# Patient Record
Sex: FEMALE | Race: WHITE | NOT HISPANIC OR LATINO | Employment: OTHER | URBAN - METROPOLITAN AREA
[De-identification: names, ages, dates, MRNs, and addresses within clinical notes are randomized per-mention and may not be internally consistent; named-entity substitution may affect disease eponyms.]

---

## 2017-01-03 ENCOUNTER — TELEPHONE (OUTPATIENT)
Dept: FAMILY MEDICINE CLINIC | Facility: CLINIC | Age: 77
End: 2017-01-03

## 2017-01-03 DIAGNOSIS — J40 BRONCHITIS: Primary | ICD-10-CM

## 2017-01-03 RX ORDER — AZITHROMYCIN 250 MG/1
TABLET, FILM COATED ORAL
Qty: 6 TABLET | Refills: 0 | Status: SHIPPED | OUTPATIENT
Start: 2017-01-03 | End: 2017-04-27

## 2017-01-03 NOTE — TELEPHONE ENCOUNTER
----- Message from Manuel Milan sent at 1/3/2017  8:53 AM EST -----  Regarding: Call back  Contact: [phone]  Ms. Carr wanted me to send you a message for you to call her. She said that she is leaving town tomorrow morning and has a bad chest cold. She was thinking that you would put her on some antibotics.

## 2017-01-09 ENCOUNTER — APPOINTMENT (OUTPATIENT)
Dept: PHYSICAL THERAPY | Facility: HOSPITAL | Age: 77
End: 2017-01-09

## 2017-01-12 ENCOUNTER — TELEPHONE (OUTPATIENT)
Dept: PHYSICAL THERAPY | Facility: HOSPITAL | Age: 77
End: 2017-01-12

## 2017-01-12 NOTE — TELEPHONE ENCOUNTER
Left Voice massage that patient did not show for bioimpedance f/u on 01/09/17. Instructed to call back to reschedule.

## 2017-01-16 ENCOUNTER — TRANSCRIBE ORDERS (OUTPATIENT)
Dept: ADMINISTRATIVE | Facility: HOSPITAL | Age: 77
End: 2017-01-16

## 2017-01-16 DIAGNOSIS — Z79.811 USE OF AROMATASE INHIBITORS: Primary | ICD-10-CM

## 2017-01-23 ENCOUNTER — APPOINTMENT (OUTPATIENT)
Dept: BONE DENSITY | Facility: HOSPITAL | Age: 77
End: 2017-01-23

## 2017-01-24 RX ORDER — ROSUVASTATIN CALCIUM 20 MG/1
TABLET, COATED ORAL
Qty: 90 TABLET | Refills: 0 | Status: SHIPPED | OUTPATIENT
Start: 2017-01-24 | End: 2017-05-22 | Stop reason: SDUPTHER

## 2017-01-24 RX ORDER — ESCITALOPRAM OXALATE 20 MG/1
TABLET ORAL
Qty: 90 TABLET | Refills: 0 | Status: SHIPPED | OUTPATIENT
Start: 2017-01-24 | End: 2017-04-28 | Stop reason: SDUPTHER

## 2017-01-24 NOTE — TELEPHONE ENCOUNTER
Please contact patient for an appointment to come in for a yearly check around April. I will refill her medications until then.

## 2017-01-26 ENCOUNTER — APPOINTMENT (OUTPATIENT)
Dept: BONE DENSITY | Facility: HOSPITAL | Age: 77
End: 2017-01-26

## 2017-01-27 ENCOUNTER — HOSPITAL ENCOUNTER (OUTPATIENT)
Dept: BONE DENSITY | Facility: HOSPITAL | Age: 77
Discharge: HOME | End: 2017-01-27
Admitting: NURSE PRACTITIONER

## 2017-01-27 DIAGNOSIS — Z79.811 USE OF AROMATASE INHIBITORS: ICD-10-CM

## 2017-01-27 PROCEDURE — 77080 DXA BONE DENSITY AXIAL: CPT

## 2017-01-31 RX ORDER — PANTOPRAZOLE SODIUM 40 MG/1
40 TABLET, DELAYED RELEASE ORAL DAILY
Qty: 90 TABLET | Refills: 0 | Status: SHIPPED | OUTPATIENT
Start: 2017-01-31 | End: 2017-06-23 | Stop reason: SDUPTHER

## 2017-01-31 RX ORDER — ATENOLOL 100 MG/1
TABLET ORAL
Qty: 90 TABLET | Refills: 0 | Status: SHIPPED | OUTPATIENT
Start: 2017-01-31 | End: 2017-04-27 | Stop reason: SDUPTHER

## 2017-01-31 RX ORDER — LEVOTHYROXINE SODIUM 0.1 MG/1
100 TABLET ORAL DAILY
Qty: 90 TABLET | Refills: 0 | Status: SHIPPED | OUTPATIENT
Start: 2017-01-31 | End: 2017-04-23 | Stop reason: DRUGHIGH

## 2017-01-31 RX ORDER — ATENOLOL 100 MG/1
TABLET ORAL
Qty: 90 TABLET | Refills: 0 | Status: SHIPPED | OUTPATIENT
Start: 2017-01-31 | End: 2017-08-22 | Stop reason: SDUPTHER

## 2017-02-22 RX ORDER — LEVOTHYROXINE SODIUM 0.1 MG/1
TABLET ORAL
Qty: 30 TABLET | Refills: 3 | Status: SHIPPED | OUTPATIENT
Start: 2017-02-22 | End: 2017-04-23 | Stop reason: DRUGHIGH

## 2017-02-22 RX ORDER — PANTOPRAZOLE SODIUM 40 MG/1
TABLET, DELAYED RELEASE ORAL
Qty: 30 TABLET | Refills: 3 | Status: SHIPPED | OUTPATIENT
Start: 2017-02-22 | End: 2017-04-27 | Stop reason: SDUPTHER

## 2017-03-24 ENCOUNTER — TELEPHONE (OUTPATIENT)
Dept: SURGERY | Facility: CLINIC | Age: 77
End: 2017-03-24

## 2017-04-17 DIAGNOSIS — E78.2 MIXED HYPERLIPIDEMIA: Primary | ICD-10-CM

## 2017-04-17 DIAGNOSIS — E03.9 UNSPECIFIED HYPOTHYROIDISM: ICD-10-CM

## 2017-04-17 DIAGNOSIS — R53.83 LACKING IN ENERGY: ICD-10-CM

## 2017-04-17 DIAGNOSIS — Z79.899 HIGH RISK MEDICATION USE: ICD-10-CM

## 2017-04-21 LAB
ALBUMIN SERPL-MCNC: 4.5 G/DL (ref 3.5–5.2)
ALBUMIN/GLOB SERPL: 1.6 G/DL
ALP SERPL-CCNC: 84 U/L (ref 39–117)
ALT SERPL-CCNC: 20 U/L (ref 1–33)
AST SERPL-CCNC: 27 U/L (ref 1–32)
BILIRUB SERPL-MCNC: 0.3 MG/DL (ref 0.1–1.2)
BUN SERPL-MCNC: 14 MG/DL (ref 8–23)
BUN/CREAT SERPL: 15.1 (ref 7–25)
CALCIUM SERPL-MCNC: 10.2 MG/DL (ref 8.6–10.5)
CHLORIDE SERPL-SCNC: 103 MMOL/L (ref 98–107)
CHOLEST SERPL-MCNC: 168 MG/DL (ref 0–200)
CO2 SERPL-SCNC: 27.2 MMOL/L (ref 22–29)
CREAT SERPL-MCNC: 0.93 MG/DL (ref 0.57–1)
ERYTHROCYTE [DISTWIDTH] IN BLOOD BY AUTOMATED COUNT: 12.9 % (ref 11.7–13)
GLOBULIN SER CALC-MCNC: 2.9 GM/DL
GLUCOSE SERPL-MCNC: 104 MG/DL (ref 65–99)
HCT VFR BLD AUTO: 41.9 % (ref 35.6–45.5)
HDLC SERPL-MCNC: 39 MG/DL (ref 40–60)
HGB BLD-MCNC: 13.3 G/DL (ref 11.9–15.5)
LDLC SERPL CALC-MCNC: 81 MG/DL (ref 0–100)
LDLC/HDLC SERPL: 2.08 {RATIO}
MCH RBC QN AUTO: 32.8 PG (ref 26.9–32)
MCHC RBC AUTO-ENTMCNC: 31.7 G/DL (ref 32.4–36.3)
MCV RBC AUTO: 103.2 FL (ref 80.5–98.2)
PLATELET # BLD AUTO: 259 10*3/MM3 (ref 140–500)
POTASSIUM SERPL-SCNC: 4.5 MMOL/L (ref 3.5–5.2)
PROT SERPL-MCNC: 7.4 G/DL (ref 6–8.5)
RBC # BLD AUTO: 4.06 10*6/MM3 (ref 3.9–5.2)
SODIUM SERPL-SCNC: 143 MMOL/L (ref 136–145)
TRIGL SERPL-MCNC: 240 MG/DL (ref 0–150)
TSH SERPL DL<=0.005 MIU/L-ACNC: 12.12 MIU/ML (ref 0.27–4.2)
VLDLC SERPL CALC-MCNC: 48 MG/DL (ref 5–40)
WBC # BLD AUTO: 4.13 10*3/MM3 (ref 4.5–10.7)

## 2017-04-23 RX ORDER — LEVOTHYROXINE SODIUM 0.12 MG/1
125 TABLET ORAL DAILY
Qty: 90 TABLET | Refills: 3 | Status: SHIPPED | OUTPATIENT
Start: 2017-04-23 | End: 2017-12-07 | Stop reason: SDUPTHER

## 2017-04-27 ENCOUNTER — OFFICE VISIT (OUTPATIENT)
Dept: FAMILY MEDICINE CLINIC | Facility: CLINIC | Age: 77
End: 2017-04-27

## 2017-04-27 VITALS
HEART RATE: 59 BPM | WEIGHT: 176 LBS | OXYGEN SATURATION: 97 % | DIASTOLIC BLOOD PRESSURE: 84 MMHG | BODY MASS INDEX: 30.05 KG/M2 | HEIGHT: 64 IN | SYSTOLIC BLOOD PRESSURE: 140 MMHG | RESPIRATION RATE: 16 BRPM

## 2017-04-27 DIAGNOSIS — Z23 NEED FOR VACCINATION: ICD-10-CM

## 2017-04-27 DIAGNOSIS — Z00.00 ROUTINE GENERAL MEDICAL EXAMINATION AT A HEALTH CARE FACILITY: Primary | ICD-10-CM

## 2017-04-27 DIAGNOSIS — E03.9 UNSPECIFIED HYPOTHYROIDISM: ICD-10-CM

## 2017-04-27 PROCEDURE — 90670 PCV13 VACCINE IM: CPT | Performed by: FAMILY MEDICINE

## 2017-04-27 PROCEDURE — 99213 OFFICE O/P EST LOW 20 MIN: CPT | Performed by: FAMILY MEDICINE

## 2017-04-27 PROCEDURE — G0009 ADMIN PNEUMOCOCCAL VACCINE: HCPCS | Performed by: FAMILY MEDICINE

## 2017-04-27 PROCEDURE — 90714 TD VACC NO PRESV 7 YRS+ IM: CPT | Performed by: FAMILY MEDICINE

## 2017-04-27 PROCEDURE — 90471 IMMUNIZATION ADMIN: CPT | Performed by: FAMILY MEDICINE

## 2017-04-27 PROCEDURE — G0439 PPPS, SUBSEQ VISIT: HCPCS | Performed by: FAMILY MEDICINE

## 2017-04-27 NOTE — PATIENT INSTRUCTIONS
Medicare Wellness  Personal Prevention Plan of Service     Date of Office Visit:  2017  Encounter Provider:  Goran Carr MD  Place of Service:  [facility]  Patient Name: Avelina Carr  :  1940    As part of the Medicare Wellness portion of your visit today, we are providing you with this personalized preventive plan of services (PPPS). This plan is based upon recommendations of the [place] Preventive Services Task Force (USPSTF) and the Advisory Committee on Immunization Practices (ACIP).    This lists the preventive care services that should be considered, and provides dates of when you are due. Items listed as completed are up-to-date and do not require any further intervention.    Health Maintenance   Topic Date Due   • TDAP/TD VACCINES (2 - Td) 2017   • LIPID PANEL  2018   • MEDICARE ANNUAL WELLNESS  2018   • MAMMOGRAM  2018   • INFLUENZA VACCINE  Completed   • PNEUMOCOCCAL VACCINES (65+ LOW/MEDIUM RISK)  Completed   • ZOSTER VACCINE  Completed       Orders Placed This Encounter   Procedures   • Pneumococcal Conjugate Vaccine 13-Valent All   • Td Vaccine Greater Than or Equal To 8yo With Preservative IM       No Follow-up on file.

## 2017-04-27 NOTE — PROGRESS NOTES
Medicare Subsequent Wellness Visit  Subjective   History of Present Illness    Avelina Carr is a 77 y.o. female who presents for an Medicare Wellness Visit. In addition, we addressed the following health issues:  Hypothyroidism. Her TSH was elevated on last labs. I had called her and started increased levothyroxine to 125 mcg.   She has elevated trigs but is on Crestor so I am assuming this is due to hypothyroidism.    PMH, PSH, SocHx, FamHx, Allergies, and Medications: Reviewed and updated in the history section of chart.  Family History   Problem Relation Age of Onset   • Hypertension Mother    • Hyperlipidemia Mother        Social History     Social History Narrative    Lives at home with        Allergies   Allergen Reactions   • Penicillins        Outpatient Medications Prior to Visit   Medication Sig Dispense Refill   • Probiotic Product (PROBIOTIC DAILY PO) Take  by mouth.     • rosuvastatin (CRESTOR) 20 MG tablet TAKE ONE TABLET BY MOUTH DAILY 90 tablet 0   • Triamcinolone Acetonide (NASACORT) 55 MCG/ACT nasal inhaler 2 sprays into each nostril daily.     • atenolol (TENORMIN) 100 MG tablet TAKE ONE TABLET BY MOUTH DAILY 90 tablet 0   • calcium-vitamin D (OSCAL-500) 500-200 MG-UNIT per tablet Take 1 tablet by mouth daily.     • clopidogrel (PLAVIX) 75 MG tablet Take 75 mg by mouth daily.     • diphenhydrAMINE (BENADRYL) 12.5 MG/5ML elixir Take  by mouth 4 (four) times a day as needed for itching.     • folic acid (FOLVITE) 1 MG tablet Take 1 mg by mouth daily.     • furosemide (LASIX) 20 MG tablet Take 1 tablet by mouth 2 (two) times a day. 90 tablet 1   • gabapentin (NEURONTIN) 800 MG tablet Take 800 mg by mouth 3 (three) times a day.     • letrozole (FEMARA) 2.5 MG tablet Take  by mouth daily.     • levothyroxine (SYNTHROID, LEVOTHROID) 125 MCG tablet Take 1 tablet by mouth Daily. 90 tablet 3   • Multiple Vitamins-Minerals (VISION-BAR PRESERVE PO) Take  by mouth.     • pantoprazole (PROTONIX) 40 MG  EC tablet Take 1 tablet by mouth Daily. 90 tablet 0   • atenolol (TENORMIN) 100 MG tablet TAKE ONE TABLET BY MOUTH DAILY 90 tablet 0   • azithromycin (ZITHROMAX Z-JUWAN) 250 MG tablet Take 2 tablets the first day, then 1 tablet daily for 4 days. 6 tablet 0   • azithromycin (ZITHROMAX Z-JUWAN) 250 MG tablet Take 2 tablets the first day, then 1 tablet daily for 4 days. 6 tablet 0   • escitalopram (LEXAPRO) 20 MG tablet TAKE ONE TABLET BY MOUTH DAILY 90 tablet 0   • Multiple Vitamin (MULTIVITAMIN) capsule Take 1 capsule by mouth daily.     • pantoprazole (PROTONIX) 40 MG EC tablet TAKE ONE TABLET BY MOUTH DAILY 30 tablet 3   • predniSONE (DELTASONE) 10 MG tablet 4 tabs po qd for 4 days, 3 tabs po qd for 4 days, 2 tabs po qd for 2 days, 1 tab po qd for 2 days 34 tablet 0   • Probiotic Product ([employer] PO) Take  by mouth.     • promethazine-dextromethorphan (PROMETHAZINE-DM) 6.25-15 MG/5ML syrup Take 5 mL by mouth 4 (four) times a day as needed for cough. 240 mL 0     No facility-administered medications prior to visit.         Patient Active Problem List   Diagnosis   • Soft tissue swelling of chest wall   • Abnormal ultrasound of breast   • Postmastectomy lymphedema syndrome   • Malignant neoplasm of upper-outer quadrant of right female breast         Patient Care Team:  Goran Carr MD as PCP - General (Family Medicine)  Health Habits:  Current Diet: Well balanced diet  Dental Exam. UTD  Eye Exam. UTD  Exercise:none  Current exercise activities include:    Recent Hospitalizations:  none    Age-appropriate Screening Schedule:  Refer to the list below for future screening recommendations based on patient's age. Orders for these recommended tests are listed in the plan section. The patient has been provided with a written plan.    Health Maintenance   Topic Date Due   • LIPID PANEL  04/21/2018   • MEDICARE ANNUAL WELLNESS  04/27/2018   • MAMMOGRAM  09/09/2018   • TDAP/TD VACCINES (3 - Td) 04/27/2027   •  INFLUENZA VACCINE  Completed   • PNEUMOCOCCAL VACCINES (65+ LOW/MEDIUM RISK)  Completed   • ZOSTER VACCINE  Completed       Depression Screen:   PHQ-9 Depression Screening 4/27/2017   Little interest or pleasure in doing things 0   Feeling down, depressed, or hopeless 0   Trouble falling or staying asleep, or sleeping too much 0   Feeling tired or having little energy 0   Poor appetite or overeating 0   Feeling bad about yourself - or that you are a failure or have let yourself or your family down 0   Trouble concentrating on things, such as reading the newspaper or watching television 0   Moving or speaking so slowly that other people could have noticed. Or the opposite - being so fidgety or restless that you have been moving around a lot more than usual 0   Thoughts that you would be better off dead, or of hurting yourself in some way 0   PHQ-9 Total Score 0   If you checked off any problems, how difficult have these problems made it for you to do your work, take care of things at home, or get along with other people? Not difficult at all       Functional and Cognitive Screening:  Functional & Cognitive Status 4/27/2017   Do you have difficulty preparing food and eating? No   Do you have difficulty bathing yourself? No   Do you have difficulty using the toilet? No   Do you have difficulty moving around from place to place? No   In the past year have you fallen or experienced a near fall? No   Do you need help using the phone?  No   Are you deaf or do you have serious difficulty hearing?  No   Do you need help with transportation? No   Do you need help shopping? No   Do you need help preparing meals?  No   Do you need help with housework?  Yes   Do you need help with laundry? No   Do you need help taking your medications? No     Does the patient have evidence of cognitive impairment? mild      Review of Systems   Constitutional: Positive for fatigue.   Musculoskeletal: Positive for back pain and myalgias.  "      Objective     Vitals:    04/27/17 1029   BP: 140/84   Pulse: 59   Resp: 16   SpO2: 97%   Weight: 176 lb (79.8 kg)   Height: 64\" (162.6 cm)       Body mass index is 30.21 kg/(m^2).    PHYSICAL EXAM  Vitals reviewed and on chart.  HEENT: PERRLA, EOMI. Oral mucosa moist,   No LAD.  CV: RRR, no murmurs, rubs, clicks or gallops  LUNGS: CTA bilaterally  EXT: No edema, FROM in bilateral upper and lower ext  NEURO: CN II - XII grossly intact        ASSESSMENT AND PLAN      Problem List Items Addressed This Visit     None      Visit Diagnoses     Routine general medical examination at a health care facility    -  Primary    Need for vaccination        Relevant Orders    Pneumococcal Conjugate Vaccine 13-Valent All (Completed)    Td Vaccine Greater Than or Equal To 8yo With Preservative IM (Completed)    Unspecified hypothyroidism    You are now on an increased dose at may direction and will  RTO for TSH in 2 months.            Orders:  Orders Placed This Encounter   Procedures   • Pneumococcal Conjugate Vaccine 13-Valent All   • Td Vaccine Greater Than or Equal To 8yo With Preservative IM       Follow Up:  Return in about 2 months (around 6/27/2017) for recheck thyroid.     ADVANCED DIRECTIVES: advised    An After Visit Summary and PPPS with all of these plans were given to the patient.            "

## 2017-04-28 RX ORDER — ESCITALOPRAM OXALATE 20 MG/1
TABLET ORAL
Qty: 90 TABLET | Refills: 0 | Status: SHIPPED | OUTPATIENT
Start: 2017-04-28 | End: 2017-07-25 | Stop reason: SDUPTHER

## 2017-05-01 ENCOUNTER — TELEPHONE (OUTPATIENT)
Dept: FAMILY MEDICINE CLINIC | Facility: CLINIC | Age: 77
End: 2017-05-01

## 2017-05-01 RX ORDER — GABAPENTIN 800 MG/1
800 TABLET ORAL 3 TIMES DAILY
Qty: 90 TABLET | Refills: 6 | Status: SHIPPED | OUTPATIENT
Start: 2017-05-01 | End: 2017-12-07 | Stop reason: DRUGHIGH

## 2017-05-22 RX ORDER — ROSUVASTATIN CALCIUM 20 MG/1
TABLET, COATED ORAL
Qty: 90 TABLET | Refills: 0 | Status: SHIPPED | OUTPATIENT
Start: 2017-05-22 | End: 2017-08-21 | Stop reason: SDUPTHER

## 2017-06-23 RX ORDER — PANTOPRAZOLE SODIUM 40 MG/1
TABLET, DELAYED RELEASE ORAL
Qty: 90 TABLET | Refills: 0 | Status: SHIPPED | OUTPATIENT
Start: 2017-06-23 | End: 2017-12-07 | Stop reason: SDUPTHER

## 2017-07-25 RX ORDER — ESCITALOPRAM OXALATE 20 MG/1
TABLET ORAL
Qty: 90 TABLET | Refills: 0 | Status: SHIPPED | OUTPATIENT
Start: 2017-07-25 | End: 2017-10-24 | Stop reason: SDUPTHER

## 2017-07-25 RX ORDER — FUROSEMIDE 20 MG/1
TABLET ORAL
Qty: 180 TABLET | Refills: 0 | Status: SHIPPED | OUTPATIENT
Start: 2017-07-25 | End: 2017-12-06 | Stop reason: HOSPADM

## 2017-08-21 RX ORDER — ATENOLOL 100 MG/1
TABLET ORAL
Qty: 90 TABLET | Refills: 0 | Status: SHIPPED | OUTPATIENT
Start: 2017-08-21 | End: 2017-12-07 | Stop reason: SDUPTHER

## 2017-08-21 RX ORDER — ROSUVASTATIN CALCIUM 20 MG/1
TABLET, COATED ORAL
Qty: 90 TABLET | Refills: 0 | Status: SHIPPED | OUTPATIENT
Start: 2017-08-21 | End: 2017-12-07 | Stop reason: SDUPTHER

## 2017-08-22 RX ORDER — ATENOLOL 100 MG/1
100 TABLET ORAL DAILY
Qty: 90 TABLET | Refills: 0 | Status: SHIPPED | OUTPATIENT
Start: 2017-08-22 | End: 2017-11-15 | Stop reason: SDUPTHER

## 2017-09-11 ENCOUNTER — APPOINTMENT (OUTPATIENT)
Dept: MAMMOGRAPHY | Facility: HOSPITAL | Age: 77
End: 2017-09-11
Attending: SURGERY

## 2017-09-12 ENCOUNTER — HOSPITAL ENCOUNTER (OUTPATIENT)
Dept: MAMMOGRAPHY | Facility: HOSPITAL | Age: 77
Discharge: HOME | End: 2017-09-12
Attending: SURGERY | Admitting: SURGERY

## 2017-09-12 DIAGNOSIS — Z12.39 BREAST SCREENING: ICD-10-CM

## 2017-09-12 DIAGNOSIS — Z12.31 ENCOUNTER FOR SCREENING MAMMOGRAM FOR MALIGNANT NEOPLASM OF BREAST: ICD-10-CM

## 2017-09-12 PROCEDURE — G0202 SCR MAMMO BI INCL CAD: HCPCS

## 2017-09-12 PROCEDURE — 77063 BREAST TOMOSYNTHESIS BI: CPT

## 2017-09-15 ENCOUNTER — TELEPHONE (OUTPATIENT)
Dept: FAMILY MEDICINE CLINIC | Facility: CLINIC | Age: 77
End: 2017-09-15

## 2017-09-15 NOTE — TELEPHONE ENCOUNTER
----- Message from Charlette Mc sent at 9/14/2017  3:25 PM EDT -----  [facility] called they have received an order from [facility] for vertigo, was requesting a last office note within 90 days (last seen in April), asking, if Dr. Carr would write something up and send it to them so Medicare will pay for visits.  Cira Landeros [phone]

## 2017-09-15 NOTE — TELEPHONE ENCOUNTER
No, we must abide by medicare guidelines, which are:  Pt must have been seen within 90 days prior to start of HH or within 30 days after the start of home health.  She can be seen within that 30 day period either w/ [facility] or one of the ARNP's.

## 2017-09-18 ENCOUNTER — OFFICE VISIT (OUTPATIENT)
Dept: SURGERY | Facility: CLINIC | Age: 77
End: 2017-09-18

## 2017-09-18 ENCOUNTER — TELEPHONE (OUTPATIENT)
Dept: SURGERY | Facility: CLINIC | Age: 77
End: 2017-09-18

## 2017-09-18 VITALS
WEIGHT: 176 LBS | HEART RATE: 62 BPM | DIASTOLIC BLOOD PRESSURE: 83 MMHG | BODY MASS INDEX: 30.05 KG/M2 | TEMPERATURE: 98.5 F | OXYGEN SATURATION: 98 % | HEIGHT: 64 IN | SYSTOLIC BLOOD PRESSURE: 164 MMHG

## 2017-09-18 DIAGNOSIS — C50.411 MALIGNANT NEOPLASM OF UPPER-OUTER QUADRANT OF RIGHT FEMALE BREAST (HCC): Primary | ICD-10-CM

## 2017-09-18 DIAGNOSIS — I97.2 POSTMASTECTOMY LYMPHEDEMA SYNDROME: ICD-10-CM

## 2017-09-18 PROCEDURE — 99213 OFFICE O/P EST LOW 20 MIN: CPT | Performed by: SURGERY

## 2017-09-18 NOTE — PROGRESS NOTES
Chief Complaint: Paloma Carr is a 77 y.o. female who was seen in consultation at the request of Artem Martinez MD  for Malignant neoplasm of upper-outer quadrant of right female breast and a followup visit    History of Present Illness:  Patient presents with a newly diagnosed RIGHT breast cancer. She noted no masses, skin changes, nipple discharge, nipple changes prior to her most recent imaging.     Her most recent imaging includes:  03/16/09          E           Bilateral Mammography             Avelina Carr  Scattered fibroglandular densities.   BIRADS category 1: Negative     03/17/10          E           Mammo Screening Bilateral            Avelina Carr   Scattered fibroglandular densities.   BIRADS category 1: Negative     09/04/14        [facility]             Bone Mineral Densitometry             Avelina Carr   IMPRESSION: Osteopenia.     09/04/14          E            Mammo Screening Bilateral            Avelina Carr   There is some increased density outer aspect of the right breast which has a slightly nodular appearance. Benign-appearing lymph nodes are seen in both axilla.   BIRADS category 0    09/19/14            [facility]              Right Diagnostic Mammography with Right Breast Sonography            Avelina Carr   There is persistence of the area of focal asymmetry although the area partially resolves.   ULTRASOUND: Right breast lateral half. At the 9 o'clock position 6 cm from the nipple, there is a 1.1 x 0.7 x 0.9 cm heterogeneous hypoechoic region.   IMPRESSION: 1.1 cm vague heterogeneous hypoechoic region 9 o'clock right breast 6 cm from the nipple.   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   Grade: Intermediate (Tubules=3, Nuclei=2, Mitosis=1).   Largest focus spanning at least 0.8 cm.     She has not had  a breast biopsy in  "the past.  She has her uterus and ovaries, is postmenopausal, and takes no hormones.  Her family history includes the following:no breast or ovarian cancer    MRI showed 2cm additional enhancement posteriorly, rec removal at surgery. Entire area 3.3 cm AP dimension. No findings LEFt no abnormal nodes. Will not biopsy or place clip due to need to come off of plavix. Instead, will reimage after treatment before resection with MRI.  Her receptors returend as [id number] her 2 1+.   Saw Dr Martinez, who started neoadjuvant femara .  He noted a decrease in the mass. She tells me that she never noted it to begin with so has noted no changes.  She reports hot flashes, muscle aches, and fatigue on the femara, but is \"dealing with it.\"    In the interim, Mrs. Carr has done well. She has continued on the femara with minimal side effects.  She can no longer appreciate the mass.  Her post treatment MRi showed   05/04/15        [facility]         Bilateral Breast MRI          Paloma Carr  IMPRESSION:   There are no findings suspicious for malignancy in the left breast or in the right breast. The metallic clip denoting the site of biopsy-proven malignancy is stable in position. The imaging features are most consistent with that of complete resolution to neoadjuvant chemotherapy.    She has lost 3 #.    We went to the operating room on 5-27-15 for a RIGHT lumpectomy, with a suspected path CR based on imaging. Unfortunately, her pathology returned as     5-27-15 RIGHT lumpectomy returend as IMC, NST, int grade (2,1,1), 2.5 cm, single focus, DCIS focally present, margins as follows:  - anterior margin focally positive for invasive carcinoma, Inferior margin focally positive for invasive cancer, DCIS 5mm from edge, medial margin positive for invasive cancer at multiple foci, DCIS within 4 mm, lateral margin invasive to within < 1mm lateral edge and DCIS within 3 mm.     She reports modest discomfort, denies erythema, warmth, drainage " from the incision.       We went to the operating room on 6-4-15 for a RIGHT total mastectomy and SLNB. Her pathology returend as no residual insitu or invasive in the breast, a 4mm metastatic deposit in an intramammary parth and 1/2 sentinel nodes with a 2.3mm deposit. path stage [id number].    She reports that her drain has had 80-60-45 over the past 3 days. Initially was 200 per day.  She takes pain medication before bed at night.    In the interim,  Mrs. Carr  has done well.  She has noted no changes in her breast exam. No new masses, skin changes,  nipple changes, nipple discharge LEFT breast.  She reports skin thickening RIGHT chest wall with tenderness, but no nodules or discolorations on the chest wall.   Denies lumps in her armpit or neck.  She denies headache, bone pain, belly pain, cough, changes in vision or gait.    Her most recent imaging includes the followin/08/15     [facility]      SCREENING MAMMOGRAM                  Avelina Carr   Scattered fibroglandular densities.   BI-RADS Category 1: negative       In the interim,  Mrs. Carr has done well.  She has noted no changes in her LEFT breast exam. No new masses, skin changes,  nipple changes, nipple discharge either breast.   She reports a swelling on her RIGHT chest wall that has been present for 3 months.  SHe thinks that it may be larger than when she initially identified it.  It is tender. She denies any fever, drainage, redness.  She is still taking her plavix.  I arranged a RIGHT chest wall ultrasound once she called to notify us of the problem:  12/09/15                  [facility]              CHEST SONOGRAPHY                   Avelina Carr   IMPRESSION: 6.5cm complex septated partially cystic mass at the mastectomy site in the right breast. 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 Aspiration to alleviate the mass should be considered but the mass may not completely drain percutaneously. Should there be any persistent portion then 6-month  sonographic followup is recommended.     She has seen PT and they worked with her arm and gave her a sleeve for swelling. She does not routinely wear this at this time. She has not noted any swelling.    SHe did not see Dr Mcdowell about the rib pain and no longer has symptoms similar to esophagitis.        Mrs. Carr called due to persistent discomfort in her right chest wall at the area of the hematoma.  We therefore arranged for a right chest wall ultrasound and to see me after.  The ultrasound showed a dimension of the hematoma that was essentially unchanged just slightly longer and thinner and shape.  This was 8.1 x 6 cm.  It was described as a multicystic septated mass consistent with hematoma.    He has gained 2 pounds.  She has no other changes.    She does report that her right arm feels some discomfort.  She saw Dr. Pham and he feels that this is related to the pediatric issues with her right shoulder for which they have elected not to operate.  I asked her whether her sleeve improved the symptoms and she said that it did not.      In the interim,  Paloma Carr  has done well.  She has noted no changes in her breast exam. No new masses, skin changes, nipple changes, nipple discharge either breast.   She denies headache, bone pain, belly pain, cough, changes in vision or gait.    She has had a period of inactivity.  Over the summer she had bilateral leg skin biopsies in dermatology that did not heal due to venous insufficiency.  After this her dermatologist Dr. Acosta sent her to the wound care clinic where she is seeing Dr. Peterson.  He has her wrapped in Unna boots with a silver salve underneath.  She has been wearing this for 6 weeks.    Her most recent imaging includes the following:  Left screening mammogram September 13, 2016.  No findings suspicious for malignancy BI-RADS 1, [facility].    She reports that the hematoma has completely resolved and she has minimal discomfort at this site.  She does  tell me that her prosthesis is very heavy and that she does not like it.    Interval History:  In the interim,  Paloma Carr  has done well.  She has noted no changes in her breast exam. No new masses, skin changes, nipple changes, nipple discharge LEFT breast.  No nodules or discolorations RIGHT chest wall.    She denies headache, bone pain, belly pain, cough, changes in vision or gait.    Her most recent imaging includes the following:  [facility] mamm[facility] with 3-D.  Fatty replaced left breast parenchyma.  No suspicious findings BI-RADS 1.  She has been having trouble with the crystals in her ear and vertigo.  She continues the Femara under the direction of Dr. Martinez and saw him last week.  She does not wear prosthesis because it is uncomfortable.  She complains of discomfort in her right shoulder related to rotator cuff injury.      Review of Systems:  Review of Systems   Constitutional: Positive for unexpected weight change (6 lb wt gain).   Skin: Positive for wound (seeing [facility] for non-healing wounds in legs bilaterally).   All other systems reviewed and are negative.       Past Medical and Surgical History:  Breast Biopsy History:  Patient has had  1 number of breast biopsies in the past. .  Breast Cancer HIstory:  Patient does not have a past medical history of breast cancer.  Breast Operations, and year:  The patient has never had a breast operation.    Obstetric/Gynecologic History:  Age menstrual periods began:13-13  Patient is postmenopausal, entered menopause naturally at age: The date of her last menstrual period was  not having anymore mp.  The patient started into menopause at age  54.    Number of pregnancies:4  Number of live births: 4  Number of abortions or miscarriages: none  Age of delivery of first child: 15  Patient did not breast feed.  Length of time taking birth control pills:15 yrs  n/a      Past Surgical History:   Procedure  "Laterality Date   • ADRENAL GLAND SURGERY     • BRAIN MENINGIOMA EXCISION      occipital   •  SECTION     • TOTAL SHOULDER REPLACEMENT      x2         Past Medical History:   Diagnosis Date   • Anxiety    • Arthritis    • COPD (chronic obstructive pulmonary disease)    • CVA (cerebral infarction)    • CVD (cardiovascular disease)    • Depression    • Hyperlipemia    • Hypertension    • Hypothyroid    • Macular degeneration    • Meningioma     OCCIPITAL MENINGIOMA-REMOVED BY DR TOLLIVER    • Overweight    • Vertigo        Prior Hospitalizations, other than for surgery or childbirth, and year:  na      Social History     Social History   • Marital status:      Spouse name: Vitaly   • Number of children: 4   • Years of education: N/A     Occupational History   • artist      Social History Main Topics   • Smoking status: Former Smoker     Packs/day: 2.00     Years: 40.00     Types: Cigarettes   • Smokeless tobacco: Not on file      Comment: non-smoker since    • Alcohol use No   • Drug use: No   • Sexual activity: Yes     Partners: Male     Birth control/ protection: Post-menopausal     Other Topics Concern   • Not on file     Social History Narrative    Lives at home with      Patient is .  Patient is a homemaker.  Patient drinks 3 servings of caffeine per day.      Family History:  Family History   Problem Relation Age of Onset   • Hypertension Mother    • Hyperlipidemia Mother        Vital Signs:  /83  Pulse 62  Temp 98.5 °F (36.9 °C)  Ht 64\" (162.6 cm)  Wt 176 lb (79.8 kg)  SpO2 98%  BMI 30.21 kg/m2     Medications:    Current Outpatient Prescriptions:   •  atenolol (TENORMIN) 100 MG tablet, TAKE ONE TABLET BY MOUTH DAILY, Disp: 90 tablet, Rfl: 0  •  atenolol (TENORMIN) 100 MG tablet, Take 1 tablet by mouth Daily., Disp: 90 tablet, Rfl: 0  •  calcium-vitamin D (OSCAL-500) 500-200 MG-UNIT per tablet, Take 1 tablet by mouth daily., Disp: , Rfl:   •  " clopidogrel (PLAVIX) 75 MG tablet, Take 75 mg by mouth daily., Disp: , Rfl:   •  diphenhydrAMINE (BENADRYL) 12.5 MG/5ML elixir, Take  by mouth 4 (four) times a day as needed for itching., Disp: , Rfl:   •  escitalopram (LEXAPRO) 20 MG tablet, TAKE ONE TABLET BY MOUTH DAILY, Disp: 90 tablet, Rfl: 0  •  folic acid (FOLVITE) 1 MG tablet, Take 1 mg by mouth daily., Disp: , Rfl:   •  furosemide (LASIX) 20 MG tablet, TAKE ONE TABLET BY MOUTH TWICE A DAY, Disp: 180 tablet, Rfl: 0  •  gabapentin (NEURONTIN) 800 MG tablet, Take 1 tablet by mouth 3 (Three) Times a Day., Disp: 90 tablet, Rfl: 6  •  letrozole (FEMARA) 2.5 MG tablet, Take  by mouth daily., Disp: , Rfl:   •  levothyroxine (SYNTHROID, LEVOTHROID) 125 MCG tablet, Take 1 tablet by mouth Daily., Disp: 90 tablet, Rfl: 3  •  Multiple Vitamins-Minerals (VISION-BAR PRESERVE PO), Take  by mouth., Disp: , Rfl:   •  pantoprazole (PROTONIX) 40 MG EC tablet, TAKE ONE TABLET BY MOUTH DAILY, Disp: 90 tablet, Rfl: 0  •  Probiotic Product (PROBIOTIC DAILY PO), Take  by mouth., Disp: , Rfl:   •  rosuvastatin (CRESTOR) 20 MG tablet, TAKE ONE TABLET BY MOUTH DAILY, Disp: 90 tablet, Rfl: 0  •  Triamcinolone Acetonide (NASACORT) 55 MCG/ACT nasal inhaler, 2 sprays into each nostril daily., Disp: , Rfl:      Physical Examination:  General Appearance:  5'4 168#  Patient is in no distress.  She is well kept and has an  overweight build.   Psychiatric:  Patient with appropriate mood and affect. Alert and oriented to self, time, and place.    Breast, RIGHT:  surgically absent with a well healed oblique incision. No nodules or discolorations.  There is no longer residual hematoma at the RIGHT LOQ location, inferior to her incision.      Breast, LEFT:   medium  sized, asymmetric with the contralateral surgically absent side.  Breast skin is without erythema, edema, rashes.  There are no visible abnormalities upon inspection during the arm-raising maneuver or with hands on hips in the sitting  position. There is no nipple retraction, discharge or nipple/areolar skin changes. There are no masses palpable in the sitting or supine positions.    Lymphatic:  There is no axillary, cervical, infraclavicular, or supraclavicular adenopathy bilaterally. There is no significant edema on examination today.  Eyes:  Pupils are round and reactive to light.  Cardiovascular:  Heart rate and rhythm are regular.  Respiratory:  Lungs are clear bilaterally with no crackles or wheezes in any lung field.  Gastrointestinal:  Abdomen is soft, nondistended, and nontender.  Musculoskeletal:  Good strength in all 4 extremities.  There is good range of motion in both shoulders.   Skin:  No new skin lesions or rashes on the skin excluding the breast (see breast exam above).    Imagin09          E           Bilateral Mammography             Avelina Carr  Scattered fibroglandular densities.   BIRADS category 1: Negative     03/17/10          E           Mammo Screening Bilateral            Avelina Carr   Scattered fibroglandular densities.   BIRADS category 1: Negative     14        [facility]             Bone Mineral Densitometry             Avelina Carr   IMPRESSION: Osteopenia.     14          E            Mammo Screening Bilateral            Avelina Carr   There is some increased density outer aspect of the right breast which has a slightly nodular appearance. Benign-appearing lymph nodes are seen in both axilla.   BIRADS category 0    14            E              Right Diagnostic Mammography with Right Breast Sonography            Avelina Carr   There is persistence of the area of focal asymmetry although the area partially resolves.   ULTRASOUND: Right breast lateral half. At the 9 o'clock position 6 cm from the nipple, there is a 1.1 x 0.7 x 0.9 cm heterogeneous hypoechoic region.   IMPRESSION: 1.1 cm vague heterogeneous hypoechoic region 9 o'clock right breast 6 cm from the nipple.   BIRADS category  4    10/09/14          [facility]           Bilateral Breast MRI            Avelina Carr   fluid containing fluid biopsy cavity. posterior one-third right breast 10 o'clock 1.9 x 1.5 x 1.6 cm in diameter. Marking clip in the far anterior aspect of the biopsy cavity. Rim of enhancement surrounding the biopsy cavity. Particularly anteriorly where there are areas of above-threshold enhancement. In addition, there is linear enhancement extending posteriorly and medially from the biopsy cavity to the level of the pectoralis fascia.   I cannot completely exclude DCIS extending posteriorly from the biopsy cavity. Therefore, if lumpectomy is performed, it would be prudent to excise this tissue if possible. This extends 2 cm posterior to the posterior margin of the biopsy cavity. A lumpectomy specimen including this area would be approximately 3.3 cm in AP dimension.   There is no axillary lymphadenopathy.   IMPRESSION: There is also a 2 cm long focus of linear enhancement extending posteriorly medially from the biopsy cavity toward the chest wall. DCIS cannot be entirely excluded. There are no suspicious findings in the left breast.     Bedside Ultrasound Breast after 3 months of femara  Procedure: Diagnostic ultrasound RIGHT breast.  Indication: mid neoadjuvant, treatment response assessment  Description of procedure: Using a 10MHz linear transducer, I scanned the breast at the area of interest.  Findings: At the RIGHT breast 9:30 6.5 CFN location, there is a reflecctor that represents the marker centrally within an area of heterogeneity. This measures 1.65 x 0.41 cm in the RAD dimension x 0.72 x 0.35 cm in the ARAD dimension.   Initially, there were 2 areas of heterogeneity present. The more lateral suggests post biopsy hematoma with internal reflector suggestive of clip. The more medial suggests tumor. The area to include both lesions spanned 4.18 cm in the radial dimension.   Impression: Biopsy proven area of malignancy is  visualized as a single area of heterogeneity with reflector centrally representing marker. All at the site of palpable nodularity.  Plan:  as below    05/04/15        [facility]         Bilateral Breast MRI          Paloma Carr  IMPRESSION:   There are no findings suspicious for malignancy in the left breast or in the right breast. The metallic clip denoting the site of biopsy-proven malignancy is stable in position. The imaging features are most consistent with that of complete resolution to neoadjuvant chemotherapy.    09/08/15     [facility]      SCREENING MAMMOGRAM                  Shala Carr   Scattered fibroglandular densities.   BI-RADS Category 1: negative     12/09/15                  [facility]              CHEST SONOGRAPHY                   Shala Carr   IMPRESSION: 6.5cm complex septated partially cystic mass at the mastectomy site in the right breast. 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 Aspiration to alleviate the mass should be considered but the mass may not completely drain percutaneously. Should there be any persistent portion then 6-month sonographic followup is recommended.     03-02-16                          CHEST SONO                                    SHALA CARR  There is an 8.1 x 1.8 x 6  cm  multiseptated  predominantly  fluid- filled mass that is consistent with a postmastectomy  bed hematoma.  While the greastest  dimension measures  slightly  larger, the transverse dimension is slightly smaller. Overall  no significant interval change.  Stable right postmastectomy   bed  Hematoma    9-9-16   [facility]  MAMMO SCREENING MODIFIED LEFT  SHALA CARR  Scattered fibroglandular densisites.  BIRADS CATEGORY:  1 Negative    [facility] September 16, 2017 left mammogram with 3-D.  Fatty replaced left breast parenchyma.  No suspicious findings BI-RADS 1    Pathology:  10/02/14          [facility]            Pathology               Shala Carr   Right breast mass  Invasive ductal carcinoma focally with  "lobular features.   Grade: Intermediate (Tubules=3, Nuclei=2, Mitosis=1).   Largest focus spanning at least 0.8 cm.       Received: 5/27/2015  Reported: 5/28/2015    Clinical Diagnosis and History       Right breast cancer        Operation: Right breast needle localized lumpectomy  fresh for permanent   not for calcifications  calcifications not indicated as per reviewing  radiologist Manuel Epps M.D.       Diagnosis  1: BREAST, RIGHT, LUMPECTOMY WITH WIRE-GUIDED LOCALIZATION:       INVASIVE DUCTAL CARCINOMA AND ASSOCIATED DUCTAL CARCINOMA IN SITU       (PATIENT IS STATUS POST PRESURGICAL / NEOADJUVANT THERAPY).       SEE SYNOPTIC REPORT (BELOW) FOR ADDITIONAL DETAILS.         2: BREAST, RIGHT, DESIGNATED \"ADDITIONAL RIGHT SUPERIOR MARGIN\", EXCISION:       BENIGN BREAST TISSUE.    3: BREAST, RIGHT, DESIGNATED \"ADDITIONAL INFERIOR MARGIN\", EXCISION:       BENIGN BREAST TISSUE.    4: BREAST, RIGHT, DESIGNATED \"ADDITIONAL MEDIAL MARGIN\", EXCISION:       SINGLE FOCUS OF ATYPICAL DUCT HYPERPLASIA, EXTENDING TO WITHIN LESS       THAN 1 MM OF NEW MEDIAL MARGIN (SEE COMMENT).    COMMENT: This focus is obscured by cautery artifact; therefore, ductal carcinoma  in situ (DCIS) is difficult to exclude with certainty.    5: BREAST, RIGHT, DESIGNATED \"ADDITIONAL LATERAL MARGIN\", EXCISION:       BENIGN BREAST TISSUE.    SYNOPTIC REPORT (Based on [facility] Cancer Case Summary, version December 2013):       Procedure: Excision with wire-guided localization and subsequent reexcision  of superior, inferior,       medial, and lateral margins.       Lymph Node Sampling: Not performed.       Specimen Laterality: Right.       Tumor Site: Not specified.       Histologic Type of Invasive Carcinoma: Invasive ductal carcinoma.       Tumor Size (size of largest invasive carcinoma): Approximately 2.5 cm  (estimate based on both       gross evaluation and the number of consecutive cross sections containing  invasive carcinoma       multiplied by " average thickness of tissue sections).       Histologic Grade (Matt Histologic Score):            Glandular (Acinar)/Tubular Differentiation: Score 2.            Nuclear Pleomorphism: Score 2.            Mitotic Rate: Score 1.            Overall Grade: Grade 1 (Score 5 of 9).       Tumor Focality: Single focus of invasive carcinoma.       Ductal Carcinoma In Situ (DCIS): DCIS is focally present.            Architectural Pattern: Soild.            Nuclear Grade: Grade II (intermediate).            Necrosis: Not identified.       Lobular Carcinoma In Situ (LCIS): Not identified.       Margins: Focally positive for invasive carcinoma, but negative for DCIS.                Anterior margin: Focally positive for invasive carcinoma; DCIS  greater than 10 mm from            anterior margin.                Posterior margin: Greater than 10 mm from invasive carcinoma and  from DCIS.               Superior margin: Invasive carcinoma extends to within 10 mm of  superior edge of            specimen #1 and DCIS extends to within 9 mm of superior edge of  specimen #1;       re-excised superior margin (specimen #2) is negative for tumor.               Inferior margin: Focally positive for invasive carcinoma (specimen  #1); DCIS extends to            within 5 mm of inferior edge of specimen #1; reexcised inferior margin  (specimen #3)       demonstrates no evidence of tumor (see NOTE).                 Medial margin: Positive for invasive carcinoma (multiple foci in  specimen #1); DCIS            extends to within 4 mm of medial edge of specimen #1; re-excised medial  margin       (specimen #4) demonstrates atypical duct hyperplasia and possible DCIS less  than 1       mm from new medial margin (see NOTE).               Lateral margin: Invasive carcinoma focally extends to within less  than 1 mm of lateral edge            of specimen #1 and DCIS extends to within 3 mm of lateral edge of  specimen #1;       re-excised lateral  margin demonstrates no evidence of tumor.            NOTE: The re-excised margins are significantly smaller than their  corresponding edges on            the main lumpectomy specimen (specimen #1); therefore, these margins  may be       positive or close even after the subsequent re-excisions, depending on how  the       specimens are physically related to one another.  Correlation with clinical  and       intraoperative findings is suggested.  Treatment Effect (Response to Presurgical Neoadjuvant Therapy):            In the breast: Probable, focal response to presurgical therapy in the  invasive carcinoma.            In the lymph nodes: No lymph nodes removed.       Lymph-Vascular Invasion: Not identified.       Dermal Lymph-Vascular Invasion: No skin present for evaluation.       Pathologic Staging:            TNM Descriptors: y (post treatment).            Primary Tumor: ypT2.            Regional Lymph Nodes: ypNX (no nodes submitted or found).            Distant Metastasis: Not applicable.       Additional Pathologic Findings:             Fibrocystic changes with duct dilatation and stasis and apocrine  metaplasia.            Small fibroadenoma.            Small radial sclerosing lesion.            Small arteries with medial calcific sclerosis.       Ancillary Studies: ER, TX and HER-2/eva studies performed on previous  biopsy specimen (see       [id number]).      REYES/gini     Received: 6/4/2015  Reported: 6/5/2015    Clinical Diagnosis and History       Right breast cancer       Operation: Right breast mastectomy with right axilla sentinel node biopsy -  frozen section       Diagnosis  1: SENTINEL LYMPH NODE #1, RIGHT AXILLA, EXCISION:       ONE LYMPH NODE, POSITIVE FOR METASTATIC CARCINOMA (2.3 MM FOCUS OF       METASTATIC TUMOR, SEE COMMENT).          2: SENTINEL LYMPH NODE #2, RIGHT AXILLA, EXCISION:       ONE LYMPH NODE, NEGATIVE FOR METASTATIC CARCINOMA.    COMMENT: The original frozen section slides have  been reviewed and are negative  for metastatic carcinoma.  This focus of metastatic tumor appears only on deeper  permanent sections of one tissue block (FSC1B).   There is no evidence of  fibrous scarring in either lymph node.  The regional lymph node stage is ypN1a.   The above diagnosis was discussed with Dr. DANGELO Hernandez on 6/5/2015 at 4:45 pm.      The final diagnosis for the remaining specimen (right mastectomy) will be  reported in a supplemental report.      TDJ/je IHC/a/THM           Assessment:   Diagnosis Plan   1. Malignant neoplasm of upper-outer quadrant of right female breast  Ambulatory Referral to Physical Therapy Bioimpedance (Deep tissue massaeg RIGHT chest wall; RIGHT shoulder ROM IF ok with Dr Pahm)   2. Postmastectomy lymphedema syndrome  Ambulatory Referral to Physical Therapy Bioimpedance (Deep tissue massaeg RIGHT chest wall; RIGHT shoulder ROM IF ok with Dr Pham)      1-  RIGHT breast 9:30 6.5 CFN- 5x2 cm on exam, 4 cm on in office US, 1.1 cm on pre biopsy US, density on mammogram pre biopsy was 3.3 x 2.3 cm.  IMC, NST, lobular features, int grade, (t3n2m1), ER 90%  CA 90% HER2 IHC 1+  Clinical stage T2N0M0- IIA , possibly multifocal vs significant lobular growth pattern    - Dr Martinez, femara start - no postoperative chemotherapy  Initially on MRI spanned 3.3 cm, mammogram 3.3 cm. After 3 mo femara, 0.7 x 1.65cm on bedside ultrasound, and 2 cm down from 5 on exam of thickening.  -7 months (5-2015) post start of femara- exam and MRi show no evidence of disease    5-27-15 RIGHT lumpectomy returned as IMC, NST, int grade (2,1,1), 2.5 cm, single focus, DCIS focally present, margins as follows:  - anterior margin focally positive for invasive carcinoma, Inferior margin focally positive for invasive cancer, DCIS 5mm from edge, medial margin positive for invasive cancer at multiple foci, DCIS within 4 mm, lateral margin invasive to within < 1mm lateral edge and DCIS within 3 mm.       6-4-15 for a RIGHT total mastectomy and SLNB. Her pathology returend as no residual insitu or invasive in the breast, a 4mm metastatic deposit in an intramammary parth and 1/2 sentinel nodes with a 2.3mm deposit.   -path stage ghM0H9c- IIB.      07/30/15          [facility]          Trever Mcdowell MD              Avelina Carr  DATE STARTED: 7/10/15  DATE COMPLETED: 7/30/15  4,050 cGy over the course of 15 fractions to the right chest wall and regional lymphatics including the axilla and supraclavicular fossa.     2-  RIGHT arm  - In surveillance with physical therapy at [facility], with bioimpedence    Plan:  Mrs. Carr is doing okay today at her 2 year visit.  From a breast cancer standpoint she is doing well.  There is no evidence of disease on her examination or on her imaging today.    We discussed that she had some bio impedance measurements which were outside of normal range.  I encouraged her to return for follow-up and we will order this.    With regards to some tightness that she observe at her mastectomy site, this is likely related to post-scarring from surgery as well as postop hematoma on her blood thinner.  Therefore we will have physical therapy to some deep tissue massage with her.    With regards to her right arm, we are happy to order range of motion for the right shoulder, but I have asked her to clear this through Dr. Alvarado office before she would engage in any sort of therapy to that shoulder.    Her most recent imaging is in good order.    I have not given her a routine follow-up in our office.  She is due her next mammogram left breast September 17, 2018 at [facility].  I've asked her to continue her self breast exam and to call us in the future if she has any concerns and we will be happy to see her back.          Zoey Hernandez MD      We spent 20 minutes in visit today, 15 in face to face .    Next Appointment:  Return for any future  concerns.

## 2017-09-18 NOTE — TELEPHONE ENCOUNTER
[facility] September 16, 2017 left mammogram with 3-D.  Fatty replaced left breast parenchyma.  No suspicious findings BI-RADS 1.

## 2017-09-26 ENCOUNTER — HOSPITAL ENCOUNTER (OUTPATIENT)
Dept: PHYSICAL THERAPY | Facility: HOSPITAL | Age: 77
Setting detail: THERAPIES SERIES
Discharge: HOME | End: 2017-09-26
Attending: SURGERY

## 2017-09-26 DIAGNOSIS — R29.3 ABNORMAL POSTURE: ICD-10-CM

## 2017-09-26 DIAGNOSIS — I97.2 POSTMASTECTOMY LYMPHEDEMA SYNDROME: Primary | ICD-10-CM

## 2017-09-26 DIAGNOSIS — M25.511 CHRONIC RIGHT SHOULDER PAIN: ICD-10-CM

## 2017-09-26 DIAGNOSIS — G89.29 CHRONIC RIGHT SHOULDER PAIN: ICD-10-CM

## 2017-09-26 DIAGNOSIS — C50.411 MALIGNANT NEOPLASM OF UPPER-OUTER QUADRANT OF RIGHT FEMALE BREAST (HCC): ICD-10-CM

## 2017-09-26 DIAGNOSIS — Z96.611 H/O TOTAL SHOULDER REPLACEMENT, RIGHT: ICD-10-CM

## 2017-09-26 DIAGNOSIS — R22.2 SOFT TISSUE SWELLING OF CHEST WALL: ICD-10-CM

## 2017-09-26 PROCEDURE — 97162 PT EVAL MOD COMPLEX 30 MIN: CPT | Performed by: PHYSICAL THERAPIST

## 2017-09-26 PROCEDURE — 97110 THERAPEUTIC EXERCISES: CPT | Performed by: PHYSICAL THERAPIST

## 2017-09-27 PROCEDURE — G8985 CARRY GOAL STATUS: HCPCS | Performed by: PHYSICAL THERAPIST

## 2017-09-27 PROCEDURE — G8984 CARRY CURRENT STATUS: HCPCS | Performed by: PHYSICAL THERAPIST

## 2017-09-27 NOTE — THERAPY EVALUATION
Physical Therapy Lymphedema Initial Evaluation  [facility]     Patient Name: Paloma Carr  : 1940  MRN: [id number]  Today's Date: 2017      Visit Date: 2017    Visit Dx:    ICD-10-CM ICD-9-CM   1. 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 Malignant neoplasm of upper-outer quadrant of right female breast C50.411 174.4   3. Abnormal posture R29.3 781.92   4. Chronic right shoulder pain M25.511 719.41    G89.29 338.29   5. H/O total shoulder replacement, right Z96.611 V43.61   6. Soft tissue swelling of chest wall R22.2 786.6       Patient Active Problem List   Diagnosis   • Soft tissue swelling of chest wall   • Abnormal ultrasound of breast   • Postmastectomy lymphedema syndrome   • Malignant neoplasm of upper-outer quadrant of right female breast        Past Medical History:   Diagnosis Date   • Anxiety    • Arthritis    • COPD (chronic obstructive pulmonary disease)    • CVA (cerebral infarction)    • CVD (cardiovascular disease)    • Depression    • Hyperlipemia    • Hypertension    • Hypothyroid    • Macular degeneration    • Meningioma     OCCIPITAL MENINGIOMA-REMOVED BY DR TOLLIVER    • Overweight    • Vertigo         Past Surgical History:   Procedure Laterality Date   • ADRENAL GLAND SURGERY     • BRAIN MENINGIOMA EXCISION      occipital   •  SECTION     • TOTAL SHOULDER REPLACEMENT      x2       Visit Dx:    ICD-10-CM ICD-9-CM   1. 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 Malignant neoplasm of upper-outer quadrant of right female breast C50.411 174.4   3. Abnormal posture R29.3 781.92   4. Chronic right shoulder pain M25.511 719.41    G89.29 338.29   5. H/O total shoulder replacement, right Z96.611 V43.61   6. Soft tissue swelling of chest wall R22.2 786.6             Patient History       17 1445          History    Chief Complaint Pain  -SC      Type of Pain Chest pain;Shoulder pain;Upper Extremity / Arm   right  -SC      Date  Current Problem(s) Began 05/11/15  -SC      Brief Description of Current Complaint Patient s/p right lumpectomy then mastectomy May 2015, 0/2 R ALN (+) no chemo, completed 14 days of radiation. Did not have reconstruction. She has history of right total shoulder replacement and left reverse shoulder replacement. She has back issues with sciatica into right LE. She has completed physical therapy in the past for her right UE for post mastectomy lymphedema syndrome. She does have a sleeve that she wears preventatively. She does not qualify for surgery for her back or her shoulder due to multiple other medical issues. She is currently not wearing a bra or compression top for support of her left breast. History of stroke affecting left side.   -SC      Patient/Caregiver Goals Relieve pain;Return to prior level of function;Improve mobility;Improve strength;Know what to do to help the symptoms;Decrease swelling  -SC      Current Tobacco Use none  -SC      Smoking Status former  -SC      Patient's Rating of General Health Good  -SC      Hand Dominance right-handed  -SC      How has patient tried to help current problem? none  -SC      Are you or can you be pregnant No  -SC      Pain     Pain Location Breast;Chest;Back;Shoulder  -SC      Pain at Present 3  -SC      Pain at Best 3  -SC      Pain at Worst 6  -SC      Pain Frequency Constant/continuous  -SC      Pain Description Aching;Sharp;Radiating;Pressure;Tightness  -SC      What Performance Factors Make the Current Problem(s) WORSE? standing, sitting, reaching, lifting, pushing, pullling  -SC      What Performance Factors Make the Current Problem(s) BETTER? rest  -SC      Pain Comments chronic  -SC      Fall Risk Assessment    Any falls in the past year: No  -SC      Previous Functional Level --   independent  -SC      Services    Are you currently receiving Home Health services No  -SC      Daily Activities    Primary Language English  -SC      Are you able to read Yes   -SC      Are you able to write Yes  -SC      How does patient learn best? Listening;Reading;Demonstration;Pictures/Video  -SC      Teaching needs identified Home Exercise Program;Management of Condition  -SC      Patient is concerned about/has problems with Coordination;Difficulty with self care (i.e. bathing, dressing, toileting:;Flexibility;Grasping objects lifting;Performing home management (household chores, shopping, care of dependents);Reaching over head;Repetitive movements of the hand, arm, shoulder;Sitting;Standing;Walking;Writing/grasping items with hand(s)  -SC      Does patient have problems with the following? None  -SC      Barriers to learning None  -SC      Functional Status mobility issues preventing performance of daily activities  -SC      Explanation of Functional Status Problem increased time to complete but does live independently  -SC      Pt Participated in POC and Goals Yes  -SC      Safety    Are you being hurt, hit, or frightened by anyone at home or in your life? No  -SC      Are you being neglected by a caregiver No  -SC        User Key  (r) = Recorded By, (t) = Taken By, (c) = Cosigned By    Initials Name Provider Type    SC Yadira Geller, PT Physical Therapist              09/26/17 3885   Lymphedema Assessment   Lymphedema Classification RUE:;at risk/stage 0;secondary;stage 1 (Spontaneously Reversible)   Lymphedema Cancer Related Sx lumpectomy;radical mastectomy;sentinel node biopsy   Lymphedema Surgery Comments 2015   Lymph Nodes Removed # 2   Positive Lymph Nodes # 0   Chemo Received no   Radiation Therapy Received yes   Lymphedema Edema Assessment   Bioimpedence 9.6, WNL, decrease rom baseline in Aprill 2016 (16.7)   Edema Assessment Comment negative edema noted in right UE currently   Lymphedema Measurements   Measurement Type(s) Circumferential   Circumferential Areas Upper extremities   LUE Circumferential (cm)   Measurement Location 1 axilla   Left 1 27.5 cm   Measurement  Location 2 15 cm above elbow   Left 2 26.4 cm   Measurement Location 3 10 cm above elbow   Measurement Location 4 5 cm above elbow   Left 4 23 cm   Measurement Location 5 elbow   Left 5 21.4 cm   Measurement Location 6 5 cm below elbow   Left 6 20.5 cm   Measurement Location 7 10 cm below elbow   Measurement Location 8 15 cm below elbow   Left 8 14.2 cm   Measurement Location 9 20 cm below elbow   Measurement Location 10 wrist    Left 10 13.8 cm   Measurement Location 11 Midpalm   Left 11 16.2 cm   Measurement Location 12 MCPs    Left 12 17.2 cm   RUE Circumferential (cm)   Measurement Location 1 axilla   Right 1 29 cm   Measurement Location 2 15 cm above elbow   Right 2 28.3 cm   Measurement Location 3 10 cm above elbow   Measurement Location 4 5 cm above elbow   Right 4 25 cm   Measurement Location 5 elbow   Right 5 22 cm   Measurement Location 6 5 cm below elbow   Right 6 21 cm   Measurement Location 7 10 cm below elbow   Measurement Location 8 15 cm below elbow   Right 8 17 cm   Measurement Location 9 20 cm below elbow   Measurement Location 10 wrist    Right 10 14.1 cm   Measurement Location 11 Midpalm   Right 11 16 cm   Measurement Location 12 MCPs    Right 12 18 cm   Compression/Skin Care   Compression/Skin Care compression garment   Compression Garment Comments reviewed for travel           09/26/17 1445   Therapy Education   Education Details education of progression of POC for RTC symptoms, lymphedema, bioimpedance test and results, compression   Program New   How Provided Verbal;Demonstration   Provided to Patient   Level of Understanding Teach back education performed;Verbalized;Demonstrated           09/26/17 1445   PT Short Term Goals   STG Date to Achieve 11/07/17   STG 1 1.Patient independent and compliant with initial home exercise program focused on diaphragmatic breathing, range of motion, flexibility to decrease edema and improve lymphatic flow for decreased edema and decreased risk of infection.    STG 1 Progress New   STG 2 2.Patient demonstrate proper awareness of What is Lymphedema and 18 Steps of Prevention for improved prevention, management, care of symptoms and ease of transition to self-care of condition.   STG 2 Progress New   STG 3 3.Patient independent and compliant with self-massage techniques with spouse/family member as needed for improved lymphatic drainage, decreased edema/symptoms, desensitization, decreased axillary cording, decreased risk of infection and improved transition to self-care of condition.   STG 3 Progress New   STG 4 4.Patient independent and compliant with advanced Home Exercise Program and self-care techniques for self-management of condition.   STG 4 Progress New   STG 5 5..Patient demonstrate proper awareness of Care and Air Travel safety with compression/exercise as indicated for improved safety with travel and self-care of condition.   STG 5 Progress New   Long Term Goals   LTG Date to Achieve 12/26/17   LTG 1 1.Patient score </=18.8/100 on DASH for improved function and transition to self-management of condition.   LTG 1 Progress New   LTG 2 2.Patient independent and compliant self MLD techniques for improved mobility, skin care and lymphatic flow.   LTG 2 Progress New   LTG 3 3. Pt. to have a decrease in UE circumfrential measurements of at least 2 cm.   LTG 3 Progress New   Time Calculation   PT Goal Re-Cert Due Date 12/26/17 09/26/17 1445   PT Assessment   Functional Limitations Limitation in home management;Limitations in community activities;Performance in leisure activities;Performance in self-care ADL   Impairments Impaired flexibility;Impaired lymphatic circulation;Impaired postural alignment;Joint mobility;Muscle strength;Pain;Poor body mechanics;Posture;Range of motion;Sensation   Assessment Comments Mrs. Carr returns to formal therapy due to increased pain right UE and chest wall with history of post mastectomy lymphedema syndrome and R TSR.  Current bioimpedance L-Dex score is 9.6, Which is WNL and marked improved score for Paloma. She does have very limited mobility of  right shoulder with positive RTC impingement however due to history of right TSR rehab will be challenging. We will attempt therapy 2x/week for 3 weeks to determine progress.    Please refer to paper survey for additional self-reported information Yes   Rehab Potential Good   Patient/caregiver participated in establishment of treatment plan and goals Yes   Patient would benefit from skilled therapy intervention Yes   PT Plan   PT Frequency 2x/week   Predicted Duration of Therapy Intervention (days/wks) 3 weeks   Planned CPT's? PT EVAL MOD COMPLELITY: 77816;PT RE-EVAL: 44834;PT THER PROC EA 15 MIN: 11235;PT THER ACT EA 15 MIN: 73191;PT MANUAL THERAPY EA 15 MIN: 56643;PT NEUROMUSC RE-EDUCATION EA 15 MIN: 97199;PT EVAL AQUA: 96137;PT AQUATIC THERAPY EA 15 MIN: 15492;PT SELF CARE/HOME MGMT/TRAIN EA 15: 19228;PT HOT OR COLD PACK TREAT MCARE;PT BIS XTRACELL FLUID ANALYSIS: 97569   PT Plan Comments initiate therapeutic gym exercise with focus on flexibility and scapular stabilzation, deep tissue right chest wall, review lymphedema prevention techniques, bioimpedance 3 months                                                      Outcome Measures       09/26/17 1445          DASH    Open a tight or new jar. 3  -SC      Write 1  -SC      Turn a key 1  -SC      Prepare a meal 1  -SC      Push open a heavy door 1  -SC      Place an object on a shelf above your head 1  -SC      Do heavy household chores (e.g., wash walls, wash floors) 3  -SC      Garden or do yard work 3  -SC      Make a bed 3  -SC      Carry a shopping bag or briefcase 3  -SC      Carry a heavy object (over 10 lbs) 2  -SC      Change a lightbulb overhead 2  -SC      Wash or blow dry your hair 3  -SC      Wash your back 5  -SC      Put on a pullover sweater 2  -SC      Use a knife to cut food 1  -SC      Recreational activities in  which require little effort (e.g., cardplaying, knitting, etc.) 1  -SC      Recreational activities in which you take some force or impact through your arm, should or hand (e.g. golf, hammering, tennis, etc.) 4  -SC      Recreational Activities in which you move your arm freely (e.g., frisbee, badminton, etc.) 3  -SC      Manage transportation needs (getting from one place to another) 1  -SC      Sexual Activities 3  -SC      During the past week, to what extent has your arm, shoulder, or hand problem interfered with your normal social activites with family, friends, neighbors or groups? 2  -SC      During the past week, were you limited in your work or other regular daily activities as a result of your arm, shoulder or hand problem? 2  -SC      Arm, Shoulder, or hand pain 3  -SC      Arm, shoulder or hand pain when you performed any specific activity 3  -SC      Weakness in your arm, shoulder or hand 3  -SC      Stiffness in your arm, shoulder or hand 4  -SC      During the past week, how much difficulty have you had sleeping because of the pain in your arm, shoulder or hand? 2  -SC      I feel less capable, less confident or less useful because of my arm, shoulder or hand problem 3  -SC      DASH Sum  69  -SC      Number of Questions Answered 29  -SC      DASH Score 34.48  -SC      Functional Assessment    Outcome Measure Options Disabilities of the Arm, Shoulder, and Hand (DASH)  -SC        User Key  (r) = Recorded By, (t) = Taken By, (c) = Cosigned By    Initials Name Provider Type    SC Yadira Geller PT Physical Therapist            Time Calculation:   Start Time: 1445  Stop Time: 1530  Time Calculation (min): 45 min     Therapy Charges for Today     Code Description Service Date Service Provider Modifiers Qty    49710807537 HC PT THER PROC EA 15 MIN 9/26/2017 Yadira Geller, PT GP 1    43399600943 HC PT EVAL MOD COMPLEXITY 2 9/26/2017 Yadira Geller, PT GP 1    [id number]  PT CARRY MOV HAND  OBJ CURRENT 9/27/2017 Yadira Geller, PT GP, CJ 1    76879360758 HC PT CARRY MOV HAND OBJ PROJECTED 9/27/2017 Yadira Geller, PT GP, CI 1          PT G-Codes  PT Professional Judgement Used?: Yes  Outcome Measure Options: Disabilities of the Arm, Shoulder, and Hand (DASH)  Score: 35/100  Functional Limitation: Carrying, moving and handling objects  Carrying, Moving and Handling Objects Current Status (): At least 20 percent but less than 40 percent impaired, limited or restricted  Carrying, Moving and Handling Objects Goal Status (): At least 1 percent but less than 20 percent impaired, limited or restricted         Yadira Coleman, PT  9/27/2017

## 2017-10-04 ENCOUNTER — HOSPITAL ENCOUNTER (OUTPATIENT)
Dept: PHYSICAL THERAPY | Facility: HOSPITAL | Age: 77
Setting detail: THERAPIES SERIES
Discharge: HOME | End: 2017-10-04

## 2017-10-04 DIAGNOSIS — C50.411 MALIGNANT NEOPLASM OF UPPER-OUTER QUADRANT OF RIGHT BREAST IN FEMALE, ESTROGEN RECEPTOR POSITIVE (HCC): ICD-10-CM

## 2017-10-04 DIAGNOSIS — I97.2 POST-MASTECTOMY LYMPHEDEMA SYNDROME: ICD-10-CM

## 2017-10-04 DIAGNOSIS — I97.2 POSTMASTECTOMY LYMPHEDEMA SYNDROME: Primary | ICD-10-CM

## 2017-10-04 DIAGNOSIS — R22.2 SOFT TISSUE SWELLING OF CHEST WALL: ICD-10-CM

## 2017-10-04 DIAGNOSIS — G89.29 CHRONIC RIGHT SHOULDER PAIN: ICD-10-CM

## 2017-10-04 DIAGNOSIS — Z96.611 H/O TOTAL SHOULDER REPLACEMENT, RIGHT: ICD-10-CM

## 2017-10-04 DIAGNOSIS — Z17.0 MALIGNANT NEOPLASM OF UPPER-OUTER QUADRANT OF RIGHT BREAST IN FEMALE, ESTROGEN RECEPTOR POSITIVE (HCC): ICD-10-CM

## 2017-10-04 DIAGNOSIS — M25.511 CHRONIC RIGHT SHOULDER PAIN: ICD-10-CM

## 2017-10-04 DIAGNOSIS — R29.3 ABNORMAL POSTURE: ICD-10-CM

## 2017-10-04 PROCEDURE — 97140 MANUAL THERAPY 1/> REGIONS: CPT | Performed by: PHYSICAL THERAPIST

## 2017-10-04 PROCEDURE — 97110 THERAPEUTIC EXERCISES: CPT | Performed by: PHYSICAL THERAPIST

## 2017-10-04 NOTE — THERAPY RE-EVALUATION
Outpatient Physical Therapy Lymphedema Treatment Note  [facility]     Patient Name: Paloma Carr  : 1940  MRN: [id number]  Today's Date: 10/4/2017        Visit Date: 10/04/2017    Visit Dx:    ICD-10-CM ICD-9-CM   1. 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 Malignant neoplasm of upper-outer quadrant of right breast in female, estrogen receptor positive C50.411 174.4    Z17.0 V86.0   3. Abnormal posture R29.3 781.92   4. Chronic right shoulder pain M25.511 719.41    G89.29 338.29   5. H/O total shoulder replacement, right Z96.611 V43.61   6. Soft tissue swelling of chest wall R22.2 786.6   7. Post-mastectomy lymphedema syndrome I97.2 457.0       Patient Active Problem List   Diagnosis   • Soft tissue swelling of chest wall   • Abnormal ultrasound of breast   • Postmastectomy lymphedema syndrome   • Malignant neoplasm of upper-outer quadrant of right female breast                                PT Assessment/Plan       10/04/17 1000       PT Assessment    Assessment Comments Marked post radiation skin changes right pec, responds well to manual. Very limited right shoulder ER A/PROM creating tension in pec as well contributing to symptoms. Tolerated therapeutic gym exercises very well. Focus on flexibility and scapular stabilization.   -SC     PT Plan    PT Plan Comments assess manual, progress flexibility and strengthening gym program.   -SC       User Key  (r) = Recorded By, (t) = Taken By, (c) = Cosigned By    Initials Name Provider Type    SC Yadira Geller, PT Physical Therapist                     Exercises       10/04/17 1000          Subjective Comments    Subjective Comments I am doing well. Took 3 extra strength tylenol before I came in. I hate exercising. But the massage feels so great.   -SC      Subjective Pain    Able to rate subjective pain? yes  -SC      Pre-Treatment Pain Level 0  -SC      Post-Treatment Pain Level 0  -SC      Subjective Pain Comment states no pain with  taking meds  -SC      Exercise 1    Exercise Name 1 see paper flowsheet in chart for greater detail, initiated gym program today  -SC        User Key  (r) = Recorded By, (t) = Taken By, (c) = Cosigned By    Initials Name Provider Type    ROB Geller, PT Physical Therapist                       Manual Rx (last 36 hours)      Manual Treatments       10/04/17 1000          Manual Rx 1    Manual Rx 1 Location right shoulder, chest wall  -SC      Manual Rx 1 Type PROM all planes, scar massage with vit E oil, STM, deep tissue massage, myofascial release right chest wall, pec, axilla, lateral flank, gentle MLD in region  -SC      Manual Rx 1 Duration 15 minutes  -SC        User Key  (r) = Recorded By, (t) = Taken By, (c) = Cosigned By    Initials Name Provider Type    ROB Geller, PT Physical Therapist                PT OP Goals       10/04/17 1000       PT Short Term Goals    STG Date to Achieve 11/07/17  -SC     STG 1 1.Patient independent and compliant with initial home exercise program focused on diaphragmatic breathing, range of motion, flexibility to decrease edema and improve lymphatic flow for decreased edema and decreased risk of infection.  -SC     STG 1 Progress Progressing  -SC     STG 1 Progress Comments initiated gym exercise program today  -SC     STG 2 2.Patient demonstrate proper awareness of What is Lymphedema and 18 Steps of Prevention for improved prevention, management, care of symptoms and ease of transition to self-care of condition.  -SC     STG 2 Progress Progressing  -SC     STG 2 Progress Comments reviewed today  -SC     STG 3 3.Patient independent and compliant with self-massage techniques with spouse/family member as needed for improved lymphatic drainage, decreased edema/symptoms, desensitization, decreased axillary cording, decreased risk of infection and improved transition to self-care of condition.  -SC     STG 3 Progress Progressing  -SC     STG 3 Progress Comments  instructed today  -SC     STG 4 4.Patient independent and compliant with advanced Home Exercise Program and self-care techniques for self-management of condition.  -SC     STG 4 Progress Ongoing  -SC     STG 5 5..Patient demonstrate proper awareness of Care and Air Travel safety with compression/exercise as indicated for improved safety with travel and self-care of condition.  -SC     STG 5 Progress Met  -SC     Long Term Goals    LTG Date to Achieve 12/26/17  -SC     LTG 1 1.Patient score </=18.8/100 on DASH for improved function and transition to self-management of condition.  -SC     LTG 1 Progress Ongoing  -SC     LTG 2 2.Patient independent and compliant self MLD techniques for improved mobility, skin care and lymphatic flow.  -SC     LTG 2 Progress Progressing  -SC     LTG 2 Progress Comments instructed today  -SC     LTG 3 3. Pt. to have a decrease in UE circumfrential measurements of at least 2 cm.  -SC     LTG 3 Progress Ongoing  -SC     Time Calculation    PT Goal Re-Cert Due Date 12/26/17   recert 10/26  -SC       User Key  (r) = Recorded By, (t) = Taken By, (c) = Cosigned By    Initials Name Provider Type    ROB Geller PT Physical Therapist                Therapy Education       10/04/17 1000          Therapy Education    Education Details radiated skin, prognosis  -SC      Given HEP;Symptoms/condition management;Pain management;Posture/body mechanics;Edema management;Mobility training  -SC      Program Progressed  -SC      How Provided Verbal;Demonstration  -SC      Provided to Patient  -SC      Level of Understanding Teach back education performed;Verbalized;Demonstrated  -SC        User Key  (r) = Recorded By, (t) = Taken By, (c) = Cosigned By    Initials Name Provider Type    ROB Geller PT Physical Therapist                Time Calculation:   Start Time: 1000  Stop Time: 1045  Time Calculation (min): 45 min     Therapy Charges for Today     Code Description Service Date  Service Provider Modifiers Qty    31520261099 HC PT THER PROC EA 15 MIN 10/4/2017 Yadira Geller, PT GP 2    44160020738 HC PT MANUAL THERAPY EA 15 MIN 10/4/2017 Yadira Geller, PT GP 1                    Yadira Coleman, PT  10/4/2017

## 2017-10-06 ENCOUNTER — HOSPITAL ENCOUNTER (OUTPATIENT)
Dept: PHYSICAL THERAPY | Facility: HOSPITAL | Age: 77
Setting detail: THERAPIES SERIES
Discharge: HOME | End: 2017-10-06

## 2017-10-06 DIAGNOSIS — M25.511 CHRONIC RIGHT SHOULDER PAIN: ICD-10-CM

## 2017-10-06 DIAGNOSIS — R22.2 SOFT TISSUE SWELLING OF CHEST WALL: ICD-10-CM

## 2017-10-06 DIAGNOSIS — Z96.611 H/O TOTAL SHOULDER REPLACEMENT, RIGHT: ICD-10-CM

## 2017-10-06 DIAGNOSIS — R29.3 ABNORMAL POSTURE: ICD-10-CM

## 2017-10-06 DIAGNOSIS — I97.2 POSTMASTECTOMY LYMPHEDEMA SYNDROME: Primary | ICD-10-CM

## 2017-10-06 DIAGNOSIS — G89.29 CHRONIC RIGHT SHOULDER PAIN: ICD-10-CM

## 2017-10-06 DIAGNOSIS — C50.411 MALIGNANT NEOPLASM OF UPPER-OUTER QUADRANT OF RIGHT BREAST IN FEMALE, ESTROGEN RECEPTOR POSITIVE (HCC): ICD-10-CM

## 2017-10-06 DIAGNOSIS — I97.2 POST-MASTECTOMY LYMPHEDEMA SYNDROME: ICD-10-CM

## 2017-10-06 DIAGNOSIS — Z17.0 MALIGNANT NEOPLASM OF UPPER-OUTER QUADRANT OF RIGHT BREAST IN FEMALE, ESTROGEN RECEPTOR POSITIVE (HCC): ICD-10-CM

## 2017-10-06 PROCEDURE — 97110 THERAPEUTIC EXERCISES: CPT | Performed by: PHYSICAL THERAPIST

## 2017-10-06 PROCEDURE — 97140 MANUAL THERAPY 1/> REGIONS: CPT | Performed by: PHYSICAL THERAPIST

## 2017-10-06 NOTE — THERAPY TREATMENT NOTE
Outpatient Physical Therapy Lymphedema Treatment Note  [facility]     Patient Name: Paloma Carr  : 1940  MRN: [id number]  Today's Date: 10/6/2017        Visit Date: 10/06/2017    Visit Dx:    ICD-10-CM ICD-9-CM   1. 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 Malignant neoplasm of upper-outer quadrant of right breast in female, estrogen receptor positive C50.411 174.4    Z17.0 V86.0   3. Abnormal posture R29.3 781.92   4. Chronic right shoulder pain M25.511 719.41    G89.29 338.29   5. H/O total shoulder replacement, right Z96.611 V43.61   6. Soft tissue swelling of chest wall R22.2 786.6   7. Post-mastectomy lymphedema syndrome I97.2 457.0       Patient Active Problem List   Diagnosis   • Soft tissue swelling of chest wall   • Abnormal ultrasound of breast   • Postmastectomy lymphedema syndrome   • Malignant neoplasm of upper-outer quadrant of right female breast                                PT Assessment/Plan       10/06/17 0945       PT Plan    PT Plan Comments continue manual, progress flexibility and strengthening as patient tolerates  -SC       User Key  (r) = Recorded By, (t) = Taken By, (c) = Cosigned By    Initials Name Provider Type    ROB Geller, PT Physical Therapist                     Exercises       10/06/17 0945          Subjective Comments    Subjective Comments I was a little sore after the exercises in my back not  my shoulder. The massage seemed to help.   -SC      Subjective Pain    Able to rate subjective pain? yes  -SC      Pre-Treatment Pain Level 2  -SC      Post-Treatment Pain Level 1  -SC      Subjective Pain Comment right shoulder  -SC      Exercise 1    Exercise Name 1 see paper flowsheet in chart for greater detail, started on E  -SC        User Key  (r) = Recorded By, (t) = Taken By, (c) = Cosigned By    Initials Name Provider Type    ROB Geller, PT Physical Therapist                       Manual Rx (last 36 hours)      Manual Treatments        10/06/17 0945          Manual Rx 1    Manual Rx 1 Location right shoulder, chest wall  -SC      Manual Rx 1 Type PROM all planes, scar massage with vit E oil, STM, deep tissue massage, myofascial release right chest wall, pec, axilla, lateral flank, gentle MLD in region  -SC      Manual Rx 1 Duration 15 minutes  -SC        User Key  (r) = Recorded By, (t) = Taken By, (c) = Cosigned By    Initials Name Provider Type    SC Yadira Geller, PT Physical Therapist                PT OP Goals       10/06/17 0945       PT Short Term Goals    STG Date to Achieve 11/07/17  -SC     STG 1 1.Patient independent and compliant with initial home exercise program focused on diaphragmatic breathing, range of motion, flexibility to decrease edema and improve lymphatic flow for decreased edema and decreased risk of infection.  -SC     STG 1 Progress Progressing  -SC     STG 2 2.Patient demonstrate proper awareness of What is Lymphedema and 18 Steps of Prevention for improved prevention, management, care of symptoms and ease of transition to self-care of condition.  -SC     STG 2 Progress Progressing  -SC     STG 3 3.Patient independent and compliant with self-massage techniques with spouse/family member as needed for improved lymphatic drainage, decreased edema/symptoms, desensitization, decreased axillary cording, decreased risk of infection and improved transition to self-care of condition.  -SC     STG 3 Progress Progressing  -SC     STG 4 4.Patient independent and compliant with advanced Home Exercise Program and self-care techniques for self-management of condition.  -SC     STG 4 Progress Ongoing  -SC     STG 5 5..Patient demonstrate proper awareness of Care and Air Travel safety with compression/exercise as indicated for improved safety with travel and self-care of condition.  -SC     STG 5 Progress Met  -SC     Long Term Goals    LTG Date to Achieve 12/26/17  -SC     LTG 1 1.Patient score </=18.8/100 on DASH for  improved function and transition to self-management of condition.  -SC     LTG 1 Progress Ongoing  -SC     LTG 2 2.Patient independent and compliant self MLD techniques for improved mobility, skin care and lymphatic flow.  -SC     LTG 2 Progress Progressing  -SC     LTG 3 3. Pt. to have a decrease in UE circumfrential measurements of at least 2 cm.  -SC     LTG 3 Progress Ongoing  -SC     Time Calculation    PT Goal Re-Cert Due Date 12/26/17   reassess 10/26  -SC       User Key  (r) = Recorded By, (t) = Taken By, (c) = Cosigned By    Initials Name Provider Type    ROB Geller PT Physical Therapist                Therapy Education       10/06/17 0945          Therapy Education    Given HEP;Symptoms/condition management  -SC      Program Reinforced  -SC      How Provided Verbal  -SC      Provided to Patient  -SC      Level of Understanding Verbalized  -SC        User Key  (r) = Recorded By, (t) = Taken By, (c) = Cosigned By    Initials Name Provider Type    ROB Geller PT Physical Therapist                Time Calculation:   Start Time: 0945  Stop Time: 1030  Time Calculation (min): 45 min     Therapy Charges for Today     Code Description Service Date Service Provider Modifiers Qty    82953020291 HC PT THER PROC EA 15 MIN 10/6/2017 Yadira Geller, PT GP 2    23382929124 HC PT MANUAL THERAPY EA 15 MIN 10/6/2017 Yadira Geller PT GP 1                    Yadira Coleman PT  10/6/2017

## 2017-10-18 ENCOUNTER — HOSPITAL ENCOUNTER (OUTPATIENT)
Dept: PHYSICAL THERAPY | Facility: HOSPITAL | Age: 77
Setting detail: THERAPIES SERIES
Discharge: HOME | End: 2017-10-18

## 2017-10-18 DIAGNOSIS — R29.3 ABNORMAL POSTURE: ICD-10-CM

## 2017-10-18 DIAGNOSIS — I97.2 POST-MASTECTOMY LYMPHEDEMA SYNDROME: ICD-10-CM

## 2017-10-18 DIAGNOSIS — C50.411 MALIGNANT NEOPLASM OF UPPER-OUTER QUADRANT OF RIGHT BREAST IN FEMALE, ESTROGEN RECEPTOR POSITIVE (HCC): ICD-10-CM

## 2017-10-18 DIAGNOSIS — M25.511 CHRONIC RIGHT SHOULDER PAIN: ICD-10-CM

## 2017-10-18 DIAGNOSIS — G89.29 CHRONIC RIGHT SHOULDER PAIN: ICD-10-CM

## 2017-10-18 DIAGNOSIS — R22.2 SOFT TISSUE SWELLING OF CHEST WALL: ICD-10-CM

## 2017-10-18 DIAGNOSIS — I97.2 POSTMASTECTOMY LYMPHEDEMA SYNDROME: Primary | ICD-10-CM

## 2017-10-18 DIAGNOSIS — Z17.0 MALIGNANT NEOPLASM OF UPPER-OUTER QUADRANT OF RIGHT BREAST IN FEMALE, ESTROGEN RECEPTOR POSITIVE (HCC): ICD-10-CM

## 2017-10-18 DIAGNOSIS — Z96.611 H/O TOTAL SHOULDER REPLACEMENT, RIGHT: ICD-10-CM

## 2017-10-18 PROCEDURE — 97140 MANUAL THERAPY 1/> REGIONS: CPT | Performed by: PHYSICAL THERAPIST

## 2017-10-18 PROCEDURE — 97110 THERAPEUTIC EXERCISES: CPT | Performed by: PHYSICAL THERAPIST

## 2017-10-20 ENCOUNTER — HOSPITAL ENCOUNTER (OUTPATIENT)
Dept: PHYSICAL THERAPY | Facility: HOSPITAL | Age: 77
Setting detail: THERAPIES SERIES
Discharge: HOME | End: 2017-10-20

## 2017-10-20 DIAGNOSIS — I97.2 POSTMASTECTOMY LYMPHEDEMA SYNDROME: Primary | ICD-10-CM

## 2017-10-20 DIAGNOSIS — M25.511 CHRONIC RIGHT SHOULDER PAIN: ICD-10-CM

## 2017-10-20 DIAGNOSIS — G89.29 CHRONIC RIGHT SHOULDER PAIN: ICD-10-CM

## 2017-10-20 DIAGNOSIS — Z17.0 MALIGNANT NEOPLASM OF UPPER-OUTER QUADRANT OF RIGHT BREAST IN FEMALE, ESTROGEN RECEPTOR POSITIVE (HCC): ICD-10-CM

## 2017-10-20 DIAGNOSIS — R29.3 ABNORMAL POSTURE: ICD-10-CM

## 2017-10-20 DIAGNOSIS — R22.2 SOFT TISSUE SWELLING OF CHEST WALL: ICD-10-CM

## 2017-10-20 DIAGNOSIS — I97.2 POST-MASTECTOMY LYMPHEDEMA SYNDROME: ICD-10-CM

## 2017-10-20 DIAGNOSIS — Z96.611 H/O TOTAL SHOULDER REPLACEMENT, RIGHT: ICD-10-CM

## 2017-10-20 DIAGNOSIS — C50.411 MALIGNANT NEOPLASM OF UPPER-OUTER QUADRANT OF RIGHT BREAST IN FEMALE, ESTROGEN RECEPTOR POSITIVE (HCC): ICD-10-CM

## 2017-10-20 PROCEDURE — 97110 THERAPEUTIC EXERCISES: CPT | Performed by: PHYSICAL THERAPIST

## 2017-10-20 PROCEDURE — 97140 MANUAL THERAPY 1/> REGIONS: CPT | Performed by: PHYSICAL THERAPIST

## 2017-10-20 NOTE — THERAPY TREATMENT NOTE
Outpatient Physical Therapy Lymphedema Treatment Note  [facility]     Patient Name: Paloma Carr  : 1940  MRN: [id number]  Today's Date: 10/20/2017        Visit Date: 10/20/2017    Visit Dx:    ICD-10-CM ICD-9-CM   1. 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 Abnormal posture R29.3 781.92   3. Soft tissue swelling of chest wall R22.2 786.6   4. Post-mastectomy lymphedema syndrome I97.2 457.0   5. H/O total shoulder replacement, right Z96.611 V43.61   6. Chronic right shoulder pain M25.511 719.41    G89.29 338.29   7. Malignant neoplasm of upper-outer quadrant of right breast in female, estrogen receptor positive C50.411 174.4    Z17.0 V86.0       Patient Active Problem List   Diagnosis   • Soft tissue swelling of chest wall   • Abnormal ultrasound of breast   • Postmastectomy lymphedema syndrome   • Malignant neoplasm of upper-outer quadrant of right female breast                                PT Assessment/Plan       10/20/17 1030       PT Assessment    Assessment Comments Noted edema and visible bruising with no traumatic injury to region left knee. Instructed gentle program to decrease symptoms. Noted improved right shoulder mobility after manual techniques today. Recert next week to determine continuation of care. Schedule f/u bioimpedance.   -SC     PT Plan    PT Plan Comments recert 10/26 due   -SC       User Key  (r) = Recorded By, (t) = Taken By, (c) = Cosigned By    Initials Name Provider Type    SC Yadira Geller, PT Physical Therapist                 Modalities       10/20/17 1030          Ice    Ice Applied Yes  -SC      Location left knee lateral joint line and inferior medial quad (bruised region)   -SC      Rx Minutes --   60 seconds each location  -SC      Ice S/P Rx Yes  -SC      Patient reports will apply ice at home to involved area Yes   instructed ice massage and ice wrap (8-10 minutes)   -SC        User Key  (r) = Recorded By, (t) = Taken By, (c) = Cosigned By     Initials Name Provider Type    SC Yadira Geller, PT Physical Therapist                Exercises       10/20/17 1030          Subjective Comments    Subjective Comments My knee is really hurting me. I have started the steroids but it is more worrisome that my right shoulder now. After the last session my right shoulder felt so much better. I feel like this is helping now. I want to keep coming for sure now.   -SC      Subjective Pain    Able to rate subjective pain? yes  -SC      Pre-Treatment Pain Level 1  -SC      Subjective Pain Comment right shoulder, left knee 6/10  -SC      Exercise 1    Exercise Name 1 held shoulder exercises today to do knee exercises and focus on manual right shoulder  -SC      Exercise 2    Exercise Name 2 HR  -SC      Reps 2 10  -SC      Exercise 3    Exercise Name 3 HS curl standing  -SC      Reps 3 10  -SC      Exercise 4    Exercise Name 4 SAQ  -SC      Reps 4 10  -SC        User Key  (r) = Recorded By, (t) = Taken By, (c) = Cosigned By    Initials Name Provider Type    SC Yadira Geller, PT Physical Therapist                       Manual Rx (last 36 hours)      Manual Treatments       10/20/17 1030          Manual Rx 1    Manual Rx 1 Location right shoulder, chest wall  -SC      Manual Rx 1 Type PROM all planes, scar massage with vit E oil, STM, deep tissue massage, myofascial release right chest wall, pec, axilla, lateral flank, gentle MLD in region  -SC      Manual Rx 1 Duration 30  -SC        User Key  (r) = Recorded By, (t) = Taken By, (c) = Cosigned By    Initials Name Provider Type    SC Yadira Geller, PT Physical Therapist                PT OP Goals       10/20/17 1030       PT Short Term Goals    STG Date to Achieve 11/07/17  -SC     STG 1 1.Patient independent and compliant with initial home exercise program focused on diaphragmatic breathing, range of motion, flexibility to decrease edema and improve lymphatic flow for decreased edema and decreased risk of  infection.  -SC     STG 1 Progress Progressing  -SC     STG 1 Progress Comments instructed for left knee pain today  -SC     STG 2 2.Patient demonstrate proper awareness of What is Lymphedema and 18 Steps of Prevention for improved prevention, management, care of symptoms and ease of transition to self-care of condition.  -SC     STG 2 Progress Met  -SC     STG 3 3.Patient independent and compliant with self-massage techniques with spouse/family member as needed for improved lymphatic drainage, decreased edema/symptoms, desensitization, decreased axillary cording, decreased risk of infection and improved transition to self-care of condition.  -SC     STG 3 Progress Progressing  -SC     STG 3 Progress Comments reviewed today with myofascial release techniques  -SC     STG 4 4.Patient independent and compliant with advanced Home Exercise Program and self-care techniques for self-management of condition.  -SC     STG 4 Progress Ongoing  -SC     STG 5 5..Patient demonstrate proper awareness of Care and Air Travel safety with compression/exercise as indicated for improved safety with travel and self-care of condition.  -SC     STG 5 Progress Met  -SC     Long Term Goals    LTG Date to Achieve 12/26/17  -SC     LTG 1 1.Patient score </=18.8/100 on DASH for improved function and transition to self-management of condition.  -SC     LTG 1 Progress Ongoing  -SC     LTG 2 2.Patient independent and compliant self MLD techniques for improved mobility, skin care and lymphatic flow.  -SC     LTG 2 Progress Progressing  -SC     LTG 2 Progress Comments reviewed today  -SC     LTG 3 3. Pt. to have a decrease in UE circumfrential measurements of at least 2 cm.  -SC     LTG 3 Progress Ongoing  -SC     Time Calculation    PT Goal Re-Cert Due Date 12/26/17   10/26/17  -SC       User Key  (r) = Recorded By, (t) = Taken By, (c) = Cosigned By    Initials Name Provider Type    SC Yadira Geller, PT Physical Therapist                 Therapy Education       10/20/17 1030          Therapy Education    Education Details instructed knee exercises to decrease edema and pain, education myofascial release right shoulder  -SC      Given HEP;Symptoms/condition management;Edema management  -SC      Program New  -SC      How Provided Verbal;Demonstration;Written  -SC      Provided to Patient  -SC      Level of Understanding Teach back education performed;Verbalized;Demonstrated  -SC        User Key  (r) = Recorded By, (t) = Taken By, (c) = Cosigned By    Initials Name Provider Type    SC Yadira Geller, PT Physical Therapist                Time Calculation:   Start Time: 1045 (patient arrived at 1045 for 1030 appointment)  Stop Time: 1125  Time Calculation (min): 40 min     Therapy Charges for Today     Code Description Service Date Service Provider Modifiers Qty    21252600659  PT THER PROC EA 15 MIN 10/20/2017 Yadira Geller, PT GP 1    [id number]  PT MANUAL THERAPY EA 15 MIN 10/20/2017 Yadira Geller, PT GP 2                    Yadira Coleman, PT  10/20/2017

## 2017-10-23 ENCOUNTER — APPOINTMENT (OUTPATIENT)
Dept: PHYSICAL THERAPY | Facility: HOSPITAL | Age: 77
End: 2017-10-23

## 2017-10-24 RX ORDER — ESCITALOPRAM OXALATE 20 MG/1
TABLET ORAL
Qty: 90 TABLET | Refills: 1 | Status: SHIPPED | OUTPATIENT
Start: 2017-10-24 | End: 2017-12-07 | Stop reason: SDUPTHER

## 2017-10-25 ENCOUNTER — APPOINTMENT (OUTPATIENT)
Dept: PHYSICAL THERAPY | Facility: HOSPITAL | Age: 77
End: 2017-10-25

## 2017-10-30 ENCOUNTER — HOSPITAL ENCOUNTER (OUTPATIENT)
Dept: PHYSICAL THERAPY | Facility: HOSPITAL | Age: 77
Setting detail: THERAPIES SERIES
Discharge: HOME | End: 2017-10-30

## 2017-10-30 DIAGNOSIS — I97.2 POST-MASTECTOMY LYMPHEDEMA SYNDROME: ICD-10-CM

## 2017-10-30 DIAGNOSIS — C50.411 MALIGNANT NEOPLASM OF UPPER-OUTER QUADRANT OF RIGHT BREAST IN FEMALE, ESTROGEN RECEPTOR POSITIVE (HCC): ICD-10-CM

## 2017-10-30 DIAGNOSIS — I97.2 POSTMASTECTOMY LYMPHEDEMA SYNDROME: Primary | ICD-10-CM

## 2017-10-30 DIAGNOSIS — M25.511 CHRONIC RIGHT SHOULDER PAIN: ICD-10-CM

## 2017-10-30 DIAGNOSIS — Z96.611 H/O TOTAL SHOULDER REPLACEMENT, RIGHT: ICD-10-CM

## 2017-10-30 DIAGNOSIS — Z17.0 MALIGNANT NEOPLASM OF UPPER-OUTER QUADRANT OF RIGHT BREAST IN FEMALE, ESTROGEN RECEPTOR POSITIVE (HCC): ICD-10-CM

## 2017-10-30 DIAGNOSIS — R29.3 ABNORMAL POSTURE: ICD-10-CM

## 2017-10-30 DIAGNOSIS — G89.29 CHRONIC RIGHT SHOULDER PAIN: ICD-10-CM

## 2017-10-30 DIAGNOSIS — R22.2 SOFT TISSUE SWELLING OF CHEST WALL: ICD-10-CM

## 2017-10-30 PROCEDURE — 97140 MANUAL THERAPY 1/> REGIONS: CPT | Performed by: PHYSICAL THERAPIST

## 2017-10-30 PROCEDURE — 97110 THERAPEUTIC EXERCISES: CPT | Performed by: PHYSICAL THERAPIST

## 2017-10-31 PROCEDURE — G8985 CARRY GOAL STATUS: HCPCS | Performed by: PHYSICAL THERAPIST

## 2017-10-31 PROCEDURE — G8984 CARRY CURRENT STATUS: HCPCS | Performed by: PHYSICAL THERAPIST

## 2017-10-31 NOTE — THERAPY PROGRESS REPORT/RE-CERT
Outpatient Physical Therapy Lymphedema Progress Note  [facility]     Patient Name: Paloma Carr  : 1940  MRN: [id number]  Today's Date: 10/31/2017        Visit Date: 10/30/2017    Visit Dx:    ICD-10-CM ICD-9-CM   1. 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 Abnormal posture R29.3 781.92   3. Soft tissue swelling of chest wall R22.2 786.6   4. Post-mastectomy lymphedema syndrome I97.2 457.0   5. H/O total shoulder replacement, right Z96.611 V43.61   6. Chronic right shoulder pain M25.511 719.41    G89.29 338.29   7. Malignant neoplasm of upper-outer quadrant of right breast in female, estrogen receptor positive C50.411 174.4    Z17.0 V86.0       Patient Active Problem List   Diagnosis   • Soft tissue swelling of chest wall   • Abnormal ultrasound of breast   • Postmastectomy lymphedema syndrome   • Malignant neoplasm of upper-outer quadrant of right female breast                                PT Assessment/Plan       10/30/17 1015       PT Assessment    Assessment Comments Mrs. Carr has been attending regular therapy session with focus of care on right shoulder RTC strengthening with history of R total shoulder replacement and s/p right mastectomy with axillary dissection. She is progressing quite welll with shoulder rehabilitation however she has multiple joint pain due to age, OA and AI. She currently has negative s/s of lymphedema and we continue to monitor with diagnostic assessment bioimpedance tool and review prevention techniques for right UE. Patient will continue to benefit from formal outpatient physical therapy to progress strengthening of right shoulder 2x/week for additional 2-4 weeks with goal of transition to self-care.   -SC     PT Plan    PT Frequency 2x/week  -SC     Predicted Duration of Therapy Intervention (days/wks) 2-4 weeks  -SC     PT Plan Comments focus strengthening of right shoulder  -SC       User Key  (r) = Recorded By, (t) = Taken By, (c) = Cosigned By     Initials Name Provider Type    ROB Geller, PT Physical Therapist                     Exercises       10/30/17 1015          Subjective Comments    Subjective Comments My knee is much better. My shoulder does seem to be doing better. It's my right hand that is really bothering me now and my back. I know it is from the AI but I have continue to take it for 10 years, which is like the rest of my life so I guess I just have to live with it.   -SC      Subjective Pain    Able to rate subjective pain? yes  -SC      Pre-Treatment Pain Level 2  -SC      Post-Treatment Pain Level 1  -SC      Exercise 1    Exercise Name 1 see paper flowsheet in chart for greater detail, started on UBE  -SC        User Key  (r) = Recorded By, (t) = Taken By, (c) = Cosigned By    Initials Name Provider Type    ROB Geller, PT Physical Therapist                              PT OP Goals       10/31/17 0800       PT Short Term Goals    STG Date to Achieve 11/07/17  -SC     STG 1 1.Patient independent and compliant with initial home exercise program focused on diaphragmatic breathing, range of motion, flexibility to decrease edema and improve lymphatic flow for decreased edema and decreased risk of infection.  -SC     STG 1 Progress Met  -SC     STG 2 2.Patient demonstrate proper awareness of What is Lymphedema and 18 Steps of Prevention for improved prevention, management, care of symptoms and ease of transition to self-care of condition.  -SC     STG 2 Progress Met  -SC     STG 3 3.Patient independent and compliant with self-massage techniques with spouse/family member as needed for improved lymphatic drainage, decreased edema/symptoms, desensitization, decreased axillary cording, decreased risk of infection and improved transition to self-care of condition.  -SC     STG 3 Progress Progressing  -SC     STG 3 Progress Comments reviewed  -SC     STG 4 4.Patient independent and compliant with advanced Home Exercise Program and  self-care techniques for self-management of condition.  -SC     STG 4 Progress Progressing  -SC     STG 5 5..Patient demonstrate proper awareness of Care and Air Travel safety with compression/exercise as indicated for improved safety with travel and self-care of condition.  -SC     STG 5 Progress Met  -SC     Long Term Goals    LTG Date to Achieve 12/26/17  -SC     LTG 1 1.Patient score </=18.8/100 on DASH for improved function and transition to self-management of condition.  -SC     LTG 1 Progress Ongoing  -SC     LTG 2 2.Patient independent and compliant self MLD techniques for improved mobility, skin care and lymphatic flow.  -SC     LTG 2 Progress Met  -SC     LTG 3 3. Pt. to have a decrease in UE circumfrential measurements of at least 2 cm.  -SC     LTG 3 Progress Met  -SC     LTG 3 Progress Comments stable  -SC     Time Calculation    PT Goal Re-Cert Due Date 12/26/17  -SC       User Key  (r) = Recorded By, (t) = Taken By, (c) = Cosigned By    Initials Name Provider Type    ROB Geller, KAREN Physical Therapist                Therapy Education       10/30/17 1015          Therapy Education    Education Details education of re-evaluation and continuation of care  -SC      Given HEP;Symptoms/condition management;Pain management;Posture/body mechanics  -SC      Program Reinforced  -SC      How Provided Verbal;Demonstration  -SC      Provided to Patient  -SC      Level of Understanding Teach back education performed;Verbalized;Demonstrated  -SC        User Key  (r) = Recorded By, (t) = Taken By, (c) = Cosigned By    Initials Name Provider Type    ROB Geller, PT Physical Therapist                Outcome Measures       10/30/17 1015          DASH    Open a tight or new jar. 3  -SC      Write 2  -SC      Turn a key 1  -SC      Prepare a meal 1  -SC      Push open a heavy door 2  -SC      Place an object on a shelf above your head 1  -SC      Do heavy household chores (e.g., wash walls, wash  floors) 2  -SC      Garden or do yard work 2  -SC      Make a bed 3  -SC      Carry a shopping bag or briefcase 2  -SC      Carry a heavy object (over 10 lbs) 2  -SC      Change a lightbulb overhead 2  -SC      Wash or blow dry your hair 3  -SC      Wash your back 5  -SC      Put on a pullover sweater 2  -SC      Use a knife to cut food 1  -SC      Recreational activities in which require little effort (e.g., cardplaying, knitting, etc.) 1  -SC      Recreational activities in which you take some force or impact through your arm, should or hand (e.g. golf, hammering, tennis, etc.) 4  -SC      Recreational Activities in which you move your arm freely (e.g., frisbee, badminton, etc.) 3  -SC      Manage transportation needs (getting from one place to another) 1  -SC      Sexual Activities 2  -SC      During the past week, to what extent has your arm, shoulder, or hand problem interfered with your normal social activites with family, friends, neighbors or groups? 2  -SC      During the past week, were you limited in your work or other regular daily activities as a result of your arm, shoulder or hand problem? 1  -SC      Arm, Shoulder, or hand pain 3  -SC      Arm, shoulder or hand pain when you performed any specific activity 3  -SC      Tingling (pins and needles) in your arm, shoulder, or hand 1  -SC      Weakness in your arm, shoulder or hand 3  -SC      Stiffness in your arm, shoulder or hand 4  -SC      During the past week, how much difficulty have you had sleeping because of the pain in your arm, shoulder or hand? 2  -SC      I feel less capable, less confident or less useful because of my arm, shoulder or hand problem 3  -SC      DASH Sum  67  -SC      Number of Questions Answered 30  -SC      DASH Score 30.83  -SC      Functional Assessment    Outcome Measure Options Disabilities of the Arm, Shoulder, and Hand (DASH)  -SC        User Key  (r) = Recorded By, (t) = Taken By, (c) = Cosigned By    Initials Name  Provider Type    SC Yadira Geller PT Physical Therapist            Time Calculation:   Start Time: 1015  Stop Time: 1100  Time Calculation (min): 45 min     Therapy Charges for Today     Code Description Service Date Service Provider Modifiers Qty    64854977559 HC PT THER PROC EA 15 MIN 10/30/2017 Yadira Geller, PT GP 2    54883941748 HC PT MANUAL THERAPY EA 15 MIN 10/30/2017 Yadira Geller, PT GP 1    63889098282 HC PT CARRY MOV HAND OBJ CURRENT 10/31/2017 Yadira Geller, PT GP, CJ 1    09029177345 HC PT CARRY MOV HAND OBJ PROJECTED 10/31/2017 Yadira Geller PT GP, CI 1          PT G-Codes  PT Professional Judgement Used?: Yes  Outcome Measure Options: Disabilities of the Arm, Shoulder, and Hand (DASH)  Score: 31/100  Functional Limitation: Carrying, moving and handling objects  Carrying, Moving and Handling Objects Current Status (): At least 20 percent but less than 40 percent impaired, limited or restricted  Carrying, Moving and Handling Objects Goal Status (): At least 1 percent but less than 20 percent impaired, limited or restricted         Yadira Coleman PT  10/31/2017

## 2017-11-01 ENCOUNTER — HOSPITAL ENCOUNTER (OUTPATIENT)
Dept: PHYSICAL THERAPY | Facility: HOSPITAL | Age: 77
Setting detail: THERAPIES SERIES
Discharge: HOME | End: 2017-11-01

## 2017-11-01 DIAGNOSIS — M25.511 CHRONIC RIGHT SHOULDER PAIN: ICD-10-CM

## 2017-11-01 DIAGNOSIS — R29.3 ABNORMAL POSTURE: ICD-10-CM

## 2017-11-01 DIAGNOSIS — I97.2 POSTMASTECTOMY LYMPHEDEMA SYNDROME: Primary | ICD-10-CM

## 2017-11-01 DIAGNOSIS — I97.2 POST-MASTECTOMY LYMPHEDEMA SYNDROME: ICD-10-CM

## 2017-11-01 DIAGNOSIS — R22.2 SOFT TISSUE SWELLING OF CHEST WALL: ICD-10-CM

## 2017-11-01 DIAGNOSIS — G89.29 CHRONIC RIGHT SHOULDER PAIN: ICD-10-CM

## 2017-11-01 DIAGNOSIS — Z17.0 MALIGNANT NEOPLASM OF UPPER-OUTER QUADRANT OF RIGHT BREAST IN FEMALE, ESTROGEN RECEPTOR POSITIVE (HCC): ICD-10-CM

## 2017-11-01 DIAGNOSIS — Z96.611 H/O TOTAL SHOULDER REPLACEMENT, RIGHT: ICD-10-CM

## 2017-11-01 DIAGNOSIS — C50.411 MALIGNANT NEOPLASM OF UPPER-OUTER QUADRANT OF RIGHT BREAST IN FEMALE, ESTROGEN RECEPTOR POSITIVE (HCC): ICD-10-CM

## 2017-11-01 PROCEDURE — 97110 THERAPEUTIC EXERCISES: CPT | Performed by: PHYSICAL THERAPIST

## 2017-11-01 PROCEDURE — 97140 MANUAL THERAPY 1/> REGIONS: CPT | Performed by: PHYSICAL THERAPIST

## 2017-11-01 NOTE — THERAPY TREATMENT NOTE
Outpatient Physical Therapy Lymphedema Treatment Note  [facility]     Patient Name: Paloma Carr  : 1940  MRN: [id number]  Today's Date: 2017        Visit Date: 2017    Visit Dx:    ICD-10-CM ICD-9-CM   1. 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 Abnormal posture R29.3 781.92   3. Soft tissue swelling of chest wall R22.2 786.6   4. Post-mastectomy lymphedema syndrome I97.2 457.0   5. H/O total shoulder replacement, right Z96.611 V43.61   6. Chronic right shoulder pain M25.511 719.41    G89.29 338.29   7. Malignant neoplasm of upper-outer quadrant of right breast in female, estrogen receptor positive C50.411 174.4    Z17.0 V86.0       Patient Active Problem List   Diagnosis   • Soft tissue swelling of chest wall   • Abnormal ultrasound of breast   • Postmastectomy lymphedema syndrome   • Malignant neoplasm of upper-outer quadrant of right female breast                                PT Assessment/Plan       17 1130       PT Plan    PT Plan Comments focus strengthening right shoulder, continue manual right chest wall  -SC       User Key  (r) = Recorded By, (t) = Taken By, (c) = Cosigned By    Initials Name Provider Type    SC Yadira Geller, PT Physical Therapist                     Exercises       17 1130          Subjective Comments    Subjective Comments I am doing ok. My chest is just so tight. I think my shoulder is doing some better  -SC      Subjective Pain    Able to rate subjective pain? yes  -SC      Pre-Treatment Pain Level 2  -SC      Post-Treatment Pain Level 1  -SC      Exercise 1    Exercise Name 1 UBE  -SC      Time (Minutes) 1 4  -SC      Exercise 2    Exercise Name 2 shrugs  -SC      Reps 2 10  -SC      Additional Comments 3#  -SC      Exercise 3    Exercise Name 3 reverse shoulder rolls  -SC      Reps 3 10  -SC      Additional Comments 3#  -SC      Exercise 4    Exercise Name 4 alt bicep curl  -SC      Reps 4 10  -SC      Additional Comments 3#  -SC       Exercise 5    Exercise Name 5 shoulder rows  -SC      Reps 5 10  -SC      Additional Comments GTB  -SC      Exercise 6    Exercise Name 6 shoulder extension   -SC      Reps 6 10  -SC      Additional Comments GTB  -SC      Exercise 7    Exercise Name 7 wallslides  -SC      Reps 7 10  -SC      Exercise 8    Exercise Name 8 pulleys flexion  -SC      Reps 8 10  -SC        User Key  (r) = Recorded By, (t) = Taken By, (c) = Cosigned By    Initials Name Provider Type    SC Yadira Geller, KAREN Physical Therapist                       Manual Rx (last 36 hours)      Manual Treatments       11/01/17 1130          Manual Rx 1    Manual Rx 1 Location right shoulder, chest wall  -SC      Manual Rx 1 Type PROM all planes, scar massage with vit E oil, STM, deep tissue massage, myofascial release right chest wall, pec, axilla, lateral flank, gentle MLD in region  -SC      Manual Rx 1 Duration 15 minutes  -SC        User Key  (r) = Recorded By, (t) = Taken By, (c) = Cosigned By    Initials Name Provider Type    SC Yadira Geller PT Physical Therapist                    Therapy Education       11/01/17 1130          Therapy Education    Given HEP;Symptoms/condition management  -SC      Program Reinforced  -SC      How Provided Verbal  -SC      Provided to Patient  -SC      Level of Understanding Verbalized  -SC        User Key  (r) = Recorded By, (t) = Taken By, (c) = Cosigned By    Initials Name Provider Type    SC Yadira Geller PT Physical Therapist                Outcome Measures       10/30/17 1015          DASH    Open a tight or new jar. 3  -SC      Write 2  -SC      Turn a key 1  -SC      Prepare a meal 1  -SC      Push open a heavy door 2  -SC      Place an object on a shelf above your head 1  -SC      Do heavy household chores (e.g., wash walls, wash floors) 2  -SC      Garden or do yard work 2  -SC      Make a bed 3  -SC      Carry a shopping bag or briefcase 2  -SC      Carry a heavy object (over 10 lbs)  2  -SC      Change a lightbulb overhead 2  -SC      Wash or blow dry your hair 3  -SC      Wash your back 5  -SC      Put on a pullover sweater 2  -SC      Use a knife to cut food 1  -SC      Recreational activities in which require little effort (e.g., cardplaying, knitting, etc.) 1  -SC      Recreational activities in which you take some force or impact through your arm, should or hand (e.g. golf, hammering, tennis, etc.) 4  -SC      Recreational Activities in which you move your arm freely (e.g., frisbee, badminton, etc.) 3  -SC      Manage transportation needs (getting from one place to another) 1  -SC      Sexual Activities 2  -SC      During the past week, to what extent has your arm, shoulder, or hand problem interfered with your normal social activites with family, friends, neighbors or groups? 2  -SC      During the past week, were you limited in your work or other regular daily activities as a result of your arm, shoulder or hand problem? 1  -SC      Arm, Shoulder, or hand pain 3  -SC      Arm, shoulder or hand pain when you performed any specific activity 3  -SC      Tingling (pins and needles) in your arm, shoulder, or hand 1  -SC      Weakness in your arm, shoulder or hand 3  -SC      Stiffness in your arm, shoulder or hand 4  -SC      During the past week, how much difficulty have you had sleeping because of the pain in your arm, shoulder or hand? 2  -SC      I feel less capable, less confident or less useful because of my arm, shoulder or hand problem 3  -SC      DASH Sum  67  -SC      Number of Questions Answered 30  -SC      DASH Score 30.83  -SC      Functional Assessment    Outcome Measure Options Disabilities of the Arm, Shoulder, and Hand (DASH)  -SC        User Key  (r) = Recorded By, (t) = Taken By, (c) = Cosigned By    Initials Name Provider Type    SC Yadira Geller PT Physical Therapist            Time Calculation:   Start Time: 1130  Stop Time: 1215  Time Calculation (min): 45 min      Therapy Charges for Today     Code Description Service Date Service Provider Modifiers Qty    99970707283  PT THER PROC EA 15 MIN 11/1/2017 Yadira Geller, PT GP 2    04329319279 HC PT MANUAL THERAPY EA 15 MIN 11/1/2017 Yadira Geller, PT GP 1                    Yadira Coleman, PT  11/1/2017

## 2017-11-06 ENCOUNTER — HOSPITAL ENCOUNTER (OUTPATIENT)
Dept: PHYSICAL THERAPY | Facility: HOSPITAL | Age: 77
Setting detail: THERAPIES SERIES
Discharge: HOME | End: 2017-11-06

## 2017-11-06 DIAGNOSIS — G89.29 CHRONIC RIGHT SHOULDER PAIN: ICD-10-CM

## 2017-11-06 DIAGNOSIS — M25.511 CHRONIC RIGHT SHOULDER PAIN: ICD-10-CM

## 2017-11-06 DIAGNOSIS — R22.2 SOFT TISSUE SWELLING OF CHEST WALL: ICD-10-CM

## 2017-11-06 DIAGNOSIS — I97.2 POSTMASTECTOMY LYMPHEDEMA SYNDROME: Primary | ICD-10-CM

## 2017-11-06 DIAGNOSIS — C50.411 MALIGNANT NEOPLASM OF UPPER-OUTER QUADRANT OF RIGHT BREAST IN FEMALE, ESTROGEN RECEPTOR POSITIVE (HCC): ICD-10-CM

## 2017-11-06 DIAGNOSIS — I97.2 POST-MASTECTOMY LYMPHEDEMA SYNDROME: ICD-10-CM

## 2017-11-06 DIAGNOSIS — R29.3 ABNORMAL POSTURE: ICD-10-CM

## 2017-11-06 DIAGNOSIS — Z96.611 H/O TOTAL SHOULDER REPLACEMENT, RIGHT: ICD-10-CM

## 2017-11-06 DIAGNOSIS — Z17.0 MALIGNANT NEOPLASM OF UPPER-OUTER QUADRANT OF RIGHT BREAST IN FEMALE, ESTROGEN RECEPTOR POSITIVE (HCC): ICD-10-CM

## 2017-11-06 PROCEDURE — 97140 MANUAL THERAPY 1/> REGIONS: CPT | Performed by: PHYSICAL THERAPIST

## 2017-11-06 PROCEDURE — 97110 THERAPEUTIC EXERCISES: CPT | Performed by: PHYSICAL THERAPIST

## 2017-11-06 NOTE — THERAPY TREATMENT NOTE
Outpatient Physical Therapy Lymphedema Treatment Note  [facility]     Patient Name: Paloma Carr  : 1940  MRN: [id number]  Today's Date: 2017        Visit Date: 2017    Visit Dx:    ICD-10-CM ICD-9-CM   1. 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 Abnormal posture R29.3 781.92   3. Soft tissue swelling of chest wall R22.2 786.6   4. Post-mastectomy lymphedema syndrome I97.2 457.0   5. H/O total shoulder replacement, right Z96.611 V43.61   6. Chronic right shoulder pain M25.511 719.41    G89.29 338.29   7. Malignant neoplasm of upper-outer quadrant of right breast in female, estrogen receptor positive C50.411 174.4    Z17.0 V86.0       Patient Active Problem List   Diagnosis   • Soft tissue swelling of chest wall   • Abnormal ultrasound of breast   • Postmastectomy lymphedema syndrome   • Malignant neoplasm of upper-outer quadrant of right female breast                                PT Assessment/Plan       17 1020       PT Assessment    Assessment Comments noted slight improved mobility right shoulder with mild decreased myofascial restrictions right chest wall however may be nearing therapeutic plateau. Patient to contact MD about steroids for knee and hand pain and cough.   -SC     PT Plan    PT Plan Comments continue to focus on strengthening right shoulder, flexibility and manual right chest wall.   -SC       User Key  (r) = Recorded By, (t) = Taken By, (c) = Cosigned By    Initials Name Provider Type    SC Yadira Geller, PT Physical Therapist                     Exercises       17 1020          Subjective Comments    Subjective Comments My knee is killing me today. Going to call in the steroid now. And my right hand. And I am getting a cold. I had pneumonia before so I need to listen to my body  -SC      Subjective Pain    Able to rate subjective pain? yes  -SC      Pre-Treatment Pain Level 3  -SC      Subjective Pain Comment right shoulder   -SC      Exercise  1    Exercise Name 1 UBE  -SC      Time (Minutes) 1 4  -SC      Exercise 2    Exercise Name 2 shrugs  -SC      Reps 2 15  -SC      Additional Comments 3#  -SC      Exercise 3    Exercise Name 3 reverse shoulder rolls  -SC      Reps 3 15  -SC      Additional Comments 3#  -SC      Exercise 4    Exercise Name 4 alt bicep curl  -SC      Reps 4 15  -SC      Additional Comments 3#  -SC      Exercise 5    Exercise Name 5 shoulder rows  -SC      Reps 5 15  -SC      Additional Comments GTB  -SC      Exercise 6    Exercise Name 6 shoulder extension   -SC      Reps 6 15  -SC      Additional Comments GTB  -SC      Exercise 7    Exercise Name 7 wallslides  -SC      Reps 7 10  -SC      Exercise 8    Exercise Name 8 pulleys flexion  -SC      Reps 8 10  -SC      Exercise 9    Exercise Name 9 shoulder ER B  -SC      Reps 9 15  -SC      Additional Comments B   -SC        User Key  (r) = Recorded By, (t) = Taken By, (c) = Cosigned By    Initials Name Provider Type    SC Yadira Geller, PT Physical Therapist                       Manual Rx (last 36 hours)      Manual Treatments       11/06/17 1020          Manual Rx 1    Manual Rx 1 Location right shoulder, chest wall  -SC      Manual Rx 1 Type PROM all planes, scar massage with vit E oil, STM, deep tissue massage, myofascial release right chest wall, pec, axilla, lateral flank, gentle MLD in region  -SC      Manual Rx 1 Duration 15 minutes  -SC        User Key  (r) = Recorded By, (t) = Taken By, (c) = Cosigned By    Initials Name Provider Type    SC Yadira Geller, PT Physical Therapist                PT OP Goals       11/06/17 1020       PT Short Term Goals    STG Date to Achieve 11/07/17  -SC     STG 1 1.Patient independent and compliant with initial home exercise program focused on diaphragmatic breathing, range of motion, flexibility to decrease edema and improve lymphatic flow for decreased edema and decreased risk of infection.  -SC     STG 1 Progress Met  -SC     STG  2 2.Patient demonstrate proper awareness of What is Lymphedema and 18 Steps of Prevention for improved prevention, management, care of symptoms and ease of transition to self-care of condition.  -SC     STG 2 Progress Met  -SC     STG 3 3.Patient independent and compliant with self-massage techniques with spouse/family member as needed for improved lymphatic drainage, decreased edema/symptoms, desensitization, decreased axillary cording, decreased risk of infection and improved transition to self-care of condition.  -SC     STG 3 Progress Met  -SC     STG 4 4.Patient independent and compliant with advanced Home Exercise Program and self-care techniques for self-management of condition.  -SC     STG 4 Progress Progressing  -SC     STG 5 5..Patient demonstrate proper awareness of Care and Air Travel safety with compression/exercise as indicated for improved safety with travel and self-care of condition.  -SC     STG 5 Progress Met  -SC     Long Term Goals    LTG Date to Achieve 12/26/17  -SC     LTG 1 1.Patient score </=18.8/100 on DASH for improved function and transition to self-management of condition.  -SC     LTG 1 Progress Ongoing  -SC     LTG 2 2.Patient independent and compliant self MLD techniques for improved mobility, skin care and lymphatic flow.  -SC     LTG 2 Progress Met  -SC     LTG 3 3. Pt. to have a decrease in UE circumfrential measurements of at least 2 cm.  -SC     LTG 3 Progress Met  -SC     Time Calculation    PT Goal Re-Cert Due Date 12/26/17 11/26  -SC       User Key  (r) = Recorded By, (t) = Taken By, (c) = Cosigned By    Initials Name Provider Type    SC Yadira Geller, PT Physical Therapist                Therapy Education       11/06/17 1020          Therapy Education    Given HEP;Symptoms/condition management;Pain management  -SC      Program Reinforced  -SC      How Provided Verbal  -SC      Provided to Patient  -SC      Level of Understanding Verbalized  -SC        User Key  (r) =  Recorded By, (t) = Taken By, (c) = Cosigned By    Initials Name Provider Type    SC Yadira Geller, PT Physical Therapist                Time Calculation:   Start Time: 1020  Stop Time: 1100  Time Calculation (min): 40 min     Therapy Charges for Today     Code Description Service Date Service Provider Modifiers Qty    56462966541  PT THER PROC EA 15 MIN 11/6/2017 Yadira Geller, PT GP 2    41629290408 HC PT MANUAL THERAPY EA 15 MIN 11/6/2017 Yadira Geller, PT GP 1                    Yadira Coleman, PT  11/6/2017

## 2017-11-08 ENCOUNTER — APPOINTMENT (OUTPATIENT)
Dept: PHYSICAL THERAPY | Facility: HOSPITAL | Age: 77
End: 2017-11-08

## 2017-11-13 ENCOUNTER — APPOINTMENT (OUTPATIENT)
Dept: PHYSICAL THERAPY | Facility: HOSPITAL | Age: 77
End: 2017-11-13

## 2017-11-14 ENCOUNTER — APPOINTMENT (OUTPATIENT)
Dept: PHYSICAL THERAPY | Facility: HOSPITAL | Age: 77
End: 2017-11-14

## 2017-11-15 ENCOUNTER — ANESTHESIA (OUTPATIENT)
Dept: PERIOP | Facility: HOSPITAL | Age: 77
End: 2017-11-15

## 2017-11-15 ENCOUNTER — APPOINTMENT (OUTPATIENT)
Dept: GENERAL RADIOLOGY | Facility: HOSPITAL | Age: 77
End: 2017-11-15
Attending: EMERGENCY MEDICINE

## 2017-11-15 ENCOUNTER — ANESTHESIA EVENT (OUTPATIENT)
Dept: PERIOP | Facility: HOSPITAL | Age: 77
End: 2017-11-15

## 2017-11-15 ENCOUNTER — APPOINTMENT (OUTPATIENT)
Dept: GENERAL RADIOLOGY | Facility: HOSPITAL | Age: 77
End: 2017-11-15

## 2017-11-15 ENCOUNTER — HOSPITAL ENCOUNTER (INPATIENT)
Facility: HOSPITAL | Age: 77
LOS: 4 days | End: 2017-11-19
Attending: EMERGENCY MEDICINE | Admitting: INTERNAL MEDICINE

## 2017-11-15 ENCOUNTER — TELEPHONE (OUTPATIENT)
Dept: FAMILY MEDICINE CLINIC | Facility: CLINIC | Age: 77
End: 2017-11-15

## 2017-11-15 ENCOUNTER — APPOINTMENT (OUTPATIENT)
Dept: CARDIOLOGY | Facility: HOSPITAL | Age: 77
End: 2017-11-15

## 2017-11-15 ENCOUNTER — APPOINTMENT (OUTPATIENT)
Dept: CT IMAGING | Facility: HOSPITAL | Age: 77
End: 2017-11-15

## 2017-11-15 DIAGNOSIS — I63.9 CEREBROVASCULAR ACCIDENT (CVA), UNSPECIFIED MECHANISM (HCC): Primary | ICD-10-CM

## 2017-11-15 LAB
ABO GROUP BLD: NORMAL
ALBUMIN SERPL-MCNC: 4.5 G/DL (ref 3.5–5.2)
ALBUMIN/GLOB SERPL: 1.5 G/DL
ALP SERPL-CCNC: 82 U/L (ref 39–117)
ALT SERPL W P-5'-P-CCNC: 28 U/L (ref 1–33)
ANION GAP SERPL CALCULATED.3IONS-SCNC: 15.2 MMOL/L
ANTI-FYA: NORMAL
APTT PPP: 24.6 SECONDS (ref 22.7–35.4)
AST SERPL-CCNC: 33 U/L (ref 1–32)
B PERT DNA SPEC QL NAA+PROBE: NOT DETECTED
BASOPHILS # BLD AUTO: 0.03 10*3/MM3 (ref 0–0.2)
BASOPHILS NFR BLD AUTO: 0.4 % (ref 0–1.5)
BILIRUB SERPL-MCNC: 0.5 MG/DL (ref 0.1–1.2)
BLD GP AB SCN SERPL QL: POSITIVE
BUN BLD-MCNC: 19 MG/DL (ref 8–23)
BUN/CREAT SERPL: 22.4 (ref 7–25)
C PNEUM DNA NPH QL NAA+NON-PROBE: NOT DETECTED
CALCIUM SPEC-SCNC: 9.5 MG/DL (ref 8.6–10.5)
CHLORIDE SERPL-SCNC: 98 MMOL/L (ref 98–107)
CO2 SERPL-SCNC: 24.8 MMOL/L (ref 22–29)
CREAT BLD-MCNC: 0.85 MG/DL (ref 0.57–1)
DAT POLY-SP REAG RBC QL: NEGATIVE
DEPRECATED RDW RBC AUTO: 45.8 FL (ref 37–54)
EOSINOPHIL # BLD AUTO: 0.1 10*3/MM3 (ref 0–0.7)
EOSINOPHIL NFR BLD AUTO: 1.3 % (ref 0.3–6.2)
ERYTHROCYTE [DISTWIDTH] IN BLOOD BY AUTOMATED COUNT: 12.7 % (ref 11.7–13)
FLUAV H1 2009 PAND RNA NPH QL NAA+PROBE: NOT DETECTED
FLUAV H1 HA GENE NPH QL NAA+PROBE: NOT DETECTED
FLUAV H3 RNA NPH QL NAA+PROBE: NOT DETECTED
FLUAV SUBTYP SPEC NAA+PROBE: NOT DETECTED
FLUBV RNA ISLT QL NAA+PROBE: NOT DETECTED
GFR SERPL CREATININE-BSD FRML MDRD: 65 ML/MIN/1.73
GLOBULIN UR ELPH-MCNC: 3.1 GM/DL
GLUCOSE BLD-MCNC: 76 MG/DL (ref 65–99)
GLUCOSE BLDC GLUCOMTR-MCNC: 93 MG/DL (ref 70–130)
HADV DNA SPEC NAA+PROBE: NOT DETECTED
HCOV 229E RNA SPEC QL NAA+PROBE: NOT DETECTED
HCOV HKU1 RNA SPEC QL NAA+PROBE: NOT DETECTED
HCOV NL63 RNA SPEC QL NAA+PROBE: NOT DETECTED
HCOV OC43 RNA SPEC QL NAA+PROBE: NOT DETECTED
HCT VFR BLD AUTO: 41.8 % (ref 35.6–45.5)
HGB BLD-MCNC: 13.9 G/DL (ref 11.9–15.5)
HMPV RNA NPH QL NAA+NON-PROBE: NOT DETECTED
HOLD SPECIMEN: NORMAL
HOLD SPECIMEN: NORMAL
HPIV1 RNA SPEC QL NAA+PROBE: NOT DETECTED
HPIV2 RNA SPEC QL NAA+PROBE: NOT DETECTED
HPIV3 RNA NPH QL NAA+PROBE: NOT DETECTED
HPIV4 P GENE NPH QL NAA+PROBE: NOT DETECTED
IMM GRANULOCYTES # BLD: 0 10*3/MM3 (ref 0–0.03)
IMM GRANULOCYTES NFR BLD: 0 % (ref 0–0.5)
INR PPP: 0.99 (ref 0.9–1.1)
LYMPHOCYTES # BLD AUTO: 1.32 10*3/MM3 (ref 0.9–4.8)
LYMPHOCYTES NFR BLD AUTO: 17.2 % (ref 19.6–45.3)
M PNEUMO IGG SER IA-ACNC: NOT DETECTED
MCH RBC QN AUTO: 32.7 PG (ref 26.9–32)
MCHC RBC AUTO-ENTMCNC: 33.3 G/DL (ref 32.4–36.3)
MCV RBC AUTO: 98.4 FL (ref 80.5–98.2)
MONOCYTES # BLD AUTO: 1.24 10*3/MM3 (ref 0.2–1.2)
MONOCYTES NFR BLD AUTO: 16.2 % (ref 5–12)
NEUTROPHILS # BLD AUTO: 4.98 10*3/MM3 (ref 1.9–8.1)
NEUTROPHILS NFR BLD AUTO: 64.9 % (ref 42.7–76)
NONSPECIFIC GEL REACTION: NORMAL
PLATELET # BLD AUTO: 263 10*3/MM3 (ref 140–500)
PMV BLD AUTO: 10.6 FL (ref 6–12)
POTASSIUM BLD-SCNC: 4.4 MMOL/L (ref 3.5–5.2)
PROT SERPL-MCNC: 7.6 G/DL (ref 6–8.5)
PROTHROMBIN TIME: 12.7 SECONDS (ref 11.7–14.2)
RBC # BLD AUTO: 4.25 10*6/MM3 (ref 3.9–5.2)
RH BLD: POSITIVE
RHINOVIRUS RNA SPEC NAA+PROBE: NOT DETECTED
RSV RNA NPH QL NAA+NON-PROBE: NOT DETECTED
SODIUM BLD-SCNC: 138 MMOL/L (ref 136–145)
TROPONIN T SERPL-MCNC: <0.01 NG/ML (ref 0–0.03)
WBC NRBC COR # BLD: 7.67 10*3/MM3 (ref 4.5–10.7)
WHOLE BLOOD HOLD SPECIMEN: NORMAL
WHOLE BLOOD HOLD SPECIMEN: NORMAL

## 2017-11-15 PROCEDURE — C1769 GUIDE WIRE: HCPCS | Performed by: RADIOLOGY

## 2017-11-15 PROCEDURE — 25010000002 PERFLUTREN (DEFINITY) 8.476 MG IN SODIUM CHLORIDE 0.9 % 10 ML INJECTION: Performed by: INTERNAL MEDICINE

## 2017-11-15 PROCEDURE — 86880 COOMBS TEST DIRECT: CPT | Performed by: EMERGENCY MEDICINE

## 2017-11-15 PROCEDURE — 87581 M.PNEUMON DNA AMP PROBE: CPT | Performed by: INTERNAL MEDICINE

## 2017-11-15 PROCEDURE — C1894 INTRO/SHEATH, NON-LASER: HCPCS | Performed by: RADIOLOGY

## 2017-11-15 PROCEDURE — 94799 UNLISTED PULMONARY SVC/PX: CPT

## 2017-11-15 PROCEDURE — 03CG3ZZ EXTIRPATION OF MATTER FROM INTRACRANIAL ARTERY, PERCUTANEOUS APPROACH: ICD-10-PCS | Performed by: RADIOLOGY

## 2017-11-15 PROCEDURE — 85610 PROTHROMBIN TIME: CPT | Performed by: EMERGENCY MEDICINE

## 2017-11-15 PROCEDURE — 87633 RESP VIRUS 12-25 TARGETS: CPT | Performed by: INTERNAL MEDICINE

## 2017-11-15 PROCEDURE — 70496 CT ANGIOGRAPHY HEAD: CPT

## 2017-11-15 PROCEDURE — 25010000002 ALTEPLASE PER 1 MG

## 2017-11-15 PROCEDURE — B3171ZZ FLUOROSCOPY OF LEFT INTERNAL CAROTID ARTERY USING LOW OSMOLAR CONTRAST: ICD-10-PCS | Performed by: RADIOLOGY

## 2017-11-15 PROCEDURE — 93306 TTE W/DOPPLER COMPLETE: CPT

## 2017-11-15 PROCEDURE — 93306 TTE W/DOPPLER COMPLETE: CPT | Performed by: INTERNAL MEDICINE

## 2017-11-15 PROCEDURE — 25010000002 HEPARIN (PORCINE) PER 1000 UNITS: Performed by: RADIOLOGY

## 2017-11-15 PROCEDURE — 80053 COMPREHEN METABOLIC PANEL: CPT | Performed by: EMERGENCY MEDICINE

## 2017-11-15 PROCEDURE — 85730 THROMBOPLASTIN TIME PARTIAL: CPT | Performed by: EMERGENCY MEDICINE

## 2017-11-15 PROCEDURE — 86870 RBC ANTIBODY IDENTIFICATION: CPT | Performed by: EMERGENCY MEDICINE

## 2017-11-15 PROCEDURE — 86900 BLOOD TYPING SEROLOGIC ABO: CPT | Performed by: EMERGENCY MEDICINE

## 2017-11-15 PROCEDURE — B31B1ZZ FLUOROSCOPY OF LEFT EXTERNAL CAROTID ARTERY USING LOW OSMOLAR CONTRAST: ICD-10-PCS | Performed by: RADIOLOGY

## 2017-11-15 PROCEDURE — 0 IODIXANOL PER 1 ML: Performed by: RADIOLOGY

## 2017-11-15 PROCEDURE — C2628 CATHETER, OCCLUSION: HCPCS | Performed by: RADIOLOGY

## 2017-11-15 PROCEDURE — 25010000002 ALTEPLASE PER 1 MG: Performed by: RADIOLOGY

## 2017-11-15 PROCEDURE — 82565 ASSAY OF CREATININE: CPT

## 2017-11-15 PROCEDURE — 93010 ELECTROCARDIOGRAM REPORT: CPT | Performed by: INTERNAL MEDICINE

## 2017-11-15 PROCEDURE — 84484 ASSAY OF TROPONIN QUANT: CPT | Performed by: EMERGENCY MEDICINE

## 2017-11-15 PROCEDURE — 86901 BLOOD TYPING SEROLOGIC RH(D): CPT | Performed by: EMERGENCY MEDICINE

## 2017-11-15 PROCEDURE — 0042T HC CT CEREBRAL PERFUSION W/WO CONTRAST: CPT

## 2017-11-15 PROCEDURE — 70498 CT ANGIOGRAPHY NECK: CPT

## 2017-11-15 PROCEDURE — 94640 AIRWAY INHALATION TREATMENT: CPT

## 2017-11-15 PROCEDURE — 86920 COMPATIBILITY TEST SPIN: CPT

## 2017-11-15 PROCEDURE — 99291 CRITICAL CARE FIRST HOUR: CPT

## 2017-11-15 PROCEDURE — 93005 ELECTROCARDIOGRAM TRACING: CPT | Performed by: EMERGENCY MEDICINE

## 2017-11-15 PROCEDURE — 87798 DETECT AGENT NOS DNA AMP: CPT | Performed by: INTERNAL MEDICINE

## 2017-11-15 PROCEDURE — 82962 GLUCOSE BLOOD TEST: CPT

## 2017-11-15 PROCEDURE — C1760 CLOSURE DEV, VASC: HCPCS | Performed by: RADIOLOGY

## 2017-11-15 PROCEDURE — 86905 BLOOD TYPING RBC ANTIGENS: CPT | Performed by: EMERGENCY MEDICINE

## 2017-11-15 PROCEDURE — 0 IOPAMIDOL PER 1 ML: Performed by: EMERGENCY MEDICINE

## 2017-11-15 PROCEDURE — 86850 RBC ANTIBODY SCREEN: CPT | Performed by: EMERGENCY MEDICINE

## 2017-11-15 PROCEDURE — 71010 HC CHEST PA OR AP: CPT

## 2017-11-15 PROCEDURE — 86922 COMPATIBILITY TEST ANTIGLOB: CPT

## 2017-11-15 PROCEDURE — C1887 CATHETER, GUIDING: HCPCS | Performed by: RADIOLOGY

## 2017-11-15 PROCEDURE — 3E03317 INTRODUCTION OF OTHER THROMBOLYTIC INTO PERIPHERAL VEIN, PERCUTANEOUS APPROACH: ICD-10-PCS | Performed by: RADIOLOGY

## 2017-11-15 PROCEDURE — 87486 CHLMYD PNEUM DNA AMP PROBE: CPT | Performed by: INTERNAL MEDICINE

## 2017-11-15 PROCEDURE — 85025 COMPLETE CBC W/AUTO DIFF WBC: CPT | Performed by: EMERGENCY MEDICINE

## 2017-11-15 RX ORDER — LEVOTHYROXINE SODIUM 0.1 MG/1
100 TABLET ORAL DAILY
COMMUNITY
End: 2017-12-06 | Stop reason: HOSPADM

## 2017-11-15 RX ORDER — DEXTROMETHORPHAN HYDROBROMIDE AND PROMETHAZINE HYDROCHLORIDE 15; 6.25 MG/5ML; MG/5ML
5 SYRUP ORAL 4 TIMES DAILY PRN
Qty: 180 ML | Refills: 1 | Status: SHIPPED | OUTPATIENT
Start: 2017-11-15 | End: 2017-12-07

## 2017-11-15 RX ORDER — IODIXANOL 320 MG/ML
150 INJECTION, SOLUTION INTRAVASCULAR
Status: COMPLETED | OUTPATIENT
Start: 2017-11-15 | End: 2017-11-15

## 2017-11-15 RX ORDER — SODIUM CHLORIDE 9 MG/ML
INJECTION, SOLUTION INTRAVENOUS CONTINUOUS PRN
Status: DISCONTINUED | OUTPATIENT
Start: 2017-11-15 | End: 2017-11-15 | Stop reason: SURG

## 2017-11-15 RX ORDER — ASPIRIN 325 MG
325 TABLET ORAL DAILY
Status: DISCONTINUED | OUTPATIENT
Start: 2017-11-16 | End: 2017-11-19 | Stop reason: HOSPADM

## 2017-11-15 RX ORDER — SODIUM CHLORIDE 9 MG/ML
100 INJECTION, SOLUTION INTRAVENOUS ONCE
Status: COMPLETED | OUTPATIENT
Start: 2017-11-15 | End: 2017-11-15

## 2017-11-15 RX ORDER — ASPIRIN 300 MG/1
300 SUPPOSITORY RECTAL DAILY
Status: DISCONTINUED | OUTPATIENT
Start: 2017-11-16 | End: 2017-11-19 | Stop reason: HOSPADM

## 2017-11-15 RX ORDER — ONDANSETRON 2 MG/ML
4 INJECTION INTRAMUSCULAR; INTRAVENOUS EVERY 6 HOURS PRN
Status: DISCONTINUED | OUTPATIENT
Start: 2017-11-15 | End: 2017-11-19 | Stop reason: HOSPADM

## 2017-11-15 RX ORDER — SODIUM CHLORIDE 0.9 % (FLUSH) 0.9 %
10 SYRINGE (ML) INJECTION AS NEEDED
Status: DISCONTINUED | OUTPATIENT
Start: 2017-11-15 | End: 2017-11-19 | Stop reason: HOSPADM

## 2017-11-15 RX ORDER — AZITHROMYCIN 250 MG/1
TABLET, FILM COATED ORAL
Qty: 6 TABLET | Refills: 0 | Status: SHIPPED | OUTPATIENT
Start: 2017-11-15 | End: 2017-12-06 | Stop reason: HOSPADM

## 2017-11-15 RX ORDER — ACETAMINOPHEN 325 MG/1
650 TABLET ORAL EVERY 4 HOURS PRN
Status: DISCONTINUED | OUTPATIENT
Start: 2017-11-15 | End: 2017-11-19 | Stop reason: HOSPADM

## 2017-11-15 RX ORDER — ATORVASTATIN CALCIUM 80 MG/1
80 TABLET, FILM COATED ORAL DAILY
Status: DISCONTINUED | OUTPATIENT
Start: 2017-11-15 | End: 2017-11-16

## 2017-11-15 RX ORDER — SODIUM CHLORIDE 0.9 % (FLUSH) 0.9 %
1-10 SYRINGE (ML) INJECTION AS NEEDED
Status: DISCONTINUED | OUTPATIENT
Start: 2017-11-15 | End: 2017-11-16

## 2017-11-15 RX ORDER — IPRATROPIUM BROMIDE AND ALBUTEROL SULFATE 2.5; .5 MG/3ML; MG/3ML
3 SOLUTION RESPIRATORY (INHALATION)
Status: DISCONTINUED | OUTPATIENT
Start: 2017-11-15 | End: 2017-11-17

## 2017-11-15 RX ORDER — SODIUM CHLORIDE 9 MG/ML
125 INJECTION, SOLUTION INTRAVENOUS CONTINUOUS
Status: DISCONTINUED | OUTPATIENT
Start: 2017-11-15 | End: 2017-11-16

## 2017-11-15 RX ORDER — SODIUM CHLORIDE 0.9 % (FLUSH) 0.9 %
10 SYRINGE (ML) INJECTION AS NEEDED
Status: DISCONTINUED | OUTPATIENT
Start: 2017-11-15 | End: 2017-11-16

## 2017-11-15 RX ADMIN — SODIUM CHLORIDE: 9 INJECTION, SOLUTION INTRAVENOUS at 14:41

## 2017-11-15 RX ADMIN — ALTEPLASE 71 MG: KIT at 14:04

## 2017-11-15 RX ADMIN — SODIUM CHLORIDE 1000 ML: 9 INJECTION, SOLUTION INTRAVENOUS at 14:10

## 2017-11-15 RX ADMIN — CLEVIDIPINE 2 MG/HR: 0.5 EMULSION INTRAVENOUS at 13:59

## 2017-11-15 RX ADMIN — NICARDIPINE HYDROCHLORIDE 5 MG/HR: 2.5 INJECTION INTRAVENOUS at 17:09

## 2017-11-15 RX ADMIN — PERFLUTREN 4 ML: 6.52 INJECTION, SUSPENSION INTRAVENOUS at 21:07

## 2017-11-15 RX ADMIN — SODIUM CHLORIDE: 9 INJECTION, SOLUTION INTRAVENOUS at 14:42

## 2017-11-15 RX ADMIN — SODIUM CHLORIDE: 9 INJECTION, SOLUTION INTRAVENOUS at 14:45

## 2017-11-15 RX ADMIN — IODIXANOL 119 ML: 320 INJECTION, SOLUTION INTRAVASCULAR at 15:00

## 2017-11-15 RX ADMIN — SODIUM CHLORIDE 1000 ML: 9 INJECTION, SOLUTION INTRAVENOUS at 14:14

## 2017-11-15 RX ADMIN — IOPAMIDOL 150 ML: 755 INJECTION, SOLUTION INTRAVENOUS at 14:13

## 2017-11-15 RX ADMIN — IPRATROPIUM BROMIDE AND ALBUTEROL SULFATE 3 ML: .5; 3 SOLUTION RESPIRATORY (INHALATION) at 23:07

## 2017-11-15 NOTE — H&P
[facility]  Phone: [phone]  Timothy Barillas MD      Subjective   LOS: 0 days     77-year-old female who presented with right-sided weakness and speech difficulty.  She has had a previous stroke 4 years ago.  There was no residual damage from the prior stroke.  She is currently on Plavix at home.  After acute change in mental status with new focal findings she was brought to the ED.  Here she was evaluated by the neuro interventional team.  Patient received TPA.  She continued to have worsening of her neuro status.  She was therefore taken to the OR for a possible embolectomy.    She has prior history of meningioma removal about 35 years ago.  Her prior history also includes COPD and hypothyroidism.  She is a former smoker and quit over 15 years ago. She smoked 1ppd since age 14.     I have reviewed and edited the Past Medical History, Past Surgical History, Home Medications, Social History and Family History as of 5:44 PM on 11/15/17.    Prescriptions Prior to Admission   Medication Sig Dispense Refill Last Dose   • B COMPLEX-C-FOLIC ACID PO Take 1 tablet by mouth Daily.      • Cholecalciferol (VITAMIN D3) 5000 units capsule capsule Take 5,000 Units by mouth Daily.      • clopidogrel (PLAVIX) 75 MG tablet Take 75 mg by mouth daily.   Taking   • Cyanocobalamin (VITAMIN B 12 PO) Take 1,000 mcg by mouth Daily.      • escitalopram (LEXAPRO) 20 MG tablet TAKE ONE TABLET BY MOUTH DAILY 90 tablet 1    • furosemide (LASIX) 20 MG tablet TAKE ONE TABLET BY MOUTH TWICE A DAY (Patient taking differently: TAKE ONE TABLET BY MOUTH once A DAY) 180 tablet 0 Taking   • gabapentin (NEURONTIN) 800 MG tablet Take 1 tablet by mouth 3 (Three) Times a Day. 90 tablet 6 Taking   • letrozole (FEMARA) 2.5 MG tablet Take  by mouth daily.   Taking   • levothyroxine (SYNTHROID, LEVOTHROID) 100 MCG tablet Take 100 mcg by mouth Daily.      • Multiple Vitamins-Minerals (MULTIVITAMIN ADULT PO) Take 1 tablet by mouth Daily.      •  Multiple Vitamins-Minerals (VISION-BAR PRESERVE PO) Take  by mouth.   Taking   • pantoprazole (PROTONIX) 40 MG EC tablet TAKE ONE TABLET BY MOUTH DAILY 90 tablet 0 Taking   • rosuvastatin (CRESTOR) 20 MG tablet TAKE ONE TABLET BY MOUTH DAILY 90 tablet 0 Taking   • atenolol (TENORMIN) 100 MG tablet TAKE ONE TABLET BY MOUTH DAILY 90 tablet 0 Taking   • azithromycin (ZITHROMAX Z-JUWAN) 250 MG tablet Take 2 tablets the first day, then 1 tablet daily for 4 days. 6 tablet 0    • calcium-vitamin D (OSCAL-500) 500-200 MG-UNIT per tablet Take 1 tablet by mouth daily.   Taking   • diphenhydrAMINE (BENADRYL) 12.5 MG/5ML elixir Take  by mouth 4 (four) times a day as needed for itching.   Taking   • folic acid (FOLVITE) 1 MG tablet Take 1 mg by mouth daily.   Taking   • levothyroxine (SYNTHROID, LEVOTHROID) 125 MCG tablet Take 1 tablet by mouth Daily. 90 tablet 3 Taking   • Probiotic Product (PROBIOTIC DAILY PO) Take  by mouth.   Taking   • promethazine-dextromethorphan (PROMETHAZINE-DM) 6.25-15 MG/5ML syrup Take 5 mL by mouth 4 (Four) Times a Day As Needed for Cough. 180 mL 1    • Triamcinolone Acetonide (NASACORT) 55 MCG/ACT nasal inhaler 2 sprays into each nostril daily.   Taking     Allergies   Allergen Reactions   • Penicillins        Review of Systems   Unable to perform ROS: Patient nonverbal       Vital Signs past 24hrs  BP range: BP: (115-206)/(61-98) 115/61  Pulse range: Heart Rate:  [55-83] 59  Resp rate range: Resp:  [16-21] 20  Temp range: Temp (24hrs), Av.4 °F (36.3 °C), Min:97.1 °F (36.2 °C), Max:97.7 °F (36.5 °C)    Oxygen range: SpO2:  [94 %-100 %] 99 %; Flow (L/min):  [2] 2;   O2 Device: nasal cannula  173 lb (78.5 kg); Body mass index is 28.79 kg/(m^2).  I/O this shift:  In: 4000 [I.V.:1000; IV Piggyback:3000]  Out: -     Adult female in no distress.  She appears anxious.  Due to the expressive and receptive aphasia history is difficult.  Pupils equal and reactive to light.  Oropharynx moist with class II  Mallampati airway area dentition intact.  Nasopharynx without discharge septum midline.  JVP is not elevated trachea midline thyroid not enlarged.  Lungs reveal bilateral air entry clear to auscultation no rales rhonchi or wheeze.  Percussion note resonant chest expansion equal with no chest wall deformities or tenderness.  Heart examination S1-S2 present rhythm regular no murmurs.  No edema lower extremities.  Abdomen is soft nontender bowel sounds present no liver spleen enlargement.  No peripheral cyanosis clubbing.  Neuro exam was limited somewhat by the aphasia.  She appears to move all 4 extremities and power appears intact.  However profound expressive and receptive aphasia.  No cervical, axillary, inguinal adenopathy.    Results Review:    I have reviewed the laboratory and imaging data from current admission. My annotations are as noted in assessment and plan.    Medication Review:  I have reviewed the current MAR. My annotations are as noted in assessment and plan.    Plan   PCCM Problems  Acute CVA, status post thrombectomy 11/15  History previous stroke  Cough  Relevant Medical Diagnoses  Ex-smoker  COPD, patient states unaware    Plan of Treatment  I reviewed the notes and spoke with the .  I spoke with the son at bedside as well.  Continue to monitor and follow neurology recommendations.     reports that patient has had a cough for the last 3-4 days.  She is an ex-smoker and quit smoking 15 years ago.  She had a pretty significant smoking history of one pack a day for 45 years at least.  However she is not on any bronchodilators.  I will add some duo nebs.  I will check a respiratory virus panel.  A chest x-ray was clear.      Part of this note may be an electronic transcription/translation of spoken language to printed text using the Dragon Dictation System.

## 2017-11-15 NOTE — ANESTHESIA POSTPROCEDURE EVALUATION
"Patient: Paloma Carr    Procedure Summary     Date Anesthesia Start Anesthesia Stop Room / Location    11/15/17 1441 [id number]  [facility] OR 19 INV /  [facility] HY[facility]ID OR 18/19       Procedure Diagnosis Provider Provider    Embolectomy Mechanical (N/A ) No diagnosis on file. MD Wolfgang Vera MD          Anesthesia Type: MAC  Last vitals  BP   110/57 (11/15/17 1747)   Temp   36.5 °C (97.7 °F) (11/15/17 1717)   Pulse   57 (11/15/17 1747)   Resp   20 (11/15/17 1717)     SpO2   97 % (11/15/17 1747)     Post Anesthesia Care and Evaluation    Patient location during evaluation: bedside  Patient participation: complete - patient participated  Level of consciousness: lethargic  Pain score: 2  Pain management: adequate  Airway patency: patent  Anesthetic complications: No anesthetic complications    Cardiovascular status: acceptable  Respiratory status: acceptable  Hydration status: acceptable    Comments: /57  Pulse 57  Temp 36.5 °C (97.7 °F) (Oral)   Resp 20  Ht 65\" (165.1 cm)  Wt 173 lb (78.5 kg)  SpO2 97%  BMI 28.79 kg/m2        "

## 2017-11-15 NOTE — ANESTHESIA PREPROCEDURE EVALUATION
Anesthesia Evaluation     Patient summary reviewed and Nursing notes reviewed          Airway   Mallampati: II  no difficulty expected  Dental - normal exam     Pulmonary     breath sounds clear to auscultation  (+) COPD,   Cardiovascular     Rhythm: regular  Rate: normal    (+) hypertension, hyperlipidemia      Neuro/Psych  (+) CVA,      ROS Comment: Active stroke  GI/Hepatic/Renal/Endo    (+)  hypothyroidism,     Musculoskeletal     Abdominal    Substance History      OB/GYN          Other   (+) arthritis   history of cancer                                    Anesthesia Plan    ASA 3 - emergent     MAC   total IV anesthesia  intravenous induction   Anesthetic plan and risks discussed with patient.

## 2017-11-15 NOTE — ED NOTES
Dr. Figueroa confers with pt's . Decision is made to take pt to OR. OR is called and confirmed for the case to proceed. Pt connected to portable HR and BP monitoring in preparation to be taken to OR. ER pharmacist and ER tech (Autumn) help this RN to transport pt.     Zoey Gorman RN  11/15/17 6709

## 2017-11-15 NOTE — ED NOTES
"Pharmacist to pharmacy to obtain Cleviprex drip per Dr. Figueroa. Pt's BP obtained by portable v/s machine. Pt's BP sustained around 200/90's. Per Dr. Figueroa, starting Cleviprex drip prior to alteplase administration. Per Dr. Figueroa, this RN is to titrate \"EVERY 90 SECONDS,\" until pt's BP is systolic 160-180. With the direction of pharmacy, drip was titrated to 4 mg/hr. Per Dr. Figueroa, pt's drip to stay sustained at 4 mg per hour or higher while alteplase is given if BP is over 180 systolic. Pt's systolic BP was 183 just prior to alteplase administration. Per Dr. Figueroa, Cleviprex to be maintained at 5 mg/hr until next set of v/s are obtained.      Zoey Gorman RN  11/15/17 4386    "

## 2017-11-15 NOTE — ED NOTES
Dr. Figueroa at bedside. Pt's NIH reassessed at 14, when it was previously 5. Asked Dr. Figueroa if he wanted alteplase administration stopped. Per Dr. Figueroa, alteplase administration is to be continued. Dr. Figueroa consulted with this RN to ensure Cleviprex was d/c. Dr. Figueroa informed that Cleviprex had been stopped. NS boluses ordered/administered to help bolster pt's BP and help reduce new-onset neuro symptoms. Pt's  and ER MD (Dr. Marti) at bedside and updated.      Zoey Gorman, RN  11/15/17 2581

## 2017-11-15 NOTE — ED PROVIDER NOTES
EMERGENCY DEPARTMENT ENCOUNTER    CHIEF COMPLAINT  Chief Complaint: Neuro-deficit  History given by: pt/ spouse is present at bedside   History limited by: N/A  Room Number: [facility] Main OR/MAIN OR  PMD: Goran Carr MD      HPI:  Pt is a 77 y.o. female who presents via EMS  complaining of speech difficulty and R sided weakness. Spouse states that pt had a previous stroke that occurred 4 years ago. Spouse states that pt did not receive any permanent damage from this previous stroke. PT is currently on Plavix, but spouse is unsure if pt took her Plavix this morning. Pt was last normal at 1230 today. Shortly after 1230, pt slumped to the ground with spouse present to witness this episode.     Duration/Onset/Timing: Last normal at 1230, symptoms are constant   Location: N/A  Radiation: N/A  Quality: R sided weakness and speech difficulty   Intensity/Severity: moderate   Associated Symptoms: speech difficulty and R sided weakness   Aggravating or Alleviating Factors: None reported   Previous Episodes: Spouse states that pt had a previous stroke approximately 4 years ago with no residual damage from this previous stroke..       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   • Malignant neoplasm of upper-outer quadrant of right female breast 03/04/2016     Resolved Ambulatory Problems     Diagnosis Date Noted   • No Resolved Ambulatory Problems     Past Medical History:   Diagnosis Date   • Anxiety    • Arthritis    • COPD (chronic obstructive pulmonary disease)    • CVA (cerebral infarction) 2012   • CVD (cardiovascular disease)    • Depression    • Hyperlipemia    • Hypertension    • Hypothyroid    • Macular degeneration    • Meningioma    • Overweight    • Stroke    • Vertigo        PAST SURGICAL HISTORY  Past Surgical History:   Procedure  Laterality Date   • ADRENAL GLAND SURGERY     • BRAIN MENINGIOMA EXCISION      occipital   •  SECTION     • TOTAL SHOULDER REPLACEMENT      x2       FAMILY HISTORY  Family History   Problem Relation Age of Onset   • Hypertension Mother    • Hyperlipidemia Mother        SOCIAL HISTORY  Social History     Social History   • Marital status:      Spouse name: Vitaly   • Number of children: 4   • Years of education: N/A     Occupational History   • artist      Social History Main Topics   • Smoking status: Former Smoker     Packs/day: 2.00     Years: 40.00     Types: Cigarettes   • Smokeless tobacco: Not on file      Comment: non-smoker since    • Alcohol use No   • Drug use: No   • Sexual activity: Defer     Other Topics Concern   • Not on file     Social History Narrative    Lives at home with        ALLERGIES  Penicillins    REVIEW OF SYSTEMS  Review of Systems   Constitutional: Negative for chills and fever.   HENT: Negative.  Negative for sore throat.    Eyes: Negative.    Respiratory: Negative.  Negative for cough.    Cardiovascular: Negative.  Negative for chest pain.   Gastrointestinal: Negative.    Genitourinary: Negative.  Negative for dysuria.   Musculoskeletal: Negative.  Negative for back pain.   Skin: Negative.  Negative for rash.   Neurological: Positive for speech difficulty and weakness (R sided ). Negative for headaches.       PHYSICAL EXAM  ED Triage Vitals   Temp Heart Rate Resp BP SpO2   -- 11/15/17 1327 11/15/17 1327 11/15/17 1327 11/15/17 1327    57 16 180/98 94 %      Temp src Heart Rate Source Patient Position BP Location FiO2 (%)   -- 11/15/17 1327 -- -- --    Monitor          Physical Exam   Constitutional: No distress.   Pt was confused and in NAD   HENT:   Head: Normocephalic and atraumatic.   Mouth/Throat: Oropharynx is clear and moist.   Eyes:   Unremarkable   Neck:   Normal    Cardiovascular: Normal rate and regular rhythm.    No murmur heard.  Pulmonary/Chest:  No respiratory distress (breathing was unlabored ). She has wheezes (mild expiratory ).   Abdominal: Soft. There is no tenderness.   Musculoskeletal: She exhibits no edema or tenderness.   Neurological: She is alert.   Refer to procedure for NIH stroke scale    Skin: No rash noted.   Nursing note and vitals reviewed.      LAB RESULTS  Lab Results (last 24 hours)     Procedure Component Value Units Date/Time    CBC & Differential [[id number]] Collected:  11/15/17 1335    Specimen:  Blood Updated:  11/15/17 1349    Narrative:       The following orders were created for panel order CBC & Differential.  Procedure                               Abnormality         Status                     ---------                               -----------         ------                     CBC Auto Differential[[id number]]        Abnormal            Final result                 Please view results for these tests on the individual orders.    Comprehensive Metabolic Panel [[id number]] Collected:  11/15/17 1335    Specimen:  Blood Updated:  11/15/17 1353    Protime-INR [[id number]]  (Normal) Collected:  11/15/17 1335    Specimen:  Blood Updated:  11/15/17 1354     Protime 12.7 Seconds      INR 0.99    aPTT [[id number]]  (Normal) Collected:  11/15/17 1335    Specimen:  Blood Updated:  11/15/17 1354     PTT 24.6 seconds     Troponin [[id number]] Collected:  11/15/17 1335    Specimen:  Blood Updated:  11/15/17 1353    CBC Auto Differential [[id number]]  (Abnormal) Collected:  11/15/17 1335    Specimen:  Blood Updated:  11/15/17 1349     WBC 7.67 10*3/mm3      RBC 4.25 10*6/mm3      Hemoglobin 13.9 g/dL      Hematocrit 41.8 %      MCV 98.4 (H) fL      MCH 32.7 (H) pg      MCHC 33.3 g/dL      RDW 12.7 %      RDW-SD 45.8 fl      MPV 10.6 fL      Platelets 263 10*3/mm3      Neutrophil % 64.9 %      Lymphocyte % 17.2 (L) %      Monocyte % 16.2 (H) %      Eosinophil % 1.3 %      Basophil % 0.4 %      Immature Grans % 0.0 %      Neutrophils, Absolute  4.98 10*3/mm3      Lymphocytes, Absolute 1.32 10*3/mm3      Monocytes, Absolute 1.24 (H) 10*3/mm3      Eosinophils, Absolute 0.10 10*3/mm3      Basophils, Absolute 0.03 10*3/mm3      Immature Grans, Absolute 0.00 10*3/mm3           I ordered the above labs and reviewed the results    RADIOLOGY  XR Chest 1 View    (Results Pending)   CT Angiogram Neck With & Without Contrast    (Results Pending)   CT Cerebral Perfusion With & Without Contrast    (Results Pending)   CT Angiogram Head With & Without Contrast    (Results Pending)   Arteriogram ([employer])    (Results Pending)        I ordered the above noted radiological studies. Interpreted by radiologist. Reviewed by me in PACS.       PROCEDURES  Critical Care  Performed by: LUCERO BEST  Authorized by: LUCERO BEST     Critical care provider statement:     Critical care time (minutes):  35    Critical care was necessary to treat or prevent imminent or life-threatening deterioration of the following conditions:  CNS failure or compromise    Critical care was time spent personally by me on the following activities:  Blood draw for specimens, development of treatment plan with patient or surrogate, discussions with consultants, evaluation of patient's response to treatment, examination of patient, obtaining history from patient or surrogate, ordering and performing treatments and interventions, ordering and review of laboratory studies, ordering and review of radiographic studies, re-evaluation of patient's condition and review of old charts        Interval: baseline  1a. Level Of Consciousness: 0-->Alert: keenly responsive  1b. LOC Questions: 2-->Answers neither question correctly  1c. LOC Commands: 1-->Performs one task correctly  2. Best Gaze: 0-->Normal  3. Visual: 0-->No visual loss  4. Facial Palsy: 0-->Normal symmetrical movements  5a. Motor Arm, Left: 0-->No drift: limb holds 90 (or 45) degrees for full 10 secs  5b. Motor Arm, Right: 0-->No drift: limb  holds 90 (or 45) degrees for full 10 secs  6a. Motor Leg, Left: 0-->No drift: leg holds 30 degree position for full 5 secs  6b. Motor Leg, Right: 0-->No drift: leg holds 30 degree position for full 5 secs  7. Limb Ataxia: 0-->Absent  8. Sensory: 0-->Normal: no sensory loss  9. Best Language: 2-->Severe aphasia: all communication is through fragmentary expression: great need for inference, questioning, and guessing by the listener. Range of information that can be exchanged is limited: listener carries burden of. . . (see row details)  10. Dysarthria: 0-->Normal  11. Extinction and Inattention (formerly Neglect): 0-->No abnormality    Total (NIH Stroke Scale): 5      PROGRESS AND CONSULTS  ED Course     1335  Evaluated pt and discussed that pt is a TPA candidate due to onset of symptoms. Discussed risks and benefits of TPA. Plan to consult stroke neurology and to CT pt's head. Spouse agrees with TPA treatment. All questions addressed at this time.     1343  Ordered CT Head, Chest XR, and labs for further evaluation.     1350  Discussed pt's case with Dr. Samayoa [stroke neurology] who asks us to order more radiology studies to be performed.     1352  Ordered CT A Head, CT A Neck, and CT Cerebral perfusion for further evaluation.     1421  Rechecked pt with Dr. Samayoa present at bedside; Dr. Samayoa is worried about pt's low BP [current 74 systolic] and potential wheezes. Plan to administer a breathing treatment and pt is placed on oxygen. Dr. Samayoa performs another NIH Stroke Scale and got a score of 19. Dr. Samayoa states that pt received TPA while in radiology and was reported to worsen after receiving this treatment. CT a Head showed a M1 occulusion. Plan to take pt to OR for possible embolectomy.      1428  Pt is being admitted to OR. There are pending radiology results that have not been reviewed prior to pt being admitted.     MEDICAL DECISION MAKING  Results were reviewed/discussed with  the patient and they were also made aware of online access. Pt also made aware that some labs, such as cultures, will not be resulted during ER visit and follow up with PMD is necessary.     MDM  Number of Diagnoses or Management Options     Amount and/or Complexity of Data Reviewed  Clinical lab tests: ordered and reviewed (Pt was admitted before all labs were reviewed )  Tests in the radiology section of CPT®: ordered and reviewed (CT A Head showed a M1 Occulusion )  Discussion of test results with the performing providers: yes (Dr. Samayoa (stroke neurology) )  Independent visualization of images, tracings, or specimens: yes    Critical Care  Total time providing critical care: 30-74 minutes         DIAGNOSIS  Final diagnoses:   Cerebrovascular accident (CVA), unspecified mechanism       DISPOSITION  ADMISSION    Discussed treatment plan and reason for admission with pt/family and admitting physician.  Pt/family voiced understanding of the plan for admission for further testing/treatment as needed.         Latest Documented Vital Signs:  As of 2:39 PM  BP- (!) 206/97 HR- 56 Temp-   O2 sat- 97%    --  Documentation assistance provided by irene Valencia for Dr. Marti.  Information recorded by the scribe was done at my direction and has been verified and validated by me.     Ulices Valencia  11/15/17 7940       Gage Marti MD  11/15/17 2365

## 2017-11-15 NOTE — ED NOTES
Pt returned to ER exam room from CT scan. Pt starting to demonstrate new right-sided facial droop and new right-sided weakness. Dr. Figueroa informed.      Zoey Gorman RN  11/15/17 3761

## 2017-11-15 NOTE — ED NOTES
Per Dr. Marti, Team D (stroke protocol) activated. Pt attached to portable heart monitoring and taken to CT scan with assistance of ER tech Anisa and ER pharmacist.      Zoey Gorman RN  11/15/17 2801

## 2017-11-15 NOTE — ED NOTES
Pt taken from ER to OR. Report handed to OR RNs; updated on pt's NIH/decrease in mental status after BP drop, when cleviprex was stopped, and slow increase in pt's mentation now that pt's BP has started to recover. Pt was taken to OR with Dr. Figueroa. Pt's  (Vitaly) was updated on pt status, informed consent was obtained from him by Dr. Figueroa for surgical stroke intervention, and pt's  was lead to OR waiting room.      Zoey Gorman, SENG  11/15/17 7209

## 2017-11-15 NOTE — ED NOTES
"Pt presents to ER with expressive aphasia and difficulty following commands/confusion. NIH upon presenting to ER was 5 (see related documentation). Pt's  reports that around 12:35 while pt was attempting to cook lunch, she slumped over into the cabinets, \" and \"started slurring her words and not making any sense.\" Pt's  reports pt has a hx of CVA \"between 3-5 years ago,\" and that she was seen at this hospital for treatment. Pt's  also reports that prior to her collapse, pt had no prior neurological deficits from any other CVAs, etc. Pt's  provided pt's medication list and reported that the only \"blood thinner\" that the pt was taking was Plavix. Pt's  also reported that pt had \"issues with a meningioma 20 or so years ago, but she hasn't had any issues with it since.\" Pt's  reports that pt's speech sounds normal, but pt is obviously confused and is unable to follow some simple commands and is experiencing expressive aphasia with her inability to answer simple questions. Pt is quick to respond to questions with \"yes,\" and \"I'm Paloma,\" which sometimes are, and sometimes are not logical answers to the questions being asked of her. Pt was able to ask her  where she was upon her arrival to the ER, but after 3 minutes or so had passed since her arrival to the ER, pt only used fragmented sentences and phrases. Pt's  and EMS both report that pt had right-sided weakness, facial droop, dysarthria, expressive aphasia and confusion prior to her arrival to the ER. Pt had equal  upon her arrival, and no focal right or left-sided deficits were noted when pt was assessed with NIH stroke scale. EMS crew observed and noted that pt did look to be mildly improved in neuro status from when they first picked up the pt from her home. Dr. Marti Notified.      Zoey Gorman RN  11/15/17 3461    "

## 2017-11-15 NOTE — SIGNIFICANT NOTE
11/15/17 1453   Rehab Treatment   Discipline speech language pathologist   Rehab Evaluation   Evaluation Not Performed patient unavailable for evaluation  (Pt RAMOS in OR. SLP to follow up next date. )   Recommendation   OT - Next Appointment (11/16 as indicated)

## 2017-11-15 NOTE — ED NOTES
Dr. Figueroa (stroke neurologist) has arrived in CT and is assessing pt on CT scan table. History and v/s provided to MD. TPA mixed in anticipation of administration. Pt's weight already gathered via stretcher scale, dosing calculations checked by pharmacy and MD.       Zoey Gorman RN  11/15/17 3628

## 2017-11-15 NOTE — ED TRIAGE NOTES
Pt was in kitchen at 1230 and was at baseline. Pt then slid down cabinet, had dysarthria, aphasia, right facial droop, right arm weakness.    Pt now able to move right arm and speech has improved some, however pt not answering questions appropriately - pt says yes or no when asked what objects are.

## 2017-11-15 NOTE — TELEPHONE ENCOUNTER
Avelina called with 10 days of worsening cough and URI symptoms. She has recently been on prednisone for her knee but noticed no improvement in her cough. She is a former smoker. She is up at night with cough and is finding it hard to rest.   I have called in a Zpak, phen DM cough med and advised supportive care, f/u with me if she gets worse.

## 2017-11-16 ENCOUNTER — APPOINTMENT (OUTPATIENT)
Dept: GENERAL RADIOLOGY | Facility: HOSPITAL | Age: 77
End: 2017-11-16

## 2017-11-16 ENCOUNTER — APPOINTMENT (OUTPATIENT)
Dept: CT IMAGING | Facility: HOSPITAL | Age: 77
End: 2017-11-16

## 2017-11-16 LAB
ALBUMIN SERPL-MCNC: 3.4 G/DL (ref 3.5–5.2)
ALBUMIN/GLOB SERPL: 1.3 G/DL
ALP SERPL-CCNC: 65 U/L (ref 39–117)
ALT SERPL W P-5'-P-CCNC: 13 U/L (ref 1–33)
ANION GAP SERPL CALCULATED.3IONS-SCNC: 12.2 MMOL/L
APTT PPP: 26.2 SECONDS (ref 22.7–35.4)
ASCENDING AORTA: 3 CM
AST SERPL-CCNC: 30 U/L (ref 1–32)
BH CV ECHO MEAS - ACS: 1.6 CM
BH CV ECHO MEAS - AO MAX PG: 9 MMHG
BH CV ECHO MEAS - AO MEAN PG (FULL): 1 MMHG
BH CV ECHO MEAS - AO MEAN PG: 4 MMHG
BH CV ECHO MEAS - AO ROOT AREA (BSA CORRECTED): 1.6
BH CV ECHO MEAS - AO ROOT AREA: 7.1 CM^2
BH CV ECHO MEAS - AO ROOT DIAM: 3 CM
BH CV ECHO MEAS - AO V2 MAX: 152 CM/SEC
BH CV ECHO MEAS - AO V2 MEAN: 94.1 CM/SEC
BH CV ECHO MEAS - AO V2 VTI: 33 CM
BH CV ECHO MEAS - ASC AORTA: 3 CM
BH CV ECHO MEAS - AVA(I,A): 3.3 CM^2
BH CV ECHO MEAS - AVA(I,D): 3.3 CM^2
BH CV ECHO MEAS - BSA(HAYCOCK): 1.9 M^2
BH CV ECHO MEAS - BSA: 1.9 M^2
BH CV ECHO MEAS - BZI_BMI: 28.8 KILOGRAMS/M^2
BH CV ECHO MEAS - BZI_METRIC_HEIGHT: 165.1 CM
BH CV ECHO MEAS - BZI_METRIC_WEIGHT: 78.5 KG
BH CV ECHO MEAS - CONTRAST EF (2CH): 78.7 ML/M^2
BH CV ECHO MEAS - CONTRAST EF 4CH: 71.4 ML/M^2
BH CV ECHO MEAS - EDV(CUBED): 91.1 ML
BH CV ECHO MEAS - EDV(MOD-SP2): 61 ML
BH CV ECHO MEAS - EDV(MOD-SP4): 77 ML
BH CV ECHO MEAS - EDV(TEICH): 92.4 ML
BH CV ECHO MEAS - EF(CUBED): 73.2 %
BH CV ECHO MEAS - EF(MOD-SP2): 78.7 %
BH CV ECHO MEAS - EF(MOD-SP4): 71.4 %
BH CV ECHO MEAS - EF(TEICH): 65.2 %
BH CV ECHO MEAS - ESV(CUBED): 24.4 ML
BH CV ECHO MEAS - ESV(MOD-SP2): 13 ML
BH CV ECHO MEAS - ESV(MOD-SP4): 22 ML
BH CV ECHO MEAS - ESV(TEICH): 32.2 ML
BH CV ECHO MEAS - FS: 35.6 %
BH CV ECHO MEAS - IVS/LVPW: 0.9
BH CV ECHO MEAS - IVSD: 0.9 CM
BH CV ECHO MEAS - LAT PEAK E' VEL: 7 CM/SEC
BH CV ECHO MEAS - LV DIASTOLIC VOL/BSA (35-75): 41.4 ML/M^2
BH CV ECHO MEAS - LV MASS(C)D: 142.9 GRAMS
BH CV ECHO MEAS - LV MASS(C)DI: 76.8 GRAMS/M^2
BH CV ECHO MEAS - LV MEAN PG: 3 MMHG
BH CV ECHO MEAS - LV SYSTOLIC VOL/BSA (12-30): 11.8 ML/M^2
BH CV ECHO MEAS - LV V1 MEAN: 81.5 CM/SEC
BH CV ECHO MEAS - LV V1 VTI: 31.5 CM
BH CV ECHO MEAS - LVIDD: 4.5 CM
BH CV ECHO MEAS - LVIDS: 2.9 CM
BH CV ECHO MEAS - LVLD AP2: 7 CM
BH CV ECHO MEAS - LVLD AP4: 7.6 CM
BH CV ECHO MEAS - LVLS AP2: 4.8 CM
BH CV ECHO MEAS - LVLS AP4: 5.9 CM
BH CV ECHO MEAS - LVOT AREA (M): 3.5 CM^2
BH CV ECHO MEAS - LVOT AREA: 3.5 CM^2
BH CV ECHO MEAS - LVOT DIAM: 2.1 CM
BH CV ECHO MEAS - LVPWD: 1 CM
BH CV ECHO MEAS - MED PEAK E' VEL: 5 CM/SEC
BH CV ECHO MEAS - MR MAX PG: 47.3 MMHG
BH CV ECHO MEAS - MR MAX VEL: 344 CM/SEC
BH CV ECHO MEAS - MV A DUR: 156 SEC
BH CV ECHO MEAS - MV A MAX VEL: 92.7 CM/SEC
BH CV ECHO MEAS - MV DEC SLOPE: 189 CM/SEC^2
BH CV ECHO MEAS - MV DEC TIME: 351 SEC
BH CV ECHO MEAS - MV E MAX VEL: 55.9 CM/SEC
BH CV ECHO MEAS - MV E/A: 0.6
BH CV ECHO MEAS - MV MEAN PG: 2 MMHG
BH CV ECHO MEAS - MV P1/2T MAX VEL: 86.3 CM/SEC
BH CV ECHO MEAS - MV P1/2T: 133.7 MSEC
BH CV ECHO MEAS - MV V2 MEAN: 61.9 CM/SEC
BH CV ECHO MEAS - MV V2 VTI: 40.9 CM
BH CV ECHO MEAS - MVA P1/2T LCG: 2.5 CM^2
BH CV ECHO MEAS - MVA(P1/2T): 1.6 CM^2
BH CV ECHO MEAS - MVA(VTI): 2.7 CM^2
BH CV ECHO MEAS - PA ACC SLOPE: 287 CM/SEC^2
BH CV ECHO MEAS - PA ACC TIME: 0.14 SEC
BH CV ECHO MEAS - PA MAX PG: 0.69 MMHG
BH CV ECHO MEAS - PA PR(ACCEL): 15.6 MMHG
BH CV ECHO MEAS - PA V2 MAX: 41.6 CM/SEC
BH CV ECHO MEAS - PULM A REVS DUR: 134 SEC
BH CV ECHO MEAS - PULM A REVS VEL: 26.8 CM/SEC
BH CV ECHO MEAS - PULM DIAS VEL: 13.9 CM/SEC
BH CV ECHO MEAS - PULM S/D: 2.8
BH CV ECHO MEAS - PULM SYS VEL: 39.4 CM/SEC
BH CV ECHO MEAS - QP/QS: 0.35
BH CV ECHO MEAS - RAP SYSTOLE: 15 MMHG
BH CV ECHO MEAS - RV MEAN PG: 1 MMHG
BH CV ECHO MEAS - RV V1 MEAN: 44.5 CM/SEC
BH CV ECHO MEAS - RV V1 VTI: 16.8 CM
BH CV ECHO MEAS - RVOT AREA: 2.3 CM^2
BH CV ECHO MEAS - RVOT DIAM: 1.7 CM
BH CV ECHO MEAS - SI(AO): 125.4 ML/M^2
BH CV ECHO MEAS - SI(CUBED): 35.9 ML/M^2
BH CV ECHO MEAS - SI(LVOT): 58.7 ML/M^2
BH CV ECHO MEAS - SI(MOD-SP2): 25.8 ML/M^2
BH CV ECHO MEAS - SI(MOD-SP4): 29.6 ML/M^2
BH CV ECHO MEAS - SI(TEICH): 32.4 ML/M^2
BH CV ECHO MEAS - SV(AO): 233.3 ML
BH CV ECHO MEAS - SV(CUBED): 66.7 ML
BH CV ECHO MEAS - SV(LVOT): 109.1 ML
BH CV ECHO MEAS - SV(MOD-SP2): 48 ML
BH CV ECHO MEAS - SV(MOD-SP4): 55 ML
BH CV ECHO MEAS - SV(RVOT): 38.1 ML
BH CV ECHO MEAS - SV(TEICH): 60.2 ML
BH CV ECHO MEAS - TAPSE (>1.6): 3.3 CM2
BH CV VAS BP RIGHT ARM: NORMAL MMHG
BH CV XLRA - RV BASE: 3.5 CM
BH CV XLRA - TDI S': 12 CM/SEC
BILIRUB SERPL-MCNC: 0.5 MG/DL (ref 0.1–1.2)
BUN BLD-MCNC: 9 MG/DL (ref 8–23)
BUN/CREAT SERPL: 16.7 (ref 7–25)
CALCIUM SPEC-SCNC: 8 MG/DL (ref 8.6–10.5)
CHLORIDE SERPL-SCNC: 108 MMOL/L (ref 98–107)
CHOLEST SERPL-MCNC: 139 MG/DL (ref 0–200)
CO2 SERPL-SCNC: 21.8 MMOL/L (ref 22–29)
CREAT BLD-MCNC: 0.54 MG/DL (ref 0.57–1)
DEPRECATED RDW RBC AUTO: 47.5 FL (ref 37–54)
DUFFY A ANTIGEN: NEGATIVE
E/E' RATIO: 10
ERYTHROCYTE [DISTWIDTH] IN BLOOD BY AUTOMATED COUNT: 13.1 % (ref 11.7–13)
GFR SERPL CREATININE-BSD FRML MDRD: 109 ML/MIN/1.73
GLOBULIN UR ELPH-MCNC: 2.7 GM/DL
GLUCOSE BLD-MCNC: 86 MG/DL (ref 65–99)
GLUCOSE BLDC GLUCOMTR-MCNC: 93 MG/DL (ref 70–130)
HBA1C MFR BLD: 5.46 % (ref 4.8–5.6)
HCT VFR BLD AUTO: 31.3 % (ref 35.6–45.5)
HDLC SERPL-MCNC: 40 MG/DL (ref 40–60)
HGB BLD-MCNC: 10.2 G/DL (ref 11.9–15.5)
INR PPP: 1.11 (ref 0.9–1.1)
LDLC SERPL CALC-MCNC: 63 MG/DL (ref 0–100)
LDLC/HDLC SERPL: 1.58 {RATIO}
LEFT ATRIUM VOLUME INDEX: 30 ML/M2
LV EF 2D ECHO EST: 71 %
MAXIMAL PREDICTED HEART RATE: 142 BPM
MCH RBC QN AUTO: 32.4 PG (ref 26.9–32)
MCHC RBC AUTO-ENTMCNC: 32.6 G/DL (ref 32.4–36.3)
MCV RBC AUTO: 99.4 FL (ref 80.5–98.2)
PA ADP PRP-ACNC: 323 PRU (ref 194–418)
PLATELET # BLD AUTO: 202 10*3/MM3 (ref 140–500)
PMV BLD AUTO: 10.6 FL (ref 6–12)
POTASSIUM BLD-SCNC: 3.6 MMOL/L (ref 3.5–5.2)
PROT SERPL-MCNC: 6.1 G/DL (ref 6–8.5)
PROTHROMBIN TIME: 13.8 SECONDS (ref 11.7–14.2)
RBC # BLD AUTO: 3.15 10*6/MM3 (ref 3.9–5.2)
SODIUM BLD-SCNC: 142 MMOL/L (ref 136–145)
STRESS TARGET HR: 121 BPM
TRIGL SERPL-MCNC: 180 MG/DL (ref 0–150)
TSH SERPL DL<=0.05 MIU/L-ACNC: 2.28 MIU/ML (ref 0.27–4.2)
VLDLC SERPL-MCNC: 36 MG/DL (ref 5–40)
WBC NRBC COR # BLD: 6.95 10*3/MM3 (ref 4.5–10.7)

## 2017-11-16 PROCEDURE — 25010000002 ONDANSETRON PER 1 MG: Performed by: NURSE PRACTITIONER

## 2017-11-16 PROCEDURE — 84443 ASSAY THYROID STIM HORMONE: CPT | Performed by: NURSE PRACTITIONER

## 2017-11-16 PROCEDURE — 82962 GLUCOSE BLOOD TEST: CPT

## 2017-11-16 PROCEDURE — 94799 UNLISTED PULMONARY SVC/PX: CPT

## 2017-11-16 PROCEDURE — 92610 EVALUATE SWALLOWING FUNCTION: CPT

## 2017-11-16 PROCEDURE — 73060 X-RAY EXAM OF HUMERUS: CPT

## 2017-11-16 PROCEDURE — 80061 LIPID PANEL: CPT | Performed by: NURSE PRACTITIONER

## 2017-11-16 PROCEDURE — 85576 BLOOD PLATELET AGGREGATION: CPT | Performed by: NURSE PRACTITIONER

## 2017-11-16 PROCEDURE — 85027 COMPLETE CBC AUTOMATED: CPT | Performed by: NURSE PRACTITIONER

## 2017-11-16 PROCEDURE — 80053 COMPREHEN METABOLIC PANEL: CPT | Performed by: NURSE PRACTITIONER

## 2017-11-16 PROCEDURE — 99233 SBSQ HOSP IP/OBS HIGH 50: CPT | Performed by: NURSE PRACTITIONER

## 2017-11-16 PROCEDURE — 97110 THERAPEUTIC EXERCISES: CPT

## 2017-11-16 PROCEDURE — 85610 PROTHROMBIN TIME: CPT | Performed by: NURSE PRACTITIONER

## 2017-11-16 PROCEDURE — 70450 CT HEAD/BRAIN W/O DYE: CPT

## 2017-11-16 PROCEDURE — 83036 HEMOGLOBIN GLYCOSYLATED A1C: CPT | Performed by: NURSE PRACTITIONER

## 2017-11-16 PROCEDURE — 97162 PT EVAL MOD COMPLEX 30 MIN: CPT

## 2017-11-16 PROCEDURE — 99232 SBSQ HOSP IP/OBS MODERATE 35: CPT | Performed by: NURSE PRACTITIONER

## 2017-11-16 PROCEDURE — 85730 THROMBOPLASTIN TIME PARTIAL: CPT | Performed by: NURSE PRACTITIONER

## 2017-11-16 RX ORDER — ROSUVASTATIN CALCIUM 20 MG/1
20 TABLET, COATED ORAL DAILY
Status: DISCONTINUED | OUTPATIENT
Start: 2017-11-16 | End: 2017-11-19 | Stop reason: HOSPADM

## 2017-11-16 RX ORDER — CLOPIDOGREL BISULFATE 75 MG/1
75 TABLET ORAL DAILY
Status: DISCONTINUED | OUTPATIENT
Start: 2017-11-16 | End: 2017-11-19 | Stop reason: HOSPADM

## 2017-11-16 RX ORDER — LEVOTHYROXINE SODIUM 0.12 MG/1
125 TABLET ORAL DAILY
Status: DISCONTINUED | OUTPATIENT
Start: 2017-11-16 | End: 2017-11-19 | Stop reason: HOSPADM

## 2017-11-16 RX ORDER — SODIUM CHLORIDE 9 MG/ML
75 INJECTION, SOLUTION INTRAVENOUS CONTINUOUS
Status: DISCONTINUED | OUTPATIENT
Start: 2017-11-16 | End: 2017-11-16

## 2017-11-16 RX ORDER — GABAPENTIN 400 MG/1
800 CAPSULE ORAL EVERY 8 HOURS SCHEDULED
Status: DISCONTINUED | OUTPATIENT
Start: 2017-11-16 | End: 2017-11-19 | Stop reason: HOSPADM

## 2017-11-16 RX ORDER — ESCITALOPRAM OXALATE 10 MG/1
10 TABLET ORAL DAILY
Status: DISCONTINUED | OUTPATIENT
Start: 2017-11-16 | End: 2017-11-19 | Stop reason: HOSPADM

## 2017-11-16 RX ORDER — ATENOLOL 50 MG/1
50 TABLET ORAL
Status: DISCONTINUED | OUTPATIENT
Start: 2017-11-16 | End: 2017-11-19 | Stop reason: HOSPADM

## 2017-11-16 RX ORDER — PANTOPRAZOLE SODIUM 40 MG/1
40 TABLET, DELAYED RELEASE ORAL
Status: DISCONTINUED | OUTPATIENT
Start: 2017-11-16 | End: 2017-11-19 | Stop reason: HOSPADM

## 2017-11-16 RX ADMIN — ESCITALOPRAM 10 MG: 10 TABLET, FILM COATED ORAL at 11:27

## 2017-11-16 RX ADMIN — GABAPENTIN 800 MG: 400 CAPSULE ORAL at 16:31

## 2017-11-16 RX ADMIN — IPRATROPIUM BROMIDE AND ALBUTEROL SULFATE 3 ML: .5; 3 SOLUTION RESPIRATORY (INHALATION) at 07:18

## 2017-11-16 RX ADMIN — PANTOPRAZOLE SODIUM 40 MG: 40 TABLET, DELAYED RELEASE ORAL at 16:31

## 2017-11-16 RX ADMIN — IPRATROPIUM BROMIDE AND ALBUTEROL SULFATE 3 ML: .5; 3 SOLUTION RESPIRATORY (INHALATION) at 16:23

## 2017-11-16 RX ADMIN — IPRATROPIUM BROMIDE AND ALBUTEROL SULFATE 3 ML: .5; 3 SOLUTION RESPIRATORY (INHALATION) at 11:06

## 2017-11-16 RX ADMIN — CLOPIDOGREL 75 MG: 75 TABLET, FILM COATED ORAL at 16:31

## 2017-11-16 RX ADMIN — GABAPENTIN 800 MG: 400 CAPSULE ORAL at 21:45

## 2017-11-16 RX ADMIN — ATORVASTATIN CALCIUM 80 MG: 80 TABLET, FILM COATED ORAL at 09:12

## 2017-11-16 RX ADMIN — ASPIRIN 325 MG: 325 TABLET ORAL at 16:32

## 2017-11-16 RX ADMIN — LEVOTHYROXINE SODIUM 125 MCG: 125 TABLET ORAL at 11:27

## 2017-11-16 RX ADMIN — IPRATROPIUM BROMIDE AND ALBUTEROL SULFATE 3 ML: .5; 3 SOLUTION RESPIRATORY (INHALATION) at 20:19

## 2017-11-16 RX ADMIN — ATENOLOL 50 MG: 50 TABLET ORAL at 11:16

## 2017-11-16 RX ADMIN — ONDANSETRON 4 MG: 2 INJECTION INTRAMUSCULAR; INTRAVENOUS at 08:09

## 2017-11-16 NOTE — PROGRESS NOTES
LOS: 1 day   Patient Care Team:  Goran Carr MD as PCP - General (Family Medicine)    Chief Complaint:    Chief Complaint   Patient presents with   • Neuro Deficit(s)       Subjective     Interval History: per son, she drinks 7 days a week at least 2 glasses of wine if not more. She also has intermittent right arm pit swelling, since her mastectomy.     Patient Complaints: none  Patient Denies:  H/A  History taken from: patient chart family RN    Objective     Vital Signs  Temp:  [97.1 °F (36.2 °C)-98.8 °F (37.1 °C)] 98.8 °F (37.1 °C)  Heart Rate:  [51-83] 59  Resp:  [16-21] 16  BP: ()/(39-98) 109/64      Physical Examination:  HEENT: Normocephalic, atraumatic   COR: RRR  Resp: Even and unlabored  Extremities: Equal pulses, non distal embolization, generalized ecchymosis     Neurological:   MS: AO. Language: mild expressive aphasia with some paraphasic errors. No neglect. Higher integrative function fair  CN: II-XII normal  Motor: 5/5, normal tone  Coordination: Normal    Results Review:     I reviewed the patient's new clinical results.      Results from last 7 days  Lab Units 11/16/17  0426   HEMOGLOBIN A1C % 5.46       Results from last 7 days  Lab Units 11/16/17  0426 11/15/17  1335   WBC 10*3/mm3 6.95 7.67   HEMOGLOBIN g/dL 10.2* 13.9   HEMATOCRIT % 31.3* 41.8   PLATELETS 10*3/mm3 202 263       Results from last 7 days  Lab Units 11/16/17  0426   CHOLESTEROL mg/dL 139     Lab Results   Component Value Date    LDLCALC 63 11/16/2017         Results from last 7 days  Lab Units 11/15/17  1335   SODIUM mmol/L 138   POTASSIUM mmol/L 4.4   CHLORIDE mmol/L 98   CO2 mmol/L 24.8   BUN mg/dL 19   CREATININE mg/dL 0.85   CALCIUM mg/dL 9.5   BILIRUBIN mg/dL 0.5   ALK PHOS U/L 82   ALT (SGPT) U/L 28   AST (SGOT) U/L 33*   GLUCOSE mg/dL 76     11/16 CT head: normal    11/2017 TTE  Left Ventricle  Left ventricular systolic function is hyperdynamic (EF > 70%). Calculated EF = 71.4%. Estimated EF was in agreement  with the calculated EF. Normal left ventricular cavity size and wall thickness noted. All left ventricular wall segments contract normally. Left ventricular diastolic dysfunction is noted (grade I) consistent with impaired relaxation.   Left Atrium  Left atrial volume is borderline increased. Lipomatous hypertrophy of the interatrial septum present. Saline test results are negative.   Aortic Valve  The aortic valve is not well visualized. The aortic valve is abnormal in structure. There is mild-to-moderate thickening of the aortic valve. No aortic valve regurgitation is present. No aortic valve stenosis is present.       Medication Review: reviewed, changes made    Assessment/Plan  Ms Carr is a 76 yo with HTN, HLD, breast cancer and Hx of stroke and meningioma resection who presented on 11/15 with sudden onset speech difficulty and right side weakness. She is POD#1 for IV thrombolysis and emergent mechanical embolectomy. She has improved neurologically. The etiology is unknown. I will check plavix response, she was on this at home. TTE only shows borderline LA enlargement, ? JEFE need. She needs continued aggressive risk factor control, Crestor 20mg sufficient LDL 63. Her home antihypertensives have been resumed, Cardene off. Goal SBP . If CT head normal okay to transfer to [facility] [place] [place] after 24 hours. PT/OT/ST. CCP For D/C planning. Please call with any questions or concerns.     Plan:   -     - 24 hour head CT    - Crestor 20mg, LDL 63    - Neurochecks   - Non-pharmacological DVT prophylaxis   - EKG Tele   - PT/OT/ST   - Stroke Education   - SBP    - Goal LDL <70-recommend high dose statins-    - Serum glucose < 140   - CCP for D/C planning    Active Problems:    Cerebrovascular accident (CVA)    ADDENDUM: start ASA and Plavix. P2Y12 reveals the patient does not response to plavix alone. etiology of stroke unknown. Cerebral vascular imaging did not reveal atherosclerosis that would explain the event. ?JEFE  and if negative prolonged heart monitoring     I have discussed the above with the patient, Dr. Figueroa, the nurse and family, son renita .    Jenelle Kevin, APRN  11/16/17  8:03 AM

## 2017-11-16 NOTE — CONSULTS
Patient Name: Paloma Carr  :1940  77 y.o.    Date of Admission: 11/15/2017  Date of Consultation:  17  Encounter Provider: CELY Honeycutt  Place of Service: [facility] CARDIOLOGY  Referring Provider: Timothy Barillas MD  Patient Care Team:  Goran Carr MD as PCP - General (Family Medicine)      Chief complaint: stroke    Reason for consult: JEFE    History of Present Illness: Mrs. Carr is a 77 year old female with history of HTN, HLD, hypothyroidism, breast cancer, right occipital meningioma s/p removal, and prior stroke 3 years ago. She presented to the emergency room on 11/15 with acute stroke.  states she was standing at the counter cutting fruit and suddenly had a glazed over appearance and started to slip down to the floor. She was found to have complete occlusion of the left MCA. She got IV thrombolysis and was taken emergently for mechanical thrombectomy. Patient's  reports she has improved neurologically although she seems to continue to have trouble with comprehension and speech.     She had a TTE that demonstrated normal LV systolic function with grade I diastolic dysfunction. LA was borderline enlarged. Saline study was negative. We have been asked to see for JFEE and complete cardiac workup.     Patient denies any trouble swallowing. She has not had any throat/neck/stomach surgeries or esophageal dilation. She has not had any prior problems with sedation. She denies any palpitations or heart racing.     Echo 11/15/17  · Left ventricular systolic function is hyperdynamic (EF > 70%). Calculated EF = 71.4%. Estimated EF was in agreement with the calculated EF. Normal left ventricular cavity size and wall thickness noted. All left ventricular wall segments contract normally. Left ventricular diastolic dysfunction is noted (grade I) consistent with impaired relaxation.  · Limited 2D imaging of cardiac valves  · Left atrial volume is  borderline increased. Lipomatous hypertrophy of the interatrial septum present. Saline test results are negative.  · There is mild-to-moderate thickening of the aortic valve.  · Moderate mitral annular calcification is present. Mild mitral valve regurgitation is present      Past Medical History:   Diagnosis Date   • Anxiety    • Arthritis    • COPD (chronic obstructive pulmonary disease)    • CVA (cerebral infarction)    • CVD (cardiovascular disease)    • Depression    • Hyperlipemia    • Hypertension    • Hypothyroid    • Macular degeneration    • Meningioma     OCCIPITAL MENINGIOMA-REMOVED BY DR TOLLIVER    • Overweight    • Stroke    • Vertigo        Past Surgical History:   Procedure Laterality Date   • ADRENAL GLAND SURGERY     • BRAIN MENINGIOMA EXCISION      occipital   •  SECTION     • EMBOLECTOMY N/A 11/15/2017    Procedure: Embolectomy Mechanical;  Surgeon: Rachid Figueroa MD;  Location: [facility] ;  Service:    • TOTAL SHOULDER REPLACEMENT      x2         Prior to Admission medications    Medication Sig Start Date End Date Taking? Authorizing Provider   B COMPLEX-C-FOLIC ACID PO Take 1 tablet by mouth Daily.   Yes Historical Provider, MD   Cholecalciferol (VITAMIN D3) 5000 units capsule capsule Take 5,000 Units by mouth Daily.   Yes Historical Provider, MD   clopidogrel (PLAVIX) 75 MG tablet Take 75 mg by mouth daily.   Yes Historical Provider, MD   Cyanocobalamin (VITAMIN B 12 PO) Take 1,000 mcg by mouth Daily.   Yes Historical Provider, MD   escitalopram (LEXAPRO) 20 MG tablet TAKE ONE TABLET BY MOUTH DAILY 10/24/17  Yes Goran Carr MD   furosemide (LASIX) 20 MG tablet TAKE ONE TABLET BY MOUTH TWICE A DAY  Patient taking differently: TAKE ONE TABLET BY MOUTH once A DAY 17  Yes Goran Carr MD   gabapentin (NEURONTIN) 800 MG tablet Take 1 tablet by mouth 3 (Three) Times a Day. 17  Yes Goran Carr MD   letrozole (FEMARA) 2.5 MG tablet  Take  by mouth daily.   Yes Historical Provider, MD   levothyroxine (SYNTHROID, LEVOTHROID) 100 MCG tablet Take 100 mcg by mouth Daily.   Yes Historical Provider, MD   Multiple Vitamins-Minerals (MULTIVITAMIN ADULT PO) Take 1 tablet by mouth Daily.   Yes Historical Provider, MD   Multiple Vitamins-Minerals (VISION-BAR PRESERVE PO) Take  by mouth.   Yes Historical Provider, MD   pantoprazole (PROTONIX) 40 MG EC tablet TAKE ONE TABLET BY MOUTH DAILY 6/23/17  Yes Goran Carr MD   rosuvastatin (CRESTOR) 20 MG tablet TAKE ONE TABLET BY MOUTH DAILY 8/21/17  Yes Goran Carr MD   atenolol (TENORMIN) 100 MG tablet TAKE ONE TABLET BY MOUTH DAILY 8/21/17   Goran Carr MD   azithromycin (ZITHROMAX Z-JUWAN) 250 MG tablet Take 2 tablets the first day, then 1 tablet daily for 4 days. 11/15/17   Goran Carr MD   calcium-vitamin D (OSCAL-500) 500-200 MG-UNIT per tablet Take 1 tablet by mouth daily.    Historical Provider, MD   diphenhydrAMINE (BENADRYL) 12.5 MG/5ML elixir Take  by mouth 4 (four) times a day as needed for itching.    Historical Provider, MD   folic acid (FOLVITE) 1 MG tablet Take 1 mg by mouth daily.    Historical Provider, MD   levothyroxine (SYNTHROID, LEVOTHROID) 125 MCG tablet Take 1 tablet by mouth Daily. 4/23/17   Goran Carr MD   Probiotic Product (PROBIOTIC DAILY PO) Take  by mouth.    Historical Provider, MD   promethazine-dextromethorphan (PROMETHAZINE-DM) 6.25-15 MG/5ML syrup Take 5 mL by mouth 4 (Four) Times a Day As Needed for Cough. 11/15/17   Goran Carr MD   Triamcinolone Acetonide (NASACORT) 55 MCG/ACT nasal inhaler 2 sprays into each nostril daily.    Historical Provider, MD       Allergies   Allergen Reactions   • Penicillins        Social History     Social History   • Marital status:      Spouse name: Vitaly   • Number of children: 4   • Years of education: N/A     Occupational History   • artist      Social History Main Topics   • Smoking status:  Former Smoker     Packs/day: 2.00     Years: 40.00     Types: Cigarettes   • Smokeless tobacco: None      Comment: non-smoker since 2003   • Alcohol use No   • Drug use: No   • Sexual activity: Defer     Other Topics Concern   • None     Social History Narrative    Lives at home with        Family History   Problem Relation Age of Onset   • Hypertension Mother    • Hyperlipidemia Mother        REVIEW OF SYSTEMS:   All systems reviewed.  Pertinent positives identified in HPI.  All other systems are negative.      Objective:     Vitals:    11/16/17 1445 11/16/17 1500 11/16/17 1605 11/16/17 1623   BP: 118/51 118/51 117/43    BP Location:       Patient Position:       Pulse: 75 75 70 69   Resp:    18   Temp:  99.7 °F (37.6 °C)     TempSrc:  Oral     SpO2: 93% 91% 91% 91%   Weight:       Height:         Body mass index is 28.79 kg/(m^2).    General Appearance:    Alert, cooperative, in no acute distress   Head:    Normocephalic, without obvious abnormality, atraumatic   Eyes:            Lids and lashes normal, conjunctivae and sclerae normal, no   icterus, no pallor, corneas clear, PERRLA   Ears:    Ears appear intact with no abnormalities noted   Throat:   No oral lesions, no thrush, oral mucosa moist   Neck:   No adenopathy, supple, trachea midline, no thyromegaly, no   carotid bruit, no JVD   Back:     No kyphosis present, no scoliosis present, no skin lesions, erythema or scars, no tenderness to percussion or palpation, range of motion normal   Lungs:     Clear to auscultation,respirations regular, even and unlabored    Heart:    Regular rhythm and normal rate, normal S1 and S2, no murmur, no gallop, no rub, no click   Chest Wall:    No abnormalities observed   Abdomen:     Normal bowel sounds, no masses, no organomegaly, soft        non-tender, non-distended, no guarding, no rebound  tenderness   Extremities:   Moves all extremities well, no edema, no cyanosis, no redness   Pulses:   Pulses palpable and equal  bilaterally. Normal radial, carotid, femoral, dorsalis pedis and posterior tibial pulses bilaterally. Normal abdominal aorta   Skin:  Psychiatric:   No bleeding, bruising or rash    Alert to person, flat affect   Lab Review:       Results from last 7 days  Lab Units 11/16/17  0708   SODIUM mmol/L 142   POTASSIUM mmol/L 3.6   CHLORIDE mmol/L 108*   CO2 mmol/L 21.8*   BUN mg/dL 9   CREATININE mg/dL 0.54*   CALCIUM mg/dL 8.0*   BILIRUBIN mg/dL 0.5   ALK PHOS U/L 65   ALT (SGPT) U/L 13   AST (SGOT) U/L 30   GLUCOSE mg/dL 86       Results from last 7 days  Lab Units 11/15/17  1335   TROPONIN T ng/mL <0.010       Results from last 7 days  Lab Units 11/16/17  0426   WBC 10*3/mm3 6.95   HEMOGLOBIN g/dL 10.2*   HEMATOCRIT % 31.3*   PLATELETS 10*3/mm3 202       Results from last 7 days  Lab Units 11/16/17  0426 11/15/17  1335   INR  1.11* 0.99   APTT seconds 26.2 24.6           Results from last 7 days  Lab Units 11/16/17  0426   CHOLESTEROL mg/dL 139   TRIGLYCERIDES mg/dL 180*   HDL CHOL mg/dL 40                I personally viewed and interpreted the patient's EKG/Telemetry data.        Assessment and Plan:   1. Left MCA occlusion/ stroke- TTE unremarkable. Will proceed with JEFE in the morning (With Dr. Reed at 8am at the bedside). She will need prolonged telemetry monitoring at discharge if all other testing is negative.   2. HTN  3. HLD  4. Prior stroke  5. Hypothyroidism     Discussed with Dr. Boyd. Discussed the indication/risk/benefits of JEFE with patient and . They agree to proceed.     Dee Shaw, CELY  11/16/17  5:33 PM

## 2017-11-16 NOTE — SIGNIFICANT NOTE
11/16/17 1447   Rehab Treatment   Discipline occupational therapist   Rehab Evaluation   Evaluation Not Performed other (see comments)  (await CT. Will follow up tomorrow for OT)

## 2017-11-16 NOTE — THERAPY EVALUATION
Acute Care - Speech Language Pathology   Swallow Initial Evaluation [facility]     Patient Name: Paloma Carr  : 1940  MRN: [id number]  Today's Date: 2017               Admit Date: 11/15/2017    SPEECH-LANGUAGE PATHOLOGY EVALUATION - SWALLOW  Subjective: The patient was seen on this date for a Clinical Swallow evaluation.  Patient was alert and cooperative.    The patient's history is significant for acute stroke s/p tPA and embolectomy. RN cleared patient for upright positioning. Pt with expressive aphasia, but able to state name and ; responding well to verbal cues. Pt with difficulty following commands for oral mech exam, suspect receptive aphasia component. Will follow for SLE.   Objective: Textures given included thin liquid, puree consistency, mechanical soft consistency and regular consistency.  Assessment: Difficulties were noted with thin liquid and mechanical soft consistency, characterized by oral holding vs swallow delay with thins via cup-voice clear post swallow, throat clear x1 with consecutive drinks of thins via straw, no overt s/s of asp with puree or regular, voice change x1 with mixed mech soft.  SLP Findings:  Patient presents with mild oropharyngeal dysphagia, without esophageal component.   Recommendations: Diet Textures: thin liquid, regular consistency (no mixed) food.  Medications should be taken whole with puree.   Recommended Strategies: Upright for PO and small bites and sips. Oral care before breakfast, after all meals and PRN.  Other Recommended Evaluations: VFSS and Speech-Language Evaluation    Dysphagia therapy is recommended. Rationale: to establish safest PO diet.    Visit Dx:     ICD-10-CM ICD-9-CM   1. Cerebrovascular accident (CVA), unspecified mechanism I63.9 434.91     Patient Active Problem List   Diagnosis   • Soft tissue swelling of chest wall   • Abnormal ultrasound of breast   • Postmastectomy lymphedema syndrome   • Malignant neoplasm of upper-outer  quadrant of right female breast   • Cerebrovascular accident (CVA)     Past Medical History:   Diagnosis Date   • Anxiety    • Arthritis    • COPD (chronic obstructive pulmonary disease)    • CVA (cerebral infarction)    • CVD (cardiovascular disease)    • Depression    • Hyperlipemia    • Hypertension    • Hypothyroid    • Macular degeneration    • Meningioma     OCCIPITAL MENINGIOMA-REMOVED BY DR TOLLIVER    • Overweight    • Stroke    • Vertigo      Past Surgical History:   Procedure Laterality Date   • ADRENAL GLAND SURGERY     • BRAIN MENINGIOMA EXCISION      occipital   •  SECTION     • TOTAL SHOULDER REPLACEMENT      x2          SWALLOW EVALUATION (last 72 hours)      Swallow Evaluation       17 0901                Rehab Evaluation    Document Type evaluation  -        Subjective Information agree to therapy  -        Patient Effort, Rehab Treatment excellent  -        Symptoms Noted During/After Treatment none  -        General Information    Patient Profile Review yes  -        Subjective Patient Observations Pt with aphasia, but able to state name and  without cues  -        Pertinent History Of Current Problem stroke s/p tPA and embolectomy  -        Current Diet Limitations NPO  -        Prior Level of Function- Communication functional in all spheres  -        Prior Level of Function- Swallowing no diet consistency restrictions  -        Plans/Goals Discussed With patient  -        Barriers to Rehab medically complex  -        Clinical Impression    Patient's Goals For Discharge return to regular diet  -        SLP Swallowing Diagnosis mild dysphagia  -        Rehab Potential/Prognosis, Swallowing good, to achieve stated therapy goals  -        Criteria for Skilled Therapeutic Interventions Met skilled criteria for dysphagia intervention met  -        FCM, Swallowing 6-->Level 6  -SH        Therapy Frequency PRN  -SH        Predicted  Duration Therapy Interv (days) until discharge  [facility]        Expected Duration Therapy Session (min) 15-30 minutes  [facility]        SLP Diet Recommendation regular textures;thin liquids  -        Recommended Diagnostics VFSS ([facility])  -        Recommended Feeding/Eating Techniques no straws;small sips/bites;other (see comments)   no mixed  -        SLP Rec. for Method of Medication Administration meds whole in pudding/applesauce  [facility]        Monitor For Signs Of Aspiration cough;elevated WBC count;gurgly voice;fever;throat clearing;upper respiratory infection;pneumonia;right lower lobe infiltrates  -        Anticipated Discharge Disposition inpatient rehabilitation facility  [facility]        Pain Assessment    Pain Assessment No/denies pain  -        Oral Motor Structure and Function    Oral Motor Assessment Comment No focal weakness  -        Dysphagia Treatment Objectives and Progress    Dysphagia Treatment Objectives Other 1  -        Dysphagia Other 1    Dysphagia Other 1 Objective Pt will tolerate regular and thins without overt s/s of aspiration.   -          User Key  (r) = Recorded By, (t) = Taken By, (c) = Cosigned By    Initials Name Effective Dates     Yadira Lancaster MS [facility]-SLP 07/13/17 -         EDUCATION  The patient has been educated in the following areas:   Dysphagia (Swallowing Impairment) Modified Diet Instruction.    SLP Recommendation and Plan  SLP Swallowing Diagnosis: mild dysphagia  SLP Diet Recommendation: regular textures, thin liquids  Recommended Feeding/Eating Techniques: no straws, small sips/bites, other (see comments) (no mixed)  SLP Rec. for Method of Medication Administration: meds whole in pudding/applesauce  Monitor For Signs Of Aspiration: cough, elevated WBC count, gurgly voice, fever, throat clearing, upper respiratory infection, pneumonia, right lower lobe infiltrates  Recommended Diagnostics: VFSS ([facility])  Criteria for Skilled Therapeutic Interventions Met: skilled criteria for  dysphagia intervention met  Anticipated Discharge Disposition: inpatient rehabilitation facility  Rehab Potential/Prognosis, Swallowing: good, to achieve stated therapy goals  Therapy Frequency: PRN             Plan of Care Review  Plan Of Care Reviewed With: patient  Progress: improving  Outcome Summary/Follow up Plan: Bedside swallow completed. SLP recs regular (no mixed) and thins (no straw); meds whole in puree. Will follow for VFSS and language evaluation.            SLP Goals       11/16/17 0844          Safely Consume Diet    Safely Consume Diet- SLP, Date Established 11/16/17  -      Safely Consume Diet- SLP, Time to Achieve by discharge  -        User Key  (r) = Recorded By, (t) = Taken By, (c) = Cosigned By    Initials Name Provider Type    SANDI Lancaster MS CCC-SLP Speech and Language Pathologist             SLP Outcome Measures (last 72 hours)      SLP Outcome Measures       11/16/17 0900          SLP Outcome Measures    Outcome Measure Used? Adult NOMS  -      FCM Scores    FCM Chosen Swallowing  -      Swallowing FCM Score 6  -        User Key  (r) = Recorded By, (t) = Taken By, (c) = Cosigned By    Initials Name Effective Dates     Yadira Lancaster MS CCC-SLP 07/13/17 -            Time Calculation:         Time Calculation- SLP       11/16/17 0905          Time Calculation- [facility]    SLP Start Time 0830  -      SLP Stop Time 0905  -      SLP Time Calculation (min) 35 min  -      SLP Received On 11/16/17  -        User Key  (r) = Recorded By, (t) = Taken By, (c) = Cosigned By    Initials Name Provider Type     Yadira Lancaster MS CCC-SLP Speech and Language Pathologist          Therapy Charges for Today     Code Description Service Date Service Provider Modifiers Qty    [id number] HC ST EVAL ORAL PHARYNG SWALLOW 2 11/16/2017 Yadira Lancaster MS CCC-SLP GN 1               MS AVI Andrade  11/16/2017

## 2017-11-16 NOTE — PLAN OF CARE
Problem: Patient Care Overview (Adult)  Goal: Plan of Care Review  Outcome: Ongoing (interventions implemented as appropriate)    11/16/17 1501   Coping/Psychosocial Response Interventions   Plan Of Care Reviewed With patient   Outcome Evaluation   Outcome Summary/Follow up Plan Pt presents with impaired funcitonal mobility and gait secondary to some weakness, impaired balance, and decreased activity tolerance s/p CVA with embolectomy. Pt may benefit from skilled PT to address these deficits.         Problem: Inpatient Physical Therapy  Goal: Bed Mobility Goal LTG- PT  Outcome: Ongoing (interventions implemented as appropriate)    11/16/17 1501   Bed Mobility PT LTG   Bed Mobility PT LTG, Time to Achieve 1 wk   Bed Mobility PT LTG, Activity Type all bed mobility   Bed Mobility PT LTG, [place] Level supervision required       Goal: Transfer Training Goal 1 LTG- PT  Outcome: Ongoing (interventions implemented as appropriate)    11/16/17 1501   Transfer Training PT LTG   Transfer Training PT LTG, Time to Achieve 1 wk   Transfer Training PT LTG, Activity Type all transfers   Transfer Training PT LTG, [place] Level supervision required       Goal: Gait Training Goal LTG- PT  Outcome: Ongoing (interventions implemented as appropriate)    11/16/17 1501   Gait Training PT LTG   Gait Training Goal PT LTG, Time to Achieve 1 wk   Gait Training Goal PT LTG, [place] Level supervision required   Gait Training Goal PT LTG, Distance to Achieve 150

## 2017-11-16 NOTE — PLAN OF CARE
Problem: Patient Care Overview (Adult)  Goal: Plan of Care Review    11/16/17 0844   Coping/Psychosocial Response Interventions   Plan Of Care Reviewed With patient   Patient Care Overview   Progress improving   Outcome Evaluation   Outcome Summary/Follow up Plan Bedside swallow completed. SLP recs regular (no mixed) and thins (no straw); meds whole in puree. Will follow for VFSS and language evaluation.          Problem: Inpatient SLP  Goal: Dysphagia- Patient will safely consume diet as per recommendation with no signs/symptoms of aspiration    11/16/17 0844   Safely Consume Diet   Safely Consume Diet- SLP, Date Established 11/16/17   Safely Consume Diet- SLP, Time to Achieve by discharge

## 2017-11-16 NOTE — THERAPY EVALUATION
Acute Care - Physical Therapy Initial Evaluation  [facility]     Patient Name: Paloma Carr  : 1940  MRN: [id number]  Today's Date: 2017   Onset of Illness/Injury or Date of Surgery Date: 11/15/17            Admit Date: 11/15/2017     Visit Dx:    ICD-10-CM ICD-9-CM   1. Cerebrovascular accident (CVA), unspecified mechanism I63.9 434.91     Patient Active Problem List   Diagnosis   • Soft tissue swelling of chest wall   • Abnormal ultrasound of breast   • Postmastectomy lymphedema syndrome   • Malignant neoplasm of upper-outer quadrant of right female breast   • Cerebrovascular accident (CVA)     Past Medical History:   Diagnosis Date   • Anxiety    • Arthritis    • COPD (chronic obstructive pulmonary disease)    • CVA (cerebral infarction)    • CVD (cardiovascular disease)    • Depression    • Hyperlipemia    • Hypertension    • Hypothyroid    • Macular degeneration    • Meningioma     OCCIPITAL MENINGIOMA-REMOVED BY DR TOLLIVER    • Overweight    • Stroke    • Vertigo      Past Surgical History:   Procedure Laterality Date   • ADRENAL GLAND SURGERY     • BRAIN MENINGIOMA EXCISION      occipital   •  SECTION     • EMBOLECTOMY N/A 11/15/2017    Procedure: Embolectomy Mechanical;  Surgeon: Rachid Figueroa MD;  Location: [facility] ;  Service:    • TOTAL SHOULDER REPLACEMENT      x2          PT ASSESSMENT (last 72 hours)      PT Evaluation       17 1447 17 1440    Rehab Evaluation    Document Type  evaluation  -CH    Subjective Information  agree to therapy  -CH    Evaluation Not Performed other (see comments)   await CT. Will follow up tomorrow for OT  -SG     Patient Effort, Rehab Treatment  good  -CH    Symptoms Noted During/After Treatment  none  -CH    General Information    Onset of Illness/Injury or Date of Surgery Date  11/15/17  -CH    General Observations  supine in bed, no acute distress noted at rest  -CH    Pertinent History Of Current  Problem  pt admitted with R side weakness, aphasia, and CVA  -    Precautions/Limitations  fall precautions  -    Prior Level of Function  independent:;gait;transfer;bed mobility;ADL's  -    Equipment Currently Used at Home  none  -    Plans/Goals Discussed With  patient  -    Barriers to Rehab  medically complex  -    Clinical Impression    Patient/Family Goals Statement  to return to [facility]  -    Criteria for Skilled Therapeutic Interventions Met  treatment indicated  -    Impairments Found (describe specific impairments)  gait, locomotion, and balance;muscle performance  -    Rehab Potential  good, to achieve stated therapy goals  -    Pain Assessment    Pain Assessment  --   pt says 'yes' to pain, but unable to specify where  -    Cognitive Assessment/Intervention    Current Cognitive/Communication Assessment  impaired  -    Orientation Status  unable/difficult to assess   pt with aphasia  -    Follows Commands/Answers Questions  75% of the time;able to follow single-step instructions;needs cueing;needs increased time;needs repetition  -    Personal Safety  mild impairment  -    Personal Safety Interventions  fall prevention program maintained;nonskid shoes/slippers when out of bed  -    ROM (Range of Motion)    General ROM  no range of motion deficits identified  -    MMT (Manual Muscle Testing)    General MMT Assessment  other (see comments)   generalized weaknes noted R slightly weaker than L  -    General MMT Assessment Detail  --   pt having some difficulty following commands for MMT  -    Bed Mobility, Assessment/Treatment    Bed Mob, Supine to Sit, [place]  verbal cues required;nonverbal cues required (demo/gesture);minimum assist (75% patient effort)  -    Bed Mob, Sit to Supine, [place]  verbal cues required;nonverbal cues required (demo/gesture);contact guard assist;2 person assist required  -    Transfer Assessment/Treatment    Transfers, Sit-Stand  [place]  verbal cues required;nonverbal cues required (demo/gesture);minimum assist (75% patient effort);2 person assist required;hand held assist  -    Transfers, Stand-Sit [place]  verbal cues required;nonverbal cues required (demo/gesture);minimum assist (75% patient effort);2 person assist required;hand held assist  -    Gait Assessment/Treatment    Gait, [place] Level  verbal cues required;nonverbal cues required (demo/gesture);minimum assist (75% patient effort);2 person assist required;hand held assist  -    Gait, Distance (Feet)  --   3 sidesteps to [facility]  -    Gait, Gait Deviations  zabrina decreased;step length decreased;stride length decreased  -    Gait, Safety Issues  step length decreased  -    Gait, Impairments  strength decreased;impaired balance  -    Motor Skills/Interventions    Additional Documentation  Balance Skills Training (Group)  -    Balance Skills Training    Standing-Level of Assistance  Minimum assistance;x2  -    Gait Balance-Level of Assistance  Minimum assistance;x2  -CH    Positioning and Restraints    Pre-Treatment Position  in bed  -CH    Post Treatment Position  bed  -    In Bed  supine;call light within reach;encouraged to call for assist;with [facility]  -      11/16/17 0901 11/15/17 1703    Rehab Evaluation    Document Type evaluation  -     Subjective Information agree to therapy  -SH     Patient Effort, Rehab Treatment excellent  -SH     Symptoms Noted During/After Treatment none  -SH     General Information    Equipment Currently Used at Home  walker, standard  -KW    Living Environment    Lives With  spouse  -KW    Living Arrangements  condominium  -KW    Home Accessibility  no concerns  -KW    Stair Railings at Home  none  -KW    Type of Financial/Environmental Concern  none  -KW    Transportation Available  family or friend will provide  -KW    Living Environment Comment  no concerns  -KW    Pain Assessment    Pain Assessment No/denies pain   -       11/15/17 1453       Rehab Evaluation    Evaluation Not Performed patient unavailable for evaluation   Pt RAMOS in OR. SLP to follow up next date.   -       User Key  (r) = Recorded By, (t) = Taken By, (c) = Cosigned By    Initials Name Provider Type    SG Charlette Crabtree, OTR Occupational Therapist     Alicia Gan, PT Physical Therapist     Yadira Lancaster, MS CCC-SLP Speech and Language Pathologist    HOSSEIN Costello, RN Registered Nurse          Physical Therapy Education     Title: PT OT SLP Therapies (Done)     Topic: Physical Therapy (Done)     Point: Mobility training (Done)    Learning Progress Summary    Learner Readiness Method Response Comment Documented by Status   Patient Acceptance TB,D,E VU,NR   11/16/17 1500 Done               Point: Home exercise program (Done)    Learning Progress Summary    Learner Readiness Method Response Comment Documented by Status   Patient Acceptance TB,D,E VU,NR   11/16/17 1500 Done               Point: Body mechanics (Done)    Learning Progress Summary    Learner Readiness Method Response Comment Documented by Status   Patient Acceptance TB,D,E VU,NR   11/16/17 1500 Done               Point: Precautions (Done)    Learning Progress Summary    Learner Readiness Method Response Comment Documented by Status   Patient Acceptance TB,D,E VU,NR   11/16/17 1500 Done                      User Key     Initials Effective Dates Name Provider Type [facility] 12/01/15 -  Alicia Gan, PT Physical Therapist PT                PT Recommendation and Plan  Anticipated Discharge Disposition: inpatient rehabilitation facility (pending progress and home resources)  Planned Therapy Interventions: balance training, bed mobility training, gait training, home exercise program, patient/family education, transfer training  PT Frequency: daily  Plan of Care Review  Plan Of Care Reviewed With: patient  Outcome Summary/Follow up Plan: Pt presents with impaired funcitonal  mobility and gait secondary to some weakness, impaired balance, and decreased activity tolerance s/p CVA with embolectomy. Pt may benefit from skilled PT to address these deficits.          IP PT Goals       11/16/17 1501          Bed Mobility PT LTG    Bed Mobility PT LTG, Time to Achieve 1 wk  -CH      Bed Mobility PT LTG, Activity Type all bed mobility  -CH      Bed Mobility PT LTG, [place] Level supervision required  -CH      Transfer Training PT LTG    Transfer Training PT LTG, Time to Achieve 1 wk  -CH      Transfer Training PT LTG, Activity Type all transfers  -CH      Transfer Training PT LTG, [place] Level supervision required  -CH      Gait Training PT LTG    Gait Training Goal PT LTG, Time to Achieve 1 wk  -CH      Gait Training Goal PT LTG, [place] Level supervision required  -CH      Gait Training Goal PT LTG, Distance to Achieve 150  -CH        User Key  (r) = Recorded By, (t) = Taken By, (c) = Cosigned By    Initials Name Provider Type     Alicia aGn, PT Physical Therapist                Outcome Measures       11/16/17 1500          How much help from another person do you currently need...    Turning from your back to your side while in flat bed without using bedrails? 3  -CH      Moving from lying on back to sitting on the side of a flat bed without bedrails? 3  -CH      Moving to and from a bed to a chair (including a wheelchair)? 3  -CH      Standing up from a chair using your arms (e.g., wheelchair, bedside chair)? 3  -CH      Climbing 3-5 steps with a railing? 2  -CH      To walk in hospital room? 3  -CH      AM-PAC 6 Clicks Score 17  -CH      Modified Jose Scale    Modified Crawford Scale 4 - Moderately severe disability.  Unable to walk without assistance, and unable to attend to own bodily needs without assistance.  -CH      Functional Assessment    Outcome Measure Options AM-PAC 6 Clicks Basic Mobility (PT);Modified Jose  -CH        User Key  (r) = Recorded By, (t)  = Taken By, (c) = Cosigned By    Initials Name Provider Type     Alicia Gan, PT Physical Therapist           Time Calculation:         PT Charges       11/16/17 1503          Time Calculation    Start Time 1430  -      Stop Time 1440  -      Time Calculation (min) 10 min  -      PT Received On 11/16/17  -      PT - Next Appointment 11/17/17  -      PT Goal Re-Cert Due Date 11/23/17  -        User Key  (r) = Recorded By, (t) = Taken By, (c) = Cosigned By    Initials Name Provider Type     Alicia Gan, PT Physical Therapist          Therapy Charges for Today     Code Description Service Date Service Provider Modifiers Qty    83311348361 HC PT EVAL MOD COMPLEXITY 2 11/16/2017 Alicia Gan, PT GP 1    88446569305 HC PT THER PROC EA 15 MIN 11/16/2017 Alicia Gan, PT GP 1    68713850109 HC PT THER SUPP EA 15 MIN 11/16/2017 Alicia Gan, PT GP 1          PT G-Codes  Outcome Measure Options: AM-PAC 6 Clicks Basic Mobility (PT), Modified Jose Gan, PT  11/16/2017

## 2017-11-16 NOTE — PLAN OF CARE
Problem: Patient Care Overview (Adult)  Goal: Plan of Care Review  Outcome: Ongoing (interventions implemented as appropriate)    11/16/17 0623   Coping/Psychosocial Response Interventions   Plan Of Care Reviewed With patient;spouse   Patient Care Overview   Progress improving   Outcome Evaluation   Outcome Summary/Follow up Plan pt NIH at a 3-4 this shift d/t confusion with questions at times and continues to have expressive aphasia. pt continues the need for cardene gtt to maintain sbp . follows all commands at this time moving all extremities, ROM in right upper extremity limited d/t previous orthopedic procedure pt has had in past. MD notified of significant amount of bruising to KATERINA and xray was ordered.  vss, q1hr neuro checks, using bedpan for elimination, q2hr turns being offered to pt. Will continue to monitor.          Problem: Stroke (Ischemic) (Adult)  Goal: Signs and Symptoms of Listed Potential Problems Will be Absent or Manageable (Stroke)  Outcome: Ongoing (interventions implemented as appropriate)    Problem: Fall Risk (Adult)  Goal: Absence of Falls  Outcome: Ongoing (interventions implemented as appropriate)

## 2017-11-16 NOTE — PROGRESS NOTES
[facility]  Phone: [phone]  Timothy Barillas MD    Subjective:  LOS: 1    Her aphasia seems marginally better.  She does not have focal weakness.  She is pending a follow-up CT this afternoon.    Objective   Vital Signs past 24hrs  BP range: BP: ()/(39-97) 104/88  Pulse range: Heart Rate:  [51-90] 71  Resp rate range: Resp:  [16-20] 16  Temp range: Temp (24hrs), Av.5 °F (36.9 °C), Min:97.7 °F (36.5 °C), Max:99.1 °F (37.3 °C)    O2 Device: room airFlow (L/min):  [1-2] 1  Oxygen range:SpO2:  [90 %-100 %] 90 %   173 lb (78.5 kg); Body mass index is 28.79 kg/(m^2).    Intake/Output Summary (Last 24 hours) at 17 1406  Last data filed at 17 0830   Gross per 24 hour   Intake             6361 ml   Output             2400 ml   Net             3961 ml       Physical Exam   Constitutional: She appears well-developed.   Eyes: Conjunctivae are normal.   Neck: Normal range of motion. Neck supple.   Cardiovascular: Normal rate and regular rhythm.    No murmur heard.  Pulmonary/Chest: Effort normal. No respiratory distress. She has no wheezes. She has no rales.   Abdominal: Soft. Bowel sounds are normal. She exhibits no mass. There is no tenderness.   Musculoskeletal: She exhibits no edema.   Right upper extremity bruising   Neurological: She is alert.   Receptive aphasia appears improved  Expressive aphasia persistent   Skin: Skin is warm. No rash noted.     Results Review:    I have reviewed the laboratory and imaging data since the last note by [facility] physician.  My annotations are noted in assessment and plan.    Medication Review:  I have reviewed the current MAR.  My annotations are noted in assessment and plan.      niCARdipine 5-15 mg/hr Last Rate: Stopped (17 0848)     Plan   PCCM Problems  Acute CVA, status post thrombectomy 11/15  History previous stroke  Cough  Relevant Medical Diagnoses  Ex-smoker  COPD, patient states unaware    Plan of Treatment  Spoke with the son and .   Patient is somewhat improved.  CT head is stable will transfer out.    Patient states cough improved.  Continue nebs every 4hrs.    Drop in hb likely dilutional. Monitor. Cleared for diet. Off fluids.    Timothy Barillas MD  11/16/17  2:06 PM    Part of this note may be an electronic transcription/translation of spoken language to printed text using the Dragon Dictation System.

## 2017-11-16 NOTE — PLAN OF CARE
Problem: Patient Care Overview (Adult)  Goal: Plan of Care Review  Outcome: Ongoing (interventions implemented as appropriate)    11/15/17 9663   Coping/Psychosocial Response Interventions   Plan Of Care Reviewed With patient;spouse   Patient Care Overview   Progress improving   Outcome Evaluation   Outcome Summary/Follow up Plan Patient arrived to ICU at 1615. Patient is moving all extremities however having some receptive and expressive aphasia. NIHSS 6. Cardene on to maintain SBP .        Goal: Adult Individualization and Mutuality  Outcome: Ongoing (interventions implemented as appropriate)  Goal: Discharge Needs Assessment  Outcome: Ongoing (interventions implemented as appropriate)    Problem: Stroke (Ischemic) (Adult)  Goal: Signs and Symptoms of Listed Potential Problems Will be Absent or Manageable (Stroke)  Outcome: Ongoing (interventions implemented as appropriate)    Problem: Fall Risk (Adult)  Goal: Identify Related Risk Factors and Signs and Symptoms  Outcome: Outcome(s) achieved Date Met:  11/15/17  Goal: Absence of Falls  Outcome: Ongoing (interventions implemented as appropriate)

## 2017-11-16 NOTE — CONSULTS
"Adult Nutrition  Assessment/PES    Patient Name:  Paloma Carr  YOB: 1940  MRN: [id number]  Admit Date:  11/15/2017    Assessment Date:  11/16/2017    Comments:  Diet advanced per speech recommendations.          Reason for Assessment       11/16/17 1133    Reason for Assessment    Reason For Assessment/Visit identified at risk by screening criteria    Identified At Risk By Screening Criteria MST SCORE 2+    Diagnosis Diagnosis    Neurological CVA                Anthropometrics       11/16/17 1133    Anthropometrics    Height 165.1 cm (65\")    RD Documented Current Weight  78.5 kg (173 lb)    Ideal Body Weight (IBW)    Ideal Body Weight (IBW), Female 57.69    Usual Body Weight (UBW)    Weight Loss --   no wt loss    Body Mass Index (BMI)    BMI Grade 25 - 29.9 - overweight            Labs/Tests/Procedures/Meds       11/16/17 1134    Labs/Tests/Procedures/Meds    Diagnostic Test/Procedure Review reviewed    Labs/Tests Review Reviewed;Alb;Hgb Hct    Procedure Review SLP    Swallow eval status Done    Medication Review Reviewed, pertinent    Significant Vitals reviewed            Physical Findings       11/16/17 1134    Physical Findings/Assessment    Additional Documentation Physical Appearance (Group)    Physical Appearance    Skin other (see comments)   bruises, incision              Nutrition Prescription Ordered       11/16/17 1135    Nutrition Prescription PO    Current PO Diet Regular              Problem/Interventions:        Problem 1       11/16/17 1135    Nutrition Diagnoses Problem 1    Problem 1 Nutrition Appropriate for Condition at this Time    Etiology (related to) Medical Diagnosis    Neurological CVA                    Intervention Goal       11/16/17 1136    Intervention Goal    General Maintain nutrition    PO Initiate feeding    Weight No significant weight loss            Nutrition Intervention       11/16/17 1136    Nutrition Intervention    RD/Tech Action Follow Tx progress;Care " plan reviewd;Await begin PO              Education/Evaluation       11/16/17 1136    Education    Education Will Instruct as appropriate    Monitor/Evaluation    Monitor Per protocol        Electronically signed by:  Zoey Gee RD  11/16/17 11:37 AM

## 2017-11-17 ENCOUNTER — APPOINTMENT (OUTPATIENT)
Dept: CARDIOLOGY | Facility: HOSPITAL | Age: 77
End: 2017-11-17

## 2017-11-17 ENCOUNTER — APPOINTMENT (OUTPATIENT)
Dept: GENERAL RADIOLOGY | Facility: HOSPITAL | Age: 77
End: 2017-11-17

## 2017-11-17 LAB
ANION GAP SERPL CALCULATED.3IONS-SCNC: 12.7 MMOL/L
BH CV ECHO MEAS - BSA(HAYCOCK): 1.9 M^2
BH CV ECHO MEAS - BSA: 1.9 M^2
BH CV ECHO MEAS - BZI_BMI: 28.6 KILOGRAMS/M^2
BH CV ECHO MEAS - BZI_METRIC_HEIGHT: 165.1 CM
BH CV ECHO MEAS - BZI_METRIC_WEIGHT: 78 KG
BUN BLD-MCNC: 10 MG/DL (ref 8–23)
BUN/CREAT SERPL: 14.1 (ref 7–25)
CALCIUM SPEC-SCNC: 8.9 MG/DL (ref 8.6–10.5)
CHLORIDE SERPL-SCNC: 108 MMOL/L (ref 98–107)
CO2 SERPL-SCNC: 25.3 MMOL/L (ref 22–29)
CREAT BLD-MCNC: 0.71 MG/DL (ref 0.57–1)
DEPRECATED RDW RBC AUTO: 48.8 FL (ref 37–54)
ERYTHROCYTE [DISTWIDTH] IN BLOOD BY AUTOMATED COUNT: 13.4 % (ref 11.7–13)
GFR SERPL CREATININE-BSD FRML MDRD: 80 ML/MIN/1.73
GLUCOSE BLD-MCNC: 105 MG/DL (ref 65–99)
HCT VFR BLD AUTO: 34.3 % (ref 35.6–45.5)
HGB BLD-MCNC: 11 G/DL (ref 11.9–15.5)
MCH RBC QN AUTO: 32.4 PG (ref 26.9–32)
MCHC RBC AUTO-ENTMCNC: 32.1 G/DL (ref 32.4–36.3)
MCV RBC AUTO: 100.9 FL (ref 80.5–98.2)
PLATELET # BLD AUTO: 190 10*3/MM3 (ref 140–500)
PMV BLD AUTO: 10.3 FL (ref 6–12)
POTASSIUM BLD-SCNC: 3.6 MMOL/L (ref 3.5–5.2)
RBC # BLD AUTO: 3.4 10*6/MM3 (ref 3.9–5.2)
SODIUM BLD-SCNC: 146 MMOL/L (ref 136–145)
WBC NRBC COR # BLD: 5.94 10*3/MM3 (ref 4.5–10.7)

## 2017-11-17 PROCEDURE — 74230 X-RAY XM SWLNG FUNCJ C+: CPT

## 2017-11-17 PROCEDURE — 99232 SBSQ HOSP IP/OBS MODERATE 35: CPT | Performed by: INTERNAL MEDICINE

## 2017-11-17 PROCEDURE — 93320 DOPPLER ECHO COMPLETE: CPT

## 2017-11-17 PROCEDURE — 25010000002 MIDAZOLAM PER 1 MG: Performed by: INTERNAL MEDICINE

## 2017-11-17 PROCEDURE — 94799 UNLISTED PULMONARY SVC/PX: CPT

## 2017-11-17 PROCEDURE — 92611 MOTION FLUOROSCOPY/SWALLOW: CPT | Performed by: SPEECH-LANGUAGE PATHOLOGIST

## 2017-11-17 PROCEDURE — 99233 SBSQ HOSP IP/OBS HIGH 50: CPT | Performed by: NURSE PRACTITIONER

## 2017-11-17 PROCEDURE — 63710000001 PREDNISONE PER 1 MG: Performed by: INTERNAL MEDICINE

## 2017-11-17 PROCEDURE — B24BZZ4 ULTRASONOGRAPHY OF HEART WITH AORTA, TRANSESOPHAGEAL: ICD-10-PCS | Performed by: INTERNAL MEDICINE

## 2017-11-17 PROCEDURE — 93325 DOPPLER ECHO COLOR FLOW MAPG: CPT | Performed by: INTERNAL MEDICINE

## 2017-11-17 PROCEDURE — 99152 MOD SED SAME PHYS/QHP 5/>YRS: CPT

## 2017-11-17 PROCEDURE — 80048 BASIC METABOLIC PNL TOTAL CA: CPT | Performed by: INTERNAL MEDICINE

## 2017-11-17 PROCEDURE — 93320 DOPPLER ECHO COMPLETE: CPT | Performed by: INTERNAL MEDICINE

## 2017-11-17 PROCEDURE — 93325 DOPPLER ECHO COLOR FLOW MAPG: CPT

## 2017-11-17 PROCEDURE — 93312 ECHO TRANSESOPHAGEAL: CPT | Performed by: INTERNAL MEDICINE

## 2017-11-17 PROCEDURE — 85027 COMPLETE CBC AUTOMATED: CPT | Performed by: INTERNAL MEDICINE

## 2017-11-17 PROCEDURE — 97110 THERAPEUTIC EXERCISES: CPT

## 2017-11-17 PROCEDURE — 93312 ECHO TRANSESOPHAGEAL: CPT

## 2017-11-17 PROCEDURE — 25010000002 FENTANYL CITRATE (PF) 100 MCG/2ML SOLUTION: Performed by: INTERNAL MEDICINE

## 2017-11-17 PROCEDURE — 25010000002 METHYLPREDNISOLONE PER 40 MG: Performed by: INTERNAL MEDICINE

## 2017-11-17 RX ORDER — IPRATROPIUM BROMIDE AND ALBUTEROL SULFATE 2.5; .5 MG/3ML; MG/3ML
3 SOLUTION RESPIRATORY (INHALATION)
Status: DISCONTINUED | OUTPATIENT
Start: 2017-11-17 | End: 2017-11-18

## 2017-11-17 RX ORDER — METHYLPREDNISOLONE SODIUM SUCCINATE 40 MG/ML
40 INJECTION, POWDER, LYOPHILIZED, FOR SOLUTION INTRAMUSCULAR; INTRAVENOUS ONCE
Status: COMPLETED | OUTPATIENT
Start: 2017-11-17 | End: 2017-11-17

## 2017-11-17 RX ORDER — PREDNISONE 20 MG/1
40 TABLET ORAL EVERY 12 HOURS SCHEDULED
Status: DISCONTINUED | OUTPATIENT
Start: 2017-11-17 | End: 2017-11-18

## 2017-11-17 RX ORDER — MIDAZOLAM HYDROCHLORIDE 1 MG/ML
INJECTION INTRAMUSCULAR; INTRAVENOUS
Status: COMPLETED | OUTPATIENT
Start: 2017-11-17 | End: 2017-11-17

## 2017-11-17 RX ORDER — CHOLECALCIFEROL (VITAMIN D3) 125 MCG
5 CAPSULE ORAL NIGHTLY PRN
Status: DISCONTINUED | OUTPATIENT
Start: 2017-11-17 | End: 2017-11-19 | Stop reason: HOSPADM

## 2017-11-17 RX ORDER — FENTANYL CITRATE 50 UG/ML
INJECTION, SOLUTION INTRAMUSCULAR; INTRAVENOUS
Status: COMPLETED | OUTPATIENT
Start: 2017-11-17 | End: 2017-11-17

## 2017-11-17 RX ORDER — POTASSIUM CHLORIDE 750 MG/1
20 CAPSULE, EXTENDED RELEASE ORAL ONCE
Status: COMPLETED | OUTPATIENT
Start: 2017-11-17 | End: 2017-11-17

## 2017-11-17 RX ORDER — DOCUSATE SODIUM 100 MG/1
100 CAPSULE, LIQUID FILLED ORAL 2 TIMES DAILY
Status: DISCONTINUED | OUTPATIENT
Start: 2017-11-17 | End: 2017-11-19 | Stop reason: HOSPADM

## 2017-11-17 RX ORDER — SODIUM CHLORIDE 9 MG/ML
INJECTION, SOLUTION INTRAVENOUS
Status: COMPLETED | OUTPATIENT
Start: 2017-11-17 | End: 2017-11-17

## 2017-11-17 RX ADMIN — IPRATROPIUM BROMIDE AND ALBUTEROL SULFATE 3 ML: .5; 3 SOLUTION RESPIRATORY (INHALATION) at 15:16

## 2017-11-17 RX ADMIN — PANTOPRAZOLE SODIUM 40 MG: 40 TABLET, DELAYED RELEASE ORAL at 06:14

## 2017-11-17 RX ADMIN — ATENOLOL 50 MG: 50 TABLET ORAL at 09:22

## 2017-11-17 RX ADMIN — MAGNESIUM HYDROXIDE 20 ML: 2400 SUSPENSION ORAL at 15:35

## 2017-11-17 RX ADMIN — FENTANYL CITRATE 25 MCG: 50 INJECTION INTRAMUSCULAR; INTRAVENOUS at 08:17

## 2017-11-17 RX ADMIN — GABAPENTIN 800 MG: 400 CAPSULE ORAL at 06:15

## 2017-11-17 RX ADMIN — METHYLPREDNISOLONE SODIUM SUCCINATE 40 MG: 40 INJECTION, POWDER, FOR SOLUTION INTRAMUSCULAR; INTRAVENOUS at 15:35

## 2017-11-17 RX ADMIN — GABAPENTIN 800 MG: 400 CAPSULE ORAL at 21:09

## 2017-11-17 RX ADMIN — IPRATROPIUM BROMIDE AND ALBUTEROL SULFATE 3 ML: .5; 3 SOLUTION RESPIRATORY (INHALATION) at 07:30

## 2017-11-17 RX ADMIN — SODIUM CHLORIDE 50 ML/HR: 9 INJECTION, SOLUTION INTRAVENOUS at 07:53

## 2017-11-17 RX ADMIN — IPRATROPIUM BROMIDE AND ALBUTEROL SULFATE 3 ML: .5; 3 SOLUTION RESPIRATORY (INHALATION) at 19:21

## 2017-11-17 RX ADMIN — POTASSIUM CHLORIDE 20 MEQ: 750 CAPSULE, EXTENDED RELEASE ORAL at 09:58

## 2017-11-17 RX ADMIN — MIDAZOLAM HYDROCHLORIDE 2 MG: 1 INJECTION, SOLUTION INTRAMUSCULAR; INTRAVENOUS at 08:17

## 2017-11-17 RX ADMIN — ROSUVASTATIN CALCIUM 20 MG: 20 TABLET, FILM COATED ORAL at 09:22

## 2017-11-17 RX ADMIN — CLOPIDOGREL 75 MG: 75 TABLET, FILM COATED ORAL at 09:22

## 2017-11-17 RX ADMIN — PREDNISONE 40 MG: 20 TABLET ORAL at 21:09

## 2017-11-17 RX ADMIN — ASPIRIN 325 MG: 325 TABLET ORAL at 09:22

## 2017-11-17 RX ADMIN — ESCITALOPRAM 10 MG: 10 TABLET, FILM COATED ORAL at 09:22

## 2017-11-17 RX ADMIN — LIDOCAINE HYDROCHLORIDE 10 ML: 20 SOLUTION ORAL; TOPICAL at 07:56

## 2017-11-17 RX ADMIN — MIDAZOLAM HYDROCHLORIDE 1 MG: 1 INJECTION, SOLUTION INTRAMUSCULAR; INTRAVENOUS at 08:22

## 2017-11-17 RX ADMIN — BENZOCAINE, BUTAMBEN, AND TETRACAINE HYDROCHLORIDE 1 SPRAY: .028; .004; .004 AEROSOL, SPRAY TOPICAL at 07:58

## 2017-11-17 RX ADMIN — IPRATROPIUM BROMIDE AND ALBUTEROL SULFATE 3 ML: .5; 3 SOLUTION RESPIRATORY (INHALATION) at 11:13

## 2017-11-17 RX ADMIN — LEVOTHYROXINE SODIUM 125 MCG: 125 TABLET ORAL at 06:12

## 2017-11-17 RX ADMIN — FENTANYL CITRATE 25 MCG: 50 INJECTION INTRAMUSCULAR; INTRAVENOUS at 08:19

## 2017-11-17 RX ADMIN — MIDAZOLAM HYDROCHLORIDE 1 MG: 1 INJECTION, SOLUTION INTRAMUSCULAR; INTRAVENOUS at 08:19

## 2017-11-17 NOTE — PROGRESS NOTES
"DOS: 2017  NAME: Paloma Carr   : 1940  PCP: Goran Carr MD  Chief Complaint   Patient presents with   • Neuro Deficit(s)     CC: Stroke    Subjective: No new events overnight per RN. She had JEFE done this morning. Patient denies any headache or any new stroke/TIA symptoms but with c/o generalized weakness and residual speech problems. Spouse at bedside.     Interval History  History taken from: patient chart family RN    Objective:  Vital signs: /67  Pulse 71  Temp 98.1 °F (36.7 °C) (Oral)   Resp 16  Ht 65\" (165.1 cm)  Wt 173 lb (78.5 kg)  SpO2 94%  BMI 28.79 kg/m2      Physical Exam:  GENERAL: NAD  HEENT: Normocephalic, atraumatic   COR: RRR  Resp: Even and unlabored  Extremities: No signs of distal embolization. Multiple areas of ecchymosis bilateral upper extremities. Right groin site soft, mildly tender to palpation, no bruit.     Neurological:   MS: AO. Language nearly normal with some word finding and few paraphasic errors. Repetition and naming intact. No neglect. Some difficulty with arithmetic and multi step commands. Higher integrative function normal.  CN: II-XII normal  Motor: Normal strength and tone throughout.  Sensory: Intact  Coordination: Normal    Results Review:     I reviewed the patient's new clinical results.    Current Medications:    aspirin 325 mg Oral Daily   Or      aspirin 300 mg Rectal Daily   atenolol 50 mg Oral Q24H   clopidogrel 75 mg Oral Daily   escitalopram 10 mg Oral Daily   gabapentin 800 mg Oral Q8H   ipratropium-albuterol 3 mL Nebulization 4x Daily - RT   levothyroxine 125 mcg Oral Daily   pantoprazole 40 mg Oral Q AM   rosuvastatin 20 mg Oral Daily       niCARdipine 5-15 mg/hr Last Rate: Stopped (17 0848)       Medications Reviewed    Laboratory results:  Lab Results   Component Value Date    GLUCOSE 105 (H) 2017    CALCIUM 8.9 2017     (H) 2017    K 3.6 2017    CO2 25.3 2017     (H) 2017 "    BUN 10 11/17/2017    CREATININE 0.71 11/17/2017    EGFRIFAFRI 71 04/21/2017    EGFRIFNONA 80 11/17/2017    BCR 14.1 11/17/2017    ANIONGAP 12.7 11/17/2017     Lab Results   Component Value Date    WBC 5.94 11/17/2017    HGB 11.0 (L) 11/17/2017    HCT 34.3 (L) 11/17/2017    .9 (H) 11/17/2017     11/17/2017        Results from last 7 days  Lab Units 11/16/17  0426   CHOLESTEROL mg/dL 139     Lab Results   Component Value Date    INR 1.11 (H) 11/16/2017    INR 0.99 11/15/2017    PROTIME 13.8 11/16/2017    PROTIME 12.7 11/15/2017     Lab Results   Component Value Date    TSH 2.280 11/16/2017     Lab Results   Component Value Date    LDLCALC 63 11/16/2017     Lab Results   Component Value Date    HGBA1C 5.46 11/16/2017        Ref Range & Units 1d ago     P2Y12 Platelet Inhibition 194 - 418      No components found for: B12    Review and interpretation of imaging:  CT/CTP 11/15/17 IMPRESSION:  Complete occlusion of the left MCA division with an abnormal perfusion  study demonstrating an extensive area of perfusion abnormality involving  the lateral aspect of the left temporal and parietal lobes within the  left MCA distribution. No area of completed acute infarction is seen on  either the CT perfusion study or the noncontrast head CT. As such, there  is a sizable mismatch within this area of perfusion abnormality and this  is estimated to be 52 mL by rapid software reconstruction.    CT head 11/16/17: Chronic small vessel ischemic changes and evidence of previous meningioma resection changes otherwise normal.     TTE 11/16/17: LVEF >70%, normal size; RV nromal size; LA borderline increased, saline tests negative; RA normal size  JEFE 11/17/17: Report pending.     Impression: Ms Carr is a 78 yo with HTN, HLD, breast cancer and Hx of stroke and meningioma resection who presented on 11/15 with sudden onset speech difficulty and right side weakness. She is POD#2 for IV thrombolysis and emergent  mechanical LMCA embolectomy. Clinically she is nearly back to her neurologic baseline. The etiology is unknown. Her 24-hour CT was negative for bleed. She was on Plavix at the time of her event and shows good response.  She will need to continue ASA and Plavix. Her TTE results are noted above showing borderline LA enlargement. She underwent JEFE this morning, per spouse was unrevealing, report is pending. Plans are for ZIO patch on discharge. She will need continued aggressive control. Continue ASA, Plavix and Crestor 20 mg (LDL is 63). Goal SBP . PT/OT/ST. CCP For D/C planning. Please call with any questions or concerns.      Plan:  ZIO patch  Aspirin 325mg  Plavix 75mg  Crestor 20 mg  Hydrate  Neurochecks  Non-pharmacological DVT prophylaxis  EKG Tele  PT/OT/ST  Stroke Education  Blood pressure control to <130/80  Goal LDL <70-recommend high dose statins-   Serum glucose < 140    I have discussed the above with Dr. Figueroa, RN, patient and spouse.  Savana Estrella, APRN  11/17/17  10:27 AM      Active Problems:    Cerebrovascular accident (CVA)

## 2017-11-17 NOTE — PROGRESS NOTES
Hospital Follow Up    Chief Complaint: S/p left MCA stroke    Interval History: JEFE performed today and was unremarkable.  She has no complaints this morning.     Objective:     Objective:  Temp:  [98.1 °F (36.7 °C)-99.7 °F (37.6 °C)] 98.1 °F (36.7 °C)  Heart Rate:  [61-77] 71  Resp:  [14-18] 16  BP: (104-141)/(43-88) 131/67     Intake/Output Summary (Last 24 hours) at 11/17/17 0921  Last data filed at 11/17/17 0600   Gross per 24 hour   Intake              480 ml   Output             1900 ml   Net            -1420 ml     Body mass index is 28.79 kg/(m^2).  Last 3 weights    11/15/17  1334   Weight: 173 lb (78.5 kg)     Weight change:       Physical Exam:   General : Alert, cooperative, in no acute distress.  Neuro: alert,cooperative and oriented  Lungs: CTAB. Normal respiratory effort and rate.  CV:: Regular rate and rhythm, normal S1 and S2, no murmurs, gallops or rubs.  ABD: Soft, nontender, non-distended. positive bowel sounds  Extr: No edema or cyanosis, moves all extremities    Lab Review:     Results from last 7 days  Lab Units 11/17/17  0608 11/16/17  0708 11/15/17  1335   SODIUM mmol/L 146* 142 138   POTASSIUM mmol/L 3.6 3.6 4.4   CHLORIDE mmol/L 108* 108* 98   CO2 mmol/L 25.3 21.8* 24.8   BUN mg/dL 10 9 19   CREATININE mg/dL 0.71 0.54* 0.85   GLUCOSE mg/dL 105* 86 76   CALCIUM mg/dL 8.9 8.0* 9.5   AST (SGOT) U/L  --  30 33*   ALT (SGPT) U/L  --  13 28       Results from last 7 days  Lab Units 11/15/17  1335   TROPONIN T ng/mL <0.010       Results from last 7 days  Lab Units 11/17/17  0608 11/16/17  0426   WBC 10*3/mm3 5.94 6.95   HEMOGLOBIN g/dL 11.0* 10.2*   HEMATOCRIT % 34.3* 31.3*   PLATELETS 10*3/mm3 190 202       Results from last 7 days  Lab Units 11/16/17  0426 11/15/17  1335   INR  1.11* 0.99   APTT seconds 26.2 24.6           Results from last 7 days  Lab Units 11/16/17  0426   CHOLESTEROL mg/dL 139   TRIGLYCERIDES mg/dL 180*   HDL CHOL mg/dL 40   LDL CHOL mg/dL 63           Results from last  7 days  Lab Units 11/16/17  0426   TSH mIU/mL 2.280     I reviewed the patient's new clinical results.  I personally viewed and interpreted the patient's EKG  Current Medications:   Scheduled Meds:  aspirin 325 mg Oral Daily   Or      aspirin 300 mg Rectal Daily   atenolol 50 mg Oral Q24H   clopidogrel 75 mg Oral Daily   escitalopram 10 mg Oral Daily   gabapentin 800 mg Oral Q8H   ipratropium-albuterol 3 mL Nebulization 4x Daily - RT   levothyroxine 125 mcg Oral Daily   pantoprazole 40 mg Oral Q AM   potassium chloride 20 mEq Oral Once   rosuvastatin 20 mg Oral Daily     Continuous Infusions:  niCARdipine 5-15 mg/hr Last Rate: Stopped (11/16/17 0848)       Allergies:  Allergies   Allergen Reactions   • Penicillins        Assessment/Plan:     1. Left MCA occlusion/ stroke.  Status post TPA and thrombectomy.  TTE unremarkable. JEFE performed this morning without evidence of left atrial thrombus or obvious PFO (although bubble study was marginally adequate).     2. HTN  3. HLD  4. Prior stroke  5. Hypothyroidism    -  She will need to be discharged with a Zio patch.   -  Will see again as needed as inpatient.  Will need 4 week office follow up.      Carmen Reed MD  11/17/17  9:21 AM

## 2017-11-17 NOTE — MBS/VFSS/FEES
Acute Care - Speech Language Pathology   Swallow Initial Evaluation [facility]-VFSS     Patient Name: Paloma Carr  : 1940  MRN: [id number]  Today's Date: 2017  Onset of Illness/Injury or Date of Surgery Date: 11/15/17            Admit Date: 11/15/2017  SPEECH-LANGUAGE PATHOLOGY EVALUTION - VFSS  Subjective: The patient was seen on this date for a VFSS(Videofluoroscopic Swallowing Study).  Patient was alert and cooperative.    Objective: Risks/benefits were reviewed with the patient, and consent was obtained. The study was completed with SLP present and Radiologist review. The patient was seen in lateral view(s). Textures given included thin liquid, puree, mechanical soft/mixed consistency and regular.  Assessment:  Pt demonstrates a functional oropharyngeal swallow.  No penetration, aspiration, or significant oral, pharyngeal or cervical esophageal residue observed.  Pt was observed to cough following completion of study.  When asked, pt denied sensation of reflux at this time but did acknowledge having heartburn and taking Omeprazole and Tums for relief.  SLP Findings: Patient presents with functional swallow.   Recommendations: Diet Textures: thin liquid, regular consistency food. Medications should be taken whole with thin liquids or puree.   Recommended Strategies: Upright for PO and 30 minutes following meals.  Pt to take small bites and sips. Oral care before breakfast, after all meals and PRN.  Other Recommended Evaluations: If aspiration suspected or pt complains of symptoms may consider esophagram; however, would defer to GI for any additional esophageal studies.  Dysphagia therapy is not recommended. Rationale: functional oropharyngeal swallow.        Visit Dx:     ICD-10-CM ICD-9-CM   1. Cerebrovascular accident (CVA), unspecified mechanism I63.9 434.91     Patient Active Problem List   Diagnosis   • Soft tissue swelling of chest wall   • Abnormal ultrasound of breast   • Postmastectomy  lymphedema syndrome   • Malignant neoplasm of upper-outer quadrant of right female breast   • Cerebrovascular accident (CVA)     Past Medical History:   Diagnosis Date   • Anxiety    • Arthritis    • COPD (chronic obstructive pulmonary disease)    • CVA (cerebral infarction)    • CVD (cardiovascular disease)    • Depression    • Hyperlipemia    • Hypertension    • Hypothyroid    • Macular degeneration    • Meningioma     OCCIPITAL MENINGIOMA-REMOVED BY DR TOLLIVER    • Overweight    • Stroke    • Vertigo      Past Surgical History:   Procedure Laterality Date   • ADRENAL GLAND SURGERY     • BRAIN MENINGIOMA EXCISION      occipital   •  SECTION     • EMBOLECTOMY N/A 11/15/2017    Procedure: Embolectomy Mechanical;  Surgeon: Rachid Figueroa MD;  Location: [facility] ;  Service:    • TOTAL SHOULDER REPLACEMENT      x2          SWALLOW EVALUATION (last 72 hours)      Swallow Evaluation       17 1400 17 0901             Rehab Evaluation    Document Type evaluation  -SA evaluation  -SH       Subjective Information  agree to therapy  -SH       Patient Effort, Rehab Treatment  excellent  -SH       Symptoms Noted During/After Treatment none  -SA none  -SH       General Information    Patient Profile Review yes  -SA yes  -SH       Subjective Patient Observations  Pt with aphasia, but able to state name and  without cues  -SH       Pertinent History Of Current Problem  stroke s/p tPA and embolectomy  -SH       Current Diet Limitations thin liquids;regular solid  -SA NPO  -SH       Prior Level of Function- Communication  functional in all spheres  -SH       Prior Level of Function- Swallowing  no diet consistency restrictions  -SH       Plans/Goals Discussed With patient  -SA patient  -SH       Barriers to Rehab  medically complex  -SH       Clinical Impression    Patient's Goals For Discharge return home  -SA return to regular diet  -SH       SLP Swallowing Diagnosis other  (see comments)   functional  - mild dysphagia  -       Rehab Potential/Prognosis, Swallowing  good, to achieve stated therapy goals  -       Criteria for Skilled Therapeutic Interventions Met no problems identified which require skilled intervention  - skilled criteria for dysphagia intervention met  -       FCM, Swallowing  6-->Level 6  -       Therapy Frequency evaluation only  - PRN  -       Predicted Duration Therapy Interv (days) until discharge  - until discharge  -       Expected Duration Therapy Session (min)  15-30 minutes  -       SLP Diet Recommendation regular textures;thin liquids  - regular textures;thin liquids  -       Recommended Diagnostics  VFSS (MBS)  -       Recommended Feeding/Eating Techniques maintain upright posture during/after eating for 30 mins;small sips/bites  - no straws;small sips/bites;other (see comments)   no mixed  -       SLP Rec. for Method of Medication Administration meds whole with thin liquid  - meds whole in pudding/applesauce  -       Monitor For Signs Of Aspiration cough;gurgly voice;pneumonia;right lower lobe infiltrates  - cough;elevated WBC count;gurgly voice;fever;throat clearing;upper respiratory infection;pneumonia;right lower lobe infiltrates  -       Anticipated Discharge Disposition home  - inpatient rehabilitation facility  -       Pain Assessment    Pain Assessment No/denies pain  - No/denies pain  -       Oral Motor Structure and Function    Oral Motor Assessment Comment  No focal weakness  -       SLP Communication to Radiology    Summary Statement Pt demonstrates a functional oropharyngeal swallow.  No penetration, aspiration, or significant oral, pharyngeal or cervical esophageal residue observed.  -        Dysphagia Treatment Objectives and Progress    Dysphagia Treatment Objectives  Other 1  -       Dysphagia Other 1    Dysphagia Other 1 Objective  Pt will tolerate regular and thins without overt s/s  of aspiration.   -         User Key  (r) = Recorded By, (t) = Taken By, (c) = Cosigned By    Initials Name Effective Dates    SA Charlette Henry, MS CCC-SLP 04/13/15 -      Yadira Lancaster, MS CCC-SLP 07/13/17 -         EDUCATION  The patient has been educated in the following areas:   Dysphagia (Swallowing Impairment).    SLP Recommendation and Plan  SLP Swallowing Diagnosis: other (see comments) (functional)  SLP Diet Recommendation: regular textures, thin liquids  Recommended Feeding/Eating Techniques: maintain upright posture during/after eating for 30 mins, small sips/bites  SLP Rec. for Method of Medication Administration: meds whole with thin liquid  Monitor For Signs Of Aspiration: cough, gurgly voice, pneumonia, right lower lobe infiltrates     Criteria for Skilled Therapeutic Interventions Met: no problems identified which require skilled intervention  Anticipated Discharge Disposition: home     Therapy Frequency: evaluation only                        IP SLP Goals       11/17/17 1438 11/16/17 0844       Safely Consume Diet    Safely Consume Diet- SLP, Date Established  11/16/17  -     Safely Consume Diet- SLP, Time to Achieve by discharge  - by discharge  -     Safely Consume Diet- SLP, Outcome goal met  -        User Key  (r) = Recorded By, (t) = Taken By, (c) = Cosigned By    Initials Name Provider Type    SA Charlette Henry MS CCC-SLP Speech and Language Pathologist     Yadira Lancaster, MS CCC-SLP Speech and Language Pathologist             SLP Outcome Measures (last 72 hours)      SLP Outcome Measures       11/17/17 1400 11/16/17 0900       SLP Outcome Measures    Outcome Measure Used? Adult NOMS  - Adult NOMS  -     FCM Scores    FCM Chosen  Swallowing  -     Swallowing FCM Score 7  -SA 6  -SH       User Key  (r) = Recorded By, (t) = Taken By, (c) = Cosigned By    Initials Name Effective Dates    SA Charlette Henry, MS CCC-SLP 04/13/15 -      Yadira Lancaster MS CCC-SLP 07/13/17 -             Time Calculation:         Time Calculation- SLP       11/17/17 1440          Time Calculation- SLP    SLP Start Time 1315  -      SLP Stop Time 1415  -      SLP Time Calculation (min) 60 min  -      SLP Received On 11/17/17  -        User Key  (r) = Recorded By, (t) = Taken By, (c) = Cosigned By    Initials Name Provider Type    SA Charlette Henry MS CCC-SLP Speech and Language Pathologist          Therapy Charges for Today     Code Description Service Date Service Provider Modifiers Qty    75461615230 HC ST MOTION FLUORO EVAL SWALLOW 4 11/17/2017 Charlette Henry MS CCC-SLP GN 1               Charlette Henry MS CCC-OMAR  11/17/2017

## 2017-11-17 NOTE — NURSING NOTE
Referral received from [facility] and stroke screening order set.  Eval initiated and will follow up tomorrow.

## 2017-11-17 NOTE — PLAN OF CARE
Problem: Patient Care Overview (Adult)  Goal: Plan of Care Review  Outcome: Ongoing (interventions implemented as appropriate)    11/17/17 3170   Coping/Psychosocial Response Interventions   Plan Of Care Reviewed With patient;spouse   Patient Care Overview   Progress improving   Outcome Evaluation   Outcome Summary/Follow up Plan pt transfered from icu. nih 1. vital signs stable. will continue to monitor.          Problem: Stroke (Ischemic) (Adult)  Goal: Signs and Symptoms of Listed Potential Problems Will be Absent or Manageable (Stroke)  Outcome: Ongoing (interventions implemented as appropriate)    Problem: Fall Risk (Adult)  Goal: Absence of Falls  Outcome: Ongoing (interventions implemented as appropriate)

## 2017-11-17 NOTE — PROGRESS NOTES
Discharge Planning Assessment  [facility]     Patient Name: Paloma Carr  MRN: [id number]  Today's Date: 11/17/2017    Admit Date: 11/15/2017          Discharge Needs Assessment       11/17/17 1531    Living Environment    Lives With spouse    Living Arrangements condominium    Home Accessibility no concerns;stairs within home    Number of Stairs Within Home --   pt states that there are stairs but she does not use them    Type of Financial/Environmental Concern none    Transportation Available family or friend will provide    Living Environment    Provides Primary Care For no one, unable/limited ability to care for self    Quality Of Family Relationships supportive    Able to Return to Prior Living Arrangements yes    Discharge Needs Assessment    Concerns To Be Addressed discharge planning concerns    Concerns Comments will need rehab    Readmission Within The Last 30 Days no previous admission in last 30 days    Anticipated Changes Related to Illness inability to care for self    Equipment Currently Used at Home walker, rolling   for long distances    Equipment Needed After Discharge none    Discharge Facility/Level Of Care Needs acute rehab    Current Discharge Risk dependent with mobility/activities of daily living    Discharge Disposition still a patient            Discharge Plan       11/17/17 2050    Case Management/Social Work Plan    Plan [facility] to evaluate    Patient/Family In Agreement With Plan yes    Additional Comments Spoke with pt and her  at bedside.  Introduced self and explained role.  CCP contact information provided.  Face sheet and pharmacy verified and IMM received.  Pt was independent with ADLs at home.  She has been inpatient at [facility] after a prior CVA.  They would like a referral at this time as well.  Referral called to Dominik with Acute Rehab.  CCP will follow.          Discharge Placement     Facility/Agency Request Status Selected? Address Phone Number  Fax Number    [facility] Pending - No Request Sent     [address] [facility] [address] [phone]         Trang Cedillo RN 11/17/2017 15:41    Spoke with Dominik                           Demographic Summary       11/17/17 1534    Referral Information    Admission Type inpatient    Arrived From admitted as an inpatient;home or self-care    Contact Information    Comments permission granted to speak with pt's  at bedside    Primary Care Physician Information    Name Goran Carr MD            Functional Status       11/17/17 1535    Functional Status Prior    Ambulation 0-->independent   uses a walker for long distances    Transferring 0-->independent    Toileting 0-->independent    Bathing 0-->independent    Dressing 0-->independent    Eating 0-->independent    Communication 0-->understands/communicates without difficulty    Swallowing 0-->swallows foods/liquids without difficulty    IADL    Medications independent    Meal Preparation independent    Housekeeping independent    Laundry independent    Shopping independent    Oral Care independent            Psychosocial     None            Abuse/Neglect     None            Legal     None            Substance Abuse     None            Patient Forms     None          Trang Cedillo RN

## 2017-11-17 NOTE — PLAN OF CARE
Problem: Patient Care Overview (Adult)  Goal: Plan of Care Review  Outcome: Ongoing (interventions implemented as appropriate)    11/17/17 5314   Coping/Psychosocial Response Interventions   Plan Of Care Reviewed With patient   Patient Care Overview   Progress progress toward functional goals as expected   Outcome Evaluation   Outcome Summary/Follow up Plan Pts expressive aphasia improving per spouse. Able to ambulate today with HHA x 2; no focal deficits in strength noted today.

## 2017-11-17 NOTE — PLAN OF CARE
Problem: Inpatient SLP  Goal: Dysphagia- Patient will safely consume diet as per recommendation with no signs/symptoms of aspiration  Outcome: Outcome(s) achieved Date Met:  11/17/17 11/17/17 1438   Safely Consume Diet   Safely Consume Diet- SLP, Time to Achieve by discharge   Safely Consume Diet- SLP, Outcome goal met

## 2017-11-17 NOTE — SIGNIFICANT NOTE
11/17/17 1341   Rehab Treatment   Discipline occupational therapist   Rehab Evaluation   Evaluation Not Performed patient unavailable for evaluation  (Pt off floor.)   Recommendation   OT - Next Appointment 11/18/17

## 2017-11-17 NOTE — THERAPY TREATMENT NOTE
Acute Care - Physical Therapy Treatment Note  [facility]     Patient Name: Paloma Carr  : 1940  MRN: [id number]  Today's Date: 2017  Onset of Illness/Injury or Date of Surgery Date: 11/15/17          Admit Date: 11/15/2017    Visit Dx:    ICD-10-CM ICD-9-CM   1. Cerebrovascular accident (CVA), unspecified mechanism I63.9 434.91     Patient Active Problem List   Diagnosis   • Soft tissue swelling of chest wall   • Abnormal ultrasound of breast   • Postmastectomy lymphedema syndrome   • Malignant neoplasm of upper-outer quadrant of right female breast   • Cerebrovascular accident (CVA)               Adult Rehabilitation Note       17 1550 17 1500       Rehab Assessment/Intervention    Discipline physical therapist  -DM physical therapist  -DM     Document Type therapy note (daily note)  -DM      Subjective Information no complaints;agree to therapy  -DM      Patient Effort, Rehab Treatment good  -DM      Symptoms Noted During/After Treatment none  -DM      Precautions/Limitations fall precautions  -DM      Recorded by [DM] Kika García, PT [DM] Kika García, PT     Pain Assessment    Pain Assessment No/denies pain  -DM      Recorded by [DM] Kika García, PT      Cognitive Assessment/Intervention    Current Cognitive/Communication Assessment impaired  -DM      Orientation Status oriented to;person;place  -DM      Follows Commands/Answers Questions 100% of the time  -DM      Personal Safety WNL/WFL  -DM      Personal Safety Interventions fall prevention program maintained;gait belt;nonskid shoes/slippers when out of bed;supervised activity  -DM      Recorded by [DM] Kika García, PT      Bed Mobility, Assessment/Treatment    Bed Mob, Supine to Sit, [place] contact guard assist;verbal cues required  -DM      Bed Mob, Sit to Supine, [place] contact guard assist;verbal cues required  -DM      Recorded by [DM] Kika García, PT      Transfer Assessment/Treatment    Transfers,  Sit-Stand [place] contact guard assist;verbal cues required  -DM      Transfers, Stand-Sit [place] contact guard assist;verbal cues required  -DM      Recorded by [ROSALES] Kika García, PT      Gait Assessment/Treatment    Gait, [place] Level hand held assist;2 person assist required  -DM      Gait, Distance (Feet) 120  -DM      Gait, Gait Deviations zabrina decreased  -DM      Gait, Safety Issues step length decreased;balance decreased during turns  -DM      Gait, Impairments strength decreased;impaired balance  -DM      Recorded by [ROSALES] Kika García PT      Motor Skills/Interventions    Additional Documentation Balance Skills Training (Group)  -DM      Recorded by [ROSALES] Kika García, PT      Balance Skills Training    Sitting-Level of Assistance Close supervision  -DM      Sitting-Balance Support Feet supported  -DM      Standing-Level of Assistance Contact guard  -DM      Gait Balance-Level of Assistance Minimum assistance  -DM      Gait Balance Support Left upper extremity supported;Right upper extremity supported  -DM      Recorded by [ROSALES] Kika García PT      Positioning and Restraints    Pre-Treatment Position in bed  -DM      Post Treatment Position bed  -DM      In Bed supine;call light within reach;encouraged to call for assist;with nsg   getting ready to transfer to   -DM      Recorded by [ROSALES] Kika García, PT        User Key  (r) = Recorded By, (t) = Taken By, (c) = Cosigned By    Initials Name Effective Dates    ROSALES García, PT 10/06/15 -                 IP PT Goals       11/16/17 1501          Bed Mobility PT LTG    Bed Mobility PT LTG, Time to Achieve 1 wk  -CH      Bed Mobility PT LTG, Activity Type all bed mobility  -CH      Bed Mobility PT LTG, [place] Level supervision required  -CH      Transfer Training PT LTG    Transfer Training PT LTG, Time to Achieve 1 wk  -CH      Transfer Training PT LTG, Activity Type all transfers  -CH      Transfer Training PT LTG,  [place] Level supervision required  -CH      Gait Training PT LTG    Gait Training Goal PT LTG, Time to Achieve 1 wk  -CH      Gait Training Goal PT LTG, [place] Level supervision required  -CH      Gait Training Goal PT LTG, Distance to Achieve 150  -CH        User Key  (r) = Recorded By, (t) = Taken By, (c) = Cosigned By    Initials Name Provider Type     Alicia Gan, PT Physical Therapist          Physical Therapy Education     Title: PT OT SLP Therapies (Done)     Topic: Physical Therapy (Done)     Point: Mobility training (Done)    Learning Progress Summary    Learner Readiness Method Response Comment Documented by Status   Patient Eager E,TB,D DU,NR   11/17/17 1550 Done    Acceptance TB,D,E VU,NR   11/16/17 1500 Done               Point: Home exercise program (Done)    Learning Progress Summary    Learner Readiness Method Response Comment Documented by Status   Patient Acceptance TB,D,E VU,NR   11/16/17 1500 Done               Point: Body mechanics (Done)    Learning Progress Summary    Learner Readiness Method Response Comment Documented by Status   Patient Eager E,TB,D CHASIDY,NR   11/17/17 1550 Done    Acceptance TB,D,E VU,[facility] 11/16/17 1500 Done               Point: Precautions (Done)    Learning Progress Summary    Learner Readiness Method Response Comment Documented by Status   Patient Eager E,TB,D DU,NR   11/17/17 1550 Done    Acceptance TB,D,E VU,[facility] 11/16/17 1500 Done                      User Key     Initials Effective Dates Name Provider Type Discipline     12/01/15 -  Alicia Gan, PT Physical Therapist PT     10/06/15 -  Kika García, PT Physical Therapist PT                    PT Recommendation and Plan  Anticipated Discharge Disposition: inpatient rehabilitation facility (pending progress and home resources)  Planned Therapy Interventions: balance training, bed mobility training, gait training, home exercise program, patient/family education, transfer  training  PT Frequency: daily  Plan of Care Review  Plan Of Care Reviewed With: patient  Progress: progress toward functional goals as expected  Outcome Summary/Follow up Plan: Pts expressive aphasia improving per spouse. Able to ambulate today with HHA x 2; no focal deficits in strength noted today.          Outcome Measures       11/17/17 1500 11/16/17 1500       How much help from another person do you currently need...    Turning from your back to your side while in flat bed without using bedrails? 4  -DM 3  -CH     Moving from lying on back to sitting on the side of a flat bed without bedrails? 3  -DM 3  -CH     Moving to and from a bed to a chair (including a wheelchair)? 3  -DM 3  -CH     Standing up from a chair using your arms (e.g., wheelchair, bedside chair)? 3  -DM 3  -CH     Climbing 3-5 steps with a railing? 3  -DM 2  -CH     To walk in hospital room? 3  -DM 3  -CH     AM-PAC 6 Clicks Score 19  -DM 17  -CH     Modified [place] Scale    Modified [place] Scale 4 - Moderately severe disability.  Unable to walk without assistance, and unable to attend to own bodily needs without assistance.  -DM 4 - Moderately severe disability.  Unable to walk without assistance, and unable to attend to own bodily needs without assistance.  -CH     Functional Assessment    Outcome Measure Options AM-PAC 6 Clicks Basic Mobility (PT);Modified Jose  -DM AM-PAC 6 Clicks Basic Mobility (PT);Modified [place]  -CH       User Key  (r) = Recorded By, (t) = Taken By, (c) = Cosigned By    Initials Name Provider Type    TOMASZ Gan, PT Physical Therapist    ROSALES García, PT Physical Therapist           Time Calculation:         PT Charges       11/17/17 1542          Time Calculation    Start Time 1532  -DM      Stop Time 1552  -DM      Time Calculation (min) 20 min  -DM      PT Received On 11/17/17  -DM      PT - Next Appointment 11/18/17  -DM        User Key  (r) = Recorded By, (t) = Taken By, (c) = Cosigned By     Initials Name Provider Type    DM Kika García, PT Physical Therapist          Therapy Charges for Today     Code Description Service Date Service Provider Modifiers Qty    37826533531 HC PT THER PROC EA 15 MIN 11/17/2017 Kika García, PT GP 1    34155368080 HC PT THER SUPP EA 15 MIN 11/17/2017 Kika García, PT GP 1          PT G-Codes  Outcome Measure Options: AM-PAC 6 Clicks Basic Mobility (PT), Modified Jose García, PT  11/17/2017

## 2017-11-17 NOTE — PROGRESS NOTES
[facility]  Phone: [phone]  Timothy Barillas MD    Subjective:  LOS: 2    Aphasia is much better.  She continues to complain of a cough.  She wants a probiotic as she is having constipation.  I explained to her that I would like to avoid a probiotic unless she has a clear indication for same as there is a risk of acute infection in an immunosuppressed or immunocompromise situation.    Objective   Vital Signs past 24hrs  BP range: BP: (110-141)/(43-72) 128/62  Pulse range: Heart Rate:  [61-77] 67  Resp rate range: Resp:  [14-18] 16  Temp range: Temp (24hrs), Av.7 °F (37.1 °C), Min:98.1 °F (36.7 °C), Max:99.7 °F (37.6 °C)    O2 Device: room airFlow (L/min):  [2] 2  Oxygen range:SpO2:  [88 %-98 %] 95 %   173 lb (78.5 kg); Body mass index is 28.79 kg/(m^2).    Intake/Output Summary (Last 24 hours) at 17 1443  Last data filed at 17 0600   Gross per 24 hour   Intake              240 ml   Output             1900 ml   Net            -1660 ml       Physical Exam   Constitutional: She appears well-developed.   Eyes: Conjunctivae are normal.   Neck: Normal range of motion. Neck supple.   Cardiovascular: Normal rate and regular rhythm.    No murmur heard.  Pulmonary/Chest: Effort normal. No respiratory distress. She has no wheezes. She has no rales.   Abdominal: Soft. Bowel sounds are normal. She exhibits no mass. There is no tenderness.   Musculoskeletal: She exhibits no edema.   Right upper extremity bruising   Neurological: She is alert.   Receptive aphasia appears improved  Expressive aphasia much better   Skin: Skin is warm. No rash noted.     Results Review:    I have reviewed the laboratory and imaging data since the last note by [facility] physician.  My annotations are noted in assessment and plan.    Medication Review:  I have reviewed the current MAR.  My annotations are noted in assessment and plan.      niCARdipine 5-15 mg/hr Last Rate: Stopped (17 0848)     Plan   PCCM Problems  Acute  CVA, status post thrombectomy 11/15  History previous stroke  Cough  Wheezing likely COPD exacerbation  Relevant Medical Diagnoses  Ex-smoker  COPD, patient states unaware    Plan of Treatment  Neuro status stable and improved.  Transferring out of the unit today. Needs Zio patch.    Clear wheezing on exam today.  Likely COPD exacerbation.  Treat with steroids.    Spoke with the son and .  Patient is somewhat improved.  CT head is stable will transfer out.    Patient states cough improved.  Continue nebs every 4hrs.    No dysphagia.    Hb stable.    Timothy Barillas MD  11/17/17  2:43 PM    Part of this note may be an electronic transcription/translation of spoken language to printed text using the Dragon Dictation System.

## 2017-11-18 ENCOUNTER — APPOINTMENT (OUTPATIENT)
Dept: CT IMAGING | Facility: HOSPITAL | Age: 77
End: 2017-11-18

## 2017-11-18 PROCEDURE — 94640 AIRWAY INHALATION TREATMENT: CPT

## 2017-11-18 PROCEDURE — 94799 UNLISTED PULMONARY SVC/PX: CPT

## 2017-11-18 PROCEDURE — 63710000001 PREDNISONE PER 1 MG: Performed by: INTERNAL MEDICINE

## 2017-11-18 PROCEDURE — 70450 CT HEAD/BRAIN W/O DYE: CPT

## 2017-11-18 PROCEDURE — 99231 SBSQ HOSP IP/OBS SF/LOW 25: CPT | Performed by: NURSE PRACTITIONER

## 2017-11-18 PROCEDURE — 97166 OT EVAL MOD COMPLEX 45 MIN: CPT | Performed by: OCCUPATIONAL THERAPIST

## 2017-11-18 PROCEDURE — 97110 THERAPEUTIC EXERCISES: CPT

## 2017-11-18 RX ORDER — PREDNISONE 20 MG/1
40 TABLET ORAL DAILY
Status: DISCONTINUED | OUTPATIENT
Start: 2017-11-19 | End: 2017-11-19 | Stop reason: HOSPADM

## 2017-11-18 RX ORDER — BUDESONIDE AND FORMOTEROL FUMARATE DIHYDRATE 160; 4.5 UG/1; UG/1
2 AEROSOL RESPIRATORY (INHALATION)
Status: DISCONTINUED | OUTPATIENT
Start: 2017-11-18 | End: 2017-11-19 | Stop reason: HOSPADM

## 2017-11-18 RX ADMIN — Medication 5 MG: at 00:20

## 2017-11-18 RX ADMIN — DOCUSATE SODIUM 100 MG: 100 CAPSULE, LIQUID FILLED ORAL at 17:38

## 2017-11-18 RX ADMIN — GABAPENTIN 800 MG: 400 CAPSULE ORAL at 05:40

## 2017-11-18 RX ADMIN — GABAPENTIN 800 MG: 400 CAPSULE ORAL at 21:54

## 2017-11-18 RX ADMIN — PREDNISONE 40 MG: 20 TABLET ORAL at 08:06

## 2017-11-18 RX ADMIN — BUDESONIDE AND FORMOTEROL FUMARATE DIHYDRATE 2 PUFF: 160; 4.5 AEROSOL RESPIRATORY (INHALATION) at 19:52

## 2017-11-18 RX ADMIN — LEVOTHYROXINE SODIUM 125 MCG: 125 TABLET ORAL at 05:40

## 2017-11-18 RX ADMIN — DOCUSATE SODIUM 100 MG: 100 CAPSULE, LIQUID FILLED ORAL at 08:06

## 2017-11-18 RX ADMIN — ATENOLOL 50 MG: 50 TABLET ORAL at 08:06

## 2017-11-18 RX ADMIN — Medication 5 MG: at 23:10

## 2017-11-18 RX ADMIN — ROSUVASTATIN CALCIUM 20 MG: 20 TABLET, FILM COATED ORAL at 08:06

## 2017-11-18 RX ADMIN — ACETAMINOPHEN 650 MG: 325 TABLET ORAL at 04:53

## 2017-11-18 RX ADMIN — IPRATROPIUM BROMIDE AND ALBUTEROL SULFATE 3 ML: .5; 3 SOLUTION RESPIRATORY (INHALATION) at 07:39

## 2017-11-18 RX ADMIN — CLOPIDOGREL 75 MG: 75 TABLET, FILM COATED ORAL at 08:06

## 2017-11-18 RX ADMIN — ASPIRIN 325 MG: 325 TABLET ORAL at 08:06

## 2017-11-18 RX ADMIN — ESCITALOPRAM 10 MG: 10 TABLET, FILM COATED ORAL at 08:06

## 2017-11-18 RX ADMIN — GABAPENTIN 800 MG: 400 CAPSULE ORAL at 13:48

## 2017-11-18 RX ADMIN — PANTOPRAZOLE SODIUM 40 MG: 40 TABLET, DELAYED RELEASE ORAL at 05:41

## 2017-11-18 NOTE — NURSING NOTE
Talked with pt today about rehab options. Have bed available 11/19 if ready for rehab. Thanks, May RN rehab admission nurse [phone]

## 2017-11-18 NOTE — THERAPY TREATMENT NOTE
Acute Care - Physical Therapy Treatment Note  [facility]     Patient Name: Paloma Carr  : 1940  MRN: [id number]  Today's Date: 2017  Onset of Illness/Injury or Date of Surgery Date: 11/15/17     Referring Physician: Delores    Admit Date: 11/15/2017    Visit Dx:    ICD-10-CM ICD-9-CM   1. Cerebrovascular accident (CVA), unspecified mechanism I63.9 434.91     Patient Active Problem List   Diagnosis   • Soft tissue swelling of chest wall   • Abnormal ultrasound of breast   • Postmastectomy lymphedema syndrome   • Malignant neoplasm of upper-outer quadrant of right female breast   • Cerebrovascular accident (CVA)               Adult Rehabilitation Note       17 1500 17 1550 17 1500    Rehab Assessment/Intervention    Discipline physical therapy assistant  -RW physical therapist  -DM physical therapist  -DM    Document Type therapy note (daily note)  -RW therapy note (daily note)  -DM     Subjective Information agree to therapy;no complaints  -RW no complaints;agree to therapy  -DM     Patient Effort, Rehab Treatment good  -RW good  -DM     Symptoms Noted During/After Treatment fatigue;shortness of breath  -RW none  -DM     Precautions/Limitations fall precautions  -RW fall precautions  -DM     Recorded by [RW] Makenna Guadalupe PTA [DM] Kika García, PT [DM] Kika García, PT    Vital Signs    Pre SpO2 (%) 97  -RW      O2 Delivery Pre Treatment supplemental O2  -RW      Intra SpO2 (%) 95  -RW      O2 Delivery Intra Treatment room air  -RW      Post SpO2 (%) 97  -RW      O2 Delivery Post Treatment supplemental O2  -RW      Recorded by [RW] Makenna Guadalupe PTA      Pain Assessment    Pain Assessment No/denies pain  -RW No/denies pain  -DM     Recorded by [RW] Makenna Guadalupe PTA [DM] Kika García, PT     Cognitive Assessment/Intervention    Current Cognitive/Communication Assessment functional  -RW impaired  -DM     Orientation Status oriented x 4  -RW oriented to;person;place   -DM     Follows Commands/Answers Questions 100% of the time;needs cueing  -% of the time  -DM     Personal Safety mild impairment;at risk behaviors demonstrated  -RW WNL/WFL  -DM     Personal Safety Interventions fall prevention program maintained;gait belt;nonskid shoes/slippers when out of bed  -RW fall prevention program maintained;gait belt;nonskid shoes/slippers when out of bed;supervised activity  -DM     Recorded by [RW] Makenna Guadalupe PTA [DM] Kika García, PT     Bed Mobility, Assessment/Treatment    Bed Mobility, Assistive Device bed rails;head of bed elevated  -RW      Bed Mob, Supine to Sit, [place] contact guard assist;verbal cues required  -RW contact guard assist;verbal cues required  -DM     Bed Mob, Sit to Supine, [place] contact guard assist;verbal cues required  -RW contact guard assist;verbal cues required  -DM     Recorded by [RW] Makenna Guadalupe PTA [DM] Kika García, PT     Transfer Assessment/Treatment    Transfers, Sit-Stand [place] contact guard assist;hand held assist;verbal cues required  -RW contact guard assist;verbal cues required  -DM     Transfers, Stand-Sit [place] contact guard assist;hand held assist;verbal cues required  -RW contact guard assist;verbal cues required  -DM     Transfers, Sit-Stand-Sit, Assist Device --   HHA  -RW      Recorded by [RW] Makenna Guadalupe PTA [DM] Kika García, PT     Gait Assessment/Treatment    Gait, [place] Level minimum assist (75% patient effort);hand held assist;verbal cues required  -RW hand held assist;2 person assist required  -DM     Gait, Distance (Feet) 200  -  -DM     Gait, Gait Pattern Analysis swing-through gait  -RW      Gait, Gait Deviations forward flexed posture;bilateral:;zabrina decreased;limb motion velocity decreased;step length decreased;narrow base  -RW zabrina decreased  -DM     Gait, Safety Issues balance decreased during turns;step length decreased  -RW step length decreased;balance  decreased during turns  -DM     Gait, Impairments strength decreased;impaired balance  -RW strength decreased;impaired balance  -DM     Gait, Comment HHA required. Unsteady and reaching out for rails in hallway. May benefit from use of RW  -RW      Recorded by [RW] Makenna Guadalupe PTA [DM] Kika García, PT     Stairs Assessment/Treatment    Number of Stairs 12  -RW      Stairs, Handrail Location right side (ascending)  -RW      Stairs, [place] Level contact guard assist;verbal cues required  -RW      Stairs, Assistive Device --   HHA  -RW      Stairs, Technique Used step over step (ascending);step over step (descending)  -RW      Recorded by [RW] Makenna Guadalupe PTA      Motor Skills/Interventions    Additional Documentation  Balance Skills Training (Group)  -DM     Recorded by  [DM] Kika García, PT     Balance Skills Training    Sitting-Level of Assistance  Close supervision  -DM     Sitting-Balance Support  Feet supported  -DM     Standing-Level of Assistance  Contact guard  -DM     Gait Balance-Level of Assistance  Minimum assistance  -DM     Gait Balance Support  Left upper extremity supported;Right upper extremity supported  -DM     Recorded by  [DM] Kika García, PT     Positioning and Restraints    Pre-Treatment Position in bed  -RW in bed  -DM     Post Treatment Position bed  -RW bed  -DM     In Bed fowlers;call light within reach;encouraged to call for assist;exit alarm on;with family/caregiver;SCD pump applied  -RW supine;call light within reach;encouraged to call for assist;with nsg   getting ready to transfer to   -DM     Recorded by [RW] Makenna Guadalupe PTA [DM] Kika García, PT       User Key  (r) = Recorded By, (t) = Taken By, (c) = Cosigned By    Initials Name Effective Dates    ROSALES García, PT 10/06/15 -     RW Makenna Guadalupe PTA 04/06/16 -                 IP PT Goals       11/16/17 1501          Bed Mobility PT LTG    Bed Mobility PT LTG, Time to Achieve 1 wk  -      Bed  Mobility PT LTG, Activity Type all bed mobility  -CH      Bed Mobility PT LTG, [place] Level supervision required  -CH      Transfer Training PT LTG    Transfer Training PT LTG, Time to Achieve 1 wk  -CH      Transfer Training PT LTG, Activity Type all transfers  -CH      Transfer Training PT LTG, [place] Level supervision required  -CH      Gait Training PT LTG    Gait Training Goal PT LTG, Time to Achieve 1 wk  -CH      Gait Training Goal PT LTG, [place] Level supervision required  -CH      Gait Training Goal PT LTG, Distance to Achieve 150  -CH        User Key  (r) = Recorded By, (t) = Taken By, (c) = Cosigned By    Initials Name Provider Type    TOMASZ Gan, PT Physical Therapist          Physical Therapy Education     Title: PT OT SLP Therapies (Active)     Topic: Physical Therapy (Done)     Point: Mobility training (Done)    Learning Progress Summary    Learner Readiness Method Response Comment Documented by Status   Patient Acceptance E,TB,D VU,NR   11/18/17 1554 Done    Eager E,TB,D DU,NR   11/17/17 1550 Done    Acceptance TB,D,E VU,[facility] 11/16/17 1500 Done               Point: Home exercise program (Done)    Learning Progress Summary    Learner Readiness Method Response Comment Documented by Status   Patient Acceptance TB,D,E VU,[facility] 11/16/17 1500 Done               Point: Body mechanics (Done)    Learning Progress Summary    Learner Readiness Method Response Comment Documented by Status   Patient Acceptance E,TB,D VU,NR   11/18/17 1554 Done    Eager E,TB,D DU,NR   11/17/17 1550 Done    Acceptance TB,D,E VU,[facility] 11/16/17 1500 Done               Point: Precautions (Done)    Learning Progress Summary    Learner Readiness Method Response Comment Documented by Status   Patient Acceptance E,TB,D VU,[facility] 11/18/17 1554 Done    Eager E,TB,D DU,NR   11/17/17 1550 Done    Acceptance TB,D,E VU,[facility] 11/16/17 1500 Done                      User Key     Initials Effective Dates  Name Provider Type Discipline     12/01/15 -  Alicia Gan, PT Physical Therapist PT    DM 10/06/15 -  Kika García, PT Physical Therapist PT    RW 04/06/16 -  Makenna Guadalupe PTA Physical Therapy Assistant PT                    PT Recommendation and Plan  Anticipated Discharge Disposition: inpatient rehabilitation facility (pending progress and home resources)  Planned Therapy Interventions: balance training, bed mobility training, gait training, home exercise program, patient/family education, transfer training  PT Frequency: daily  Plan of Care Review  Plan Of Care Reviewed With: patient  Outcome Summary/Follow up Plan: Pt increased ambulation distance. Unsteady even w/ HHA. May benefit from use of RW. Increased fatigue and dyspnea w/ increased ambulation distance. Educated on stair climbing and demonstrated correctly.           Outcome Measures       11/18/17 1500 11/18/17 1100 11/17/17 1500    How much help from another person do you currently need...    Turning from your back to your side while in flat bed without using bedrails? 4  -RW  4  -DM    Moving from lying on back to sitting on the side of a flat bed without bedrails? 3  -RW  3  -DM    Moving to and from a bed to a chair (including a wheelchair)? 3  -RW  3  -DM    Standing up from a chair using your arms (e.g., wheelchair, bedside chair)? 3  -RW  3  -DM    Climbing 3-5 steps with a railing? 3  -RW  3  -DM    To walk in hospital room? 3  -RW  3  -DM    AM-PAC 6 Clicks Score 19  -RW  19  -DM    How much help from another is currently needed...    Putting on and taking off regular lower body clothing?  3  -SO     Bathing (including washing, rinsing, and drying)  3  -SO     Toileting (which includes using toilet bed pan or urinal)  3  -SO     Putting on and taking off regular upper body clothing  3  -SO     Taking care of personal grooming (such as brushing teeth)  3  -SO     Eating meals  4  -SO     Score  19  -SO     Modified [place] Scale     Modified [place] Scale   4 - Moderately severe disability.  Unable to walk without assistance, and unable to attend to own bodily needs without assistance.  -DM    Functional Assessment    Outcome Measure Options AM-PAC 6 Clicks Basic Mobility (PT)  -RW AM-PAC 6 Clicks Daily Activity (OT)  -SO AM-PAC 6 Clicks Basic Mobility (PT);Modified Jose  -DM      11/16/17 1500          How much help from another person do you currently need...    Turning from your back to your side while in flat bed without using bedrails? 3  -CH      Moving from lying on back to sitting on the side of a flat bed without bedrails? 3  -CH      Moving to and from a bed to a chair (including a wheelchair)? 3  -CH      Standing up from a chair using your arms (e.g., wheelchair, bedside chair)? 3  -CH      Climbing 3-5 steps with a railing? 2  -CH      To walk in hospital room? 3  -CH      AM-PAC 6 Clicks Score 17  -CH      Modified [place] Scale    Modified [place] Scale 4 - Moderately severe disability.  Unable to walk without assistance, and unable to attend to own bodily needs without assistance.  -      Functional Assessment    Outcome Measure Options AM-PAC 6 Clicks Basic Mobility (PT);Modified Jose  -        User Key  (r) = Recorded By, (t) = Taken By, (c) = Cosigned By    Initials Name Provider Type    SO Hortensia Paredes, OTR Occupational Therapist    CH Alicia Gan, PT Physical Therapist    ROSALES García, PT Physical Therapist    ALEX Guadalupe, DIO Physical Therapy Assistant           Time Calculation:         PT Charges       11/18/17 1551          Time Calculation    Start Time 1536  -RW      Stop Time 1550  -RW      Time Calculation (min) 14 min  -RW      PT Received On 11/18/17  -RW      PT - Next Appointment 11/19/17  -RW        User Key  (r) = Recorded By, (t) = Taken By, (c) = Cosigned By    Initials Name Provider Type    ALEX Guadalupe, DIO Physical Therapy Assistant          Therapy Charges for Today      Code Description Service Date Service Provider Modifiers Qty    50641712647 HC PT THER PROC EA 15 MIN 11/18/2017 Makenna Guadalupe, PTA GP 1          PT G-Codes  Outcome Measure Options: AM-PAC 6 Clicks Basic Mobility (PT)    Makenna Guadalupe PTA  11/18/2017

## 2017-11-18 NOTE — PROGRESS NOTES
[facility]  Phone: [phone]  Timothy Barillas MD    Subjective:  LOS: 3    Her aphasia is remarkably better.  However when ambulating with physical therapy she had some gait imbalance.  Her cough is slightly better as well.    Objective   Vital Signs past 24hrs  BP range: BP: (151-173)/(68-86) 153/74  Pulse range: Heart Rate:  [64-98] 68  Resp rate range: Resp:  [16-20] 18  Temp range: Temp (24hrs), Av.5 °F (36.9 °C), Min:98.1 °F (36.7 °C), Max:98.8 °F (37.1 °C)    O2 Device: nasal cannulaFlow (L/min):  [2] 2  Oxygen range:SpO2:  [92 %-98 %] 98 %   173 lb (78.5 kg); Body mass index is 28.79 kg/(m^2).  No intake or output data in the 24 hours ending 17 1739    Physical Exam   Constitutional: She appears well-developed.   Eyes: Conjunctivae are normal.   Neck: Normal range of motion. Neck supple.   Cardiovascular: Normal rate and regular rhythm.    No murmur heard.  Pulmonary/Chest: Effort normal. No respiratory distress. She has no wheezes. She has no rales.   Abdominal: Soft. Bowel sounds are normal. She exhibits no mass. There is no tenderness.   Musculoskeletal: She exhibits no edema.   Right upper extremity bruising   Neurological: She is alert.   Aphasia practically resolved  Gait not assessed   Skin: Skin is warm. No rash noted.     Results Review:    I have reviewed the laboratory and imaging data since the last note by [facility] physician.  My annotations are noted in assessment and plan.    Medication Review:  I have reviewed the current MAR.  My annotations are noted in assessment and plan.       Plan   PCCM Problems  Acute CVA, status post thrombectomy 11/15  History previous stroke  Cough  Wheezing likely COPD exacerbation  Relevant Medical Diagnoses  Ex-smoker  COPD, patient states unaware    Plan of Treatment  Neuro status stable and improved.  Needs Zio patch.    Wheezing better. On pred bid. States nebs make her shake, hold for now and use Symbicort.    Spoke with .      Transfer to acute rehab tomorrow.    Timothy Barillas MD  11/18/17  5:39 PM    Part of this note may be an electronic transcription/translation of spoken language to printed text using the Dragon Dictation System.

## 2017-11-18 NOTE — PLAN OF CARE
Problem: Patient Care Overview (Adult)  Goal: Plan of Care Review  Outcome: Ongoing (interventions implemented as appropriate)    11/18/17 4395   Coping/Psychosocial Response Interventions   Plan Of Care Reviewed With patient   Outcome Evaluation   Outcome Summary/Follow up Plan Pt increased ambulation distance. Unsteady even w/ HHA. May benefit from use of RW. Increased fatigue and dyspnea w/ increased ambulation distance. Educated on stair climbing and demonstrated correctly.

## 2017-11-18 NOTE — PROGRESS NOTES
LOS: 3 days   Patient Care Team:  Goran Carr MD as PCP - General (Family Medicine)    Chief Complaint:    Chief Complaint   Patient presents with   • Neuro Deficit(s)       Subjective     Interval History:      Patient Complaints: H/A left side of head, not bad just there and has been for a day or so and balance trouble  Patient Denies:  New stroke symptoms   History taken from: patient chart family RN    Objective     Vital Signs  Temp:  [97.7 °F (36.5 °C)-98.1 °F (36.7 °C)] 98.1 °F (36.7 °C)  Heart Rate:  [64-98] 98  Resp:  [14-20] 20  BP: (128-173)/(62-86) 173/86      Physical Examination:  HEENT: Normocephalic, atraumatic   COR: RRR  Resp: Even and unlabored  Extremities: Equal pulses, non distal embolization, generalized ecchymosis     Neurological:   MS: AO. Language: rare paraphasic errors. No neglect. Higher integrative function fair  CN: II-XII normal  Motor: 5/5, normal tone      Results Review:     I reviewed the patient's new clinical results.      Results from last 7 days  Lab Units 11/16/17  0426   HEMOGLOBIN A1C % 5.46       Results from last 7 days  Lab Units 11/17/17  0608 11/16/17  0426 11/15/17  1335   WBC 10*3/mm3 5.94 6.95 7.67   HEMOGLOBIN g/dL 11.0* 10.2* 13.9   HEMATOCRIT % 34.3* 31.3* 41.8   PLATELETS 10*3/mm3 190 202 263       Results from last 7 days  Lab Units 11/16/17  0426   CHOLESTEROL mg/dL 139     Lab Results   Component Value Date    LDLCALC 63 11/16/2017         Results from last 7 days  Lab Units 11/17/17  0608 11/16/17  0708 11/15/17  1335   SODIUM mmol/L 146* 142 138   POTASSIUM mmol/L 3.6 3.6 4.4   CHLORIDE mmol/L 108* 108* 98   CO2 mmol/L 25.3 21.8* 24.8   BUN mg/dL 10 9 19   CREATININE mg/dL 0.71 0.54* 0.85   CALCIUM mg/dL 8.9 8.0* 9.5   BILIRUBIN mg/dL  --  0.5 0.5   ALK PHOS U/L  --  65 82   ALT (SGPT) U/L  --  13 28   AST (SGOT) U/L  --  30 33*   GLUCOSE mg/dL 105* 86 76     11/16 CT head: normal    11/2017 JEFE  Left Ventricle  Left ventricular systolic function  is normal. Estimated EF appears to be in the range of 56 - 60%. Normal left ventricular cavity size noted.   Right Ventricle  Normal right ventricular cavity size and systolic function noted.   Left Atrium  Left atrial cavity size is mildly dilated. No evidence of a left atrial thrombus present. No evidence of a left atrial appendage thrombus was present. Lipomatous hypertrophy of the interatrial septum present. Saline test results are negative. No evidence of interatrial shunt noted with marginally adequate agitated saline contrast study. The left inferior pulmonary vein(s) appear normal with no flow abnormalities.   Right Atrium  Normal right atrial size noted.   Aortic Valve  The aortic valve is structurally normal. No aortic valve regurgitation is present.       Medication Review: reviewed, changes made    Assessment/Plan  Ms Carr is a 78 yo with HTN, HLD, breast cancer and Hx of stroke and meningioma resection who presented on 11/15 with sudden onset speech difficulty and right side weakness. She is POD#3 for IV thrombolysis and emergent mechanical embolectomy. She has improved neurologically. The etiology is unknown. The JEFE was unrevealing. She will need prolonged heart monitoring out patient. ? hypercoag workup if zio patch negative. She needs continued aggressive risk factor control, Crestor 20mg sufficient LDL 63. Her home antihypertensives have been resumed. Goal SBP .  PT/OT/ST. CCP For D/C planning. Please call with any questions or concerns.     Plan:   -  ASA and Plavix   - Zio Patch    - Crestor 20mg, LDL 63    - Neurochecks   - Non-pharmacological DVT prophylaxis   - EKG Tele   - PT/OT/ST   - Stroke Education   - SBP    - Goal LDL <70-recommend high dose statins-    - Serum glucose < 140   - CCP for D/C planning   - FU in 3 months     Active Problems:    Cerebrovascular accident (CVA)      I have discussed the above with the patient, Dr. Figueroa, the nurse and family, daughter  .    Jenelle Kevin, APRN  11/18/17  12:42 PM

## 2017-11-18 NOTE — PLAN OF CARE
Problem: Patient Care Overview (Adult)  Goal: Plan of Care Review  Outcome: Ongoing (interventions implemented as appropriate)    11/18/17 0402 11/18/17 1554 11/18/17 1744   Coping/Psychosocial Response Interventions   Plan Of Care Reviewed With --  patient --    Patient Care Overview   Progress progress toward functional goals as expected --  --    Outcome Evaluation   Outcome Summary/Follow up Plan --  --  NIH 1. LOCX4. vital signs stable. no C/O of nausea or vomiting. PT reports cough & wheezing. Repeat CT of head revealed no change. possible D[facility].         Problem: Stroke (Ischemic) (Adult)  Goal: Signs and Symptoms of Listed Potential Problems Will be Absent or Manageable (Stroke)  Outcome: Ongoing (interventions implemented as appropriate)    11/15/17 0399   Stroke (Ischemic)   Problems Assessed (Stroke (Ischemic)/TIA) all   Problems Present (Stroke (Ischemic)/TIA) acute neurologic deterioration;communication impairment;situational response         Problem: Fall Risk (Adult)  Goal: Absence of Falls  Outcome: Ongoing (interventions implemented as appropriate)    11/18/17 0402   Fall Risk (Adult)   Absence of Falls making progress toward outcome

## 2017-11-18 NOTE — PLAN OF CARE
Problem: Patient Care Overview (Adult)  Goal: Plan of Care Review    11/18/17 1158   Coping/Psychosocial Response Interventions   Plan Of Care Reviewed With patient   Outcome Evaluation   Outcome Summary/Follow up Plan Pt presents with some RUE shoulder weakness and decreased balance, pt may benefit from OT to address ADLs.         Problem: Inpatient Occupational Therapy  Goal: Transfer Training Goal 1 LTG- OT    11/18/17 1158   Transfer Training OT LTG   Transfer Training OT LTG, Date Established 11/18/17   Transfer Training OT LTG, Time to Achieve 1 wk   Transfer Training OT LTG, Activity Type sit to stand/stand to sit;toilet   Transfer Training OT LTG, [place] Level supervision required   Transfer Training OT LTG, Assist Device walker, rolling       Goal: Strength Goal LTG- OT    11/18/17 1158   Strength OT LTG   Strength Goal OT LTG, Date Established 11/18/17   Strength Goal OT LTG, Time to Achieve 1 wk   Strength Goal OT LTG, Measure to Achieve Pt to increase RUE strength to 4/5 to assist during ADLs.       Goal: ADL Goal LTG- OT    11/18/17 1158   ADL OT LTG   ADL OT LTG, Date Established 11/18/17   ADL OT LTG, Time to Achieve 1 wk   ADL OT LTG, Activity Type ADL skills   ADL OT LTG, [place] Level standby assist;assistive device

## 2017-11-18 NOTE — PLAN OF CARE
Problem: Patient Care Overview (Adult)  Goal: Plan of Care Review  Outcome: Ongoing (interventions implemented as appropriate)    11/18/17 0402   Coping/Psychosocial Response Interventions   Plan Of Care Reviewed With patient   Patient Care Overview   Progress progress toward functional goals as expected   Outcome Evaluation   Outcome Summary/Follow up Plan Vitals as charted, NIH 1, will continue to monitor.        Goal: Adult Individualization and Mutuality  Outcome: Ongoing (interventions implemented as appropriate)  Goal: Discharge Needs Assessment  Outcome: Ongoing (interventions implemented as appropriate)    Problem: Stroke (Ischemic) (Adult)  Goal: Signs and Symptoms of Listed Potential Problems Will be Absent or Manageable (Stroke)  Outcome: Ongoing (interventions implemented as appropriate)    Problem: Fall Risk (Adult)  Goal: Absence of Falls  Outcome: Ongoing (interventions implemented as appropriate)

## 2017-11-18 NOTE — THERAPY EVALUATION
Acute Care - Occupational Therapy Initial Evaluation  [facility]     Patient Name: Paloma Carr  : 1940  MRN: [id number]  Today's Date: 2017  Onset of Illness/Injury or Date of Surgery Date: 11/15/17  Date of Referral to OT: 17  Referring Physician: Delores    Admit Date: 11/15/2017       ICD-10-CM ICD-9-CM   1. Cerebrovascular accident (CVA), unspecified mechanism I63.9 434.91     Patient Active Problem List   Diagnosis   • Soft tissue swelling of chest wall   • Abnormal ultrasound of breast   • Postmastectomy lymphedema syndrome   • Malignant neoplasm of upper-outer quadrant of right female breast   • Cerebrovascular accident (CVA)     Past Medical History:   Diagnosis Date   • Anxiety    • Arthritis    • COPD (chronic obstructive pulmonary disease)    • CVA (cerebral infarction)    • CVD (cardiovascular disease)    • Depression    • Hyperlipemia    • Hypertension    • Hypothyroid    • Macular degeneration    • Meningioma     OCCIPITAL MENINGIOMA-REMOVED BY DR TOLLIVER    • Overweight    • Stroke    • Vertigo      Past Surgical History:   Procedure Laterality Date   • ADRENAL GLAND SURGERY     • BRAIN MENINGIOMA EXCISION      occipital   •  SECTION     • EMBOLECTOMY N/A 11/15/2017    Procedure: Embolectomy Mechanical;  Surgeon: Rachid Figueroa MD;  Location: [facility] ;  Service:    • TOTAL SHOULDER REPLACEMENT      x2          OT ASSESSMENT FLOWSHEET (last 72 hours)      OT Evaluation       17 1154 17 1550 17 1536 17 1535 17 1400    Rehab Evaluation    Document Type evaluation  -SO therapy note (daily note)  -DM   evaluation  -SA    Subjective Information no complaints;agree to therapy  -SO no complaints;agree to therapy  -DM       Patient Effort, Rehab Treatment good  -SO good  -DM       Symptoms Noted During/After Treatment none  -SO none  -DM   none  -SA    General Information    Patient Profile Review yes  -SO         Referring Physician Delores  -SO        General Observations Pt sidelying left in bed, bruises on R shoulder  -SO        Pertinent History Of Current Problem CVA  -SO        Precautions/Limitations fall precautions  -SO fall precautions  -DM       Prior Level of Function independent:;ADL's  -SO        Equipment Currently Used at Home walker, rolling  -SO  walker, rolling   for long distances  -SI      Plans/Goals Discussed With patient;agreed upon  -SO        Living Environment    Lives With spouse  -SO  spouse  -SI      Living Arrangements condominium  -SO  condominium  -SI      Home Accessibility   no concerns;stairs within home  -SI      Number of Stairs Within Home   --   pt states that there are stairs but she does not use them  -SI      Type of Financial/Environmental Concern   none  -SI      Transportation Available   family or friend will provide  -SI      Clinical Impression    Date of Referral to OT 11/18/17  -SO        Criteria for Skilled Therapeutic Interventions Met yes  -SO        Rehab Potential good, to achieve stated therapy goals  -SO        Therapy Frequency 3-5 times/wk  -SO        Anticipated Discharge Disposition home with home health;inpatient rehabilitation facility   Pending progress  -SO        Functional Level Prior    Ambulation    0-->independent   uses a walker for long distances  -SI     Transferring    0-->independent  -SI     Toileting    0-->independent  -SI     Bathing    0-->independent  -SI     Dressing    0-->independent  -SI     Eating    0-->independent  -SI     Communication    0-->understands/communicates without difficulty  -SI     Swallowing    0-->swallows foods/liquids without difficulty  -SI     Pain Assessment    Pain Assessment No/denies pain  -SO No/denies pain  -DM   No/denies pain  -SA    Cognitive Assessment/Intervention    Current Cognitive/Communication Assessment impaired  -SO impaired  -DM       Orientation Status oriented to;person;place;time;situation   -SO oriented to;person;place  -DM       Follows Commands/Answers Questions 100% of the time  -% of the time  -DM       Personal Safety decreased insight to deficits  -SO WNL/WFL  -DM       Personal Safety Interventions gait belt;fall prevention program maintained  -SO fall prevention program maintained;gait belt;nonskid shoes/slippers when out of bed;supervised activity  -DM       ROM (Range of Motion)    General ROM Detail BUE appear WFL  -SO        MMT (Manual Muscle Testing)    General MMT Assessment upper extremity strength deficits identified  -SO        General MMT Assessment Detail LUE WFL 4/5, RUE shoulder 3/5, distally 4-/5  -SO        Bed Mobility, Assessment/Treatment    Bed Mob, Supine to Sit, [place] contact guard assist  -SO contact guard assist;verbal cues required  -DM       Bed Mob, Sit to Supine, [place] minimum assist (75% patient effort)  -SO contact guard assist;verbal cues required  -DM       Bed Mobility, Comment Assist with BLE  -SO        Transfer Assessment/Treatment    Transfers, Sit-Stand [place] contact guard assist  -SO contact guard assist;verbal cues required  -DM       Transfers, Stand-Sit [place] contact guard assist  -SO contact guard assist;verbal cues required  -DM       Transfers, Sit-Stand-Sit, Assist Device --   HHA  -SO        Toilet Transfer, [place] contact guard assist;verbal cues required  -SO        Toilet Transfer, Assistive Device --   Grab bar, HHA  -SO        Functional Mobility    Functional Mobility- Ind. Level minimum assist (75% patient effort);verbal cues required  -SO        Functional Mobility- Device --   HHA  -SO        Functional Mobility- Comment Small LOB, scissoring  -SO        Lower Body Dressing Assessment/Training    LB Dressing Assess/Train, Clothing Type doffing:;donning:;slipper socks  -SO        LB Dressing Assess/Train, Position sitting;edge of bed  -SO        LB Dressing Assess/Train, [place] contact guard  assist  -SO        Toileting Assessment/Training    Toileting Assess/Train, Position sitting;standing  -SO        Toileting Assess/Train, Indepen Level contact guard assist  -SO        Toileting Assess/Train, Impairments impaired balance  -SO        Motor Skills/Interventions    Additional Documentation  Balance Skills Training (Group)  -DM       Balance Skills Training    Sitting-Level of Assistance  Close supervision  -DM       Sitting-Balance Support  Feet supported  -DM       Standing-Level of Assistance  Contact guard  -       Gait Balance-Level of Assistance  Minimum assistance  -DM       Gait Balance Support  Left upper extremity supported;Right upper extremity supported  -       Positioning and Restraints    Pre-Treatment Position in bed  -SO in bed  -DM       Post Treatment Position bed  -SO bed  -DM       In Bed side lying left;call light within reach;encouraged to call for assist;exit alarm on  -SO supine;call light within reach;encouraged to call for assist;with nsg   getting ready to transfer to [facility]         11/17/17 1341 11/16/17 1447 11/16/17 1440 11/16/17 0901 11/15/17 1700    Rehab Evaluation    Document Type   evaluation  - evaluation  -     Subjective Information   agree to therapy  - agree to therapy  -     Evaluation Not Performed patient unavailable for evaluation   Pt off floor.  -RE other (see comments)   await CT. Will follow up tomorrow for OT  -SG       Patient Effort, Rehab Treatment   good  -CH excellent  -SH     Symptoms Noted During/After Treatment   none  -CH none  -SH     General Information    Onset of Illness/Injury or Date of Surgery Date   11/15/17  -      General Observations   supine in bed, no acute distress noted at rest  -CH      Pertinent History Of Current Problem   pt admitted with R side weakness, aphasia, and CVA  -CH      Precautions/Limitations   fall precautions  -      Prior Level of Function   independent:;gait;transfer;bed mobility;ADL's  -CH       Equipment Currently Used at Home   none  -CH  walker, standard  -KW    Plans/Goals Discussed With   patient  -      Barriers to Rehab   medically complex  -      Living Environment    Lives With     spouse  -KW    Living Arrangements     condominium  -KW    Home Accessibility     no concerns  -KW    Stair Railings at Home     none  -KW    Type of Financial/Environmental Concern     none  -KW    Transportation Available     family or friend will provide  -KW    Living Environment Comment     no concerns  -KW    Functional Level Prior    Ambulation     0-->independent  -KW    Transferring     0-->independent  -KW    Toileting     0-->independent  -KW    Bathing     0-->independent  -KW    Dressing     0-->independent  -KW    Eating     0-->independent  -KW    Communication     0-->understands/communicates without difficulty  -KW    Swallowing     0-->swallows foods/liquids without difficulty  -KW    Prior Functional Level Comment     Independent  -    Pain Assessment    Pain Assessment   --   pt says 'yes' to pain, but unable to specify where  - No/denies pain  -     Cognitive Assessment/Intervention    Current Cognitive/Communication Assessment   impaired  -      Orientation Status   unable/difficult to assess   pt with aphasia  -      Follows Commands/Answers Questions   75% of the time;able to follow single-step instructions;needs cueing;needs increased time;needs repetition  -      Personal Safety   mild impairment  -      Personal Safety Interventions   fall prevention program maintained;nonskid shoes/slippers when out of bed  -      ROM (Range of Motion)    General ROM   no range of motion deficits identified  -      MMT (Manual Muscle Testing)    General MMT Assessment   other (see comments)   generalized weaknes noted R slightly weaker than L  -      General MMT Assessment Detail   --   pt having some difficulty following commands for MMT  -      Bed Mobility, Assessment/Treatment     Bed Mob, Supine to Sit, [place]   verbal cues required;nonverbal cues required (demo/gesture);minimum assist (75% patient effort)  -      Bed Mob, Sit to Supine, [place]   verbal cues required;nonverbal cues required (demo/gesture);contact guard assist;2 person assist required  -      Transfer Assessment/Treatment    Transfers, Sit-Stand [place]   verbal cues required;nonverbal cues required (demo/gesture);minimum assist (75% patient effort);2 person assist required;hand held assist  -      Transfers, Stand-Sit [place]   verbal cues required;nonverbal cues required (demo/gesture);minimum assist (75% patient effort);2 person assist required;hand held assist  -      Motor Skills/Interventions    Additional Documentation   Balance Skills Training (Group)  -      Balance Skills Training    Standing-Level of Assistance   Minimum assistance;x2  -CH      Gait Balance-Level of Assistance   Minimum assistance;x2  -CH      Positioning and Restraints    Pre-Treatment Position   in bed  -CH      Post Treatment Position   bed  -CH      In Bed   supine;call light within reach;encouraged to call for assist;with [facility]  -        11/15/17 0211                Rehab Evaluation    Evaluation Not Performed patient unavailable for evaluation   Pt RAMOS in OR. SLP to follow up next date.   -          User Key  (r) = Recorded By, (t) = Taken By, (c) = Cosigned By    Initials Name Effective Dates    SG Charlette Crabtree, OTR 04/13/15 -     SA Charlette Henry, MS [facility]-SLP 04/13/15 -     RE Tayla Kaye, OTR 04/13/15 -     SO Hortenisa Paredes, OTR 04/13/15 -     CH Alicia Gan, PT 12/01/15 -     ROSALES García, PT 10/06/15 -     SH Yadira Lancaster, MS [facility]-SLP 07/13/17 -     HOSSEIN Costello, RN 10/17/16 -     RADHA Cedillo, RN 02/18/16 -            Occupational Therapy Education     Title: PT OT SLP Therapies (Active)     Topic: Occupational Therapy (Active)     Point: ADL training (Done)    Description:  Instruct learner(s) on proper safety adaptation and remediation techniques during self care or transfers.   Instruct in proper use of assistive devices.    Learning Progress Summary    Learner Readiness Method Response Comment Documented by Status   Patient Acceptance E VU  SO 11/18/17 1157 Done                      User Key     Initials Effective Dates Name Provider Type Discipline    SO 04/13/15 -  KEON Barnhart Occupational Therapist OT                  OT Recommendation and Plan  Anticipated Discharge Disposition: home with home health, inpatient rehabilitation facility (Pending progress)  Therapy Frequency: 3-5 times/wk  Plan of Care Review  Plan Of Care Reviewed With: patient  Outcome Summary/Follow up Plan: Pt presents with some RUE shoulder weakness and decreased balance, pt may benefit from OT to address ADLs.          OT Goals       11/18/17 1158          Transfer Training OT LTG    Transfer Training OT LTG, Date Established 11/18/17  -SO      Transfer Training OT LTG, Time to Achieve 1 wk  -SO      Transfer Training OT LTG, Activity Type sit to stand/stand to sit;toilet  -SO      Transfer Training OT LTG, [place] Level supervision required  -SO      Transfer Training OT LTG, Assist Device walker, rolling  -SO      Strength OT LTG    Strength Goal OT LTG, Date Established 11/18/17  -SO      Strength Goal OT LTG, Time to Achieve 1 wk  -SO      Strength Goal OT LTG, Measure to Achieve Pt to increase RUE strength to 4/5 to assist during ADLs.  -SO      ADL OT LTG    ADL OT LTG, Date Established 11/18/17  -SO      ADL OT LTG, Time to Achieve 1 wk  -SO      ADL OT LTG, Activity Type ADL skills  -SO      ADL OT LTG, [place] Level standby assist;assistive device  -SO        User Key  (r) = Recorded By, (t) = Taken By, (c) = Cosigned By    Initials Name Provider Type    SO KEON Barnhart Occupational Therapist                Outcome Measures       11/18/17 1100 11/17/17 1500  11/16/17 1500    How much help from another person do you currently need...    Turning from your back to your side while in flat bed without using bedrails?  4  -DM 3  -CH    Moving from lying on back to sitting on the side of a flat bed without bedrails?  3  -DM 3  -CH    Moving to and from a bed to a chair (including a wheelchair)?  3  -DM 3  -CH    Standing up from a chair using your arms (e.g., wheelchair, bedside chair)?  3  -DM 3  -CH    Climbing 3-5 steps with a railing?  3  -DM 2  -CH    To walk in hospital room?  3  -DM 3  -CH    AM-PAC 6 Clicks Score  19  -DM 17  -CH    How much help from another is currently needed...    Putting on and taking off regular lower body clothing? 3  -SO      Bathing (including washing, rinsing, and drying) 3  -SO      Toileting (which includes using toilet bed pan or urinal) 3  -SO      Putting on and taking off regular upper body clothing 3  -SO      Taking care of personal grooming (such as brushing teeth) 3  -SO      Eating meals 4  -SO      Score 19  -SO      Modified [place] Scale    Modified [place] Scale  4 - Moderately severe disability.  Unable to walk without assistance, and unable to attend to own bodily needs without assistance.  -DM 4 - Moderately severe disability.  Unable to walk without assistance, and unable to attend to own bodily needs without assistance.  -CH    Functional Assessment    Outcome Measure Options AM-PAC 6 Clicks Daily Activity (OT)  -SO AM-PAC 6 Clicks Basic Mobility (PT);Modified [place]  -DM AM-PAC 6 Clicks Basic Mobility (PT);Modified [place]  -CH      User Key  (r) = Recorded By, (t) = Taken By, (c) = Cosigned By    Initials Name Provider Type    CARLITO Paredes, OTR Occupational Therapist    CH Alicia Gan, PT Physical Therapist    ROSALES García, PT Physical Therapist          Time Calculation:   OT Start Time: 0945  OT Stop Time: 0957  OT Time Calculation (min): 12 min    Therapy Charges for Today     Code Description  Service Date Service Provider Modifiers Qty    01225189396 HC OT EVAL MOD COMPLEXITY 2 11/18/2017 KEON Barnhart GO 1               KEON Barnhart  11/18/2017

## 2017-11-19 ENCOUNTER — HOSPITAL ENCOUNTER (INPATIENT)
Facility: HOSPITAL | Age: 77
LOS: 17 days | Discharge: HOME | End: 2017-12-06
Attending: PHYSICAL MEDICINE & REHABILITATION | Admitting: PHYSICAL MEDICINE & REHABILITATION

## 2017-11-19 VITALS
OXYGEN SATURATION: 98 % | TEMPERATURE: 98.3 F | BODY MASS INDEX: 28.82 KG/M2 | SYSTOLIC BLOOD PRESSURE: 149 MMHG | HEART RATE: 59 BPM | HEIGHT: 65 IN | RESPIRATION RATE: 18 BRPM | DIASTOLIC BLOOD PRESSURE: 82 MMHG | WEIGHT: 173 LBS

## 2017-11-19 DIAGNOSIS — R26.89 IMPAIRED GAIT AND MOBILITY: Primary | ICD-10-CM

## 2017-11-19 PROBLEM — J44.1 COPD EXACERBATION: Status: ACTIVE | Noted: 2017-11-19

## 2017-11-19 LAB
ABO + RH BLD: NORMAL
ABO + RH BLD: NORMAL
ANION GAP SERPL CALCULATED.3IONS-SCNC: 11.7 MMOL/L
BH BB BLOOD EXPIRATION DATE: NORMAL
BH BB BLOOD EXPIRATION DATE: NORMAL
BH BB BLOOD TYPE BARCODE: 5100
BH BB BLOOD TYPE BARCODE: 5100
BH BB DISPENSE STATUS: NORMAL
BH BB DISPENSE STATUS: NORMAL
BH BB PRODUCT CODE: NORMAL
BH BB PRODUCT CODE: NORMAL
BH BB UNIT NUMBER: NORMAL
BH BB UNIT NUMBER: NORMAL
BUN BLD-MCNC: 15 MG/DL (ref 8–23)
BUN/CREAT SERPL: 22.7 (ref 7–25)
CALCIUM SPEC-SCNC: 9.4 MG/DL (ref 8.6–10.5)
CHLORIDE SERPL-SCNC: 105 MMOL/L (ref 98–107)
CO2 SERPL-SCNC: 25.3 MMOL/L (ref 22–29)
CREAT BLD-MCNC: 0.66 MG/DL (ref 0.57–1)
CROSSMATCH INTERPRETATION: NORMAL
CROSSMATCH INTERPRETATION: NORMAL
DEPRECATED RDW RBC AUTO: 49.9 FL (ref 37–54)
ERYTHROCYTE [DISTWIDTH] IN BLOOD BY AUTOMATED COUNT: 13.2 % (ref 11.7–13)
GFR SERPL CREATININE-BSD FRML MDRD: 87 ML/MIN/1.73
GLUCOSE BLD-MCNC: 117 MG/DL (ref 65–99)
HCT VFR BLD AUTO: 34 % (ref 35.6–45.5)
HGB BLD-MCNC: 10.8 G/DL (ref 11.9–15.5)
MCH RBC QN AUTO: 32.5 PG (ref 26.9–32)
MCHC RBC AUTO-ENTMCNC: 31.8 G/DL (ref 32.4–36.3)
MCV RBC AUTO: 102.4 FL (ref 80.5–98.2)
PLATELET # BLD AUTO: 199 10*3/MM3 (ref 140–500)
PMV BLD AUTO: 10.6 FL (ref 6–12)
POTASSIUM BLD-SCNC: 3.8 MMOL/L (ref 3.5–5.2)
RBC # BLD AUTO: 3.32 10*6/MM3 (ref 3.9–5.2)
SODIUM BLD-SCNC: 142 MMOL/L (ref 136–145)
UNIT  ABO: NORMAL
UNIT  ABO: NORMAL
UNIT  RH: NORMAL
UNIT  RH: NORMAL
WBC NRBC COR # BLD: 7.16 10*3/MM3 (ref 4.5–10.7)

## 2017-11-19 PROCEDURE — 94799 UNLISTED PULMONARY SVC/PX: CPT

## 2017-11-19 PROCEDURE — 99231 SBSQ HOSP IP/OBS SF/LOW 25: CPT | Performed by: NURSE PRACTITIONER

## 2017-11-19 PROCEDURE — 85027 COMPLETE CBC AUTOMATED: CPT | Performed by: INTERNAL MEDICINE

## 2017-11-19 PROCEDURE — 63710000001 PREDNISONE PER 1 MG: Performed by: INTERNAL MEDICINE

## 2017-11-19 PROCEDURE — 97110 THERAPEUTIC EXERCISES: CPT

## 2017-11-19 PROCEDURE — 80048 BASIC METABOLIC PNL TOTAL CA: CPT | Performed by: INTERNAL MEDICINE

## 2017-11-19 RX ORDER — ACETAMINOPHEN 325 MG/1
650 TABLET ORAL EVERY 4 HOURS PRN
Status: CANCELLED | OUTPATIENT
Start: 2017-11-19

## 2017-11-19 RX ORDER — LEVOTHYROXINE SODIUM 0.12 MG/1
125 TABLET ORAL
Status: DISCONTINUED | OUTPATIENT
Start: 2017-11-20 | End: 2017-12-06 | Stop reason: HOSPADM

## 2017-11-19 RX ORDER — PANTOPRAZOLE SODIUM 40 MG/1
40 TABLET, DELAYED RELEASE ORAL
Status: DISCONTINUED | OUTPATIENT
Start: 2017-11-20 | End: 2017-12-06 | Stop reason: HOSPADM

## 2017-11-19 RX ORDER — DOCUSATE SODIUM 100 MG/1
100 CAPSULE, LIQUID FILLED ORAL 2 TIMES DAILY
Status: CANCELLED | OUTPATIENT
Start: 2017-11-19

## 2017-11-19 RX ORDER — FOLIC ACID 1 MG/1
1 TABLET ORAL DAILY
Status: DISCONTINUED | OUTPATIENT
Start: 2017-11-20 | End: 2017-12-06 | Stop reason: HOSPADM

## 2017-11-19 RX ORDER — ONDANSETRON 4 MG/1
4 TABLET, FILM COATED ORAL EVERY 6 HOURS PRN
Status: DISCONTINUED | OUTPATIENT
Start: 2017-11-19 | End: 2017-12-06 | Stop reason: HOSPADM

## 2017-11-19 RX ORDER — DOCUSATE SODIUM 100 MG/1
100 CAPSULE, LIQUID FILLED ORAL 2 TIMES DAILY
Status: DISCONTINUED | OUTPATIENT
Start: 2017-11-19 | End: 2017-12-06 | Stop reason: HOSPADM

## 2017-11-19 RX ORDER — GABAPENTIN 400 MG/1
800 CAPSULE ORAL EVERY 8 HOURS SCHEDULED
Status: DISCONTINUED | OUTPATIENT
Start: 2017-11-19 | End: 2017-12-06 | Stop reason: HOSPADM

## 2017-11-19 RX ORDER — FOLIC ACID 1 MG/1
1 TABLET ORAL DAILY
Status: CANCELLED | OUTPATIENT
Start: 2017-11-19

## 2017-11-19 RX ORDER — ROSUVASTATIN CALCIUM 20 MG/1
20 TABLET, COATED ORAL DAILY
Status: CANCELLED | OUTPATIENT
Start: 2017-11-20

## 2017-11-19 RX ORDER — ATENOLOL 50 MG/1
50 TABLET ORAL
Status: DISCONTINUED | OUTPATIENT
Start: 2017-11-20 | End: 2017-11-21

## 2017-11-19 RX ORDER — ESCITALOPRAM OXALATE 10 MG/1
10 TABLET ORAL DAILY
Status: DISCONTINUED | OUTPATIENT
Start: 2017-11-20 | End: 2017-12-06 | Stop reason: HOSPADM

## 2017-11-19 RX ORDER — CHOLECALCIFEROL (VITAMIN D3) 125 MCG
5 CAPSULE ORAL NIGHTLY PRN
Status: DISCONTINUED | OUTPATIENT
Start: 2017-11-19 | End: 2017-11-22

## 2017-11-19 RX ORDER — BUDESONIDE AND FORMOTEROL FUMARATE DIHYDRATE 160; 4.5 UG/1; UG/1
2 AEROSOL RESPIRATORY (INHALATION)
Status: DISCONTINUED | OUTPATIENT
Start: 2017-11-19 | End: 2017-12-06 | Stop reason: HOSPADM

## 2017-11-19 RX ORDER — ESCITALOPRAM OXALATE 10 MG/1
10 TABLET ORAL DAILY
Status: CANCELLED | OUTPATIENT
Start: 2017-11-20

## 2017-11-19 RX ORDER — CLOPIDOGREL BISULFATE 75 MG/1
75 TABLET ORAL DAILY
Status: DISCONTINUED | OUTPATIENT
Start: 2017-11-20 | End: 2017-12-06 | Stop reason: HOSPADM

## 2017-11-19 RX ORDER — ATENOLOL 50 MG/1
50 TABLET ORAL
Status: CANCELLED | OUTPATIENT
Start: 2017-11-20

## 2017-11-19 RX ORDER — BUDESONIDE AND FORMOTEROL FUMARATE DIHYDRATE 160; 4.5 UG/1; UG/1
2 AEROSOL RESPIRATORY (INHALATION)
Status: CANCELLED | OUTPATIENT
Start: 2017-11-19

## 2017-11-19 RX ORDER — PREDNISONE 20 MG/1
40 TABLET ORAL
Status: DISCONTINUED | OUTPATIENT
Start: 2017-11-20 | End: 2017-11-20

## 2017-11-19 RX ORDER — CLOPIDOGREL BISULFATE 75 MG/1
75 TABLET ORAL DAILY
Status: CANCELLED | OUTPATIENT
Start: 2017-11-20

## 2017-11-19 RX ORDER — GABAPENTIN 400 MG/1
800 CAPSULE ORAL EVERY 8 HOURS SCHEDULED
Status: CANCELLED | OUTPATIENT
Start: 2017-11-19

## 2017-11-19 RX ORDER — ONDANSETRON 2 MG/ML
4 INJECTION INTRAMUSCULAR; INTRAVENOUS EVERY 6 HOURS PRN
Status: CANCELLED | OUTPATIENT
Start: 2017-11-19

## 2017-11-19 RX ORDER — PANTOPRAZOLE SODIUM 40 MG/1
40 TABLET, DELAYED RELEASE ORAL
Status: CANCELLED | OUTPATIENT
Start: 2017-11-20

## 2017-11-19 RX ORDER — CHOLECALCIFEROL (VITAMIN D3) 125 MCG
5 CAPSULE ORAL NIGHTLY PRN
Status: CANCELLED | OUTPATIENT
Start: 2017-11-19

## 2017-11-19 RX ORDER — ACETAMINOPHEN 325 MG/1
650 TABLET ORAL EVERY 4 HOURS PRN
Status: DISCONTINUED | OUTPATIENT
Start: 2017-11-19 | End: 2017-12-06 | Stop reason: HOSPADM

## 2017-11-19 RX ORDER — PREDNISONE 20 MG/1
40 TABLET ORAL DAILY
Status: CANCELLED | OUTPATIENT
Start: 2017-11-20 | End: 2017-11-24

## 2017-11-19 RX ORDER — LEVOTHYROXINE SODIUM 0.12 MG/1
125 TABLET ORAL DAILY
Status: CANCELLED | OUTPATIENT
Start: 2017-11-20

## 2017-11-19 RX ORDER — ROSUVASTATIN CALCIUM 20 MG/1
20 TABLET, COATED ORAL DAILY
Status: DISCONTINUED | OUTPATIENT
Start: 2017-11-20 | End: 2017-12-06 | Stop reason: HOSPADM

## 2017-11-19 RX ADMIN — BUDESONIDE AND FORMOTEROL FUMARATE DIHYDRATE 2 PUFF: 160; 4.5 AEROSOL RESPIRATORY (INHALATION) at 10:05

## 2017-11-19 RX ADMIN — ASPIRIN 325 MG: 325 TABLET ORAL at 10:20

## 2017-11-19 RX ADMIN — DOCUSATE SODIUM 100 MG: 100 CAPSULE, LIQUID FILLED ORAL at 10:21

## 2017-11-19 RX ADMIN — GABAPENTIN 800 MG: 400 CAPSULE ORAL at 05:49

## 2017-11-19 RX ADMIN — LEVOTHYROXINE SODIUM 125 MCG: 125 TABLET ORAL at 05:49

## 2017-11-19 RX ADMIN — DOCUSATE SODIUM 100 MG: 100 CAPSULE, LIQUID FILLED ORAL at 18:01

## 2017-11-19 RX ADMIN — Medication 5 MG: at 21:21

## 2017-11-19 RX ADMIN — ATENOLOL 50 MG: 50 TABLET ORAL at 10:20

## 2017-11-19 RX ADMIN — GABAPENTIN 800 MG: 400 CAPSULE ORAL at 14:55

## 2017-11-19 RX ADMIN — ROSUVASTATIN CALCIUM 20 MG: 20 TABLET, FILM COATED ORAL at 10:21

## 2017-11-19 RX ADMIN — CLOPIDOGREL 75 MG: 75 TABLET, FILM COATED ORAL at 10:21

## 2017-11-19 RX ADMIN — GABAPENTIN 800 MG: 400 CAPSULE ORAL at 21:22

## 2017-11-19 RX ADMIN — ESCITALOPRAM 10 MG: 10 TABLET, FILM COATED ORAL at 10:21

## 2017-11-19 RX ADMIN — PREDNISONE 40 MG: 20 TABLET ORAL at 10:21

## 2017-11-19 RX ADMIN — BUDESONIDE AND FORMOTEROL FUMARATE DIHYDRATE 2 PUFF: 160; 4.5 AEROSOL RESPIRATORY (INHALATION) at 20:05

## 2017-11-19 RX ADMIN — PANTOPRAZOLE SODIUM 40 MG: 40 TABLET, DELAYED RELEASE ORAL at 05:49

## 2017-11-19 NOTE — DISCHARGE SUMMARY
Date of Admission: 11/15/2017  Date of Discharge:  11/19/2017    Discharge Diagnosis:    Acute CVA, status post thrombectomy 11/15  History previous stroke  Cough  Wheezing likely COPD exacerbation  Relevant Medical Diagnoses  Ex-smoker  COPD, patient states unaware    Presenting Problem/History of Present Illness    77-year-old female who presented with right-sided weakness and speech difficulty.  She has had a previous stroke 4 years ago.  There was no residual damage from the prior stroke.  She is currently on Plavix at home.  After acute change in mental status with new focal findings she was brought to the ED.  Here she was evaluated by the neuro interventional team.  Patient received TPA.  She continued to have worsening of her neuro status.  She was therefore taken to the OR for a possible embolectomy.     She has prior history of meningioma removal about 35 years ago.  Her prior history also includes COPD and hypothyroidism.  She is a former smoker and quit over 15 years ago. She smoked 1ppd since age 14.    Hospital Course    77-year-old female who presented to the hospital with speech difficulty and right-sided weakness.  She has had a previous stroke 4 years ago with no residual damage.  She is on Plavix at home.  She slumped to the ground with this episode.  In the ED she was thought to have some wheezing also.  She was checked by neuro interventional team.  Patient received TPA.  She continued to have some worsening of her neuro symptoms and therefore was taken to the OR for embolectomy.  She was brought back to the intensive care unit where I took over her care.  She has a prior history of meningioma removal about 35 years ago.  She has COPD and hypothyroidism.  She is a ex-smoker and quit 15 years ago.  In addition to his current symptoms she reports a cough which is quite troublesome.  This has been going on for several weeks.  Her P2 white 12 shows that patient would not respond to Plavix alone.   She will therefore require aspirin and Plavix.  Patient was seen by speech.  She was cleared for thin liquids with regular consistency with no makes foods.  As her cough continued be added some nebulizer treatments.  Cardiology was consulted for JEFE.  This was unremarkable.  They signed off the case.  However they didn't recommend AZO patch on discharge with 4 week follow-up in the office.  Repeat swallow evaluation showed functional swallow.  She was wheezing on 11/17/17 and prednisone was added.  She was initially got some Solu-Medrol.  This did seem to result in improvement of the cough.  The next day she reported perceiving some wheezing on her own.  However her cough is improved.  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  However she continues to have some gait imbalance and for this she will need rehabilitation.  As she was having the tremors with the albuterol I switched her to Symbicort inhaler he did she remains on prednisone which will be discontinued in 4-5 days.  She would benefit from an outpatient evaluation with pulmonology.    Procedures Performed:    Procedure(s):  Embolectomy Mechanical    Consults:    Consults     Date and Time Order Name Status Description    11/16/2017 1543 Inpatient Consult to Cardiology Completed     11/15/2017 1343 Inpatient Consult to Neurology (on call physician/group) Completed     11/15/2017 1343 Consult Interventional Neurologist and/or Stroke Team Completed           Pertinent Test Results:      Results for orders placed during the hospital encounter of 11/15/17   Adult Transesophageal Echo (JEFE) W/ Cont if Necessary Per Protocol    Narrative · No evidence of a left atrial thrombus present. No evidence of a left   atrial appendage thrombus was present.  · Left atrial cavity size is mildly dilated.  · Lipomatous hypertrophy of the interatrial septum present. Saline test    results are negative. No evidence of interatrial shunt noted with   marginally adequate agitated saline contrast study.  · Left ventricular systolic function is normal. Estimated EF appears to be   in the range of 56 - 60%. Normal left ventricular cavity size noted.     A two-dimensional transesophageal echocardiogram was performed.  Complete   color flow velocity mapping and Doppler interrogation was performed.           Results from last 7 days  Lab Units 11/15/17  1335   TROPONIN T ng/mL <0.010               Results from last 7 days  Lab Units 11/19/17  0525 11/17/17  0608 11/16/17  0426 11/15/17  1335   WBC 10*3/mm3 7.16 5.94 6.95 7.67   HEMOGLOBIN g/dL 10.8* 11.0* 10.2* 13.9   HEMATOCRIT % 34.0* 34.3* 31.3* 41.8   PLATELETS 10*3/mm3 199 190 202 263       Microbiology Results (last 10 days)     Procedure Component Value - Date/Time    Respiratory Panel, PCR - Swab, Nasopharynx [[id number]]  (Normal) Collected:  11/15/17 1909    Lab Status:  Final result Specimen:  Swab from Nasopharynx Updated:  11/15/17 2148     ADENOVIRUS, PCR Not Detected     Coronavirus 229E Not Detected     Coronavirus HKU1 Not Detected     Coronavirus NL63 Not Detected     Coronavirus OC43 Not Detected     Human Metapneumovirus Not Detected     Human Rhinovirus/Enterovirus Not Detected     Influenza B PCR Not Detected     Parainfluenza Virus 1 Not Detected     Parainfluenza Virus 2 Not Detected     Parainfluenza Virus 3 Not Detected     Parainfluenza Virus 4 Not Detected     Bordetella pertussis pcr Not Detected     Influenza 2009 H1N1 by PCR Not Detected     Chlamydophila pneumoniae PCR Not Detected     Mycoplasma pneumo by PCR Not Detected     Influenza A PCR Not Detected     Influenza A H3 Not Detected     Influenza A H1 Not Detected     RSV, PCR Not Detected            Results from last 7 days  Lab Units 11/19/17  0525 11/17/17  0608 11/16/17  0708 11/15/17  1335   SODIUM mmol/L 142 146* 142 138   POTASSIUM mmol/L 3.8 3.6 3.6 4.4    CHLORIDE mmol/L 105 108* 108* 98   CO2 mmol/L 25.3 25.3 21.8* 24.8   BUN mg/dL 15 10 9 19   CREATININE mg/dL 0.66 0.71 0.54* 0.85       Results from last 7 days  Lab Units 11/16/17  0708 11/15/17  1335   BILIRUBIN mg/dL 0.5 0.5   ALK PHOS U/L 65 82   ALT (SGPT) U/L 13 28   AST (SGOT) U/L 30 33*       Pertinent Imaging Results:    Imaging Results (all)     Procedure Component Value Units Date/Time    XR Chest 1 View [[id number]] Collected:  11/15/17 1726     Updated:  11/15/17 1734    Narrative:       PORTABLE CHEST 11/15/2017 AT 1640 HOURS     CLINICAL HISTORY: Acute stroke. Rule out aspiration. Status post  [facility] left MCA thrombectomy.     The lungs are fairly well-expanded and appear free of focal infiltrates.  There are no pleural effusions. The heart is mildly prominent. The  pulmonary vasculature is within normal limits. 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     IMPRESSIONS: No evidence of acute disease within the chest.     This report was finalized on 11/15/2017 5:31 PM by Dr. Gage Ji MD.       CT Angiogram Neck With & Without Contrast [[id number]] Collected:  11/15/17 1602     Updated:  11/16/17 1018    Narrative:       CT ANGIOGRAM OF THE HEAD AND NECK AND CT PERFUSION STUDY     CLINICAL HISTORY: Aphasic. History of meningioma resection.     TECHNIQUE: CT scan of the head was performed with 3 mm axial images  before and after the administration of IV contrast. A CT angiogram of  the head and neck was performed with 1 mm axial images following the  administration of IV contrast. Sagittal, coronal, and 3-dimensional  reconstructed images were obtained. Finally, a CT perfusion study was  performed after the dynamic bolus of IV contrast. Standard perfusion  maps were constructed.     COMPARISON:  Outside MRI of the brain dated 09/09/2009.     FINDINGS:  PRE AND POSTCONTRAST HEAD CT: The patient is status post a right  parietal craniotomy. Apparently, this was performed for the purpose  of  resection of a meningioma. There is a focus of encephalomalacia within  the lateral aspect of the right occipital lobe measuring up to  approximately 1.8 x 2.0 cm in greatest axial dimensions that is  compatible with a chronic infarct within the right MCA distribution.  This area of encephalomalacia is new when compared to the previous exam  of 09/09/2009. A focus of encephalomalacia is also identified within the  medial portion of the right parietal lobe and this measures up to 3.6 x  3.1 cm in greatest axial dimensions. There are some areas of extra-axial  nodular dural-based density that most likely represent foci of  meningioma. Posterior to the right parietal lobe, a focus is seen  measuring up to approximately 7 mm in diameter. Other areas are noted  along either side of the inferior aspect of the superior sagittal sinus  measuring up to approximately 1.6 cm as a conglomerate. There may be  invasion of the superior sagittal sinus. Similar findings were noted on  the outside MRI of the brain dated 09/09/2009. The extent of these  meningiomas could be more accurately assessed on MR imaging as  clinically indicated.     There is no evidence to suggest an area of acute completed infarction on  the noncontrast head CT.     CT PERFUSION STUDY: The CT perfusion study demonstrates no CBF deficit  to suggest an area of completed infarction. However, there is a sizable  area of perfusion abnormality within the lateral aspect of the left  temporal and parietal lobes within the left MCA distribution. This  volume is estimated to be 52 mL and the mismatch volume is consequently  52 mL as well.      CTA NECK: There is a classic configuration of the aortic arch. There is  a moderate degree of stenosis involving the proximal aspect of the left  subclavian artery. This is seen just proximal to the vertebral artery  origin. There is a mild to moderate degree of stenosis involving the  left vertebral artery origin. A mild  degree of stenosis is seen at the  origin of the left common carotid artery. By NASCET criteria, there is  an approximate 45% stenosis within the proximal portion of the left  internal carotid artery. Within the proximal portion of the right  internal carotid, there is an approximate 60% stenosis. A mild to  moderate stenosis is seen at the origin of the right vertebral artery.     CTA HEAD: The left M1 segment is unremarkable although there is complete  occlusion of a left MCA division. The vertebral arteries, basilar  artery, and posterior cerebrals are within normal limits. The petrous,  cavernous, and supraclinoid segments of the internal carotids are  essentially unremarkable. There is mild atherosclerotic phenomena  appreciated within the cavernous segments bilaterally. The right middle  cerebral artery and both anterior cerebrals are within normal limits.       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 No area of completed acute infarction is seen on  either the CT perfusion study or the noncontrast head CT. As such, there  is a sizable mismatch within this area of perfusion abnormality and this  is estimated to be 52 mL by rapid software reconstruction.     On the CT angiogram, there is complete occlusion of a left MCA division.     The right internal carotid artery is remarkable for an approximate 60%  stenosis by NASCET criteria and the proximal portion of the left  internal carotid artery is remarkable for a 45% NASCET stenosis.     Mild-to-moderate stenoses are seen within the origins of the vertebral  arteries bilaterally.     The patient is status post a right parieto-occipital craniotomy for the  purpose of resection of the meningioma. There are findings compatible  with meningioma along  the posterior aspect of the right parietal lobe  and along either side of the posterior and inferior portion of the  superior sagittal sinus. There may be invasion of the superior sagittal  sinus. Similar findings were noted on an outside MRI of the brain dated  09/09/2009. This could be more accurately assessed on MR imaging if  clinically indicated.     There is a focus of encephalomalacia within the medial portion of the  right parietal lobe and this is unchanged when compared to the prior  outside MRI study. However, there is a new focus of encephalomalacia  within the lateral aspect of the right occipital lobe. This new area of  encephalomalacia is seen within the right MCA distribution.     The findings of the noncontrast head CT were discussed with Dr. Figueroa on 11/15/2017 at approximately 1:48 PM. The findings of the  CT angiogram and CT perfusion study were discussed with Dr. Figueroa  at approximately 2:30 PM.     Radiation dose reduction techniques were utilized, including automated  exposure control and exposure modulation based on body size.     This report was finalized on 11/16/2017 10:15 AM by Dr. Juan Bishop MD.       CT Cerebral Perfusion With & Without Contrast [[id number]] Collected:  11/15/17 1602     Updated:  11/16/17 1018    Narrative:       CT ANGIOGRAM OF THE HEAD AND NECK AND CT PERFUSION STUDY     CLINICAL HISTORY: Aphasic. History of meningioma resection.     TECHNIQUE: CT scan of the head was performed with 3 mm axial images  before and after the administration of IV contrast. A CT angiogram of  the head and neck was performed with 1 mm axial images following the  administration of IV contrast. Sagittal, coronal, and 3-dimensional  reconstructed images were obtained. Finally, a CT perfusion study was  performed after the dynamic bolus of IV contrast. Standard perfusion  maps were constructed.     COMPARISON:  Outside MRI of the brain dated 09/09/2009.     FINDINGS:  PRE AND  POSTCONTRAST HEAD CT: The patient is status post a right  parietal craniotomy. Apparently, this was performed for the purpose of  resection of a meningioma. There is a focus of encephalomalacia within  the lateral aspect of the right occipital lobe measuring up to  approximately 1.8 x 2.0 cm in greatest axial dimensions that is  compatible with a chronic infarct within the right MCA distribution.  This area of encephalomalacia is new when compared to the previous exam  of 09/09/2009. A focus of encephalomalacia is also identified within the  medial portion of the right parietal lobe and this measures up to 3.6 x  3.1 cm in greatest axial dimensions. There are some areas of extra-axial  nodular dural-based density that most likely represent foci of  meningioma. Posterior to the right parietal lobe, a focus is seen  measuring up to approximately 7 mm in diameter. Other areas are noted  along either side of the inferior aspect of the superior sagittal sinus  measuring up to approximately 1.6 cm as a conglomerate. There may be  invasion of the superior sagittal sinus. Similar findings were noted on  the outside MRI of the brain dated 09/09/2009. The extent of these  meningiomas could be more accurately assessed on MR imaging as  clinically indicated.     There is no evidence to suggest an area of acute completed infarction on  the noncontrast head CT.     CT PERFUSION STUDY: The CT perfusion study demonstrates no CBF deficit  to suggest an area of completed infarction. However, there is a sizable  area of perfusion abnormality within the lateral aspect of the left  temporal and parietal lobes within the left MCA distribution. This  volume is estimated to be 52 mL and the mismatch volume is consequently  52 mL as well.      CTA NECK: There is a classic configuration of the aortic arch. There is  a moderate degree of stenosis involving the proximal aspect of the left  subclavian artery. This is seen just proximal to the  vertebral artery  origin. There is a mild to moderate degree of stenosis involving the  left vertebral artery origin. A mild degree of stenosis is seen at the  origin of the left common carotid artery. By NASCET criteria, there is  an approximate 45% stenosis within the proximal portion of the left  internal carotid artery. Within the proximal portion of the right  internal carotid, there is an approximate 60% stenosis. A mild to  moderate stenosis is seen at the origin of the right vertebral artery.     CTA HEAD: The left M1 segment is unremarkable although there is complete  occlusion of a left MCA division. The vertebral arteries, basilar  artery, and posterior cerebrals are within normal limits. The petrous,  cavernous, and supraclinoid segments of the internal carotids are  essentially unremarkable. There is mild atherosclerotic phenomena  appreciated within the cavernous segments bilaterally. The right middle  cerebral artery and both anterior cerebrals are within normal limits.       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 No area of completed acute infarction is seen on  either the CT perfusion study or the noncontrast head CT. As such, there  is a sizable mismatch within this area of perfusion abnormality and this  is estimated to be 52 mL by rapid software reconstruction.     On the CT angiogram, there is complete occlusion of a left MCA division.     The right internal carotid artery is remarkable for an approximate 60%  stenosis by NASCET criteria and the proximal portion of the left  internal carotid artery is remarkable for a 45% NASCET stenosis.     Mild-to-moderate stenoses are seen within the origins of the vertebral  arteries bilaterally.     The patient is status post a right  parieto-occipital craniotomy for the  purpose of resection of the meningioma. There are findings compatible  with meningioma along the posterior aspect of the right parietal lobe  and along either side of the posterior and inferior portion of the  superior sagittal sinus. There may be invasion of the superior sagittal  sinus. Similar findings were noted on an outside MRI of the brain dated  09/09/2009. This could be more accurately assessed on MR imaging if  clinically indicated.     There is a focus of encephalomalacia within the medial portion of the  right parietal lobe and this is unchanged when compared to the prior  outside MRI study. However, there is a new focus of encephalomalacia  within the lateral aspect of the right occipital lobe. This new area of  encephalomalacia is seen within the right MCA distribution.     The findings of the noncontrast head CT were discussed with Dr. Figueroa on 11/15/2017 at approximately 1:48 PM. The findings of the  CT angiogram and CT perfusion study were discussed with Dr. Figueroa  at approximately 2:30 PM.     Radiation dose reduction techniques were utilized, including automated  exposure control and exposure modulation based on body size.     This report was finalized on 11/16/2017 10:15 AM by Dr. Juan Bishop MD.       CT Angiogram Head With & Without Contrast [[id number]] Collected:  11/15/17 1602     Updated:  11/16/17 1018    Narrative:       CT ANGIOGRAM OF THE HEAD AND NECK AND CT PERFUSION STUDY     CLINICAL HISTORY: Aphasic. History of meningioma resection.     TECHNIQUE: CT scan of the head was performed with 3 mm axial images  before and after the administration of IV contrast. A CT angiogram of  the head and neck was performed with 1 mm axial images following the  administration of IV contrast. Sagittal, coronal, and 3-dimensional  reconstructed images were obtained. Finally, a CT perfusion study was  performed after the dynamic bolus of IV contrast.  Standard perfusion  maps were constructed.     COMPARISON:  Outside MRI of the brain dated 09/09/2009.     FINDINGS:  PRE AND POSTCONTRAST HEAD CT: The patient is status post a right  parietal craniotomy. Apparently, this was performed for the purpose of  resection of a meningioma. There is a focus of encephalomalacia within  the lateral aspect of the right occipital lobe measuring up to  approximately 1.8 x 2.0 cm in greatest axial dimensions that is  compatible with a chronic infarct within the right MCA distribution.  This area of encephalomalacia is new when compared to the previous exam  of 09/09/2009. A focus of encephalomalacia is also identified within the  medial portion of the right parietal lobe and this measures up to 3.6 x  3.1 cm in greatest axial dimensions. There are some areas of extra-axial  nodular dural-based density that most likely represent foci of  meningioma. Posterior to the right parietal lobe, a focus is seen  measuring up to approximately 7 mm in diameter. Other areas are noted  along either side of the inferior aspect of the superior sagittal sinus  measuring up to approximately 1.6 cm as a conglomerate. There may be  invasion of the superior sagittal sinus. Similar findings were noted on  the outside MRI of the brain dated 09/09/2009. The extent of these  meningiomas could be more accurately assessed on MR imaging as  clinically indicated.     There is no evidence to suggest an area of acute completed infarction on  the noncontrast head CT.     CT PERFUSION STUDY: The CT perfusion study demonstrates no CBF deficit  to suggest an area of completed infarction. However, there is a sizable  area of perfusion abnormality within the lateral aspect of the left  temporal and parietal lobes within the left MCA distribution. This  volume is estimated to be 52 mL and the mismatch volume is consequently  52 mL as well.      CTA NECK: There is a classic configuration of the aortic arch. There is  a  moderate degree of stenosis involving the proximal aspect of the left  subclavian artery. This is seen just proximal to the vertebral artery  origin. There is a mild to moderate degree of stenosis involving the  left vertebral artery origin. A mild degree of stenosis is seen at the  origin of the left common carotid artery. By NASCET criteria, there is  an approximate 45% stenosis within the proximal portion of the left  internal carotid artery. Within the proximal portion of the right  internal carotid, there is an approximate 60% stenosis. A mild to  moderate stenosis is seen at the origin of the right vertebral artery.     CTA HEAD: The left M1 segment is unremarkable although there is complete  occlusion of a left MCA division. The vertebral arteries, basilar  artery, and posterior cerebrals are within normal limits. The petrous,  cavernous, and supraclinoid segments of the internal carotids are  essentially unremarkable. There is mild atherosclerotic phenomena  appreciated within the cavernous segments bilaterally. The right middle  cerebral artery and both anterior cerebrals are within normal limits.       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 No area of completed acute infarction is seen on  either the CT perfusion study or the noncontrast head CT. As such, there  is a sizable mismatch within this area of perfusion abnormality and this  is estimated to be 52 mL by rapid software reconstruction.     On the CT angiogram, there is complete occlusion of a left MCA division.     The right internal carotid artery is remarkable for an approximate 60%  stenosis by NASCET criteria and the proximal portion of the left  internal carotid artery is remarkable for a 45% NASCET stenosis.     Mild-to-moderate  stenoses are seen within the origins of the vertebral  arteries bilaterally.     The patient is status post a right parieto-occipital craniotomy for the  purpose of resection of the meningioma. There are findings compatible  with meningioma along the posterior aspect of the right parietal lobe  and along either side of the posterior and inferior portion of the  superior sagittal sinus. There may be invasion of the superior sagittal  sinus. Similar findings were noted on an outside MRI of the brain dated  09/09/2009. This could be more accurately assessed on MR imaging if  clinically indicated.     There is a focus of encephalomalacia within the medial portion of the  right parietal lobe and this is unchanged when compared to the prior  outside MRI study. However, there is a new focus of encephalomalacia  within the lateral aspect of the right occipital lobe. This new area of  encephalomalacia is seen within the right MCA distribution.     The findings of the noncontrast head CT were discussed with Dr. Figueroa on 11/15/2017 at approximately 1:48 PM. The findings of the  CT angiogram and CT perfusion study were discussed with Dr. Figueroa  at approximately 2:30 PM.     Radiation dose reduction techniques were utilized, including automated  exposure control and exposure modulation based on body size.     This report was finalized on 11/16/2017 10:15 AM by Dr. Juan Bishop MD.       CT Head Without Contrast [[id number]] Collected:  11/16/17 1555     Updated:  11/16/17 1622    Narrative:       CT SCAN OF THE BRAIN WITHOUT CONTRAST AT 2:51 PM     HISTORY: Aphasia. Previous meningioma resection, has recent CT perfusion  evaluation demonstrating acute left MCA infarct. 24 hour post-TPA  treatment.     The CT scan was performed through the brain without contrast and  compared to yesterday's CT scan. There is mild to moderate diffuse  atrophy and chronic small vessel ischemic change as well as postsurgical  changes on  the right described in detail on yesterday's exam. The  patient has history of acute left MCA infarct and there is no evidence  of change in the gray-white matter interface or evidence of hemorrhage  or mass effect on the current exam. It remains unchanged from  yesterday's exam.     Radiation dose reduction techniques were utilized, including automated  exposure control and exposure modulation based on body size.     This report was finalized on 11/16/2017 4:19 PM by Dr. Willis Curtis MD.       XR Humerus Right [[id number]] Collected:  11/16/17 0439     Updated:  11/17/17 0016    Narrative:       X-RAY RIGHT HUMERUS.     HISTORY: Bruising to the upper arm and shoulder.     COMPARISON: No prior studies for comparison.     FINDINGS:  Patient is status post shoulder joint replacement, hardware is intact.  No fracture or dislocation.     Soft tissue swelling of the upper arm.       Impression:       No acute fracture or dislocation.         This report was finalized on 11/17/2017 12:13 AM by Dr. Arlene Jacobsen MD.       FL Video Swallow [[id number]] Collected:  11/17/17 1531     Updated:  11/17/17 1535    Narrative:       VIDEO SWALLOW STUDY     HISTORY: Dysphagia.     2 minutes 39 seconds fluoroscopy was provided for the speech pathologist  during a video swallow study.      No aspiration or laryngeal penetration was observed.       Impression:       Fluoroscopy was provided for the speech pathologist during a  video swallow study. For full details please see the speech pathology  report     This report was finalized on 11/17/2017 3:32 PM by Dr. Russel Mendez MD.       CT Head Without Contrast [[id number]] Collected:  11/18/17 0721     Updated:  11/18/17 0753    Narrative:       EMERGENCY NONCONTRAST HEAD CT SCAN.     HISTORY: Female who is 77 years-old, with a history of eighth dysphasia,  previous meningioma resection with recent acute left MCA infarction, 72  hours post-TPA treatment.     COMPARISON:  11/16/2017.     Radiation dose reduction techniques were utilized, including automated  exposure control and exposure modulation based on body size.     FINDINGS:   1. Stable postop change.  2. No change in patchy bilateral areas of encephalomalacia.  3. No acute hemorrhage mass or mass effect..  4. No other evidence of a complication.     This report was finalized on 11/18/2017 7:50 AM by Dr. Calin Sultana MD.       Arteriogram ("[employer]cribe) [[id number]] Collected:  11/15/17 1618     Updated:  11/18/17 1733    Narrative:       PROCEDURE: ARTERIOGRAM ([employer])-     CLINICAL HISTORY: 77-year-old woman had a witnessed onset of left MCA  syndrome at 12:30 PM. She received IV TPA but by noninvasive imaging she  has a large ischemic penumbra and a left MCA occlusion     SURGEON: Rachid Figueroa MD     ASSISTANT: None     ANESTHESIA: Mac, 10 cc 2% lidocaine     ACCESS: 8 Scottish short sheath right common femoral artery     CONTRAST: 119 cc VISI     FLUOROSCOPY TIME: 22.5 minutes     COMPLICATIONS: None     FINDINGS:     1. There is a severely tortuous left common carotid artery. There is a  calcified and mildly irregular left internal carotid artery bulb. The  left external carotid artery and its branches are angiographically  normal other than the superficial temporal artery branches which are  irregular with some areas of dilatation and narrowing. The cervical ICA  is moderately tortuous proximally. There is a a mildly irregular  intracranial internal carotid artery. There is a normal ophthalmic  artery and a large posterior communicating artery, which supplies  competitive flow to the left posterior cerebral artery. The internal  carotid artery divides into the left MCA and ARIS. The ARIS is normal. The  MCA trunk is normal. There is a small superior division of the MCA which  is patent but a large inferior division which is flush occluded at its  origin. TICI flow is 0. There are  mild pial collaterals from the ARIS  to  the posterior MCA territory. There is a normal anterior communicating  artery. There is a very poor capillary phase of the posterior two thirds  of the MCA but a normal capillary phase in the ARIS territory and there  is normal venous drainage. There are no aneurysms, AVMs, AV fistula, or  other abnormalities noted.     2. After deployment of the Solitaire device there is antegrade flow in  the inferior division with what appears to be protrusion of thrombus  through the stent tines. After withdrawal of the device there is  complete restoration of flow and recanalization of the middle cerebral  artery with TICI III flow. The MCA is normal with normal filling. Flow  within the anterior cerebral artery remains normal. There is no evidence  of extravasation or abnormal blush. The cervical ICA is normal with no  evidence of injury by the balloon guide. The superficial temporal artery  remains abnormal.     3. The right common carotid artery has diffuse irregularity distally.  There is a calcified concentric stenosis extending into the ICA bulb  causing approximately 30% luminal narrowing. There are 2 tandem kinks in  the very tortuous cervical right ICA. The intracranial ICA is normal and  gives off a normal ophthalmic artery. The ICA divides into the MCA and  ARIS both of which are angiographically normal. The normal capillary  phase. There are no aneurysms, AVMs or AV fistulas. The external carotid  artery is mildly irregular. The superficial temporal artery is not well  visualized and appears to be very small in caliber. The middle meningeal  artery is also not well visualized due to patient motion but appears  otherwise normal.     4. The right common femoral artery is angiographically normal. The  arteriotomy is below the inguinal ligament and above the bifurcation.     PROCEDURE:  Written informed consent was obtained from the patient's family since  the patient was unable to give informed consent since she had  global  aphasia. She was then emergently brought down to the vascular laboratory  and was prepped and draped in the usual sterile fashion. Using the  modified Seldinger technique an 8French short sheath was inserted into  the right common femoral artery at 1448 hours. Then a FlowGate 8F  balloon guide catheter was advanced over a wire into the aorta and with  difficulty and multiple attempts its introducer was used to cannulate  the left common carotid artery. Left common carotid artery angiography  was then performed in multiple orthogonal planes. Using roadmapping  technique the catheter was advanced into the left internal carotid  artery and selective left internal carotid artery angiography was  performed in multiple orthogonal planes with intracranial views. The  decision to intervene was made at that time. Then using roadmapping  technique the guide-catheter was advanced over its introducer into the  proximal cervical internal carotid artery at 1510 hours. It could not be  advanced beyond the bulb due to severe tortuosity. The wire and  introducer were removed and flow was checked and the guide catheter had  fallen into the common carotid artery. Then using roadmapping a Xerosman  microcatheter was advanced over a Synchro wire into the left middle  cerebral artery. This required quite a bit of time due to the severe  tortuosity. The Synchro wire was removed and then a 3mm mindframe device  was advanced into the microcatheter and slowly unsheathed with one-half  of the device distal to the thrombus and the other half within the  thrombus at 1523 hours. Control angiography was then performed. The  device was left deployed for 4 minutes after which the balloon on the  guide catheter was inflated and occlusion of antegrade flow was  confirmed. Then the mindframe device was withdrawn into the  guide-catheter and removed from the body while suction was applied  through the guide catheter at 1528 hours. The device  was inspected and a  long thrombus was seen ensnared within it. Then the guide catheter was  flushed and and the balloon was deflated and this was initially  unsuccessful. Over a period of approximately 6 minutes with the use of a  20 cc syringe as well as withdrawing the guide catheter approximately 1  cm proximally the balloon finally deflated. Control angiography was  performed in multiple orthogonal planes at 1532 hours confirming  recanalization. Then final left internal carotid artery angiography was  performed in multiple orthogonal planes with intracranial as well as  cervical views. Then the introducer was reinserted and the guide  catheter and used to cannulate the left external carotid artery.  Selective left external carotid artery angiography was then performed in  multiple orthogonal planes. The catheter was then withdrawn into the  aorta and used to cannulate the innominate artery. Using roadmapping  technique was advanced over wire into the right common carotid artery.  Selective right common carotid artery angiography was then performed in  multiple orthogonal planes with cervical and intracranial views. Then  the catheter was removed and right common femoral artery angiography was  then performed. Hemostasis was achieved with an 7French Mynx with  excellent results. By the end of the procedure the patient had mild  neurological recovery. The embolus was sent for pathological analysis.        Impression:       The patient is status post successful mechanical embolectomy of a left  middle cerebral artery thrombus. The patient will need to be transferred  to the neurological intensive care unit and monitored closely. The  results of this angiogram were discussed with the patient's family.     This report was finalized on 11/18/2017 5:29 PM by Dr. Rachid Figueroa MD.             Condition on Discharge:  Good    Vital Signs past 24hrs  BP range: BP: (138-167)/(66-84) 138/66  Pulse range: Heart Rate:   [58-69] 58  Resp rate range: Resp:  [18-20] 20  Temp range: Temp (24hrs), Av.4 °F (36.9 °C), Min:98 °F (36.7 °C), Max:98.8 °F (37.1 °C)    O2 Device: room airFlow (L/min):  [2] 2  Oxygen range:SpO2:  [90 %-98 %] 90 %   173 lb (78.5 kg); Body mass index is 28.79 kg/(m^2).  No intake or output data in the 24 hours ending 17 1440    Constitutional: She appears well-developed.   Eyes: Conjunctivae are normal.   Neck: Normal range of motion. Neck supple.   Cardiovascular: Normal rate and regular rhythm.    No murmur heard.  Pulmonary/Chest: Effort normal. No respiratory distress. She has no wheezes. She has no rales.   Abdominal: Soft. Bowel sounds are normal. She exhibits no mass. There is no tenderness.   Musculoskeletal: She exhibits no edema.   Right upper extremity bruising   Neurological: She is alert.   Gait not assessed   Skin: Skin is warm. No rash noted.        Discharge Disposition  Rehab    Discharge Medications     Your medication list      ASK your doctor about these medications       Instructions Last Dose Given Next Dose Due    atenolol 100 MG tablet   Commonly known as:  TENORMIN        TAKE ONE TABLET BY MOUTH DAILY         azithromycin 250 MG tablet   Commonly known as:  ZITHROMAX Z-JUWAN        Take 2 tablets the first day, then 1 tablet daily for 4 days.         B COMPLEX-C-FOLIC ACID PO              calcium-vitamin D 500-200 MG-UNIT per tablet   Commonly known as:  OSCAL-500              clopidogrel 75 MG tablet   Commonly known as:  PLAVIX              diphenhydrAMINE 12.5 MG/5ML elixir   Commonly known as:  BENADRYL              escitalopram 20 MG tablet   Commonly known as:  LEXAPRO        TAKE ONE TABLET BY MOUTH DAILY         folic acid 1 MG tablet   Commonly known as:  FOLVITE              furosemide 20 MG tablet   Commonly known as:  LASIX        TAKE ONE TABLET BY MOUTH TWICE A DAY         gabapentin 800 MG tablet   Commonly known as:  NEURONTIN        Take 1 tablet by mouth 3  (Three) Times a Day.         letrozole 2.5 MG tablet   Commonly known as:  FEMARA              levothyroxine 100 MCG tablet   Commonly known as:  SYNTHROID, LEVOTHROID              levothyroxine 125 MCG tablet   Commonly known as:  SYNTHROID, LEVOTHROID        Take 1 tablet by mouth Daily.         pantoprazole 40 MG EC tablet   Commonly known as:  PROTONIX        TAKE ONE TABLET BY MOUTH DAILY         PROBIOTIC DAILY PO              promethazine-dextromethorphan 6.25-15 MG/5ML syrup   Commonly known as:  PROMETHAZINE-DM        Take 5 mL by mouth 4 (Four) Times a Day As Needed for Cough.         rosuvastatin 20 MG tablet   Commonly known as:  CRESTOR        TAKE ONE TABLET BY MOUTH DAILY         Triamcinolone Acetonide 55 MCG/ACT nasal inhaler   Commonly known as:  NASACORT              MULTIVITAMIN ADULT PO              VISION-BAR PRESERVE PO              VITAMIN B 12 PO              vitamin D3 5000 units capsule capsule                    Discharge Diet:  Diet Order   Procedures   • Diet Regular       Activity at Discharge:     Per Rehab    Follow-up Appointments  Follow-up Information     Follow up with Rachid Figueroa MD. Schedule an appointment as soon as possible for a visit in 3 month(s).    Specialties:  Neurology, Radiology    Contact information:    [address] [facility] [address]  [place] [address]  [phone]          Follow up with Carmen Reed MD. Schedule an appointment as soon as possible for a visit in 1 month(s).    Specialty:  Cardiology    Contact information:    [address] [facility]  [place] [address]  [phone]          Follow up with CELY Elias. Schedule an appointment as soon as possible for a visit in 6 week(s).    Specialties:  Family Medicine, Sleep Medicine    Why:  NP for Dr. Eran BECERRIL's on arrival    Contact information:    [address] [facility] [address]  [place] [address]  [phone]          Follow up with Goran Carr MD. Schedule an appointment as soon  as possible for a visit in 1 month(s).    Specialty:  Family Medicine    Contact information:    [address]  [facility] [id number] [phone]                   Timothy Barillas MD  11/19/17  2:40 PM    Time: I spent over 30mins in the discharge planning of this patient.    Some of this encounter note is an electronic transcription/translation of spoken language to printed text.    Orders Placed during this admission:    Orders Placed This Encounter   Procedures   • Critical Care   • Respiratory Panel, PCR - Swab, Nasopharynx   • XR Chest 1 View   • CT Angiogram Neck With & Without Contrast   • CT Cerebral Perfusion With & Without Contrast   • CT Angiogram Head With & Without Contrast   • Arteriogram ([employer])   • CT Head Without Contrast   • XR Humerus Right   • FL Video Swallow   • CT Head Without Contrast   • Pavillion Draw   • Pavillion Draw   • Comprehensive Metabolic Panel   • Protime-INR   • aPTT   • Troponin   • CBC Auto Differential   • CBC (No Diff)   • Comprehensive Metabolic Panel   • Hemoglobin A1c   • Lipid Panel   • aPTT   • Protime-INR   • TSH   • P2Y12 Platelet Inhibition   • Basic Metabolic Panel   • CBC (No Diff)   • Basic Metabolic Panel   • CBC (No Diff)   • Diet Regular   • Initiate Department's Acute Stroke Process (Team D, Code 19, etc)   • Measure Weight   • Head of bed 30 degrees or less   • No Hypotonic Fluids   • Nursing swallow assessment   • Follow department guidlines to notify pharmacy that tPA will be administered.   • Ensure an NIH Stroke Scale has been performed prior to tPA administration.   • Neuro Checks per Post-tPA Flowsheet   • FOR ANY ACUTE NEUROLOGICAL CHANGE AT ANY TIME during or following tPA administration (new/worsening headache, acute hypertension, nausea and/or vomiting) suspect intracranial hemorrhage and notify stroke MD STAT.  Stop tPA if still infusing and document amount to be wasted.  Prepare for a stat non-contrast CT of head and refer to facility process for  management of intracranial hemorrhage.   • Maintain blood pressure as follows:   • AVOID indwelling urethral catheterization during IV thrombolytic infusion and for at least 24 hours after infusion   • Place Sequential Compression Device   • Maintain Sequential Compression Device   • Intake and Output   • NIHSS Assessment   • Order CT Head Without Contrast for Neurological Decline   • Place Compression Stockings (TEDs)   • Remove & Replace Compression Stockings (TEDS) Daily   • Provide Stroke Education Material   • Nursing Dysphagia Screening   • Notify physician of changes in level of consciousness, worsening of stroke symptoms, acute headache or severe nausea and vomiting or any of the following vital sign parameters:   • Continuous Pulse Oximetry   • DO NOT STICK PATIENT <24 HOURS POST ALTEPLASE ADMINISTRATION   • Saline Lock & Maintain IV Access   • Cardiac Monitoring   • Neuro checks   • Vital signs   • Clinical [place] [facility] Withdrawal Assessment   • Obtain Informed Consent   • Full Code   • Consult Interventional Neurologist and/or Stroke Team   • Inpatient Consult to Neurology (on call physician/group)   • Inpatient consult to Neuro Clinical Specialist   • Inpatient Consult to Rehab Admission   • Consult to Case Management    • Inpatient Consult to Nutrition   • Inpatient Consult to Cardiology   • OT Consult: Eval & Treat for stroke   • PT Consult: Eval & Treat   • Oxygen Therapy- Nasal Cannula; 2 LPM; Titrate for SPO2: equal to or greater than, 94%   • SLP Consult: Eval & Treat   • Holter / Event ZIO Patch   • POC Glucose Fingerstick   • POC Glucose Fingerstick   • ECG 12 Lead   • Adult transthoracic echo complete   • Adult Transesophageal Echo (JEFE) W/ Cont if Necessary Per Protocol   • Type & Screen   • Antibody Identification   • Direct Antiglobulin Test (Direct Jake)   • Prepare RBC, 2 Units   • Patient Antigen Type   • Insert peripheral IV   • Insert large-bore peripheral IV - Right AC  preferred   • Insert Peripheral IV   • Inpatient Admission   • Transfer Patient   • Transfer Patient   • Transfer Patient   • Discharge readmit patient   • Bleeding precautions   • Post TPA precautions   • Swallow Precautions   • Light Blue Top   • Green Top (Gel)   • Lavender Top   • Gold Top - SST   • CBC & Differential   • Light Blue Top   • Green Top (Gel)   • Lavender Top   • Gold Top - SST

## 2017-11-19 NOTE — PROGRESS NOTES
[facility]  Phone: [phone]  Timothy Barillas MD    Subjective:  LOS: 4    Aphasia is hugely better.  She continues to have some gait imbalance when she walks.  She requires rehabilitation.  Her cough is improved and she denies any further complaints.  No wheezing is reported today and no shortness of breath.    Objective   Vital Signs past 24hrs  BP range: BP: (138-167)/(66-84) 138/66  Pulse range: Heart Rate:  [58-69] 58  Resp rate range: Resp:  [18-20] 20  Temp range: Temp (24hrs), Av.4 °F (36.9 °C), Min:98 °F (36.7 °C), Max:98.8 °F (37.1 °C)    O2 Device: room airFlow (L/min):  [2] 2  Oxygen range:SpO2:  [90 %-98 %] 90 %   173 lb (78.5 kg); Body mass index is 28.79 kg/(m^2).  No intake or output data in the 24 hours ending 17 1350    Physical Exam   Constitutional: She appears well-developed.   Eyes: Conjunctivae are normal.   Neck: Normal range of motion. Neck supple.   Cardiovascular: Normal rate and regular rhythm.    No murmur heard.  Pulmonary/Chest: Effort normal. No respiratory distress. She has no wheezes. She has no rales.   Abdominal: Soft. Bowel sounds are normal. She exhibits no mass. There is no tenderness.   Musculoskeletal: She exhibits no edema.   Right upper extremity bruising   Neurological: She is alert.   Gait not assessed   Skin: Skin is warm. No rash noted.     Results Review:    I have reviewed the laboratory and imaging data since the last note by [facility] physician.  My annotations are noted in assessment and plan.    Medication Review:  I have reviewed the current MAR.  My annotations are noted in assessment and plan.       Plan   PCCM Problems  Acute CVA, status post thrombectomy 11/15  History previous stroke  Cough  Wheezing likely COPD exacerbation  Relevant Medical Diagnoses  Ex-smoker  COPD, patient states unaware    Plan of Treatment  Neuro status stable and improved.  Needs Zio patch on discharge.    Wheezing better. On pred daily. States nebs make her shake,  hold for now and use Symbicort.    Transfer to acute rehab when approved by insurance.    Timothy Barillas MD  11/19/17  1:50 PM    Part of this note may be an electronic transcription/translation of spoken language to printed text using the Dragon Dictation System.

## 2017-11-19 NOTE — PLAN OF CARE
Problem: Patient Care Overview (Adult)  Goal: Plan of Care Review    11/19/17 7345   Coping/Psychosocial Response Interventions   Plan Of Care Reviewed With patient   Patient Care Overview   Progress improving   Outcome Evaluation   Outcome Summary/Follow up Plan Pt demonstrates improved balance with use of rwx. Continues to require assist of one for safety due to strength and balance impairments. Demonstrating improved endurance as evidenced by tolerance of increased gait distance. No new PT concerns; pt hopeful to transfer to acute rehab soon.

## 2017-11-19 NOTE — NURSING NOTE
Several attempts made to contact patients  regarding transfer, left contact information. Discussed with patient and she called and left message with room number for him and said he was at a play and would respond when he got out of it.

## 2017-11-19 NOTE — PROGRESS NOTES
"DOS: 2017  NAME: Paloma Carr   : 1940  PCP: Goran Carr MD  Chief Complaint   Patient presents with   • Neuro Deficit(s)     CC: Stroke    Subjective: No new events overnight per RN. Patient OOB in chair, she denies headache or any new stroke/TIA symptoms. Brother at bedside.     Interval History  History taken from: patient chart family RN    Objective:  Vital signs: /66 (BP Location: Left arm, Patient Position: Sitting)  Pulse 58  Temp 98 °F (36.7 °C) (Oral)   Resp 20  Ht 65\" (165.1 cm)  Wt 173 lb (78.5 kg)  SpO2 90%  BMI 28.79 kg/m2      Physical Exam:  GENERAL: NAD  HEENT: Normocephalic, atraumatic   COR: RRR  Resp: Even and unlabored  Extremities: No signs of distal embolization. Multiple areas of ecchymosis of BUE.     Neurological:   MS: AO. Language nearly normal with mild word-finding. Naming, reading and repetition intact. No neglect. Higher integrative function normal  CN: II-XII normal  Motor: Normal strength and tone throughout.  Sensory: Intact    Results Review:     I reviewed the patient's new clinical results.    Current Medications:    aspirin 325 mg Oral Daily   Or      aspirin 300 mg Rectal Daily   atenolol 50 mg Oral Q24H   budesonide-formoterol 2 puff Inhalation BID - RT   clopidogrel 75 mg Oral Daily   docusate sodium 100 mg Oral BID   escitalopram 10 mg Oral Daily   gabapentin 800 mg Oral Q8H   levothyroxine 125 mcg Oral Daily   pantoprazole 40 mg Oral Q AM   predniSONE 40 mg Oral Daily   rosuvastatin 20 mg Oral Daily          Medications Reviewed    Laboratory results:  Lab Results   Component Value Date    GLUCOSE 117 (H) 2017    CALCIUM 9.4 2017     2017    K 3.8 2017    CO2 25.3 2017     2017    BUN 15 2017    CREATININE 0.66 2017    EGFRIFAFRI 71 2017    EGFRIFNONA 87 2017    BCR 22.7 2017    ANIONGAP 11.7 2017     Lab Results   Component Value Date    WBC 7.16 2017 "    HGB 10.8 (L) 11/19/2017    HCT 34.0 (L) 11/19/2017    .4 (H) 11/19/2017     11/19/2017        Results from last 7 days  Lab Units 11/16/17  0426   CHOLESTEROL mg/dL 139     Lab Results   Component Value Date    INR 1.11 (H) 11/16/2017    INR 0.99 11/15/2017    PROTIME 13.8 11/16/2017    PROTIME 12.7 11/15/2017     Lab Results   Component Value Date    TSH 2.280 11/16/2017     Lab Results   Component Value Date    LDLCALC 63 11/16/2017     Lab Results   Component Value Date    HGBA1C 5.46 11/16/2017     No components found for: B12      Impression: Ms Carr is a 76 yo with HTN, HLD, breast cancer and Hx of stroke and meningioma resection who presented on 11/15 with sudden onset speech difficulty and right side weakness. She is POD#4 for IV thrombolysis and emergent mechanical embolectomy. She has improved neurologically. The etiology is unknown. The JEFE was unrevealing. She will need prolonged heart monitoring out patient. ? hypercoag workup if zio patch negative. She needs continued aggressive risk factor control, Crestor 20mg sufficient LDL 63. Her home antihypertensives have been resumed. Goal SBP .  PT/OT/ST. Plans are for rehab. Please call with any questions or concerns.      Plan:  Aspirin and Plavix  Check a P2Y12 on 11/22/17  Crestor 20 mg, LDL 53  [facility] D[facility]  Non-pharmacological DVT prophylaxis  EKG Tele  PT/OT/ST  Stroke Education  Blood pressure control to <130/80  Goal LDL <70-recommend high dose statins-   Serum glucose < 140    Call [phone] for stroke/TIA symptoms  F/U in stroke clinic in 3 months, call [phone] for an appointment    I have discussed the above with the patient and family.  Savana Estrella, APRN  11/19/17  1:24 PM      Active Problems:    Cerebrovascular accident (CVA)

## 2017-11-19 NOTE — PLAN OF CARE
Problem: Patient Care Overview (Adult)  Goal: Plan of Care Review  Outcome: Ongoing (interventions implemented as appropriate)    11/19/17 0643   Coping/Psychosocial Response Interventions   Plan Of Care Reviewed With patient   Patient Care Overview   Progress improving   Outcome Evaluation   Outcome Summary/Follow up Plan No changes during night. Melatonin for sleep. VSS. [facility] soon

## 2017-11-19 NOTE — THERAPY TREATMENT NOTE
Acute Care - Physical Therapy Treatment Note  [facility]     Patient Name: Paloma Carr  : 1940  MRN: [id number]  Today's Date: 2017  Onset of Illness/Injury or Date of Surgery Date: 11/15/17     Referring Physician: Delores    Admit Date: 11/15/2017    Visit Dx:    ICD-10-CM ICD-9-CM   1. Cerebrovascular accident (CVA), unspecified mechanism I63.9 434.91     Patient Active Problem List   Diagnosis   • Soft tissue swelling of chest wall   • Abnormal ultrasound of breast   • Postmastectomy lymphedema syndrome   • Malignant neoplasm of upper-outer quadrant of right female breast   • Cerebrovascular accident (CVA)               Adult Rehabilitation Note       17 1400 17 1500 17 1550    Rehab Assessment/Intervention    Discipline physical therapist  -EE physical therapy assistant  -RW physical therapist  -DM    Document Type therapy note (daily note)  -EE therapy note (daily note)  -RW therapy note (daily note)  -DM    Subjective Information agree to therapy;no complaints  -EE agree to therapy;no complaints  -RW no complaints;agree to therapy  -DM    Patient Effort, Rehab Treatment good  -EE good  -RW good  -DM    Symptoms Noted During/After Treatment fatigue  -EE fatigue;shortness of breath  -RW none  -DM    Precautions/Limitations fall precautions  -EE fall precautions  -RW fall precautions  -DM    Recorded by [EE] Kate Roach, PT [RW] Makenna Guadalupe, PTA [DM] Kika García, PT    Vital Signs    Pre SpO2 (%)  97  -RW     O2 Delivery Pre Treatment  supplemental O2  -RW     Intra SpO2 (%)  95  -RW     O2 Delivery Intra Treatment  room air  -RW     Post SpO2 (%)  97  -RW     O2 Delivery Post Treatment  supplemental O2  -RW     Recorded by  [RW] Makenna Guadalupe PTA     Pain Assessment    Pain Assessment No/denies pain  -EE No/denies pain  -RW No/denies pain  -DM    Recorded by [EE] Kate Roach, PT [RW] Makenna Guadalupe PTA [DM] Kika García, PT    Cognitive Assessment/Intervention     Current Cognitive/Communication Assessment functional  -EE functional  -RW impaired  -DM    Orientation Status oriented x 4  -EE oriented x 4  -RW oriented to;person;place  -DM    Follows Commands/Answers Questions 100% of the time;needs cueing;able to follow single-step instructions   easily distractible; cues to stay on task  -% of the time;needs cueing  -% of the time  -DM    Personal Safety mild impairment;at risk behaviors demonstrated  -EE mild impairment;at risk behaviors demonstrated  -RW WNL/WFL  -DM    Personal Safety Interventions fall prevention program maintained;gait belt;muscle strengthening facilitated;nonskid shoes/slippers when out of bed;supervised activity  -EE fall prevention program maintained;gait belt;nonskid shoes/slippers when out of bed  -RW fall prevention program maintained;gait belt;nonskid shoes/slippers when out of bed;supervised activity  -DM    Recorded by [EE] Kate Roach, PT [RW] Makenna Guadalupe, PTA [DM] Kika García, PT    Bed Mobility, Assessment/Treatment    Bed Mobility, Assistive Device  bed rails;head of bed elevated  -RW     Bed Mob, Supine to Sit, [place]  contact guard assist;verbal cues required  -RW contact guard assist;verbal cues required  -DM    Bed Mob, Sit to Supine, [place]  contact guard assist;verbal cues required  -RW contact guard assist;verbal cues required  -DM    Bed Mobility, Comment not tested, pt up in chair  -EE      Recorded by [EE] Kate Roach, PT [RW] Makenna Guadalupe, PTA [DM] Kika García, PT    Transfer Assessment/Treatment    Transfers, Sit-Stand [place] contact guard assist;verbal cues required  -EE contact guard assist;hand held assist;verbal cues required  -RW contact guard assist;verbal cues required  -DM    Transfers, Stand-Sit [place] contact guard assist  -EE contact guard assist;hand held assist;verbal cues required  -RW contact guard assist;verbal cues required  -DM    Transfers, Sit-Stand-Sit, Assist  Device rolling walker  -EE --   HHA  -RW     Toilet Transfer, [place] contact guard assist;verbal cues required  -EE      Toilet Transfer, Assistive Device rolling walker   grab bar  -EE      Transfer, Safety Issues weight-shifting ability decreased;balance decreased during turns  -EE      Transfer, Impairments strength decreased;impaired balance  -EE      Transfer, Comment cues required for hand placement  -EE      Recorded by [EE] Kate Roach, PT [RW] Makenna Guadalupe, PTA [DM] Kika García, PT    Gait Assessment/Treatment    Gait, [place] Level contact guard assist;verbal cues required  -EE minimum assist (75% patient effort);hand held assist;verbal cues required  -RW hand held assist;2 person assist required  -DM    Gait, Assistive Device rolling walker  -EE      Gait, Distance (Feet) 400  -  -  -DM    Gait, Gait Pattern Analysis swing-through gait  -EE swing-through gait  -RW     Gait, Gait Deviations forward flexed posture;zabrina decreased;step length decreased  -EE forward flexed posture;bilateral:;zabrina decreased;limb motion velocity decreased;step length decreased;narrow base  -RW zabrina decreased  -DM    Gait, Safety Issues balance decreased during turns;step length decreased  -EE balance decreased during turns;step length decreased  -RW step length decreased;balance decreased during turns  -DM    Gait, Impairments strength decreased;impaired balance  -EE strength decreased;impaired balance  -RW strength decreased;impaired balance  -DM    Gait, Comment no LOB noted with forward ambulation, but pt drifts from straight path when performing head turns while ambulating. Easily distractible, resulting in some unsteadiness intermittently.   -EE HHA required. Unsteady and reaching out for rails in hallway. May benefit from use of RW  -RW     Recorded by [EE] Kate Roach, PT [RW] Makenna Guadalupe, PTA [DM] Kika García, PT    Stairs Assessment/Treatment    Number of Stairs  12  -RW      Stairs, Handrail Location  right side (ascending)  -RW     Stairs, [place] Level  contact guard assist;verbal cues required  -RW     Stairs, Assistive Device  --   HHA  -RW     Stairs, Technique Used  step over step (ascending);step over step (descending)  -RW     Recorded by  [RW] Makenna Guadalupe PTA     Motor Skills/Interventions    Additional Documentation   Balance Skills Training (Group)  -DM    Recorded by   [DM] Kika García PT    Balance Skills Training    Sitting-Level of Assistance   Close supervision  -DM    Sitting-Balance Support   Feet supported  -DM    Standing-Level of Assistance   Contact guard  -DM    Gait Balance-Level of Assistance   Minimum assistance  -DM    Gait Balance Support   Left upper extremity supported;Right upper extremity supported  -DM    Recorded by   [DM] Kika García PT    Positioning and Restraints    Pre-Treatment Position sitting in chair/recliner  -EE in bed  -RW in bed  -DM    Post Treatment Position chair  -EE bed  -RW bed  -DM    In Bed  fowlers;call light within reach;encouraged to call for assist;exit alarm on;with family/caregiver;SCD pump applied  -RW supine;call light within reach;encouraged to call for assist;with nsg   getting ready to transfer to   -    In Chair reclined;call light within reach;encouraged to call for assist;exit alarm on;with family/caregiver  -EE      Recorded by [EE] Kate Roach, PT [RW] Makenna Guadalupe, PTA [DM] Kika García, PT      11/17/17 1500          Rehab Assessment/Intervention    Discipline physical therapist  -DM      Recorded by [DM] Kika García, PT        User Key  (r) = Recorded By, (t) = Taken By, (c) = Cosigned By    Initials Name Effective Dates    ROSALES García, PT 10/06/15 -     EE Kate Roach, PT 12/01/15 -     RW Makenna Guadalupe, PTA 04/06/16 -                 IP PT Goals       11/16/17 1501          Bed Mobility PT LTG    Bed Mobility PT LTG, Time to Achieve 1 wk  -CH      Bed Mobility PT LTG, Activity  Type all bed mobility  -CH      Bed Mobility PT LTG, [place] Level supervision required  -CH      Transfer Training PT LTG    Transfer Training PT LTG, Time to Achieve 1 wk  -CH      Transfer Training PT LTG, Activity Type all transfers  -CH      Transfer Training PT LTG, [place] Level supervision required  -CH      Gait Training PT LTG    Gait Training Goal PT LTG, Time to Achieve 1 wk  -CH      Gait Training Goal PT LTG, [place] Level supervision required  -CH      Gait Training Goal PT LTG, Distance to Achieve 150  -CH        User Key  (r) = Recorded By, (t) = Taken By, (c) = Cosigned By    Initials Name Provider Type    CH Alicia Gan, PT Physical Therapist          Physical Therapy Education     Title: PT OT SLP Therapies (Active)     Topic: Physical Therapy (Done)     Point: Mobility training (Done)    Learning Progress Summary    Learner Readiness Method Response Comment Documented by Status   Patient Acceptance E,TB VU,NR   11/19/17 1417 Done    Acceptance E,TB,D VU,[facility] 11/18/17 1554 Done    Eager E,TB,D DU,NR   11/17/17 1550 Done    Acceptance TB,D,E VU,[facility] 11/16/17 1500 Done               Point: Home exercise program (Done)    Learning Progress Summary    Learner Readiness Method Response Comment Documented by Status   Patient Acceptance TB,D,E VU,NR   11/16/17 1500 Done               Point: Body mechanics (Done)    Learning Progress Summary    Learner Readiness Method Response Comment Documented by Status   Patient Acceptance E,TB VU,NR   11/19/17 1417 Done    Acceptance E,TB,D VU,NR   11/18/17 1554 Done    Eager E,TB,D DU,NR   11/17/17 1550 Done    Acceptance TB,D,E VU,[facility] 11/16/17 1500 Done               Point: Precautions (Done)    Learning Progress Summary    Learner Readiness Method Response Comment Documented by Status   Patient Acceptance E,TB,D VU,NR   11/18/17 1554 Done    Eager E,TB,D DU,NR   11/17/17 1550 Done    Acceptance TB,D,E VU,[facility] 11/16/17  1500 Done                      User Key     Initials Effective Dates Name Provider Type Discipline     12/01/15 -  Alicia Gan, PT Physical Therapist PT    DM 10/06/15 -  Kika García, PT Physical Therapist PT    EE 12/01/15 -  Kate Roach, PT Physical Therapist PT    RW 04/06/16 -  Makenna Guadalupe, PTA Physical Therapy Assistant PT                    PT Recommendation and Plan  Anticipated Discharge Disposition: inpatient rehabilitation facility (pending progress and home resources)  Planned Therapy Interventions: balance training, bed mobility training, gait training, home exercise program, patient/family education, transfer training  PT Frequency: daily  Plan of Care Review  Plan Of Care Reviewed With: patient  Progress: improving  Outcome Summary/Follow up Plan: Pt demonstrates improved balance with use of rwx. Continues to require assist of one for safety due to strength and balance impairments. Demonstrating improved endurance as evidenced by tolerance of increased gait distance. No new PT concerns; pt hopeful to transfer to acute rehab soon.           Outcome Measures       11/19/17 1400 11/18/17 1500 11/18/17 1100    How much help from another person do you currently need...    Turning from your back to your side while in flat bed without using bedrails? 4  -EE 4  -RW     Moving from lying on back to sitting on the side of a flat bed without bedrails? 3  -EE 3  -RW     Moving to and from a bed to a chair (including a wheelchair)? 3  -EE 3  -RW     Standing up from a chair using your arms (e.g., wheelchair, bedside chair)? 3  -EE 3  -RW     Climbing 3-5 steps with a railing? 3  -EE 3  -RW     To walk in hospital room? 3  -EE 3  -RW     AM-PAC 6 Clicks Score 19  -EE 19  -RW     How much help from another is currently needed...    Putting on and taking off regular lower body clothing?   3  -SO    Bathing (including washing, rinsing, and drying)   3  -SO    Toileting (which includes using toilet bed pan  or urinal)   3  -SO    Putting on and taking off regular upper body clothing   3  -SO    Taking care of personal grooming (such as brushing teeth)   3  -SO    Eating meals   4  -SO    Score   19  -SO    Functional Assessment    Outcome Measure Options AM-PAC 6 Clicks Basic Mobility (PT)  -EE AM-PAC 6 Clicks Basic Mobility (PT)  -RW AM-PAC 6 Clicks Daily Activity (OT)  -SO      11/17/17 1500 11/16/17 1500       How much help from another person do you currently need...    Turning from your back to your side while in flat bed without using bedrails? 4  -DM 3  -CH     Moving from lying on back to sitting on the side of a flat bed without bedrails? 3  -DM 3  -CH     Moving to and from a bed to a chair (including a wheelchair)? 3  -DM 3  -CH     Standing up from a chair using your arms (e.g., wheelchair, bedside chair)? 3  -DM 3  -CH     Climbing 3-5 steps with a railing? 3  -DM 2  -CH     To walk in hospital room? 3  -DM 3  -CH     AM-PAC 6 Clicks Score 19  -DM 17  -CH     Modified [place] Scale    Modified Jose Scale 4 - Moderately severe disability.  Unable to walk without assistance, and unable to attend to own bodily needs without assistance.  -DM 4 - Moderately severe disability.  Unable to walk without assistance, and unable to attend to own bodily needs without assistance.  -CH     Functional Assessment    Outcome Measure Options AM-PAC 6 Clicks Basic Mobility (PT);Modified [place]  -DM AM-PAC 6 Clicks Basic Mobility (PT);Modified [place]  -CH       User Key  (r) = Recorded By, (t) = Taken By, (c) = Cosigned By    Initials Name Provider Type    SO Hortensia Paredes, OTR Occupational Therapist    CH Alicia Gan, PT Physical Therapist    ROSALES García, PT Physical Therapist    EE Kate Roach, PT Physical Therapist    RW Makenna Guadalupe, PTA Physical Therapy Assistant           Time Calculation:         PT Charges       11/19/17 1419          Time Calculation    Start Time 1400  -EE      Stop Time 1415   -EE      Time Calculation (min) 15 min  -EE      PT Received On 11/19/17  -EE      PT - Next Appointment 11/20/17  -EE        User Key  (r) = Recorded By, (t) = Taken By, (c) = Cosigned By    Initials Name Provider Type    EE Kate Roach PT Physical Therapist          Therapy Charges for Today     Code Description Service Date Service Provider Modifiers Qty    [id number] HC PT THER PROC EA 15 MIN 11/19/2017 Kate Roach PT GP 1          PT G-Codes  Outcome Measure Options: AM-PAC 6 Clicks Basic Mobility (PT)    Kate Roach PT  11/19/2017

## 2017-11-20 LAB — CREAT BLDA-MCNC: 0.8 MG/DL (ref 0.6–1.3)

## 2017-11-20 PROCEDURE — 97110 THERAPEUTIC EXERCISES: CPT

## 2017-11-20 PROCEDURE — 97162 PT EVAL MOD COMPLEX 30 MIN: CPT

## 2017-11-20 PROCEDURE — 96125 COGNITIVE TEST BY HC PRO: CPT

## 2017-11-20 PROCEDURE — 94799 UNLISTED PULMONARY SVC/PX: CPT

## 2017-11-20 PROCEDURE — 63710000001 PREDNISONE PER 1 MG: Performed by: INTERNAL MEDICINE

## 2017-11-20 PROCEDURE — 97535 SELF CARE MNGMENT TRAINING: CPT | Performed by: OCCUPATIONAL THERAPIST

## 2017-11-20 PROCEDURE — 97165 OT EVAL LOW COMPLEX 30 MIN: CPT | Performed by: OCCUPATIONAL THERAPIST

## 2017-11-20 RX ORDER — ALBUTEROL SULFATE 0.63 MG/3ML
0.63 SOLUTION RESPIRATORY (INHALATION)
Status: DISCONTINUED | OUTPATIENT
Start: 2017-11-20 | End: 2017-12-01

## 2017-11-20 RX ORDER — PREDNISONE 20 MG/1
40 TABLET ORAL 2 TIMES DAILY WITH MEALS
Status: DISCONTINUED | OUTPATIENT
Start: 2017-11-20 | End: 2017-11-21

## 2017-11-20 RX ORDER — MELATONIN
5000 DAILY
Status: DISCONTINUED | OUTPATIENT
Start: 2017-11-20 | End: 2017-12-06 | Stop reason: HOSPADM

## 2017-11-20 RX ORDER — VITS A,C,E/LUTEIN/MINERALS 300MCG-200
1 TABLET ORAL 2 TIMES DAILY
Status: DISCONTINUED | OUTPATIENT
Start: 2017-11-20 | End: 2017-12-06 | Stop reason: HOSPADM

## 2017-11-20 RX ORDER — ASPIRIN 325 MG
325 TABLET ORAL DAILY
Status: DISCONTINUED | OUTPATIENT
Start: 2017-11-20 | End: 2017-12-06 | Stop reason: HOSPADM

## 2017-11-20 RX ORDER — ALBUTEROL SULFATE 0.63 MG/3ML
0.63 SOLUTION RESPIRATORY (INHALATION)
Status: DISCONTINUED | OUTPATIENT
Start: 2017-11-20 | End: 2017-12-06 | Stop reason: HOSPADM

## 2017-11-20 RX ADMIN — ATENOLOL 50 MG: 50 TABLET ORAL at 08:15

## 2017-11-20 RX ADMIN — Medication 5 MG: at 21:04

## 2017-11-20 RX ADMIN — DOCUSATE SODIUM 100 MG: 100 CAPSULE, LIQUID FILLED ORAL at 08:15

## 2017-11-20 RX ADMIN — BUDESONIDE AND FORMOTEROL FUMARATE DIHYDRATE 2 PUFF: 160; 4.5 AEROSOL RESPIRATORY (INHALATION) at 07:14

## 2017-11-20 RX ADMIN — GABAPENTIN 800 MG: 400 CAPSULE ORAL at 21:01

## 2017-11-20 RX ADMIN — ACETAMINOPHEN 650 MG: 325 TABLET ORAL at 23:07

## 2017-11-20 RX ADMIN — PANTOPRAZOLE SODIUM 40 MG: 40 TABLET, DELAYED RELEASE ORAL at 05:24

## 2017-11-20 RX ADMIN — ESCITALOPRAM 10 MG: 10 TABLET, FILM COATED ORAL at 08:15

## 2017-11-20 RX ADMIN — DOCUSATE SODIUM 100 MG: 100 CAPSULE, LIQUID FILLED ORAL at 17:46

## 2017-11-20 RX ADMIN — ROSUVASTATIN CALCIUM 20 MG: 20 TABLET, FILM COATED ORAL at 08:15

## 2017-11-20 RX ADMIN — GABAPENTIN 800 MG: 400 CAPSULE ORAL at 05:24

## 2017-11-20 RX ADMIN — GABAPENTIN 800 MG: 400 CAPSULE ORAL at 14:09

## 2017-11-20 RX ADMIN — VITAMIN D, TAB 1000IU (100/BT) 5000 UNITS: 25 TAB at 14:08

## 2017-11-20 RX ADMIN — ASPIRIN 325 MG: 325 TABLET ORAL at 14:08

## 2017-11-20 RX ADMIN — PREDNISONE 40 MG: 20 TABLET ORAL at 17:46

## 2017-11-20 RX ADMIN — FOLIC ACID 1 MG: 1 TABLET ORAL at 08:15

## 2017-11-20 RX ADMIN — LEVOTHYROXINE SODIUM 125 MCG: 125 TABLET ORAL at 05:24

## 2017-11-20 RX ADMIN — CLOPIDOGREL 75 MG: 75 TABLET, FILM COATED ORAL at 08:15

## 2017-11-20 RX ADMIN — PREDNISONE 40 MG: 20 TABLET ORAL at 08:15

## 2017-11-20 RX ADMIN — Medication 1 TABLET: at 17:46

## 2017-11-20 NOTE — PROGRESS NOTES
Case Management Discharge Note    Final Note: acute rehab bed at [facility]    Discharge Placement     Facility/Agency Request Status Selected? Address Phone Number Fax Number    [facility] Accepted    [facility] [address] [facility] [id number] [phone]         Trang Cedillo RN 11/17/2017 15:41    Spoke with Dominik                   Other: Other (bed transfer)    Discharge Codes: 02  Discharged/transferred to short-term acute care hospital

## 2017-11-21 PROBLEM — I63.9 STROKE (HCC): Status: ACTIVE | Noted: 2017-11-21

## 2017-11-21 PROCEDURE — 97110 THERAPEUTIC EXERCISES: CPT

## 2017-11-21 PROCEDURE — 97535 SELF CARE MNGMENT TRAINING: CPT

## 2017-11-21 PROCEDURE — 97532: CPT

## 2017-11-21 PROCEDURE — 94799 UNLISTED PULMONARY SVC/PX: CPT

## 2017-11-21 PROCEDURE — 63710000001 PREDNISONE PER 1 MG: Performed by: INTERNAL MEDICINE

## 2017-11-21 PROCEDURE — 97112 NEUROMUSCULAR REEDUCATION: CPT

## 2017-11-21 RX ORDER — BENZONATATE 100 MG/1
200 CAPSULE ORAL 3 TIMES DAILY PRN
Status: DISCONTINUED | OUTPATIENT
Start: 2017-11-21 | End: 2017-12-06 | Stop reason: HOSPADM

## 2017-11-21 RX ORDER — GUAIFENESIN/DEXTROMETHORPHAN 100-10MG/5
5 SYRUP ORAL EVERY 4 HOURS PRN
Status: DISCONTINUED | OUTPATIENT
Start: 2017-11-21 | End: 2017-12-06 | Stop reason: HOSPADM

## 2017-11-21 RX ORDER — LETROZOLE 2.5 MG/1
2.5 TABLET, FILM COATED ORAL DAILY
Status: DISCONTINUED | OUTPATIENT
Start: 2017-11-22 | End: 2017-12-06 | Stop reason: HOSPADM

## 2017-11-21 RX ORDER — PREDNISONE 20 MG/1
40 TABLET ORAL DAILY
Status: DISCONTINUED | OUTPATIENT
Start: 2017-11-22 | End: 2017-11-24

## 2017-11-21 RX ORDER — ATENOLOL 50 MG/1
50 TABLET ORAL ONCE
Status: COMPLETED | OUTPATIENT
Start: 2017-11-21 | End: 2017-11-21

## 2017-11-21 RX ORDER — FUROSEMIDE 20 MG/1
20 TABLET ORAL DAILY
Status: DISCONTINUED | OUTPATIENT
Start: 2017-11-21 | End: 2017-12-06 | Stop reason: HOSPADM

## 2017-11-21 RX ORDER — ATENOLOL 50 MG/1
100 TABLET ORAL
Status: DISCONTINUED | OUTPATIENT
Start: 2017-11-22 | End: 2017-12-06 | Stop reason: HOSPADM

## 2017-11-21 RX ADMIN — PREDNISONE 40 MG: 20 TABLET ORAL at 08:28

## 2017-11-21 RX ADMIN — ROSUVASTATIN CALCIUM 20 MG: 20 TABLET, FILM COATED ORAL at 08:28

## 2017-11-21 RX ADMIN — ESCITALOPRAM 10 MG: 10 TABLET, FILM COATED ORAL at 08:28

## 2017-11-21 RX ADMIN — GABAPENTIN 800 MG: 400 CAPSULE ORAL at 14:35

## 2017-11-21 RX ADMIN — GABAPENTIN 800 MG: 400 CAPSULE ORAL at 21:45

## 2017-11-21 RX ADMIN — BUDESONIDE AND FORMOTEROL FUMARATE DIHYDRATE 2 PUFF: 160; 4.5 AEROSOL RESPIRATORY (INHALATION) at 19:40

## 2017-11-21 RX ADMIN — VITAMIN D, TAB 1000IU (100/BT) 5000 UNITS: 25 TAB at 08:28

## 2017-11-21 RX ADMIN — FOLIC ACID 1 MG: 1 TABLET ORAL at 08:28

## 2017-11-21 RX ADMIN — ATENOLOL 50 MG: 50 TABLET ORAL at 15:56

## 2017-11-21 RX ADMIN — Medication 1 TABLET: at 17:25

## 2017-11-21 RX ADMIN — ASPIRIN 325 MG: 325 TABLET ORAL at 08:28

## 2017-11-21 RX ADMIN — FUROSEMIDE 20 MG: 20 TABLET ORAL at 18:18

## 2017-11-21 RX ADMIN — LEVOTHYROXINE SODIUM 125 MCG: 125 TABLET ORAL at 06:27

## 2017-11-21 RX ADMIN — DOCUSATE SODIUM 100 MG: 100 CAPSULE, LIQUID FILLED ORAL at 08:28

## 2017-11-21 RX ADMIN — Medication 5 MG: at 21:45

## 2017-11-21 RX ADMIN — Medication 1 TABLET: at 08:28

## 2017-11-21 RX ADMIN — PREDNISONE 40 MG: 20 TABLET ORAL at 17:25

## 2017-11-21 RX ADMIN — BUDESONIDE AND FORMOTEROL FUMARATE DIHYDRATE 2 PUFF: 160; 4.5 AEROSOL RESPIRATORY (INHALATION) at 07:05

## 2017-11-21 RX ADMIN — PANTOPRAZOLE SODIUM 40 MG: 40 TABLET, DELAYED RELEASE ORAL at 06:27

## 2017-11-21 RX ADMIN — BENZONATATE 200 MG: 200 CAPSULE, LIQUID FILLED ORAL at 23:52

## 2017-11-21 RX ADMIN — CLOPIDOGREL 75 MG: 75 TABLET, FILM COATED ORAL at 08:29

## 2017-11-21 RX ADMIN — ATENOLOL 50 MG: 50 TABLET ORAL at 07:18

## 2017-11-21 RX ADMIN — DOCUSATE SODIUM 100 MG: 100 CAPSULE, LIQUID FILLED ORAL at 17:25

## 2017-11-21 RX ADMIN — GABAPENTIN 800 MG: 400 CAPSULE ORAL at 06:27

## 2017-11-22 LAB — PA ADP PRP-ACNC: 116 PRU (ref 194–418)

## 2017-11-22 PROCEDURE — 97110 THERAPEUTIC EXERCISES: CPT

## 2017-11-22 PROCEDURE — 97535 SELF CARE MNGMENT TRAINING: CPT

## 2017-11-22 PROCEDURE — 85576 BLOOD PLATELET AGGREGATION: CPT | Performed by: PHYSICAL MEDICINE & REHABILITATION

## 2017-11-22 PROCEDURE — 97112 NEUROMUSCULAR REEDUCATION: CPT

## 2017-11-22 PROCEDURE — 92507 TX SP LANG VOICE COMM INDIV: CPT

## 2017-11-22 PROCEDURE — 63710000001 PREDNISONE PER 1 MG: Performed by: INTERNAL MEDICINE

## 2017-11-22 PROCEDURE — 94799 UNLISTED PULMONARY SVC/PX: CPT

## 2017-11-22 RX ORDER — POLYETHYLENE GLYCOL 3350 17 G/17G
17 POWDER, FOR SOLUTION ORAL DAILY PRN
Status: DISCONTINUED | OUTPATIENT
Start: 2017-11-22 | End: 2017-12-06 | Stop reason: HOSPADM

## 2017-11-22 RX ORDER — CHOLECALCIFEROL (VITAMIN D3) 125 MCG
10 CAPSULE ORAL NIGHTLY PRN
Status: DISCONTINUED | OUTPATIENT
Start: 2017-11-22 | End: 2017-12-01

## 2017-11-22 RX ADMIN — FOLIC ACID 1 MG: 1 TABLET ORAL at 08:02

## 2017-11-22 RX ADMIN — ALBUTEROL SULFATE 0.63 MG: 0.63 SOLUTION RESPIRATORY (INHALATION) at 20:12

## 2017-11-22 RX ADMIN — PREDNISONE 40 MG: 20 TABLET ORAL at 08:02

## 2017-11-22 RX ADMIN — BENZONATATE 200 MG: 200 CAPSULE, LIQUID FILLED ORAL at 21:16

## 2017-11-22 RX ADMIN — GABAPENTIN 800 MG: 400 CAPSULE ORAL at 14:11

## 2017-11-22 RX ADMIN — ACETAMINOPHEN 650 MG: 325 TABLET ORAL at 02:38

## 2017-11-22 RX ADMIN — ASPIRIN 325 MG: 325 TABLET ORAL at 08:03

## 2017-11-22 RX ADMIN — ATENOLOL 100 MG: 50 TABLET ORAL at 08:03

## 2017-11-22 RX ADMIN — BUDESONIDE AND FORMOTEROL FUMARATE DIHYDRATE 2 PUFF: 160; 4.5 AEROSOL RESPIRATORY (INHALATION) at 20:13

## 2017-11-22 RX ADMIN — GUAIFENESIN AND DEXTROMETHORPHAN 5 ML: 100; 10 SYRUP ORAL at 12:09

## 2017-11-22 RX ADMIN — BUDESONIDE AND FORMOTEROL FUMARATE DIHYDRATE 2 PUFF: 160; 4.5 AEROSOL RESPIRATORY (INHALATION) at 06:58

## 2017-11-22 RX ADMIN — CLOPIDOGREL 75 MG: 75 TABLET, FILM COATED ORAL at 08:03

## 2017-11-22 RX ADMIN — GUAIFENESIN AND DEXTROMETHORPHAN 5 ML: 100; 10 SYRUP ORAL at 06:47

## 2017-11-22 RX ADMIN — GUAIFENESIN AND DEXTROMETHORPHAN 5 ML: 100; 10 SYRUP ORAL at 21:16

## 2017-11-22 RX ADMIN — ROSUVASTATIN CALCIUM 20 MG: 20 TABLET, FILM COATED ORAL at 08:02

## 2017-11-22 RX ADMIN — LETROZOLE 2.5 MG: 2.5 TABLET ORAL at 09:19

## 2017-11-22 RX ADMIN — GUAIFENESIN AND DEXTROMETHORPHAN 5 ML: 100; 10 SYRUP ORAL at 02:38

## 2017-11-22 RX ADMIN — DOCUSATE SODIUM 100 MG: 100 CAPSULE, LIQUID FILLED ORAL at 08:02

## 2017-11-22 RX ADMIN — Medication 10 MG: at 21:16

## 2017-11-22 RX ADMIN — PANTOPRAZOLE SODIUM 40 MG: 40 TABLET, DELAYED RELEASE ORAL at 06:20

## 2017-11-22 RX ADMIN — LEVOTHYROXINE SODIUM 125 MCG: 125 TABLET ORAL at 06:20

## 2017-11-22 RX ADMIN — GABAPENTIN 800 MG: 400 CAPSULE ORAL at 21:16

## 2017-11-22 RX ADMIN — BENZONATATE 200 MG: 200 CAPSULE, LIQUID FILLED ORAL at 08:03

## 2017-11-22 RX ADMIN — VITAMIN D, TAB 1000IU (100/BT) 5000 UNITS: 25 TAB at 08:03

## 2017-11-22 RX ADMIN — ESCITALOPRAM 10 MG: 10 TABLET, FILM COATED ORAL at 08:02

## 2017-11-22 RX ADMIN — DOCUSATE SODIUM 100 MG: 100 CAPSULE, LIQUID FILLED ORAL at 17:18

## 2017-11-22 RX ADMIN — FUROSEMIDE 20 MG: 20 TABLET ORAL at 08:02

## 2017-11-22 RX ADMIN — GABAPENTIN 800 MG: 400 CAPSULE ORAL at 06:22

## 2017-11-22 RX ADMIN — Medication 1 TABLET: at 08:02

## 2017-11-23 PROCEDURE — 97110 THERAPEUTIC EXERCISES: CPT | Performed by: OCCUPATIONAL THERAPIST

## 2017-11-23 PROCEDURE — 97110 THERAPEUTIC EXERCISES: CPT

## 2017-11-23 PROCEDURE — 63710000001 PREDNISONE PER 1 MG: Performed by: INTERNAL MEDICINE

## 2017-11-23 PROCEDURE — 94799 UNLISTED PULMONARY SVC/PX: CPT

## 2017-11-23 PROCEDURE — 92507 TX SP LANG VOICE COMM INDIV: CPT

## 2017-11-23 PROCEDURE — 97535 SELF CARE MNGMENT TRAINING: CPT | Performed by: OCCUPATIONAL THERAPIST

## 2017-11-23 RX ADMIN — BUDESONIDE AND FORMOTEROL FUMARATE DIHYDRATE 2 PUFF: 160; 4.5 AEROSOL RESPIRATORY (INHALATION) at 20:08

## 2017-11-23 RX ADMIN — FUROSEMIDE 20 MG: 20 TABLET ORAL at 08:05

## 2017-11-23 RX ADMIN — LETROZOLE 2.5 MG: 2.5 TABLET ORAL at 08:06

## 2017-11-23 RX ADMIN — PANTOPRAZOLE SODIUM 40 MG: 40 TABLET, DELAYED RELEASE ORAL at 05:44

## 2017-11-23 RX ADMIN — GABAPENTIN 800 MG: 400 CAPSULE ORAL at 05:44

## 2017-11-23 RX ADMIN — ATENOLOL 100 MG: 50 TABLET ORAL at 08:05

## 2017-11-23 RX ADMIN — ALBUTEROL SULFATE 0.63 MG: 0.63 SOLUTION RESPIRATORY (INHALATION) at 07:04

## 2017-11-23 RX ADMIN — ESCITALOPRAM 10 MG: 10 TABLET, FILM COATED ORAL at 08:05

## 2017-11-23 RX ADMIN — FOLIC ACID 1 MG: 1 TABLET ORAL at 08:06

## 2017-11-23 RX ADMIN — VITAMIN D, TAB 1000IU (100/BT) 5000 UNITS: 25 TAB at 08:06

## 2017-11-23 RX ADMIN — Medication 10 MG: at 21:01

## 2017-11-23 RX ADMIN — PREDNISONE 40 MG: 20 TABLET ORAL at 08:06

## 2017-11-23 RX ADMIN — GABAPENTIN 800 MG: 400 CAPSULE ORAL at 21:01

## 2017-11-23 RX ADMIN — ALBUTEROL SULFATE 0.63 MG: 0.63 SOLUTION RESPIRATORY (INHALATION) at 14:39

## 2017-11-23 RX ADMIN — CLOPIDOGREL 75 MG: 75 TABLET, FILM COATED ORAL at 08:05

## 2017-11-23 RX ADMIN — DOCUSATE SODIUM 100 MG: 100 CAPSULE, LIQUID FILLED ORAL at 17:45

## 2017-11-23 RX ADMIN — LEVOTHYROXINE SODIUM 125 MCG: 125 TABLET ORAL at 05:44

## 2017-11-23 RX ADMIN — Medication 1 TABLET: at 08:06

## 2017-11-23 RX ADMIN — GUAIFENESIN AND DEXTROMETHORPHAN 5 ML: 100; 10 SYRUP ORAL at 01:59

## 2017-11-23 RX ADMIN — ASPIRIN 325 MG: 325 TABLET ORAL at 08:05

## 2017-11-23 RX ADMIN — GABAPENTIN 800 MG: 400 CAPSULE ORAL at 13:56

## 2017-11-23 RX ADMIN — BUDESONIDE AND FORMOTEROL FUMARATE DIHYDRATE 2 PUFF: 160; 4.5 AEROSOL RESPIRATORY (INHALATION) at 07:04

## 2017-11-23 RX ADMIN — ROSUVASTATIN CALCIUM 20 MG: 20 TABLET, FILM COATED ORAL at 08:06

## 2017-11-23 RX ADMIN — BENZONATATE 200 MG: 200 CAPSULE, LIQUID FILLED ORAL at 17:45

## 2017-11-23 RX ADMIN — BENZONATATE 200 MG: 200 CAPSULE, LIQUID FILLED ORAL at 09:38

## 2017-11-23 RX ADMIN — ALBUTEROL SULFATE 0.63 MG: 0.63 SOLUTION RESPIRATORY (INHALATION) at 20:08

## 2017-11-23 RX ADMIN — DOCUSATE SODIUM 100 MG: 100 CAPSULE, LIQUID FILLED ORAL at 08:06

## 2017-11-23 RX ADMIN — GUAIFENESIN AND DEXTROMETHORPHAN 5 ML: 100; 10 SYRUP ORAL at 06:06

## 2017-11-23 RX ADMIN — Medication 1 TABLET: at 17:45

## 2017-11-24 PROCEDURE — 97530 THERAPEUTIC ACTIVITIES: CPT

## 2017-11-24 PROCEDURE — 97110 THERAPEUTIC EXERCISES: CPT

## 2017-11-24 PROCEDURE — 94799 UNLISTED PULMONARY SVC/PX: CPT

## 2017-11-24 PROCEDURE — 97535 SELF CARE MNGMENT TRAINING: CPT

## 2017-11-24 PROCEDURE — 92507 TX SP LANG VOICE COMM INDIV: CPT | Performed by: SPEECH-LANGUAGE PATHOLOGIST

## 2017-11-24 PROCEDURE — 63710000001 PREDNISONE PER 1 MG: Performed by: INTERNAL MEDICINE

## 2017-11-24 RX ADMIN — GUAIFENESIN AND DEXTROMETHORPHAN 5 ML: 100; 10 SYRUP ORAL at 22:21

## 2017-11-24 RX ADMIN — PANTOPRAZOLE SODIUM 40 MG: 40 TABLET, DELAYED RELEASE ORAL at 05:35

## 2017-11-24 RX ADMIN — ALBUTEROL SULFATE 0.63 MG: 0.63 SOLUTION RESPIRATORY (INHALATION) at 12:01

## 2017-11-24 RX ADMIN — FUROSEMIDE 20 MG: 20 TABLET ORAL at 07:41

## 2017-11-24 RX ADMIN — ESCITALOPRAM 10 MG: 10 TABLET, FILM COATED ORAL at 07:43

## 2017-11-24 RX ADMIN — FOLIC ACID 1 MG: 1 TABLET ORAL at 07:43

## 2017-11-24 RX ADMIN — ACETAMINOPHEN 650 MG: 325 TABLET ORAL at 14:02

## 2017-11-24 RX ADMIN — GABAPENTIN 800 MG: 400 CAPSULE ORAL at 22:20

## 2017-11-24 RX ADMIN — LEVOTHYROXINE SODIUM 125 MCG: 125 TABLET ORAL at 05:35

## 2017-11-24 RX ADMIN — BUDESONIDE AND FORMOTEROL FUMARATE DIHYDRATE 2 PUFF: 160; 4.5 AEROSOL RESPIRATORY (INHALATION) at 07:04

## 2017-11-24 RX ADMIN — CLOPIDOGREL 75 MG: 75 TABLET, FILM COATED ORAL at 07:42

## 2017-11-24 RX ADMIN — Medication 1 TABLET: at 07:42

## 2017-11-24 RX ADMIN — BUDESONIDE AND FORMOTEROL FUMARATE DIHYDRATE 2 PUFF: 160; 4.5 AEROSOL RESPIRATORY (INHALATION) at 20:11

## 2017-11-24 RX ADMIN — GABAPENTIN 800 MG: 400 CAPSULE ORAL at 05:35

## 2017-11-24 RX ADMIN — GABAPENTIN 800 MG: 400 CAPSULE ORAL at 13:02

## 2017-11-24 RX ADMIN — DOCUSATE SODIUM 100 MG: 100 CAPSULE, LIQUID FILLED ORAL at 17:59

## 2017-11-24 RX ADMIN — ROSUVASTATIN CALCIUM 20 MG: 20 TABLET, FILM COATED ORAL at 07:41

## 2017-11-24 RX ADMIN — ALBUTEROL SULFATE 0.63 MG: 0.63 SOLUTION RESPIRATORY (INHALATION) at 20:11

## 2017-11-24 RX ADMIN — ACETAMINOPHEN 650 MG: 325 TABLET ORAL at 22:20

## 2017-11-24 RX ADMIN — ATENOLOL 100 MG: 50 TABLET ORAL at 07:42

## 2017-11-24 RX ADMIN — LETROZOLE 2.5 MG: 2.5 TABLET ORAL at 07:43

## 2017-11-24 RX ADMIN — VITAMIN D, TAB 1000IU (100/BT) 5000 UNITS: 25 TAB at 07:43

## 2017-11-24 RX ADMIN — ACETAMINOPHEN 650 MG: 325 TABLET ORAL at 17:59

## 2017-11-24 RX ADMIN — ASPIRIN 325 MG: 325 TABLET ORAL at 07:41

## 2017-11-24 RX ADMIN — PREDNISONE 40 MG: 20 TABLET ORAL at 07:41

## 2017-11-24 RX ADMIN — ALBUTEROL SULFATE 0.63 MG: 0.63 SOLUTION RESPIRATORY (INHALATION) at 15:22

## 2017-11-24 RX ADMIN — DOCUSATE SODIUM 100 MG: 100 CAPSULE, LIQUID FILLED ORAL at 07:42

## 2017-11-24 RX ADMIN — ALBUTEROL SULFATE 0.63 MG: 0.63 SOLUTION RESPIRATORY (INHALATION) at 06:55

## 2017-11-24 RX ADMIN — Medication 1 TABLET: at 17:59

## 2017-11-24 RX ADMIN — Medication 10 MG: at 22:20

## 2017-11-25 PROCEDURE — 94799 UNLISTED PULMONARY SVC/PX: CPT

## 2017-11-25 PROCEDURE — 97110 THERAPEUTIC EXERCISES: CPT

## 2017-11-25 RX ADMIN — ALBUTEROL SULFATE 0.63 MG: 0.63 SOLUTION RESPIRATORY (INHALATION) at 21:11

## 2017-11-25 RX ADMIN — GABAPENTIN 800 MG: 400 CAPSULE ORAL at 05:22

## 2017-11-25 RX ADMIN — DOCUSATE SODIUM 100 MG: 100 CAPSULE, LIQUID FILLED ORAL at 08:11

## 2017-11-25 RX ADMIN — GUAIFENESIN AND DEXTROMETHORPHAN 5 ML: 100; 10 SYRUP ORAL at 08:12

## 2017-11-25 RX ADMIN — PANTOPRAZOLE SODIUM 40 MG: 40 TABLET, DELAYED RELEASE ORAL at 05:23

## 2017-11-25 RX ADMIN — ASPIRIN 325 MG: 325 TABLET ORAL at 08:11

## 2017-11-25 RX ADMIN — GABAPENTIN 800 MG: 400 CAPSULE ORAL at 13:35

## 2017-11-25 RX ADMIN — FUROSEMIDE 20 MG: 20 TABLET ORAL at 08:11

## 2017-11-25 RX ADMIN — ACETAMINOPHEN 650 MG: 325 TABLET ORAL at 20:17

## 2017-11-25 RX ADMIN — CLOPIDOGREL 75 MG: 75 TABLET, FILM COATED ORAL at 08:11

## 2017-11-25 RX ADMIN — GABAPENTIN 800 MG: 400 CAPSULE ORAL at 20:17

## 2017-11-25 RX ADMIN — GUAIFENESIN AND DEXTROMETHORPHAN 5 ML: 100; 10 SYRUP ORAL at 20:17

## 2017-11-25 RX ADMIN — Medication 1 TABLET: at 08:12

## 2017-11-25 RX ADMIN — GUAIFENESIN AND DEXTROMETHORPHAN 5 ML: 100; 10 SYRUP ORAL at 13:35

## 2017-11-25 RX ADMIN — ATENOLOL 100 MG: 50 TABLET ORAL at 06:48

## 2017-11-25 RX ADMIN — ALBUTEROL SULFATE 0.63 MG: 0.63 SOLUTION RESPIRATORY (INHALATION) at 15:39

## 2017-11-25 RX ADMIN — LETROZOLE 2.5 MG: 2.5 TABLET ORAL at 09:58

## 2017-11-25 RX ADMIN — FOLIC ACID 1 MG: 1 TABLET ORAL at 08:11

## 2017-11-25 RX ADMIN — ROSUVASTATIN CALCIUM 20 MG: 20 TABLET, FILM COATED ORAL at 08:11

## 2017-11-25 RX ADMIN — ALBUTEROL SULFATE 0.63 MG: 0.63 SOLUTION RESPIRATORY (INHALATION) at 07:02

## 2017-11-25 RX ADMIN — Medication 1 TABLET: at 17:24

## 2017-11-25 RX ADMIN — BUDESONIDE AND FORMOTEROL FUMARATE DIHYDRATE 2 PUFF: 160; 4.5 AEROSOL RESPIRATORY (INHALATION) at 07:09

## 2017-11-25 RX ADMIN — ALBUTEROL SULFATE 0.63 MG: 0.63 SOLUTION RESPIRATORY (INHALATION) at 11:55

## 2017-11-25 RX ADMIN — ESCITALOPRAM 10 MG: 10 TABLET, FILM COATED ORAL at 08:11

## 2017-11-25 RX ADMIN — Medication 10 MG: at 20:17

## 2017-11-25 RX ADMIN — BUDESONIDE AND FORMOTEROL FUMARATE DIHYDRATE 2 PUFF: 160; 4.5 AEROSOL RESPIRATORY (INHALATION) at 21:11

## 2017-11-25 RX ADMIN — VITAMIN D, TAB 1000IU (100/BT) 5000 UNITS: 25 TAB at 08:11

## 2017-11-25 RX ADMIN — LEVOTHYROXINE SODIUM 125 MCG: 125 TABLET ORAL at 05:22

## 2017-11-26 PROCEDURE — 94799 UNLISTED PULMONARY SVC/PX: CPT

## 2017-11-26 RX ORDER — TROLAMINE SALICYLATE 10 G/100G
CREAM TOPICAL 2 TIMES DAILY PRN
Status: DISCONTINUED | OUTPATIENT
Start: 2017-11-26 | End: 2017-12-06 | Stop reason: HOSPADM

## 2017-11-26 RX ORDER — DIPHENHYDRAMINE HCL 25 MG
25 CAPSULE ORAL NIGHTLY PRN
Status: DISCONTINUED | OUTPATIENT
Start: 2017-11-26 | End: 2017-12-06 | Stop reason: HOSPADM

## 2017-11-26 RX ADMIN — LEVOTHYROXINE SODIUM 125 MCG: 125 TABLET ORAL at 05:17

## 2017-11-26 RX ADMIN — ALBUTEROL SULFATE 0.63 MG: 0.63 SOLUTION RESPIRATORY (INHALATION) at 14:33

## 2017-11-26 RX ADMIN — ROSUVASTATIN CALCIUM 20 MG: 20 TABLET, FILM COATED ORAL at 08:16

## 2017-11-26 RX ADMIN — FUROSEMIDE 20 MG: 20 TABLET ORAL at 08:16

## 2017-11-26 RX ADMIN — ALBUTEROL SULFATE 0.63 MG: 0.63 SOLUTION RESPIRATORY (INHALATION) at 20:25

## 2017-11-26 RX ADMIN — ASPIRIN 325 MG: 325 TABLET ORAL at 08:17

## 2017-11-26 RX ADMIN — ALBUTEROL SULFATE 0.63 MG: 0.63 SOLUTION RESPIRATORY (INHALATION) at 10:42

## 2017-11-26 RX ADMIN — PANTOPRAZOLE SODIUM 40 MG: 40 TABLET, DELAYED RELEASE ORAL at 05:17

## 2017-11-26 RX ADMIN — ESCITALOPRAM 10 MG: 10 TABLET, FILM COATED ORAL at 08:16

## 2017-11-26 RX ADMIN — ATENOLOL 100 MG: 50 TABLET ORAL at 08:17

## 2017-11-26 RX ADMIN — ACETAMINOPHEN 650 MG: 325 TABLET ORAL at 17:15

## 2017-11-26 RX ADMIN — Medication 1 TABLET: at 17:15

## 2017-11-26 RX ADMIN — LETROZOLE 2.5 MG: 2.5 TABLET ORAL at 08:17

## 2017-11-26 RX ADMIN — Medication 1 TABLET: at 08:16

## 2017-11-26 RX ADMIN — GUAIFENESIN AND DEXTROMETHORPHAN 5 ML: 100; 10 SYRUP ORAL at 08:17

## 2017-11-26 RX ADMIN — GABAPENTIN 800 MG: 400 CAPSULE ORAL at 14:05

## 2017-11-26 RX ADMIN — VITAMIN D, TAB 1000IU (100/BT) 5000 UNITS: 25 TAB at 08:16

## 2017-11-26 RX ADMIN — GABAPENTIN 800 MG: 400 CAPSULE ORAL at 05:17

## 2017-11-26 RX ADMIN — BENZONATATE 200 MG: 200 CAPSULE, LIQUID FILLED ORAL at 12:23

## 2017-11-26 RX ADMIN — CLOPIDOGREL 75 MG: 75 TABLET, FILM COATED ORAL at 08:16

## 2017-11-26 RX ADMIN — GUAIFENESIN AND DEXTROMETHORPHAN 5 ML: 100; 10 SYRUP ORAL at 21:31

## 2017-11-26 RX ADMIN — GUAIFENESIN AND DEXTROMETHORPHAN 5 ML: 100; 10 SYRUP ORAL at 12:23

## 2017-11-26 RX ADMIN — DOCUSATE SODIUM 100 MG: 100 CAPSULE, LIQUID FILLED ORAL at 17:15

## 2017-11-26 RX ADMIN — ALBUTEROL SULFATE 0.63 MG: 0.63 SOLUTION RESPIRATORY (INHALATION) at 06:55

## 2017-11-26 RX ADMIN — BUDESONIDE AND FORMOTEROL FUMARATE DIHYDRATE 2 PUFF: 160; 4.5 AEROSOL RESPIRATORY (INHALATION) at 06:55

## 2017-11-26 RX ADMIN — BUDESONIDE AND FORMOTEROL FUMARATE DIHYDRATE 2 PUFF: 160; 4.5 AEROSOL RESPIRATORY (INHALATION) at 20:26

## 2017-11-26 RX ADMIN — ACETAMINOPHEN 650 MG: 325 TABLET ORAL at 08:16

## 2017-11-26 RX ADMIN — BENZONATATE 200 MG: 200 CAPSULE, LIQUID FILLED ORAL at 21:31

## 2017-11-26 RX ADMIN — DOCUSATE SODIUM 100 MG: 100 CAPSULE, LIQUID FILLED ORAL at 08:16

## 2017-11-26 RX ADMIN — GUAIFENESIN AND DEXTROMETHORPHAN 5 ML: 100; 10 SYRUP ORAL at 17:15

## 2017-11-26 RX ADMIN — FOLIC ACID 1 MG: 1 TABLET ORAL at 08:16

## 2017-11-26 RX ADMIN — GABAPENTIN 800 MG: 400 CAPSULE ORAL at 21:31

## 2017-11-27 ENCOUNTER — APPOINTMENT (OUTPATIENT)
Dept: GENERAL RADIOLOGY | Facility: HOSPITAL | Age: 77
End: 2017-11-27
Attending: PHYSICAL MEDICINE & REHABILITATION

## 2017-11-27 LAB
ANION GAP SERPL CALCULATED.3IONS-SCNC: 12.9 MMOL/L
BUN BLD-MCNC: 16 MG/DL (ref 8–23)
BUN/CREAT SERPL: 20.5 (ref 7–25)
CALCIUM SPEC-SCNC: 8.9 MG/DL (ref 8.6–10.5)
CHLORIDE SERPL-SCNC: 102 MMOL/L (ref 98–107)
CK MB SERPL-CCNC: 1.81 NG/ML
CK SERPL-CCNC: 55 U/L (ref 20–180)
CO2 SERPL-SCNC: 27.1 MMOL/L (ref 22–29)
CREAT BLD-MCNC: 0.78 MG/DL (ref 0.57–1)
D DIMER PPP FEU-MCNC: 0.4 MCGFEU/ML (ref 0–0.49)
GFR SERPL CREATININE-BSD FRML MDRD: 71 ML/MIN/1.73
GLUCOSE BLD-MCNC: 75 MG/DL (ref 65–99)
POTASSIUM BLD-SCNC: 3.7 MMOL/L (ref 3.5–5.2)
SODIUM BLD-SCNC: 142 MMOL/L (ref 136–145)
TROPONIN T SERPL-MCNC: <0.01 NG/ML (ref 0–0.03)

## 2017-11-27 PROCEDURE — 97110 THERAPEUTIC EXERCISES: CPT

## 2017-11-27 PROCEDURE — 94799 UNLISTED PULMONARY SVC/PX: CPT

## 2017-11-27 PROCEDURE — 97535 SELF CARE MNGMENT TRAINING: CPT

## 2017-11-27 PROCEDURE — 85379 FIBRIN DEGRADATION QUANT: CPT | Performed by: PHYSICAL MEDICINE & REHABILITATION

## 2017-11-27 PROCEDURE — 82550 ASSAY OF CK (CPK): CPT | Performed by: PHYSICAL MEDICINE & REHABILITATION

## 2017-11-27 PROCEDURE — 80048 BASIC METABOLIC PNL TOTAL CA: CPT | Performed by: PHYSICAL MEDICINE & REHABILITATION

## 2017-11-27 PROCEDURE — 84484 ASSAY OF TROPONIN QUANT: CPT | Performed by: PHYSICAL MEDICINE & REHABILITATION

## 2017-11-27 PROCEDURE — 82553 CREATINE MB FRACTION: CPT | Performed by: PHYSICAL MEDICINE & REHABILITATION

## 2017-11-27 PROCEDURE — 93010 ELECTROCARDIOGRAM REPORT: CPT | Performed by: INTERNAL MEDICINE

## 2017-11-27 PROCEDURE — 92507 TX SP LANG VOICE COMM INDIV: CPT

## 2017-11-27 PROCEDURE — 93005 ELECTROCARDIOGRAM TRACING: CPT | Performed by: PHYSICAL MEDICINE & REHABILITATION

## 2017-11-27 PROCEDURE — 63710000001 DIPHENHYDRAMINE PER 50 MG: Performed by: PHYSICAL MEDICINE & REHABILITATION

## 2017-11-27 PROCEDURE — 71020 HC CHEST PA AND LATERAL: CPT

## 2017-11-27 RX ADMIN — BUDESONIDE AND FORMOTEROL FUMARATE DIHYDRATE 2 PUFF: 160; 4.5 AEROSOL RESPIRATORY (INHALATION) at 07:06

## 2017-11-27 RX ADMIN — GABAPENTIN 800 MG: 400 CAPSULE ORAL at 06:05

## 2017-11-27 RX ADMIN — FUROSEMIDE 20 MG: 20 TABLET ORAL at 08:24

## 2017-11-27 RX ADMIN — ASPIRIN 325 MG: 325 TABLET ORAL at 08:25

## 2017-11-27 RX ADMIN — LETROZOLE 2.5 MG: 2.5 TABLET ORAL at 08:24

## 2017-11-27 RX ADMIN — VITAMIN D, TAB 1000IU (100/BT) 5000 UNITS: 25 TAB at 08:25

## 2017-11-27 RX ADMIN — DIPHENHYDRAMINE HYDROCHLORIDE 25 MG: 25 CAPSULE ORAL at 21:32

## 2017-11-27 RX ADMIN — PANTOPRAZOLE SODIUM 40 MG: 40 TABLET, DELAYED RELEASE ORAL at 06:05

## 2017-11-27 RX ADMIN — ATENOLOL 100 MG: 50 TABLET ORAL at 08:24

## 2017-11-27 RX ADMIN — GUAIFENESIN AND DEXTROMETHORPHAN 5 ML: 100; 10 SYRUP ORAL at 17:41

## 2017-11-27 RX ADMIN — Medication 1 TABLET: at 17:39

## 2017-11-27 RX ADMIN — ALBUTEROL SULFATE 0.63 MG: 0.63 SOLUTION RESPIRATORY (INHALATION) at 07:06

## 2017-11-27 RX ADMIN — Medication 10 MG: at 21:32

## 2017-11-27 RX ADMIN — GABAPENTIN 800 MG: 400 CAPSULE ORAL at 13:07

## 2017-11-27 RX ADMIN — ROSUVASTATIN CALCIUM 20 MG: 20 TABLET, FILM COATED ORAL at 08:25

## 2017-11-27 RX ADMIN — ALBUTEROL SULFATE 0.63 MG: 0.63 SOLUTION RESPIRATORY (INHALATION) at 11:09

## 2017-11-27 RX ADMIN — GABAPENTIN 800 MG: 400 CAPSULE ORAL at 21:32

## 2017-11-27 RX ADMIN — CLOPIDOGREL 75 MG: 75 TABLET, FILM COATED ORAL at 08:25

## 2017-11-27 RX ADMIN — Medication 1 TABLET: at 08:25

## 2017-11-27 RX ADMIN — LEVOTHYROXINE SODIUM 125 MCG: 125 TABLET ORAL at 06:05

## 2017-11-27 RX ADMIN — BUDESONIDE AND FORMOTEROL FUMARATE DIHYDRATE 2 PUFF: 160; 4.5 AEROSOL RESPIRATORY (INHALATION) at 19:30

## 2017-11-27 RX ADMIN — DOCUSATE SODIUM 100 MG: 100 CAPSULE, LIQUID FILLED ORAL at 08:24

## 2017-11-27 RX ADMIN — DOCUSATE SODIUM 100 MG: 100 CAPSULE, LIQUID FILLED ORAL at 17:39

## 2017-11-27 RX ADMIN — ALBUTEROL SULFATE 0.63 MG: 0.63 SOLUTION RESPIRATORY (INHALATION) at 19:30

## 2017-11-27 RX ADMIN — FOLIC ACID 1 MG: 1 TABLET ORAL at 08:25

## 2017-11-27 RX ADMIN — ESCITALOPRAM 10 MG: 10 TABLET, FILM COATED ORAL at 08:25

## 2017-11-28 LAB
CK MB SERPL-CCNC: 1.85 NG/ML
CK SERPL-CCNC: 59 U/L (ref 20–180)
TROPONIN T SERPL-MCNC: <0.01 NG/ML (ref 0–0.03)

## 2017-11-28 PROCEDURE — 97110 THERAPEUTIC EXERCISES: CPT

## 2017-11-28 PROCEDURE — 97535 SELF CARE MNGMENT TRAINING: CPT

## 2017-11-28 PROCEDURE — 97112 NEUROMUSCULAR REEDUCATION: CPT

## 2017-11-28 PROCEDURE — 97532: CPT

## 2017-11-28 PROCEDURE — 93005 ELECTROCARDIOGRAM TRACING: CPT | Performed by: PHYSICAL MEDICINE & REHABILITATION

## 2017-11-28 PROCEDURE — 63710000001 DIPHENHYDRAMINE PER 50 MG: Performed by: PHYSICAL MEDICINE & REHABILITATION

## 2017-11-28 PROCEDURE — 82550 ASSAY OF CK (CPK): CPT | Performed by: PHYSICAL MEDICINE & REHABILITATION

## 2017-11-28 PROCEDURE — 93010 ELECTROCARDIOGRAM REPORT: CPT | Performed by: INTERNAL MEDICINE

## 2017-11-28 PROCEDURE — 94799 UNLISTED PULMONARY SVC/PX: CPT

## 2017-11-28 PROCEDURE — 84484 ASSAY OF TROPONIN QUANT: CPT | Performed by: PHYSICAL MEDICINE & REHABILITATION

## 2017-11-28 PROCEDURE — 82553 CREATINE MB FRACTION: CPT | Performed by: PHYSICAL MEDICINE & REHABILITATION

## 2017-11-28 RX ORDER — SUCRALFATE ORAL 1 G/10ML
1 SUSPENSION ORAL NIGHTLY
Status: COMPLETED | OUTPATIENT
Start: 2017-11-28 | End: 2017-11-28

## 2017-11-28 RX ORDER — SUCRALFATE ORAL 1 G/10ML
1 SUSPENSION ORAL ONCE
Status: COMPLETED | OUTPATIENT
Start: 2017-11-28 | End: 2017-11-28

## 2017-11-28 RX ADMIN — BUDESONIDE AND FORMOTEROL FUMARATE DIHYDRATE 2 PUFF: 160; 4.5 AEROSOL RESPIRATORY (INHALATION) at 06:47

## 2017-11-28 RX ADMIN — ALBUTEROL SULFATE 0.63 MG: 0.63 SOLUTION RESPIRATORY (INHALATION) at 19:40

## 2017-11-28 RX ADMIN — BENZONATATE 200 MG: 200 CAPSULE, LIQUID FILLED ORAL at 22:06

## 2017-11-28 RX ADMIN — DOCUSATE SODIUM 100 MG: 100 CAPSULE, LIQUID FILLED ORAL at 09:11

## 2017-11-28 RX ADMIN — Medication 1 TABLET: at 09:12

## 2017-11-28 RX ADMIN — ASPIRIN 325 MG: 325 TABLET ORAL at 09:12

## 2017-11-28 RX ADMIN — ALBUTEROL SULFATE 0.63 MG: 0.63 SOLUTION RESPIRATORY (INHALATION) at 11:11

## 2017-11-28 RX ADMIN — PANTOPRAZOLE SODIUM 40 MG: 40 TABLET, DELAYED RELEASE ORAL at 05:46

## 2017-11-28 RX ADMIN — DOCUSATE SODIUM 100 MG: 100 CAPSULE, LIQUID FILLED ORAL at 17:28

## 2017-11-28 RX ADMIN — GABAPENTIN 800 MG: 400 CAPSULE ORAL at 14:04

## 2017-11-28 RX ADMIN — GUAIFENESIN AND DEXTROMETHORPHAN 5 ML: 100; 10 SYRUP ORAL at 22:06

## 2017-11-28 RX ADMIN — ALBUTEROL SULFATE 0.63 MG: 0.63 SOLUTION RESPIRATORY (INHALATION) at 15:01

## 2017-11-28 RX ADMIN — ALBUTEROL SULFATE 0.63 MG: 0.63 SOLUTION RESPIRATORY (INHALATION) at 06:48

## 2017-11-28 RX ADMIN — BENZONATATE 200 MG: 200 CAPSULE, LIQUID FILLED ORAL at 01:13

## 2017-11-28 RX ADMIN — LEVOTHYROXINE SODIUM 125 MCG: 125 TABLET ORAL at 05:45

## 2017-11-28 RX ADMIN — FUROSEMIDE 20 MG: 20 TABLET ORAL at 09:11

## 2017-11-28 RX ADMIN — VITAMIN D, TAB 1000IU (100/BT) 5000 UNITS: 25 TAB at 09:12

## 2017-11-28 RX ADMIN — ROSUVASTATIN CALCIUM 20 MG: 20 TABLET, FILM COATED ORAL at 09:18

## 2017-11-28 RX ADMIN — LETROZOLE 2.5 MG: 2.5 TABLET ORAL at 11:41

## 2017-11-28 RX ADMIN — DIPHENHYDRAMINE HYDROCHLORIDE 25 MG: 25 CAPSULE ORAL at 22:07

## 2017-11-28 RX ADMIN — ATENOLOL 100 MG: 50 TABLET ORAL at 09:12

## 2017-11-28 RX ADMIN — SUCRALFATE 1 G: 1 SUSPENSION ORAL at 23:29

## 2017-11-28 RX ADMIN — Medication 10 MG: at 22:06

## 2017-11-28 RX ADMIN — GABAPENTIN 800 MG: 400 CAPSULE ORAL at 22:07

## 2017-11-28 RX ADMIN — SUCRALFATE 1 G: 1 SUSPENSION ORAL at 17:28

## 2017-11-28 RX ADMIN — Medication 1 TABLET: at 17:28

## 2017-11-28 RX ADMIN — ESCITALOPRAM 10 MG: 10 TABLET, FILM COATED ORAL at 09:11

## 2017-11-28 RX ADMIN — GABAPENTIN 800 MG: 400 CAPSULE ORAL at 05:45

## 2017-11-28 RX ADMIN — CLOPIDOGREL 75 MG: 75 TABLET, FILM COATED ORAL at 09:11

## 2017-11-28 RX ADMIN — BUDESONIDE AND FORMOTEROL FUMARATE DIHYDRATE 2 PUFF: 160; 4.5 AEROSOL RESPIRATORY (INHALATION) at 19:41

## 2017-11-28 RX ADMIN — GUAIFENESIN AND DEXTROMETHORPHAN 5 ML: 100; 10 SYRUP ORAL at 04:19

## 2017-11-28 RX ADMIN — FOLIC ACID 1 MG: 1 TABLET ORAL at 09:11

## 2017-11-29 PROCEDURE — 94799 UNLISTED PULMONARY SVC/PX: CPT

## 2017-11-29 PROCEDURE — 97110 THERAPEUTIC EXERCISES: CPT

## 2017-11-29 PROCEDURE — 97112 NEUROMUSCULAR REEDUCATION: CPT

## 2017-11-29 PROCEDURE — 92507 TX SP LANG VOICE COMM INDIV: CPT

## 2017-11-29 PROCEDURE — 63710000001 DIPHENHYDRAMINE PER 50 MG: Performed by: PHYSICAL MEDICINE & REHABILITATION

## 2017-11-29 RX ORDER — SIMETHICONE 80 MG
80 TABLET,CHEWABLE ORAL 4 TIMES DAILY PRN
Status: DISCONTINUED | OUTPATIENT
Start: 2017-11-29 | End: 2017-12-06 | Stop reason: HOSPADM

## 2017-11-29 RX ADMIN — ASPIRIN 325 MG: 325 TABLET ORAL at 08:35

## 2017-11-29 RX ADMIN — PANTOPRAZOLE SODIUM 40 MG: 40 TABLET, DELAYED RELEASE ORAL at 05:11

## 2017-11-29 RX ADMIN — ATENOLOL 100 MG: 50 TABLET ORAL at 08:35

## 2017-11-29 RX ADMIN — DOCUSATE SODIUM 100 MG: 100 CAPSULE, LIQUID FILLED ORAL at 08:36

## 2017-11-29 RX ADMIN — ACETAMINOPHEN 650 MG: 325 TABLET ORAL at 21:37

## 2017-11-29 RX ADMIN — DIPHENHYDRAMINE HYDROCHLORIDE 25 MG: 25 CAPSULE ORAL at 21:38

## 2017-11-29 RX ADMIN — SIMETHICONE CHEW TAB 80 MG 80 MG: 80 TABLET ORAL at 17:21

## 2017-11-29 RX ADMIN — LEVOTHYROXINE SODIUM 125 MCG: 125 TABLET ORAL at 05:11

## 2017-11-29 RX ADMIN — GABAPENTIN 800 MG: 400 CAPSULE ORAL at 21:37

## 2017-11-29 RX ADMIN — BENZONATATE 200 MG: 200 CAPSULE, LIQUID FILLED ORAL at 21:39

## 2017-11-29 RX ADMIN — CLOPIDOGREL 75 MG: 75 TABLET, FILM COATED ORAL at 08:35

## 2017-11-29 RX ADMIN — BUDESONIDE AND FORMOTEROL FUMARATE DIHYDRATE 2 PUFF: 160; 4.5 AEROSOL RESPIRATORY (INHALATION) at 07:04

## 2017-11-29 RX ADMIN — BENZONATATE 200 MG: 200 CAPSULE, LIQUID FILLED ORAL at 08:35

## 2017-11-29 RX ADMIN — FOLIC ACID 1 MG: 1 TABLET ORAL at 08:34

## 2017-11-29 RX ADMIN — ROSUVASTATIN CALCIUM 20 MG: 20 TABLET, FILM COATED ORAL at 08:35

## 2017-11-29 RX ADMIN — BUDESONIDE AND FORMOTEROL FUMARATE DIHYDRATE 2 PUFF: 160; 4.5 AEROSOL RESPIRATORY (INHALATION) at 20:22

## 2017-11-29 RX ADMIN — GABAPENTIN 800 MG: 400 CAPSULE ORAL at 05:12

## 2017-11-29 RX ADMIN — ESCITALOPRAM 10 MG: 10 TABLET, FILM COATED ORAL at 08:35

## 2017-11-29 RX ADMIN — ALBUTEROL SULFATE 0.63 MG: 0.63 SOLUTION RESPIRATORY (INHALATION) at 07:04

## 2017-11-29 RX ADMIN — Medication 1 TABLET: at 17:21

## 2017-11-29 RX ADMIN — LETROZOLE 2.5 MG: 2.5 TABLET ORAL at 08:36

## 2017-11-29 RX ADMIN — GUAIFENESIN AND DEXTROMETHORPHAN 5 ML: 100; 10 SYRUP ORAL at 08:35

## 2017-11-29 RX ADMIN — VITAMIN D, TAB 1000IU (100/BT) 5000 UNITS: 25 TAB at 08:35

## 2017-11-29 RX ADMIN — FUROSEMIDE 20 MG: 20 TABLET ORAL at 08:35

## 2017-11-29 RX ADMIN — SIMETHICONE CHEW TAB 80 MG 80 MG: 80 TABLET ORAL at 21:37

## 2017-11-29 RX ADMIN — Medication 1 TABLET: at 08:35

## 2017-11-29 RX ADMIN — GABAPENTIN 800 MG: 400 CAPSULE ORAL at 14:00

## 2017-11-29 RX ADMIN — GUAIFENESIN AND DEXTROMETHORPHAN 5 ML: 100; 10 SYRUP ORAL at 21:38

## 2017-11-29 RX ADMIN — DOCUSATE SODIUM 100 MG: 100 CAPSULE, LIQUID FILLED ORAL at 19:40

## 2017-11-29 RX ADMIN — Medication 10 MG: at 21:38

## 2017-11-29 RX ADMIN — ALBUTEROL SULFATE 0.63 MG: 0.63 SOLUTION RESPIRATORY (INHALATION) at 20:22

## 2017-11-30 ENCOUNTER — APPOINTMENT (OUTPATIENT)
Dept: GENERAL RADIOLOGY | Facility: HOSPITAL | Age: 77
End: 2017-11-30
Attending: PHYSICAL MEDICINE & REHABILITATION

## 2017-11-30 PROCEDURE — 97532: CPT

## 2017-11-30 PROCEDURE — 94799 UNLISTED PULMONARY SVC/PX: CPT

## 2017-11-30 PROCEDURE — 63710000001 DIPHENHYDRAMINE PER 50 MG: Performed by: PHYSICAL MEDICINE & REHABILITATION

## 2017-11-30 PROCEDURE — 92507 TX SP LANG VOICE COMM INDIV: CPT

## 2017-11-30 PROCEDURE — 97535 SELF CARE MNGMENT TRAINING: CPT

## 2017-11-30 PROCEDURE — 73030 X-RAY EXAM OF SHOULDER: CPT

## 2017-11-30 PROCEDURE — 97110 THERAPEUTIC EXERCISES: CPT

## 2017-11-30 RX ADMIN — LETROZOLE 2.5 MG: 2.5 TABLET ORAL at 08:04

## 2017-11-30 RX ADMIN — LEVOTHYROXINE SODIUM 125 MCG: 125 TABLET ORAL at 05:12

## 2017-11-30 RX ADMIN — DOCUSATE SODIUM 100 MG: 100 CAPSULE, LIQUID FILLED ORAL at 17:25

## 2017-11-30 RX ADMIN — ALBUTEROL SULFATE 0.63 MG: 0.63 SOLUTION RESPIRATORY (INHALATION) at 19:55

## 2017-11-30 RX ADMIN — Medication 10 MG: at 22:52

## 2017-11-30 RX ADMIN — Medication 1 TABLET: at 08:05

## 2017-11-30 RX ADMIN — SIMETHICONE CHEW TAB 80 MG 80 MG: 80 TABLET ORAL at 22:47

## 2017-11-30 RX ADMIN — ASPIRIN 325 MG: 325 TABLET ORAL at 08:04

## 2017-11-30 RX ADMIN — GUAIFENESIN AND DEXTROMETHORPHAN 5 ML: 100; 10 SYRUP ORAL at 07:24

## 2017-11-30 RX ADMIN — DOCUSATE SODIUM 100 MG: 100 CAPSULE, LIQUID FILLED ORAL at 08:04

## 2017-11-30 RX ADMIN — Medication 1 TABLET: at 17:25

## 2017-11-30 RX ADMIN — GABAPENTIN 800 MG: 400 CAPSULE ORAL at 22:47

## 2017-11-30 RX ADMIN — CLOPIDOGREL 75 MG: 75 TABLET, FILM COATED ORAL at 08:04

## 2017-11-30 RX ADMIN — ACETAMINOPHEN 650 MG: 325 TABLET ORAL at 07:24

## 2017-11-30 RX ADMIN — ESCITALOPRAM 10 MG: 10 TABLET, FILM COATED ORAL at 08:04

## 2017-11-30 RX ADMIN — BENZONATATE 200 MG: 200 CAPSULE, LIQUID FILLED ORAL at 22:47

## 2017-11-30 RX ADMIN — GUAIFENESIN AND DEXTROMETHORPHAN 5 ML: 100; 10 SYRUP ORAL at 22:47

## 2017-11-30 RX ADMIN — FUROSEMIDE 20 MG: 20 TABLET ORAL at 08:04

## 2017-11-30 RX ADMIN — SIMETHICONE CHEW TAB 80 MG 80 MG: 80 TABLET ORAL at 16:28

## 2017-11-30 RX ADMIN — GABAPENTIN 800 MG: 400 CAPSULE ORAL at 14:26

## 2017-11-30 RX ADMIN — ALBUTEROL SULFATE 0.63 MG: 0.63 SOLUTION RESPIRATORY (INHALATION) at 17:02

## 2017-11-30 RX ADMIN — BENZONATATE 200 MG: 200 CAPSULE, LIQUID FILLED ORAL at 14:25

## 2017-11-30 RX ADMIN — VITAMIN D, TAB 1000IU (100/BT) 5000 UNITS: 25 TAB at 08:04

## 2017-11-30 RX ADMIN — DIPHENHYDRAMINE HYDROCHLORIDE 25 MG: 25 CAPSULE ORAL at 22:47

## 2017-11-30 RX ADMIN — BUDESONIDE AND FORMOTEROL FUMARATE DIHYDRATE 2 PUFF: 160; 4.5 AEROSOL RESPIRATORY (INHALATION) at 08:37

## 2017-11-30 RX ADMIN — BUDESONIDE AND FORMOTEROL FUMARATE DIHYDRATE 2 PUFF: 160; 4.5 AEROSOL RESPIRATORY (INHALATION) at 19:55

## 2017-11-30 RX ADMIN — GABAPENTIN 800 MG: 400 CAPSULE ORAL at 05:12

## 2017-11-30 RX ADMIN — GUAIFENESIN AND DEXTROMETHORPHAN 5 ML: 100; 10 SYRUP ORAL at 14:25

## 2017-11-30 RX ADMIN — ATENOLOL 100 MG: 50 TABLET ORAL at 08:04

## 2017-11-30 RX ADMIN — ALBUTEROL SULFATE 0.63 MG: 0.63 SOLUTION RESPIRATORY (INHALATION) at 08:37

## 2017-11-30 RX ADMIN — PANTOPRAZOLE SODIUM 40 MG: 40 TABLET, DELAYED RELEASE ORAL at 05:12

## 2017-11-30 RX ADMIN — FOLIC ACID 1 MG: 1 TABLET ORAL at 08:04

## 2017-11-30 RX ADMIN — ROSUVASTATIN CALCIUM 20 MG: 20 TABLET, FILM COATED ORAL at 08:05

## 2017-12-01 PROCEDURE — 97535 SELF CARE MNGMENT TRAINING: CPT

## 2017-12-01 PROCEDURE — 97110 THERAPEUTIC EXERCISES: CPT

## 2017-12-01 PROCEDURE — 63710000001 DIPHENHYDRAMINE PER 50 MG: Performed by: PHYSICAL MEDICINE & REHABILITATION

## 2017-12-01 PROCEDURE — 94799 UNLISTED PULMONARY SVC/PX: CPT

## 2017-12-01 PROCEDURE — 92507 TX SP LANG VOICE COMM INDIV: CPT

## 2017-12-01 RX ORDER — CHOLECALCIFEROL (VITAMIN D3) 125 MCG
10 CAPSULE ORAL NIGHTLY
Status: DISCONTINUED | OUTPATIENT
Start: 2017-12-01 | End: 2017-12-06 | Stop reason: HOSPADM

## 2017-12-01 RX ORDER — MECLIZINE HCL 12.5 MG/1
12.5 TABLET ORAL 3 TIMES DAILY PRN
Status: DISCONTINUED | OUTPATIENT
Start: 2017-12-01 | End: 2017-12-06 | Stop reason: HOSPADM

## 2017-12-01 RX ADMIN — IPRATROPIUM BROMIDE 0.5 MG: 0.5 SOLUTION RESPIRATORY (INHALATION) at 14:56

## 2017-12-01 RX ADMIN — ATENOLOL 100 MG: 50 TABLET ORAL at 09:44

## 2017-12-01 RX ADMIN — BENZONATATE 200 MG: 200 CAPSULE, LIQUID FILLED ORAL at 09:43

## 2017-12-01 RX ADMIN — DIPHENHYDRAMINE HYDROCHLORIDE 25 MG: 25 CAPSULE ORAL at 21:00

## 2017-12-01 RX ADMIN — VITAMIN D, TAB 1000IU (100/BT) 5000 UNITS: 25 TAB at 09:44

## 2017-12-01 RX ADMIN — DOCUSATE SODIUM 100 MG: 100 CAPSULE, LIQUID FILLED ORAL at 09:43

## 2017-12-01 RX ADMIN — ACETAMINOPHEN 650 MG: 325 TABLET ORAL at 09:43

## 2017-12-01 RX ADMIN — Medication 1 TABLET: at 17:32

## 2017-12-01 RX ADMIN — BUDESONIDE AND FORMOTEROL FUMARATE DIHYDRATE 2 PUFF: 160; 4.5 AEROSOL RESPIRATORY (INHALATION) at 07:21

## 2017-12-01 RX ADMIN — BUDESONIDE AND FORMOTEROL FUMARATE DIHYDRATE 2 PUFF: 160; 4.5 AEROSOL RESPIRATORY (INHALATION) at 20:49

## 2017-12-01 RX ADMIN — GUAIFENESIN AND DEXTROMETHORPHAN 5 ML: 100; 10 SYRUP ORAL at 09:43

## 2017-12-01 RX ADMIN — GABAPENTIN 800 MG: 400 CAPSULE ORAL at 05:40

## 2017-12-01 RX ADMIN — IPRATROPIUM BROMIDE 0.5 MG: 0.5 SOLUTION RESPIRATORY (INHALATION) at 11:26

## 2017-12-01 RX ADMIN — CLOPIDOGREL 75 MG: 75 TABLET, FILM COATED ORAL at 09:44

## 2017-12-01 RX ADMIN — LEVOTHYROXINE SODIUM 125 MCG: 125 TABLET ORAL at 05:39

## 2017-12-01 RX ADMIN — LETROZOLE 2.5 MG: 2.5 TABLET ORAL at 09:43

## 2017-12-01 RX ADMIN — GABAPENTIN 800 MG: 400 CAPSULE ORAL at 21:01

## 2017-12-01 RX ADMIN — FOLIC ACID 1 MG: 1 TABLET ORAL at 09:44

## 2017-12-01 RX ADMIN — Medication 10 MG: at 21:00

## 2017-12-01 RX ADMIN — MECLIZINE HCL 12.5 MG: 12.5 TABLET ORAL at 13:55

## 2017-12-01 RX ADMIN — ROSUVASTATIN CALCIUM 20 MG: 20 TABLET, FILM COATED ORAL at 09:45

## 2017-12-01 RX ADMIN — ASPIRIN 325 MG: 325 TABLET ORAL at 09:45

## 2017-12-01 RX ADMIN — IPRATROPIUM BROMIDE 0.5 MG: 0.5 SOLUTION RESPIRATORY (INHALATION) at 20:49

## 2017-12-01 RX ADMIN — DOCUSATE SODIUM 100 MG: 100 CAPSULE, LIQUID FILLED ORAL at 17:32

## 2017-12-01 RX ADMIN — FUROSEMIDE 20 MG: 20 TABLET ORAL at 09:44

## 2017-12-01 RX ADMIN — Medication 1 TABLET: at 09:44

## 2017-12-01 RX ADMIN — IPRATROPIUM BROMIDE 0.5 MG: 0.5 SOLUTION RESPIRATORY (INHALATION) at 07:21

## 2017-12-01 RX ADMIN — GABAPENTIN 800 MG: 400 CAPSULE ORAL at 13:56

## 2017-12-01 RX ADMIN — ESCITALOPRAM 10 MG: 10 TABLET, FILM COATED ORAL at 09:44

## 2017-12-01 RX ADMIN — PANTOPRAZOLE SODIUM 40 MG: 40 TABLET, DELAYED RELEASE ORAL at 05:40

## 2017-12-01 NOTE — NURSING NOTE
Patient experiences positional vertigo and nystagmus which is corrected by vestibular therapy she receives at [facility].   wants this done here. Dr. Taylor will not give Ubaldo, her PT therapist an order this close to her stroke.  He wants Neuro to handle. I called for Jenelle Kevin or Dr. Neris Lane to see if I can get an order.  Ubaldo is comfortable doing the manipulation and will be here until 1600.    1420  Ifeanyi London phoned back. She will review chart and history and will advise me of whether we may do vestibular therapy.    1515  CELY London, Neuro called back and gave order for vestibular therapy for patient's vertigo.  Ubaldo will do by 1600.

## 2017-12-01 NOTE — PROGRESS NOTES
Occupational Therapy: Individual: 60 minutes.    Physical Therapy: [place]    Speech Language Pathology:  [place]    Signed by: KEON Tran/AISHA

## 2017-12-01 NOTE — PROGRESS NOTES
Inpatient Rehabilitation Plan of Care Note    Plan of Care  Care Plan Reviewed - No updates at this time.    Psychosocial    Performed Intervention(s)  Verbalize needs and concerns      Body Systems    Performed Intervention(s)  Daily skin inspection      Safety    Performed Intervention(s)  Items within reach    Signed by: Miguel Angel Valladares RN

## 2017-12-01 NOTE — PROGRESS NOTES
Inpatient Rehabilitation Functional Measures Assessment    Functional Measures  KYM Eating:  [facility] Grooming: [facility] Bathing:  [facility] Upper Body Dressing:  [facility] Lower Body Dressing:  [facility] Toileting:  [facility] Bladder Management  Level of Assistance:  [place]  Frequency/Number of Accidents this Shift:  [facility] Bowel Management  Level of Assistance: [place]  Frequency/Number of Accidents this Shift: [facility] Bed/Chair/Wheelchair Transfer:  [facility] Toilet Transfer:  [facility] Tub/Shower Transfer:  [place]    Previously Documented Mode of Locomotion at Discharge: Field  KYM Expected Mode of Locomotion at Discharge: [facility] Walk/Wheelchair:  [facility] Stairs:  [facility] Comprehension:  Auditory comprehension is the usual mode. Comprehension  Score = 6, Modified [place].  Patient comprehends complex/abstract  information in their primary language with only mild difficulty.  KYM Expression:  Vocal expression is the usual mode. Patient does not express  complex/abstract information in their primary language without a helper.  Expression Score = 3, Moderate Prompting. Patient expresses basic daily needs or  ideas 50-74% of the time. Patient requires moderate/some prompting. No assistive  devices were required.  KY[facility] Social Interaction:  Social Interaction Score = 6, Modified Independent.  Patient is modified independent for social interaction, occasionally losing  control, but self-corrects.  KY[facility] Problem Solving:  Problem Solving Score = 6, Modified [place].  Patient  makes appropriate decisions in order to solve complex problems with mild  difficulty but self-corrects.  KYM Memory:  Memory Score = 6, Modified [place].  Patient is modified  independent for memory, having only mild difficulty and using self-initiated or  environmental cues to remember.    Therapy Mode Minutes  Occupational Therapy: [place]  Physical Therapy: [place]  Speech Language  Pathology:  [place]    Signed by: Miguel Angel Valladares RN

## 2017-12-01 NOTE — PLAN OF CARE
Problem: Patient Care Overview (Adult)  Goal: Plan of Care Review  Outcome: Ongoing (interventions implemented as appropriate)    12/01/17 0434   Coping/Psychosocial Response Interventions   Plan Of Care Reviewed With patient   Patient Care Overview   Progress improving   Outcome Evaluation   Outcome Summary/Follow up Plan No complained of any pain at night. Sleeping pills is given. Pt. is still cough frequently with small thick white productive. Tessalon and Robitussin given and relieved. Pt. asked Benadryle and simethicone and applied. Sleeping well at night. No new issues noted at this time.         Problem: Stroke (Ischemic) (Adult)  Goal: Signs and Symptoms of Listed Potential Problems Will be Absent or Manageable (Stroke)  Outcome: Ongoing (interventions implemented as appropriate)    Problem: Fall Risk (Adult)  Goal: Absence of Falls  Outcome: Ongoing (interventions implemented as appropriate)

## 2017-12-01 NOTE — PLAN OF CARE
Problem: Patient Care Overview (Adult)  Goal: Plan of Care Review    12/01/17 8617   Coping/Psychosocial Response Interventions   Plan Of Care Reviewed With patient   Outcome Evaluation   Outcome Summary/Follow up Plan noted neuro clearance for vestibular therex this PM. educated pt, performed with pt and provided written HEP. good tolerance, no noted nystagmus or reproduction of s/s with exercises. see flow sheet. pt able to ambulate from gym to pt room at 1545 without reports of dizziness. will cont to monitor. RN Miguel Angel aware.

## 2017-12-01 NOTE — THERAPY TREATMENT NOTE
Inpatient Rehabilitation - Speech Language Pathology Treatment Note  [facility]     Patient Name: Paloma Carr  : 1940  MRN: [id number]  Today's Date: 2017         Admit Date: 2017    Visit Dx:      ICD-10-CM ICD-9-CM   1. Impaired gait and mobility R26.89 781.2     Patient Active Problem List   Diagnosis   • Soft tissue swelling of chest wall   • Abnormal ultrasound of breast   • Postmastectomy lymphedema syndrome   • Malignant neoplasm of upper-outer quadrant of right female breast   • Cerebrovascular accident (CVA)   • COPD exacerbation   • Stroke              Adult Rehabilitation Note       17 0930 17 0815 17 1400    Rehab Assessment/Intervention    Discipline speech language pathologist  -OC physical therapist  -LH speech language pathologist  -OC    Document Type therapy note (daily note)  -OC therapy note (daily note)  -LH therapy note (daily note)  -OC    Subjective Information agree to therapy  -OC agree to therapy;complains of;pain  -LH agree to therapy  -OC    Patient Effort, Rehab Treatment good  -OC  good  -OC    Symptoms Noted During/After Treatment none  -OC none  -LH none  -OC    Precautions/Limitations  fall precautions  -LH     Specific Treatment Considerations  continued RUE pain/discomfort rated at 4/10. ortho sx consulted. pt continues to wear sleeve for lymphedema. have contact OP PT who saw her for lymphedema - waiting response back for handouts.   -LH     Patient Response to Treatment  noted nystagmus R beating with ambulation, resolved after approx 10 sec. spoke with Dr. Taylor, no head manuevers until cleared by neuro. educated and performed basic VOR seated therex - smooth pursuit/saccades, tracking  -LH     Recorded by [OC] Viky Ramires MA,CCC-SLP [LH] Savana Dickey, PT [OC] Viky Ramires MA,CCC-SLP    Pain Assessment    Pain Assessment No/denies pain  -OC 0-10  -LH No/denies pain  -OC    Pain Score  4  -LH     Post Pain Score  4  -LH     Pain Type   Chronic pain  -LH     Pain Location  Shoulder  -LH     Pain Orientation  Right  -LH     Pain Intervention(s)  Repositioned  -LH     Response to Interventions  federica  -LH     Recorded by [OC] Viky Ramires MA,CCC-SLP [LH] Savana Dickey, PT [OC] Viky Ramires MA,CCC-SLP    Vision Assessment/Intervention    Visual Impairment  WFL with corrective lenses  -LH     Recorded by  [LH] Savana Dickey, PT     Cognitive Assessment/Intervention    Current Cognitive/Communication Assessment functional  -OC functional  -LH functional  -OC    Orientation Status oriented x 4  -OC oriented x 4  -LH oriented x 4  -OC    Follows Commands/Answers Questions  100% of the time  -% of the time  -OC    Personal Safety  mild impairment  -LH     Personal Safety Interventions  fall prevention program maintained;gait belt;nonskid shoes/slippers when out of bed;supervised activity  -LH     Recorded by [OC] Viky Ramires MA,CCC-SLP [LH] Savana Dickey, PT [OC] Viky Ramires MA,CCC-SLP    Improve word retrieval skills    Status: Improve word retrieval skills Progressing as expected  -OC  Progressing as expected  -OC    Word Retrieval Skills Progress 80%;without cues;90%;with consistent cues   dual phrase completion, 4 word completion   -OC  80%;without cues   high level word finding, famous people by attribute  -OC    Recorded by [OC] Viky Ramires MA,CCC-SLP  [OC] Viky Ramires MA,[facility]-SLP    Improve functional problem solving    Status: Improve functional problem solving Progressing as expected  -OC      Functional Problem Solving Progress 70%;without cues;90%;with consistent cues   functional math, money  -OC      Recorded by [OC] Viky Ramires MA,CCC-SLP      Improve executive function skills    Status: Improve executive function skills Progressing as expected  -OC  Progressing as expected  -OC    Executive Function Skills Progress   80%;without cues   pill box task  -OC    Recorded by [OC] Viky Ramires MA,CCC-SLP  [OC] Viky Ramires MA,CCC-SLP    Bed  Mobility, Assessment/Treatment    Bed Mob, Supine to Sit, [place]  conditional independence  -     Recorded by  [] Savana Dickey PT     Transfer Assessment/Treatment    Transfers, Bed-Chair [place]  stand by assist  -     Transfers, Chair-Bed [place]  stand by assist  -     Transfers, Bed-Chair-Bed, Assist Device  rolling walker  -     Transfers, Sit-Stand [place]  stand by assist  -     Transfers, Stand-Sit [place]  stand by assist  -     Transfers, Sit-Stand-Sit, Assist Device  rolling walker   rollator  -     Toilet Transfer, [place]  supervision required  -     Toilet Transfer, Assistive Device  rolling walker  -LH     Transfer, Safety Issues  weight-shifting ability decreased;step length decreased  -     Recorded by  [] Savana Dickey PT     Gait Assessment/Treatment    Gait, [place] Level  stand by assist  -     Gait, Assistive Device  rollator  -     Gait, Distance (Feet)  160   80x1  -     Gait, Gait Pattern Analysis  swing-through gait  -     Gait, Gait Deviations  bilateral:;zabrina decreased;decreased heel strike  -     Gait, Safety Issues  step length decreased;weight-shifting ability decreased  -     Gait, Comment  focusing in head turns horizontally for inc VOR exercise  -     Recorded by  [] Savana Dickey PT     Balance Skills Training    Training Strategies (Balance Skills)  educated on and performed seated VOR therex - saccades and visual tracking, horizontal and vertical, 10 x ea direction. re-educated on vestibular handout   -     Recorded by  [] Savana Dickey PT     Positioning and Restraints    Pre-Treatment Position  in bed  -     Post Treatment Position  wheelchair  -     In Wheelchair  sitting;call light within reach;encouraged to call for assist;exit alarm on  -     Recorded by  [] Savana Dickey PT       11/30/17 1200 11/30/17 0812 11/29/17 8899    Rehab Assessment/Intervention    Discipline occupational  "therapist  -HC physical therapist  -LH occupational therapist  -DN    Document Type therapy note (daily note)  -HC therapy note (daily note)  -LH therapy note (daily note)  -DN    Subjective Information agree to therapy;no complaints  - agree to therapy;complains of;pain  -LH no complaints;agree to therapy  -DN    Patient Effort, Rehab Treatment good  -HC good  -LH good  -DN    Symptoms Noted During/After Treatment none  -HC dizziness;fatigue  -LH     Precautions/Limitations   fall precautions  -DN    Precautions/Limitations, Vision WFL with corrective lenses  -HC WFL with corrective lenses  -     Specific Treatment Considerations  reports dizziness in AM and PM session, reports \"happens on occassion\" h/o vertigo. educated on and provided vestibular handout on VOR therex  - family conferance held with pt and  today   -DN    Recorded by [HC] Blanca Whitney, OTR [LH] Savana Dcikey, PT [DN] Gage Licea, OTR    Vital Signs    Intra Systolic BP Rehab  119  -LH     Intra Treatment Diastolic BP  79  -LH     Recorded by  [LH] Savana Dickey, PT     Pain Assessment    Pain Assessment No/denies pain  -HC 0-10  -LH     Pain Score  5  -LH     Post Pain Score  5  -LH     Pain Type  Chronic pain  -LH     Pain Location  Shoulder  -LH     Pain Orientation  Right  -LH     Pain Descriptors  Aching  -LH     Pain Intervention(s)  Repositioned  -LH     Response to Interventions  federica  -LH     Recorded by [HC] lBanca Whitney, OTR [LH] Savana Dickey, PT     Cognitive Assessment/Intervention    Current Cognitive/Communication Assessment functional  -HC functional  -LH functional  -DN    Orientation Status oriented x 4  -HC oriented x 4  -LH oriented x 4  -DN    Follows Commands/Answers Questions 100% of the time;able to follow single-step instructions;needs cueing  -% of the time;able to follow single-step instructions;needs cueing  -LH able to follow single-step instructions;needs cueing  -DN    Personal Safety mild " impairment  -HC mild impairment  -LH mild impairment;decreased awareness, need for assist  -DN    Personal Safety Interventions fall prevention program maintained;gait belt;nonskid shoes/slippers when out of bed  -HC fall prevention program maintained;gait belt;nonskid shoes/slippers when out of bed;supervised activity  -LH fall prevention program maintained;gait belt  -DN    Additional Documentation Cognitive Assessment Intervention (Group)  -HC      Recorded by [HC] Blanca Whitney OTR [LH] Savana Dickey, PT [DN] Gage Licea OTR    Cognitive Assessment Intervention    Attention mild impairment  -      Sequencing mild impairment  -      Cognitive Assessment/Treatment Comment Pt able to complete sequencing and following direction worksheets with 75% accuracy requiring increased time for completion. Verbal cues for mistakes but able to correct with redirection.  -HC      Recorded by [HC] Blanca Whitney OTR      Bed Mobility, Assessment/Treatment    Bed Mob, Supine to Sit, [place]  conditional independence  -     Bed Mob, Sit to Supine, [place] conditional independence  -HC      Recorded by [HC] Blanca Whitney OTR [LH] Savana Dickey, PT     Transfer Assessment/Treatment    Transfers, Bed-Chair [place]  stand by assist  -LH     Transfers, Chair-Bed [place] stand by assist  -HC stand by assist  - stand by assist  -DN    Transfers, Sit-Stand [place] stand by assist  -HC stand by assist  -     Transfers, Stand-Sit [place] stand by assist  -HC stand by assist  -     Transfers, Sit-Stand-Sit, Assist Device  rolling walker  -     Walk-In Shower Transfer, [place] supervision required;verbal cues required  -      Walk-In Shower Transfer, Assist Device rolling walker  -      Transfer, Safety Issues  sequencing ability decreased;step length decreased;weight-shifting ability decreased  -     Transfer, Impairments  strength decreased;impaired balance  -     Recorded by [HC]  "Blanca Whitney, OTR [] Savana Dickey, PT [DN] Gage Licea, OTR    Gait Assessment/Treatment    Gait, [place] Level  stand by assist;contact guard assist  -     Gait, Assistive Device  rollator  -     Gait, Distance (Feet)  180   x3  -     Gait, Gait Pattern Analysis  swing-through gait  -     Gait, Gait Deviations  zabrina decreased;bilateral:;decreased heel strike  -     Gait, Safety Issues  weight-shifting ability decreased;step length decreased;sequencing ability decreased  -     Gait, Impairments  strength decreased;impaired balance  -     Gait, Comment  intermittent c/o dizziness in ambulation this AM, pt stops during gait to \"adjust\"  -     Recorded by  [] Savana Dickey, PT     Stairs Assessment/Treatment    Number of Stairs  4  -     Stairs, Handrail Location  both sides  -     Stairs, [place] Level  contact guard assist;verbal cues required  -     Stairs, Technique Used  step over step (ascending);step over step (descending)  -     Stairs, Impairments  strength decreased;impaired balance  -     Recorded by  [] Savana Dickey PT     Functional Mobility    Functional Mobility- Ind. Level contact guard assist  -HC      Functional Mobility- Device rolling walker  -      Functional Mobility-Distance (Feet) --   from w/c in to BR  -HC      Recorded by [HC] KEON Cortez      Upper Body Bathing Assessment/Training    UB Bathing Assess/Train Assistive Device grab bars;hand-held shower head;shower chair with back  -HC      UB Bathing Assess/Train, Position sitting  -HC      UB Bathing Assess/Train, [place] Level stand by assist  -HC      Recorded by [HC] KEON Cortez      Lower Body Bathing Assessment/Training    LB Bathing Assess/Train Assistive Device hand-held shower head;grab bars;shower chair with back  -HC      LB Bathing Assess/Train, Position sitting;standing  -      LB Bathing Assess/Train, [place] Level supervision required  -HC      Recorded " by [HC] KEON Cortez      Upper Body Dressing Assessment/Training    UB Dressing Assess/Train, Clothing Type doffing:;donning:;t-shirt;pull over  -      UB Dressing Assess/Train, Position sitting  -      UB Dressing Assess/Train, [place] set up required  -HC      Recorded by [HC] KEON Cortez      Lower Body Dressing Assessment/Training    LB Dressing Assess/Train, Clothing Type doffing:;donning:;pants;shoes;socks  -      LB Dressing Assess/Train, Position sitting;standing  -HC      LB Dressing Assess/Train, [place] minimum assist (75% patient effort)  -      LB Dressing Assess/Train, Comment able to don RUE compression garment,  assisted with DESTINEY vuong hose  -      Recorded by [HC] KEON Cortez      Balance Skills Training    Training Strategies (Balance Skills)   standing at table with BUE activity with no device to r and l to increase balance and endurance for increased independence with adls  -DN    Standing-Level of Assistance   Contact guard  -DN    Standing-Balance Activities   Weight Shift A-P;Weight Shift R-L;Reaching for objects;Reaching across midline  -DN    Standing Balance # of Minutes   total time 15 minutes with rest breaks as needed  -DN    Stepping Strategy Assessment (Balance Skills)  educated on seated VOR therex - saccades and visual tracking, also provided vestibular handout   -     Recorded by  [LH] Savana Dickey, PT [DN] KEON Tran    Therapy Exercises    Bilateral Upper Extremity AROM:;20 reps;elbow flexion/extension;pronation/supination;shoulder extension/flexion   wrist; 2# hand weight  -  AROM:;30 reps;sitting;elbow flexion/extension;pronation/supination;shoulder abduction/adduction;shoulder extension/flexion;shoulder horizontal abd/add   2# dowel, 2# dumbell, yellow theraband, handgripper 3/10s  -DN    BUE Resistance   --   arm bike standing for 3 minutes  -DN    Recorded by [HC] KEON Cortez  [DN] KEON Tran     Functional Endurance    Detail (Functional Endurance)   fair +  -DN    Recorded by   [DN] Gage Licea OTR    Positioning and Restraints    Pre-Treatment Position sitting in chair/recliner  -HC in bed  -LH --   w/c  -DN    Post Treatment Position bed   w/c in PM  -HC wheelchair  - bed  -DN    In Bed supine;call light within reach;encouraged to call for assist;with family/caregiver;with SLP  -HC      In Wheelchair with PT  -HC sitting;call light within reach;encouraged to call for assist;exit alarm on  -LH     Recorded by [HC] Blanca Whitney, OTR [LH] Savana Dickey, PT [DN] Gage Licea, OTR      11/29/17 1545 11/29/17 1430 11/28/17 1429    Rehab Assessment/Intervention    Discipline physical therapist  -LH speech language pathologist  -OC occupational therapist  -DN    Document Type therapy note (daily note)  - therapy note (daily note)  -OC therapy note (daily note)  -DN    Subjective Information no complaints;agree to therapy  - no complaints;agree to therapy  -OC no complaints;agree to therapy  -DN    Patient Effort, Rehab Treatment good  -LH good  -OC good  -DN    Precautions/Limitations fall precautions  -LH  fall precautions  -DN    Recorded by [] Savana Dickey, PT [OC] Viky Ramires MA,CCC-SLP [DN] Gage Licea OTR    Pain Assessment    Pain Assessment No/denies pain  -  No/denies pain  -DN    Recorded by [] Savana Dickey PT  [DN] Gage Licea OTR    Vision Assessment/Intervention    Visual Impairment WFL with corrective lenses  -      Recorded by [] Savana Dickey PT      Cognitive Assessment/Intervention    Current Cognitive/Communication Assessment functional  -LH functional  -OC     Orientation Status oriented x 4  -LH oriented x 4  -OC     Follows Commands/Answers Questions 100% of the time;able to follow single-step instructions;needs cueing  -LH able to follow multi-step instructions;able to follow single-step instructions;needs increased time;needs repetition  -OC     Personal Safety  mild impairment  -LH      Personal Safety Interventions fall prevention program maintained;gait belt;nonskid shoes/slippers when out of bed;supervised activity  -LH      Recorded by [LH] Savana Dickey, PT [OC] Viky Ramires MA,CCC-SLP     Improve word retrieval skills    Status: Improve word retrieval skills  Progressing as expected  -OC     Word Retrieval Skills Progress  80%;without cues   word grid  -OC     Recorded by  [OC] Viky Ramires MA,CCC-SLP     Improve memory skills    Status: Improve memory skills  Progressing as expected  -OC     Memory Skills Progress  80%;without cues   verbal recall  -OC     Recorded by  [OC] Viky Ramires MA,CCC-SLP     Improve functional problem solving    Status: Improve functional problem solving  Progressing as expected  -OC     Functional Problem Solving Progress  70%;without cues;90%;with consistent cues   deduction task  -OC     Recorded by  [OC] Viky Ramires MA,CCC-SLP     Bed Mobility, Assessment/Treatment    Bed Mob, Supine to Sit, [place] conditional independence  -      Bed Mob, Sit to Supine, [place] conditional independence  -      Bed Mob, [place]elying to Sit, [place] conditional independence  -      Bed Mob, Sit to Sidelying, [place] conditional independence  -      Bed Mobility, Safety Issues cognitive deficits limit understanding  -      Bed Mobility, Comment tx mat  -LH      Recorded by [LH] Savana Dickey PT      Transfer Assessment/Treatment    Transfers, Bed-Chair [place] stand by assist  -      Transfers, Chair-Bed [place] stand by assist  -      Transfers, Sit-Stand [place] stand by assist  -      Transfers, Stand-Sit [place] stand by assist  -      Transfers, Sit-Stand-Sit, Assist Device rolling walker  -      Transfer, Safety Issues weight-shifting ability decreased;step length decreased;sequencing ability decreased  -      Transfer, Impairments strength decreased;impaired balance;postural control  impaired;motor control impaired  -      Recorded by [] Savana Dickey, PT      Gait Assessment/Treatment    Gait, [place] Level stand by assist  -      Gait, Assistive Device rollator  -      Gait, Distance (Feet) 160   x2  -      Gait, Gait Pattern Analysis swing-through gait  -      Gait, Safety Issues loses balance backward;weight-shifting ability decreased;step length decreased  -      Gait, Impairments strength decreased;impaired balance  -      Gait, Comment pt displays improved mechanics and comfort with using rollator today  -      Recorded by [] Savana Dickey PT      Functional Mobility    Functional Mobility- Ind. Level minimum assist (75% patient effort);contact guard assist  -      Functional Mobility- Device other (see comments)   HHA LUE  -      Functional Mobility-Distance (Feet) 80  -      Functional Mobility- Safety Issues weight-shifting ability decreased;step length decreased;sequencing ability decreased  -      Recorded by [] Savana Dickey, PT      Balance Skills Training    Training Strategies (Balance Skills) lateral WS with lateral head turns retrieving bean bags and tossing using bilateral UEs  -  standing at counter to work on weight shift L and R  during BUE activity, pt was CGA   -DN    Standing-Level of Assistance Contact guard  -  Contact guard;Close supervision  -DN    Static Standing Balance Support No upper extremity supported  -  No upper extremity supported  -DN    Standing-Balance Activities Weight Shift A-P;Weight Shift R-L;Ball toss;Reaching for objects;Reaching across midline  -  Weight Shift R-L;Weight Shift A-P;Lateral lean;Forward lean;Reaching for objects  -DN    Standing Balance # of Minutes 6  -  10 minutes total with rest breaks as needed  -DN    Recorded by [] Savana Dickey, PT  [DN] KEON Tran    Therapy Exercises    Bilateral Lower Extremities 20 reps;AROM:;supine;quad sets;SAQ;hip abduction/adduction;hamstring stretch   -LH      Bilateral Upper Extremity   30 reps;sitting;shoulder abduction/adduction;shoulder extension/flexion;elbow flexion/extension   2# dowel tere, 2# dumbell 3 sets of 10 reps  -DN    BUE Resistance   --   arm bike in standing for 4 minutes to increase end for adls  -DN    Trunk Exercises 20 reps;supine;bridging;pelvic tilts;trunk rotation   LTR, SKTC  -LH      Recorded by [LH] Savana Dickey, PT  [DN] KEON Tran    Positioning and Restraints    Pre-Treatment Position sitting in chair/recliner  -LH  --   w/c  -DN    Post Treatment Position wheelchair  -  wheelchair  -DN    In Bed   sitting;call light within reach;encouraged to call for assist  -DN    In Wheelchair sitting;call light within reach;encouraged to call for assist;exit alarm on;with family/caregiver  -LH      Recorded by [LH] Savana Dickey, PT  [DN] KEON Tran      11/28/17 1210          Rehab Assessment/Intervention    Discipline occupational therapist  -DN      Document Type therapy note (daily note)  -DN      Subjective Information agree to therapy;complains of;fatigue  -DN      Patient Effort, Rehab Treatment good  -DN      Precautions/Limitations fall precautions  -DN      Recorded by [DN] KEON Tran      Pain Assessment    Pain Assessment No/denies pain  -DN      Recorded by [YAHIR] KEON Tran      Cognitive Assessment/Intervention    Current Cognitive/Communication Assessment impaired  -DN      Orientation Status oriented x 4  -DN      Follows Commands/Answers Questions able to follow single-step instructions;needs cueing  -DN      Personal Safety mild impairment;at risk behaviors demonstrated;decreased awareness, need for assist;decreased awareness, need for safety;decreased insight to deficits;unaware of cognitive deficits;unaware of consequences of deficits;unaware of functional deficits  -DN      Personal Safety Interventions fall prevention program maintained;gait belt;supervised activity  -DN      Recorded by  [DN] KEON Tran      Transfer Assessment/Treatment    Walk-In Shower Transfer, [place] supervision required;verbal cues required  -DN      Walk-In Shower Transfer, Assist Device rolling walker  -DN      Transfer, Safety Issues weight-shifting ability decreased;balance decreased during turns  -DN      Transfer, Impairments impaired balance;strength decreased  -DN      Recorded by [DN] Gage Licea OTR      Upper Body Bathing Assessment/Training    UB Bathing Assess/Train Assistive Device grab bars;hand-held shower head;shower chair with back  -DN      UB Bathing Assess/Train, Position sitting  -DN      UB Bathing Assess/Train, [place] Level stand by assist  -DN      UB Bathing Assess/Train, Impairments strength decreased  -DN      Recorded by [DN] KEON Tran      Lower Body Bathing Assessment/Training    LB Bathing Assess/Train Assistive Device hand-held shower head;grab bars;shower chair with back  -DN      LB Bathing Assess/Train, Position sitting;standing  -DN      LB Bathing Assess/Train, [place] Level supervision required;verbal cues required  -DN      LB Bathing Assess/Train, Impairments impaired balance;strength decreased  -DN      Recorded by [DN] KEON Tran      Upper Body Dressing Assessment/Training    UB Dressing Assess/Train, Clothing Type doffing:;donning:;t-shirt;pull over  -DN      UB Dressing Assess/Train, Position sitting  -DN      UB Dressing Assess/Train, [place] set up required  -DN      UB Dressing Assess/Train, Impairments strength decreased  -DN      Recorded by [DN] KEON Tran      Lower Body Dressing Assessment/Training    LB Dressing Assess/Train, Clothing Type doffing:;donning:;pants;shoes;socks  -DN      LB Dressing Assess/Train, Position sitting;standing  -DN      LB Dressing Assess/Train, [place] minimum assist (75% patient effort);set up required;verbal cues required  -DN      LB Dressing Assess/Train, Impairments strength  decreased;impaired balance  -DN      LB Dressing Assess/Train, Comment pt able to get off compression hose, but too fatigued to put on after shower  -DN      Recorded by [DN] KEON Tran      Functional Endurance    Detail (Functional Endurance) fair, fatigued after shower  -DN      Recorded by [YAHIR] KEON Tran      Positioning and Restraints    Pre-Treatment Position --   w/c  -DN      Post Treatment Position wheelchair  -DN      In Bed sitting;call light within reach;encouraged to call for assist;exit alarm on  -DN      Recorded by [YAHIR] KEON Tran        User Key  (r) = Recorded By, (t) = Taken By, (c) = Cosigned By    Initials Name Effective Dates    OC Viky Ramires MA,CCC-SLP 04/05/17 -     DN Gage Licea, OTR 04/13/15 -     LH Savana Dickey, PT 02/07/17 -     HC Blanca Whitney, OTR 08/17/17 -               IP SLP Goals       11/28/17 1125 11/20/17 1330       Cognitive Linguistic- Improve Safety and Awareness    Cognitive Linguistic Improve Safety and Awareness- SLP, Date Established  11/20/17  -OC     Cognitive Linguistic Improve Safety and Awareness- SLP, Time to Achieve by discharge  -OC by discharge  -OC     Cognitive Linguistic Improve Safety and Awareness- SLP, Outcome goal ongoing  -OC goal ongoing  -OC       User Key  (r) = Recorded By, (t) = Taken By, (c) = Cosigned By    Initials Name Provider Type    VINNIE Ramires MA,[facility]-SLP Speech and Language Pathologist          EDUCATION  The patient has been educated in the following areas:   Cognitive Impairment Communication Impairment.    SLP Recommendation and Plan     Prognosis: good  Rehab Potential: good, to achieve stated therapy goals  Criteria for Skilled Therapeutic Interventions Met: skilled criteria for cognitive linguistic intervention met  Anticipated Discharge Disposition: home with assist     Therapy Frequency: 3-5 times/wk  Predicted Duration of Therapy Intervention (days/wks): until discharge  Expected Duration of  Therapy Session (min): 15-30 minutes    Plan of Care Review  Plan Of Care Reviewed With: patient          Time Calculation:         Time Calculation- SLP       12/01/17 1046          Time Calculation- SLP    SLP Start Time 0830  -OC      SLP Stop Time 0930  -OC      SLP Time Calculation (min) 60 min  -OC        User Key  (r) = Recorded By, (t) = Taken By, (c) = Cosigned By    Initials Name Provider Type    OC Viky Ramires MA,CCC-SLP Speech and Language Pathologist          Therapy Charges for Today     Code Description Service Date Service Provider Modifiers Qty    [id number]  ST TREATMENT SPEECH 4 11/30/2017 Viky Ramires MA,CCC-SLP GN 1    [id number]  ST TREATMENT SPEECH 4 12/1/2017 Viky Ramires MA,CCC-SLP GN 1               Viky Ramires MA,BENNETT-SLP  12/1/2017

## 2017-12-01 NOTE — THERAPY TREATMENT NOTE
Inpatient Rehabilitation - Physical Therapy Treatment Note  [facility]     Patient Name: Paloma Carr  : 1940  MRN: [id number]  Today's Date: 2017  Onset of Illness/Injury or Date of Surgery Date: 11/15/17  Date of Referral to PT: 17  Referring Physician: Brandon    Admit Date: 2017    Visit Dx:    ICD-10-CM ICD-9-CM   1. Impaired gait and mobility R26.89 781.2     Patient Active Problem List   Diagnosis   • Soft tissue swelling of chest wall   • Abnormal ultrasound of breast   • Postmastectomy lymphedema syndrome   • Malignant neoplasm of upper-outer quadrant of right female breast   • Cerebrovascular accident (CVA)   • COPD exacerbation   • Stroke               Adult Rehabilitation Note       17 1517 17 0930 17 0815    Rehab Assessment/Intervention    Discipline occupational therapist  -DN speech language pathologist  -OC physical therapist  -LH    Document Type therapy note (daily note)  -DN therapy note (daily note)  -OC therapy note (daily note)  -    Subjective Information agree to therapy;no complaints  -DN agree to therapy  -OC agree to therapy;complains of;pain  -LH    Patient Effort, Rehab Treatment good  -DN good  -OC     Symptoms Noted During/After Treatment dizziness  -DN none  -OC none  -LH    Symptoms Noted Comment --   nsg informed, dizzy spell x3 during standing balance act  -DN      Precautions/Limitations fall precautions  -DN  fall precautions  -    Specific Treatment Considerations   continued RUE pain/discomfort rated at 4/10. ortho sx consulted. pt continues to wear sleeve for lymphedema. PT has contacted OP PT who saw her for lymphedema - waiting response back for handouts. update :per OP PT - continue PROM all planes and STM as needed and OP PT requests pt to sched with her at [place].     -    Patient Response to Treatment   noted nystagmus R sided beating with ambulation and head turns this AM only, resolved after approx 10 sec with neutral  head/neck. pt reports worse dizziness in AM with waking up and turning head. PT spoke with Dr. Taylor this AM at approx 0830, no head manuevers until cleared by neuro. educated and performed basic VOR seated therex - smooth pursuit/saccades, tracking -no s/s when performed therex seated  -LH    Recorded by [DN] KEON Tran [OC] Viky Ramires MA,CCC-SLP [LH] Savana Dickey, PT    Pain Assessment    Pain Assessment No/denies pain  -DN No/denies pain  -OC 0-10  -LH    Pain Score   4  -LH    Post Pain Score   4  -LH    Pain Type   Chronic pain  -LH    Pain Location   Shoulder  -LH    Pain Orientation   Right  -LH    Pain Intervention(s)   Repositioned  -LH    Response to Interventions   federica  -LH    Recorded by [DN] KEON Tran [OC] Viky Ramires MA,CCC-SLP [] Savana Dickey, PT    Vision Assessment/Intervention    Visual Impairment WFL with corrective lenses  -DN  WFL with corrective lenses  -LH    Recorded by [DN] KEON Tran  [LH] Savana Dickey, PT    Cognitive Assessment/Intervention    Current Cognitive/Communication Assessment functional  -DN functional  -OC functional  -LH    Orientation Status oriented x 4  -DN oriented x 4  -OC oriented x 4  -LH    Follows Commands/Answers Questions needs cueing;able to follow single-step instructions  -DN  100% of the time  -LH    Personal Safety   mild impairment  -LH    Personal Safety Interventions   fall prevention program maintained;gait belt;nonskid shoes/slippers when out of bed;supervised activity  -LH    Recorded by [DN] KEON Tran [OC] Viky Ramires MA,CCC-SLP [LH] Savana Dickey, PT    Improve word retrieval skills    Status: Improve word retrieval skills  Progressing as expected  -OC     Word Retrieval Skills Progress  80%;without cues;90%;with consistent cues   dual phrase completion, 4 word completion   -OC     Recorded by  [OC] Viky Ramires MA,CCC-SLP     Improve functional problem solving    Status: Improve functional problem solving   Progressing as expected  -OC     Functional Problem Solving Progress  70%;without cues;90%;with consistent cues   functional math, money  -OC     Recorded by  [OC] Viky Ramires MA,CCC-SLP     Improve executive function skills    Status: Improve executive function skills  Progressing as expected  -OC     Recorded by  [OC] Viky Ramires MA,CCC-SLP     Bed Mobility, Assessment/Treatment    Bed Mob, Supine to Sit, [place]   conditional independence  -    Bed Mob, Sit to Supine, [place]   conditional independence  -LH    Recorded by   [LH] Savana Dickey, PT    Transfer Assessment/Treatment    Transfers, Bed-Chair [place]   stand by assist  -    Transfers, Chair-Bed [place]   stand by assist  -    Transfers, Bed-Chair-Bed, Assist Device   rolling walker  -    Transfers, Sit-Stand [place]   stand by assist  -    Transfers, Stand-Sit [place]   stand by assist  -    Transfers, Sit-Stand-Sit, Assist Device   rolling walker   rollator  -    Toilet Transfer, [place]   supervision required  -    Toilet Transfer, Assistive Device   rolling walker  -    Walk-In Shower Transfer, [place] contact guard assist;verbal cues required   secondary to stafety due to dizziness today  -DN      Transfer, Safety Issues step length decreased  -DN  weight-shifting ability decreased;step length decreased  -    Transfer, Impairments motor control impaired  -DN      Recorded by [DN] KEON Tran  [LH] Savana Dickey, PT    Gait Assessment/Treatment    Gait, [place] Level   stand by assist  -    Gait, Assistive Device   rollator  -    Gait, Distance (Feet)   160   80x2  -    Gait, Gait Pattern Analysis   swing-through gait  -    Gait, Gait Deviations   bilateral:;zabrina decreased;decreased heel strike  -    Gait, Safety Issues   step length decreased;weight-shifting ability decreased  -    Gait, Comment   focusing on head turns horizontally for inc VOR habituation  exercise, noted R sided nystagmus in heasd turning with gait this AM  -LH    Recorded by   [LH] Savana Dickey, PT    Upper Body Bathing Assessment/Training    UB Bathing Assess/Train Assistive Device grab bars;hand-held shower head;shower chair with back  -DN      UB Bathing Assess/Train, Position sitting  -DN      UB Bathing Assess/Train, [place] Level set up required  -DN      Recorded by [DN] Gage Licea OTR      Lower Body Bathing Assessment/Training    LB Bathing Assess/Train Assistive Device hand-held shower head;grab bars;shower chair with back  -DN      LB Bathing Assess/Train, Position sitting;standing  -DN      LB Bathing Assess/Train, [place] Level supervision required  -DN      LB Bathing Assess/Train, Impairments strength decreased;impaired balance  -DN      Recorded by [DN] KEON Tran      Upper Body Dressing Assessment/Training    UB Dressing Assess/Train, Clothing Type doffing:;donning:;pull over;t-shirt  -DN      UB Dressing Assess/Train, Position sitting  -DN      UB Dressing Assess/Train, [place] set up required  -DN      Recorded by [DN] Gage Licea OTR      Lower Body Dressing Assessment/Training    LB Dressing Assess/Train, Clothing Type doffing:;donning:;pants;shoes  -DN      LB Dressing Assess/Train, Position standing;sitting  -DN      LB Dressing Assess/Train, [place] set up required;supervision required  -DN      LB Dressing Assess/Train, Impairments impaired balance;strength decreased  -DN      LB Dressing Assess/Train, Comment setup for carolann hose  -DN      Recorded by [DN] KEON Tran      Grooming Assessment/Training    Grooming Assess/Train, Position sitting;sink side  -DN      Grooming Assess/Train, [place] Level set up required  -DN      Recorded by [DN] Gage Licea OTR      Balance Skills Training    Training Strategies (Balance Skills)   educated on and performed seated VOR therex (written hand outs provided)- saccades and visual tracking,  horizontal and vertical, 10 x ea direction. no s/s reproduced with therex, good tolerance. re-educated on vestibular handout   -    Standing-Level of Assistance Contact guard  -DN  Contact guard;Close supervision  -    Static Standing Balance Support   No upper extremity supported  -    Standing-Balance Activities Weight Shift A-P;Weight Shift R-L;Lateral lean;Forward lean;Reaching for objects   needed to sit x2 due dizziness   -DN  Reaching for objects;Reaching across midline;Ball toss   bean bag toss with lateral WS and reaching for bags  -    Standing Balance # of Minutes   5  -LH    Stepping Strategy Assessment (Balance Skills)   PM post neuro clearance for vestibular PT, PT session held ([id number]) - vertebral artery cleared. performed R sided zamzam hallpike into Epley manuever, no reproductions of s/s, (-) nystagmus, good tolerance. no nausea. educated on and performed Gupta Daroff R sided then L sided in pt room, no reproductions of s/s, (-) nystagmus. educated to lay flat this evening and avoid head turns. will reassess next session. pt ambulated post manuever (from gym to pt room) and reports no dizziness, no issues. RN Miguel Angel notified and aware of session.   -LH    Recorded by [DN] KEON Tran  [LH] Savana Dickey, PT    Therapy Exercises    Bilateral Upper Extremity 10 reps;elbow flexion/extension;pronation/supination   2# dumbell 3 sets of 10 reps  -DN      Recorded by [DN] KEON Tran      Functional Endurance    Detail (Functional Endurance) fair   -DN      Recorded by [DN] KEON Tran      Positioning and Restraints    Pre-Treatment Position sitting in chair/recliner  -DN  in bed  -    Post Treatment Position wheelchair  -DN  wheelchair  -LH    In Bed sitting;call light within reach;encouraged to call for assist;exit alarm on;with family/caregiver  -DN      In Wheelchair   sitting;call light within reach;encouraged to call for assist;exit alarm on  -    Recorded by [YAHIR]  "Gage Licea, OTR  [] Savana Dickey, PT      11/30/17 1400 11/30/17 1200 11/30/17 0812    Rehab Assessment/Intervention    Discipline speech language pathologist  -OC occupational therapist  -HC physical therapist  -    Document Type therapy note (daily note)  -OC therapy note (daily note)  -HC therapy note (daily note)  -    Subjective Information agree to therapy  -OC agree to therapy;no complaints  -HC agree to therapy;complains of;pain  -LH    Patient Effort, Rehab Treatment good  -OC good  -HC good  -LH    Symptoms Noted During/After Treatment none  -OC none  -HC dizziness;fatigue  -LH    Precautions/Limitations, Vision  WFL with corrective lenses  -HC WFL with corrective lenses  -    Specific Treatment Considerations   reports dizziness in AM and PM session, reports \"happens on occassion\" h/o vertigo. educated on and provided vestibular handout on VOR therex  -LH    Recorded by [OC] Viky Ramires MA,CCC-SLP [HC] Blanca Whitney, OTR [LH] Savana Dickey, PT    Vital Signs    Intra Systolic BP Rehab   119  -LH    Intra Treatment Diastolic BP   79  -LH    Recorded by   [LH] Savana Dickey, PT    Pain Assessment    Pain Assessment No/denies pain  -OC No/denies pain  -HC 0-10  -LH    Pain Score   5  -LH    Post Pain Score   5  -LH    Pain Type   Chronic pain  -LH    Pain Location   Shoulder  -LH    Pain Orientation   Right  -LH    Pain Descriptors   Aching  -LH    Pain Intervention(s)   Repositioned  -LH    Response to Interventions   federica  -LH    Recorded by [OC] Viky Ramires MA,CCC-SLP [HC] Blanca Whitney, OTR [LH] Savana Dickey, PT    Cognitive Assessment/Intervention    Current Cognitive/Communication Assessment functional  -OC functional  -HC functional  -LH    Orientation Status oriented x 4  -OC oriented x 4  -HC oriented x 4  -LH    Follows Commands/Answers Questions 100% of the time  -% of the time;able to follow single-step instructions;needs cueing  -% of the time;able to follow single-step " instructions;needs cueing  -    Personal Safety  mild impairment  -HC mild impairment  -LH    Personal Safety Interventions  fall prevention program maintained;gait belt;nonskid shoes/slippers when out of bed  - fall prevention program maintained;gait belt;nonskid shoes/slippers when out of bed;supervised activity  -    Additional Documentation  Cognitive Assessment Intervention (Group)  -HC     Recorded by [OC] Viky Ramires MA,CCC-SLP [HC] KEON Cortez [LH] Savana Dickey PT    Cognitive Assessment Intervention    Attention  mild impairment  -HC     Sequencing  mild impairment  -HC     Cognitive Assessment/Treatment Comment  Pt able to complete sequencing and following direction worksheets with 75% accuracy requiring increased time for completion. Verbal cues for mistakes but able to correct with redirection.  -HC     Recorded by  [HC] KEON Cortez     Improve word retrieval skills    Status: Improve word retrieval skills Progressing as expected  -OC      Word Retrieval Skills Progress 80%;without cues   high level word finding, famous people by attribute  -OC      Recorded by [OC] Viky Ramires MA,CCC-SLP      Improve executive function skills    Status: Improve executive function skills Progressing as expected  -OC      Executive Function Skills Progress 80%;without cues   pill box task  -OC      Recorded by [OC] Viky Ramires MA,CCC-SLP      Bed Mobility, Assessment/Treatment    Bed Mob, Supine to Sit, [place]   conditional independence  -    Bed Mob, Sit to Supine, [place]  conditional independence  -HC     Recorded by  [HC] KEON Cortez [LH] Savana Dickey, PT    Transfer Assessment/Treatment    Transfers, Bed-Chair [place]   stand by assist  -    Transfers, Chair-Bed [place]  stand by assist  - stand by assist  -    Transfers, Sit-Stand [place]  stand by assist  -HC stand by assist  -    Transfers, Stand-Sit [place]  stand by assist  - stand by  "assist  -    Transfers, Sit-Stand-Sit, Assist Device   rolling walker  -    Walk-In Shower Transfer, [place]  supervision required;verbal cues required  -     Walk-In Shower Transfer, Assist Device  rolling walker  -     Transfer, Safety Issues   sequencing ability decreased;step length decreased;weight-shifting ability decreased  -    Transfer, Impairments   strength decreased;impaired balance  -LH    Recorded by  [] KEON Cortez [] Savana Dickey, PT    Gait Assessment/Treatment    Gait, [place] Level   stand by assist;contact guard assist  -    Gait, Assistive Device   rollator  -    Gait, Distance (Feet)   180   x3  -    Gait, Gait Pattern Analysis   swing-through gait  -    Gait, Gait Deviations   zabrina decreased;bilateral:;decreased heel strike  -    Gait, Safety Issues   weight-shifting ability decreased;step length decreased;sequencing ability decreased  -    Gait, Impairments   strength decreased;impaired balance  -    Gait, Comment   intermittent c/o dizziness in ambulation this AM, pt stops during gait to \"adjust\"  -LH    Recorded by   [] Savana Dickey, PT    Stairs Assessment/Treatment    Number of Stairs   4  -    Stairs, Handrail Location   both sides  -    Stairs, [place] Level   contact guard assist;verbal cues required  -    Stairs, Technique Used   step over step (ascending);step over step (descending)  -    Stairs, Impairments   strength decreased;impaired balance  -    Recorded by   [] Savana Dickey, PT    Functional Mobility    Functional Mobility- Ind. Level  contact guard assist  -     Functional Mobility- Device  rolling walker  -     Functional Mobility-Distance (Feet)  --   from w/c in to BR  -HC     Recorded by  [] KEON Cortez     Upper Body Bathing Assessment/Training    UB Bathing Assess/Train Assistive Device  grab bars;hand-held shower head;shower chair with back  -     UB Bathing Assess/Train, Position  " sitting  -HC     UB Bathing Assess/Train, [place] Level  stand by assist  -HC     Recorded by  [HC] KEON Cortez     Lower Body Bathing Assessment/Training    LB Bathing Assess/Train Assistive Device  hand-held shower head;grab bars;shower chair with back  -     LB Bathing Assess/Train, Position  sitting;standing  -     LB Bathing Assess/Train, [place] Level  supervision required  -HC     Recorded by  [HC] KEON Cortez     Upper Body Dressing Assessment/Training    UB Dressing Assess/Train, Clothing Type  doffing:;donning:;t-shirt;pull over  -     UB Dressing Assess/Train, Position  sitting  -     UB Dressing Assess/Train, [place]  set up required  -HC     Recorded by  [HC] KEON Cortez     Lower Body Dressing Assessment/Training    LB Dressing Assess/Train, Clothing Type  doffing:;donning:;pants;shoes;socks  -     LB Dressing Assess/Train, Position  sitting;standing  -     LB Dressing Assess/Train, [place]  minimum assist (75% patient effort)  -     LB Dressing Assess/Train, Comment  able to don RUE compression garment,  assisted with LE carolann hose  -HC     Recorded by  [HC] KEON Cortez     Balance Skills Training    Stepping Strategy Assessment (Balance Skills)   educated on seated VOR therex - saccades and visual tracking, also provided vestibular handout   -    Recorded by   [] Savana Dickey, PT    Therapy Exercises    Bilateral Upper Extremity  AROM:;20 reps;elbow flexion/extension;pronation/supination;shoulder extension/flexion   wrist; 2# hand weight  -HC     Recorded by  [HC] KEON Cortez     Positioning and Restraints    Pre-Treatment Position  sitting in chair/recliner  - in bed  -    Post Treatment Position  bed   w/c in PM  - wheelchair  -    In Bed  supine;call light within reach;encouraged to call for assist;with family/caregiver;with SLP  -HC     In Wheelchair  with PT  -HC sitting;call light within reach;encouraged to  call for assist;exit alarm on  -LH    Recorded by  [HC] Blanca Whitney OTR [LH] Savana Dickey, PT      11/29/17 1649 11/29/17 1545 11/29/17 1430    Rehab Assessment/Intervention    Discipline occupational therapist  -DN physical therapist  -LH speech language pathologist  -OC    Document Type therapy note (daily note)  -DN therapy note (daily note)  -LH therapy note (daily note)  -OC    Subjective Information no complaints;agree to therapy  -DN no complaints;agree to therapy  -LH no complaints;agree to therapy  -OC    Patient Effort, Rehab Treatment good  -DN good  -LH good  -OC    Precautions/Limitations fall precautions  -DN fall precautions  -LH     Specific Treatment Considerations family conferance held with pt and  today   -DN      Recorded by [DN] Gage Licea OTR [LH] Savana Dickey, PT [OC] Viky Ramires MA,CCC-SLP    Pain Assessment    Pain Assessment  No/denies pain  -LH     Recorded by  [LH] Savana Dickey PT     Vision Assessment/Intervention    Visual Impairment  WFL with corrective lenses  -LH     Recorded by  [LH] Savana Dickey PT     Cognitive Assessment/Intervention    Current Cognitive/Communication Assessment functional  -DN functional  -LH functional  -OC    Orientation Status oriented x 4  -DN oriented x 4  -LH oriented x 4  -OC    Follows Commands/Answers Questions able to follow single-step instructions;needs cueing  -% of the time;able to follow single-step instructions;needs cueing  -LH able to follow multi-step instructions;able to follow single-step instructions;needs increased time;needs repetition  -OC    Personal Safety mild impairment;decreased awareness, need for assist  -DN mild impairment  -LH     Personal Safety Interventions fall prevention program maintained;gait belt  -DN fall prevention program maintained;gait belt;nonskid shoes/slippers when out of bed;supervised activity  -LH     Recorded by [DN] Gage Licea, OTR [LH] Savana Dickey, PT [OC] Viky Ramires MA,CCC-SLP     Improve word retrieval skills    Status: Improve word retrieval skills   Progressing as expected  -OC    Word Retrieval Skills Progress   80%;without cues   word grid  -OC    Recorded by   [OC] Viky Ramires MA,CCC-SLP    Improve memory skills    Status: Improve memory skills   Progressing as expected  -OC    Memory Skills Progress   80%;without cues   verbal recall  -OC    Recorded by   [OC] Viky Ramires MA,CCC-SLP    Improve functional problem solving    Status: Improve functional problem solving   Progressing as expected  -OC    Functional Problem Solving Progress   70%;without cues;90%;with consistent cues   deduction task  -OC    Recorded by   [OC] Viky Ramires MA,CCC-SLP    Bed Mobility, Assessment/Treatment    Bed Mob, Supine to Sit, [place]  conditional independence  -     Bed Mob, Sit to Supine, [place]  conditional independence  -     Bed Mob, Sidelying to Sit, [place]  conditional independence  -LH     Bed Mob, Sit to Sidelying, [place]  conditional independence  -LH     Bed Mobility, Safety Issues  cognitive deficits limit understanding  -     Bed Mobility, Comment  tx mat  -LH     Recorded by  [LH] Savana Dickey, PT     Transfer Assessment/Treatment    Transfers, Bed-Chair [place]  stand by assist  -     Transfers, Chair-Bed [place] stand by assist  -DN stand by assist  -     Transfers, Sit-Stand [place]  stand by assist  -     Transfers, Stand-Sit [place]  stand by assist  -     Transfers, Sit-Stand-Sit, Assist Device  rolling walker  -     Transfer, Safety Issues  weight-shifting ability decreased;step length decreased;sequencing ability decreased  -     Transfer, Impairments  strength decreased;impaired balance;postural control impaired;motor control impaired  -     Recorded by [DN] Gage Licea OTR [LH] Savana Dickey, PT     Gait Assessment/Treatment    Gait, [place] Level  stand by assist  -     Gait, Assistive Device  rollator   -     Gait, Distance (Feet)  160   x2  -     Gait, Gait Pattern Analysis  swing-through gait  -     Gait, Safety Issues  loses balance backward;weight-shifting ability decreased;step length decreased  -     Gait, Impairments  strength decreased;impaired balance  -     Gait, Comment  pt displays improved mechanics and comfort with using rollator today  -     Recorded by  [LH] Savana Dickey, PT     Functional Mobility    Functional Mobility- Ind. Level  minimum assist (75% patient effort);contact guard assist  -     Functional Mobility- Device  other (see comments)   HHA LUE  -     Functional Mobility-Distance (Feet)  80  -     Functional Mobility- Safety Issues  weight-shifting ability decreased;step length decreased;sequencing ability decreased  -     Recorded by  [] Savana Dickey, PT     Balance Skills Training    Training Strategies (Balance Skills) standing at table with BUE activity with no device to r and l to increase balance and endurance for increased independence with adls  - lateral WS with lateral head turns retrieving bean bags and tossing using bilateral UEs  -     Standing-Level of Assistance Contact guard  - Contact guard  -     Static Standing Balance Support  No upper extremity supported  -     Standing-Balance Activities Weight Shift A-P;Weight Shift R-L;Reaching for objects;Reaching across midline  - Weight Shift A-P;Weight Shift R-L;Ball toss;Reaching for objects;Reaching across midline  -     Standing Balance # of Minutes total time 15 minutes with rest breaks as needed  -DN 6  -     Recorded by [DN] KEON Tran [LH] Savana Dickey, PT     Therapy Exercises    Bilateral Lower Extremities  20 reps;AROM:;supine;quad sets;SAQ;hip abduction/adduction;hamstring stretch  -     Bilateral Upper Extremity AROM:;30 reps;sitting;elbow flexion/extension;pronation/supination;shoulder abduction/adduction;shoulder extension/flexion;shoulder horizontal abd/add   2#  donny, 2# dumbell, yellow theraband, handgripper 3/10s  -DN      BUE Resistance --   arm bike standing for 3 minutes  -DN      Trunk Exercises  20 reps;supine;bridging;pelvic tilts;trunk rotation   LTR, SKTC  -LH     Recorded by [DN] Gage Licea, OTR [LH] Savana Dickey, PT     Functional Endurance    Detail (Functional Endurance) fair +  -DN      Recorded by [DN] Gage Licea OTR      Positioning and Restraints    Pre-Treatment Position --   w/c  -DN sitting in chair/recliner  -LH     Post Treatment Position bed  -DN wheelchair  -LH     In Wheelchair  sitting;call light within reach;encouraged to call for assist;exit alarm on;with family/caregiver  -LH     Recorded by [DN] Gage Licea, OTR [LH] Savana Dickey, PT       User Key  (r) = Recorded By, (t) = Taken By, (c) = Cosigned By    Initials Name Effective Dates    OC Viyk Ramires MA,CCC-SLP 04/05/17 -     DN Gage Licea, OTR 04/13/15 -     LH Savana Dickey, PT 02/07/17 -      Blanca Whitney, OTR 08/17/17 -                 IP PT Goals       11/27/17 1511 11/20/17 1538       Bed Mobility PT LTG    Bed Mobility PT LTG, Date Established 11/27/17  -MG 11/20/17  -LH     Bed Mobility PT LTG, Time to Achieve 1 wk  -MG 2 wks  -LH     Bed Mobility PT LTG, Activity Type all bed mobility  -MG all bed mobility  -LH     Bed Mobility PT LTG, [place] Level independent  -MG independent  -LH     Bed Mobility PT LTG, Date Goal Reviewed 11/27/17  -MG      Bed Mobility PT LTG, Outcome goal partially met  -MG      Bed Mobility PT LTG, Reason Goal Not Met --  -MG      Transfer Training PT LTG    Transfer Training PT LTG, Date Established 11/27/17  -MG 11/20/17  -LH     Transfer Training PT LTG, Time to Achieve 1 wk  -MG 2 wks  -LH     Transfer Training PT LTG, Activity Type  all transfers  -LH     Transfer Training PT LTG, [place] Level  supervision required  -LH     Transfer Training PT  LTG, Date Goal Reviewed 11/27/17  -MG      Transfer Training PT LTG, Outcome goal  partially met  -MG      Transfer Training PT LTG, Reason Goal Not Met --  -MG      Transfer Training 2 PT LTG    Transfer Training PT 2 LTG, Date Established 11/27/17  -MG 11/20/17  -LH     Transfer Training PT 2 LTG, Time to Achieve 1 wk  -MG 2 wks  -LH     Transfer Training PT 2 LTG, Activity Type all transfers  -MG --   car tsf   -LH     Transfer Training PT 2 LTG, [place] Level  supervision required  -LH     Transfer Training PT 2 LTG, Date Goal Reviewed 11/27/17  -MG      Transfer Training PT 2 LTG, Outcome goal partially met  -MG      Transfer Training PT 2 LTG, Reason Goal Not Met --  -MG      Gait Training PT LTG    Gait Training Goal PT LTG, Date Established 11/27/17  -MG 11/20/17  -LH     Gait Training Goal PT LTG, Time to Achieve 1 wk  -MG 2 wks  -LH     Gait Training Goal PT LTG, [place] Level supervision required  -MG supervision required  -LH     Gait Training Goal PT LTG, Distance to Achieve  300  -LH     Gait Training Goal PT LTG, Outcome goal met  -MG      Gait Training Goal PT LTG, Reason Goal Not Met --  -MG      Stair Training PT LTG    Stair Training Goal PT LTG, Date Established 11/27/17  -MG 11/20/17  -LH     Stair Training Goal PT LTG, Time to Achieve 1 wk  -MG 2 wks  -LH     Stair Training Goal PT LTG, Number of Steps 4  -MG 4  -LH     Stair Training Goal PT LTG, [place] Level  supervision required  -LH     Stair Training Goal PT LTG, Assist Device  2 handrails  -LH     Stair Training Goal PT LTG, Date Goal Reviewed 11/27/17  -MG      Stair Training Goal PT LTG, Outcome goal partially met  -MG      Stair Training Goal PT LTG, Reason Goal Not Met --  -MG      Patient Education PT LTG    Patient Education PT LTG, Date Established 11/27/17  -MG 11/20/17  -LH     Patient Education PT LTG, Time to Achieve 1 wk  -MG 2 wks  -LH     Patient Education PT LTG, Education Type  written program;HEP  -     Patient Education PT LTG, Education Understanding demonstrate adequately;verbalize  understanding  -MG      Patient Education PT LTG, Date Goal Reviewed 11/27/17  -MG      Patient Education PT LTG Outcome goal ongoing  -MG        User Key  (r) = Recorded By, (t) = Taken By, (c) = Cosigned By    Initials Name Provider Type     Savana Dickey, PT Physical Therapist    MG Megan Gosselin, PT Physical Therapist          Physical Therapy Education     Title: PT OT SLP Therapies (Done)     Topic: Physical Therapy (Done)     Point: Mobility training (Done)    Learning Progress Summary    Learner Readiness Method Response Comment Documented by Status   Patient Acceptance E NR,VU   12/01/17 0904 Done    Acceptance E,H VU,NR   11/30/17 0856 Done    Acceptance E VU,DU,NR family conference held this date. educated pt and spouse on expectations for home including 24 hr assist for cog deficits. has all equipment needs at home (rwx, rollator). will provided HEP prior to DC.  11/29/17 1551 Done    Acceptance E VU,NR   11/27/17 1200 Done    Acceptance E VU,NR   11/25/17 1348 Done    Acceptance E NR,VU   11/24/17 1017 Done    Acceptance E VU,NR   11/23/17 1251 Done    Acceptance E VU,NR   11/22/17 1247 Done    Acceptance E VU,NR   11/21/17 1212 Done    Acceptance E NR   11/20/17 1536 Active   Family Acceptance E VU,DU,NR family conference held this date. educated pt and spouse on expectations for home including 24 hr assist for cog deficits. has all equipment needs at home (rwx, rollator). will provided HEP prior to DC.  11/29/17 1551 Done               Point: Home exercise program (Done)    Learning Progress Summary    Learner Readiness Method Response Comment Documented by Status   Patient Acceptance E NR,VU   12/01/17 0904 Done    Acceptance E,H VU,NR   11/30/17 0856 Done    Acceptance E VU,DU,NR family conference held this date. educated pt and spouse on expectations for home including 24 hr assist for cog deficits. has all equipment needs at home (rwx, rollator). will provided HEP prior  to DC.  11/29/17 1551 Done    Acceptance E NR,VU   11/24/17 1017 Done    Acceptance E VU,NR   11/22/17 1247 Done    Acceptance E VU,NR   11/21/17 1212 Done    Acceptance E NR   11/20/17 1536 Active   Family Acceptance E VU,DU,NR family conference held this date. educated pt and spouse on expectations for home including 24 hr assist for cog deficits. has all equipment needs at home (rwx, rollator). will provided HEP prior to DC.  11/29/17 1551 Done               Point: Body mechanics (Done)    Learning Progress Summary    Learner Readiness Method Response Comment Documented by Status   Patient Acceptance E NR,VU   12/01/17 0904 Done    Acceptance E,H VU,NR   11/30/17 0856 Done    Acceptance E VU,DU,NR family conference held this date. educated pt and spouse on expectations for home including 24 hr assist for cog deficits. has all equipment needs at home (rwx, rollator). will provided HEP prior to DC.  11/29/17 1551 Done    Acceptance E VU,NR   11/27/17 1200 Done    Acceptance E NR,VU   11/24/17 1017 Done    Acceptance E VU,NR   11/22/17 1247 Done    Acceptance E VU,NR   11/21/17 1212 Done    Acceptance E NR   11/20/17 1536 Active   Family Acceptance E VU,DU,NR family conference held this date. educated pt and spouse on expectations for home including 24 hr assist for cog deficits. has all equipment needs at home (rwx, rollator). will provided HEP prior to DC.  11/29/17 1551 Done               Point: Precautions (Done)    Learning Progress Summary    Learner Readiness Method Response Comment Documented by Status   Patient Acceptance E NR,VU   12/01/17 0904 Done    Acceptance E,H VU,NR   11/30/17 0856 Done    Acceptance E VU,DU,NR family conference held this date. educated pt and spouse on expectations for home including 24 hr assist for cog deficits. has all equipment needs at home (rwx, rollator). will provided HEP prior to DC.  11/29/17 1551 Done    Acceptance E VU,NR   11/27/17  1200 Done    Acceptance E NR,VU   11/24/17 1017 Done    Acceptance E VU,NR   11/22/17 1247 Done    Acceptance E VU,NR   11/21/17 1212 Done    Acceptance E NR   11/20/17 1536 Active   Family Acceptance E VU,DU,NR family conference held this date. educated pt and spouse on expectations for home including 24 hr assist for cog deficits. has all equipment needs at home (rwx, rollator). will provided HEP prior to DC.  11/29/17 1551 Done                      User Key     Initials Effective Dates Name Provider Type Discipline     02/07/17 -  Savana Dickey, PT Physical Therapist PT    LB 02/18/16 -  Savana Novoa, PTA Physical Therapy Assistant PT    MG 09/13/17 -  Megan Gosselin, KAREN Physical Therapist PT                    PT Recommendation and Plan  Anticipated Discharge Disposition: home with assist, home with outpatient services  Planned Therapy Interventions: bed mobility training, balance training, gait training, home exercise program, ROM (Range of Motion), stair training, strengthening, stretching, transfer training  PT Frequency: 2 times/day  Plan of Care Review  Plan Of Care Reviewed With: patient, spouse  Outcome Summary/Follow up Plan: pt presents w. generalized weakness, apparent L sided decreased coodination, increased distractability with attending to tasks., loss of independent s/p CVA, s/p TPA, thrombectomy and JEFE. h/o CVA 4 yrs ago ([facility] rehab), meningioma, poor peripheral vision, macular degeneration, breast CA s/p mastectomy. fair tolerance to tsf, gait and stair navigation at this date. may benefit from skilled inpt PT rehab to address functional deficits and assist w. reintegration to home and community.          Time Calculation:         PT Charges       12/01/17 1600 12/01/17 1430 12/01/17 0905    Time Calculation    Start Time 1515  -LH 1300  - 0800  -    Stop Time 1545  -LH 1330  - 0830  -    Time Calculation (min) 30 min  - 30 min  - 30 min  -    PT Received On    12/01/17  -    PT - Next Appointment   12/02/17  -      User Key  (r) = Recorded By, (t) = Taken By, (c) = Cosigned By    Initials Name Provider Type     Savana Dickey PT Physical Therapist          Therapy Charges for Today     Code Description Service Date Service Provider Modifiers Qty    75844465106 HC PT THER PROC EA 15 MIN 11/30/2017 Savana Dickey, PT GP 4    50891142224 HC PT THER PROC EA 15 MIN 12/1/2017 Savana Dickey, PT GP 4    51140367032 HC PT THER PROC EA 15 MIN 12/1/2017 Savana Dickey, PT GP 2               Savana Dickey, PT  12/1/2017

## 2017-12-01 NOTE — PROGRESS NOTES
Occupational Therapy: Branch    Physical Therapy: Branch    Speech Language Pathology:  Individual: 60 minutes.    Signed by: OMAR Arriaga

## 2017-12-01 NOTE — THERAPY TREATMENT NOTE
Inpatient Rehabilitation - Occupational Therapy Treatment Note  Psychiatric     Patient Name: Paloma Carr  : 1940  MRN: [id number]  Today's Date: 2017  Onset of Illness/Injury or Date of Surgery Date: 11/15/17     Referring Physician: Brandon      Admit Date: 2017    Visit Dx:     ICD-10-CM ICD-9-CM   1. Impaired gait and mobility R26.89 781.2     Patient Active Problem List   Diagnosis   • Soft tissue swelling of chest wall   • Abnormal ultrasound of breast   • Postmastectomy lymphedema syndrome   • Malignant neoplasm of upper-outer quadrant of right female breast   • Cerebrovascular accident (CVA)   • COPD exacerbation   • Stroke             Adult Rehabilitation Note       17 1517 17 0930 17 0815    Rehab Assessment/Intervention    Discipline occupational therapist  -DN speech language pathologist  -OC physical therapist  -LH    Document Type therapy note (daily note)  -DN therapy note (daily note)  -OC therapy note (daily note)  -LH    Subjective Information agree to therapy;no complaints  -DN agree to therapy  -OC agree to therapy;complains of;pain  -LH    Patient Effort, Rehab Treatment good  -DN good  -OC     Symptoms Noted During/After Treatment dizziness  -DN none  -OC none  -LH    Symptoms Noted Comment --   nsg informed, dizzy spell x3 during standing balance act  -DN      Precautions/Limitations fall precautions  -DN  fall precautions  -    Specific Treatment Considerations   continued RUE pain/discomfort rated at 4/10. ortho sx consulted. pt continues to wear sleeve for lymphedema. PT has contacted OP PT who saw her for lymphedema - waiting response back for handouts. update :per OP PT - continue PROM all planes and STM as needed and OP PT requests pt to sched with her at [place].     -    Patient Response to Treatment   noted nystagmus R sided beating with ambulation and head turns this AM only, resolved after approx 10 sec. with neutral head/neck. PT spoke with  Dr. Taylor this AM at approx 0830, no head manuevers until cleared by neuro. educated and performed basic VOR seated therex - smooth pursuit/saccades, tracking -no s/s when performed therex seated  -LH    Recorded by [DN] KEON Tran [OC] Viky Ramires MA,CCC-SLP [LH] Savana Dickey, PT    Pain Assessment    Pain Assessment No/denies pain  -DN No/denies pain  -OC 0-10  -LH    Pain Score   4  -LH    Post Pain Score   4  -LH    Pain Type   Chronic pain  -LH    Pain Location   Shoulder  -LH    Pain Orientation   Right  -LH    Pain Intervention(s)   Repositioned  -LH    Response to Interventions   federica  -LH    Recorded by [DN] KEON Tran [OC] Viky Ramires MA,CCC-SLP [LH] Savana Dickey, PT    Vision Assessment/Intervention    Visual Impairment WFL with corrective lenses  -DN  WFL with corrective lenses  -LH    Recorded by [DN] KEON Tran  [LH] Savana Dickey, PT    Cognitive Assessment/Intervention    Current Cognitive/Communication Assessment functional  -DN functional  -OC functional  -LH    Orientation Status oriented x 4  -DN oriented x 4  -OC oriented x 4  -LH    Follows Commands/Answers Questions needs cueing;able to follow single-step instructions  -DN  100% of the time  -LH    Personal Safety   mild impairment  -LH    Personal Safety Interventions   fall prevention program maintained;gait belt;nonskid shoes/slippers when out of bed;supervised activity  -LH    Recorded by [DN] KEON Tran [OC] Viky Ramires MA,CCC-SLP [LH] Savana Dickey, PT    Improve word retrieval skills    Status: Improve word retrieval skills  Progressing as expected  -OC     Word Retrieval Skills Progress  80%;without cues;90%;with consistent cues   dual phrase completion, 4 word completion   -OC     Recorded by  [OC] Viky Ramires MA,CCC-SLP     Improve functional problem solving    Status: Improve functional problem solving  Progressing as expected  -OC     Functional Problem Solving Progress  70%;without  cues;90%;with consistent cues   functional math, money  -OC     Recorded by  [OC] Viky Ramires MA,CCC-SLP     Improve executive function skills    Status: Improve executive function skills  Progressing as expected  -OC     Recorded by  [OC] Viky Ramires MA,CCC-SLP     Bed Mobility, Assessment/Treatment    Bed Mob, Supine to Sit, [place]   conditional independence  -    Bed Mob, Sit to Supine, [place]   conditional independence  -LH    Recorded by   [LH] Savana Dickey, PT    Transfer Assessment/Treatment    Transfers, Bed-Chair [place]   stand by assist  -    Transfers, Chair-Bed [place]   stand by assist  -    Transfers, Bed-Chair-Bed, Assist Device   rolling walker  -    Transfers, Sit-Stand [place]   stand by assist  -LH    Transfers, Stand-Sit [place]   stand by assist  -    Transfers, Sit-Stand-Sit, Assist Device   rolling walker   rollator  -    Toilet Transfer, [place]   supervision required  -    Toilet Transfer, Assistive Device   rolling walker  -    Walk-In Shower Transfer, [place] contact guard assist;verbal cues required   secondary to stafety due to dizziness today  -DN      Transfer, Safety Issues step length decreased  -DN  weight-shifting ability decreased;step length decreased  -    Transfer, Impairments motor control impaired  -DN      Recorded by [DN] Gage Licea, OTR  [LH] Savana Dickey, PT    Gait Assessment/Treatment    Gait, [place] Level   stand by assist  -    Gait, Assistive Device   rollator  -    Gait, Distance (Feet)   160   80x2  -    Gait, Gait Pattern Analysis   swing-through gait  -    Gait, Gait Deviations   bilateral:;zabrina decreased;decreased heel strike  -    Gait, Safety Issues   step length decreased;weight-shifting ability decreased  -    Gait, Comment   focusing on head turns horizontally for inc VOR habituation exercise, noted R sided nystagmus in heasd turning with gait this AM  -LH    Recorded by    [LH] Savana Dickey, PT    Upper Body Bathing Assessment/Training    UB Bathing Assess/Train Assistive Device grab bars;hand-held shower head;shower chair with back  -DN      UB Bathing Assess/Train, Position sitting  -DN      UB Bathing Assess/Train, [place] Level set up required  -DN      Recorded by [DN] KEON Tran      Lower Body Bathing Assessment/Training    LB Bathing Assess/Train Assistive Device hand-held shower head;grab bars;shower chair with back  -DN      LB Bathing Assess/Train, Position sitting;standing  -DN      LB Bathing Assess/Train, [place] Level supervision required  -DN      LB Bathing Assess/Train, Impairments strength decreased;impaired balance  -DN      Recorded by [YAHIR] KEON Tran      Upper Body Dressing Assessment/Training    UB Dressing Assess/Train, Clothing Type doffing:;donning:;pull over;t-shirt  -DN      UB Dressing Assess/Train, Position sitting  -DN      UB Dressing Assess/Train, [place] set up required  -DN      Recorded by [YAHRI] KEON Tran      Lower Body Dressing Assessment/Training    LB Dressing Assess/Train, Clothing Type doffing:;donning:;pants;shoes  -DN      LB Dressing Assess/Train, Position standing;sitting  -DN      LB Dressing Assess/Train, [place] set up required;supervision required  -DN      LB Dressing Assess/Train, Impairments impaired balance;strength decreased  -DN      LB Dressing Assess/Train, Comment setup for carolann hendersone  -DN      Recorded by [YAHIR] KEON Tran      Grooming Assessment/Training    Grooming Assess/Train, Position sitting;sink side  -DN      Grooming Assess/Train, [place] Level set up required  -DN      Recorded by [DN] KEON Tran      Balance Skills Training    Training Strategies (Balance Skills)   educated on and performed seated VOR therex (written hand outs provided)- saccades and visual tracking, horizontal and vertical, 10 x ea direction. no s/s reproduced with therex, good tolerance.  re-educated on vestibular handout   -    Standing-Level of Assistance Contact guard  -DN  Contact guard;Close supervision  -    Static Standing Balance Support   No upper extremity supported  -    Standing-Balance Activities Weight Shift A-P;Weight Shift R-L;Lateral lean;Forward lean;Reaching for objects   needed to sit x2 due dizziness   -DN  Reaching for objects;Reaching across midline;Ball toss   bean bag toss with lateral WS and reaching for bags  -    Standing Balance # of Minutes   5  -LH    Recorded by [DN] KEON Tran  [LH] Savana Dickey, PT    Therapy Exercises    Bilateral Upper Extremity 10 reps;elbow flexion/extension;pronation/supination   2# dumbell 3 sets of 10 reps  -DN      Recorded by [DN] KEON Tran      Functional Endurance    Detail (Functional Endurance) fair   -DN      Recorded by [DN] KEON Tran      Positioning and Restraints    Pre-Treatment Position sitting in chair/recliner  -DN  in bed  -LH    Post Treatment Position wheelchair  -DN  wheelchair  -LH    In Bed sitting;call light within reach;encouraged to call for assist;exit alarm on;with family/caregiver  -DN      In Wheelchair   sitting;call light within reach;encouraged to call for assist;exit alarm on  -LH    Recorded by [DN] KEON Tran  [LH] Savana Dickey, PT      11/30/17 1400 11/30/17 1200 11/30/17 0812    Rehab Assessment/Intervention    Discipline speech language pathologist  -OC occupational therapist  -HC physical therapist  -    Document Type therapy note (daily note)  -OC therapy note (daily note)  -HC therapy note (daily note)  -    Subjective Information agree to therapy  -OC agree to therapy;no complaints  -HC agree to therapy;complains of;pain  -LH    Patient Effort, Rehab Treatment good  -OC good  -HC good  -LH    Symptoms Noted During/After Treatment none  -OC none  -HC dizziness;fatigue  -LH    Precautions/Limitations, Vision  WFL with corrective lenses  -HC WFL with corrective  "lenses  -LH    Specific Treatment Considerations   reports dizziness in AM and PM session, reports \"happens on occassion\" h/o vertigo. educated on and provided vestibular handout on VOR therex  -LH    Recorded by [OC] Viky Ramires MA,CCC-SLP [HC] Blanca Whitney, OTR [LH] Savana Dickey, PT    Vital Signs    Intra Systolic BP Rehab   119  -LH    Intra Treatment Diastolic BP   79  -LH    Recorded by   [LH] Savana Dickey, PT    Pain Assessment    Pain Assessment No/denies pain  -OC No/denies pain  -HC 0-10  -LH    Pain Score   5  -LH    Post Pain Score   5  -LH    Pain Type   Chronic pain  -LH    Pain Location   Shoulder  -LH    Pain Orientation   Right  -LH    Pain Descriptors   Aching  -LH    Pain Intervention(s)   Repositioned  -LH    Response to Interventions   federica  -LH    Recorded by [OC] Viky Ramires MA,CCC-SLP [HC] Blanca Whitney, OTR [LH] Savana Dickey, PT    Cognitive Assessment/Intervention    Current Cognitive/Communication Assessment functional  -OC functional  -HC functional  -LH    Orientation Status oriented x 4  -OC oriented x 4  -HC oriented x 4  -LH    Follows Commands/Answers Questions 100% of the time  -% of the time;able to follow single-step instructions;needs cueing  -% of the time;able to follow single-step instructions;needs cueing  -    Personal Safety  mild impairment  -HC mild impairment  -LH    Personal Safety Interventions  fall prevention program maintained;gait belt;nonskid shoes/slippers when out of bed  -HC fall prevention program maintained;gait belt;nonskid shoes/slippers when out of bed;supervised activity  -    Additional Documentation  Cognitive Assessment Intervention (Group)  -HC     Recorded by [OC] Viky Ramires MA,CCC-SLP [HC] Blanca Whitney, OTR [LH] Savana Dickey, PT    Cognitive Assessment Intervention    Attention  mild impairment  -HC     Sequencing  mild impairment  -HC     Cognitive Assessment/Treatment Comment  Pt able to complete sequencing and following " direction worksheets with 75% accuracy requiring increased time for completion. Verbal cues for mistakes but able to correct with redirection.  -HC     Recorded by  [HC] KEON Cortez     Improve word retrieval skills    Status: Improve word retrieval skills Progressing as expected  -OC      Word Retrieval Skills Progress 80%;without cues   high level word finding, famous people by attribute  -OC      Recorded by [OC] Viky Ramires MA,CCC-SLP      Improve executive function skills    Status: Improve executive function skills Progressing as expected  -OC      Executive Function Skills Progress 80%;without cues   pill box task  -OC      Recorded by [OC] Viky Ramires MA,CCC-SLP      Bed Mobility, Assessment/Treatment    Bed Mob, Supine to Sit, [place]   conditional independence  -LH    Bed Mob, Sit to Supine, [place]  conditional independence  -HC     Recorded by  [HC] KEON Cortez [LH] Savana Dickey, PT    Transfer Assessment/Treatment    Transfers, Bed-Chair [place]   stand by assist  -    Transfers, Chair-Bed [place]  stand by assist  -HC stand by assist  -    Transfers, Sit-Stand [place]  stand by assist  -HC stand by assist  -    Transfers, Stand-Sit [place]  stand by assist  -HC stand by assist  -    Transfers, Sit-Stand-Sit, Assist Device   rolling walker  -    Walk-In Shower Transfer, [place]  supervision required;verbal cues required  -     Walk-In Shower Transfer, Assist Device  rolling walker  -HC     Transfer, Safety Issues   sequencing ability decreased;step length decreased;weight-shifting ability decreased  -    Transfer, Impairments   strength decreased;impaired balance  -    Recorded by  [HC] KEON Cortez [LH] Savana Dickey, PT    Gait Assessment/Treatment    Gait, [place] Level   stand by assist;contact guard assist  -    Gait, Assistive Device   rollator  -    Gait, Distance (Feet)   180   x3  -    Gait, Gait Pattern Analysis  "  swing-through gait  -    Gait, Gait Deviations   zabrina decreased;bilateral:;decreased heel strike  -    Gait, Safety Issues   weight-shifting ability decreased;step length decreased;sequencing ability decreased  -    Gait, Impairments   strength decreased;impaired balance  -    Gait, Comment   intermittent c/o dizziness in ambulation this AM, pt stops during gait to \"adjust\"  -    Recorded by   [] Savana Dickey, PT    Stairs Assessment/Treatment    Number of Stairs   4  -    Stairs, Handrail Location   both sides  -    Stairs, [place] Level   contact guard assist;verbal cues required  -    Stairs, Technique Used   step over step (ascending);step over step (descending)  -    Stairs, Impairments   strength decreased;impaired balance  -    Recorded by   [] Savana Dickey, PT    Functional Mobility    Functional Mobility- Ind. Level  contact guard assist  -     Functional Mobility- Device  rolling walker  -     Functional Mobility-Distance (Feet)  --   from w/c in to BR  -HC     Recorded by  [HC] KEON Cortez     Upper Body Bathing Assessment/Training    UB Bathing Assess/Train Assistive Device  grab bars;hand-held shower head;shower chair with back  -HC     UB Bathing Assess/Train, Position  sitting  -     UB Bathing Assess/Train, [place] Level  stand by assist  -HC     Recorded by  [HC] KEON Cortez     Lower Body Bathing Assessment/Training    LB Bathing Assess/Train Assistive Device  hand-held shower head;grab bars;shower chair with back  -HC     LB Bathing Assess/Train, Position  sitting;standing  -     LB Bathing Assess/Train, [place] Level  supervision required  -     Recorded by  [HC] KEON Cortez     Upper Body Dressing Assessment/Training    UB Dressing Assess/Train, Clothing Type  doffing:;donning:;t-shirt;pull over  -     UB Dressing Assess/Train, Position  sitting  -     UB Dressing Assess/Train, [place]  set up required  -     " Recorded by  [HC] KEON Cortez     Lower Body Dressing Assessment/Training    LB Dressing Assess/Train, Clothing Type  doffing:;donning:;pants;shoes;socks  -     LB Dressing Assess/Train, Position  sitting;standing  -     LB Dressing Assess/Train, [place]  minimum assist (75% patient effort)  -     LB Dressing Assess/Train, Comment  able to don RUE compression garment,  assisted with LE carolann hose  -HC     Recorded by  [HC] KEON Cortez     Balance Skills Training    Stepping Strategy Assessment (Balance Skills)   educated on seated VOR therex - saccades and visual tracking, also provided vestibular handout   -LH    Recorded by   [LH] Savana Dickey, PT    Therapy Exercises    Bilateral Upper Extremity  AROM:;20 reps;elbow flexion/extension;pronation/supination;shoulder extension/flexion   wrist; 2# hand weight  -HC     Recorded by  [HC] KEON Cortez     Positioning and Restraints    Pre-Treatment Position  sitting in chair/recliner  - in bed  -    Post Treatment Position  bed   w/c in PM  - wheelchair  -    In Bed  supine;call light within reach;encouraged to call for assist;with family/caregiver;with SLP  -HC     In Wheelchair  with PT  -HC sitting;call light within reach;encouraged to call for assist;exit alarm on  -LH    Recorded by  [HC] KEON Cortez [] Savana Dickey, PT      11/29/17 1649 11/29/17 1545 11/29/17 1430    Rehab Assessment/Intervention    Discipline occupational therapist  -DN physical therapist  -LH speech language pathologist  -OC    Document Type therapy note (daily note)  -DN therapy note (daily note)  - therapy note (daily note)  -OC    Subjective Information no complaints;agree to therapy  -DN no complaints;agree to therapy  -LH no complaints;agree to therapy  -OC    Patient Effort, Rehab Treatment good  -DN good  -LH good  -OC    Precautions/Limitations fall precautions  -DN fall precautions  -     Specific Treatment Considerations family  conferance held with pt and  today   -DN      Recorded by [DN] Gage Licea, OTR [LH] Savana Dickey, PT [OC] Viky Ramires MA,CCC-SLP    Pain Assessment    Pain Assessment  No/denies pain  -LH     Recorded by  [LH] Savana Dickey PT     Vision Assessment/Intervention    Visual Impairment  WFL with corrective lenses  -LH     Recorded by  [LH] Savana Dickey PT     Cognitive Assessment/Intervention    Current Cognitive/Communication Assessment functional  -DN functional  -LH functional  -OC    Orientation Status oriented x 4  -DN oriented x 4  -LH oriented x 4  -OC    Follows Commands/Answers Questions able to follow single-step instructions;needs cueing  -% of the time;able to follow single-step instructions;needs cueing  -LH able to follow multi-step instructions;able to follow single-step instructions;needs increased time;needs repetition  -OC    Personal Safety mild impairment;decreased awareness, need for assist  -DN mild impairment  -LH     Personal Safety Interventions fall prevention program maintained;gait belt  -DN fall prevention program maintained;gait belt;nonskid shoes/slippers when out of bed;supervised activity  -LH     Recorded by [DN] Gage Licea, OTR [LH] Savana Dickey, PT [OC] Viky Ramires MA,CCC-SLP    Improve word retrieval skills    Status: Improve word retrieval skills   Progressing as expected  -OC    Word Retrieval Skills Progress   80%;without cues   word grid  -OC    Recorded by   [OC] Viky Ramires MA,CCC-SLP    Improve memory skills    Status: Improve memory skills   Progressing as expected  -OC    Memory Skills Progress   80%;without cues   verbal recall  -OC    Recorded by   [OC] Viky Ramires MA,CCC-SLP    Improve functional problem solving    Status: Improve functional problem solving   Progressing as expected  -OC    Functional Problem Solving Progress   70%;without cues;90%;with consistent cues   deduction task  -OC    Recorded by   [OC] Viky Ramires MA,CCC-SLP    Bed  Mobility, Assessment/Treatment    Bed Mob, Supine to Sit, [place]  conditional independence  -     Bed Mob, Sit to Supine, [place]  conditional independence  -     Bed Mob, Sidelying to Sit, [place]  conditional independence  -     Bed Mob, Sit to Sidelying, [place]  conditional independence  -     Bed Mobility, Safety Issues  cognitive deficits limit understanding  -     Bed Mobility, Comment  tx mat  -     Recorded by  [] Savana Dickey, PT     Transfer Assessment/Treatment    Transfers, Bed-Chair [place]  stand by assist  -     Transfers, Chair-Bed [place] stand by assist  -DN stand by assist  -     Transfers, Sit-Stand [place]  stand by assist  -     Transfers, Stand-Sit [place]  stand by assist  -     Transfers, Sit-Stand-Sit, Assist Device  rolling walker  -     Transfer, Safety Issues  weight-shifting ability decreased;step length decreased;sequencing ability decreased  -     Transfer, Impairments  strength decreased;impaired balance;postural control impaired;motor control impaired  -     Recorded by [DN] Gage Licea, OTR [] Savana Dickey, PT     Gait Assessment/Treatment    Gait, [place] Level  stand by assist  -     Gait, Assistive Device  rollator  -     Gait, Distance (Feet)  160   x2  -     Gait, Gait Pattern Analysis  swing-through gait  -     Gait, Safety Issues  loses balance backward;weight-shifting ability decreased;step length decreased  -     Gait, Impairments  strength decreased;impaired balance  -     Gait, Comment  pt displays improved mechanics and comfort with using rollator today  -     Recorded by  [] Savana Dickey, PT     Functional Mobility    Functional Mobility- Ind. Level  minimum assist (75% patient effort);contact guard assist  -     Functional Mobility- Device  other (see comments)   HHA LUE  -     Functional Mobility-Distance (Feet)  80  -     Functional Mobility- Safety Issues   weight-shifting ability decreased;step length decreased;sequencing ability decreased  -LH     Recorded by  [LH] Savana Dickey, PT     Balance Skills Training    Training Strategies (Balance Skills) standing at table with BUE activity with no device to r and l to increase balance and endurance for increased independence with adls  -DN lateral WS with lateral head turns retrieving bean bags and tossing using bilateral UEs  -     Standing-Level of Assistance Contact guard  -DN Contact guard  -     Static Standing Balance Support  No upper extremity supported  -     Standing-Balance Activities Weight Shift A-P;Weight Shift R-L;Reaching for objects;Reaching across midline  -DN Weight Shift A-P;Weight Shift R-L;Ball toss;Reaching for objects;Reaching across midline  -     Standing Balance # of Minutes total time 15 minutes with rest breaks as needed  -DN 6  -LH     Recorded by [YAHIR] KEON Tran [LH] Savana Dickey, PT     Therapy Exercises    Bilateral Lower Extremities  20 reps;AROM:;supine;quad sets;SAQ;hip abduction/adduction;hamstring stretch  -     Bilateral Upper Extremity AROM:;30 reps;sitting;elbow flexion/extension;pronation/supination;shoulder abduction/adduction;shoulder extension/flexion;shoulder horizontal abd/add   2# dowel, 2# dumbell, yellow theraband, handgripper 3/10s  -DN      BUE Resistance --   arm bike standing for 3 minutes  -YAHIR      Trunk Exercises  20 reps;supine;bridging;pelvic tilts;trunk rotation   LTR, SKTC  -LH     Recorded by [YAHIR] KEON Tran [LH] Savana Dickey, PT     Functional Endurance    Detail (Functional Endurance) fair +  -DN      Recorded by [YAHIR] KEON Tran      Positioning and Restraints    Pre-Treatment Position --   w/c  -DN sitting in chair/recliner  -LH     Post Treatment Position bed  -DN wheelchair  -LH     In Wheelchair  sitting;call light within reach;encouraged to call for assist;exit alarm on;with family/caregiver  -LH     Recorded by [YAHIR] Gage  Vinayak, OTR [LH] Savana Dickey, PT       User Key  (r) = Recorded By, (t) = Taken By, (c) = Cosigned By    Initials Name Effective Dates    OC Viky Ramires MA,CCC-SLP 04/05/17 -     DN Gage Licea, OTR 04/13/15 -     LH Savana Dickey, PT 02/07/17 -      Blanca Pérezer, OTR 08/17/17 -                 OT Goals       11/27/17 1549 11/20/17 1549 11/18/17 1158    Transfer Training OT STG    Transfer Training OT STG, Date Established 11/27/17  -DN 11/20/17  -KP     Transfer Training OT STG, Time to Achieve  1 wk  -KP     Transfer Training OT STG, Activity Type  toilet;sit to stand/stand to sit;shower chair;walk-in shower  -KP     Transfer Training OT STG, [place] Level supervision required;verbal cues required  -DN set up required;supervision required  -KP     Transfer Training OT STG, Assist Device  walker, rolling;shower chair  -KP     Transfer Training OT LTG    Transfer Training OT LTG, Date Established 11/27/17  -DN 11/20/17  -KP 11/18/17  -SO    Transfer Training OT LTG, Time to Achieve  other (see comments)   10 days/ discharge  -KP 1 wk  -SO    Transfer Training OT LTG, Activity Type  toilet;shower chair;sit to stand/stand to sit;walk-in shower  -KP sit to stand/stand to sit;toilet  -SO    Transfer Training OT LTG, [place] Level supervision required;verbal cues required  -DN conditional independence  -KP supervision required  -SO    Transfer Training OT LTG, Assist Device  walker, rolling;shower chair  -KP walker, rolling  -SO    Transfer Training OT LTG, Outcome goal revised  -DN      Strength OT LTG    Strength Goal OT LTG, Date Established 11/27/17  -DN 11/20/17  -KP 11/18/17  -SO    Strength Goal OT LTG, Time to Achieve  other (see comments)   10 days/ dc day  -KP 1 wk  -SO    Strength Goal OT LTG, Measure to Achieve  to increase B UE to 4/5  -KP Pt to increase RUE strength to 4/5 to assist during ADLs.  -SO    Strength Goal OT LTG, Functional Goal  to increase strength for functional tsf,  self-care independence.  -KP     Strength Goal OT LTG, Outcome goal ongoing  -DN      Dynamic Standing Balance OT LTG    Dynamic Standing Balance OT LTG, Date Established 11/27/17  -DN 11/20/17  -KP     Dynamic Standing Balance OT LTG, Time to Achieve  other (see comments)   10 days d/c day  -KP     Dynamic Standing Balance OT LTG, [place] Level supervision required  -DN conditional independence;set up required  -     Dynamic Standing Balance OT LTG, Assist Device  assistive Device  -     Dynamic Standing Balance OT LTG, Outcome goal revised  -DN      Caregiver Training OT LTG    Caregiver Training OT LTG, Date Established 11/27/17  -DN 11/20/17  -     Caregiver Training OT LTG, Time to Achieve  other (see comments)   10 days d/c   -KP     Caregiver Training OT LTG, Activity Type  to ed on toilet/shower tsf technique and self-care skills  -     Caregiver Training OT LTG, [place] Level  able to cue patient adequately;able to assist adequately  -     Caregiver Training OT LTG, Outcome goal ongoing  -DN      Patient Education OT LTG    Patient Education OT LTG, Date Established 11/27/17  -DN 11/20/17  -     Patient Education OT LTG, Time to Achieve  by discharge  -     Patient Education OT LTG, Education Type  written program;HEP  -     Patient Education OT LTG, Education Understanding  independent;demonstrates adequately;verbalizes understanding  -     Patient Education OT LTG Outcome goal ongoing  -DN      ADL OT STG    ADL OT STG, Date Established 11/27/17  -DN 11/20/17  -KP     ADL OT STG, Time to Achieve  1 wk  -KP     ADL OT STG, Activity Type  ADL skills  -     ADL OT STG, [place] Level  standby assist;setup;assistive device  -     ADL OT STG, Outcome goal ongoing  -DN      ADL OT LTG    ADL OT LTG, Date Established 11/27/17  -DN 11/20/17  -KP 11/18/17  -SO    ADL OT LTG, Time to Achieve  other (see comments)   10 days d/c  - 1 wk  -SO    ADL OT LTG, Activity Type  ADL  skills  -KP ADL skills  -SO    ADL OT LTG, [place] Level standby assist;min verbal cues;assistive device  -DN modified independent;assistive device  -KP standby assist;assistive device  -SO    ADL OT LTG, Outcome goal revised  -DN        User Key  (r) = Recorded By, (t) = Taken By, (c) = Cosigned By    Initials Name Provider Type    YAHIR Licea, OTR Occupational Therapist    SO Hrotensia Paredes, OTR Occupational Therapist    KP Carolina Simon, OTR Occupational Therapist          Occupational Therapy Education     Title: PT OT SLP Therapies (Done)     Topic: Occupational Therapy (Done)     Point: ADL training (Done)    Description: Instruct learner(s) on proper safety adaptation and remediation techniques during self care or transfers.   Instruct in proper use of assistive devices.    Learning Progress Summary    Learner Readiness Method Response Comment Documented by Status   Patient Acceptance E NR,VU   12/01/17 0904 Done    Acceptance E,H MAXIMO,NR   11/30/17 0856 Done    Eager E MAXIMO family conferance held with pt and  today to discuss current status with adls, commode/shower transfers, safety, balance, equipment needs antipated at d/c and further therapies after d/c  11/29/17 1654 Done    Acceptance E,D MAXIMO,NR ed pt on OT role. benefit fo therapy. POC w. OT. ed on toilet and shower tsf and safety. pt demo w. CGA to SBA w. tsf.  11/20/17 1538 Done   Family Eager E VU family conferance held with pt and  today to discuss current status with adls, commode/shower transfers, safety, balance, equipment needs antipated at d/c and further therapies after d/c  11/29/17 1654 Done               Point: Home exercise program (Done)    Description: Instruct learner(s) on appropriate technique for monitoring, assisting and/or progressing therapeutic exercises/activities.    Learning Progress Summary    Learner Readiness Method Response Comment Documented by Status   Patient Acceptance E  NR,MAXIMO   12/01/17 0904 Done    Acceptance E,H VU,NR   11/30/17 0856 Done    Eager E VU family conferance held with pt and  today to discuss current status with adls, commode/shower transfers, safety, balance, equipment needs antipated at d/c and further therapies after d/c DN 11/29/17 1654 Done   Family Eager E VU family conferance held with pt and  today to discuss current status with adls, commode/shower transfers, safety, balance, equipment needs antipated at d/c and further therapies after d/c DN 11/29/17 1654 Done               Point: Precautions (Done)    Description: Instruct learner(s) on prescribed precautions during self-care and functional transfers.    Learning Progress Summary    Learner Readiness Method Response Comment Documented by Status   Patient Acceptance E NR,MAXIMO   12/01/17 0904 Done    Acceptance E,H MAXIMO,NR   11/30/17 0856 Done    Eager E VU family conferance held with pt and  today to discuss current status with adls, commode/shower transfers, safety, balance, equipment needs antipated at d/c and further therapies after d/c DN 11/29/17 1654 Done    Acceptance E,D MAXIMO,NR ed pt on OT role. benefit fo therapy. POC w. OT. ed on toilet and shower tsf and safety. pt demo w. CGA to SBA w. tsf. KP 11/20/17 1538 Done   Family Eager E VU family conferance held with pt and  today to discuss current status with adls, commode/shower transfers, safety, balance, equipment needs antipated at d/c and further therapies after d/c DN 11/29/17 1654 Done               Point: Body mechanics (Done)    Description: Instruct learner(s) on proper positioning and spine alignment during self-care, functional mobility activities and/or exercises.    Learning Progress Summary    Learner Readiness Method Response Comment Documented by Status   Patient Acceptance E NRMAXIMO   12/01/17 0904 Done    Acceptance E,H VU,NR   11/30/17 0856 Done    Eager E VU family conferance held with pt and   today to discuss current status with adls, commode/shower transfers, safety, balance, equipment needs antipated at d/c and further therapies after d/c  11/29/17 1654 Done    Acceptance INDIRA,JENNIFER MARSH,NR ed pt on OT role. benefit fo therapy. POC w. OT. ed on toilet and shower tsf and safety. pt demo w. CGA to SBA w. tsf.  11/20/17 1538 Done   Family Eager INDIRA MARSH family conferance held with pt and  today to discuss current status with adls, commode/shower transfers, safety, balance, equipment needs antipated at d/c and further therapies after d/c  11/29/17 1654 Done                      User Key     Initials Effective Dates Name Provider Type Discipline     04/13/15 -  Gage Licea, OTR Occupational Therapist OT     04/13/15 -  Carolina Simon, OTR Occupational Therapist OT     02/07/17 -  Savana Dickey, PT Physical Therapist PT                  OT Recommendation and Plan  Anticipated Discharge Disposition: home with assist, home with home health, home with outpatient services (pending progress out pt OT. vs home program)  Planned Therapy Interventions: activity intolerance, ADL retraining, strengthening, transfer training, balance training, home exercise program  Therapy Frequency: 5 times/wk          Time Calculation:         Time Calculation- OT       12/01/17 1538 12/01/17 1030       Time Calculation- OT    OT Start Time 1230  -DN 1030  -DN     OT Stop Time 1300  -DN 1100  -DN     OT Time Calculation (min) 30 min  -DN 30 min  -DN     OT Received On 12/01/17  -DN 12/01/17  -DN       User Key  (r) = Recorded By, (t) = Taken By, (c) = Cosigned By    Initials Name Provider Type    KEON Bacon Occupational Therapist           Therapy Charges for Today     Code Description Service Date Service Provider Modifiers Qty    54830900529 HC OT SELF CARE/MGMT/TRAIN EA 15 MIN 12/1/2017 KEON Tran GO 2    05809704761 HC OT THER PROC EA 15 MIN 12/1/2017 KEON Tran GO 2                Gage Licea, OTR  12/1/2017

## 2017-12-01 NOTE — PROGRESS NOTES
Inpatient Rehabilitation Plan of Care Note    Plan of Care  Care Plan Reviewed - No updates at this time.    Sphincter Control    Performed Intervention(s)  Encourage fluid intake  Elimination schedule  Monitor intake and output      Psychosocial    Performed Intervention(s)  Verbalize needs and concerns  Therapeutic environmental set up      Safety    Performed Intervention(s)  Falls prevention protocol  Items within reach  Bed/ Chair alarm.    Signed by: Jam Willis RN

## 2017-12-01 NOTE — PLAN OF CARE
Problem: Patient Care Overview (Adult)  Goal: Plan of Care Review  Outcome: Ongoing (interventions implemented as appropriate)    12/01/17 1608   Coping/Psychosocial Response Interventions   Plan Of Care Reviewed With patient   Patient Care Overview   Progress improving   Outcome Evaluation   Outcome Summary/Follow up Plan received order for vestibular therapy today from neuro         Problem: Stroke (Ischemic) (Adult)  Goal: Signs and Symptoms of Listed Potential Problems Will be Absent or Manageable (Stroke)  Outcome: Ongoing (interventions implemented as appropriate)    12/01/17 1608   Stroke (Ischemic)   Problems Assessed (Stroke (Ischemic)/TIA) motor/sensory impairment   Problems Present (Stroke (Ischemic)/TIA) motor/sensory impairment         Problem: Fall Risk (Adult)  Goal: Absence of Falls  Outcome: Ongoing (interventions implemented as appropriate)    12/01/17 1608   Fall Risk (Adult)   Absence of Falls making progress toward outcome

## 2017-12-01 NOTE — PROGRESS NOTES
LOS: 12 days   Patient Care Team:  Goran Carr MD as PCP - General (Family Medicine)    Chief Complaint: same    Subjective     History of Present Illness    SubjectivePt is awake and alet. Continues to c/o of R shoulder pain and periods of vertigo.     History taken from: patient    Objective     Vital Signs  Temp:  [98.1 °F (36.7 °C)-98.3 °F (36.8 °C)] 98.3 °F (36.8 °C)  Heart Rate:  [60-63] 60  Resp:  [16-18] 16  BP: (120-142)/(58-67) 142/67    Objectiveexam unchanged    Results Review:     I reviewed the patient's new clinical results.    Medication Review:     Assessment/Plan     Active Problems:    Stroke      Assessment & PlanContinue to prepare for dc. I discussed results of R shoulder xray and order for Dr Bonner consult.     Norman Taylor MD  12/01/17  7:12 AM    Time:

## 2017-12-01 NOTE — CONSULTS
initiated visit with patient.  Pt. Appears to be in good spirits.  Was transported to  via ambulance and was at home when she became ill.  Per patient, the dr's said she is doing well.  Pt is a [employer] and attends [employer] on [address].  When asked if  could do anything for her she couldn't think of anything. Visit was pleasant.

## 2017-12-01 NOTE — PROGRESS NOTES
Inpatient Rehabilitation Functional Measures Assessment    Functional Measures  [facility] Eating:  [facility] Grooming: [facility] Bathing:  [facility] Upper Body Dressing:  [facility] Lower Body Dressing:  [facility] Toileting:  [facility] Bladder Management  Level of Assistance:  [place]  Frequency/Number of Accidents this Shift:  [facility] Bowel Management  Level of Assistance: [place]  Frequency/Number of Accidents this Shift: [facility] Bed/Chair/Wheelchair Transfer:  [facility] Toilet Transfer:  [facility] Tub/Shower Transfer:  [place]    Previously Documented Mode of Locomotion at Discharge: Field  [facility] Expected Mode of Locomotion at Discharge: [facility] Walk/Wheelchair:  [facility] Stairs:  [facility] Comprehension:  Both ( auditory and visual) modes of comprehension are used  equally. Comprehension Score = 6, Modified [place].  Patient comprehends  complex/abstract information in their primary language, requiring: Additional  time. Glasses.  [facility] Expression:  Vocal expression is the usual mode. Expression Score = 7,  Independent.  Patient expresses complex/abstract information in their primary  language.  Patient is completely independent for vocal expression.  There are no  activity limitations.  [facility] Social Interaction:  Social Interaction Score = 6, Modified Independent.  Patient is modified independent for social interaction, requiring: Requires  additional time.  [facility] Problem Solving:  Problem Solving Score = 6, Modified [place].  Patient  makes appropriate decisions in order to solve complex problems, but requires  extra time.  [facility] Memory:  Memory Score = 6, Modified [place].  Patient is modified  independent for memory, requiring: Requires additional time.    Therapy Mode Minutes  Occupational Therapy: [facility]  Physical Therapy: Branch  Speech Language Pathology:  [place]    Signed by: Jam Willis RN

## 2017-12-02 PROCEDURE — 97110 THERAPEUTIC EXERCISES: CPT | Performed by: PHYSICAL THERAPIST

## 2017-12-02 PROCEDURE — 63710000001 DIPHENHYDRAMINE PER 50 MG: Performed by: PHYSICAL MEDICINE & REHABILITATION

## 2017-12-02 PROCEDURE — 94799 UNLISTED PULMONARY SVC/PX: CPT

## 2017-12-02 RX ADMIN — GABAPENTIN 800 MG: 400 CAPSULE ORAL at 21:31

## 2017-12-02 RX ADMIN — IPRATROPIUM BROMIDE 0.5 MG: 0.5 SOLUTION RESPIRATORY (INHALATION) at 08:15

## 2017-12-02 RX ADMIN — DOCUSATE SODIUM 100 MG: 100 CAPSULE, LIQUID FILLED ORAL at 17:23

## 2017-12-02 RX ADMIN — CLOPIDOGREL 75 MG: 75 TABLET, FILM COATED ORAL at 09:43

## 2017-12-02 RX ADMIN — GABAPENTIN 800 MG: 400 CAPSULE ORAL at 13:50

## 2017-12-02 RX ADMIN — BENZONATATE 200 MG: 200 CAPSULE, LIQUID FILLED ORAL at 22:12

## 2017-12-02 RX ADMIN — SIMETHICONE CHEW TAB 80 MG 80 MG: 80 TABLET ORAL at 17:24

## 2017-12-02 RX ADMIN — ESCITALOPRAM 10 MG: 10 TABLET, FILM COATED ORAL at 09:42

## 2017-12-02 RX ADMIN — Medication 1 TABLET: at 21:31

## 2017-12-02 RX ADMIN — PANTOPRAZOLE SODIUM 40 MG: 40 TABLET, DELAYED RELEASE ORAL at 05:34

## 2017-12-02 RX ADMIN — DIPHENHYDRAMINE HYDROCHLORIDE 25 MG: 25 CAPSULE ORAL at 21:31

## 2017-12-02 RX ADMIN — LETROZOLE 2.5 MG: 2.5 TABLET ORAL at 09:44

## 2017-12-02 RX ADMIN — ROSUVASTATIN CALCIUM 20 MG: 20 TABLET, FILM COATED ORAL at 09:43

## 2017-12-02 RX ADMIN — GABAPENTIN 800 MG: 400 CAPSULE ORAL at 05:34

## 2017-12-02 RX ADMIN — ATENOLOL 100 MG: 50 TABLET ORAL at 09:41

## 2017-12-02 RX ADMIN — VITAMIN D, TAB 1000IU (100/BT) 5000 UNITS: 25 TAB at 09:42

## 2017-12-02 RX ADMIN — LEVOTHYROXINE SODIUM 125 MCG: 125 TABLET ORAL at 05:34

## 2017-12-02 RX ADMIN — FUROSEMIDE 20 MG: 20 TABLET ORAL at 09:43

## 2017-12-02 RX ADMIN — Medication 10 MG: at 21:31

## 2017-12-02 RX ADMIN — ASPIRIN 325 MG: 325 TABLET ORAL at 09:43

## 2017-12-02 RX ADMIN — Medication 1 TABLET: at 09:41

## 2017-12-02 RX ADMIN — FOLIC ACID 1 MG: 1 TABLET ORAL at 09:42

## 2017-12-02 RX ADMIN — GUAIFENESIN AND DEXTROMETHORPHAN 5 ML: 100; 10 SYRUP ORAL at 22:12

## 2017-12-02 RX ADMIN — DOCUSATE SODIUM 100 MG: 100 CAPSULE, LIQUID FILLED ORAL at 09:45

## 2017-12-02 RX ADMIN — BUDESONIDE AND FORMOTEROL FUMARATE DIHYDRATE 2 PUFF: 160; 4.5 AEROSOL RESPIRATORY (INHALATION) at 08:15

## 2017-12-02 NOTE — PROGRESS NOTES
Inpatient Rehabilitation Plan of Care Note    Plan of Care  Care Plan Reviewed - No updates at this time.    Sphincter Control    Performed Intervention(s)  Encourage fluid intake  Elimination schedule  Monitor intake and output      Psychosocial    Performed Intervention(s)  Verbalize needs and concerns  Therapeutic environmental set up      Body Systems    Performed Intervention(s)  Daily skin inspection  Turning/ repositioning      Safety    Performed Intervention(s)  Falls prevention protocol  Items within reach  Bed/ Chair alarm.    Signed by: Angeles Mas RN

## 2017-12-02 NOTE — PROGRESS NOTES
Inpatient Rehabilitation Plan of Care Note    Plan of Care  Care Plan Reviewed - No updates at this time.    Sphincter Control    Performed Intervention(s)  Encourage fluid intake  Elimination schedule  Monitor intake and output      Psychosocial    Performed Intervention(s)  Verbalize needs and concerns  Therapeutic environmental set up      Body Systems    Performed Intervention(s)  Daily skin inspection  Turning/ repositioning      Safety    Performed Intervention(s)  Falls prevention protocol  Items within reach  Bed/ Chair alarm.    Signed by: Nicole Troncoso RN

## 2017-12-02 NOTE — THERAPY TREATMENT NOTE
Inpatient Rehabilitation - Physical Therapy Treatment Note  [facility]     Patient Name: Paloma Carr  : 1940  MRN: [id number]  Today's Date: 2017  Onset of Illness/Injury or Date of Surgery Date: 11/15/17  Date of Referral to PT: 17  Referring Physician: Brandon    Admit Date: 2017    Visit Dx:    ICD-10-CM ICD-9-CM   1. Impaired gait and mobility R26.89 781.2     Patient Active Problem List   Diagnosis   • Soft tissue swelling of chest wall   • Abnormal ultrasound of breast   • Postmastectomy lymphedema syndrome   • Malignant neoplasm of upper-outer quadrant of right female breast   • Cerebrovascular accident (CVA)   • COPD exacerbation   • Stroke               Adult Rehabilitation Note       17 1030 17 1517 17 0930    Rehab Assessment/Intervention    Discipline physical therapist  -JS occupational therapist  -DN speech language pathologist  -OC    Document Type therapy note (daily note)  -JS therapy note (daily note)  -DN therapy note (daily note)  -OC    Subjective Information no complaints;agree to therapy   Reports vertigo earlier this am in the bathroom. Better now.  -JS agree to therapy;no complaints  -DN agree to therapy  -OC    Patient Effort, Rehab Treatment good  -JS good  -DN good  -OC    Symptoms Noted During/After Treatment dizziness   c/o dizziness while ambulating during treatment session  -JS dizziness  -DN none  -OC    Symptoms Noted Comment  --   nsg informed, dizzy spell x3 during standing balance act  -DN     Precautions/Limitations fall precautions  -JS fall precautions  -DN     Recorded by [JS] Theodora Nevarez, PT [DN] Gage Licea OTR [OC] Viky Ramires MA,CCC-SLP    Pain Assessment    Pain Assessment No/denies pain  -JS No/denies pain  -DN No/denies pain  -OC    Recorded by [JS] Theodora Nevarez PT [DN] KEON Tran [OC] Viky Ramires MA,CCC-SLP    Vision Assessment/Intervention    Visual Impairment  WFL with corrective lenses  -DN     Recorded by   [DN] Gage Licea, AZAMR     Cognitive Assessment/Intervention    Current Cognitive/Communication Assessment functional  -JS functional  -DN functional  -OC    Orientation Status oriented x 4  -JS oriented x 4  -DN oriented x 4  -OC    Follows Commands/Answers Questions 100% of the time;able to follow single-step instructions  -JS needs cueing;able to follow single-step instructions  -DN     Personal Safety Interventions fall prevention program maintained;gait belt;muscle strengthening facilitated;nonskid shoes/slippers when out of bed  -JS      Recorded by [JS] Theodora Nevarez PT [DN] Gage Licea OTR [OC] Viky Ramires MA,CCC-SLP    Improve word retrieval skills    Status: Improve word retrieval skills   Progressing as expected  -OC    Word Retrieval Skills Progress   80%;without cues;90%;with consistent cues   dual phrase completion, 4 word completion   -OC    Recorded by   [OC] Viky Ramires MA,CCC-SLP    Improve functional problem solving    Status: Improve functional problem solving   Progressing as expected  -OC    Functional Problem Solving Progress   70%;without cues;90%;with consistent cues   functional math, money  -OC    Recorded by   [OC] Viky Ramires MA,CCC-SLP    Improve executive function skills    Status: Improve executive function skills   Progressing as expected  -OC    Recorded by   [OC] Viky Ramires MA,CCC-SLP    Bed Mobility, Assessment/Treatment    Bed Mobility, Assistive Device bed rails  -JS      Bed Mob, Supine to Sit, [place] supervision required  -JS      Bed Mob, Sit to Supine, [place] supervision required  -JS      Recorded by [JS] Theodora Nevarez PT      Transfer Assessment/Treatment    Transfers, Bed-Chair [place] stand by assist  -JS      Transfers, Bed-Chair-Bed, Assist Device rolling walker  -JS      Transfers, Sit-Stand [place] stand by assist  -JS      Transfers, Stand-Sit [place] stand by assist  -JS      Transfers, Sit-Stand-Sit, Assist Device rolling walker   -JS      Walk-In Shower Transfer, [place]  contact guard assist;verbal cues required   secondary to stafety due to dizziness today  -DN     Transfer, Safety Issues step length decreased;other (see comments)   intermittent c/o vertigo  -JS step length decreased  -DN     Transfer, Impairments motor control impaired;other (see comments)   intermittent c/o vertigo  -JS motor control impaired  -DN     Transfer, Comment Pt c/o vertigo/dizziness intermittently during transfers  -JS      Recorded by [JS] Theodora Nevarez PT [DN] KEON Tran     Gait Assessment/Treatment    Gait, [place] Level contact guard assist  -JS      Gait, Assistive Device rolling walker  -JS      Gait, Distance (Feet) 240   x1, 160 feet x 2.Intermittent standing breaks w/ c/o vertigo  -JS      Gait, Gait Deviations bilateral:;zabrina decreased;decreased heel strike  -JS      Gait, Safety Issues step length decreased;weight-shifting ability decreased;other (see comments)   intermittent c/o dizziness/vertigo while ambulating  -JS      Gait, Impairments strength decreased;impaired balance  -JS      Gait, Comment Ambulates 80 feet and 60 feet with HHA (no assistive device) with intermittent standing breaks due to c/o occasional vertigo during ambulation  -JS      Recorded by [JS] Theodora Nevarez PT      Upper Body Bathing Assessment/Training    UB Bathing Assess/Train Assistive Device  grab bars;hand-held shower head;shower chair with back  -DN     UB Bathing Assess/Train, Position  sitting  -DN     UB Bathing Assess/Train, [place] Level  set up required  -DN     Recorded by  [DN] KEON Tran     Lower Body Bathing Assessment/Training    LB Bathing Assess/Train Assistive Device  hand-held shower head;grab bars;shower chair with back  -DN     LB Bathing Assess/Train, Position  sitting;standing  -DN     LB Bathing Assess/Train, [place] Level  supervision required  -DN     LB Bathing Assess/Train, Impairments  strength  decreased;impaired balance  -DN     Recorded by  [DN] KEON Tran     Upper Body Dressing Assessment/Training    UB Dressing Assess/Train, Clothing Type  doffing:;donning:;pull over;t-shirt  -DN     UB Dressing Assess/Train, Position  sitting  -DN     UB Dressing Assess/Train, [place]  set up required  -DN     Recorded by  [DN] KEON Tran     Lower Body Dressing Assessment/Training    LB Dressing Assess/Train, Clothing Type  doffing:;donning:;pants;shoes  -DN     LB Dressing Assess/Train, Position  standing;sitting  -DN     LB Dressing Assess/Train, [place]  set up required;supervision required  -DN     LB Dressing Assess/Train, Impairments  impaired balance;strength decreased  -DN     LB Dressing Assess/Train, Comment  setup for carolann hose  -DN     Recorded by  [YAHIR] KEON Tran     Grooming Assessment/Training    Grooming Assess/Train, Position  sitting;sink side  -DN     Grooming Assess/Train, [place] Level  set up required  -DN     Recorded by  [YAHIR] KEON Tran     Balance Skills Training    Standing-Level of Assistance  Contact guard  -DN     Standing-Balance Activities  Weight Shift A-P;Weight Shift R-L;Lateral lean;Forward lean;Reaching for objects   needed to sit x2 due dizziness   -DN     Gait Balance-Level of Assistance Contact guard  -JS      Gait Balance Support Right upper extremity supported;Left upper extremity supported;clarence bar  -JS      Gait Balance Activities side-stepping  -JS      Recorded by [JS] Theodora Nevarez PT [DN] KEON Tran     Therapy Exercises    Bilateral Lower Extremities AROM:;20 reps;sitting;LAQ;hip flexion;standing;heel raises;mini squats  -JS      Bilateral Upper Extremity  10 reps;elbow flexion/extension;pronation/supination   2# dumbell 3 sets of 10 reps  -DN     Recorded by [JS] Theodora Nevarez PT [DN] KEON Tran     Functional Endurance    Detail (Functional Endurance)  fair   -DN     Recorded by  [YAHIR] KEON Tran      Positioning and Restraints    Pre-Treatment Position in bed  -JS sitting in chair/recliner  -DN     Post Treatment Position wheelchair  -JS wheelchair  -DN     In Bed  sitting;call light within reach;encouraged to call for assist;exit alarm on;with family/caregiver  -DN     In Wheelchair notified nsg;sitting;patient within staff view   in dining room  -JS      Recorded by [JS] Theodora Nevarez, PT [DN] Gage Licea, OTR       12/01/17 0815 11/30/17 1400 11/30/17 1200    Rehab Assessment/Intervention    Discipline physical therapist  -LH speech language pathologist  -OC occupational therapist  -HC    Document Type therapy note (daily note)  - therapy note (daily note)  -OC therapy note (daily note)  -HC    Subjective Information agree to therapy;complains of;pain  -LH agree to therapy  -OC agree to therapy;no complaints  -HC    Patient Effort, Rehab Treatment  good  -OC good  -HC    Symptoms Noted During/After Treatment none  -LH none  -OC none  -HC    Precautions/Limitations fall precautions  -      Precautions/Limitations, Vision   WFL with corrective lenses  -HC    Specific Treatment Considerations continued RUE pain/discomfort rated at 4/10. ortho sx consulted. pt continues to wear sleeve for lymphedema. PT has contacted OP PT who saw her for lymphedema - waiting response back for handouts. update :per OP PT - continue PROM all planes and STM as needed and OP PT requests pt to sched with her at [place].     -      Patient Response to Treatment noted nystagmus R sided beating with ambulation and head turns this AM only, resolved after approx 10 sec with neutral head/neck. pt reports worse dizziness in AM with waking up and turning head. PT spoke with Dr. Taylor this AM at approx 0830, no head manuevers until cleared by neuro. educated and performed basic VOR seated therex - smooth pursuit/saccades, tracking -no s/s when performed therex seated  -      Recorded by [LH] Savana Dickey, PT [OC] Viky Ramires,  MA,CCC-SLP [HC] KEON Cortez    Pain Assessment    Pain Assessment 0-10  -LH No/denies pain  -OC No/denies pain  -HC    Pain Score 4  -LH      Post Pain Score 4  -LH      Pain Type Chronic pain  -LH      Pain Location Shoulder  -LH      Pain Orientation Right  -LH      Pain Intervention(s) Repositioned  -LH      Response to Interventions federica  -LH      Recorded by [LH] Savana Dickey PT [OC] Viky Ramires MA,CCC-SLP [HC] Blanca Whitney OTR    Vision Assessment/Intervention    Visual Impairment WFL with corrective lenses  -LH      Recorded by [LH] Savana Dickey PT      Cognitive Assessment/Intervention    Current Cognitive/Communication Assessment functional  -LH functional  -OC functional  -HC    Orientation Status oriented x 4  -LH oriented x 4  -OC oriented x 4  -HC    Follows Commands/Answers Questions 100% of the time  -% of the time  -% of the time;able to follow single-step instructions;needs cueing  -HC    Personal Safety mild impairment  -LH  mild impairment  -HC    Personal Safety Interventions fall prevention program maintained;gait belt;nonskid shoes/slippers when out of bed;supervised activity  -LH  fall prevention program maintained;gait belt;nonskid shoes/slippers when out of bed  -HC    Additional Documentation   Cognitive Assessment Intervention (Group)  -HC    Recorded by [] Savana Dickey PT [OC] Viky Ramires MA,CCC-SLP [HC] KEON Cortez    Cognitive Assessment Intervention    Attention   mild impairment  -HC    Sequencing   mild impairment  -HC    Cognitive Assessment/Treatment Comment   Pt able to complete sequencing and following direction worksheets with 75% accuracy requiring increased time for completion. Verbal cues for mistakes but able to correct with redirection.  -HC    Recorded by   [HC] KEON Cortez    Improve word retrieval skills    Status: Improve word retrieval skills  Progressing as expected  -OC     Word Retrieval Skills Progress  80%;without cues    high level word finding, famous people by attribute  -OC     Recorded by  [OC] Viky Ramires MA,CCC-SLP     Improve executive function skills    Status: Improve executive function skills  Progressing as expected  -OC     Executive Function Skills Progress  80%;without cues   pill box task  -OC     Recorded by  [OC] Viky Ramires MA,CCC-SLP     Bed Mobility, Assessment/Treatment    Bed Mob, Supine to Sit, [place] conditional independence  -LH      Bed Mob, Sit to Supine, [place] conditional independence  -LH  conditional independence  -HC    Recorded by [LH] Savana Dickey, PT  [HC] KEON Cortez    Transfer Assessment/Treatment    Transfers, Bed-Chair [place] stand by assist  -LH      Transfers, Chair-Bed [place] stand by assist  -LH  stand by assist  -HC    Transfers, Bed-Chair-Bed, Assist Device rolling walker  -      Transfers, Sit-Stand [place] stand by assist  -LH  stand by assist  -HC    Transfers, Stand-Sit [place] stand by assist  -LH  stand by assist  -HC    Transfers, Sit-Stand-Sit, Assist Device rolling walker   rollator  -      Toilet Transfer, [place] supervision required  -      Toilet Transfer, Assistive Device rolling walker  -      Walk-In Shower Transfer, [place]   supervision required;verbal cues required  -    Walk-In Shower Transfer, Assist Device   rolling walker  -HC    Transfer, Safety Issues weight-shifting ability decreased;step length decreased  -LH      Recorded by [LH] Savana Dickey, PT  [HC] KEON Cortez    Gait Assessment/Treatment    Gait, [place] Level stand by assist  -      Gait, Assistive Device rollator  -      Gait, Distance (Feet) 160   80x2  -      Gait, Gait Pattern Analysis swing-through gait  -      Gait, Gait Deviations bilateral:;zabrina decreased;decreased heel strike  -      Gait, Safety Issues step length decreased;weight-shifting ability decreased  -      Gait, Comment focusing on head turns  horizontally for inc VOR habituation exercise, noted R sided nystagmus in heasd turning with gait this AM  -LH      Recorded by [LH] Savana Dickey PT      Functional Mobility    Functional Mobility- Ind. Level   contact guard assist  -HC    Functional Mobility- Device   rolling walker  -HC    Functional Mobility-Distance (Feet)   --   from w/c in to BR  -HC    Recorded by   [HC] KEON Cortez    Upper Body Bathing Assessment/Training    UB Bathing Assess/Train Assistive Device   grab bars;hand-held shower head;shower chair with back  -HC    UB Bathing Assess/Train, Position   sitting  -    UB Bathing Assess/Train, [place] Level   stand by assist  -HC    Recorded by   [HC] KEON Cortez    Lower Body Bathing Assessment/Training    LB Bathing Assess/Train Assistive Device   hand-held shower head;grab bars;shower chair with back  -HC    LB Bathing Assess/Train, Position   sitting;standing  -    LB Bathing Assess/Train, [place] Level   supervision required  -HC    Recorded by   [HC] KEON Cortez    Upper Body Dressing Assessment/Training    UB Dressing Assess/Train, Clothing Type   doffing:;donning:;t-shirt;pull over  -    UB Dressing Assess/Train, Position   sitting  -    UB Dressing Assess/Train, [place]   set up required  -HC    Recorded by   [HC] KEON Cortez    Lower Body Dressing Assessment/Training    LB Dressing Assess/Train, Clothing Type   doffing:;donning:;pants;shoes;socks  -    LB Dressing Assess/Train, Position   sitting;standing  -    LB Dressing Assess/Train, [place]   minimum assist (75% patient effort)  -    LB Dressing Assess/Train, Comment   able to don RUE compression garment,  assisted with LE carolann hose  -HC    Recorded by   [HC] KEON Cortez    Balance Skills Training    Training Strategies (Balance Skills) educated on and performed seated VOR therex (written hand outs provided)- saccades and visual tracking, horizontal and  vertical, 10 x ea direction. no s/s reproduced with therex, good tolerance. re-educated on vestibular handout   -      Standing-Level of Assistance Contact guard;Close supervision  -      Static Standing Balance Support No upper extremity supported  -      Standing-Balance Activities Reaching for objects;Reaching across midline;Ball toss   bean bag toss with lateral WS and reaching for bags  -      Standing Balance # of Minutes 5  -      Stepping Strategy Assessment (Balance Skills) PM post neuro clearance for vestibular PT, PT session held ([id number]) - vertebral artery cleared. performed R sided zamzam hallpike into Epley manuever, no reproductions of s/s, (-) nystagmus, good tolerance. no nausea. educated on and performed Gupta Daroff R sided then L sided in pt room, no reproductions of s/s, (-) nystagmus. educated to lay flat this evening and avoid head turns. will reassess next session. pt ambulated post manuever (from gym to pt room) and reports no dizziness, no issues. RN Miguel Angel notified and aware of session.   -LH      Recorded by [] Savana Dickey, KAREN      Therapy Exercises    Bilateral Upper Extremity   AROM:;20 reps;elbow flexion/extension;pronation/supination;shoulder extension/flexion   wrist; 2# hand weight  -HC    Recorded by   [HC] KEON Cortez    Positioning and Restraints    Pre-Treatment Position in bed  -  sitting in chair/recliner  -    Post Treatment Position wheelchair  -  bed   w/c in PM  -HC    In Bed   supine;call light within reach;encouraged to call for assist;with family/caregiver;with SLP  -HC    In Wheelchair sitting;call light within reach;encouraged to call for assist;exit alarm on  -LH  with PT  -HC    Recorded by [] Savana Dickey, PT  [HC] KEON Cortez      11/30/17 0812 11/29/17 1649 11/29/17 1545    Rehab Assessment/Intervention    Discipline physical therapist  - occupational therapist  -DN physical therapist  -    Document Type therapy note (daily  "note)  -LH therapy note (daily note)  -DN therapy note (daily note)  -    Subjective Information agree to therapy;complains of;pain  -LH no complaints;agree to therapy  -DN no complaints;agree to therapy  -LH    Patient Effort, Rehab Treatment good  -LH good  -DN good  -LH    Symptoms Noted During/After Treatment dizziness;fatigue  -LH      Precautions/Limitations  fall precautions  -DN fall precautions  -    Precautions/Limitations, Vision WFL with corrective lenses  -      Specific Treatment Considerations reports dizziness in AM and PM session, reports \"happens on occassion\" h/o vertigo. educated on and provided vestibular handout on VOR therex  - family conferance held with pt and  today   -DN     Recorded by [] Savana Dickey, PT [DN] Gage Licea OTR [LH] Savana Dickey, PT    Vital Signs    Intra Systolic BP Rehab 119  -LH      Intra Treatment Diastolic BP 79  -LH      Recorded by [] Savana Dickey, PT      Pain Assessment    Pain Assessment 0-10  -  No/denies pain  -LH    Pain Score 5  -LH      Post Pain Score 5  -LH      Pain Type Chronic pain  -LH      Pain Location Shoulder  -LH      Pain Orientation Right  -LH      Pain Descriptors Aching  -LH      Pain Intervention(s) Repositioned  -LH      Response to Interventions federica  -LH      Recorded by [] Savana Dickey, PT  [] Savana Dickey, PT    Vision Assessment/Intervention    Visual Impairment   WFL with corrective lenses  -LH    Recorded by   [] Savana Dickey, PT    Cognitive Assessment/Intervention    Current Cognitive/Communication Assessment functional  -LH functional  -DN functional  -LH    Orientation Status oriented x 4  -LH oriented x 4  -DN oriented x 4  -LH    Follows Commands/Answers Questions 100% of the time;able to follow single-step instructions;needs cueing  -LH able to follow single-step instructions;needs cueing  -% of the time;able to follow single-step instructions;needs cueing  -LH    Personal Safety mild " impairment  - mild impairment;decreased awareness, need for assist  -DN mild impairment  -    Personal Safety Interventions fall prevention program maintained;gait belt;nonskid shoes/slippers when out of bed;supervised activity  - fall prevention program maintained;gait belt  -DN fall prevention program maintained;gait belt;nonskid shoes/slippers when out of bed;supervised activity  -    Recorded by [LH] Savana Dickey, PT [DN] Gage Licea, OTR [LH] Savana Dickey, PT    Bed Mobility, Assessment/Treatment    Bed Mob, Supine to Sit, [place] conditional independence  -LH  conditional independence  -LH    Bed Mob, Sit to Supine, [place]   conditional independence  -    Bed Mob, Sidelying to Sit, [place]   conditional independence  -    Bed Mob, Sit to Sidelying, [place]   conditional independence  -LH    Bed Mobility, Safety Issues   cognitive deficits limit understanding  -    Bed Mobility, Comment   tx mat  -LH    Recorded by [LH] Savana Dickey, PT  [LH] Savana Dickey, PT    Transfer Assessment/Treatment    Transfers, Bed-Chair [place] stand by assist  -  stand by assist  -    Transfers, Chair-Bed [place] stand by assist  - stand by assist  - stand by assist  -    Transfers, Sit-Stand [place] stand by assist  -  stand by assist  -    Transfers, Stand-Sit [place] stand by assist  -  stand by assist  -    Transfers, Sit-Stand-Sit, Assist Device rolling walker  -  rolling walker  -    Transfer, Safety Issues sequencing ability decreased;step length decreased;weight-shifting ability decreased  -  weight-shifting ability decreased;step length decreased;sequencing ability decreased  -    Transfer, Impairments strength decreased;impaired balance  -  strength decreased;impaired balance;postural control impaired;motor control impaired  -LH    Recorded by [LH] Savana Dickey, PT [DN] Gage Licea, OTR [LH] Savana Dickey, PT    Gait Assessment/Treatment  "   Gait, [place] Level stand by assist;contact guard assist  -  stand by assist  -    Gait, Assistive Device rollator  -  rollator  -    Gait, Distance (Feet) 180   x3  -  160   x2  -    Gait, Gait Pattern Analysis swing-through gait  -  swing-through gait  -    Gait, Gait Deviations zabrina decreased;bilateral:;decreased heel strike  -      Gait, Safety Issues weight-shifting ability decreased;step length decreased;sequencing ability decreased  -  loses balance backward;weight-shifting ability decreased;step length decreased  -    Gait, Impairments strength decreased;impaired balance  -  strength decreased;impaired balance  -    Gait, Comment intermittent c/o dizziness in ambulation this AM, pt stops during gait to \"adjust\"  -  pt displays improved mechanics and comfort with using rollator today  -    Recorded by [] Savana Dickey PT  [] Savana Dickey PT    Stairs Assessment/Treatment    Number of Stairs 4  -      Stairs, Handrail Location both sides  -      Stairs, [place] Level contact guard assist;verbal cues required  -      Stairs, Technique Used step over step (ascending);step over step (descending)  -      Stairs, Impairments strength decreased;impaired balance  -      Recorded by [] Savana Dickey PT      Functional Mobility    Functional Mobility- Ind. Level   minimum assist (75% patient effort);contact guard assist  -    Functional Mobility- Device   other (see comments)   HHA LUE  -    Functional Mobility-Distance (Feet)   80  -    Functional Mobility- Safety Issues   weight-shifting ability decreased;step length decreased;sequencing ability decreased  -    Recorded by   [] Savana Dickey, PT    Balance Skills Training    Training Strategies (Balance Skills)  standing at table with BUE activity with no device to r and l to increase balance and endurance for increased independence with adls  -DN lateral WS with lateral head turns retrieving " bean bags and tossing using bilateral UEs  -    Standing-Level of Assistance  Contact guard  -DN Contact guard  -    Static Standing Balance Support   No upper extremity supported  -    Standing-Balance Activities  Weight Shift A-P;Weight Shift R-L;Reaching for objects;Reaching across midline  -DN Weight Shift A-P;Weight Shift R-L;Ball toss;Reaching for objects;Reaching across midline  -    Standing Balance # of Minutes  total time 15 minutes with rest breaks as needed  -DN 6  -LH    Stepping Strategy Assessment (Balance Skills) educated on seated VOR therex - saccades and visual tracking, also provided vestibular handout   -LH      Recorded by [LH] Savana Dickey, PT [DN] Gage Licea OTR [LH] Savana Dickey PT    Therapy Exercises    Bilateral Lower Extremities   20 reps;AROM:;supine;quad sets;SAQ;hip abduction/adduction;hamstring stretch  -    Bilateral Upper Extremity  AROM:;30 reps;sitting;elbow flexion/extension;pronation/supination;shoulder abduction/adduction;shoulder extension/flexion;shoulder horizontal abd/add   2# dowel, 2# dumbell, yellow theraband, handgripper 3/10s  -DN     BUE Resistance  --   arm bike standing for 3 minutes  -DN     Trunk Exercises   20 reps;supine;bridging;pelvic tilts;trunk rotation   LTR, SKTC  -LH    Recorded by  [DN] Gage Licea OTR [LH] Savana Dickey PT    Functional Endurance    Detail (Functional Endurance)  fair +  -DN     Recorded by  [DN] KEON Tran     Positioning and Restraints    Pre-Treatment Position in bed  - --   w/c  -DN sitting in chair/recliner  -    Post Treatment Position wheelchair  - bed  -DN wheelchair  -    In Wheelchair sitting;call light within reach;encouraged to call for assist;exit alarm on  -  sitting;call light within reach;encouraged to call for assist;exit alarm on;with family/caregiver  -LH    Recorded by [] Savana Dickey PT [DN] Gage Licea OTR [LH] Savana Dickey, PT      11/29/17 4004          Rehab  Assessment/Intervention    Discipline speech language pathologist  -OC      Document Type therapy note (daily note)  -OC      Subjective Information no complaints;agree to therapy  -OC      Patient Effort, Rehab Treatment good  -OC      Recorded by [OC] Viky Ramires MA, CCC-SLP      Cognitive Assessment/Intervention    Current Cognitive/Communication Assessment functional  -OC      Orientation Status oriented x 4  -OC      Follows Commands/Answers Questions able to follow multi-step instructions;able to follow single-step instructions;needs increased time;needs repetition  -OC      Recorded by [OC] Viky Ramires MA, CCC-SLP      Improve word retrieval skills    Status: Improve word retrieval skills Progressing as expected  -OC      Word Retrieval Skills Progress 80%;without cues   word grid  -OC      Recorded by [OC] Viky Ramires MA, CCC-SLP      Improve memory skills    Status: Improve memory skills Progressing as expected  -OC      Memory Skills Progress 80%;without cues   verbal recall  -OC      Recorded by [OC] Viky Ramires MA, CCC-SLP      Improve functional problem solving    Status: Improve functional problem solving Progressing as expected  -OC      Functional Problem Solving Progress 70%;without cues;90%;with consistent cues   deduction task  -OC      Recorded by [OC] Viky Ramires MA, CCC-SLP        User Key  (r) = Recorded By, (t) = Taken By, (c) = Cosigned By    Initials Name Effective Dates    OC Viky Ramires MA, CCC-OMAR 04/05/17 -     DN Gage Licea, OTR 04/13/15 -     JS Theodora Nevarez, PT 04/06/17 -     LH Savana Dickey, PT 02/07/17 -     HC Blanca Wihtney, OTR 08/17/17 -                 IP PT Goals       11/27/17 1511 11/20/17 1538       Bed Mobility PT LTG    Bed Mobility PT LTG, Date Established 11/27/17  -MG 11/20/17  -LH     Bed Mobility PT LTG, Time to Achieve 1 wk  -MG 2 wks  -LH     Bed Mobility PT LTG, Activity Type all bed mobility  -MG all bed mobility  -LH     Bed Mobility PT LTG, [place] Level  independent  -MG independent  -LH     Bed Mobility PT LTG, Date Goal Reviewed 11/27/17  -MG      Bed Mobility PT LTG, Outcome goal partially met  -MG      Bed Mobility PT LTG, Reason Goal Not Met --  -MG      Transfer Training PT LTG    Transfer Training PT LTG, Date Established 11/27/17  -MG 11/20/17  -LH     Transfer Training PT LTG, Time to Achieve 1 wk  -MG 2 wks  -LH     Transfer Training PT LTG, Activity Type  all transfers  -LH     Transfer Training PT LTG, [place] Level  supervision required  -LH     Transfer Training PT  LTG, Date Goal Reviewed 11/27/17  -MG      Transfer Training PT LTG, Outcome goal partially met  -MG      Transfer Training PT LTG, Reason Goal Not Met --  -MG      Transfer Training 2 PT LTG    Transfer Training PT 2 LTG, Date Established 11/27/17  -MG 11/20/17  -LH     Transfer Training PT 2 LTG, Time to Achieve 1 wk  -MG 2 wks  -LH     Transfer Training PT 2 LTG, Activity Type all transfers  -MG --   car tsf   -LH     Transfer Training PT 2 LTG, [place] Level  supervision required  -LH     Transfer Training PT 2 LTG, Date Goal Reviewed 11/27/17  -MG      Transfer Training PT 2 LTG, Outcome goal partially met  -MG      Transfer Training PT 2 LTG, Reason Goal Not Met --  -MG      Gait Training PT LTG    Gait Training Goal PT LTG, Date Established 11/27/17  -MG 11/20/17  -LH     Gait Training Goal PT LTG, Time to Achieve 1 wk  -MG 2 wks  -LH     Gait Training Goal PT LTG, [place] Level supervision required  -MG supervision required  -LH     Gait Training Goal PT LTG, Distance to Achieve  300  -LH     Gait Training Goal PT LTG, Outcome goal met  -MG      Gait Training Goal PT LTG, Reason Goal Not Met --  -MG      Stair Training PT LTG    Stair Training Goal PT LTG, Date Established 11/27/17  -MG 11/20/17  -LH     Stair Training Goal PT LTG, Time to Achieve 1 wk  -MG 2 wks  -LH     Stair Training Goal PT LTG, Number of Steps 4  -MG 4  -LH     Stair Training Goal PT LTG,  [place] Level  supervision required  -     Stair Training Goal PT LTG, Assist Device  2 handrails  -     Stair Training Goal PT LTG, Date Goal Reviewed 11/27/17  -MG      Stair Training Goal PT LTG, Outcome goal partially met  -MG      Stair Training Goal PT LTG, Reason Goal Not Met --  -MG      Patient Education PT LTG    Patient Education PT LTG, Date Established 11/27/17  -MG 11/20/17  -     Patient Education PT LTG, Time to Achieve 1 wk  -MG 2 wks  -     Patient Education PT LTG, Education Type  written program;HEP  -     Patient Education PT LTG, Education Understanding demonstrate adequately;verbalize understanding  -MG      Patient Education PT LTG, Date Goal Reviewed 11/27/17  -MG      Patient Education PT LTG Outcome goal ongoing  -MG        User Key  (r) = Recorded By, (t) = Taken By, (c) = Cosigned By    Initials Name Provider Type     Savana Dickey, PT Physical Therapist    MG Megan Gosselin, PT Physical Therapist          Physical Therapy Education     Title: PT OT SLP Therapies (Done)     Topic: Physical Therapy (Done)     Point: Mobility training (Done)    Learning Progress Summary    Learner Readiness Method Response Comment Documented by Status   Patient Acceptance E,TB VU transfer training, gait training JS 12/02/17 1306 Done    Acceptance E NR,VU   12/01/17 0904 Done    Acceptance E,H VU,NR   11/30/17 0856 Done    Acceptance E VU,DU,NR family conference held this date. educated pt and spouse on expectations for home including 24 hr assist for cog deficits. has all equipment needs at home (rwx, rollator). will provided HEP prior to DC.  11/29/17 1551 Done    Acceptance E VU,NR  MG 11/27/17 1200 Done    Acceptance E VU,NR   11/25/17 1348 Done    Acceptance E NR,VU   11/24/17 1017 Done    Acceptance E VU,NR  LB 11/23/17 1251 Done    Acceptance E VU,NR   11/22/17 1247 Done    Acceptance E VU,NR   11/21/17 1212 Done    Acceptance E NR   11/20/17 1536 Active   Family  Acceptance E VU,DU,NR family conference held this date. educated pt and spouse on expectations for home including 24 hr assist for cog deficits. has all equipment needs at home (rwx, rollator). will provided HEP prior to DC.  11/29/17 1551 Done               Point: Home exercise program (Done)    Learning Progress Summary    Learner Readiness Method Response Comment Documented by Status   Patient Acceptance E,TB VU transfer training, gait training  12/02/17 1306 Done    Acceptance E NR,VU   12/01/17 0904 Done    Acceptance E,H VU,NR   11/30/17 0856 Done    Acceptance E VU,DU,NR family conference held this date. educated pt and spouse on expectations for home including 24 hr assist for cog deficits. has all equipment needs at home (rwx, rollator). will provided HEP prior to DC.  11/29/17 1551 Done    Acceptance E NR,VU   11/24/17 1017 Done    Acceptance E VU,NR   11/22/17 1247 Done    Acceptance E VU,NR   11/21/17 1212 Done    Acceptance E NR   11/20/17 1536 Active   Family Acceptance E VU,DU,NR family conference held this date. educated pt and spouse on expectations for home including 24 hr assist for cog deficits. has all equipment needs at home (rwx, rollator). will provided HEP prior to DC.  11/29/17 1551 Done               Point: Body mechanics (Done)    Learning Progress Summary    Learner Readiness Method Response Comment Documented by Status   Patient Acceptance E,TB VU transfer training, gait training  12/02/17 1306 Done    Acceptance E NR,VU   12/01/17 0904 Done    Acceptance E,H VU,NR   11/30/17 0856 Done    Acceptance E VU,DU,NR family conference held this date. educated pt and spouse on expectations for home including 24 hr assist for cog deficits. has all equipment needs at home (rwx, rollator). will provided HEP prior to DC.  11/29/17 1551 Done    Acceptance E VU,NR   11/27/17 1200 Done    Acceptance E NR,VU   11/24/17 1017 Done    Acceptance E VU,NR   11/22/17 1247  Done    Acceptance E VU,NR   11/21/17 1212 Done    Acceptance E NR   11/20/17 1536 Active   Family Acceptance E VU,DU,NR family conference held this date. educated pt and spouse on expectations for home including 24 hr assist for cog deficits. has all equipment needs at home (rwx, rollator). will provided HEP prior to DC.  11/29/17 1551 Done               Point: Precautions (Done)    Learning Progress Summary    Learner Readiness Method Response Comment Documented by Status   Patient Acceptance E,TB VU transfer training, gait training  12/02/17 1306 Done    Acceptance E NR,VU   12/01/17 0904 Done    Acceptance E,H VU,NR   11/30/17 0856 Done    Acceptance E VU,DU,NR family conference held this date. educated pt and spouse on expectations for home including 24 hr assist for cog deficits. has all equipment needs at home (rwx, rollator). will provided HEP prior to DC.  11/29/17 1551 Done    Acceptance E VU,NR   11/27/17 1200 Done    Acceptance E NR,VU   11/24/17 1017 Done    Acceptance E VU,NR   11/22/17 1247 Done    Acceptance E VU,NR   11/21/17 1212 Done    Acceptance E NR   11/20/17 1536 Active   Family Acceptance E VU,DU,NR family conference held this date. educated pt and spouse on expectations for home including 24 hr assist for cog deficits. has all equipment needs at home (rwx, rollator). will provided HEP prior to DC.  11/29/17 1551 Done                      User Key     Initials Effective Dates Name Provider Type Discipline     04/06/17 -  Theodora Nevarez, PT Physical Therapist PT     02/07/17 -  Savana Dickey, PT Physical Therapist PT    LB 02/18/16 -  Savana Novoa, PTA Physical Therapy Assistant PT     09/13/17 -  Megan Gosselin, PT Physical Therapist PT                    PT Recommendation and Plan  Anticipated Discharge Disposition: home with assist, home with outpatient services  Planned Therapy Interventions: bed mobility training, balance training, gait training, home  exercise program, ROM (Range of Motion), stair training, strengthening, stretching, transfer training  PT Frequency: 2 times/day            Time Calculation:         PT Charges       12/02/17 1030          Time Calculation    Start Time 1030  -JS      Stop Time 1100  -JS      Time Calculation (min) 30 min  -JS      PT Received On 12/02/17  -JS      PT - Next Appointment 12/04/17  -JS        User Key  (r) = Recorded By, (t) = Taken By, (c) = Cosigned By    Initials Name Provider Type    PAUL Nevarez PT Physical Therapist          Therapy Charges for Today     Code Description Service Date Service Provider Modifiers Qty    [id number] HC PT THER PROC EA 15 MIN 12/2/2017 Theodora Nevarez PT GP 2               Theodora Nevarez PT  12/2/2017

## 2017-12-02 NOTE — PROGRESS NOTES
Inpatient Rehabilitation Functional Measures Assessment    Functional Measures  KYM Eating:  [place]  KYM Grooming: [facility] Bathing:  [facility] Upper Body Dressing:  [facility] Lower Body Dressing:  [facility] Toileting:  [facility] Bladder Management  Level of Assistance:  [place]  Frequency/Number of Accidents this Shift:  [facility] Bowel Management  Level of Assistance: [place]  Frequency/Number of Accidents this Shift: [facility] Bed/Chair/Wheelchair Transfer:  Bed/chair/wheelchair Transfer Score = 5.  Patient is supervision/set-up for transferring to and from the  bed/chair/wheelchair, requiring: Stand by assistance. No assistive devices were  required.  KYM Toilet Transfer:  [facility] Tub/Shower Transfer:  [place]    Previously Documented Mode of Locomotion at Discharge: Field  KYM Expected Mode of Locomotion at Discharge: [facility] Walk/Wheelchair:  WHEELCHAIR OBSERVATION   Activity was not observed.    WALK OBSERVATION   Walk Distance Scale = 3.  Distance walked is greater than 150 feet. Walk Score  = 4.  Patient performs 75% or more of effort and requires minimal assistance.  Incidental help/contact guard/steadying was provided. Patient walked a distance  of  240 feet. Patient requires the following assistive device(s): Rolling  walker.  KYM Stairs:  Activity was not observed.    KYM Comprehension:  [place]  KYM Expression:  [facility] Social Interaction:  [facility] Problem Solving:  [facility] Memory:  [place]    Therapy Mode Minutes  Occupational Therapy: [place]  Physical Therapy: Individual: 30 minutes.  Speech Language Pathology:  [place]    Signed by: Theodora Nevarez PT

## 2017-12-02 NOTE — PROGRESS NOTES
LOS: 13 days   Patient Care Team:  Goran Carr MD as PCP - General (Family Medicine)    Chief Complaint: same    Subjective     History of Present Illness    SubjectivePt is awake and alert. No new concerns    History taken from: patient    Objective     Vital Signs  Temp:  [98 °F (36.7 °C)-98.3 °F (36.8 °C)] 98.3 °F (36.8 °C)  Heart Rate:  [57-70] 65  Resp:  [16-18] 16  BP: (114-136)/(57-70) 136/64    Objectiveexam unchanged    Results Review:     I reviewed the patient's new clinical results.    Medication Review:     Assessment/Plan     Active Problems:    Stroke      Assessment & Plan Continue to prepare for dc.     Norman Taylor MD  12/02/17  11:44 AM    Time:

## 2017-12-02 NOTE — PROGRESS NOTES
Inpatient Rehabilitation Functional Measures Assessment    Functional Measures  KYM Eating:  [facility] Grooming: [facility] Bathing:  [facility] Upper Body Dressing:  [facility] Lower Body Dressing:  [facility] Toileting:  [facility] Bladder Management  Level of Assistance:  [place]  Frequency/Number of Accidents this Shift:  [facility] Bowel Management  Level of Assistance: [place]  Frequency/Number of Accidents this Shift: [facility] Bed/Chair/Wheelchair Transfer:  [facility] Toilet Transfer:  [facility] Tub/Shower Transfer:  [place]    Previously Documented Mode of Locomotion at Discharge: [place]  KYM Expected Mode of Locomotion at Discharge: [facility] Walk/Wheelchair:  [facility] Stairs:  [facility] Comprehension:  Auditory comprehension is the usual mode. Comprehension  Score = 6, Modified [place].  Patient comprehends complex/abstract  information in their primary language, requiring:  YKM Expression:  Vocal expression is the usual mode. Expression Score = 6,  Modified Independent.  Patient expresses complex/abstract information in their  primary language, requiring:  [facility] Social Interaction:  Activity was not observed.  KYM Problem Solving:  Activity was not observed.  KYM Memory:  Memory Score = 6, Modified [place].  Patient is modified  independent for memory, requiring:    Therapy Mode Minutes  Occupational Therapy: [place]  Physical Therapy: [place]  Speech Language Pathology:  [place]    Signed by: Angeles Mas RN

## 2017-12-02 NOTE — PLAN OF CARE
Problem: Patient Care Overview (Adult)  Goal: Plan of Care Review  Outcome: Ongoing (interventions implemented as appropriate)    12/02/17 0204   Coping/Psychosocial Response Interventions   Plan Of Care Reviewed With patient   Patient Care Overview   Progress improving   Outcome Evaluation   Outcome Summary/Follow up Plan Patient calm and cooperative with staff tonight. Received vestibular therapy today. Will receive it again on Monday,, treatments are to be 48 hours apart. No c/o pain, no unsafe behaviors, no c/o vertigo / dizziness.          Problem: Stroke (Ischemic) (Adult)  Goal: Signs and Symptoms of Listed Potential Problems Will be Absent or Manageable (Stroke)  Outcome: Ongoing (interventions implemented as appropriate)    12/02/17 0204   Stroke (Ischemic)   Problems Assessed (Stroke (Ischemic)/TIA) all   Problems Present (Stroke (Ischemic)/TIA) motor/sensory impairment         Problem: Fall Risk (Adult)  Goal: Absence of Falls  Outcome: Ongoing (interventions implemented as appropriate)    12/02/17 0204   Fall Risk (Adult)   Absence of Falls making progress toward outcome

## 2017-12-02 NOTE — PROGRESS NOTES
Inpatient Rehabilitation Functional Measures Assessment    Functional Measures  KYM Eating:  [facility] Grooming: [facility] Bathing:  [facility] Upper Body Dressing:  [facility] Lower Body Dressing:  [facility] Toileting:  [facility] Bladder Management  Level of Assistance:  [place]  Frequency/Number of Accidents this Shift:  [facility] Bowel Management  Level of Assistance: [place]  Frequency/Number of Accidents this Shift: [facility] Bed/Chair/Wheelchair Transfer:  [facility] Toilet Transfer:  [facility] Tub/Shower Transfer:  [place]    Previously Documented Mode of Locomotion at Discharge: [place]  KYM Expected Mode of Locomotion at Discharge: [facility] Walk/Wheelchair:  [facility] Stairs:  [facility] Comprehension:  Auditory comprehension is the usual mode. Comprehension  Score = 6, Modified [place].  Patient comprehends complex/abstract  information in their primary language, requiring:  K[facility] Expression:  Vocal expression is the usual mode. Expression Score = 6,  Modified Independent.  Patient expresses complex/abstract information in their  primary language, requiring:  KYM Social Interaction:  Social Interaction Score = 6, Modified [place].  Patient is modified independent for social interaction, requiring:  [facility] Problem Solving:  Problem Solving Score = 6, Modified [place].  Patient  makes appropriate decisions in order to solve complex problems, but requires  extra time.  KYM Memory:  Memory Score = 6, Modified [place].  Patient is modified  independent for memory, requiring:    Therapy Mode Minutes  Occupational Therapy: [place]  Physical Therapy: [place]  Speech Language Pathology:  [place]    Signed by: Nicole Troncoso RN

## 2017-12-02 NOTE — PROGRESS NOTES
Inpatient Rehabilitation Functional Measures Assessment    Functional Measures  KYM Eating:  [place]  KYM Grooming: [place]  KYM Bathing:  [place]  KYM Upper Body Dressing:  [place]  [facility] Lower Body Dressing:  [place]  [facility] Toileting:  Toileting Score = 4.  Patient requires minimal assistance for  toileting, such as steadying for balance while cleansing or adjusting clothes.  Patient requires the following assistive device(s): Grab bar. Adaptive device to  maintain balance.    KYM Bladder Management  Level of Assistance:  Bladder Score = 3. Patient performs 50-74% of tasks and  requires moderate assistance for bladder management requiring assistive  device/method: bathroom .  Frequency/Number of Accidents this Shift:  Bladder accidents this shift:  0 .  Patient has not had an accident this shift.    KYM Bowel Management  Level of Assistance: Activity was not observed.  Frequency/Number of Accidents this Shift: Bowel accidents this shift: 0 .  Patient has not had an accident this shift.    KYM Bed/Chair/Wheelchair Transfer:  Bed/chair/wheelchair Transfer Score = 2.  Patient performs 25-49% of effort and requires maximal assistance (most of the  lifting) for transferring to and from the bed/chair/wheelchair. Patient requires  the following assistive device(s): Walker. Bed rails. Elevated bed/surface.  Elevated head of bed.  KYM Toilet Transfer:  Toilet Transfer Score = 3.  Patient performs 50-74% of  effort and requires moderate assistance (some lifting) for transferring to and  from the toilet/commode. Patient requires the following assistive device(s):  Walker. Grab bars.  KYM Tub/Shower Transfer:  [place]    Previously Documented Mode of Locomotion at Discharge: [place]  KYM Expected Mode of Locomotion at Discharge: [place]  [facility] Walk/Wheelchair:  [place]  [facility] Stairs:  [place]    KYM Comprehension:  [place]  KYM Expression:  [place]  KYM Social Interaction:  [place]  KYM Problem Solving:  [place]  KYM Memory:  [place]    Therapy Mode  Minutes  Occupational Therapy: [place]  Physical Therapy: [place]  Speech Language Pathology:  [place]    Signed by: MUKUL Mann

## 2017-12-02 NOTE — PLAN OF CARE
Problem: Patient Care Overview (Adult)  Goal: Plan of Care Review  Outcome: Ongoing (interventions implemented as appropriate)    12/02/17 1113   Coping/Psychosocial Response Interventions   Plan Of Care Reviewed With patient   Outcome Evaluation   Outcome Summary/Follow up Plan Pt is attending all therapies and still has veritgo from time to time.       Goal: Adult Individualization and Mutuality  Outcome: Ongoing (interventions implemented as appropriate)  Goal: Discharge Needs Assessment  Outcome: Ongoing (interventions implemented as appropriate)    Problem: Stroke (Ischemic) (Adult)  Goal: Signs and Symptoms of Listed Potential Problems Will be Absent or Manageable (Stroke)  Outcome: Ongoing (interventions implemented as appropriate)    Problem: Fall Risk (Adult)  Goal: Absence of Falls  Outcome: Ongoing (interventions implemented as appropriate)

## 2017-12-03 PROCEDURE — 94799 UNLISTED PULMONARY SVC/PX: CPT

## 2017-12-03 PROCEDURE — 63710000001 DIPHENHYDRAMINE PER 50 MG: Performed by: PHYSICAL MEDICINE & REHABILITATION

## 2017-12-03 RX ADMIN — PANTOPRAZOLE SODIUM 40 MG: 40 TABLET, DELAYED RELEASE ORAL at 05:21

## 2017-12-03 RX ADMIN — ASPIRIN 325 MG: 325 TABLET ORAL at 10:03

## 2017-12-03 RX ADMIN — BENZONATATE 200 MG: 200 CAPSULE, LIQUID FILLED ORAL at 12:07

## 2017-12-03 RX ADMIN — IPRATROPIUM BROMIDE 0.5 MG: 0.5 SOLUTION RESPIRATORY (INHALATION) at 02:56

## 2017-12-03 RX ADMIN — FOLIC ACID 1 MG: 1 TABLET ORAL at 10:03

## 2017-12-03 RX ADMIN — IPRATROPIUM BROMIDE 0.5 MG: 0.5 SOLUTION RESPIRATORY (INHALATION) at 10:51

## 2017-12-03 RX ADMIN — ESCITALOPRAM 10 MG: 10 TABLET, FILM COATED ORAL at 10:02

## 2017-12-03 RX ADMIN — LEVOTHYROXINE SODIUM 125 MCG: 125 TABLET ORAL at 05:21

## 2017-12-03 RX ADMIN — LETROZOLE 2.5 MG: 2.5 TABLET ORAL at 10:04

## 2017-12-03 RX ADMIN — Medication 10 MG: at 21:44

## 2017-12-03 RX ADMIN — FUROSEMIDE 20 MG: 20 TABLET ORAL at 10:03

## 2017-12-03 RX ADMIN — GABAPENTIN 800 MG: 400 CAPSULE ORAL at 05:21

## 2017-12-03 RX ADMIN — DIPHENHYDRAMINE HYDROCHLORIDE 25 MG: 25 CAPSULE ORAL at 21:44

## 2017-12-03 RX ADMIN — GUAIFENESIN AND DEXTROMETHORPHAN 5 ML: 100; 10 SYRUP ORAL at 02:13

## 2017-12-03 RX ADMIN — ACETAMINOPHEN 650 MG: 325 TABLET ORAL at 02:19

## 2017-12-03 RX ADMIN — Medication 1 TABLET: at 10:03

## 2017-12-03 RX ADMIN — DOCUSATE SODIUM 100 MG: 100 CAPSULE, LIQUID FILLED ORAL at 21:47

## 2017-12-03 RX ADMIN — VITAMIN D, TAB 1000IU (100/BT) 5000 UNITS: 25 TAB at 10:02

## 2017-12-03 RX ADMIN — BUDESONIDE AND FORMOTEROL FUMARATE DIHYDRATE 2 PUFF: 160; 4.5 AEROSOL RESPIRATORY (INHALATION) at 07:19

## 2017-12-03 RX ADMIN — IPRATROPIUM BROMIDE 0.5 MG: 0.5 SOLUTION RESPIRATORY (INHALATION) at 07:19

## 2017-12-03 RX ADMIN — CLOPIDOGREL 75 MG: 75 TABLET, FILM COATED ORAL at 10:04

## 2017-12-03 RX ADMIN — ATENOLOL 100 MG: 50 TABLET ORAL at 10:02

## 2017-12-03 RX ADMIN — ROSUVASTATIN CALCIUM 20 MG: 20 TABLET, FILM COATED ORAL at 10:04

## 2017-12-03 RX ADMIN — Medication 1 TABLET: at 21:45

## 2017-12-03 RX ADMIN — BENZONATATE 200 MG: 200 CAPSULE, LIQUID FILLED ORAL at 05:21

## 2017-12-03 RX ADMIN — GUAIFENESIN AND DEXTROMETHORPHAN 5 ML: 100; 10 SYRUP ORAL at 22:59

## 2017-12-03 RX ADMIN — SIMETHICONE CHEW TAB 80 MG 80 MG: 80 TABLET ORAL at 22:59

## 2017-12-03 RX ADMIN — GABAPENTIN 800 MG: 400 CAPSULE ORAL at 21:44

## 2017-12-03 RX ADMIN — BUDESONIDE AND FORMOTEROL FUMARATE DIHYDRATE 2 PUFF: 160; 4.5 AEROSOL RESPIRATORY (INHALATION) at 21:19

## 2017-12-03 RX ADMIN — BENZONATATE 200 MG: 200 CAPSULE, LIQUID FILLED ORAL at 22:59

## 2017-12-03 RX ADMIN — DOCUSATE SODIUM 100 MG: 100 CAPSULE, LIQUID FILLED ORAL at 10:09

## 2017-12-03 RX ADMIN — IPRATROPIUM BROMIDE 0.5 MG: 0.5 SOLUTION RESPIRATORY (INHALATION) at 21:18

## 2017-12-03 NOTE — PROGRESS NOTES
Inpatient Rehabilitation Functional Measures Assessment    Functional Measures  KYM Eating:  [facility] Grooming: [facility] Bathing:  [facility] Upper Body Dressing:  [facility] Lower Body Dressing:  [facility] Toileting:  [facility] Bladder Management  Level of Assistance:  [place]  Frequency/Number of Accidents this Shift:  [facility] Bowel Management  Level of Assistance: [place]  Frequency/Number of Accidents this Shift: [facility] Bed/Chair/Wheelchair Transfer:  [facility] Toilet Transfer:  [facility] Tub/Shower Transfer:  [place]    Previously Documented Mode of Locomotion at Discharge: [place]  KYM Expected Mode of Locomotion at Discharge: [facility] Walk/Wheelchair:  [facility] Stairs:  [facility] Comprehension:  Auditory comprehension is the usual mode. Comprehension  Score = 6, Modified [place].  Patient comprehends complex/abstract  information in their primary language, requiring:  KYM Expression:  Vocal expression is the usual mode. Expression Score = 6,  Modified Independent.  Patient expresses complex/abstract information in their  primary language, requiring:  KYM Social Interaction:  Social Interaction Score = 6, Modified Independent.  Patient is modified independent for social interaction, requiring: Requires  additional time.  KYM Problem Solving:  Problem Solving Score = 6, Modified [place].  Patient  makes appropriate decisions in order to solve complex problems, but requires  extra time.  KYM Memory:  Memory Score = 6, Modified [place].  Patient is modified  independent for memory, requiring:    Therapy Mode Minutes  Occupational Therapy: [place]  Physical Therapy: [place]  Speech Language Pathology:  [place]    Signed by: Nicole Troncoso RN

## 2017-12-03 NOTE — PLAN OF CARE
Problem: Patient Care Overview (Adult)  Goal: Plan of Care Review  Outcome: Ongoing (interventions implemented as appropriate)    12/03/17 0150   Coping/Psychosocial Response Interventions   Plan Of Care Reviewed With patient   Patient Care Overview   Progress improving   Outcome Evaluation   Outcome Summary/Follow up Plan Patient was calm and cooperative this evening. Patient has no c/o of vertigo or dizziness but did complain of coughing. Tessalon pearls and Robitussin DM were given and coughing did stop for a while. Around 0040 patient again c/o coughing and asked for medication but it had only been two hours since her last dose of medication. This was explained to patient who stated that it was okay and she would just have to wait No c/o pain ,no unsafe behaviors.         Problem: Stroke (Ischemic) (Adult)  Goal: Signs and Symptoms of Listed Potential Problems Will be Absent or Manageable (Stroke)  Outcome: Ongoing (interventions implemented as appropriate)    12/03/17 0150   Stroke (Ischemic)   Problems Assessed (Stroke (Ischemic)/TIA) all   Problems Present (Stroke (Ischemic)/TIA) motor/sensory impairment         Problem: Fall Risk (Adult)  Goal: Absence of Falls  Outcome: Ongoing (interventions implemented as appropriate)    12/03/17 0150   Fall Risk (Adult)   Absence of Falls making progress toward outcome

## 2017-12-03 NOTE — PLAN OF CARE
Problem: Patient Care Overview (Adult)  Goal: Plan of Care Review  Outcome: Ongoing (interventions implemented as appropriate)    12/03/17 0975   Coping/Psychosocial Response Interventions   Plan Of Care Reviewed With patient   Outcome Evaluation   Outcome Summary/Follow up Plan Pt is calm and cooperative and has has some coughing that is non productive.       Goal: Adult Individualization and Mutuality  Outcome: Ongoing (interventions implemented as appropriate)  Goal: Discharge Needs Assessment  Outcome: Ongoing (interventions implemented as appropriate)    Problem: Stroke (Ischemic) (Adult)  Goal: Signs and Symptoms of Listed Potential Problems Will be Absent or Manageable (Stroke)  Outcome: Ongoing (interventions implemented as appropriate)    Problem: Fall Risk (Adult)  Goal: Absence of Falls  Outcome: Ongoing (interventions implemented as appropriate)

## 2017-12-03 NOTE — PROGRESS NOTES
Inpatient Rehabilitation Plan of Care Note    Plan of Care  Care Plan Reviewed - Updates as Follows    Body Systems    [RN] Integumentary(Active)  Current Status(11/26/2017): Puncture site in groin from heart cath. Bruised  arms.  Weekly Goal(12/10/2017): No s/s infection noted  Discharge Goal: Incision healed    Performed Intervention(s)  Daily skin inspection  Turning/ repositioning      Psychosocial    [RN] Coping/Adjustment(Active)  Current Status(11/26/2017): Supportive family  Weekly Goal(12/10/2017): Allow opportunity to express concerns regarding current  status.  Discharge Goal: Demonstrates healthy coping strategies.    Performed Intervention(s)  Verbalize needs and concerns  Therapeutic environmental set up      Safety    [RN] Potential for Injury(Active)  Current Status(11/26/2017): Uses call light appropriately  Weekly Goal(12/10/2017): Instruct family/caregivers regarding safety precautions  and need for close supervision.  Discharge Goal: Patient and family will be aware of risk for fall and safety in  home setting.    Performed Intervention(s)  Falls prevention protocol  Items within reach  Bed/ Chair alarm.      Sphincter Control    [RN] Bowel and bladder management(Active)  Current Status(11/26/2017): 100% Continent  Weekly Goal(12/10/2017): 100% Continent  Discharge Goal: 100% Continent    Performed Intervention(s)  Encourage fluid intake  Elimination schedule  Monitor intake and output    Signed by: Nicole Troncoso RN

## 2017-12-03 NOTE — PROGRESS NOTES
LOS: 14 days   Patient Care Team:  Goran Carr MD as PCP - General (Family Medicine)    Chief Complaint: same    Subjective     History of Present Illness    Subjective Pt is awake and alert. No c/o. Pt is to have pass today    History taken from: patient    Objective     Vital Signs  Temp:  [97.7 °F (36.5 °C)-98.1 °F (36.7 °C)] 97.7 °F (36.5 °C)  Heart Rate:  [59-74] 62  Resp:  [16-20] 16  BP: ()/(50-75) 173/75    Objective exam unchanged    Results Review:     I reviewed the patient's new clinical results.    Medication Review:     Assessment/Plan     Active Problems:    Stroke      Assessment & PlanContinue to prepare for dc.     Norman Taylor MD  12/03/17  11:41 AM    Time:

## 2017-12-03 NOTE — PROGRESS NOTES
Inpatient Rehabilitation Functional Measures Assessment    Functional Measures  [facility] Eating:  Branch  [facility] Grooming: Branch  [facility] Bathing:  Branch  [facility] Upper Body Dressing:  Branch  [facility] Lower Body Dressing:  Branch  [facility] Toileting:  Toileting Score = 4.  Patient requires minimal assistance for  toileting, such as steadying for balance while cleansing or adjusting clothes.  Patient requires the following assistive device(s): Grab bar. Adaptive device to  maintain balance.    [facility] Bladder Management  Level of Assistance:  Bladder Score = 2. Patient performs 25-49% of tasks and  requires maximal assistance for bladder management requiring assistive  device/method: bathroom .  Frequency/Number of Accidents this Shift:  Bladder accidents this shift:  0 .  Patient has not had an accident this shift.    [facility] Bowel Management  Level of Assistance: Activity was not observed.  Frequency/Number of Accidents this Shift: Bowel accidents this shift: 0 .  Patient has not had an accident this shift.    [facility] Bed/Chair/Wheelchair Transfer:  Bed/chair/wheelchair Transfer Score = 2.  Patient performs 25-49% of effort and requires maximal assistance (most of the  lifting) for transferring to and from the bed/chair/wheelchair. Patient requires  the following assistive device(s): Walker. Elevated head of bed. Bed rails.  Elevated bed/surface.  [facility] Toilet Transfer:  Toilet Transfer Score = 3.  Patient performs 50-74% of  effort and requires moderate assistance (some lifting) for transferring to and  from the toilet/commode. Patient requires the following assistive device(s):  Walker.  [facility] Tub/Shower Transfer:  Branch    Previously Documented Mode of Locomotion at Discharge: Field  [facility] Expected Mode of Locomotion at Discharge: Branch  [facility] Walk/Wheelchair:  Branch  [facility] Stairs:  Branch    [facility] Comprehension:  Branch  [facility] Expression:  Branch  [facility] Social Interaction:  Branch  [facility] Problem Solving:  Branch  [facility] Memory:  Branch    Therapy Mode  Minutes  Occupational Therapy: Branch  Physical Therapy: Branch  Speech Language Pathology:  Branch    Signed by: MUKUL Mann

## 2017-12-04 LAB
ANION GAP SERPL CALCULATED.3IONS-SCNC: 13.5 MMOL/L
BUN BLD-MCNC: 16 MG/DL (ref 8–23)
BUN/CREAT SERPL: 18.4 (ref 7–25)
CALCIUM SPEC-SCNC: 9.3 MG/DL (ref 8.6–10.5)
CHLORIDE SERPL-SCNC: 101 MMOL/L (ref 98–107)
CO2 SERPL-SCNC: 26.5 MMOL/L (ref 22–29)
CREAT BLD-MCNC: 0.87 MG/DL (ref 0.57–1)
GFR SERPL CREATININE-BSD FRML MDRD: 63 ML/MIN/1.73
GLUCOSE BLD-MCNC: 102 MG/DL (ref 65–99)
POTASSIUM BLD-SCNC: 4.1 MMOL/L (ref 3.5–5.2)
SODIUM BLD-SCNC: 141 MMOL/L (ref 136–145)

## 2017-12-04 PROCEDURE — 94799 UNLISTED PULMONARY SVC/PX: CPT

## 2017-12-04 PROCEDURE — 92507 TX SP LANG VOICE COMM INDIV: CPT

## 2017-12-04 PROCEDURE — 80048 BASIC METABOLIC PNL TOTAL CA: CPT | Performed by: PHYSICAL MEDICINE & REHABILITATION

## 2017-12-04 PROCEDURE — 63710000001 DIPHENHYDRAMINE PER 50 MG: Performed by: PHYSICAL MEDICINE & REHABILITATION

## 2017-12-04 PROCEDURE — 97535 SELF CARE MNGMENT TRAINING: CPT

## 2017-12-04 PROCEDURE — 94762 N-INVAS EAR/PLS OXIMTRY CONT: CPT

## 2017-12-04 PROCEDURE — 97110 THERAPEUTIC EXERCISES: CPT

## 2017-12-04 RX ORDER — ACETAMINOPHEN, ASPIRIN AND CAFFEINE 250; 250; 65 MG/1; MG/1; MG/1
2 TABLET, FILM COATED ORAL EVERY 6 HOURS PRN
Status: CANCELLED | OUTPATIENT
Start: 2017-12-04

## 2017-12-04 RX ADMIN — DIPHENHYDRAMINE HYDROCHLORIDE 25 MG: 25 CAPSULE ORAL at 21:30

## 2017-12-04 RX ADMIN — ESCITALOPRAM 10 MG: 10 TABLET, FILM COATED ORAL at 07:41

## 2017-12-04 RX ADMIN — ATENOLOL 100 MG: 50 TABLET ORAL at 07:43

## 2017-12-04 RX ADMIN — FOLIC ACID 1 MG: 1 TABLET ORAL at 07:44

## 2017-12-04 RX ADMIN — Medication 10 MG: at 21:30

## 2017-12-04 RX ADMIN — BUDESONIDE AND FORMOTEROL FUMARATE DIHYDRATE 2 PUFF: 160; 4.5 AEROSOL RESPIRATORY (INHALATION) at 20:16

## 2017-12-04 RX ADMIN — ROSUVASTATIN CALCIUM 20 MG: 20 TABLET, FILM COATED ORAL at 07:41

## 2017-12-04 RX ADMIN — FUROSEMIDE 20 MG: 20 TABLET ORAL at 07:45

## 2017-12-04 RX ADMIN — VITAMIN D, TAB 1000IU (100/BT) 5000 UNITS: 25 TAB at 07:43

## 2017-12-04 RX ADMIN — IPRATROPIUM BROMIDE 0.5 MG: 0.5 SOLUTION RESPIRATORY (INHALATION) at 09:06

## 2017-12-04 RX ADMIN — Medication 1 TABLET: at 07:42

## 2017-12-04 RX ADMIN — PANTOPRAZOLE SODIUM 40 MG: 40 TABLET, DELAYED RELEASE ORAL at 05:14

## 2017-12-04 RX ADMIN — GABAPENTIN 800 MG: 400 CAPSULE ORAL at 21:30

## 2017-12-04 RX ADMIN — DOCUSATE SODIUM 100 MG: 100 CAPSULE, LIQUID FILLED ORAL at 07:44

## 2017-12-04 RX ADMIN — GUAIFENESIN AND DEXTROMETHORPHAN 5 ML: 100; 10 SYRUP ORAL at 14:03

## 2017-12-04 RX ADMIN — IPRATROPIUM BROMIDE 0.5 MG: 0.5 SOLUTION RESPIRATORY (INHALATION) at 16:43

## 2017-12-04 RX ADMIN — LETROZOLE 2.5 MG: 2.5 TABLET ORAL at 07:41

## 2017-12-04 RX ADMIN — DOCUSATE SODIUM 100 MG: 100 CAPSULE, LIQUID FILLED ORAL at 17:03

## 2017-12-04 RX ADMIN — MAGNESIUM HYDROXIDE 10 ML: 2400 SUSPENSION ORAL at 21:30

## 2017-12-04 RX ADMIN — GUAIFENESIN AND DEXTROMETHORPHAN 5 ML: 100; 10 SYRUP ORAL at 21:30

## 2017-12-04 RX ADMIN — BUDESONIDE AND FORMOTEROL FUMARATE DIHYDRATE 2 PUFF: 160; 4.5 AEROSOL RESPIRATORY (INHALATION) at 07:19

## 2017-12-04 RX ADMIN — ASPIRIN 325 MG: 325 TABLET ORAL at 07:45

## 2017-12-04 RX ADMIN — BENZONATATE 200 MG: 200 CAPSULE, LIQUID FILLED ORAL at 07:41

## 2017-12-04 RX ADMIN — ACETAMINOPHEN 650 MG: 325 TABLET ORAL at 14:03

## 2017-12-04 RX ADMIN — GABAPENTIN 800 MG: 400 CAPSULE ORAL at 05:14

## 2017-12-04 RX ADMIN — Medication 1 TABLET: at 17:03

## 2017-12-04 RX ADMIN — CLOPIDOGREL 75 MG: 75 TABLET, FILM COATED ORAL at 07:45

## 2017-12-04 RX ADMIN — BENZONATATE 200 MG: 200 CAPSULE, LIQUID FILLED ORAL at 14:03

## 2017-12-04 RX ADMIN — ACETAMINOPHEN 650 MG: 325 TABLET ORAL at 05:14

## 2017-12-04 RX ADMIN — LEVOTHYROXINE SODIUM 125 MCG: 125 TABLET ORAL at 05:14

## 2017-12-04 RX ADMIN — IPRATROPIUM BROMIDE 0.5 MG: 0.5 SOLUTION RESPIRATORY (INHALATION) at 20:15

## 2017-12-04 RX ADMIN — GABAPENTIN 800 MG: 400 CAPSULE ORAL at 14:03

## 2017-12-04 RX ADMIN — GUAIFENESIN AND DEXTROMETHORPHAN 5 ML: 100; 10 SYRUP ORAL at 07:41

## 2017-12-04 RX ADMIN — POLYETHYLENE GLYCOL 3350 17 G: 17 POWDER, FOR SOLUTION ORAL at 07:41

## 2017-12-04 NOTE — THERAPY TREATMENT NOTE
Inpatient Rehabilitation - Occupational Therapy Treatment Note  [facility]     Patient Name: Paloma Carr  : 1940  MRN: [id number]  Today's Date: 2017  Onset of Illness/Injury or Date of Surgery Date: 11/15/17     Referring Physician: Brandon      Admit Date: 2017    Visit Dx:     ICD-10-CM ICD-9-CM   1. Impaired gait and mobility R26.89 781.2     Patient Active Problem List   Diagnosis   • Soft tissue swelling of chest wall   • Abnormal ultrasound of breast   • Postmastectomy lymphedema syndrome   • Malignant neoplasm of upper-outer quadrant of right female breast   • Cerebrovascular accident (CVA)   • COPD exacerbation   • Stroke             Adult Rehabilitation Note       17 1554 17 1200 17 0824    Rehab Assessment/Intervention    Discipline occupational therapist  -DN speech language pathologist  -OC physical therapist  -LH    Document Type therapy note (daily note)  -DN  therapy note (daily note)  -LH    Subjective Information agree to therapy;no complaints  -DN  agree to therapy  -LH    Patient Effort, Rehab Treatment good  -DN  good  -LH    Symptoms Noted During/After Treatment none  -DN      Precautions/Limitations fall precautions  -DN  fall precautions  -LH    Precautions/Limitations, Vision WFL with corrective lenses  -DN  WFL with corrective lenses  -LH    Specific Treatment Considerations Cooking eval in kitchen to day with pt and therapist  -DN  noted R beat nystagmus with turning/reaching and tossing bean bags, corrects after approx 15 sec with head/neck in neutral position  -LH    Recorded by [DN] Gage Licea OTR [OC] Viky Ramires MA,CCC-SLP [LH] Savana Dickey, PT    Pain Assessment    Pain Assessment  No/denies pain  -OC No/denies pain  -LH    Recorded by  [OC] Viky Ramires MA,CCC-SLP [LH] Savana Dickey, PT    Vision Assessment/Intervention    Visual Impairment  WFL with corrective lenses  -OC WFL with corrective lenses  -LH    Recorded by  [OC] Viky Ramires,  MA,CCC-SLP [LH] Savana Dickey, PT    Cognitive Assessment/Intervention    Current Cognitive/Communication Assessment functional  -DN functional  -OC functional  -LH    Orientation Status oriented x 4  -DN oriented x 4  -OC oriented x 4  -LH    Follows Commands/Answers Questions 100% of the time  -% of the time  -% of the time  -LH    Personal Safety mild impairment;decreased awareness, need for assist;decreased insight to deficits;decreased awareness, need for safety  -DN  mild impairment  -LH    Recorded by [DN] KEON Tran [OC] Viky Ramires MA,CCC-SLP [LH] Savana Dickey, PT    Cognitive Assessment Intervention    Sequencing  WNL/WFL  -OC     Recorded by  [OC] Viky Ramires MA,CCC-SLP     Improve word retrieval skills    Status: Improve word retrieval skills  Progressing as expected  -OC     Word Retrieval Skills Progress  90%;without cues   min cues for describing details to aid in word finding  -OC     Recorded by  [OC] Viky Ramires MA,CCC-SLP     Improve functional problem solving    Status: Improve functional problem solving  Progressing as expected  -OC     Functional Problem Solving Progress  90%;without cues   4-6 step directions  -OC     Recorded by  [OC] Viky Ramires MA,CCC-SLP     Improve executive function skills    Status: Improve executive function skills  Progressing as expected  -OC     Executive Function Skills Progress  80%;without cues   word associations  -OC     Recorded by  [OC] Viky Ramires MA,CCC-SLP     Bed Mobility, Assessment/Treatment    Bed Mob, Supine to Sit, [place]   conditional independence  -LH    Bed Mob, Sidelying to Sit, [place]   conditional independence  -LH    Recorded by   [LH] Savana Dickey, PT    Transfer Assessment/Treatment    Transfers, Bed-Chair [place]   stand by assist  -LH    Transfers, Chair-Bed [place]   stand by assist  -LH    Transfers, Bed-Chair-Bed, Assist Device   rolling walker  -LH    Transfers, Sit-Stand [place]    stand by assist  -LH    Transfers, Stand-Sit [place]   stand by assist  -LH    Transfers, Sit-Stand-Sit, Assist Device   rolling walker   rollator  -LH    Toilet Transfer, [place] supervision required  -DN  supervision required  -LH    Toilet Transfer, Assistive Device rolling walker;elevated toilet seat  -DN  rolling walker  -LH    Walk-In Shower Transfer, [place] supervision required  -DN      Walk-In Shower Transfer, Assist Device rolling walker  -DN      Transfer, Safety Issues step length decreased  -DN  step length decreased;weight-shifting ability decreased  -LH    Transfer, Impairments impaired balance  -DN  impaired balance  -LH    Transfer, Comment   car tsf SBA rollator  -LH    Recorded by [DN] Gage Licea OTR  [LH] Savana Dickey, PT    Gait Assessment/Treatment    Gait, [place] Level   stand by assist  -    Gait, Assistive Device   rollator  -LH    Gait, Distance (Feet)   160   x2  -    Gait, Gait Pattern Analysis   swing-through gait  -    Gait, Gait Deviations   bilateral:;zabrina decreased;forward flexed posture  -    Gait, Safety Issues   step length decreased;weight-shifting ability decreased  -    Gait, Impairments   impaired balance  -    Recorded by   [LH] Savana Dickey, PT    ADL Assessment/Intervention    Additional Documentation IADL Assess/Train, Comment (Row)   pt SBA with light meal prep inkitchen today at RWX level  -DN      Recorded by [DN] KEON Tran      Upper Body Bathing Assessment/Training    UB Bathing Assess/Train Assistive Device shower chair with back  -DN      UB Bathing Assess/Train, Position sitting  -DN      UB Bathing Assess/Train, [place] Level set up required  -DN      Recorded by [DN] KEON Tran      Lower Body Bathing Assessment/Training    LB Bathing Assess/Train Assistive Device hand-held shower head;grab bars;shower chair with back  -DN      LB Bathing Assess/Train, Position sitting;standing  -DN      LB Bathing  Assess/Train, [place] Level supervision required;set up required  -DN      Recorded by [DN] KEON Tran      Upper Body Dressing Assessment/Training    UB Dressing Assess/Train, Clothing Type doffing:;donning:;pull over;t-shirt  -DN      UB Dressing Assess/Train, Position sitting  -DN      UB Dressing Assess/Train, [place] set up required  -DN      UB Dressing Assess/Train, Impairments strength decreased  -DN      Recorded by [DN] KEON Tran      Lower Body Dressing Assessment/Training    LB Dressing Assess/Train, Clothing Type doffing:;donning:;pants;shoes  -DN      LB Dressing Assess/Train, Position sitting;standing  -DN      LB Dressing Assess/Train, [place] supervision required;set up required  -DN      LB Dressing Assess/Train, Impairments strength decreased  -DN      Recorded by [YAHIR] KEON Tran      Toileting Assessment/Training    Toileting Assess/Train, Assistive Device grab bars;raised toilet seat  -DN      Toileting Assess/Train, Position sitting;standing  -DN      Toileting Assess/Train, [place] Level supervision required  -DN      Toileting Assess/Train, Impairments impaired balance;strength decreased  -DN      Recorded by [DN] KEON rTan      Grooming Assessment/Training    Grooming Assess/Train, Assistive Device electric toothbrush  -DN      Grooming Assess/Train, Position sitting;sink side  -DN      Grooming Assess/Train, [place] Level set up required  -DN      Recorded by [DN] KEON Tran      Balance Skills Training    Training Strategies (Balance Skills) standing at counter in kitchen with meal prep of cutting veggies for soup pt had No lob and did require sit down breaks for time to time   -DN  lateral reaching and turning for bean bags with associated head turns- noted R beating nystagmus with head turns to R, resolved after approx 15 sec with neutral head neck. vertebral artery cleared, tested L sided [place] Hallpike, (-) s/s, no nystagmus  present. retested the R side [place] Hallpike, (-) s/s, no nystagmus. deferred [place]ple[place] at this time due to negative nystagmus with [place] Hallpike. will cont to monitor.   -LH    Standing-Level of Assistance   Contact guard  -    Static Standing Balance Support   No upper extremity supported  -    Standing-Balance Activities   Forward lean;Reaching for objects;Reaching across midline  -    Standing Balance # of Minutes   5  -LH    Gait Balance-Level of Assistance   Contact guard  -LH    Gait Balance Support   Left upper extremity supported;clarence bar  -    Gait Balance Activities   side-stepping;backwards;uneven surface   on foam mat  -    Gait Balance # of Minutes   5  -LH    Recorded by [DN] Gage Licea, OTR  [] Savana Dickey, PT    Positioning and Restraints    Pre-Treatment Position --   w/c  -DN  in bed  -LH    Post Treatment Position wheelchair  -DN  wheelchair  -LH    In Bed sitting;call light within reach;encouraged to call for assist;exit alarm on  -DN      In Wheelchair   sitting;call light within reach;encouraged to call for assist;exit alarm on  -LH    Recorded by [DN] Gage Licea, OTR  [] Savana Dickey, PT      12/02/17 1030          Rehab Assessment/Intervention    Discipline physical therapist  -JS      Document Type therapy note (daily note)  -JS      Subjective Information no complaints;agree to therapy   Reports vertigo earlier this am in the bathroom. Better now.  -JS      Patient Effort, Rehab Treatment good  -JS      Symptoms Noted During/After Treatment dizziness   c/o dizziness while ambulating during treatment session  -JS      Precautions/Limitations fall precautions  -JS      Recorded by [JS] Theodora Nevarez, PT      Pain Assessment    Pain Assessment No/denies pain  -JS      Recorded by [JS] Theodora Nevarez, PT      Cognitive Assessment/Intervention    Current Cognitive/Communication Assessment functional  -JS      Orientation Status oriented x 4  -JS      Follows Commands/Answers Questions  100% of the time;able to follow single-step instructions  -      Personal Safety Interventions fall prevention program maintained;gait belt;muscle strengthening facilitated;nonskid shoes/slippers when out of bed  -      Recorded by [JS] Theodora Nevarez PT      Bed Mobility, Assessment/Treatment    Bed Mobility, Assistive Device bed rails  -      Bed Mob, Supine to Sit, [place] supervision required  -JS      Bed Mob, Sit to Supine, [place] supervision required  -JS      Recorded by [JS] Theodora Nevarez PT      Transfer Assessment/Treatment    Transfers, Bed-Chair [place] stand by assist  -JS      Transfers, Bed-Chair-Bed, Assist Device rolling walker  -JS      Transfers, Sit-Stand [place] stand by assist  -JS      Transfers, Stand-Sit [place] stand by assist  -JS      Transfers, Sit-Stand-Sit, Assist Device rolling walker  -JS      Transfer, Safety Issues step length decreased;other (see comments)   intermittent c/o vertigo  -      Transfer, Impairments motor control impaired;other (see comments)   intermittent c/o vertigo  -      Transfer, Comment Pt c/o vertigo/dizziness intermittently during transfers  -JS      Recorded by [JS] Theodora Nevarez, PT      Gait Assessment/Treatment    Gait, [place] Level contact guard assist  -      Gait, Assistive Device rolling walker  -      Gait, Distance (Feet) 240   x1, 160 feet x 2.Intermittent standing breaks w/ c/o vertigo  -      Gait, Gait Deviations bilateral:;zabrina decreased;decreased heel strike  -      Gait, Safety Issues step length decreased;weight-shifting ability decreased;other (see comments)   intermittent c/o dizziness/vertigo while ambulating  -      Gait, Impairments strength decreased;impaired balance  -      Gait, Comment Ambulates 80 feet and 60 feet with HHA (no assistive device) with intermittent standing breaks due to c/o occasional vertigo during ambulation  -      Recorded by [JS] Theodora Nevarez, PT      Balance  Skills Training    Gait Balance-Level of Assistance Contact guard  -JS      Gait Balance Support Right upper extremity supported;Left upper extremity supported;clarence bar  -JS      Gait Balance Activities side-stepping  -JS      Recorded by [PAUL] Theodora Nevarez PT      Therapy Exercises    Bilateral Lower Extremities AROM:;20 reps;sitting;LAQ;hip flexion;standing;heel raises;mini squats  -JS      Recorded by [PAUL] Theodora Nevarez PT      Positioning and Restraints    Pre-Treatment Position in bed  -JS      Post Treatment Position wheelchair  -JS      In Wheelchair notified nsg;sitting;patient within staff view   in dining room  -JS      Recorded by [JS] Theodora Nevarez PT        User Key  (r) = Recorded By, (t) = Taken By, (c) = Cosigned By    Initials Name Effective Dates    OC Viky Ramires MA,CCC-SLP 04/05/17 -     DN Gage Licea, OTR 04/13/15 -     JS Theodora Nevarez, PT 04/06/17 -     LH Savana Dickey, PT 02/07/17 -                 OT Goals       12/04/17 1605 11/27/17 1549       Transfer Training OT STG    Transfer Training OT STG, Date Established  11/27/17  -DN     Transfer Training OT STG, [place] Level  supervision required;verbal cues required  -DN     Transfer Training OT LTG    Transfer Training OT LTG, Date Established  11/27/17  -DN     Transfer Training OT LTG, [place] Level  supervision required;verbal cues required  -DN     Transfer Training OT LTG, Outcome  goal revised  -DN     Strength OT LTG    Strength Goal OT LTG, Date Established  11/27/17  -DN     Strength Goal OT LTG, Outcome  goal ongoing  -DN     Dynamic Standing Balance OT LTG    Dynamic Standing Balance OT LTG, Date Established  11/27/17  -DN     Dynamic Standing Balance OT LTG, [place] Level  supervision required  -DN     Dynamic Standing Balance OT LTG, Outcome  goal revised  -DN     Caregiver Training OT LTG    Caregiver Training OT LTG, Date Established  11/27/17  -DN     Caregiver Training OT LTG, Outcome  goal ongoing  -DN     Patient  Education OT LTG    Patient Education OT LTG, Date Established  11/27/17  -DN     Patient Education OT LTG Outcome  goal ongoing  -DN     Home Management OT LTG    Home Mgmt Goal OT LTG, Date Established 12/04/17  -DN      Home Mgmt Goal OT LTG, Time To Achieve by discharge  -DN      Home Mgmt Goal OT LTG, Activity Type pt to be sba with light meal prep at rwx level  -DN      Home Mgmt Goal OT LTG, [place] Level supervision required  -DN      ADL OT STG    ADL OT STG, Date Established  11/27/17  -DN     ADL OT STG, Outcome  goal ongoing  -DN     ADL OT LTG    ADL OT LTG, Date Established  11/27/17  -DN     ADL OT LTG, [place] Level  standby assist;min verbal cues;assistive device  -DN     ADL OT LTG, Outcome  goal revised  -DN       User Key  (r) = Recorded By, (t) = Taken By, (c) = Cosigned By    Initials Name Provider Type    YAHIR Licea OTR Occupational Therapist          Occupational Therapy Education     Title: PT OT SLP Therapies (Done)     Topic: Occupational Therapy (Done)     Point: ADL training (Done)    Description: Instruct learner(s) on proper safety adaptation and remediation techniques during self care or transfers.   Instruct in proper use of assistive devices.    Learning Progress Summary    Learner Readiness Method Response Comment Documented by Status   Patient Mariann JACOBSON,TB CHASIDY MARSH pt eduacated on safe technique with light meal prep in kitchen at RWX level, safe with stove use, cutting veggies, needed vc to sit when fatigued DN 12/04/17 1602 Done    Acceptance E NR,VU   12/04/17 0826 Done    Acceptance ETB MAXIMO transfer training, gait training  12/02/17 1306 Done    Acceptance E NR,VU   12/01/17 0904 Done    Acceptance E,H VU,NR   11/30/17 0856 Done    Mariann MARSH family conferance held with pt and  today to discuss current status with adls, commode/shower transfers, safety, balance, equipment needs antipated at d/c and further therapies after d/c DN 11/29/17 1654 Done     Acceptance E,D VU,NR ed pt on OT role. benefit fo therapy. POC w. OT. ed on toilet and shower tsf and safety. pt demo w. CGA to SBA w. tsf. KP 11/20/17 1538 Done   Family Eager E VU family conferance held with pt and  today to discuss current status with adls, commode/shower transfers, safety, balance, equipment needs antipated at d/c and further therapies after d/c DN 11/29/17 1654 Done               Point: Home exercise program (Done)    Description: Instruct learner(s) on appropriate technique for monitoring, assisting and/or progressing therapeutic exercises/activities.    Learning Progress Summary    Learner Readiness Method Response Comment Documented by Status   Patient Acceptance E NR,VU   12/04/17 0826 Done    Acceptance E,TB VU transfer training, gait training  12/02/17 1306 Done    Acceptance E NR,VU   12/01/17 0904 Done    Acceptance E,H VU,NR   11/30/17 0856 Done    Eager E VU family conferance held with pt and  today to discuss current status with adls, commode/shower transfers, safety, balance, equipment needs antipated at d/c and further therapies after d/c DN 11/29/17 1654 Done   Family Eager E VU family conferance held with pt and  today to discuss current status with adls, commode/shower transfers, safety, balance, equipment needs antipated at d/c and further therapies after d/c DN 11/29/17 1654 Done               Point: Precautions (Done)    Description: Instruct learner(s) on prescribed precautions during self-care and functional transfers.    Learning Progress Summary    Learner Readiness Method Response Comment Documented by Status   Patient Acceptance E NR,VU   12/04/17 0826 Done    Acceptance E,TB VU transfer training, gait training  12/02/17 1306 Done    Acceptance E NR,VU   12/01/17 0904 Done    Acceptance E,H VU,NR   11/30/17 0856 Done    Eager E VU family conferance held with pt and  today to discuss current status with adls, commode/shower  transfers, safety, balance, equipment needs antipated at d/c and further therapies after d/c  11/29/17 1654 Done    Acceptance E,D VU,NR ed pt on OT role. benefit fo therapy. POC w. OT. ed on toilet and shower tsf and safety. pt demo w. CGA to SBA w. tsf.  11/20/17 1538 Done   Family Eager E VU family conferance held with pt and  today to discuss current status with adls, commode/shower transfers, safety, balance, equipment needs antipated at d/c and further therapies after d/c  11/29/17 1654 Done               Point: Body mechanics (Done)    Description: Instruct learner(s) on proper positioning and spine alignment during self-care, functional mobility activities and/or exercises.    Learning Progress Summary    Learner Readiness Method Response Comment Documented by Status   Patient Acceptance E NR,MAX[place]   12/04/17 0826 Done    Acceptance E,TB VU transfer training, gait training  12/02/17 1306 Done    Acceptance E NR,VU   12/01/17 0904 Done    Acceptance E,H VU,NR   11/30/17 0856 Done    Eager E VU family conferance held with pt and  today to discuss current status with adls, commode/shower transfers, safety, balance, equipment needs antipated at d/c and further therapies after d/c  11/29/17 1654 Done    Acceptance E,D VU,NR ed pt on OT role. benefit fo therapy. POC w. OT. ed on toilet and shower tsf and safety. pt demo w. CGA to SBA w. tsf.  11/20/17 1538 Done   Family Eager E VU family conferance held with pt and  today to discuss current status with adls, commode/shower transfers, safety, balance, equipment needs antipated at d/c and further therapies after d/c  11/29/17 1654 Done                      User Key     Initials Effective Dates Name Provider Type Discipline     04/13/15 -  Gage Licea, OTR Occupational Therapist OT     04/13/15 -  Carolina Simon, OTR Occupational Therapist OT     04/06/17 -  Theodora Nevarez, PT Physical Therapist PT     02/07/17 -   Savana Dickey, PT Physical Therapist PT                  OT Recommendation and Plan  Anticipated Discharge Disposition: home with assist, home with home health, home with outpatient services (pending progress out pt OT. vs home program)  Planned Therapy Interventions: activity intolerance, ADL retraining, strengthening, transfer training, balance training, home exercise program  Therapy Frequency: 5 times/wk          Time Calculation:         Time Calculation- OT       12/04/17 1604 12/04/17 1030       Time Calculation- OT    OT Start Time 1230  -DN 1030  -DN     OT Stop Time 1300  -DN 1100  -DN     OT Time Calculation (min) 30 min  -DN 30 min  -DN     OT Received On 12/04/17  -DN 12/04/17  -DN       User Key  (r) = Recorded By, (t) = Taken By, (c) = Cosigned By    Initials Name Provider Type    KEON Bacon Occupational Therapist           Therapy Charges for Today     Code Description Service Date Service Provider Modifiers Qty    [id number] HC OT SELF CARE/MGMT/TRAIN EA 15 MIN 12/4/2017 KEON Tran GO 4               KEON Tran  12/4/2017

## 2017-12-04 NOTE — THERAPY TREATMENT NOTE
Acute Care - Speech Language Pathology Treatment Note  [facility]     Patient Name: Paloma Carr  : 1940  MRN: [id number]  Today's Date: 2017         Admit Date: 2017    Visit Dx:      ICD-10-CM ICD-9-CM   1. Impaired gait and mobility R26.89 781.2     Patient Active Problem List   Diagnosis   • Soft tissue swelling of chest wall   • Abnormal ultrasound of breast   • Postmastectomy lymphedema syndrome   • Malignant neoplasm of upper-outer quadrant of right female breast   • Cerebrovascular accident (CVA)   • COPD exacerbation   • Stroke              Adult Rehabilitation Note       17 1200 17 0824 17 1030    Rehab Assessment/Intervention    Discipline speech language pathologist  -OC physical therapist  -LH physical therapist  -JS    Document Type  therapy note (daily note)  -LH therapy note (daily note)  -JS    Subjective Information  agree to therapy  -LH no complaints;agree to therapy   Reports vertigo earlier this am in the bathroom. Better now.  -JS    Patient Effort, Rehab Treatment  good  -LH good  -JS    Symptoms Noted During/After Treatment   dizziness   c/o dizziness while ambulating during treatment session  -JS    Precautions/Limitations  fall precautions  -LH fall precautions  -JS    Precautions/Limitations, Vision  WFL with corrective lenses  -LH     Recorded by [OC] Viky Ramires MA,CCC-SLP [LH] Savana Dickey, PT [JS] Theodora Nevarez, PT    Pain Assessment    Pain Assessment No/denies pain  -OC No/denies pain  -LH No/denies pain  -JS    Recorded by [OC] Viky Ramires MA,CCC-SLP [] Savana Dickey, PT [JS] Theodora Nevarez, PT    Vision Assessment/Intervention    Visual Impairment WFL with corrective lenses  -OC WFL with corrective lenses  -LH     Recorded by [OC] Viky Ramires MA,CCC-SLP [] Savana Dickey, PT     Cognitive Assessment/Intervention    Current Cognitive/Communication Assessment functional  -OC functional  -LH functional  -JS    Orientation Status oriented x 4  -OC  oriented x 4  -LH oriented x 4  -JS    Follows Commands/Answers Questions 100% of the time  -% of the time  -% of the time;able to follow single-step instructions  -JS    Personal Safety  mild impairment  -LH     Personal Safety Interventions   fall prevention program maintained;gait belt;muscle strengthening facilitated;nonskid shoes/slippers when out of bed  -JS    Recorded by [OC] Viky Ramires MA,CCC-SLP [LH] Savana Dickey, PT [JS] Theodora Nevarez PT    Cognitive Assessment Intervention    Sequencing WNL/WFL  -OC      Recorded by [OC] Viky Ramires MA,CCC-SLP      Improve word retrieval skills    Status: Improve word retrieval skills Progressing as expected  -OC      Word Retrieval Skills Progress 90%;without cues   min cues for describing details to aid in word finding  -OC      Recorded by [OC] Viky Ramires MA,CCC-SLP      Improve functional problem solving    Status: Improve functional problem solving Progressing as expected  -OC      Functional Problem Solving Progress 90%;without cues   4-6 step directions  -OC      Recorded by [OC] Viky Ramires MA,CCC-SLP      Improve executive function skills    Status: Improve executive function skills Progressing as expected  -OC      Executive Function Skills Progress 80%;without cues   word associations  -OC      Recorded by [OC] Viky Ramires MA,CCC-SLP      Bed Mobility, Assessment/Treatment    Bed Mobility, Assistive Device   bed rails  -JS    Bed Mob, Supine to Sit, [place]  conditional independence  - supervision required  -JS    Bed Mob, Sit to Supine, [place]   supervision required  -JS    Bed Mob, Sidelying to Sit, [place]  conditional independence  -LH     Recorded by  [LH] Savana Dickey, PT [JS] Theodora Nevarez, PT    Transfer Assessment/Treatment    Transfers, Bed-Chair [place]   stand by assist  -JS    Transfers, Bed-Chair-Bed, Assist Device   rolling walker  -JS    Transfers, Sit-Stand [place]  stand by assist  - stand by assist   -JS    Transfers, Stand-Sit [place]  stand by assist  - stand by assist  -JS    Transfers, Sit-Stand-Sit, Assist Device  rolling walker   rollator  - rolling walker  -JS    Toilet Transfer, [place]  supervision required  -     Toilet Transfer, Assistive Device  rolling walker  -LH     Transfer, Safety Issues  step length decreased;weight-shifting ability decreased  - step length decreased;other (see comments)   intermittent c/o vertigo  -JS    Transfer, Impairments  impaired balance  - motor control impaired;other (see comments)   intermittent c/o vertigo  -JS    Transfer, Comment  car tsf SBA rollator  - Pt c/o vertigo/dizziness intermittently during transfers  -JS    Recorded by  [LH] Savana Dickey, PT [JS] Theodora Nevarez, PT    Gait Assessment/Treatment    Gait, [place] Level  stand by assist  - contact guard assist  -JS    Gait, Assistive Device  rollator  - rolling walker  -JS    Gait, Distance (Feet)  160   x2  -   x1, 160 feet x 2.Intermittent standing breaks w/ c/o vertigo  -    Gait, Gait Pattern Analysis  swing-through gait  -     Gait, Gait Deviations  bilateral:;z[place] decreased;forward flexed posture  - bilateral:;[place] decreased;decreased heel strike  -    Gait, Safety Issues  step length decreased;weight-shifting ability decreased  - step length decreased;weight-shifting ability decreased;other (see comments)   intermittent c/o dizziness/vertigo while ambulating  -    Gait, Impairments  impaired balance  - strength decreased;impaired balance  -    Gait, Comment   Ambulates 80 feet and 60 feet with HHA (no assistive device) with intermittent standing breaks due to c/o occasional vertigo during ambulation  -    Recorded by  [] Savana Dickey, PT [JS] Theodora Nevarez, PT    Balance Skills Training    Gait Balance-Level of Assistance  Contact guard  - Contact guard  -JS    Gait Balance Support  Left upper extremity supported;clarence bar  -LH Right upper  extremity supported;Left upper extremity supported;clarence bar  -    Gait Balance Activities  side-stepping;backwards;uneven surface   on foam mat  - side-stepping  -    Gait Balance # of Minutes  5  -LH     Recorded by  [] Savana Dickey, PT [JS] Theodora Nevarez PT    Therapy Exercises    Bilateral Lower Extremities   AROM:;20 reps;sitting;LAQ;hip flexion;standing;heel raises;mini squats  -JS    Recorded by   [JS] Theodora Nevarez PT    Positioning and Restraints    Pre-Treatment Position  in bed  - in bed  -    Post Treatment Position  wheelchair  - wheelchair  -JS    In Wheelchair  sitting;call light within reach;encouraged to call for assist;exit alarm on  - notified nsg;sitting;patient within staff view   in dining room  -JS    Recorded by  [] Savana Dickey, PT [JS] Theodora Nevarez, PT      12/01/17 1517          Rehab Assessment/Intervention    Discipline occupational therapist  -DN      Document Type therapy note (daily note)  -DN      Subjective Information agree to therapy;no complaints  -DN      Patient Effort, Rehab Treatment good  -DN      Symptoms Noted During/After Treatment dizziness  -DN      Symptoms Noted Comment --   nsg informed, dizzy spell x3 during standing balance act  -DN      Precautions/Limitations fall precautions  -DN      Recorded by [YAHIR] KEON Tran      Pain Assessment    Pain Assessment No/denies pain  -DN      Recorded by [YAHIR] KEON Tran      Vision Assessment/Intervention    Visual Impairment WFL with corrective lenses  -DN      Recorded by [YAHIR] KEON Tran      Cognitive Assessment/Intervention    Current Cognitive/Communication Assessment functional  -DN      Orientation Status oriented x 4  -DN      Follows Commands/Answers Questions needs cueing;able to follow single-step instructions  -DN      Recorded by [DN] KEON Tran      Transfer Assessment/Treatment    Walk-In Shower Transfer, [place] contact guard assist;verbal cues required   secondary  to stafety due to dizziness today  -DN      Transfer, Safety Issues step length decreased  -DN      Transfer, Impairments motor control impaired  -DN      Recorded by [DN] Gage Licea OTR      Upper Body Bathing Assessment/Training    UB Bathing Assess/Train Assistive Device grab bars;hand-held shower head;shower chair with back  -DN      UB Bathing Assess/Train, Position sitting  -DN      UB Bathing Assess/Train, [place] Level set up required  -DN      Recorded by [DN] Gage Licea OTR      Lower Body Bathing Assessment/Training    LB Bathing Assess/Train Assistive Device hand-held shower head;grab bars;shower chair with back  -DN      LB Bathing Assess/Train, Position sitting;standing  -DN      LB Bathing Assess/Train, [place] Level supervision required  -DN      LB Bathing Assess/Train, Impairments strength decreased;impaired balance  -DN      Recorded by [DN] KEON Tran      Upper Body Dressing Assessment/Training    UB Dressing Assess/Train, Clothing Type doffing:;donning:;pull over;t-shirt  -DN      UB Dressing Assess/Train, Position sitting  -DN      UB Dressing Assess/Train, [place] set up required  -DN      Recorded by [DN] Gage Licea OTR      Lower Body Dressing Assessment/Training    LB Dressing Assess/Train, Clothing Type doffing:;donning:;pants;shoes  -DN      LB Dressing Assess/Train, Position standing;sitting  -DN      LB Dressing Assess/Train, [place] set up required;supervision required  -DN      LB Dressing Assess/Train, Impairments impaired balance;strength decreased  -DN      LB Dressing Assess/Train, Comment setup for carolann hose  -DN      Recorded by [DN] Gage Licea OTR      Grooming Assessment/Training    Grooming Assess/Train, Position sitting;sink side  -DN      Grooming Assess/Train, [place] Level set up required  -DN      Recorded by [DN] Gage Licea OTR      Balance Skills Training    Standing-Level of Assistance Contact guard  -DN       Standing-Balance Activities Weight Shift A-P;Weight Shift R-L;Lateral lean;Forward lean;Reaching for objects   needed to sit x2 due dizziness   -DN      Recorded by [DN] KEON Tran      Therapy Exercises    Bilateral Upper Extremity 10 reps;elbow flexion/extension;pronation/supination   2# dumbell 3 sets of 10 reps  -DN      Recorded by [DN] KEON Tran      Functional Endurance    Detail (Functional Endurance) fair   -DN      Recorded by [DN] KEON Tran      Positioning and Restraints    Pre-Treatment Position sitting in chair/recliner  -DN      Post Treatment Position wheelchair  -DN      In Bed sitting;call light within reach;encouraged to call for assist;exit alarm on;with family/caregiver  -DN      Recorded by [DN] KEON Tran        User Key  (r) = Recorded By, (t) = Taken By, (c) = Cosigned By    Initials Name Effective Dates    VINNIE Ramires MA,CCC-SLP 04/05/17 -     YAHIR Licea OTR 04/13/15 -     PAUL Nevarez, PT 04/06/17 -      Savana Dickey, PT 02/07/17 -               IP SLP Goals       12/04/17 1200 11/28/17 1125       Cognitive Linguistic- Improve Safety and Awareness    Cognitive Linguistic Improve Safety and Awareness- SLP, Time to Achieve by discharge  -OC by discharge  -OC     Cognitive Linguistic Improve Safety and Awareness- SLP, Outcome goal ongoing  -OC goal ongoing  -OC       User Key  (r) = Recorded By, (t) = Taken By, (c) = Cosigned By    Initials Name Provider Type    OC Viky Ramires MA,[facility]-SLP Speech and Language Pathologist          EDUCATION  The patient has been educated in the following areas:   Cognitive Impairment Communication Impairment.    SLP Recommendation and Plan     Prognosis: good  Rehab Potential: good, to achieve stated therapy goals  Criteria for Skilled Therapeutic Interventions Met: skilled criteria for cognitive linguistic intervention met  Anticipated Discharge Disposition: home with assist     Therapy Frequency: 3-5  times/wk  Predicted Duration of Therapy Intervention (days/wks): until discharge  Expected Duration of Therapy Session (min): 15-30 minutes    Plan of Care Review  Plan Of Care Reviewed With: patient  Progress: improving          Time Calculation:         Time Calculation- SLP       12/04/17 1337          Time Calculation- SLP    SLP Start Time 1100  -OC      SLP Stop Time 1200  -OC      SLP Time Calculation (min) 60 min  -OC        User Key  (r) = Recorded By, (t) = Taken By, (c) = Cosigned By    Initials Name Provider Type    OC Viky Ramires MA,CCC-SLP Speech and Language Pathologist          Therapy Charges for Today     Code Description Service Date Service Provider Modifiers Qty    [id number]  ST TREATMENT SPEECH 4 12/4/2017 Viky Ramires MA,CCC-SLP GN 1               Viky Ramires MA,BENNETT-SLP  12/4/2017

## 2017-12-04 NOTE — PROGRESS NOTES
Inpatient Rehabilitation Functional Measures Assessment and Plan of Care    Plan of Care  Updated Problems/Interventions  Field    Functional Measures  KYM Eating:  Eating Score = 7. Patient is completely independent for eating.  There are no activity limitations.  KYM Grooming: Grooming Score = 5. Patient is supervision/set-up for grooming,  requiring: Stand by assistance. No assistive devices were required.  KYM Bathing:  Patient bathed in shower. Bathing Score = 5.  Patient is  supervision/set-up for bathing, requiring: Standing by. Patient requires the  following assistive device(s): Grab bar/arm rest to maintain balance. Hand held  shower.  KYM Upper Body Dressing:  Upper Body Dressing Score = 5. Patient is supervision  for upper body dressing, requiring: Stand by assistance. Gathering/setting out  clothes. No assistive devices were required.  KYM Lower Body Dressing:  Lower Body Dressing Score = 5. Patient is  supervision/set-up for lower body dressing, requiring: Gathering/setting out  clothes. Standing by. No assistive devices were required.  KYM Toileting:  Toileting Score = 5.  Patient is supervision/set-up for  toileting, requiring: Stand by assistance. Patient requires the following  assistive device(s): Adaptive device to maintain balance.    KYM Bladder Management  Level of Assistance:  [place]  Frequency/Number of Accidents this Shift:  [place]    KYM Bowel Management  Level of Assistance: [place]  Frequency/Number of Accidents this Shift: [place]    KYM Bed/Chair/Wheelchair Transfer:  [place]  KYM Toilet Transfer:  Toilet Transfer Score = 5.  Patient is supervision/set-up  for transferring to and from the toilet/commode, requiring: Verbal cuing,  prompting, or instructing. Stand by assistance. Patient requires the following  assistive device(s): Safety frame/over the toilet. Walker.  KYM Tub/Shower Transfer:  Shower Transfer Score = 5.  Patient is  supervision/set-up for transferring to and from the  shower, requiring: Stand by  assistance. Patient requires the following assistive device(s): Shower chair.  Shower chair.    Previously Documented Mode of Locomotion at Discharge: Field  KYM Expected Mode of Locomotion at Discharge: [facility] Walk/Wheelchair:  [facility] Stairs:  [facility] Comprehension:  [facility] Expression:  [facility] Social Interaction:  [facility] Problem Solving:  [facility] Memory:  [place]    Therapy Mode Minutes  Occupational Therapy: Individual: 60 minutes.  Physical Therapy: [place]  Speech Language Pathology:  [place]    Signed by: KEON Tran/AISHA

## 2017-12-04 NOTE — PROGRESS NOTES
Inpatient Rehabilitation Functional Measures Assessment    Functional Measures  KYM Eating:  Eating Score = 7. Patient is completely independent for eating.  There are no activity limitations.  KYM Grooming: [place]  [facility] Bathing:  [place]  [facility] Upper Body Dressing:  [place]  [facility] Lower Body Dressing:  [place]  [facility] Toileting:  Toileting Score = 5.  Patient is supervision/set-up for  toileting, requiring: No assistive devices were required.    KYM Bladder Management  Level of Assistance:  Bladder Score = 7.  Patient is completely independent for  bladder management. There are no activity limitations.  Frequency/Number of Accidents this Shift:  Bladder accidents this shift:  0 .  Patient has not had an accident this shift.    KYM Bowel Management  Level of Assistance: Bowel Score = 7.  Patient is completely independent for  bowel management. There are no activity limitations.  Frequency/Number of Accidents this Shift: Bowel accidents this shift: 0 .  Patient has not had an accident this shift.    KYM Bed/Chair/Wheelchair Transfer:  Bed/chair/wheelchair Transfer Score = 5.  Patient is supervision/set-up for transferring to and from the  bed/chair/wheelchair, requiring: Patient requires the following assistive  device(s): Bed rails. Elevated head of bed. Grab bars.  KYM Toilet Transfer:  Toilet Transfer Score = 5.  Patient is supervision/set-up  for transferring to and from the toilet/commode, requiring: No assistive devices  were required.  [facility] Tub/Shower Transfer:  [place]    Previously Documented Mode of Locomotion at Discharge: Field  KYM Expected Mode of Locomotion at Discharge: [facility] Walk/Wheelchair:  [place]  [facility] Stairs:  Branch    [facility] Comprehension:  [place]  [facility] Expression:  [place]  [facility] Social Interaction:  [place]  [facility] Problem Solving:  [facility] Memory:  [place]    Therapy Mode Minutes  Occupational Therapy: [place]  Physical Therapy: [place]  Speech Language Pathology:  [place]    Signed by: MUKUL Rodriguez

## 2017-12-04 NOTE — PLAN OF CARE
Problem: Inpatient Occupational Therapy  Goal: Home Management Goal LTG- OT  Outcome: Ongoing (interventions implemented as appropriate)    12/04/17 1605   Home Management OT LTG   Home Mgmt Goal OT LTG, Date Established 12/04/17   Home Mgmt Goal OT LTG, Time To Achieve by discharge   Home Mgmt Goal OT LTG, Activity Type pt to be sba with light meal prep at rwx level   Home Mgmt Goal OT LTG, [place] Level supervision required

## 2017-12-04 NOTE — PROGRESS NOTES
Inpatient Rehabilitation Plan of Care Note    Plan of Care  Care Plan Reviewed - No updates at this time.    Sphincter Control    Performed Intervention(s)  Encourage fluid intake  Elimination schedule  Monitor intake and output      Psychosocial    Performed Intervention(s)  Verbalize needs and concerns  Therapeutic environmental set up      Body Systems    Performed Intervention(s)  Daily skin inspection  Turning/ repositioning      Safety    Performed Intervention(s)  Falls prevention protocol  Items within reach  Bed/ Chair alarm.    Signed by: Jam Willis RN

## 2017-12-04 NOTE — PROGRESS NOTES
Inpatient Rehabilitation Plan of Care Note    Plan of Care  Care Plan Reviewed - No updates at this time.    Psychosocial    Performed Intervention(s)  Verbalize needs and concerns      Body Systems    Performed Intervention(s)  Daily skin inspection      Safety    Performed Intervention(s)  Falls prevention protocol  Bed/ Chair alarm.    Signed by: Miguel Angel Valladares RN

## 2017-12-04 NOTE — PROGRESS NOTES
Inpatient Rehabilitation Functional Measures Assessment    Functional Measures  KYM Eating:  [facility] Grooming: [facility] Bathing:  [facility] Upper Body Dressing:  [facility] Lower Body Dressing:  [facility] Toileting:  [facility] Bladder Management  Level of Assistance:  [place]  Frequency/Number of Accidents this Shift:  [facility] Bowel Management  Level of Assistance: [place]  Frequency/Number of Accidents this Shift: [facility] Bed/Chair/Wheelchair Transfer:  Bed/chair/wheelchair Transfer Score = 5.  Patient is supervision/set-up for transferring to and from the  bed/chair/wheelchair, requiring: Stand by assistance. No assistive devices were  required.  KYM Toilet Transfer:  [facility] Tub/Shower Transfer:  [place]    Previously Documented Mode of Locomotion at Discharge: Field  KYM Expected Mode of Locomotion at Discharge: [facility] Walk/Wheelchair:  WHEELCHAIR OBSERVATION   Activity was not observed.    WALK OBSERVATION   Walk Distance Scale = 3.  Distance walked is greater than 150 feet. Walk Score  = 5.  Patient requires supervision or set up for walking. Stand by assistance.  Patient walked a distance of  160 feet. Patient requires the following assistive  device(s): Rolling walker.  KYM Stairs:  Activity was not observed.    KYM Comprehension:  [facility] Expression:  [facility] Social Interaction:  [facility] Problem Solving:  [facility] Memory:  [place]    Therapy Mode Minutes  Occupational Therapy: [place]  Physical Therapy: Individual: 60 minutes.  Speech Language Pathology:  [place]    Signed by: Savana Dickey PT

## 2017-12-04 NOTE — PROGRESS NOTES
Inpatient Rehabilitation Functional Measures Assessment    Functional Measures  KYM Eating:  [facility] Grooming: [facility] Bathing:  [facility] Upper Body Dressing:  [facility] Lower Body Dressing:  [facility] Toileting:  [facility] Bladder Management  Level of Assistance:  [place]  Frequency/Number of Accidents this Shift:  [facility] Bowel Management  Level of Assistance: [place]  Frequency/Number of Accidents this Shift: [facility] Bed/Chair/Wheelchair Transfer:  [facility] Toilet Transfer:  [facility] Tub/Shower Transfer:  [place]    Previously Documented Mode of Locomotion at Discharge: Field  KYM Expected Mode of Locomotion at Discharge: [facility] Walk/Wheelchair:  [facility] Stairs:  [facility] Comprehension:  Both ( auditory and visual) modes of comprehension are used  equally. Comprehension Score = 6, Modified [place].  Patient comprehends  complex/abstract information in their primary language, requiring: Additional  time. Glasses.  KYM Expression:  Vocal expression is the usual mode. Expression Score = 6,  Modified Independent.  Patient expresses complex/abstract information in their  primary language, requiring: Additional time.  KYM Social Interaction:  Social Interaction Score = 6, Modified Independent.  Patient is modified independent for social interaction, requiring: Requires  additional time.  KYM Problem Solving:  Problem Solving Score = 6, Modified [place].  Patient  makes appropriate decisions in order to solve complex problems, but requires  extra time.  KYM Memory:  Memory Score = 6, Modified [place].  Patient is modified  independent for memory, requiring: Requires additional time.    Therapy Mode Minutes  Occupational Therapy: [place]  Physical Therapy: [place]  Speech Language Pathology:  [place]    Signed by: Jam Willis RN

## 2017-12-04 NOTE — PLAN OF CARE
Problem: Patient Care Overview (Adult)  Goal: Plan of Care Review  Outcome: Ongoing (interventions implemented as appropriate)    12/04/17 1336   Coping/Psychosocial Response Interventions   Plan Of Care Reviewed With patient   Patient Care Overview   Progress improving         Problem: Inpatient SLP  Goal: Cognitive-linguistic-Patient will improve Cognitive-linguistic skills to improve safety and safety awareness in environment  Outcome: Ongoing (interventions implemented as appropriate)    12/04/17 1200   Cognitive Linguistic- Improve Safety and Awareness   Cognitive Linguistic Improve Safety and Awareness- SLP, Time to Achieve by discharge   Cognitive Linguistic Improve Safety and Awareness- SLP, Outcome goal ongoing

## 2017-12-04 NOTE — PLAN OF CARE
Problem: Patient Care Overview (Adult)  Goal: Plan of Care Review  Outcome: Ongoing (interventions implemented as appropriate)    12/04/17 1504   Coping/Psychosocial Response Interventions   Plan Of Care Reviewed With patient   Patient Care Overview   Progress improving   Outcome Evaluation   Outcome Summary/Follow up Plan DC wednesday. all follow up appointments made except Cardiology. Spouse nurys told me today he will give MD name today and i will make appointment.         Problem: Stroke (Ischemic) (Adult)  Goal: Signs and Symptoms of Listed Potential Problems Will be Absent or Manageable (Stroke)  Outcome: Ongoing (interventions implemented as appropriate)    12/04/17 1504   Stroke (Ischemic)   Problems Assessed (Stroke (Ischemic)/TIA) motor/sensory impairment   Problems Present (Stroke (Ischemic)/TIA) motor/sensory impairment         Problem: Fall Risk (Adult)  Goal: Absence of Falls  Outcome: Ongoing (interventions implemented as appropriate)    12/04/17 1504   Fall Risk (Adult)   Absence of Falls achieves outcome

## 2017-12-04 NOTE — NURSING NOTE
[facility] Cardiologists instead of Dr. Reed for Cardiology followup. [phone]. He will get MD name for me and I will make the appointment.      1815  Dr. Narendra Mantilla is the Cardiologist who will follow the patient.  We are to call the office Tuesday Morning to make a follow up appointment within two weeks of discharge.  Dr. Mantilla told us to call the back office number [phone] instead of main number [phone] and advise we spoke with Dr. Mantilla who visited the patient today and those are his instructions.  I will also put a sticky note to advise as well.  Jaelyn Granger RN is aware and will pass on to day shift and will call our cardiology department to place zio patch Wednesday before DC.

## 2017-12-04 NOTE — PROGRESS NOTES
Inpatient Rehabilitation Plan of Care Note    Plan of Care  Care Plan Reviewed - No updates at this time.    Psychosocial    Performed Intervention(s)  Verbalize needs and concerns      Body Systems    Performed Intervention(s)  Turning/ repositioning      Safety    Performed Intervention(s)  Items within reach  Bed/ Chair alarm.    Signed by: Miguel Angel Valladares RN

## 2017-12-04 NOTE — THERAPY TREATMENT NOTE
Inpatient Rehabilitation - Physical Therapy Treatment Note  [facility]     Patient Name: Paloma Carr  : 1940  MRN: [id number]  Today's Date: 2017  Onset of Illness/Injury or Date of Surgery Date: 11/15/17  Date of Referral to PT: 17  Referring Physician: Brandon    Admit Date: 2017    Visit Dx:    ICD-10-CM ICD-9-CM   1. Impaired gait and mobility R26.89 781.2     Patient Active Problem List   Diagnosis   • Soft tissue swelling of chest wall   • Abnormal ultrasound of breast   • Postmastectomy lymphedema syndrome   • Malignant neoplasm of upper-outer quadrant of right female breast   • Cerebrovascular accident (CVA)   • COPD exacerbation   • Stroke               Adult Rehabilitation Note       17 1200 17 0824 17 1030    Rehab Assessment/Intervention    Discipline speech language pathologist  -OC physical therapist  -LH physical therapist  -JS    Document Type  therapy note (daily note)  - therapy note (daily note)  -JS    Subjective Information  agree to therapy  - no complaints;agree to therapy   Reports vertigo earlier this am in the bathroom. Better now.  -JS    Patient Effort, Rehab Treatment  good  - good  -JS    Symptoms Noted During/After Treatment   dizziness   c/o dizziness while ambulating during treatment session  -JS    Precautions/Limitations  fall precautions  - fall precautions  -JS    Precautions/Limitations, Vision  WFL with corrective lenses  -     Specific Treatment Considerations  noted R beat nystagmus with turning/reaching and tossing bean bags, corrects after approx 15 sec with head/neck in neutral position  -LH     Recorded by [OC] Viky Ramires MA,CCC-SLP [] Savana Dickey, PT [JS] Theodora Nevarez, PT    Pain Assessment    Pain Assessment No/denies pain  -OC No/denies pain  - No/denies pain  -JS    Recorded by [OC] Viky Ramires MA,CCC-SLP [] Savana Dickey, PT [JS] Theodora Nevarez, PT    Vision Assessment/Intervention    Visual Impairment WFL  with corrective lenses  -OC WFL with corrective lenses  -LH     Recorded by [OC] Viky Ramires MA,CCC-SLP [LH] Savana Dickey, PT     Cognitive Assessment/Intervention    Current Cognitive/Communication Assessment functional  -OC functional  -LH functional  -JS    Orientation Status oriented x 4  -OC oriented x 4  -LH oriented x 4  -JS    Follows Commands/Answers Questions 100% of the time  -% of the time  -% of the time;able to follow single-step instructions  -JS    Personal Safety  mild impairment  -LH     Personal Safety Interventions   fall prevention program maintained;gait belt;muscle strengthening facilitated;nonskid shoes/slippers when out of bed  -JS    Recorded by [OC] Viky Ramires MA,CCC-SLP [LH] Savana Dickey, PT [JS] Theodora Nevarez PT    Cognitive Assessment Intervention    Sequencing WNL/WFL  -OC      Recorded by [OC] Viky Ramires MA,CCC-SLP      Improve word retrieval skills    Status: Improve word retrieval skills Progressing as expected  -OC      Word Retrieval Skills Progress 90%;without cues   min cues for describing details to aid in word finding  -OC      Recorded by [OC] Viky Ramires MA,CCC-SLP      Improve functional problem solving    Status: Improve functional problem solving Progressing as expected  -OC      Functional Problem Solving Progress 90%;without cues   4-6 step directions  -OC      Recorded by [OC] Viky Ramires MA,CCC-SLP      Improve executive function skills    Status: Improve executive function skills Progressing as expected  -OC      Executive Function Skills Progress 80%;without cues   word associations  -OC      Recorded by [OC] Viky Ramires MA,CCC-SLP      Bed Mobility, Assessment/Treatment    Bed Mobility, Assistive Device   bed rails  -JS    Bed Mob, Supine to Sit, Powers  conditional independence  -LH supervision required  -JS    Bed Mob, Sit to Supine, Powers   supervision required  -JS    Bed Mob, Sidelying to Sit, Powers  conditional  independence  -LH     Recorded by  [LH] Savana Dickey, PT [JS] Theodora Nevarez, PT    Transfer Assessment/Treatment    Transfers, Bed-Chair [place]  stand by assist  - stand by assist  -JS    Transfers, Chair-Bed [place]  stand by assist  -LH     Transfers, Bed-Chair-Bed, Assist Device  rolling walker  -LH rolling walker  -JS    Transfers, Sit-Stand [place]  stand by assist  -LH stand by assist  -JS    Transfers, Stand-Sit [place]  stand by assist  - stand by assist  -JS    Transfers, Sit-Stand-Sit, Assist Device  rolling walker   rollator  -LH rolling walker  -JS    Toilet Transfer, [place]  supervision required  -     Toilet Transfer, Assistive Device  rolling walker  -LH     Transfer, Safety Issues  step length decreased;weight-shifting ability decreased  - step length decreased;other (see comments)   intermittent c/o vertigo  -JS    Transfer, Impairments  impaired balance  - motor control impaired;other (see comments)   intermittent c/o vertigo  -JS    Transfer, Comment  car tsf SBA rollator  - Pt c/o vertigo/dizziness intermittently during transfers  -JS    Recorded by  [LH] Savana Dickey, PT [JS] Theodora Neavrez, PT    Gait Assessment/Treatment    Gait, [place] Level  stand by assist  - contact guard assist  -    Gait, Assistive Device  rollator  -LH rolling walker  -JS    Gait, Distance (Feet)  160   x2  -   x1, 160 feet x 2.Intermittent standing breaks w/ c/o vertigo  -    Gait, Gait Pattern Analysis  swing-through gait  -     Gait, Gait Deviations  bilateral:;zabrina decreased;forward flexed posture  - bilateral:;zabrina decreased;decreased heel strike  -    Gait, Safety Issues  step length decreased;weight-shifting ability decreased  - step length decreased;weight-shifting ability decreased;other (see comments)   intermittent c/o dizziness/vertigo while ambulating  -    Gait, Impairments  impaired balance  - strength decreased;impaired balance  -     Gait, Comment   Ambulates 80 feet and 60 feet with HHA (no assistive device) with intermittent standing breaks due to c/o occasional vertigo during ambulation  -JS    Recorded by  [] Savana Dickey PT [JS] Theodora Nevarez PT    Balance Skills Training    Training Strategies (Balance Skills)  lateral reaching and turning for bean bags with associated head turns- noted R beating nystagmus with head turns to R, resolved after approx 15 sec with neutral head neck. vertebral artery cleared, tested L sided [place] Hallpike, (-) s/s, no nystagmus present. retested the R side [place] Hallpike, (-) s/s, no nystagmus. deferred Epley at this time due to negative nystagmus with [place] Hallpike. will cont to monitor.   -     Standing-Level of Assistance  Contact guard  -     Static Standing Balance Support  No upper extremity supported  -     Standing-Balance Activities  Forward lean;Reaching for objects;Reaching across midline  -     Standing Balance # of Minutes  5  -LH     Gait Balance-Level of Assistance  Contact guard  - Contact guard  -    Gait Balance Support  Left upper extremity supported;clarence bar  - Right upper extremity supported;Left upper extremity supported;clarence bar  -    Gait Balance Activities  side-stepping;backwards;uneven surface   on foam mat  - side-stepping  -    Gait Balance # of Minutes  5  -LH     Recorded by  [] Savana Dickey PT [JS] Theodora Nevarez PT    Therapy Exercises    Bilateral Lower Extremities   AROM:;20 reps;sitting;LAQ;hip flexion;standing;heel raises;mini squats  -JS    Recorded by   [JS] Theodora Nevarez PT    Positioning and Restraints    Pre-Treatment Position  in bed  - in bed  -    Post Treatment Position  wheelchair  - wheelchair  -    In Wheelchair  sitting;call light within reach;encouraged to call for assist;exit alarm on  - notified nsg;sitting;patient within staff view   in dining room  -JS    Recorded by  [] Savana Dickey PT [JS] Theodora Nevarez PT      User Choi  (r) =  Recorded By, (t) = Taken By, (c) = Cosigned By    Initials Name Effective Dates    OC Viky Ramires MA,CCC-SLP 04/05/17 -     JS Theodora Nevarez, PT 04/06/17 -     LH Savana Dickey, PT 02/07/17 -                 IP PT Goals       12/04/17 0851 11/27/17 1511       Bed Mobility PT LTG    Bed Mobility PT LTG, Date Established  11/27/17  -MG     Bed Mobility PT LTG, Time to Achieve  1 wk  -MG     Bed Mobility PT LTG, Activity Type  all bed mobility  -MG     Bed Mobility PT LTG, [place] Level conditional independence  -LH independent  -MG     Bed Mobility PT LTG, Date Goal Reviewed 12/04/17  - 11/27/17  -MG     Bed Mobility PT LTG, Outcome goal met  -LH goal partially met  -MG     Bed Mobility PT LTG, Reason Goal Not Met  --  -MG     Transfer Training PT LTG    Transfer Training PT LTG, Date Established  11/27/17  -MG     Transfer Training PT LTG, Time to Achieve  1 wk  -MG     Transfer Training PT  LTG, Date Goal Reviewed 12/04/17  - 11/27/17  -MG     Transfer Training PT LTG, Outcome goal met  - goal partially met  -MG     Transfer Training PT LTG, Reason Goal Not Met  --  -MG     Transfer Training 2 PT LTG    Transfer Training PT 2 LTG, Date Established  11/27/17  -MG     Transfer Training PT 2 LTG, Time to Achieve  1 wk  -MG     Transfer Training PT 2 LTG, Activity Type  all transfers  -MG     Transfer Training PT 2 LTG, Date Goal Reviewed 12/04/17  - 11/27/17  -MG     Transfer Training PT 2 LTG, Outcome goal met  -LH goal partially met  -MG     Transfer Training PT 2 LTG, Reason Goal Not Met  --  -MG     Gait Training PT LTG    Gait Training Goal PT LTG, Date Established  11/27/17  -MG     Gait Training Goal PT LTG, Time to Achieve  1 wk  -MG     Gait Training Goal PT LTG, [place] Level  supervision required  -MG     Gait Training Goal PT LTG, Date Goal Reviewed 12/04/17  -      Gait Training Goal PT LTG, Outcome goal ongoing  -LH goal met  -MG     Gait Training Goal PT LTG, Reason Goal Not Met  --  -MG      Stair Training PT LTG    Stair Training Goal PT LTG, Date Established  11/27/17  -MG     Stair Training Goal PT LTG, Time to Achieve  1 wk  -MG     Stair Training Goal PT LTG, Number of Steps  4  -MG     Stair Training Goal PT LTG, Date Goal Reviewed 12/04/17  - 11/27/17  -MG     Stair Training Goal PT LTG, Outcome goal ongoing  - goal partially met  -MG     Stair Training Goal PT LTG, Reason Goal Not Met  --  -MG     Patient Education PT LTG    Patient Education PT LTG, Date Established  11/27/17  -MG     Patient Education PT LTG, Time to Achieve  1 wk  -MG     Patient Education PT LTG, Education Understanding  demonstrate adequately;verbalize understanding  -MG     Patient Education PT LTG, Date Goal Reviewed 12/04/17  - 11/27/17  -MG     Patient Education PT LTG Outcome goal ongoing  - goal ongoing  -MG       User Key  (r) = Recorded By, (t) = Taken By, (c) = Cosigned By    Initials Name Provider Type     Savana Dickey, PT Physical Therapist    MG Megan Gosselin, PT Physical Therapist          Physical Therapy Education     Title: PT OT SLP Therapies (Done)     Topic: Physical Therapy (Done)     Point: Mobility training (Done)    Learning Progress Summary    Learner Readiness Method Response Comment Documented by Status   Patient Acceptance E NR,VU   12/04/17 0826 Done    Acceptance E,TB VU transfer training, gait training JS 12/02/17 1306 Done    Acceptance E NR,VU   12/01/17 0904 Done    Acceptance E,H VU,NR   11/30/17 0856 Done    Acceptance E VU,DU,NR family conference held this date. educated pt and spouse on expectations for home including 24 hr assist for cog deficits. has all equipment needs at home (rwx, rollator). will provided HEP prior to DC. LH 11/29/17 1551 Done    Acceptance E VU,NR  MG 11/27/17 1200 Done    Acceptance E VU,NR  LB 11/25/17 1348 Done    Acceptance E NR,VU   11/24/17 1017 Done    Acceptance E VU,NR   11/23/17 1251 Done    Acceptance E VU,NR   11/22/17 1247  Done    Acceptance E VU,NR   11/21/17 1212 Done    Acceptance E NR   11/20/17 1536 Active   Family Acceptance E VU,DU,NR family conference held this date. educated pt and spouse on expectations for home including 24 hr assist for cog deficits. has all equipment needs at home (rwx, rollator). will provided HEP prior to DC.  11/29/17 1551 Done               Point: Home exercise program (Done)    Learning Progress Summary    Learner Readiness Method Response Comment Documented by Status   Patient Acceptance E NR,VU   12/04/17 0826 Done    Acceptance E,TB VU transfer training, gait training  12/02/17 1306 Done    Acceptance E NR,VU   12/01/17 0904 Done    Acceptance E,H VU,NR   11/30/17 0856 Done    Acceptance E VU,DU,NR family conference held this date. educated pt and spouse on expectations for home including 24 hr assist for cog deficits. has all equipment needs at home (rwx, rollator). will provided HEP prior to DC.  11/29/17 1551 Done    Acceptance E NR,VU   11/24/17 1017 Done    Acceptance E VU,NR   11/22/17 1247 Done    Acceptance E VU,NR   11/21/17 1212 Done    Acceptance E NR   11/20/17 1536 Active   Family Acceptance E VU,DU,NR family conference held this date. educated pt and spouse on expectations for home including 24 hr assist for cog deficits. has all equipment needs at home (rwx, rollator). will provided HEP prior to DC.  11/29/17 1551 Done               Point: Body mechanics (Done)    Learning Progress Summary    Learner Readiness Method Response Comment Documented by Status   Patient Acceptance E NR,VU   12/04/17 0826 Done    Acceptance E,TB VU transfer training, gait training  12/02/17 1306 Done    Acceptance E NR,VU   12/01/17 0904 Done    Acceptance E,H VU,NR   11/30/17 0856 Done    Acceptance E VU,DU,NR family conference held this date. educated pt and spouse on expectations for home including 24 hr assist for cog deficits. has all equipment needs at home (rwx,  rollator). will provided HEP prior to DC.  11/29/17 1551 Done    Acceptance E VU,NR   11/27/17 1200 Done    Acceptance E NR,VU   11/24/17 1017 Done    Acceptance E VU,NR   11/22/17 1247 Done    Acceptance E VU,NR   11/21/17 1212 Done    Acceptance E NR   11/20/17 1536 Active   Family Acceptance E VU,DU,NR family conference held this date. educated pt and spouse on expectations for home including 24 hr assist for cog deficits. has all equipment needs at home (rwx, rollator). will provided HEP prior to DC.  11/29/17 1551 Done               Point: Precautions (Done)    Learning Progress Summary    Learner Readiness Method Response Comment Documented by Status   Patient Acceptance E NR,VU   12/04/17 0826 Done    Acceptance E,TB VU transfer training, gait training  12/02/17 1306 Done    Acceptance E NR,VU   12/01/17 0904 Done    Acceptance E,H VU,NR   11/30/17 0856 Done    Acceptance E VU,DU,NR family conference held this date. educated pt and spouse on expectations for home including 24 hr assist for cog deficits. has all equipment needs at home (rwx, rollator). will provided HEP prior to DC.  11/29/17 1551 Done    Acceptance E VU,NR   11/27/17 1200 Done    Acceptance E NR,VU   11/24/17 1017 Done    Acceptance E VU,NR   11/22/17 1247 Done    Acceptance E VU,NR   11/21/17 1212 Done    Acceptance E NR   11/20/17 1536 Active   Family Acceptance E VU,DU,NR family conference held this date. educated pt and spouse on expectations for home including 24 hr assist for cog deficits. has all equipment needs at home (rwx, rollator). will provided HEP prior to DC.  11/29/17 1551 Done                      User Key     Initials Effective Dates Name Provider Type Discipline     04/06/17 -  Theodora Nevarez, PT Physical Therapist PT     02/07/17 -  Savana Dickey, PT Physical Therapist PT    LB 02/18/16 -  Savana Novoa, PTA Physical Therapy Assistant PT     09/13/17 -  Megan Gosselin, PT Physical  Therapist PT                    PT Recommendation and Plan  Anticipated Discharge Disposition: home with assist, home with outpatient services  Planned Therapy Interventions: bed mobility training, balance training, gait training, home exercise program, ROM (Range of Motion), stair training, strengthening, stretching, transfer training  PT Frequency: 2 times/day  Plan of Care Review  Plan Of Care Reviewed With: patient, spouse  Outcome Summary/Follow up Plan: pt making good progress toward reaching LTGs, 3/6 currently met. pt reports home pass sunday successful, no issues. dizziness improved this AM. will cont to prepare for DC this wed.          Time Calculation:         PT Charges       12/04/17 1546 12/04/17 0854 12/04/17 0826    Time Calculation    Start Time 1300  -  0800  -    Stop Time 1330  -  0830  -    Time Calculation (min) 30 min  -  30 min  -    PT Received On   12/04/17  -    PT - Next Appointment   12/05/17  -    PT Goal Re-Cert Due Date  12/11/17  -       User Key  (r) = Recorded By, (t) = Taken By, (c) = Cosigned By    Initials Name Provider Type     Savana Dickey PT Physical Therapist          Therapy Charges for Today     Code Description Service Date Service Provider Modifiers Qty    [id number] HC PT THER PROC EA 15 MIN 12/4/2017 Savana Dickey, PT GP 4               Savana Dickey PT  12/4/2017

## 2017-12-04 NOTE — PLAN OF CARE
Problem: Patient Care Overview (Adult)  Goal: Plan of Care Review  Outcome: Ongoing (interventions implemented as appropriate)    12/04/17 0228   Coping/Psychosocial Response Interventions   Plan Of Care Reviewed With patient   Patient Care Overview   Progress improving   Outcome Evaluation   Outcome Summary/Follow up Plan Pt. went to home on Sunday and came back the unit at 2025. Calm and cooperative. Complained of any pain to right shoulder. No required any pain medicine. Still cough without any productive. Cough medicine given and relieved. No unsafety issues noted at this time. Will continue to monitor.         Problem: Stroke (Ischemic) (Adult)  Goal: Signs and Symptoms of Listed Potential Problems Will be Absent or Manageable (Stroke)  Outcome: Ongoing (interventions implemented as appropriate)    Problem: Fall Risk (Adult)  Goal: Absence of Falls  Outcome: Ongoing (interventions implemented as appropriate)

## 2017-12-04 NOTE — PROGRESS NOTES
Discussed patient's progress with team. Outpatient PT recommended. No further ST or OT recommended. Discussed with patient. She is agreeable to return here to Rehab for outpatient PT. She stated home pass went well yesterday. Patient did cooking eval with OT today and OT reported patient did well. Will assist with plans for d/c on Wednesday, 12/6.

## 2017-12-04 NOTE — PROGRESS NOTES
"   LOS: 15 days   Patient Care Team:  Goran Carr MD as PCP - General (Family Medicine)    Chief Complaint:   CVA Nov 15 - Left MCA - temporal/parietal lobe  S/p TPA folowed by mechanical thrombectomy  Stroke prophylaxis - /Plavix 75/ Crestor   Recheck P2Y12 on Nov 20  H/o previous CVA with right occipital and right parietal encephalomalacia  H/o remote meningioma decades ago  COPD exacerbation - On prednisone with plan for taper  Depression - Lexapro  Hypothyroidism - Levothyroxine  Post-mastectomy lymphedema  Anemia  ZIO patch at discharge.        Subjective     History of Present Illness    Subjective     Therapeutic day pass on December 3  Patient reports went well.     History taken from: patient    Objective     Vital Signs  Temp:  [98 °F (36.7 °C)-98.6 °F (37 °C)] 98.6 °F (37 °C)  Heart Rate:  [62-66] 66  Resp:  [18-20] 18  BP: (124-138)/(60-65) 138/65    Objective:  Vital signs: (most recent): Blood pressure 138/65, pulse 66, temperature 98.6 °F (37 °C), temperature source Oral, resp. rate 18, height 65\" (165.1 cm), weight 170 lb 6.4 oz (77.3 kg), SpO2 93 %.            MENTAL STATUS - A/A  HEENT - OP MOIST  LUNGS - clear to auscultation without wheezes rales or rhonchi  HEART - RRR  ABD - NABS,SOFT,NT  EXT - NO EDEMA  NEURO -  SPEECH FLUENT. RECOGNIZE FINGER MOVEMENT ALL 4 QUADRANTS. TAKES RESISTANCE BUE AND BLE.     Results Review:     I reviewed the patient's new clinical results.          Results from last 7 days  Lab Units 12/04/17  0703   SODIUM mmol/L 141   POTASSIUM mmol/L 4.1   CHLORIDE mmol/L 101   CO2 mmol/L 26.5   BUN mg/dL 16   CREATININE mg/dL 0.87   CALCIUM mg/dL 9.3   GLUCOSE mg/dL 102*     Results for JASE CARR (MRN [id number]) as of 11/24/2017 12:18   Ref. Range 11/22/2017 07:23   P2Y12 Platelet Inhibition Latest Ref Range: 194 - 418  (L)   Results for JASE CARR (MRN [id number]) as of 11/28/2017 18:23   Ref. Range 11/27/2017 13:19   Creatine Kinase Latest Ref Range: " 20 - 180 U/L 55   CKMB Latest Ref Range: <=5.30 ng/mL 1.81   Troponin T Latest Ref Range: 0.000 - 0.030 ng/mL <0.010   D-dimer, Quant Latest Ref Range: 0.00 - 0.49 MCGFEU/mL 0.40     Medication Review: done  Scheduled Meds:    aspirin 325 mg Oral Daily   atenolol 100 mg Oral Q24H   budesonide-formoterol 2 puff Inhalation BID - RT   cholecalciferol 5,000 Units Oral Daily   clopidogrel 75 mg Oral Daily   docusate sodium 100 mg Oral BID   escitalopram 10 mg Oral Daily   folic acid 1 mg Oral Daily   furosemide 20 mg Oral Daily   gabapentin 800 mg Oral Q8H   ipratropium 0.5 mg Nebulization 4x Daily - RT   letrozole 2.5 mg Oral Daily   levothyroxine 125 mcg Oral Q AM   melatonin 10 mg Oral Nightly   OCUVITE-LUTEIN 1 tablet Oral BID   pantoprazole 40 mg Oral Q AM   rosuvastatin 20 mg Oral Daily     Continuous Infusions:   PRN Meds:.•  acetaminophen  •  albuterol  •  benzonatate  •  diphenhydrAMINE  •  guaifenesin-dextromethorphan  •  meclizine  •  ondansetron  •  polyethylene glycol  •  simethicone  •  trolamine salicylate      Assessment/Plan     Active Problems:    Stroke      Assessment & Plan  CVA Nov 15 - Left MCA - temporal/parietal lobe  S/p TPA folowed by mechanical thrombectomy  Stroke prophylaxis - /Plavix 75/ Crestor   Recheck P2Y12 on Nov 22 - 116 , which is in the therapeutic effect range.    H/o previous CVA with right occipital and right parietal encephalomalacia  H/o remote meningioma decades ago  COPD exacerbation - On prednisone /Symbicort.  November 20-prednisone increased to 40 mg twice a day and half dose albuterol nebulizer added.  November 22-prednisone decreased to 40 mg daily.  Depression - Lexapro  Hypothyroidism - Levothyroxine  Hypertension-on atenolol 50 mg-November 21-increased to home dose of atenolol 100 mg daily and resume Lasix 20 mg daily  Post-mastectomy lymphedema  Took Letrozole at home - Discussed with her Hemacologist-Oncologist  Dr Martinez who wishes for patient to continue on  Letrozole, given her cancer and as that class of med is not felt to raise risk of thrombosis.     Anemia  ZIO patch at discharge.        NOW ADMIT ACUTE INPT REHAB . PT 1 HR, OT 1 HR, SLOP 1 HR, 5 DAYS A WEEK. COGNITION, MOBILITY, SELF CARE. REHAB NURSING CARRYOVER AND B/B, ONGOING PHYSICIAN F/U, WEEKLY TEAM CONF.     December 4-transfers standby assist.  Gait 240 feet rolling walker contact-guard    Russel Sanchez MD  12/04/17  11:16 AM    Time:

## 2017-12-04 NOTE — PLAN OF CARE
Problem: Patient Care Overview (Adult)  Goal: Plan of Care Review    12/04/17 0851   Coping/Psychosocial Response Interventions   Plan Of Care Reviewed With patient;spouse   Outcome Evaluation   Outcome Summary/Follow up Plan pt making good progress toward reaching LTGs, 3/6 currently met. pt reports home pass sunday successful, no issues. dizziness improved this AM. will cont to prepare for DC this wed.          Problem: Inpatient Physical Therapy  Goal: Bed Mobility Goal LTG- PT  Outcome: Outcome(s) achieved Date Met:  12/04/17 12/04/17 0851   Bed Mobility PT LTG   Bed Mobility PT LTG, [place] Level conditional independence   Bed Mobility PT LTG, Date Goal Reviewed 12/04/17   Bed Mobility PT LTG, Outcome goal met       Goal: Transfer Training Goal 1 LTG- PT  Outcome: Outcome(s) achieved Date Met:  12/04/17 12/04/17 0851   Transfer Training PT LTG   Transfer Training PT LTG, Date Goal Reviewed 12/04/17   Transfer Training PT LTG, Outcome goal met       Goal: Transfer Training Goal 2 LTG- PT  Outcome: Outcome(s) achieved Date Met:  12/04/17 12/04/17 0851   Transfer Training 2 PT LTG   Transfer Training PT 2 LTG, Date Goal Reviewed 12/04/17   Transfer Training PT 2 LTG, Outcome goal met       Goal: Gait Training Goal LTG- PT    12/04/17 0851   Gait Training PT LTG   Gait Training Goal PT LTG, Date Goal Reviewed 12/04/17   Gait Training Goal PT LTG, Outcome goal ongoing       Goal: Stair Training Goal LTG- PT    12/04/17 0851   Stair Training PT LTG   Stair Training Goal PT LTG, Date Goal Reviewed 12/04/17   Stair Training Goal PT LTG, Outcome goal ongoing       Goal: Patient Education Goal LTG- PT    12/04/17 0851   Patient Education PT LTG   Patient Education PT LTG, Date Goal Reviewed 12/04/17   Patient Education PT LTG Outcome goal ongoing

## 2017-12-05 PROCEDURE — 92507 TX SP LANG VOICE COMM INDIV: CPT

## 2017-12-05 PROCEDURE — 97112 NEUROMUSCULAR REEDUCATION: CPT | Performed by: OCCUPATIONAL THERAPIST

## 2017-12-05 PROCEDURE — 94799 UNLISTED PULMONARY SVC/PX: CPT

## 2017-12-05 PROCEDURE — 97535 SELF CARE MNGMENT TRAINING: CPT | Performed by: OCCUPATIONAL THERAPIST

## 2017-12-05 PROCEDURE — 63710000001 DIPHENHYDRAMINE PER 50 MG: Performed by: PHYSICAL MEDICINE & REHABILITATION

## 2017-12-05 PROCEDURE — 97532: CPT

## 2017-12-05 PROCEDURE — 97110 THERAPEUTIC EXERCISES: CPT

## 2017-12-05 RX ADMIN — VITAMIN D, TAB 1000IU (100/BT) 5000 UNITS: 25 TAB at 07:35

## 2017-12-05 RX ADMIN — BUDESONIDE AND FORMOTEROL FUMARATE DIHYDRATE 2 PUFF: 160; 4.5 AEROSOL RESPIRATORY (INHALATION) at 20:06

## 2017-12-05 RX ADMIN — Medication 1 TABLET: at 07:34

## 2017-12-05 RX ADMIN — GABAPENTIN 800 MG: 400 CAPSULE ORAL at 05:31

## 2017-12-05 RX ADMIN — GUAIFENESIN AND DEXTROMETHORPHAN 5 ML: 100; 10 SYRUP ORAL at 07:36

## 2017-12-05 RX ADMIN — BENZONATATE 200 MG: 200 CAPSULE, LIQUID FILLED ORAL at 02:08

## 2017-12-05 RX ADMIN — ASPIRIN 325 MG: 325 TABLET ORAL at 07:36

## 2017-12-05 RX ADMIN — LEVOTHYROXINE SODIUM 125 MCG: 125 TABLET ORAL at 05:31

## 2017-12-05 RX ADMIN — CLOPIDOGREL 75 MG: 75 TABLET, FILM COATED ORAL at 07:35

## 2017-12-05 RX ADMIN — GUAIFENESIN AND DEXTROMETHORPHAN 5 ML: 100; 10 SYRUP ORAL at 21:45

## 2017-12-05 RX ADMIN — GABAPENTIN 800 MG: 400 CAPSULE ORAL at 21:45

## 2017-12-05 RX ADMIN — DOCUSATE SODIUM 100 MG: 100 CAPSULE, LIQUID FILLED ORAL at 07:36

## 2017-12-05 RX ADMIN — LETROZOLE 2.5 MG: 2.5 TABLET ORAL at 09:35

## 2017-12-05 RX ADMIN — Medication 10 MG: at 21:44

## 2017-12-05 RX ADMIN — IPRATROPIUM BROMIDE 0.5 MG: 0.5 SOLUTION RESPIRATORY (INHALATION) at 20:05

## 2017-12-05 RX ADMIN — BUDESONIDE AND FORMOTEROL FUMARATE DIHYDRATE 2 PUFF: 160; 4.5 AEROSOL RESPIRATORY (INHALATION) at 07:09

## 2017-12-05 RX ADMIN — GABAPENTIN 800 MG: 400 CAPSULE ORAL at 14:52

## 2017-12-05 RX ADMIN — ATENOLOL 100 MG: 50 TABLET ORAL at 07:36

## 2017-12-05 RX ADMIN — ROSUVASTATIN CALCIUM 20 MG: 20 TABLET, FILM COATED ORAL at 07:34

## 2017-12-05 RX ADMIN — DIPHENHYDRAMINE HYDROCHLORIDE 25 MG: 25 CAPSULE ORAL at 21:45

## 2017-12-05 RX ADMIN — FOLIC ACID 1 MG: 1 TABLET ORAL at 07:34

## 2017-12-05 RX ADMIN — GUAIFENESIN AND DEXTROMETHORPHAN 5 ML: 100; 10 SYRUP ORAL at 02:08

## 2017-12-05 RX ADMIN — IPRATROPIUM BROMIDE 0.5 MG: 0.5 SOLUTION RESPIRATORY (INHALATION) at 15:09

## 2017-12-05 RX ADMIN — ESCITALOPRAM 10 MG: 10 TABLET, FILM COATED ORAL at 07:35

## 2017-12-05 RX ADMIN — IPRATROPIUM BROMIDE 0.5 MG: 0.5 SOLUTION RESPIRATORY (INHALATION) at 07:09

## 2017-12-05 RX ADMIN — PANTOPRAZOLE SODIUM 40 MG: 40 TABLET, DELAYED RELEASE ORAL at 05:31

## 2017-12-05 RX ADMIN — Medication 1 TABLET: at 17:14

## 2017-12-05 RX ADMIN — FUROSEMIDE 20 MG: 20 TABLET ORAL at 07:34

## 2017-12-05 RX ADMIN — DOCUSATE SODIUM 100 MG: 100 CAPSULE, LIQUID FILLED ORAL at 17:14

## 2017-12-05 NOTE — PROGRESS NOTES
Inpatient Rehabilitation Plan of Care Note    Plan of Care  Care Plan Reviewed - No updates at this time.    Body Systems    [RN] Integumentary(Active)  Current Status(12/05/2017): Puncture site in groin from heart cath. Bruised  arms.  Weekly Goal(12/10/2017): No s/s infection noted  Discharge Goal: Incision healed    Performed Intervention(s)  Daily skin inspection  Turning/ repositioning      Psychosocial    [RN] Coping/Adjustment(Active)  Current Status(12/05/2017): Supportive family  Weekly Goal(12/10/2017): Allow opportunity to express concerns regarding current  status.  Discharge Goal: Demonstrates healthy coping strategies.    Performed Intervention(s)  Verbalize needs and concerns  Therapeutic environmental set up      Safety    [RN] Potential for Injury(Active)  Current Status(12/05/2017): Uses call light appropriately  Weekly Goal(12/10/2017): Instruct family/caregivers regarding safety precautions  and need for close supervision.  Discharge Goal: Patient and family will be aware of risk for fall and safety in  home setting.    Performed Intervention(s)  Falls prevention protocol  Items within reach  Bed/ Chair alarm.      Sphincter Control    [RN] Bowel and bladder management(Active)  Current Status(12/05/2017): 100% Continent  Weekly Goal(12/10/2017): 100% Continent  Discharge Goal: 100% Continent    Performed Intervention(s)  Encourage fluid intake  Elimination schedule  Monitor intake and output    Signed by: Stephanie Sharma

## 2017-12-05 NOTE — DISCHARGE INSTR - APPOINTMENTS
Dec 18, 2017 @ 11:35 AM - Follow up appointment with Dr. Narendra Mantilla, [facility].  [address], [facility].  Call [phone] for questions or to reschedule.    December 6 - Follow - up with Dr. Bonner on December 6 after discharged from Rehab.

## 2017-12-05 NOTE — PROGRESS NOTES
Inpatient Rehabilitation Functional Measures Assessment    Functional Measures  [facility] Eating:  Branch  [facility] Grooming: Branch  [facility] Bathing:  Branch  [facility] Upper Body Dressing:  Branch  [facility] Lower Body Dressing:  [facility] Toileting:  [facility] Bladder Management  Level of Assistance:  [place]  Frequency/Number of Accidents this Shift:  [facility] Bowel Management  Level of Assistance: [place]  Frequency/Number of Accidents this Shift: Branch    [facility] Bed/Chair/Wheelchair Transfer:  Bed/chair/wheelchair Transfer Score = 5.  Patient is supervision/set-up for transferring to and from the  bed/chair/wheelchair, requiring: Stand by assistance. Patient requires the  following assistive device(s): Walker.  [facility] Toilet Transfer:  [facility] Tub/Shower Transfer:  [place]    Previously Documented Mode of Locomotion at Discharge: Field  [facility] Expected Mode of Locomotion at Discharge: [facility] Walk/Wheelchair:  WHEELCHAIR OBSERVATION   Activity was not observed.    WALK OBSERVATION   Walk Distance Scale = 3.  Distance walked is greater than 150 feet. Walk Score  = 5.  Patient requires supervision or set up for walking. Stand by assistance.  Patient walked a distance of  250 feet. Patient requires the following assistive  device(s): rollator .  [facility] Stairs:  Stairs Score = 2.  Patient requires supervision for household  negotiation of stairs. Patient negotiated  4 stairs. Patient requires the  following assistive device(s): Handrail(s).    [facility] Comprehension:  [facility] Expression:  [facility] Social Interaction:  [facility] Problem Solving:  [facility] Memory:  [place]    Therapy Mode Minutes  Occupational Therapy: [place]  Physical Therapy: Individual: 60 minutes.  Speech Language Pathology:  [place]    Signed by: Savana Dickey PT

## 2017-12-05 NOTE — PLAN OF CARE
Problem: Patient Care Overview (Adult)  Goal: Plan of Care Review  Outcome: Ongoing (interventions implemented as appropriate)    12/05/17 1642   Coping/Psychosocial Response Interventions   Plan Of Care Reviewed With patient;spouse   Outcome Evaluation   Outcome Summary/Follow up Plan Discussed discharge process with pt and spouse - answered all questions and pt and spouse verbalized understanding. Pt needs home O2 set up prior to d/c for use at HS until sleep study completed (social work aware - paperwork for [facility] on chart). No unsafe behaviors.       Goal: Adult Individualization and Mutuality  Outcome: Ongoing (interventions implemented as appropriate)  Goal: Discharge Needs Assessment  Outcome: Ongoing (interventions implemented as appropriate)    Problem: Stroke (Ischemic) (Adult)  Goal: Signs and Symptoms of Listed Potential Problems Will be Absent or Manageable (Stroke)  Outcome: Ongoing (interventions implemented as appropriate)    12/05/17 1642   Stroke (Ischemic)   Problems Assessed (Stroke (Ischemic)/TIA) all   Problems Present (Stroke (Ischemic)/TIA) muscle tone abnormal;situational response         Problem: Fall Risk (Adult)  Goal: Absence of Falls  Outcome: Ongoing (interventions implemented as appropriate)    12/05/17 1642   Fall Risk (Adult)   Absence of Falls making progress toward outcome

## 2017-12-05 NOTE — PLAN OF CARE
Problem: Patient Care Overview (Adult)  Goal: Plan of Care Review    12/05/17 1457   Coping/Psychosocial Response Interventions   Plan Of Care Reviewed With patient   Outcome Evaluation   Outcome Summary/Follow up Plan pt has met all LTGs at this time. to go home SBA level at home tomorrow with spouse. to use rollator home/community for improved balance. educated on, performed and provided written HEP including strengthening BLEs and vestibular therex. pt to follow up with neuro OP PT [facility] next Monday to cont vestibular therex. will sign off at this time.          Problem: Inpatient Physical Therapy  Goal: Gait Training Goal LTG- PT  Outcome: Outcome(s) achieved Date Met:  12/05/17 12/05/17 1457   Gait Training PT LTG   Gait Training Goal PT LTG, Date Goal Reviewed 12/05/17   Gait Training Goal PT LTG, Outcome goal partially met       Goal: Stair Training Goal LTG- PT  Outcome: Outcome(s) achieved Date Met:  12/05/17 12/05/17 1457   Stair Training PT LTG   Stair Training Goal PT LTG, Date Goal Reviewed 12/05/17   Stair Training Goal PT LTG, Outcome goal met       Goal: Patient Education Goal LTG- PT  Outcome: Outcome(s) achieved Date Met:  12/05/17 12/05/17 1457   Patient Education PT LTG   Patient Education PT LTG, Date Goal Reviewed 12/05/17   Patient Education PT LTG Outcome goal met

## 2017-12-05 NOTE — THERAPY DISCHARGE NOTE
Inpatient Rehabilitation - Occupational Therapy Treatment Note/Discharge  [facility]     Patient Name: Paloma Carr  : 1940  MRN: [id number]  Today's Date: 2017  Onset of Illness/Injury or Date of Surgery Date: 11/15/17     Referring Physician: Brandon      Admit Date: 2017    Visit Dx:     ICD-10-CM ICD-9-CM   1. Impaired gait and mobility R26.89 781.2     Patient Active Problem List   Diagnosis   • Soft tissue swelling of chest wall   • Abnormal ultrasound of breast   • Postmastectomy lymphedema syndrome   • Malignant neoplasm of upper-outer quadrant of right female breast   • Cerebrovascular accident (CVA)   • COPD exacerbation   • Stroke             Adult Rehabilitation Note       17 1542 17 1523 17 1400    Rehab Assessment/Intervention    Discipline occupational therapist  -DN occupational therapist  -SO speech language pathologist  -OC    Document Type therapy note (daily note);discharge summary  -DN therapy note (daily note)  -SO     Subjective Information agree to therapy;no complaints  -DN no complaints;agree to therapy  -SO     Patient Effort, Rehab Treatment good  -DN good  -SO     Symptoms Noted During/After Treatment none  -DN none  -SO     Recorded by [DN] KEON Tran [SO] KEON Barnhart [OC] Viky Ramires MA,[facility]-SLP    Pain Assessment    Pain Assessment No/denies pain  -DN No/denies pain  -SO No/denies pain  -OC    Recorded by [DN] KEON Tran [SO] KEON Barnhart [OC] Viky Ramires MA,CCC-SLP    Vision Assessment/Intervention    Visual Impairment WFL with corrective lenses  -DN      Recorded by [DN] KEON Tran      Cognitive Assessment/Intervention    Current Cognitive/Communication Assessment functional  -DN functional  -SO functional  -OC    Orientation Status oriented x 4  -DN oriented x 4  -SO oriented x 4  -OC    Follows Commands/Answers Questions   100% of the time  -OC    Recorded by [DN] KEON Tran  [SO] Hortensia Paredes, OTR [OC] Viky Ramires MA,CCC-SLP    Improve word retrieval skills    Status: Improve word retrieval skills   Progressing as expected  -OC    Word Retrieval Skills Progress   90%;without cues   Answering contrversial topics/questions  -OC    Recorded by   [OC] Viky Ramires MA,CCC-SLP    Improve functional problem solving    Status: Improve functional problem solving   Progressing as expected  -OC    Functional Problem Solving Progress   90%;without cues   Sequexing 4-6 steps  -OC    Recorded by   [OC] Viky Ramires MA,CCC-SLP    Improve executive function skills    Status: Improve executive function skills   Progressing as expected  -OC    Executive Function Skills Progress   90%;without cues   if/thin written directives  -OC    Recorded by   [OC] Viky Ramires MA,CCC-SLP    ROM (Range of Motion)    General ROM Detail  BUE WFL  -SO     Recorded by  [SO] Hortensia Paredes, AZAMR     MMT (Manual Muscle Testing)    General MMT Assessment Detail  RUE 5/5, LUE 4-/5;  R 46 L35, lat pinch R9 L9, 2 pt R7 L8  -SO     Recorded by  [SO] Hortensia Paredes OTR     Bed Mobility, Assessment/Treatment    Bed Mobility, Comment  Up in w/c  -SO     Recorded by  [SO] Hortensia Paredes OTR     Transfer Assessment/Treatment    Transfers, Sit-Stand [place]  supervision required  -SO     Transfers, Stand-Sit [place]  supervision required  -SO     Toilet Transfer, [place] supervision required  -DN supervision required  -SO     Toilet Transfer, Assistive Device rolling walker  -DN --   Grab bars  -SO     Walk-In Shower Transfer, [place] supervision required  -DN      Walk-In Shower Transfer, Assist Device rolling walker  -DN      Recorded by [YAHIR] KEON Tran [SO] Hortensia Paredes, AZAMR     Upper Body Bathing Assessment/Training    UB Bathing Assess/Train, [place] Level set up required  -DN      Recorded by [YAHIR] KEON Tran      Lower Body Bathing  Assessment/Training    LB Bathing Assess/Train, [place] Level stand by assist  -DN      Recorded by [YAHIR] KEON Tran      Lower Body Dressing Assessment/Training    LB Dressing Assess/Train, [place] supervision required  -DN      Recorded by [YAHIR] KEON Tran      Toileting Assessment/Training    Toileting Assess/Train, Assistive Device  grab bars;raised toilet seat  -SO     Toileting Assess/Train, Position  sitting;standing  -SO     Toileting Assess/Train, [place] Level supervision required  -DN supervision required;set up required  -SO     Recorded by [YAHIR] KEON Tran [SO] KEON Barnhart     Grooming Assessment/Training    Grooming Assess/Train, Position  sitting;sink side  -SO     Grooming Assess/Train, [place]ep[place] Level supervision required  -DN supervision required;set up required  -SO     Recorded by [YAHIR] KEON Tran [SO] KEON Barnhart     Therapy Exercises    Bilateral Upper Extremity AROM:;30 reps;sitting;elbow flexion/extension;pronation/supination;shoulder abduction/adduction;shoulder extension/flexion;shoulder ER/IR;shoulder horizontal abd/add;shoulder protraction/retraction   2# dumbell, 2# dowel, handgripper 3sets of 10 reps  -DN      Recorded by [YAHIR] KEON Tran      Fine Motor Coordination Training    9-Hole Peg Right  23  -SO     9-Hole Peg Left  29  -SO     Box and Blocks Right  48  -SO     Box and Blocks Left  50  -SO     Recorded by  [SO] KOEN Barnhart     Functional Endurance    Detail (Functional Endurance) good -  -DN      Recorded by [YAHIR] KEON Tran      Positioning and Restraints    Pre-Treatment Position sitting in chair/recliner  -DN sitting in chair/recliner  -SO     Post Treatment Position wheelchair  -DN wheelchair  -SO     In Bed sitting;with SLP  -DN      In Wheelchair  sitting;call light within reach;encouraged to call for assist;exit alarm on  -SO     Recorded by [YAHIR] KEON Tran [SO]  Hortensia Paredes, OTR       12/05/17 0838 12/04/17 1554 12/04/17 1200    Rehab Assessment/Intervention    Discipline physical therapist  -LH occupational therapist  -DN speech language pathologist  -OC    Document Type therapy note (daily note);discharge summary  - therapy note (daily note)  -DN     Subjective Information agree to therapy  - agree to therapy;no complaints  -DN     Patient Effort, Rehab Treatment good  -LH good  -DN     Symptoms Noted During/After Treatment none  -LH none  -DN     Precautions/Limitations fall precautions  - fall precautions  -DN     Precautions/Limitations, Vision  WFL with corrective lenses  -DN     Specific Treatment Considerations  Cooking eval in kitchen to day with pt and therapist  -DN     Patient Response to Treatment noted R beating nystagmus with ambulation from room to gym this AM. brought pt to gym, cleared the vertebral artery. performed R galilea Hallpike, (-) nystagmus nor reproduction of s/s, went into Epleys, (-) reproduction of s/s with manuever. good tolerance. will cont to monitor. no nystagmus or s/s with ambulation post session from gym back to room. Dr Sanchez present for AM session - aware that nytagmus is still intermittently present, ? central in nature. will cont monitor. plan pt to return as OP PT with Audrey/vestibular therapist  -LH      Recorded by [LH] Savana Dickey, PT [DN] Gage Licea, OTR [OC] Viky Ramires MA,CCC-SLP    Pain Assessment    Pain Assessment No/denies pain  -  No/denies pain  -OC    Recorded by [] Savana Dickey, PT  [OC] Viky Ramires MA,CCC-SLP    Vision Assessment/Intervention    Visual Impairment WFL with corrective lenses  -  WFL with corrective lenses  -OC    Recorded by [] Savana Dickey, PT  [OC] Viky Ramires MA,CCC-SLP    Cognitive Assessment/Intervention    Current Cognitive/Communication Assessment functional  -LH functional  -DN functional  -OC    Orientation Status oriented x 4  -LH oriented x 4  -DN oriented x 4   -OC    Follows Commands/Answers Questions 100% of the time  -% of the time  -% of the time  -OC    Personal Safety mild impairment  -LH mild impairment;decreased awareness, need for assist;decreased insight to deficits;decreased awareness, need for safety  -DN     Personal Safety Interventions fall prevention program maintained;gait belt;nonskid shoes/slippers when out of bed;supervised activity  -LH      Recorded by [LH] Savana Dickey, PT [DN] Gage Licea, OTR [OC] Viky Ramires MA,CCC-SLP    Cognitive Assessment Intervention    Sequencing   WNL/WFL  -OC    Recorded by   [OC] Viky Ramires MA,CCC-SLP    Improve word retrieval skills    Status: Improve word retrieval skills   Progressing as expected  -OC    Word Retrieval Skills Progress   90%;without cues   min cues for describing details to aid in word finding  -OC    Recorded by   [OC] Viky Ramires MA,BENNETT-SLP    Improve functional problem solving    Status: Improve functional problem solving   Progressing as expected  -OC    Functional Problem Solving Progress   90%;without cues   4-6 step directions  -OC    Recorded by   [OC] Viky Ramires MA,CCC-SLP    Improve executive function skills    Status: Improve executive function skills   Progressing as expected  -OC    Executive Function Skills Progress   80%;without cues   word associations  -OC    Recorded by   [OC] Viky Ramires MA,CCC-SLP    Bed Mobility, Assessment/Treatment    Bed Mobility, Roll Left, [place] conditional independence  -      Bed Mobility, Roll Right, [place] conditional independence  -      Bed Mobility, Scoot/Bridge, [place] conditional independence  -      Bed Mob, Supine to Sit, [place] conditional independence  -LH      Bed Mob, Sit to Supine, [place] conditional independence  -      Bed Mob, Sidelying to Sit, [place] conditional independence  -      Bed Mob, Sit to Sidelying, [place] conditional independence  -LH      Recorded by [LH]  Savana Dickey, PT      Transfer Assessment/Treatment    Transfers, Bed-Chair [place] supervision required  -LH      Transfers, Chair-Bed [place] supervision required  -LH      Transfers, Sit-Stand [place] supervision required  -LH      Transfers, Stand-Sit [place] supervision required  -LH      Transfers, Sit-Stand-Sit, Assist Device rolling walker   rollator  -LH      Toilet Transfer, [place]  supervision required  -DN     Toilet Transfer, Assistive Device  rolling walker;elevated toilet seat  -DN     Walk-In Shower Transfer, [place]  supervision required  -DN     Walk-In Shower Transfer, Assist Device  rolling walker  -DN     Transfer, Safety Issues  step length decreased  -DN     Transfer, Impairments  impaired balance  -DN     Recorded by [] Savana Dickey, PT [DN] Gage Licea OTR     Gait Assessment/Treatment    Gait, [place] Level stand by assist  -      Gait, Assistive Device rollator  -      Gait, Distance (Feet) 250   x2  -      Gait, Gait Pattern Analysis swing-through gait  -      Recorded by [] Savana Dickey, PT      Stairs Assessment/Treatment    Number of Stairs 4  -      Stairs, Handrail Location both sides  -      Stairs, [place] Level supervision required  -      Stairs, Technique Used step over step (ascending);step over step (descending)  -      Recorded by [] Savana Dickey, PT      Functional Mobility    Functional Mobility- Ind. Level supervision required  -      Functional Mobility- Device --   hemibars  -      Functional Mobility-Distance (Feet) 5   over foam mat x 2   -      Recorded by [] Savana Dickey, PT      ADL Assessment/Intervention    Additional Documentation  IADL Assess/Train, Comment (Row)   pt SBA with light meal prep inkitchen today at RWX level  -DN     Recorded by  [DN] Gage Licea, AZAMR     Upper Body Bathing Assessment/Training    UB Bathing Assess/Train Assistive Device  shower chair with back  -DN     UB  Bathing Assess/Train, Position  sitting  -DN     UB Bathing Assess/Train, [place] Level  set up required  -DN     Recorded by  [DN] KEON Tran     Lower Body Bathing Assessment/Training    LB Bathing Assess/Train Assistive Device  hand-held shower head;grab bars;shower chair with back  -DN     LB Bathing Assess/Train, Position  sitting;standing  -DN     LB Bathing Assess/Train, [place] Level  supervision required;set up required  -DN     Recorded by  [YAHIR] KEON Tran     Upper Body Dressing Assessment/Training    UB Dressing Assess/Train, Clothing Type  doffing:;donning:;pull over;t-shirt  -DN     UB Dressing Assess/Train, Position  sitting  -DN     UB Dressing Assess/Train, [place]  set up required  -DN     UB Dressing Assess/Train, Impairments  strength decreased  -DN     Recorded by  [YAHIR] KEON Tran     Lower Body Dressing Assessment/Training    LB Dressing Assess/Train, Clothing Type  doffing:;donning:;pants;shoes  -DN     LB Dressing Assess/Train, Position  sitting;standing  -DN     LB Dressing Assess/Train, [place]  supervision required;set up required  -DN     LB Dressing Assess/Train, Impairments  strength decreased  -DN     Recorded by  [YAHIR] KEON Tran     Toileting Assessment/Training    Toileting Assess/Train, Assistive Device  grab bars;raised toilet seat  -DN     Toileting Assess/Train, Position  sitting;standing  -DN     Toileting Assess/Train, [place]ep[place] Level  supervision required  -DN     Toileting Assess/Train, Impairments  impaired balance;strength decreased  -DN     Recorded by  [DN] KEON Tran     Grooming Assessment/Training    Grooming Assess/Train, Assistive Device  electric toothbrush  -DN     Grooming Assess/Train, Position  sitting;sink side  -DN     Grooming Assess/Train, [place]ep[place] Level  set up required  -DN     Recorded by  [YAHIR] KEON Tran     Balance Skills Training    Training Strategies (Balance Skills)  standing at  counter in kitchen with meal prep of cutting veggies for soup pt had No lob and did require sit down breaks for time to time   -DN     Standing-Level of Assistance Close supervision  -      Static Standing Balance Support assistive device   rollator  -      Standing-Balance Activities Retrieve object from floor  -      Stepping Strategy Assessment (Balance Skills) educated on, performed and provided written HEP - including strengthening therex and vestibular therex. performed sit to stands with head turns EO/EC, no nystagmus noted. performed Gupta Daroff bilaterally 1 set, no nystagmus. good tolerance to all therex.   -LH      Recorded by [] Savana Dickey, PT [DN] Gage Licea, AZAMR     Therapy Exercises    Bilateral Lower Extremities 15 reps;AROM:;standing;hip flexion;heel raises;hip abduction/adduction   educated on, performed and provided written HEP  -LH      Recorded by [] Savana Dickey PT      Positioning and Restraints    Pre-Treatment Position in bed  - --   w/c  -DN     Post Treatment Position bed  - wheelchair  -DN     In Bed supine;call light within reach;encouraged to call for assist;exit alarm on  - sitting;call light within reach;encouraged to call for assist;exit alarm on  -DN     Recorded by [] Savana Dickey, PT [DN] KEON Tran       12/04/17 0824          Rehab Assessment/Intervention    Discipline physical therapist  -      Document Type therapy note (daily note)  -      Subjective Information agree to therapy  -      Patient Effort, Rehab Treatment good  -      Precautions/Limitations fall precautions  -      Precautions/Limitations, Vision WFL with corrective lenses  -      Specific Treatment Considerations noted R beat nystagmus with turning/reaching and tossing bean bags, corrects after approx 15 sec with head/neck in neutral position  -LH      Recorded by [] Savana Dickey PT      Pain Assessment    Pain Assessment No/denies pain  -      Recorded by []  Savana Dickey, PT      Vision Assessment/Intervention    Visual Impairment WFL with corrective lenses  -      Recorded by [LH] Savnaa Dickey, PT      Cognitive Assessment/Intervention    Current Cognitive/Communication Assessment functional  -      Orientation Status oriented x 4  -      Follows Commands/Answers Questions 100% of the time  -      Personal Safety mild impairment  -      Recorded by [] Savana Dickey, PT      Bed Mobility, Assessment/Treatment    Bed Mob, Supine to Sit, [place] conditional independence  -      Bed Mob, Sidelying to Sit, [place] conditional independence  -LH      Recorded by [LH] Savana Dickey, PT      Transfer Assessment/Treatment    Transfers, Bed-Chair [place] stand by assist  -      Transfers, Chair-Bed [place] stand by assist  -      Transfers, Bed-Chair-Bed, Assist Device rolling walker  -      Transfers, Sit-Stand [place] stand by assist  -      Transfers, Stand-Sit [place] stand by assist  -      Transfers, Sit-Stand-Sit, Assist Device rolling walker   rollator  -      Toilet Transfer, [place] supervision required  -      Toilet Transfer, Assistive Device rolling walker  -      Transfer, Safety Issues step length decreased;weight-shifting ability decreased  -      Transfer, Impairments impaired balance  -      Transfer, Comment car tsf SBA rollator  -      Recorded by [] Savana Dickey, PT      Gait Assessment/Treatment    Gait, [place] Level stand by assist  -      Gait, Assistive Device rollator  -      Gait, Distance (Feet) 160   x2  -      Gait, Gait Pattern Analysis swing-through gait  -      Gait, Gait Deviations bilateral:;z[place] decreased;forward flexed posture  -      Gait, Safety Issues step length decreased;weight-shifting ability decreased  -      Gait, Impairments impaired balance  -      Recorded by [] Savana Dickey, PT      Balance Skills Training    Training Strategies  (Balance Skills) lateral reaching and turning for bean bags with associated head turns- noted R beating nystagmus with head turns to R, resolved after approx 15 sec with neutral head neck. vertebral artery cleared, tested L sided Olivia Hallpike, (-) s/s, no nystagmus present. retested the R side [place] Hallpike, (-) s/s, no nystagmus. deferred Epley at this time due to negative nystagmus with Olivia Hallpike. will cont to monitor.   -      Standing-Level of Assistance Contact guard  -      Static Standing Balance Support No upper extremity supported  -      Standing-Balance Activities Forward lean;Reaching for objects;Reaching across midline  -      Standing Balance # of Minutes 5  -LH      Gait Balance-Level of Assistance Contact guard  -      Gait Balance Support Left upper extremity supported;clarence bar  -      Gait Balance Activities side-stepping;backwards;uneven surface   on foam mat  -      Gait Balance # of Minutes 5  -LH      Recorded by [] Savana Dickey, PT      Positioning and Restraints    Pre-Treatment Position in bed  -      Post Treatment Position wheelchair  -      In Wheelchair sitting;call light within reach;encouraged to call for assist;exit alarm on  -LH      Recorded by [] Savana Dickey, PT        User Key  (r) = Recorded By, (t) = Taken By, (c) = Cosigned By    Initials Name Effective Dates    OC Viky Ramires MA,CCC-SLP 04/05/17 -     DN Gage Licea, OTR 04/13/15 -     SO Hortensia Paredes, OTR 04/13/15 -     LH Savana Dickey, PT 02/07/17 -                 OT Goals       12/05/17 1548 12/04/17 1605 11/27/17 1549    Transfer Training OT STG    Transfer Training OT STG, Date Established   11/27/17  -DN    Transfer Training OT STG, [place] Level   supervision required;verbal cues required  -DN    Transfer Training OT STG, Outcome goal met  -DN      Transfer Training OT LTG    Transfer Training OT LTG, Date Established   11/27/17  -DN    Transfer Training OT LTG, [place]  Level   supervision required;verbal cues required  -DN    Transfer Training OT LTG, Outcome goal met  -DN  goal revised  -DN    Strength OT LTG    Strength Goal OT LTG, Date Established 12/05/17  -DN  11/27/17  -DN    Strength Goal OT LTG, Outcome goal partially met  -DN  goal ongoing  -DN    Dynamic Standing Balance OT LTG    Dynamic Standing Balance OT LTG, Date Established   11/27/17  -DN    Dynamic Standing Balance OT LTG, [place] Level   supervision required  -DN    Dynamic Standing Balance OT LTG, Outcome goal met  -DN  goal revised  -DN    Caregiver Training OT LTG    Caregiver Training OT LTG, Date Established 12/05/17  -DN  11/27/17  -DN    Caregiver Training OT LTG, [place] Level able to assist adequately;able to cue patient adequately  -DN      Caregiver Training OT LTG, Outcome   goal ongoing  -DN    Patient Education OT LTG    Patient Education OT LTG, Date Established   11/27/17  -DN    Patient Education OT LTG Outcome   goal ongoing  -DN    Home Management OT LTG    Home Mgmt Goal OT LTG, Date Established 12/05/17  -DN 12/04/17  -DN     Home Mgmt Goal OT LTG, Time To Achieve  by discharge  -DN     Home Mgmt Goal OT LTG, Activity Type  pt to be sba with light meal prep at rwx level  -DN     Home Mgmt Goal OT LTG, [place] Level  supervision required  -DN     Home Mgmt Goal OT LTG, Outcome Achieved goal met  -DN      ADL OT STG    ADL OT STG, Date Established 12/05/17  -DN  11/27/17  -DN    ADL OT STG, Outcome goal met  -DN  goal ongoing  -DN    ADL OT LTG    ADL OT LTG, Date Established 12/05/17  -DN  11/27/17  -DN    ADL OT LTG, [place] Level   standby assist;min verbal cues;assistive device  -DN    ADL OT LTG, Outcome goal met  -DN  goal revised  -DN      User Key  (r) = Recorded By, (t) = Taken By, (c) = Cosigned By    Initials Name Provider Type    KEON Bacon Occupational Therapist          Occupational Therapy Education     Title: PT OT SLP Therapies (Done)     Topic:  Occupational Therapy (Done)     Point: ADL training (Done)    Description: Instruct learner(s) on proper safety adaptation and remediation techniques during self care or transfers.   Instruct in proper use of assistive devices.    Learning Progress Summary    Learner Readiness Method Response Comment Documented by Status   Patient Acceptance E VU pt  stated he feels fine with assist of pt at this level and knows she will need supervision at home initially DN 12/05/17 1546 Done    Acceptance E,TB,D,H VU,NR   12/05/17 0848 Done    Eager E,TB VU,DU pt eduacated on safe technique with light meal prep in kitchen at RWX level, safe with stove use, cutting veggies, needed vc to sit when fatigued DN 12/04/17 1602 Done    Acceptance E NR,VU   12/04/17 0826 Done    Acceptance E,TB VU transfer training, gait training  12/02/17 1306 Done    Acceptance E NR,VU   12/01/17 0904 Done    Acceptance E,H VU,NR   11/30/17 0856 Done    Eager E MAXIMO family conferance held with pt and  today to discuss current status with adls, commode/shower transfers, safety, balance, equipment needs antipated at d/c and further therapies after d/c DN 11/29/17 1654 Done    Acceptance E,D MAXIMO,NR ed pt on OT role. benefit fo therapy. POC w. OT. ed on toilet and shower tsf and safety. pt demo w. CGA to SBA w. tsf. KP 11/20/17 1538 Done   Family Eager E VU family conferance held with pt and  today to discuss current status with adls, commode/shower transfers, safety, balance, equipment needs antipated at d/c and further therapies after d/c DN 11/29/17 1654 Done   Significant Other Acceptance E VU pt  stated he feels fine with assist of pt at this level and knows she will need supervision at home initially DN 12/05/17 1546 Done               Point: Home exercise program (Done)    Description: Instruct learner(s) on appropriate technique for monitoring, assisting and/or progressing therapeutic exercises/activities.     Learning Progress Summary    Learner Readiness Method Response Comment Documented by Status   Patient Acceptance E VU pt  stated he feels fine with assist of pt at this level and knows she will need supervision at home initially DN 12/05/17 1546 Done    Acceptance E,TB,D,H VU,NR   12/05/17 0848 Done    Acceptance E NR,VU   12/04/17 0826 Done    Acceptance E,TB VU transfer training, gait training JS 12/02/17 1306 Done    Acceptance E NR,VU   12/01/17 0904 Done    Acceptance E,H VU,NR   11/30/17 0856 Done    Eager E VU family conferance held with pt and  today to discuss current status with adls, commode/shower transfers, safety, balance, equipment needs antipated at d/c and further therapies after d/c DN 11/29/17 1654 Done   Family Eager E VU family conferance held with pt and  today to discuss current status with adls, commode/shower transfers, safety, balance, equipment needs antipated at d/c and further therapies after d/c DN 11/29/17 1654 Done   Significant Other Acceptance E VU pt  stated he feels fine with assist of pt at this level and knows she will need supervision at home initially DN 12/05/17 1546 Done               Point: Precautions (Done)    Description: Instruct learner(s) on prescribed precautions during self-care and functional transfers.    Learning Progress Summary    Learner Readiness Method Response Comment Documented by Status   Patient Acceptance E VU pt  stated he feels fine with assist of pt at this level and knows she will need supervision at home initially DN 12/05/17 1546 Done    Acceptance E,TB,D,H VU,NR   12/05/17 0848 Done    Acceptance E NR,VU   12/04/17 0826 Done    Acceptance E,TB VU transfer training, gait training JS 12/02/17 1306 Done    Acceptance E NR,VU   12/01/17 0904 Done    Acceptance E,H VU,NR   11/30/17 0856 Done    Eager E VU family conferance held with pt and  today to discuss current status with adls,  commode/shower transfers, safety, balance, equipment needs antipated at d/c and further therapies after d/c DN 11/29/17 1654 Done    Acceptance E,D VU,NR ed pt on OT role. benefit fo therapy. POC w. OT. ed on toilet and shower tsf and safety. pt demo w. CGA to SBA w. tsf.  11/20/17 1538 Done   Family Eager E VU family conferance held with pt and  today to discuss current status with adls, commode/shower transfers, safety, balance, equipment needs antipated at d/c and further therapies after d/c DN 11/29/17 1654 Done   Significant Other Acceptance E VU pt  stated he feels fine with assist of pt at this level and knows she will need supervision at home initially  12/05/17 1546 Done               Point: Body mechanics (Done)    Description: Instruct learner(s) on proper positioning and spine alignment during self-care, functional mobility activities and/or exercises.    Learning Progress Summary    Learner Readiness Method Response Comment Documented by Status   Patient Acceptance E VU pt  stated he feels fine with assist of pt at this level and knows she will need supervision at home initially DN 12/05/17 1546 Done    Acceptance E,TB,D,H VU,NR   12/05/17 0848 Done    Acceptance E NR,VU   12/04/17 0826 Done    Acceptance E,TB VU transfer training, gait training  12/02/17 1306 Done    Acceptance E NR,VU   12/01/17 0904 Done    Acceptance E,H VU,NR   11/30/17 0856 Done    Eager E VU family conferance held with pt and  today to discuss current status with adls, commode/shower transfers, safety, balance, equipment needs antipated at d/c and further therapies after d/c DN 11/29/17 1654 Done    Acceptance E,D VU,NR ed pt on OT role. benefit fo therapy. POC w. OT. ed on toilet and shower tsf and safety. pt demo w. CGA to SBA w. tsf.  11/20/17 1538 Done   Family Eager E VU family conferance held with pt and  today to discuss current status with adls, commode/shower transfers,  safety, balance, equipment needs antipated at d/c and further therapies after d/c DN 11/29/17 1654 Done   Significant Other Acceptance E VU pt  stated he feels fine with assist of pt at this level and knows she will need supervision at home initially DN 12/05/17 1546 Done                      User Key     Initials Effective Dates Name Provider Type Discipline    DN 04/13/15 -  Gage Licea, OTR Occupational Therapist OT    KP 04/13/15 -  Carolina Simon, OTR Occupational Therapist OT    JS 04/06/17 -  Theodora Nevarez, PT Physical Therapist PT     02/07/17 -  Savana Dickey, PT Physical Therapist PT                OT Recommendation and Plan  Anticipated Discharge Disposition: home with assist  Planned Therapy Interventions: activity intolerance, ADL retraining, strengthening, transfer training, balance training, home exercise program  Therapy Frequency: 5 times/wk            Time Calculation:          Time Calculation- OT       12/05/17 1552 12/05/17 1527       Time Calculation- OT    OT Start Time 1230  -DN 1030  -SO     OT Stop Time 1300  -DN 1100  -SO     OT Time Calculation (min) 30 min  -DN 30 min  -SO     OT Received On 12/05/17  -DN 12/05/17  -SO       User Key  (r) = Recorded By, (t) = Taken By, (c) = Cosigned By    Initials Name Provider Type    YAHIR Licea OTR Occupational Therapist    SO Hortensia Paredes OTR Occupational Therapist          Therapy Charges for Today     Code Description Service Date Service Provider Modifiers Qty    21427795176 HC OT SELF CARE/MGMT/TRAIN EA 15 MIN 12/4/2017 KEON Tran GO 4    63226483717 HC OT THER PROC EA 15 MIN 12/5/2017 KEON Tran GO 2               OT Discharge Summary  Anticipated Discharge Disposition: home with assist  Reason for Discharge: Maximum functional potential achieved  Outcomes Achieved: Patient able to partially acheive established goals  Discharge Destination: Home with assist    KEON Tran  12/5/2017

## 2017-12-05 NOTE — PROGRESS NOTES
SECTION GG      Mobility Performance Discharge:   Roll Left and Right: Patient completed the activities by him/herself with no  assistance from a helper.   Sit to Lying: Patient completed the activities by him/herself with no  assistance from a helper.   Lying to Sitting on Side of Bed: Patient completed the activities by  him/herself with no assistance from a helper.   Sit to Stand: [place] provides verbal cues or touching/steadying assistance as  patient completes activity.   Chair/Bed to Chair Transfer: [place] provides verbal cues or touching/steadying  assistance as patient completes activity.   Car Transfer: [place] provides verbal cues or touching/steadying assistance as  patient completes activity.    Patient Walks:  Yes   Walk 10 Feet: [place] provides verbal cues or touching/steadying assistance as  patient completes activity.   Walk 50 Feet With 2 Turns: [place] provides verbal cues or touching/steadying  assistance as patient completes activity.   Walk 150 Feet: [place] provides verbal cues or touching/steadying assistance as  patient completes activity.   Walking 10 Feet on Uneven Surfaces: [place] provides verbal cues or  touching/steadying assistance as patient completes activity.   1 Step Over Curb or Up/Down Stair: [place] provides verbal cues or  touching/steadying assistance as patient completes activity.   4 Steps Up and Down, With/Without Rail: [place] provides verbal cues or  touching/steadying assistance as patient completes activity.   12 Steps Up and Down, With/Without Rail: Not attempted due to medical or safety  concerns.   Picking up an Object: [place] provides verbal cues or touching/steadying  assistance as patient completes activity.     Uses Wheelchair/Scooter: No    Signed by: Savana Dickey, PT

## 2017-12-05 NOTE — PROGRESS NOTES
Inpatient Rehabilitation Functional Measures Assessment and Plan of Care    Plan of Care  Updated Problems/Interventions  Field    Functional Measures  KY[facility] Eating:  Eating Score = 7. Patient is completely independent for eating.  There are no activity limitations.  [facility] Grooming: Grooming Score = 5. Patient is supervision/set-up for grooming,  requiring: No assistive devices were required.  KYM Bathing:  Patient bathed in shower. Bathing Score = 5.  Patient is  supervision/set-up for bathing, requiring: Standing by. Patient requires the  following assistive device(s): Grab bar/arm rest to maintain balance. Hand held  shower.  KYM Upper Body Dressing:  Upper Body Dressing Score = 5. Patient is supervision  for upper body dressing, requiring: Gathering/setting out clothes. No assistive  devices were required.  KYM Lower Body Dressing:  Lower Body Dressing Score = 5. Patient is  supervision/set-up for lower body dressing, requiring: Gathering/setting out  clothes. No assistive devices were required.  KYM Toileting:  Toileting Score = 5.  Patient is supervision/set-up for  toileting, requiring: Stand by assistance. Patient requires the following  assistive device(s): Adaptive device to maintain balance.    KYM Bladder Management  Level of Assistance:  Branch  Frequency/Number of Accidents this Shift:  Branch    KYM Bowel Management  Level of Assistance: [place]  Frequency/Number of Accidents this Shift: Branch    KYM Bed/Chair/Wheelchair Transfer:  Branch  KYM Toilet Transfer:  Toilet Transfer Score = 5.  Patient is supervision/set-up  for transferring to and from the toilet/commode, requiring: Stand by assistance.  Patient requires the following assistive device(s): Walker.  KYM Tub/Shower Transfer:  Shower Transfer Score = 5.  Patient is  supervision/set-up for transferring to and from the shower, requiring: Stand by  assistance. Patient requires the following assistive device(s): Walker. Shower  chair.    Previously  Documented Mode of Locomotion at Discharge: Field  [facility]M Expected Mode of Locomotion at Discharge: [facility] Walk/Wheelchair:  [facility] Stairs:  [facility] Comprehension:  [facility] Expression:  [facility] Social Interaction:  [facility] Problem Solving:  [facility] Memory:  [place]    Therapy Mode Minutes  Occupational Therapy: Individual: 60 minutes.  Physical Therapy: [place]  Speech Language Pathology:  [place]    Signed by: Gage Licea OTR/L

## 2017-12-05 NOTE — THERAPY TREATMENT NOTE
Inpatient Rehabilitation - Occupational Therapy Treatment Note  [facility]     Patient Name: Paloma Carr  : 1940  MRN: [id number]  Today's Date: 2017  Onset of Illness/Injury or Date of Surgery Date: 11/15/17     Referring Physician: Brandon      Admit Date: 2017    Visit Dx:     ICD-10-CM ICD-9-CM   1. Impaired gait and mobility R26.89 781.2     Patient Active Problem List   Diagnosis   • Soft tissue swelling of chest wall   • Abnormal ultrasound of breast   • Postmastectomy lymphedema syndrome   • Malignant neoplasm of upper-outer quadrant of right female breast   • Cerebrovascular accident (CVA)   • COPD exacerbation   • Stroke             Adult Rehabilitation Note       17 1523 17 1400 17 0838    Rehab Assessment/Intervention    Discipline occupational therapist  -SO speech language pathologist  -OC physical therapist  -LH    Document Type therapy note (daily note)  -SO  therapy note (daily note);discharge summary  -LH    Subjective Information no complaints;agree to therapy  -SO  agree to therapy  -LH    Patient Effort, Rehab Treatment good  -SO  good  -LH    Symptoms Noted During/After Treatment none  -SO  none  -LH    Precautions/Limitations   fall precautions  -LH    Patient Response to Treatment   noted R beating nystagmus with ambulation from room to gym this AM. brought pt to gym, cleared the vertebral artery. performed R galilea Hallpike, (-) nystagmus nor reproduction of s/s, went into Epleys, (-) reproduction of s/s with manuever. good tolerance. will cont to monitor. no nystagmus or s/s with ambulation post session from gym back to room. Dr Sanchez present for AM session - aware that nytagmus is still intermittently present, ? central in nature. will cont monitor. plan pt to return as OP PT with Audrey/vestibular therapist  -LH    Recorded by [SO] Hortensia Paredes, OTR [OC] Viky Ramires MA,CCC-SLP [LH] Savana Dickey, PT    Pain Assessment    Pain Assessment  No/denies pain  -SO No/denies pain  -OC No/denies pain  -LH    Recorded by [SO] Hortensia Paredes, OTR [OC] Viky Ramires MA,CCC-SLP [LH] Savana Dickey, PT    Vision Assessment/Intervention    Visual Impairment   WFL with corrective lenses  -LH    Recorded by   [LH] Savana Dickey, PT    Cognitive Assessment/Intervention    Current Cognitive/Communication Assessment functional  -SO functional  -OC functional  -LH    Orientation Status oriented x 4  -SO oriented x 4  -OC oriented x 4  -LH    Follows Commands/Answers Questions  100% of the time  -% of the time  -LH    Personal Safety   mild impairment  -LH    Personal Safety Interventions   fall prevention program maintained;gait belt;nonskid shoes/slippers when out of bed;supervised activity  -LH    Recorded by [SO] Hortensia Paredes, AZAMR [OC] Viky Ramires MA,CCC-SLP [LH] Savana Dickey, PT    Improve word retrieval skills    Status: Improve word retrieval skills  Progressing as expected  -OC     Word Retrieval Skills Progress  90%;without cues   Answering contrversial topics/questions  -OC     Recorded by  [OC] Viky Ramires MA,CCC-SLP     Improve functional problem solving    Status: Improve functional problem solving  Progressing as expected  -OC     Functional Problem Solving Progress  90%;without cues   [place] 4-6 steps  -OC     Recorded by  [OC] Viky Ramires MA,CCC-SLP     Improve executive function skills    Status: Improve executive function skills  Progressing as expected  -OC     Executive Function Skills Progress  90%;without cues   if/thin written directives  -OC     Recorded by  [OC] Viky Ramires MA,CCC-SLP     ROM (Range of Motion)    General ROM Detail BUE WFL  -SO      Recorded by [SO] Hortensia Paredes, AZAMR      MMT (Manual Muscle Testing)    General MMT Assessment Detail RUE 5/5, LUE 4-/5;  R 46 L35, lat pinch R9 L9, 2 pt R7 L8  -SO      Recorded by [SO] Hortensia Paredes, AZAMR      Bed Mobility, Assessment/Treatment    Bed  Mobility, Roll Left, [place]   conditional independence  -    Bed Mobility, Roll Right, [place]   conditional independence  -    Bed Mobility, Scoot/Bridge, [place]   conditional independence  -    Bed Mob, Supine to Sit, [place]   conditional independence  -    Bed Mob, Sit to Supine, [place]   conditional independence  -    Bed Mob, Sidelying to Sit, [place]   conditional independence  -    Bed Mob, Sit to Sidelying, [place]   conditional independence  -    Bed Mobility, Comment Up in w/c  -SO      Recorded by [SO] oHrtensia Paredes, OTR  [LH] Savana Dickey, PT    Transfer Assessment/Treatment    Transfers, Bed-Chair [place]   supervision required  -LH    Transfers, Chair-Bed [place]   supervision required  -LH    Transfers, Sit-Stand [place] supervision required  -SO  supervision required  -LH    Transfers, Stand-Sit [place] supervision required  -SO  supervision required  -    Transfers, Sit-Stand-Sit, Assist Device   rolling walker   rollator  -    Toilet Transfer, [place] supervision required  -SO      Toilet Transfer, Assistive Device --   Grab bars  -SO      Recorded by [SO] KEON Barnhart  [LH] Savana Dickey, PT    Gait Assessment/Treatment    Gait, [place] Level   stand by assist  -    Gait, Assistive Device   rollator  -    Gait, Distance (Feet)   250   x2  -    Gait, Gait Pattern Analysis   swing-through gait  -LH    Recorded by   [LH] Savana Dickey PT    Stairs Assessment/Treatment    Number of Stairs   4  -    Stairs, Handrail Location   both sides  -    Stairs, [place] Level   supervision required  -    Stairs, Technique Used   step over step (ascending);step over step (descending)  -LH    Recorded by   [LH] Savana Dickey PT    Functional Mobility    Functional Mobility- Ind. Level   supervision required  -    Functional Mobility- Device   --   hemibars  -    Functional  Mobility-Distance (Feet)   5   over foam mat x 2   -LH    Recorded by   [LH] Savana Dickey PT    Toileting Assessment/Training    Toileting Assess/Train, Assistive Device grab bars;raised toilet seat  -SO      Toileting Assess/Train, Position sitting;standing  -SO      Toileting Assess/Train, Indepen Level supervision required;set up required  -SO      Recorded by [SO] Hortensia Paredes, OTR      Grooming Assessment/Training    Grooming Assess/Train, Position sitting;sink side  -SO      Grooming Assess/Train, Indepen Level supervision required;set up required  -SO      Recorded by [SO] Hortensia Paredes, AZAMR      Balance Skills Training    Standing-Level of Assistance   Close supervision  -    Static Standing Balance Support   assistive device   rollator  -    Standing-Balance Activities   Retrieve object from floor  -    Stepping Strategy Assessment (Balance Skills)   educated on, performed and provided written HEP - including strengthening therex and vestibular therex. performed sit to stands with head turns EO/EC, no nystagmus noted. performed Gupta Daroff bilaterally 1 set, no nystagmus. good tolerance to all therex.   -LH    Recorded by   [LH] Savana Dickey, KAREN    Therapy Exercises    Bilateral Lower Extremities   15 reps;AROM:;standing;hip flexion;heel raises;hip abduction/adduction   educated on, performed and provided written HEP  -LH    Recorded by   [LH] Savana Dickey PT    Fine Motor Coordination Training    9-Hole Peg Right 23  -SO      9-Hole Peg Left 29  -SO      Box and Blocks Right 48  -SO      Box and Blocks Left 50  -SO      Recorded by [SO] Hortensia Paredes OTR      Positioning and Restraints    Pre-Treatment Position sitting in chair/recliner  -SO  in bed  -    Post Treatment Position wheelchair  -SO  bed  -LH    In Bed   supine;call light within reach;encouraged to call for assist;exit alarm on  -    In Wheelchair sitting;call light within reach;encouraged to call for  assist;exit alarm on  -SO      Recorded by [SO] Hortensia Paredes, OTR  [LH] Savana Dickey, PT      12/04/17 1554 12/04/17 1200 12/04/17 0824    Rehab Assessment/Intervention    Discipline occupational therapist  -DN speech language pathologist  -OC physical therapist  -LH    Document Type therapy note (daily note)  -DN  therapy note (daily note)  -LH    Subjective Information agree to therapy;no complaints  -DN  agree to therapy  -LH    Patient Effort, Rehab Treatment good  -DN  good  -LH    Symptoms Noted During/After Treatment none  -DN      Precautions/Limitations fall precautions  -DN  fall precautions  -LH    Precautions/Limitations, Vision WFL with corrective lenses  -DN  WFL with corrective lenses  -LH    Specific Treatment Considerations Cooking eval in kitchen to day with pt and therapist  -DN  noted R beat nystagmus with turning/reaching and tossing bean bags, corrects after approx 15 sec with head/neck in neutral position  -LH    Recorded by [DN] Gage Licea, OTR [OC] Viky Ramires MA,CCC-SLP [] Savana Dickey, PT    Pain Assessment    Pain Assessment  No/denies pain  -OC No/denies pain  -LH    Recorded by  [OC] Viky Ramires MA,CCC-SLP [] Savana Dickey, PT    Vision Assessment/Intervention    Visual Impairment  WFL with corrective lenses  -OC WFL with corrective lenses  -LH    Recorded by  [OC] Viky Ramires MA,CCC-SLP [LH] Savana Dickey, PT    Cognitive Assessment/Intervention    Current Cognitive/Communication Assessment functional  -DN functional  -OC functional  -LH    Orientation Status oriented x 4  -DN oriented x 4  -OC oriented x 4  -LH    Follows Commands/Answers Questions 100% of the time  -% of the time  -% of the time  -LH    Personal Safety mild impairment;decreased awareness, need for assist;decreased insight to deficits;decreased awareness, need for safety  -DN  mild impairment  -LH    Recorded by [DN] Gage Licea, OTR [OC] Viky Ramires MA,CCC-SLP [LH] Savana Dickey, PT     Cognitive Assessment Intervention    Sequencing  WNL/WFL  -OC     Recorded by  [OC] Viky Ramires MA,CCC-SLP     Improve word retrieval skills    Status: Improve word retrieval skills  Progressing as expected  -OC     Word Retrieval Skills Progress  90%;without cues   min cues for describing details to aid in word finding  -OC     Recorded by  [OC] Viky Ramires MA,CCC-SLP     Improve functional problem solving    Status: Improve functional problem solving  Progressing as expected  -OC     Functional Problem Solving Progress  90%;without cues   4-6 step directions  -OC     Recorded by  [OC] Viky Ramires MA,CCC-SLP     Improve executive function skills    Status: Improve executive function skills  Progressing as expected  -OC     Executive Function Skills Progress  80%;without cues   word associations  -OC     Recorded by  [OC] Viky aRmires MA,CCC-SLP     Bed Mobility, Assessment/Treatment    Bed Mob, Supine to Sit, [place]   conditional independence  -    Bed Mob, Sidelying to Sit, [place]   conditional independence  -LH    Recorded by   [LH] Savana Dickey, PT    Transfer Assessment/Treatment    Transfers, Bed-Chair [place]   stand by assist  -    Transfers, Chair-Bed [place]   stand by assist  -    Transfers, Bed-Chair-Bed, Assist Device   rolling walker  -    Transfers, Sit-Stand [place]   stand by assist  -    Transfers, Stand-Sit [place]   stand by assist  -    Transfers, Sit-Stand-Sit, Assist Device   rolling walker   rollator  -LH    Toilet Transfer, [place] supervision required  -DN  supervision required  -    Toilet Transfer, Assistive Device rolling walker;elevated toilet seat  -DN  rolling walker  -LH    Walk-In Shower Transfer, [place] supervision required  -DN      Walk-In Shower Transfer, Assist Device rolling walker  -DN      Transfer, Safety Issues step length decreased  -DN  step length decreased;weight-shifting ability decreased  -    Transfer,  Impairments impaired balance  -DN  impaired balance  -LH    Transfer, Comment   car tsf SBA rollator  -LH    Recorded by [DN] Gage Licea OTR  [LH] Savana Dickey, PT    Gait Assessment/Treatment    Gait, [place] Level   stand by assist  -LH    Gait, Assistive Device   rollator  -LH    Gait, Distance (Feet)   160   x2  -LH    Gait, Gait Pattern Analysis   swing-through gait  -    Gait, Gait Deviations   bilateral:;zab[place] decreased;forward flexed posture  -LH    Gait, Safety Issues   step length decreased;weight-shifting ability decreased  -    Gait, Impairments   impaired balance  -LH    Recorded by   [LH] Savana Dickey, PT    ADL Assessment/Intervention    Additional Documentation IADL Assess/Train, Comment (Row)   pt SBA with light meal prep inkitchen today at RWX level  -DN      Recorded by [DN] Gage Licea OTR      Upper Body Bathing Assessment/Training    UB Bathing Assess/Train Assistive Device shower chair with back  -DN      UB Bathing Assess/Train, Position sitting  -DN      UB Bathing Assess/Train, [place] Level set up required  -DN      Recorded by [DN] Gage Licea OTR      Lower Body Bathing Assessment/Training    LB Bathing Assess/Train Assistive Device hand-held shower head;grab bars;shower chair with back  -DN      LB Bathing Assess/Train, Position sitting;standing  -DN      LB Bathing Assess/Train, [place] Level supervision required;set up required  -DN      Recorded by [DN] KEON Tran      Upper Body Dressing Assessment/Training    UB Dressing Assess/Train, Clothing Type doffing:;donning:;pull over;t-shirt  -DN      UB Dressing Assess/Train, Position sitting  -DN      UB Dressing Assess/Train, [place] set up required  -DN      UB Dressing Assess/Train, Impairments strength decreased  -DN      Recorded by [DN] Gage Licea OTR      Lower Body Dressing Assessment/Training    LB Dressing Assess/Train, Clothing Type doffing:;donning:;pants;shoes  -DN      LB  Dressing Assess/Train, Position sitting;standing  -DN      LB Dressing Assess/Train, [place] supervision required;set up required  -DN      LB Dressing Assess/Train, Impairments strength decreased  -DN      Recorded by [DN] KEON Tran      Toileting Assessment/Training    Toileting Assess/Train, Assistive Device grab bars;raised toilet seat  -DN      Toileting Assess/Train, Position sitting;standing  -DN      Toileting Assess/Train, Indepen Level supervision required  -DN      Toileting Assess/Train, Impairments impaired balance;strength decreased  -DN      Recorded by [DN] KEON Tran      Grooming Assessment/Training    Grooming Assess/Train, Assistive Device electric toothbrush  -DN      Grooming Assess/Train, Position sitting;sink side  -DN      Grooming Assess/Train, [place]ep[place] Level set up required  -DN      Recorded by [DN] KEON Tran      Balance Skills Training    Training Strategies (Balance Skills) standing at counter in kitchen with meal prep of cutting veggies for soup pt had No lob and did require sit down breaks for time to time   -DN  lateral reaching and turning for bean bags with associated head turns- noted R beating nystagmus with head turns to R, resolved after approx 15 sec with neutral head neck. vertebral artery cleared, tested L sided [place] Hallpike, (-) s/s, no nystagmus present. retested the R side [place] Hallpike, (-) s/s, no nystagmus. deferred Epley at this time due to negative nystagmus with [place] Hallpike. will cont to monitor.   -    Standing-Level of Assistance   Contact guard  -    Static Standing Balance Support   No upper extremity supported  -    Standing-Balance Activities   Forward lean;Reaching for objects;Reaching across midline  -    Standing Balance # of Minutes   5  -    Gait Balance-Level of Assistance   Contact guard  -    Gait Balance Support   Left upper extremity supported;clarence bar  -    Gait Balance Activities    side-stepping;backwards;uneven surface   on foam mat  -    Gait Balance # of Minutes   5  -LH    Recorded by [DN] Gage Licea, OTR  [LH] Savana Dickey, PT    Positioning and Restraints    Pre-Treatment Position --   w/c  -DN  in bed  -LH    Post Treatment Position wheelchair  -DN  wheelchair  -LH    In Bed sitting;call light within reach;encouraged to call for assist;exit alarm on  -DN      In Wheelchair   sitting;call light within reach;encouraged to call for assist;exit alarm on  -LH    Recorded by [DN] Gage Licea, OTR  [LH] Savana Dickey, PT      User Key  (r) = Recorded By, (t) = Taken By, (c) = Cosigned By    Initials Name Effective Dates    OC Viky Ramires MA,CCC-SLP 04/05/17 -     DN Gage Licea, OTR 04/13/15 -     SO Hortensia Paredes, OTR 04/13/15 -     LH Savana Dickey, PT 02/07/17 -                 OT Goals       12/04/17 1605 11/27/17 1549       Transfer Training OT STG    Transfer Training OT STG, Date Established  11/27/17  -DN     Transfer Training OT STG, [place] Level  supervision required;verbal cues required  -DN     Transfer Training OT LTG    Transfer Training OT LTG, Date Established  11/27/17  -DN     Transfer Training OT LTG, [place] Level  supervision required;verbal cues required  -DN     Transfer Training OT LTG, Outcome  goal revised  -DN     Strength OT LTG    Strength Goal OT LTG, Date Established  11/27/17  -DN     Strength Goal OT LTG, Outcome  goal ongoing  -DN     Dynamic Standing Balance OT LTG    Dynamic Standing Balance OT LTG, Date Established  11/27/17  -DN     Dynamic Standing Balance OT LTG, [place] Level  supervision required  -DN     Dynamic Standing Balance OT LTG, Outcome  goal revised  -DN     Caregiver Training OT LTG    Caregiver Training OT LTG, Date Established  11/27/17  -DN     Caregiver Training OT LTG, Outcome  goal ongoing  -DN     Patient Education OT LTG    Patient Education OT LTG, Date Established  11/27/17  -DN     Patient  Education OT LTG Outcome  goal ongoing  -DN     Home Management OT LTG    Home Mgmt Goal OT LTG, Date Established 12/04/17  -DN      Home Mgmt Goal OT LTG, Time To Achieve by discharge  -DN      Home Mgmt Goal OT LTG, Activity Type pt to be sba with light meal prep at rwx level  -DN      Home Mgmt Goal OT LTG, [place] Level supervision required  -DN      ADL OT STG    ADL OT STG, Date Established  11/27/17  -DN     ADL OT STG, Outcome  goal ongoing  -DN     ADL OT LTG    ADL OT LTG, Date Established  11/27/17  -DN     ADL OT LTG, [place] Level  standby assist;min verbal cues;assistive device  -DN     ADL OT LTG, Outcome  goal revised  -DN       User Key  (r) = Recorded By, (t) = Taken By, (c) = Cosigned By    Initials Name Provider Type    KEON Bacon Occupational Therapist          Occupational Therapy Education     Title: PT OT SLP Therapies (Done)     Topic: Occupational Therapy (Done)     Point: ADL training (Done)    Description: Instruct learner(s) on proper safety adaptation and remediation techniques during self care or transfers.   Instruct in proper use of assistive devices.    Learning Progress Summary    Learner Readiness Method Response Comment Documented by Status   Patient Acceptance E,TB,D,H VU,NR   12/05/17 0848 Done    Eager EKIMBERLEE DU pt eduacated on safe technique with light meal prep in kitchen at RWX level, safe with stove use, cutting veggies, needed vc to sit when fatigued DN 12/04/17 1602 Done    Acceptance E NR,VU   12/04/17 0826 Done    Acceptance ETB VU transfer training, gait training  12/02/17 1306 Done    Acceptance E NR,VU   12/01/17 0904 Done    Acceptance E,H VU,NR   11/30/17 0856 Done    Eager INDIRA MARSH family conferance held with pt and  today to discuss current status with adls, commode/shower transfers, safety, balance, equipment needs antipated at d/c and further therapies after d/c DN 11/29/17 1654 Done    Acceptance INDIRAD MAXIMO,MAMIE ed pt on OT role.  benefit fo therapy. POC w. OT. ed on toilet and shower tsf and safety. pt demo w. CGA to SBA w. tsf. KP 11/20/17 1538 Done   Family Eager E VU family conferance held with pt and  today to discuss current status with adls, commode/shower transfers, safety, balance, equipment needs antipated at d/c and further therapies after d/c DN 11/29/17 1654 Done               Point: Home exercise program (Done)    Description: Instruct learner(s) on appropriate technique for monitoring, assisting and/or progressing therapeutic exercises/activities.    Learning Progress Summary    Learner Readiness Method Response Comment Documented by Status   Patient Acceptance E,TB,D,H VU,NR   12/05/17 0848 Done    Acceptance E NR,VU   12/04/17 0826 Done    Acceptance E,TB VU transfer training, gait training  12/02/17 1306 Done    Acceptance E NR,VU   12/01/17 0904 Done    Acceptance E,H VU,NR   11/30/17 0856 Done    Eager E VU family conferance held with pt and  today to discuss current status with adls, commode/shower transfers, safety, balance, equipment needs antipated at d/c and further therapies after d/c DN 11/29/17 1654 Done   Family Eager E VU family conferance held with pt and  today to discuss current status with adls, commode/shower transfers, safety, balance, equipment needs antipated at d/c and further therapies after d/c DN 11/29/17 1654 Done               Point: Precautions (Done)    Description: Instruct learner(s) on prescribed precautions during self-care and functional transfers.    Learning Progress Summary    Learner Readiness Method Response Comment Documented by Status   Patient Acceptance E,TB,D,H VU,NR   12/05/17 0848 Done    Acceptance E NR,VU   12/04/17 0826 Done    Acceptance E,TB VU transfer training, gait training  12/02/17 1306 Done    Acceptance E NR,VU   12/01/17 0904 Done    Acceptance E,H VU,NR   11/30/17 0856 Done    Eager E VU family conferance held with pt and   today to discuss current status with adls, commode/shower transfers, safety, balance, equipment needs antipated at d/c and further therapies after d/c  11/29/17 1654 Done    Acceptance E,D VU,NR ed pt on OT role. benefit fo therapy. POC w. OT. ed on toilet and shower tsf and safety. pt demo w. CGA to SBA w. tsf.  11/20/17 1538 Done   Family Eager E VU family conferance held with pt and  today to discuss current status with adls, commode/shower transfers, safety, balance, equipment needs antipated at d/c and further therapies after d/c  11/29/17 1654 Done               Point: Body mechanics (Done)    Description: Instruct learner(s) on proper positioning and spine alignment during self-care, functional mobility activities and/or exercises.    Learning Progress Summary    Learner Readiness Method Response Comment Documented by Status   Patient Acceptance E,TB,D,H VU,NR   12/05/17 0848 Done    Acceptance E NR,VU   12/04/17 0826 Done    Acceptance E,TB VU transfer training, gait training  12/02/17 1306 Done    Acceptance E NR,VU   12/01/17 0904 Done    Acceptance E,H VU,NR   11/30/17 0856 Done    Eager E VU family conferance held with pt and  today to discuss current status with adls, commode/shower transfers, safety, balance, equipment needs antipated at d/c and further therapies after d/c  11/29/17 1654 Done    Acceptance E,D VU,NR ed pt on OT role. benefit fo therapy. POC w. OT. ed on toilet and shower tsf and safety. pt demo w. CGA to SBA w. tsf.  11/20/17 1538 Done   Family Eager E VU family conferance held with pt and  today to discuss current status with adls, commode/shower transfers, safety, balance, equipment needs antipated at d/c and further therapies after d/c  11/29/17 1654 Done                      User Key     Initials Effective Dates Name Provider Type Discipline     04/13/15 -  Gage Licea OTR Occupational Therapist OT     04/13/15 -  [place]  Toney Simon, OTR Occupational Therapist OT    JS 04/06/17 -  Theodora Nevarez, PT Physical Therapist PT     02/07/17 -  Savana Dickey, PT Physical Therapist PT                  OT Recommendation and Plan  Anticipated Discharge Disposition: home with assist, home with home health, home with outpatient services (pending progress out pt OT. vs home program)  Planned Therapy Interventions: activity intolerance, ADL retraining, strengthening, transfer training, balance training, home exercise program  Therapy Frequency: 5 times/wk          Time Calculation:         Time Calculation- OT       12/05/17 1527          Time Calculation- OT    OT Start Time 1030  -SO      OT Stop Time 1100  -SO      OT Time Calculation (min) 30 min  -SO      OT Received On 12/05/17  -SO        User Key  (r) = Recorded By, (t) = Taken By, (c) = Cosigned By    Initials Name Provider Type    SO KEON Barnhart Occupational Therapist           Therapy Charges for Today     Code Description Service Date Service Provider Modifiers Qty    [id number] HC OT NEUROMUSC RE EDUCATION EA 15 MIN 12/5/2017 KEON Barnhart GO 1    [id number] HC OT SELF CARE/MGMT/TRAIN EA 15 MIN 12/5/2017 KEON Barnhart GO 1               KEON Barnhart  12/5/2017

## 2017-12-05 NOTE — DISCHARGE INSTRUCTIONS
Arrange nocturnal O2 at home 2L nasal cannula until has sleep study.  Overnight pulse oximetry < 89% for 1 hour 36 min (26.9% of time) on Dec 4-5.

## 2017-12-05 NOTE — PROGRESS NOTES
Inpatient Rehabilitation Functional Measures Assessment    Functional Measures  KYM Eating:  [facility] Grooming: [facility] Bathing:  [facility] Upper Body Dressing:  [facility] Lower Body Dressing:  [facility] Toileting:  [facility] Bladder Management  Level of Assistance:  [place]  Frequency/Number of Accidents this Shift:  [facility] Bowel Management  Level of Assistance: [place]  Frequency/Number of Accidents this Shift: [facility] Bed/Chair/Wheelchair Transfer:  [facility] Toilet Transfer:  [facility] Tub/Shower Transfer:  [place]    Previously Documented Mode of Locomotion at Discharge: Field  KYM Expected Mode of Locomotion at Discharge: [facility] Walk/Wheelchair:  [facility] Stairs:  [facility] Comprehension:  Auditory comprehension is the usual mode. Comprehension  Score = 7, Independent.  Patient comprehends complex/abstract information in  their primary language.  Patient is completely independent for auditory  comprehension.  There are no activity limitations.  KYM Expression:  Vocal expression is the usual mode. Expression Score = 7,  Independent.  Patient expresses complex/abstract information in their primary  language.  Patient is completely independent for vocal expression.  There are no  activity limitations.  KYM Social Interaction:  Social Interaction Score = 6, Modified Independent.  Patient is modified independent for social interaction, requiring: Requires  additional time.  KYM Problem Solving:  Activity was not observed.  KYM Memory:  Memory Score = 6, Modified [place].  Patient is modified  independent for memory, having only mild difficulty and using self-initiated or  environmental cues to remember.    Therapy Mode Minutes  Occupational Therapy: Branch  Physical Therapy: Branch  Speech Language Pathology:  [place]    Signed by: Stephanie Sharma

## 2017-12-05 NOTE — PLAN OF CARE
Problem: Patient Care Overview (Adult)  Goal: Plan of Care Review  Outcome: Ongoing (interventions implemented as appropriate)    12/05/17 0414   Coping/Psychosocial Response Interventions   Plan Of Care Reviewed With patient   Patient Care Overview   Progress improving   Outcome Evaluation   Outcome Summary/Follow up Plan Cooperative. PRN cough medication given at 2030 and 0200 for coughing and c/o not able to sleep. Waking up during for toileting and coughing.          Problem: Stroke (Ischemic) (Adult)  Goal: Signs and Symptoms of Listed Potential Problems Will be Absent or Manageable (Stroke)  Outcome: Ongoing (interventions implemented as appropriate)    Problem: Fall Risk (Adult)  Goal: Absence of Falls  Outcome: Ongoing (interventions implemented as appropriate)

## 2017-12-05 NOTE — PROGRESS NOTES
Inpatient Rehabilitation Functional Measures Assessment    Functional Measures  KYM Eating:  [facility] Grooming: [facility] Bathing:  [facility] Upper Body Dressing:  [facility] Lower Body Dressing:  [facility] Toileting:  [facility] Bladder Management  Level of Assistance:  [place]  Frequency/Number of Accidents this Shift:  [facility] Bowel Management  Level of Assistance: [place]  Frequency/Number of Accidents this Shift: [facility] Bed/Chair/Wheelchair Transfer:  [facility] Toilet Transfer:  [facility] Tub/Shower Transfer:  [place]    Previously Documented Mode of Locomotion at Discharge: Field  KYM Expected Mode of Locomotion at Discharge: [facility] Walk/Wheelchair:  [facility] Stairs:  [facility] Comprehension:  Auditory comprehension is the usual mode. Comprehension  Score = 6, Modified [place].  Patient comprehends complex/abstract  information in their primary language, requiring: Additional time.  KYM Expression:  Vocal expression is the usual mode. Expression Score = 6,  Modified Independent.  Patient expresses complex/abstract information in their  primary language, requiring:  KYM Social Interaction:  Social Interaction Score = 6, Modified Independent.  Patient is modified independent for social interaction, requiring: Medications.    KYM Problem Solving:  Problem Solving Score = 6, Modified [place].  Patient  makes appropriate decisions in order to solve complex problems, but requires  extra time.  KYM Memory:  Memory Score = 6, Modified [place].  Patient is modified  independent for memory, having only mild difficulty and using self-initiated or  environmental cues to remember.    Therapy Mode Minutes  Occupational Therapy: Branch  Physical Therapy: Branch  Speech Language Pathology:  [place]    Signed by: Bárbara De La Rosa RN

## 2017-12-05 NOTE — THERAPY DISCHARGE NOTE
Inpatient Rehabilitation -   Speech Language Pathology /Discharge  [facility]     Patient Name: Paloma Carr  : 1940  MRN: [id number]  Today's Date: 2017         Admit Date: 2017    Visit Dx:     ICD-10-CM ICD-9-CM   1. Impaired gait and mobility R26.89 781.2     Patient Active Problem List   Diagnosis   • Soft tissue swelling of chest wall   • Abnormal ultrasound of breast   • Postmastectomy lymphedema syndrome   • Malignant neoplasm of upper-outer quadrant of right female breast   • Cerebrovascular accident (CVA)   • COPD exacerbation   • Stroke              Adult Rehabilitation Note       17 1400 17 [id number] 17 1554    Rehab Assessment/Intervention    Discipline speech language pathologist  -OC physical therapist  -LH occupational therapist  -DN    Document Type  therapy note (daily note);discharge summary  - therapy note (daily note)  -DN    Subjective Information  agree to therapy  -LH agree to therapy;no complaints  -DN    Patient Effort, Rehab Treatment  good  -LH good  -DN    Symptoms Noted During/After Treatment  none  -LH none  -DN    Precautions/Limitations  fall precautions  - fall precautions  -DN    Precautions/Limitations, Vision   WFL with corrective lenses  -DN    Specific Treatment Considerations   Cooking eval in kitchen to day with pt and therapist  -DN    Patient Response to Treatment  noted R beating nystagmus with ambulation from room to gym this AM. brought pt to gym, cleared the vertebral artery. performed R galilea Hallpike, (-) nystagmus nor reproduction of s/s, went into Epleys, (-) reproduction of s/s with manuever. good tolerance. will cont to monitor. no nystagmus or s/s with ambulation post session from gym back to room. Dr Sanchez present for AM session - aware that nytagmus is still intermittently present, ? central in nature. will cont monitor. plan pt to return as OP PT with Audrey/vestibular therapist  -LH     Recorded by [OC] Viky Ramires  MA,CCC-SLP [LH] Savana Dickey, PT [DN] Gage Licea, OTR    Pain Assessment    Pain Assessment No/denies pain  -OC No/denies pain  -LH     Recorded by [OC] Viky Ramires MA,CCC-SLP [LH] Savana Dickey, PT     Vision Assessment/Intervention    Visual Impairment  WFL with corrective lenses  -LH     Recorded by  [LH] Savana Dickey, PT     Cognitive Assessment/Intervention    Current Cognitive/Communication Assessment functional  -OC functional  -LH functional  -DN    Orientation Status oriented x 4  -OC oriented x 4  -LH oriented x 4  -DN    Follows Commands/Answers Questions 100% of the time  -% of the time  -% of the time  -DN    Personal Safety  mild impairment  -LH mild impairment;decreased awareness, need for assist;decreased insight to deficits;decreased awareness, need for safety  -DN    Personal Safety Interventions  fall prevention program maintained;gait belt;nonskid shoes/slippers when out of bed;supervised activity  -LH     Recorded by [OC] Viky Ramires MA,CCC-SLP [LH] Savana Dickey, PT [DN] Gage Licea, OTR    Improve word retrieval skills    Status: Improve word retrieval skills Progressing as expected  -OC      Word Retrieval Skills Progress 90%;without cues   Answering contrversial topics/questions  -OC      Recorded by [OC] Viky Ramires MA,CCC-SLP      Improve functional problem solving    Status: Improve functional problem solving Progressing as expected  -OC      Functional Problem Solving Progress 90%;without cues   [place] 4-6 steps  -OC      Recorded by [OC] Viky Ramires MA,CCC-SLP      Improve executive function skills    Status: Improve executive function skills Progressing as expected  -OC      Executive Function Skills Progress 90%;without cues   if/thin written directives  -OC      Recorded by [OC] Viky Ramires MA,CCC-SLP      Bed Mobility, Assessment/Treatment    Bed Mobility, Roll Left, [place]  conditional independence  -LH     Bed Mobility, Roll Right, [place]   conditional independence  -     Bed Mobility, Scoot/Bridge, [place]  conditional independence  -     Bed Mob, Supine to Sit, [place]  conditional independence  -     Bed Mob, Sit to Supine, [place]  conditional independence  -     Bed Mob, Sidelying to Sit, [place]  conditional independence  -     Bed Mob, Sit to Sidelying, [place]  conditional independence  -     Recorded by  [] Savana Dickey, PT     Transfer Assessment/Treatment    Transfers, Bed-Chair [place]  supervision required  -     Transfers, Chair-Bed [place]  supervision required  -     Transfers, Sit-Stand [place]  supervision required  -     Transfers, Stand-Sit [place]  supervision required  -LH     Transfers, Sit-Stand-Sit, Assist Device  rolling walker   rollator  -     Toilet Transfer, [place]   supervision required  -DN    Toilet Transfer, Assistive Device   rolling walker;elevated toilet seat  -DN    Walk-In Shower Transfer, [place]   supervision required  -DN    Walk-In Shower Transfer, Assist Device   rolling walker  -DN    Transfer, Safety Issues   step length decreased  -    Transfer, Impairments   impaired balance  -DN    Recorded by  [] Savana Dickey, PT [DN] Gage Licea OTR    Gait Assessment/Treatment    Gait, [place] Level  stand by assist  -     Gait, Assistive Device  rollator  -     Gait, Distance (Feet)  250   x2  -     Gait, Gait Pattern Analysis  swing-through gait  -     Recorded by  [] Savana Dickey, PT     Stairs Assessment/Treatment    Number of Stairs  4  -     Stairs, Handrail Location  both sides  -     Stairs, [place] Level  supervision required  -     Stairs, Technique Used  step over step (ascending);step over step (descending)  -     Recorded by  [] Saavna Dickey, PT     Functional Mobility    Functional Mobility- Ind. Level  supervision required  -     Functional Mobility- Device  --   hemibars  -      Functional Mobility-Distance (Feet)  5   over foam mat x 2   -LH     Recorded by  [LH] Savana Dickey, PT     ADL Assessment/Intervention    Additional Documentation   IADL Assess/Train, Comment (Row)   pt SBA with light meal prep inkitchen today at RWX level  -DN    Recorded by   [DN] KEON Tran    Upper Body Bathing Assessment/Training    UB Bathing Assess/Train Assistive Device   shower chair with back  -DN    UB Bathing Assess/Train, Position   sitting  -DN    UB Bathing Assess/Train, [place] Level   set up required  -DN    Recorded by   [DN] Gage Licea OTR    Lower Body Bathing Assessment/Training    LB Bathing Assess/Train Assistive Device   hand-held shower head;grab bars;shower chair with back  -DN    LB Bathing Assess/Train, Position   sitting;standing  -DN    LB Bathing Assess/Train, [place] Level   supervision required;set up required  -DN    Recorded by   [DN] KEON Tran    Upper Body Dressing Assessment/Training    UB Dressing Assess/Train, Clothing Type   doffing:;donning:;pull over;t-shirt  -DN    UB Dressing Assess/Train, Position   sitting  -DN    UB Dressing Assess/Train, [place]   set up required  -DN    UB Dressing Assess/Train, Impairments   strength decreased  -DN    Recorded by   [DN] KEON Tran    Lower Body Dressing Assessment/Training    LB Dressing Assess/Train, Clothing Type   doffing:;donning:;pants;shoes  -DN    LB Dressing Assess/Train, Position   sitting;standing  -DN    LB Dressing Assess/Train, [place]   supervision required;set up required  -DN    LB Dressing Assess/Train, Impairments   strength decreased  -DN    Recorded by   [DN] KEON Tran    Toileting Assessment/Training    Toileting Assess/Train, Assistive Device   grab bars;raised toilet seat  -DN    Toileting Assess/Train, Position   sitting;standing  -DN    Toileting Assess/Train, [place] Level   supervision required  -DN    Toileting Assess/Train, Impairments   impaired  balance;strength decreased  -DN    Recorded by   [DN] KEON Tran    Grooming Assessment/Training    Grooming Assess/Train, Assistive Device   electric toothbrush  -DN    Grooming Assess/Train, Position   sitting;sink side  -DN    Grooming Assess/Train, Indepen Level   set up required  -DN    Recorded by   [DN] KEON Tran    Balance Skills Training    Training Strategies (Balance Skills)   standing at counter in kitchen with meal prep of cutting veggies for soup pt had No lob and did require sit down breaks for time to time   -DN    Standing-Level of Assistance  Close supervision  -     Static Standing Balance Support  assistive device   rollator  -     Standing-Balance Activities  Retrieve object from floor  -     Stepping Strategy Assessment (Balance Skills)  educated on, performed and provided written HEP - including strengthening therex and vestibular therex. performed sit to stands with head turns EO/EC, no nystagmus noted. performed Gupta Daroff bilaterally 1 set, no nystagmus. good tolerance to all therex.   -LH     Recorded by  [] Savana Dickey, PT [DN] KEON Tran    Therapy Exercises    Bilateral Lower Extremities  15 reps;AROM:;standing;hip flexion;heel raises;hip abduction/adduction   educated on, performed and provided written HEP  -LH     Recorded by  [] Savana Dickey PT     Positioning and Restraints    Pre-Treatment Position  in bed  - --   w/c  -DN    Post Treatment Position  bed  - wheelchair  -DN    In Bed  supine;call light within reach;encouraged to call for assist;exit alarm on  -LH sitting;call light within reach;encouraged to call for assist;exit alarm on  -DN    Recorded by  [] Savana Dickey PT [DN] KEON Tran      12/04/17 1200 12/04/17 0824       Rehab Assessment/Intervention    Discipline speech language pathologist  -OC physical therapist  -     Document Type  therapy note (daily note)  -     Subjective Information  agree to therapy  -      Patient Effort, Rehab Treatment  good  -LH     Precautions/Limitations  fall precautions  -LH     Precautions/Limitations, Vision  WFL with corrective lenses  -LH     Specific Treatment Considerations  noted R beat nystagmus with turning/reaching and tossing bean bags, corrects after approx 15 sec with head/neck in neutral position  -LH     Recorded by [OC] Viky Ramires MA,CCC-SLP [LH] Savana Dickey, PT     Pain Assessment    Pain Assessment No/denies pain  -OC No/denies pain  -LH     Recorded by [OC] Viky Ramires MA,CCC-SLP [LH] Savana Dickey, PT     Vision Assessment/Intervention    Visual Impairment WFL with corrective lenses  -OC WFL with corrective lenses  -LH     Recorded by [OC] Viky Ramires MA,CCC-SLP [] Savana Dickey, PT     Cognitive Assessment/Intervention    Current Cognitive/Communication Assessment functional  -OC functional  -LH     Orientation Status oriented x 4  -OC oriented x 4  -LH     Follows Commands/Answers Questions 100% of the time  -% of the time  -LH     Personal Safety  mild impairment  -LH     Recorded by [OC] Viky Ramires MA,CCC-SLP [LH] Savana Dickey, PT     Cognitive Assessment Intervention    Sequencing WNL/WFL  -OC      Recorded by [OC] Viky Ramires MA,CCC-SLP      Improve word retrieval skills    Status: Improve word retrieval skills Progressing as expected  -OC      Word Retrieval Skills Progress 90%;without cues   min cues for describing details to aid in word finding  -OC      Recorded by [OC] Viky Ramires MA,CCC-SLP      Improve functional problem solving    Status: Improve functional problem solving Progressing as expected  -OC      Functional Problem Solving Progress 90%;without cues   4-6 step directions  -OC      Recorded by [OC] Viky Ramires MA,CCC-SLP      Improve executive function skills    Status: Improve executive function skills Progressing as expected  -OC      Executive Function Skills Progress 80%;without cues   word associations  -OC      Recorded by [OC]  Viky Ramires MA,CCC-SLP      Bed Mobility, Assessment/Treatment    Bed Mob, Supine to Sit, [place]  conditional independence  -     Bed Mob, Sidelying to Sit, [place]  conditional independence  -LH     Recorded by  [] Savana Dickey PT     Transfer Assessment/Treatment    Transfers, Bed-Chair [place]  stand by assist  -     Transfers, Chair-Bed [place]  stand by assist  -     Transfers, Bed-Chair-Bed, Assist Device  rolling walker  -     Transfers, Sit-Stand [place]  stand by assist  -LH     Transfers, Stand-Sit [place]  stand by assist  -LH     Transfers, Sit-Stand-Sit, Assist Device  rolling walker   rollator  -     Toilet Transfer, [place]  supervision required  -     Toilet Transfer, Assistive Device  rolling walker  -     Transfer, Safety Issues  step length decreased;weight-shifting ability decreased  -     Transfer, Impairments  impaired balance  -     Transfer, Comment  car tsf SBA rollator  -     Recorded by  [LH] Savana Dickey PT     Gait Assessment/Treatment    Gait, [place] Level  stand by assist  -     Gait, Assistive Device  rollator  -     Gait, Distance (Feet)  160   x2  -     Gait, Gait Pattern Analysis  swing-through gait  -     Gait, Gait Deviations  bilateral:;zabrina decreased;forward flexed posture  -     Gait, Safety Issues  step length decreased;weight-shifting ability decreased  -     Gait, Impairments  impaired balance  -     Recorded by  [] Savana Dickey PT     Balance Skills Training    Training Strategies (Balance Skills)  lateral reaching and turning for bean bags with associated head turns- noted R beating nystagmus with head turns to R, resolved after approx 15 sec with neutral head neck. vertebral artery cleared, tested L sided [place] Hallpike, (-) s/s, no nystagmus present. retested the R side [place] Hallpike, (-) s/s, no nystagmus. deferred Epley at this time due to negative nystagmus with [place] Hallpike. will  cont to monitor.   -LH     Standing-Level of Assistance  Contact guard  -     Static Standing Balance Support  No upper extremity supported  -     Standing-Balance Activities  Forward lean;Reaching for objects;Reaching across midline  -     Standing Balance # of Minutes  5  -LH     Gait Balance-Level of Assistance  Contact guard  -LH     Gait Balance Support  Left upper extremity supported;clarence bar  -     Gait Balance Activities  side-stepping;backwards;uneven surface   on foam mat  -     Gait Balance # of Minutes  5  -LH     Recorded by  [] Savana Dickey PT     Positioning and Restraints    Pre-Treatment Position  in bed  -LH     Post Treatment Position  wheelchair  -     In Wheelchair  sitting;call light within reach;encouraged to call for assist;exit alarm on  -LH     Recorded by  [LH] Savana Dickey PT       User Key  (r) = Recorded By, (t) = Taken By, (c) = Cosigned By    Initials Name Effective Dates    VINNIE Ramires MA,CCC-SLP 04/05/17 -     DN Gage Licea, OTR 04/13/15 -     LH Savana Dickey PT 02/07/17 -                SLP Goals       12/05/17 1510 12/04/17 1200 11/28/17 1125    Cognitive Linguistic- Improve Safety and Awareness    Cognitive Linguistic Improve Safety and Awareness- SLP, Time to Achieve by discharge  -OC by discharge  -OC by discharge  -OC    Cognitive Linguistic Improve Safety and Awareness- SLP, Outcome goal met  -OC goal ongoing  -OC goal ongoing  -OC      User Key  (r) = Recorded By, (t) = Taken By, (c) = Cosigned By    Initials Name Provider Type    VINNIE Ramires MA,[facility]-SLP Speech and Language Pathologist          EDUCATION  The patient has been educated in the following areas:   Cognitive Impairment Communication Impairment.    SLP Recommendation and Plan     Prognosis: good  Rehab Potential: good, to achieve stated therapy goals  Criteria for Skilled Therapeutic Interventions Met: skilled criteria for cognitive linguistic intervention met  Anticipated Discharge  Disposition: home with assist     Therapy Frequency: 3-5 times/wk  Predicted Duration of Therapy Intervention (days/wks): until discharge  Expected Duration of Therapy Session (min): 15-30 minutes    Plan of Care Review  Plan Of Care Reviewed With: patient  Progress: improving            Time Calculation:         Time Calculation- SLP       12/05/17 1515          Time Calculation- SLP    SLP Start Time 1300  -OC      SLP Stop Time 1400  -OC      SLP Time Calculation (min) 60 min  -OC        User Key  (r) = Recorded By, (t) = Taken By, (c) = Cosigned By    Initials Name Provider Type    OC Viky Ramires MA,CCC-SLP Speech and Language Pathologist          Therapy Charges for Today     Code Description Service Date Service Provider Modifiers Qty    [id number]  ST TREATMENT SPEECH 4 12/4/2017 Viky Ramires MA,CCC-SLP GN 1    [id number]  ST DEV OF COGN SKILLS EACH 15 MIN 12/5/2017 Viky Ramires MAC[facility]-SLP GN 2    [id number]  ST TREATMENT SPEECH 2 12/5/2017 Viky Ramires MAC[facility]-SLP GN 1               SLP Discharge Summary  Anticipated Discharge Disposition: home with assist  Reason for Discharge: All goals achieved  Outcomes Achieved: Able to achieve all goals within established timeline  Discharge Destination: Home with assist    Viky Ramires MA, CCC-SLP  12/5/2017

## 2017-12-05 NOTE — PROGRESS NOTES
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        Subjective     History of Present Illness    Subjective     She will have episodic vertigo if she walks for a while.  Not responsive to maneuvers with physical therapy.  Last for about 15 seconds.  Ambulates supervision with a rolling walker.  Still has a cough.  Lungs are clear on exam.  Had overnight pulse oximetry last night with desaturations as noted below.    History taken from: patient    Objective     Vital Signs  Temp:  [98.4 °F (36.9 °C)-98.8 °F (37.1 °C)] 98.6 °F (37 °C)  Heart Rate:  [64-70] 65  Resp:  [18-20] 18  BP: (142-145)/(75-97) 145/83    Objective:  Vital signs: (most recent): Blood pressure 145/83, pulse 65, temperature 98.6 °F (37 °C), temperature source Oral, resp. rate 18, height 165.1 cm (65\"), weight 77.3 kg (170 lb 6.4 oz), SpO2 95 %.            MENTAL STATUS - A/A  HEENT - OP MOIST  LUNGS - clear to auscultation without wheezes rales or rhonchi  HEART - RRR  ABD - NABS,SOFT,NT  EXT - NO EDEMA  NEURO -  SPEECH FLUENT.  No nystagmus.  Extraocular movements.  TAKES RESISTANCE BUE AND BLE.  Ambulates with rolling walker    Results Review:     I reviewed the patient's new clinical results.          Results from last 7 days  Lab Units 12/04/17  0703   SODIUM mmol/L 141   POTASSIUM mmol/L 4.1   CHLORIDE mmol/L 101   CO2 mmol/L 26.5   BUN mg/dL 16   CREATININE mg/dL 0.87   CALCIUM mg/dL 9.3   GLUCOSE mg/dL 102*     Results for MEGHAN, " JASE (MRN [id number]) as of 11/24/2017 12:18   Ref. Range 11/22/2017 07:23   P2Y12 Platelet Inhibition Latest Ref Range: 194 - 418  (L)   Results for JASE CHRISTIANSON (MRN [id number]) as of 11/28/2017 18:23   Ref. Range 11/27/2017 13:19   Creatine Kinase Latest Ref Range: 20 - 180 U/L 55   CKMB Latest Ref Range: <=5.30 ng/mL 1.81   Troponin T Latest Ref Range: 0.000 - 0.030 ng/mL <0.010   D-dimer, Quant Latest Ref Range: 0.00 - 0.49 MCGFEU/mL 0.40     Medication Review: done  Scheduled Meds:    aspirin 325 mg Oral Daily   atenolol 100 mg Oral Q24H   budesonide-formoterol 2 puff Inhalation BID - RT   cholecalciferol 5,000 Units Oral Daily   clopidogrel 75 mg Oral Daily   docusate sodium 100 mg Oral BID   escitalopram 10 mg Oral Daily   folic acid 1 mg Oral Daily   furosemide 20 mg Oral Daily   gabapentin 800 mg Oral Q8H   ipratropium 0.5 mg Nebulization 4x Daily - RT   letrozole 2.5 mg Oral Daily   levothyroxine 125 mcg Oral Q AM   melatonin 10 mg Oral Nightly   OCUVITE-LUTEIN 1 tablet Oral BID   pantoprazole 40 mg Oral Q AM   rosuvastatin 20 mg Oral Daily     Continuous Infusions:   PRN Meds:.•  acetaminophen  •  albuterol  •  benzonatate  •  diphenhydrAMINE  •  guaifenesin-dextromethorphan  •  magnesium hydroxide  •  meclizine  •  ondansetron  •  polyethylene glycol  •  simethicone  •  trolamine salicylate      Assessment/Plan     Active Problems:    Stroke      Assessment & Plan  CVA Nov 15 - Left MCA - temporal/parietal lobe  S/p TPA folowed by mechanical thrombectomy  Stroke prophylaxis - /Plavix 75/ Crestor   Recheck P2Y12 on Nov 22 - 116 , which is in the therapeutic effect range.    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  Will have it at times when she walks into the bathroom.  Last for about 15 seconds.  Does not respond to  maneuvers with physical therapy    COPD exacerbation - On prednisone /Symbicort.  November 20-prednisone increased to 40 mg twice a day and half dose albuterol nebulizer added.  November 22-prednisone decreased to 40 mg daily.    Hypoxemia at night-Arrange nocturnal O2 at home 2L nasal cannula until has sleep study.  Overnight pulse oximetry < 89% for 1 hour 36 min (26.9% of time) on Dec 4-5.     Depression - Lexapro    Hypothyroidism - Levothyroxine    Hypertension-on atenolol 50 mg-November 21-increased to home dose of atenolol 100 mg daily and resume Lasix 20 mg daily    Post-mastectomy lymphedema    Took Letrozole at home - Discussed with her Hemacologist-Oncologist  Dr Martinez who wishes for patient to continue on Letrozole, given her cancer and as that class of med is not felt to raise risk of thrombosis.       Anemia    ZIO patch at discharge.        NOW ADMIT ACUTE INPT REHAB . PT 1 HR, OT 1 HR, SLOP 1 HR, 5 DAYS A WEEK. COGNITION, MOBILITY, SELF CARE. REHAB NURSING CARRYOVER AND B/B, ONGOING PHYSICIAN F/U, WEEKLY TEAM CONF.     December 4-transfers standby assist.  Gait 240 feet rolling walker contact-guard    Russel Sanchez MD  12/05/17  9:08 AM    Time:

## 2017-12-05 NOTE — PLAN OF CARE
Problem: Inpatient Occupational Therapy  Goal: Transfer Training Goal 1 STG- OT  Outcome: Outcome(s) achieved Date Met:  12/05/17 11/27/17 1549 12/05/17 1548   Transfer Training OT STG   Transfer Training OT STG, [place] Level supervision required;verbal cues required --    Transfer Training OT STG, Outcome --  goal met       Goal: Transfer Training Goal 1 LTG- OT  Outcome: Outcome(s) achieved Date Met:  12/05/17 11/20/17 1549 11/27/17 1549 12/05/17 1548   Transfer Training OT LTG   Transfer Training OT LTG, [place] Level --  supervision required;verbal cues required --    Transfer Training OT LTG, Assist Device walker, rolling;shower chair --  --    Transfer Training OT LTG, Outcome --  --  goal met       Goal: Strength Goal LTG- OT  Outcome: Unable to achieve outcome(s) by discharge Date Met:  12/05/17 12/05/17 1548   Strength OT LTG   Strength Goal OT LTG, Date Established 12/05/17   Strength Goal OT LTG, Outcome goal partially met       Goal: Dynamic Standing Balance Goal LTG-OT  Outcome: Outcome(s) achieved Date Met:  12/05/17 11/20/17 1549 11/27/17 1549 12/05/17 1548   Dynamic Standing Balance OT LTG   Dynamic Standing Balance OT LTG, [place] Level --  supervision required --    Dynamic Standing Balance OT LTG, Assist Device assistive Device --  --    Dynamic Standing Balance OT LTG, Outcome --  --  goal met       Goal: Caregiver Training Goal LTG- OT  Outcome: Outcome(s) achieved Date Met:  12/05/17 12/05/17 1548   Caregiver Training OT LTG   Caregiver Training OT LTG, Date Established 12/05/17   Caregiver Training OT LTG, [place] Level able to assist adequately;able to cue patient adequately       Goal: Patient Education Goal LTG- OT  Outcome: Outcome(s) achieved Date Met:  12/05/17 11/20/17 1549   Patient Education OT LTG   Patient Education OT LTG, Education Understanding independent;demonstrates adequately;verbalizes understanding       Goal: ADL Goal STG-  OT  Outcome: Outcome(s) achieved Date Met:  12/05/17 11/20/17 1549 12/05/17 1548   ADL OT STG   ADL OT STG, Date Established --  12/05/17   ADL OT STG, Activity Type ADL skills --    ADL OT STG, [place] Level standby assist;setup;assistive device --    ADL OT STG, Outcome --  goal met       Goal: ADL Goal LTG- OT  Outcome: Outcome(s) achieved Date Met:  12/05/17 11/20/17 1549 11/27/17 1549 12/05/17 1548   ADL OT LTG   ADL OT LTG, Date Established --  --  12/05/17   ADL OT LTG, Activity Type ADL skills --  --    ADL OT LTG, [place] Level --  standby assist;min verbal cues;assistive device --    ADL OT LTG, Outcome --  --  goal met       Goal: Home Management Goal LTG- OT  Outcome: Outcome(s) achieved Date Met:  12/05/17 12/04/17 1605 12/05/17 1548   Home Management OT LTG   Home Mgmt Goal OT LTG, Date Established --  12/05/17   Home Mgmt Goal OT LTG, Activity Type pt to be sba with light meal prep at rwx level --    Home Mgmt Goal OT LTG, [place] Level supervision required --    Home Mgmt Goal OT LTG, Outcome Achieved --  goal met

## 2017-12-05 NOTE — PROGRESS NOTES
Inpatient Rehabilitation Functional Measures Assessment    Functional Measures  [facility] Eating:  Eating Score = 7. Patient is completely independent for eating.  There are no activity limitations.  [facility] Grooming: Branch  [facility] Bathing:  Branch  [facility] Upper Body Dressing:  Branch  [facility] Lower Body Dressing:  Branch  [facility] Toileting:  Toileting Score = 7.  Patient is completely independent for  toileting. There are no activity limitations. Patient is SBA with angel belt and  walker    [facility] Bladder Management  Level of Assistance:  Bladder Score = 7.  Patient is completely independent for  bladder management. There are no activity limitations.  Frequency/Number of Accidents this Shift:  Bladder accidents this shift:  0 .  Patient has not had an accident this shift.    [facility] Bowel Management  Level of Assistance: Bowel Score = 7.  Patient is completely independent for  bowel management. There are no activity limitations.  Frequency/Number of Accidents this Shift: Bowel accidents this shift: 0 .  Patient has not had an accident this shift.    [facility] Bed/Chair/Wheelchair Transfer:  Bed/chair/wheelchair Transfer Score = 7.  Patient is completely independent for transferring to and from the  bed/chair/wheelchair. There are no activity limitations. Patient is SBA with  gait belt and walker  [facility] Toilet Transfer:  Toilet Transfer Score = 7.  Patient is completely  independent for transferring to and from the toilet/commode. There are no  activity limitations.  [facility] Tub/Shower Transfer:  Branch    Previously Documented Mode of Locomotion at Discharge: Field  [facility] Expected Mode of Locomotion at Discharge: Branch  [facility] Walk/Wheelchair:  Branch  [facility] Stairs:  Branch    [facility] Comprehension:  Branch  [facility] Expression:  Branch  [facility] Social Interaction:  Branch  [facility] Problem Solving:  Branch  [facility] Memory:  Branch    Therapy Mode Minutes  Occupational Therapy: Branch  Physical Therapy: Branch  Speech Language Pathology:  Branch    Signed by: MUKUL Rodriguez

## 2017-12-05 NOTE — THERAPY DISCHARGE NOTE
Inpatient Rehabilitation - Physical Therapy Treatment Note/Discharge  [facility]     Patient Name: Paloma Carr  : 1940  MRN: [id number]  Today's Date: 2017  Onset of Illness/Injury or Date of Surgery Date: 11/15/17  Date of Referral to PT: 17  Referring Physician: Brandon    Admit Date: 2017    Visit Dx:    ICD-10-CM ICD-9-CM   1. Impaired gait and mobility R26.89 781.2     Patient Active Problem List   Diagnosis   • Soft tissue swelling of chest wall   • Abnormal ultrasound of breast   • Postmastectomy lymphedema syndrome   • Malignant neoplasm of upper-outer quadrant of right female breast   • Cerebrovascular accident (CVA)   • COPD exacerbation   • Stroke       Physical Therapy Education     Title: PT OT SLP Therapies (Done)     Topic: Physical Therapy (Done)     Point: Mobility training (Done)    Learning Progress Summary    Learner Readiness Method Response Comment Documented by Status   Patient Acceptance E,TB,D,H VU,NR   17 0848 Done    Acceptance E NR,VU   17 0826 Done    Acceptance E,TB VU transfer training, gait training  17 1306 Done    Acceptance E NR,VU   17 0904 Done    Acceptance E,H VU,NR   17 0856 Done    Acceptance E VU,DU,NR family conference held this date. educated pt and spouse on expectations for home including 24 hr assist for cog deficits. has all equipment needs at home (rwx, rollator). will provided HEP prior to DC.  17 1551 Done    Acceptance E VU,NR  MG 17 1200 Done    Acceptance E VU,NR  LB 17 1348 Done    Acceptance E NR,VU   17 1017 Done    Acceptance E VU,NR   17 1251 Done    Acceptance E VU,NR   17 1247 Done    Acceptance E VU,NR   17 1212 Done    Acceptance E NR   17 1536 Active   Family Acceptance E VU,DU,NR family conference held this date. educated pt and spouse on expectations for home including 24 hr assist for cog deficits. has all equipment needs  at home (rwx, rollator). will provided HEP prior to DC.  11/29/17 1551 Done               Point: Home exercise program (Done)    Learning Progress Summary    Learner Readiness Method Response Comment Documented by Status   Patient Acceptance E,TB,D,H VU,NR   12/05/17 0848 Done    Acceptance E NR,VU   12/04/17 0826 Done    Acceptance E,TB VU transfer training, gait training  12/02/17 1306 Done    Acceptance E NR,VU   12/01/17 0904 Done    Acceptance E,H VU,NR   11/30/17 0856 Done    Acceptance E VU,DU,NR family conference held this date. educated pt and spouse on expectations for home including 24 hr assist for cog deficits. has all equipment needs at home (rwx, rollator). will provided HEP prior to DC.  11/29/17 1551 Done    Acceptance E NR,VU   11/24/17 1017 Done    Acceptance E VU,NR   11/22/17 1247 Done    Acceptance E VU,NR   11/21/17 1212 Done    Acceptance E NR   11/20/17 1536 Active   Family Acceptance E VU,DU,NR family conference held this date. educated pt and spouse on expectations for home including 24 hr assist for cog deficits. has all equipment needs at home (rwx, rollator). will provided HEP prior to DC.  11/29/17 1551 Done               Point: Body mechanics (Done)    Learning Progress Summary    Learner Readiness Method Response Comment Documented by Status   Patient Acceptance E,TB,D,H VU,NR   12/05/17 0848 Done    Acceptance E NR,VU   12/04/17 0826 Done    Acceptance E,TB VU transfer training, gait training  12/02/17 1306 Done    Acceptance E NR,VU   12/01/17 0904 Done    Acceptance E,H VU,NR   11/30/17 0856 Done    Acceptance E VU,DU,NR family conference held this date. educated pt and spouse on expectations for home including 24 hr assist for cog deficits. has all equipment needs at home (rwx, rollator). will provided HEP prior to DC.  11/29/17 1551 Done    Acceptance E VU,NR  MG 11/27/17 1200 Done    Acceptance E NR,VU   11/24/17 1017 Done    Acceptance E  VU,NR   11/22/17 1247 Done    Acceptance E VU,NR   11/21/17 1212 Done    Acceptance E NR   11/20/17 1536 Active   Family Acceptance E VU,DU,NR family conference held this date. educated pt and spouse on expectations for home including 24 hr assist for cog deficits. has all equipment needs at home (rwx, rollator). will provided HEP prior to DC.  11/29/17 1551 Done               Point: Precautions (Done)    Learning Progress Summary    Learner Readiness Method Response Comment Documented by Status   Patient Acceptance E,TB,D,H VU,NR   12/05/17 0848 Done    Acceptance E NR,VU   12/04/17 0826 Done    Acceptance E,TB VU transfer training, gait training  12/02/17 1306 Done    Acceptance E NR,VU   12/01/17 0904 Done    Acceptance E,H VU,NR   11/30/17 0856 Done    Acceptance E VU,DU,NR family conference held this date. educated pt and spouse on expectations for home including 24 hr assist for cog deficits. has all equipment needs at home (rwx, rollator). will provided HEP prior to DC.  11/29/17 1551 Done    Acceptance E VU,NR   11/27/17 1200 Done    Acceptance E NR,VU   11/24/17 1017 Done    Acceptance E VU,NR   11/22/17 1247 Done    Acceptance E VU,NR   11/21/17 1212 Done    Acceptance E NR   11/20/17 1536 Active   Family Acceptance E VU,DU,NR family conference held this date. educated pt and spouse on expectations for home including 24 hr assist for cog deficits. has all equipment needs at home (rwx, rollator). will provided HEP prior to DC.  11/29/17 1551 Done                      User Key     Initials Effective Dates Name Provider Type Discipline    JS 04/06/17 -  Theodora Nevarez, PT Physical Therapist PT     02/07/17 -  Savana Dickey, PT Physical Therapist PT    LB 02/18/16 -  Savana Novoa, PTA Physical Therapy Assistant PT    MG 09/13/17 -  Megan Gosselin, PT Physical Therapist PT                    IP PT Goals       12/05/17 1457 12/04/17 0851 11/27/17 1511    Bed Mobility PT LTG     Bed Mobility PT LTG, Date Established   11/27/17  -MG    Bed Mobility PT LTG, Time to Achieve   1 wk  -MG    Bed Mobility PT LTG, Activity Type   all bed mobility  -MG    Bed Mobility PT LTG, [place] Level  conditional independence  -LH independent  -MG    Bed Mobility PT LTG, Date Goal Reviewed  12/04/17  - 11/27/17  -MG    Bed Mobility PT LTG, Outcome  goal met  -LH goal partially met  -MG    Bed Mobility PT LTG, Reason Goal Not Met   --  -MG    Transfer Training PT LTG    Transfer Training PT LTG, Date Established   11/27/17  -MG    Transfer Training PT LTG, Time to Achieve   1 wk  -MG    Transfer Training PT  LTG, Date Goal Reviewed  12/04/17  - 11/27/17  -MG    Transfer Training PT LTG, Outcome  goal met  - goal partially met  -MG    Transfer Training PT LTG, Reason Goal Not Met   --  -MG    Transfer Training 2 PT LTG    Transfer Training PT 2 LTG, Date Established   11/27/17  -MG    Transfer Training PT 2 LTG, Time to Achieve   1 wk  -MG    Transfer Training PT 2 LTG, Activity Type   all transfers  -MG    Transfer Training PT 2 LTG, Date Goal Reviewed  12/04/17  - 11/27/17  -MG    Transfer Training PT 2 LTG, Outcome  goal met  - goal partially met  -MG    Transfer Training PT 2 LTG, Reason Goal Not Met   --  -MG    Gait Training PT LTG    Gait Training Goal PT LTG, Date Established   11/27/17  -MG    Gait Training Goal PT LTG, Time to Achieve   1 wk  -MG    Gait Training Goal PT LTG, [place] Level   supervision required  -MG    Gait Training Goal PT LTG, Date Goal Reviewed 12/05/17  - 12/04/17  -LH     Gait Training Goal PT LTG, Outcome goal partially met  -LH goal ongoing  -LH goal met  -MG    Gait Training Goal PT LTG, Reason Goal Not Met   --  -MG    Stair Training PT LTG    Stair Training Goal PT LTG, Date Established   11/27/17  -MG    Stair Training Goal PT LTG, Time to Achieve   1 wk  -MG    Stair Training Goal PT LTG, Number of Steps   4  -MG    Stair Training Goal PT LTG, Date  Goal Reviewed 12/05/17  - 12/04/17  - 11/27/17  -MG    Stair Training Goal PT LTG, Outcome goal met  - goal ongoing  - goal partially met  -MG    Stair Training Goal PT LTG, Reason Goal Not Met   --  -MG    Patient Education PT LTG    Patient Education PT LTG, Date Established   11/27/17  -MG    Patient Education PT LTG, Time to Achieve   1 wk  -MG    Patient Education PT LTG, Education Understanding   demonstrate adequately;verbalize understanding  -MG    Patient Education PT LTG, Date Goal Reviewed 12/05/17  - 12/04/17  - 11/27/17  -MG    Patient Education PT LTG Outcome goal met  - goal ongoing  - goal ongoing  -MG      User Key  (r) = Recorded By, (t) = Taken By, (c) = Cosigned By    Initials Name Provider Type     Savana Dickey, PT Physical Therapist    MG Megan Gosselin, PT Physical Therapist              Adult Rehabilitation Note       12/05/17 0838 12/04/17 1554 12/04/17 1200    Rehab Assessment/Intervention    Discipline physical therapist  -LH occupational therapist  -DN speech language pathologist  -OC    Document Type therapy note (daily note);discharge summary  - therapy note (daily note)  -DN     Subjective Information agree to therapy  -LH agree to therapy;no complaints  -DN     Patient Effort, Rehab Treatment good  - good  -DN     Symptoms Noted During/After Treatment none  - none  -DN     Precautions/Limitations fall precautions  - fall precautions  -DN     Precautions/Limitations, Vision  WFL with corrective lenses  -DN     Specific Treatment Considerations  Cooking eval in kitchen to day with pt and therapist  -DN     Patient Response to Treatment noted R beating nystagmus with ambulation from room to gym this AM. brought pt to gym, cleared the vertebral artery. performed R galilea Hallpike, (-) nystagmus nor reproduction of s/s, went into Epleys, (-) reproduction of s/s with manuever. good tolerance. will cont to monitor. no nystagmus or s/s with ambulation post session from  gym back to room. Dr Sanchez present for AM session - aware that nytagmus is still intermittently present, ? central in nature. will cont monitor. plan pt to return as OP PT with Audrey/vestibular therapist  -LH      Recorded by [LH] Savana Dickey, PT [DN] Gage Licea, AZAMR [OC] Viky Ramires MA,CCC-SLP    Pain Assessment    Pain Assessment No/denies pain  -LH  No/denies pain  -OC    Recorded by [LH] Savana Dickey, PT  [OC] Viky Ramires MA,CCC-SLP    Vision Assessment/Intervention    Visual Impairment WFL with corrective lenses  -LH  WFL with corrective lenses  -OC    Recorded by [LH] Savana Dickey, PT  [OC] Viky Ramires MA,CCC-SLP    Cognitive Assessment/Intervention    Current Cognitive/Communication Assessment functional  -LH functional  -DN functional  -OC    Orientation Status oriented x 4  -LH oriented x 4  -DN oriented x 4  -OC    Follows Commands/Answers Questions 100% of the time  -% of the time  -% of the time  -OC    Personal Safety mild impairment  -LH mild impairment;decreased awareness, need for assist;decreased insight to deficits;decreased awareness, need for safety  -DN     Personal Safety Interventions fall prevention program maintained;gait belt;nonskid shoes/slippers when out of bed;supervised activity  -LH      Recorded by [LH] Savana Dickey, PT [DN] Gage Licea, AZAMR [OC] Viky Ramires MA,CCC-SLP    Cognitive Assessment Intervention    Sequencing   WNL/WFL  -OC    Recorded by   [OC] Viky Ramires MA,CCC-SLP    Improve word retrieval skills    Status: Improve word retrieval skills   Progressing as expected  -OC    Word Retrieval Skills Progress   90%;without cues   min cues for describing details to aid in word finding  -OC    Recorded by   [OC] Viky Ramires MA,CCC-SLP    Improve functional problem solving    Status: Improve functional problem solving   Progressing as expected  -OC    Functional Problem Solving Progress   90%;without cues   4-6 step directions  -OC    Recorded by   [OC]  Viky Ramires MA,CCC-SLP    Improve executive function skills    Status: Improve executive function skills   Progressing as expected  -OC    Executive Function Skills Progress   80%;without cues   word associations  -OC    Recorded by   [OC] Viky Ramires MA,CCC-SLP    Bed Mobility, Assessment/Treatment    Bed Mobility, Roll Left, [place] conditional independence  -LH      Bed Mobility, Roll Right, [place] conditional independence  -LH      Bed Mobility, Scoot/Bridge, [place] conditional independence  -LH      Bed Mob, Supine to Sit, [place] conditional independence  -LH      Bed Mob, Sit to Supine, [place] conditional independence  -LH      Bed Mob, Sidelying to Sit, [place] conditional independence  -LH      Bed Mob, Sit to Sidelying, [place] conditional independence  -LH      Recorded by [LH] Savana Dickey, PT      Transfer Assessment/Treatment    Transfers, Bed-Chair [place] supervision required  -LH      Transfers, Chair-Bed [place] supervision required  -LH      Transfers, Sit-Stand [place] supervision required  -LH      Transfers, Stand-Sit [place] supervision required  -LH      Transfers, Sit-Stand-Sit, Assist Device rolling walker   rollator  -LH      Toilet Transfer, [place]  supervision required  -DN     Toilet Transfer, Assistive Device  rolling walker;elevated toilet seat  -DN     Walk-In Shower Transfer, [place]  supervision required  -DN     Walk-In Shower Transfer, Assist Device  rolling walker  -DN     Transfer, Safety Issues  step length decreased  -DN     Transfer, Impairments  impaired balance  -DN     Recorded by [LH] Savana Dickey, PT [DN] KEON Tran     Gait Assessment/Treatment    Gait, [place] Level stand by assist  -      Gait, Assistive Device rollator  -      Gait, Distance (Feet) 250   x2  -      Gait, Gait Pattern Analysis swing-through gait  -      Recorded by [LH] Savana Dickey, PT      Stairs  Assessment/Treatment    Number of Stairs 4  -      Stairs, Handrail Location both sides  -      Stairs, [place] Level supervision required  -      Stairs, Technique Used step over step (ascending);step over step (descending)  -      Recorded by [] Savana Dickey, PT      Functional Mobility    Functional Mobility- Ind. Level supervision required  -      Functional Mobility- Device --   hemibars  -      Functional Mobility-Distance (Feet) 5   over foam mat x 2   -      Recorded by [] Savana Dickey PT      ADL Assessment/Intervention    Additional Documentation  IADL Assess/Train, Comment (Row)   pt SBA with light meal prep inkitchen today at RWX level  -DN     Recorded by  [DN] KEON Tran     Upper Body Bathing Assessment/Training    UB Bathing Assess/Train Assistive Device  shower chair with back  -DN     UB Bathing Assess/Train, Position  sitting  -DN     UB Bathing Assess/Train, [place] Level  set up required  -DN     Recorded by  [DN] KEON Tran     Lower Body Bathing Assessment/Training    LB Bathing Assess/Train Assistive Device  hand-held shower head;grab bars;shower chair with back  -DN     LB Bathing Assess/Train, Position  sitting;standing  -DN     LB Bathing Assess/Train, [place] Level  supervision required;set up required  -DN     Recorded by  [DN] KEON Tran     Upper Body Dressing Assessment/Training    UB Dressing Assess/Train, Clothing Type  doffing:;donning:;pull over;t-shirt  -DN     UB Dressing Assess/Train, Position  sitting  -DN     UB Dressing Assess/Train, [place]  set up required  -DN     UB Dressing Assess/Train, Impairments  strength decreased  -DN     Recorded by  [DN] KEON Tran     Lower Body Dressing Assessment/Training    LB Dressing Assess/Train, Clothing Type  doffing:;donning:;pants;shoes  -DN     LB Dressing Assess/Train, Position  sitting;standing  -DN     LB Dressing Assess/Train, [place]  supervision  required;set up required  -DN     LB Dressing Assess/Train, Impairments  strength decreased  -DN     Recorded by  [DN] KEON Tran     Toileting Assessment/Training    Toileting Assess/Train, Assistive Device  grab bars;raised toilet seat  -DN     Toileting Assess/Train, Position  sitting;standing  -DN     Toileting Assess/Train, [place] Level  supervision required  -DN     Toileting Assess/Train, Impairments  impaired balance;strength decreased  -DN     Recorded by  [DN] KEON Tran     Grooming Assessment/Training    Grooming Assess/Train, Assistive Device  electric toothbrush  -DN     Grooming Assess/Train, Position  sitting;sink side  -DN     Grooming Assess/Train, [place]ep[place] Level  set up required  -DN     Recorded by  [DN] KEON Tran     Balance Skills Training    Training Strategies (Balance Skills)  standing at counter in kitchen with meal prep of cutting veggies for soup pt had No lob and did require sit down breaks for time to time   -DN     Standing-Level of Assistance Close supervision  -      Static Standing Balance Support assistive device   rollator  -      Standing-Balance Activities Retrieve object from floor  -      Stepping Strategy Assessment (Balance Skills) educated on, performed and provided written HEP - including strengthening therex and vestibular therex. performed sit to stands with head turns EO/EC, no nystagmus noted. performed Gupta Daroff bilaterally 1 set, no nystagmus. good tolerance to all therex.   -LH      Recorded by [] Savana Dickey PT [DN] KEON Tran     Therapy Exercises    Bilateral Lower Extremities 15 reps;AROM:;standing;hip flexion;heel raises;hip abduction/adduction   educated on, performed and provided written HEP  -LH      Recorded by [] Savana Dickey PT      Positioning and Restraints    Pre-Treatment Position in bed  - --   w/c  -DN     Post Treatment Position bed  - wheelchair  -DN     In Bed supine;call light within  reach;encouraged to call for assist;exit alarm on  -LH sitting;call light within reach;encouraged to call for assist;exit alarm on  -DN     Recorded by [] Savana Dickey, PT [DN] Gage Licea, OTR       12/04/17 0824          Rehab Assessment/Intervention    Discipline physical therapist  -      Document Type therapy note (daily note)  -      Subjective Information agree to therapy  -      Patient Effort, Rehab Treatment good  -      Precautions/Limitations fall precautions  -      Precautions/Limitations, Vision WFL with corrective lenses  -      Specific Treatment Considerations noted R beat nystagmus with turning/reaching and tossing bean bags, corrects after approx 15 sec with head/neck in neutral position  -LH      Recorded by [] Savana Dickey, PT      Pain Assessment    Pain Assessment No/denies pain  -LH      Recorded by [] Savana Dickey, PT      Vision Assessment/Intervention    Visual Impairment WFL with corrective lenses  -LH      Recorded by [] Savana Dickey, PT      Cognitive Assessment/Intervention    Current Cognitive/Communication Assessment functional  -      Orientation Status oriented x 4  -      Follows Commands/Answers Questions 100% of the time  -      Personal Safety mild impairment  -LH      Recorded by [] Savana Dickey, PT      Bed Mobility, Assessment/Treatment    Bed Mob, Supine to Sit, [place] conditional independence  -      Bed Mob, Sidelying to Sit, [place] conditional independence  -LH      Recorded by [] Savana Dickey, PT      Transfer Assessment/Treatment    Transfers, Bed-Chair [place] stand by assist  -      Transfers, Chair-Bed [place] stand by assist  -      Transfers, Bed-Chair-Bed, Assist Device rolling walker  -      Transfers, Sit-Stand [place] stand by assist  -      Transfers, Stand-Sit [place] stand by assist  -      Transfers, Sit-Stand-Sit, Assist Device rolling walker   rollator  -      Toilet Transfer,  [place] supervision required  -      Toilet Transfer, Assistive Device rolling walker  -      Transfer, Safety Issues step length decreased;weight-shifting ability decreased  -      Transfer, Impairments impaired balance  -      Transfer, Comment car tsf SBA rollator  -      Recorded by [] Savana Dickey PT      Gait Assessment/Treatment    Gait, [place] Level stand by assist  -      Gait, Assistive Device rollator  -      Gait, Distance (Feet) 160   x2  -      Gait, Gait Pattern Analysis swing-through gait  -      Gait, Gait Deviations bilateral:;[place] decreased;forward flexed posture  -      Gait, Safety Issues step length decreased;weight-shifting ability decreased  -      Gait, Impairments impaired balance  -      Recorded by [] Savana Dickey PT      Balance Skills Training    Training Strategies (Balance Skills) lateral reaching and turning for bean bags with associated head turns- noted R beating nystagmus with head turns to R, resolved after approx 15 sec with neutral head neck. vertebral artery cleared, tested L sided Olivia Hallpike, (-) s/s, no nystagmus present. retested the R side Olivia Hallpike, (-) s/s, no nystagmus. deferred Epley at this time due to negative nystagmus with Olivia Hallpike. will cont to monitor.   -      Standing-Level of Assistance Contact guard  -      Static Standing Balance Support No upper extremity supported  -      Standing-Balance Activities Forward lean;Reaching for objects;Reaching across midline  -      Standing Balance # of Minutes 5  -LH      Gait Balance-Level of Assistance Contact guard  -      Gait Balance Support Left upper extremity supported;clarence bar  -      Gait Balance Activities side-stepping;backwards;uneven surface   on foam mat  -      Gait Balance # of Minutes 5  -LH      Recorded by [] Savana Dickey PT      Positioning and Restraints    Pre-Treatment Position in bed  -      Post Treatment Position wheelchair   -      In Wheelchair sitting;call light within reach;encouraged to call for assist;exit alarm on  -      Recorded by [] Savana Dickey PT        User Key  (r) = Recorded By, (t) = Taken By, (c) = Cosigned By    Initials Name Effective Dates     Viky Ramires MA,CCC-SLP 04/05/17 -     DN Gage Licea, OTR 04/13/15 -      Savana Dickey, PT 02/07/17 -           PT Recommendation and Plan  Anticipated Discharge Disposition: home with assist, home with outpatient services  Planned Therapy Interventions: bed mobility training, balance training, gait training, home exercise program, ROM (Range of Motion), stair training, strengthening, stretching, transfer training  PT Frequency: 2 times/day  Plan of Care Review  Plan Of Care Reviewed With: patient  Outcome Summary/Follow up Plan: pt has met all LTGs at this time. to go home SBA level at home. to use rollator home/community for improved balance. educated on, performed and provided written HEP including strengthening BLEs and vestibular therex. pt to follow up with neuro OP PT next Monday to cont vestibular therex.  will sign off at this time.          Time Calculation:         PT Charges       12/05/17 1502 12/05/17 0848       Time Calculation    Start Time 1400  - 0800  -     Stop Time 1430  - 0830  -     Time Calculation (min) 30 min  - 30 min  -     PT Received On  12/05/17  -     PT - Next Appointment  12/06/17  -       User Key  (r) = Recorded By, (t) = Taken By, (c) = Cosigned By    Initials Name Provider Type     Savana Dickey PT Physical Therapist          Therapy Charges for Today     Code Description Service Date Service Provider Modifiers Qty    79068159030 HC PT THER PROC EA 15 MIN 12/4/2017 Savnaa Dickey, PT GP 4    02687345078 HC PT THER PROC EA 15 MIN 12/5/2017 Savana Dickey, PT GP 4               PT Discharge Summary  Anticipated Discharge Disposition: home with assist, home with outpatient services    Savana Dickey,  PT  12/5/2017

## 2017-12-05 NOTE — PROGRESS NOTES
Inpatient Rehabilitation Plan of Care Note    Plan of Care  Care Plan Reviewed - No updates at this time.    Sphincter Control    Performed Intervention(s)  Encourage fluid intake  Elimination schedule  Monitor intake and output      Psychosocial    Performed Intervention(s)  Verbalize needs and concerns  Therapeutic environmental set up      Body Systems    Performed Intervention(s)  Daily skin inspection  Turning/ repositioning      Safety    Performed Intervention(s)  Falls prevention protocol  Items within reach  Bed/ Chair alarm.    Signed by: Bárbara De La Rosa RN

## 2017-12-05 NOTE — PROGRESS NOTES
Inpatient Rehabilitation Functional Measures Assessment    Functional Measures  KYM Eating:  [facility] Grooming: [facility] Bathing:  [facility] Upper Body Dressing:  [facility] Lower Body Dressing:  [facility] Toileting:  [facility] Bladder Management  Level of Assistance:  [place]  Frequency/Number of Accidents this Shift:  [facility] Bowel Management  Level of Assistance: [place]  Frequency/Number of Accidents this Shift: [facility] Bed/Chair/Wheelchair Transfer:  [facility] Toilet Transfer:  [facility] Tub/Shower Transfer:  [place]    Previously Documented Mode of Locomotion at Discharge: Field  KYM Expected Mode of Locomotion at Discharge: [facility] Walk/Wheelchair:  [facility] Stairs:  [facility] Comprehension:  Auditory comprehension is the usual mode. Comprehension  Score = 7, Independent.  Patient comprehends complex/abstract information in  their primary language.  Patient is completely independent for auditory  comprehension.  There are no activity limitations.  KYM Expression:  Vocal expression is the usual mode. Expression Score = 7,  Independent.  Patient expresses complex/abstract information in their primary  language.  Patient is completely independent for vocal expression.  There are no  activity limitations.  KYM Social Interaction:  Social Interaction Score = 7, Independent. Patient is  completely independent for social interaction.  There are no activity  limitations.  KYM Problem Solving:  Problem Solving Score = 6, Modified [place].  Patient  makes appropriate decisions in order to solve complex problems, but requires  extra time.  KYM Memory:  Memory Score = 6, Modified [place].  Patient is modified  independent for memory, having only mild difficulty and using self-initiated or  environmental cues to remember.    Therapy Mode Minutes  Occupational Therapy: [place]  Physical Therapy: [place]  Speech Language Pathology:  Individual: 60 minutes.    Signed by: Viky  Card, SLP

## 2017-12-05 NOTE — PROGRESS NOTES
Discussed d/c plans for tomorrow, 12/6 and team recommendation for patient to have 24 hour assistance at home with patient and . Also discussed order for home O2 at night. Patient not really wanting home O2 but agreeable after Dr. Sanchez discussed with her.  also voiced understanding of need for home O2. Referral made to [facility] for home O2. Gave  information on [employer] medical alert system with auto alert and in home care agency brochures. Patient scheduled to start outpatient PT on Monday, 12/11 at 3:00 p.m. Will have outpatient PT 2 times/week. Patient to d/c home with  tomorrow. Will assist with plans.

## 2017-12-05 NOTE — PLAN OF CARE
Problem: Patient Care Overview (Adult)  Goal: Plan of Care Review  Outcome: Ongoing (interventions implemented as appropriate)    12/05/17 1400   Coping/Psychosocial Response Interventions   Plan Of Care Reviewed With patient         Problem: Inpatient SLP  Goal: Cognitive-linguistic-Patient will improve Cognitive-linguistic skills to improve safety and safety awareness in environment  Outcome: Outcome(s) achieved Date Met:  12/05/17 12/05/17 1510   Cognitive Linguistic- Improve Safety and Awareness   Cognitive Linguistic Improve Safety and Awareness- SLP, Time to Achieve by discharge   Cognitive Linguistic Improve Safety and Awareness- SLP, Outcome goal met

## 2017-12-06 ENCOUNTER — APPOINTMENT (OUTPATIENT)
Dept: CARDIOLOGY | Facility: HOSPITAL | Age: 77
End: 2017-12-06
Attending: INTERNAL MEDICINE

## 2017-12-06 ENCOUNTER — TRANSITIONAL CARE MANAGEMENT TELEPHONE ENCOUNTER (OUTPATIENT)
Dept: FAMILY MEDICINE CLINIC | Facility: CLINIC | Age: 77
End: 2017-12-06

## 2017-12-06 VITALS
WEIGHT: 170.4 LBS | HEIGHT: 65 IN | RESPIRATION RATE: 18 BRPM | DIASTOLIC BLOOD PRESSURE: 71 MMHG | TEMPERATURE: 98.6 F | HEART RATE: 69 BPM | OXYGEN SATURATION: 96 % | BODY MASS INDEX: 28.39 KG/M2 | SYSTOLIC BLOOD PRESSURE: 129 MMHG

## 2017-12-06 PROCEDURE — 0296T HC EXT ECG > 48HR TO 21 DAY RCRD W/CONECT INTL RCRD: CPT

## 2017-12-06 PROCEDURE — 94799 UNLISTED PULMONARY SVC/PX: CPT

## 2017-12-06 RX ORDER — VITS A,C,E/LUTEIN/MINERALS 300MCG-200
1 TABLET ORAL 2 TIMES DAILY
Qty: 60 TABLET | Refills: 0 | Status: SHIPPED | OUTPATIENT
Start: 2017-12-06 | End: 2017-12-20 | Stop reason: HOSPADM

## 2017-12-06 RX ORDER — FUROSEMIDE 20 MG/1
20 TABLET ORAL DAILY
Qty: 60 TABLET | Refills: 0 | Status: SHIPPED | OUTPATIENT
Start: 2017-12-06 | End: 2019-10-28

## 2017-12-06 RX ADMIN — DOCUSATE SODIUM 100 MG: 100 CAPSULE, LIQUID FILLED ORAL at 07:19

## 2017-12-06 RX ADMIN — LETROZOLE 2.5 MG: 2.5 TABLET ORAL at 07:20

## 2017-12-06 RX ADMIN — BENZONATATE 200 MG: 200 CAPSULE, LIQUID FILLED ORAL at 00:04

## 2017-12-06 RX ADMIN — GABAPENTIN 800 MG: 400 CAPSULE ORAL at 05:54

## 2017-12-06 RX ADMIN — FUROSEMIDE 20 MG: 20 TABLET ORAL at 07:20

## 2017-12-06 RX ADMIN — LEVOTHYROXINE SODIUM 125 MCG: 125 TABLET ORAL at 05:54

## 2017-12-06 RX ADMIN — Medication 1 TABLET: at 07:19

## 2017-12-06 RX ADMIN — FOLIC ACID 1 MG: 1 TABLET ORAL at 07:20

## 2017-12-06 RX ADMIN — BUDESONIDE AND FORMOTEROL FUMARATE DIHYDRATE 2 PUFF: 160; 4.5 AEROSOL RESPIRATORY (INHALATION) at 07:09

## 2017-12-06 RX ADMIN — ROSUVASTATIN CALCIUM 20 MG: 20 TABLET, FILM COATED ORAL at 07:19

## 2017-12-06 RX ADMIN — CLOPIDOGREL 75 MG: 75 TABLET, FILM COATED ORAL at 07:20

## 2017-12-06 RX ADMIN — ESCITALOPRAM 10 MG: 10 TABLET, FILM COATED ORAL at 07:20

## 2017-12-06 RX ADMIN — ASPIRIN 325 MG: 325 TABLET ORAL at 07:18

## 2017-12-06 RX ADMIN — VITAMIN D, TAB 1000IU (100/BT) 5000 UNITS: 25 TAB at 07:18

## 2017-12-06 RX ADMIN — PANTOPRAZOLE SODIUM 40 MG: 40 TABLET, DELAYED RELEASE ORAL at 05:54

## 2017-12-06 RX ADMIN — ATENOLOL 100 MG: 50 TABLET ORAL at 07:18

## 2017-12-06 RX ADMIN — IPRATROPIUM BROMIDE 0.5 MG: 0.5 SOLUTION RESPIRATORY (INHALATION) at 07:09

## 2017-12-06 NOTE — PROGRESS NOTES
Inpatient Rehabilitation Plan of Care Note    Plan of Care  Care Plan Reviewed - Updates as Follows    Body Systems    [RN] Integumentary(Resolved)  Current Status(12/05/2017): Puncture site in groin from heart cath. Bruised  arms.  Weekly Goal(12/10/2017): No s/s infection noted  Discharge Goal: Incision healed        Psychosocial    [RN] Coping/Adjustment(Resolved)  Current Status(12/05/2017): Supportive family  Weekly Goal(12/10/2017): Allow opportunity to express concerns regarding current  status.  Discharge Goal: Demonstrates healthy coping strategies.        Safety    [RN] Potential for Injury(Resolved)  Current Status(12/05/2017): Uses call light appropriately  Weekly Goal(12/10/2017): Instruct family/caregivers regarding safety precautions  and need for close supervision.  Discharge Goal: Patient and family will be aware of risk for fall and safety in  home setting.        Sphincter Control    [RN] Bowel and bladder management(Resolved)  Current Status(12/05/2017): 100% Continent  Weekly Goal(12/10/2017): 100% Continent  Discharge Goal: 100% Continent    Signed by: Stephanie Sharma,

## 2017-12-06 NOTE — NURSING NOTE
Flu shot offered prior to discharge - pt states she has already received in October of 2017.  TWYLA Sharma RN

## 2017-12-06 NOTE — PLAN OF CARE
Problem: Patient Care Overview (Adult)  Goal: Plan of Care Review  Outcome: Ongoing (interventions implemented as appropriate)    12/06/17 0153   Coping/Psychosocial Response Interventions   Plan Of Care Reviewed With patient   Patient Care Overview   Progress improving   Outcome Evaluation   Outcome Summary/Follow up Plan Calm and Cooperative. Oxygen at 2 l/min per NC during the night administered. PRN cough medication given for c/o coughing keeping pt up. Pt able to rest after cough medication given.          Problem: Stroke (Ischemic) (Adult)  Goal: Signs and Symptoms of Listed Potential Problems Will be Absent or Manageable (Stroke)  Outcome: Ongoing (interventions implemented as appropriate)    Problem: Fall Risk (Adult)  Goal: Absence of Falls  Outcome: Ongoing (interventions implemented as appropriate)

## 2017-12-06 NOTE — PROGRESS NOTES
Inpatient Rehabilitation Plan of Care Note    Plan of Care  Care Plan Reviewed - No updates at this time.    Sphincter Control    Performed Intervention(s)  Encourage fluid intake  Elimination schedule  Monitor intake and output      Psychosocial    Performed Intervention(s)  Verbalize needs and concerns  Therapeutic environmental set up      Safety    Performed Intervention(s)  Falls prevention protocol  Items within reach  Bed/ Chair alarm.    Signed by: Bárbara De La Rosa RN

## 2017-12-06 NOTE — PROGRESS NOTES
LOS: 17 days   Patient Care Team:  Goran Carr MD as PCP - General (Family Medicine)    Chief Complaint:same    Subjective     History of Present Illness    Subjective    History taken from: patient    Objective     Vital Signs  Temp:  [98.1 °F (36.7 °C)-98.6 °F (37 °C)] 98.6 °F (37 °C)  Heart Rate:  [61-69] 69  Resp:  [16-18] 18  BP: (123-163)/(58-74) 129/71    Objectiveexam unchanged    Results Review:     I reviewed the patient's new clinical results.    Medication Review:     Assessment/Plan     Active Problems:    Stroke      Assessment & Plan Continue to prepare for dc later this am. Med reconcilliation completed    Norman Taylor MD  12/06/17  7:20 AM    Time:

## 2017-12-06 NOTE — PROGRESS NOTES
SECTION GG    Eating Performance Discharge: Patient completed the activities by him/herself  with no assistance from a helper.    Signed by: Stephanie Sharma,

## 2017-12-06 NOTE — PROGRESS NOTES
Patient to d/c home with  today, 12/6. Scheduled to start outpatient PT here at [facility] on Monday, 12/11 at 3:00 p.m. Schedule given to patient today and discussed. [facility] to supply home O2. Patient ready to go home.

## 2017-12-06 NOTE — PLAN OF CARE
Problem: Patient Care Overview (Adult)  Goal: Plan of Care Review  Outcome: Outcome(s) achieved Date Met:  12/06/17 12/06/17 0905   Coping/Psychosocial Response Interventions   Plan Of Care Reviewed With patient;spouse   Patient Care Overview   Progress improving   Outcome Evaluation   Outcome Summary/Follow up Plan Pt prepared for d/c. Home nocturnal O2 to be set up by social work through [facility]. Discharge instructions reviewed with pt and spouse. Discharge NIH 0.       Goal: Adult Individualization and Mutuality  Outcome: Outcome(s) achieved Date Met:  12/06/17  Goal: Discharge Needs Assessment  Outcome: Outcome(s) achieved Date Met:  12/06/17 12/06/17 0905   Discharge Needs Assessment   Concerns To Be Addressed basic needs concerns;adjustment to diagnosis/illness concerns;home safety concerns   Equipment Needed After Discharge walker, rolling   Discharge Disposition (outpatient physical therapy)   Current Health   Anticipated Changes Related to Illness inability to care for self   Living Environment   Transportation Available family or friend will provide;car         Problem: Stroke (Ischemic) (Adult)  Goal: Signs and Symptoms of Listed Potential Problems Will be Absent or Manageable (Stroke)  Outcome: Outcome(s) achieved Date Met:  12/06/17    Problem: Fall Risk (Adult)  Goal: Absence of Falls  Outcome: Outcome(s) achieved Date Met:  12/06/17 12/06/17 0905   Fall Risk (Adult)   Absence of Falls achieves outcome

## 2017-12-06 NOTE — PROGRESS NOTES
Inpatient Rehabilitation Functional Measures Assessment    Functional Measures  KYM Eating:  [facility] Grooming: [facility] Bathing:  [facility] Upper Body Dressing:  [facility] Lower Body Dressing:  [facility] Toileting:  [facility] Bladder Management  Level of Assistance:  [place]  Frequency/Number of Accidents this Shift:  [facility] Bowel Management  Level of Assistance: [place]  Frequency/Number of Accidents this Shift: [facility] Bed/Chair/Wheelchair Transfer:  [facility] Toilet Transfer:  [facility] Tub/Shower Transfer:  [place]    Previously Documented Mode of Locomotion at Discharge: Field  KYM Expected Mode of Locomotion at Discharge: [facility] Walk/Wheelchair:  [facility] Stairs:  [facility] Comprehension:  Auditory comprehension is the usual mode. Comprehension  Score = 6, Modified [place].  Patient comprehends complex/abstract  information in their primary language with only mild difficulty.  KYM Expression:  Vocal expression is the usual mode. Expression Score = 6,  Modified Independent.  Patient expresses complex/abstract information in their  primary language with only mild difficulty with tasks.  KYM Social Interaction:  Social Interaction Score = 6, Modified Independent.  Patient is modified independent for social interaction, requiring: Requires  additional time.  KYM Problem Solving:  Problem Solving Score = 6, Modified [place].  Patient  makes appropriate decisions in order to solve complex problems with mild  difficulty but self-corrects.  KYM Memory:  Memory Score = 6, Modified [place].  Patient is modified  independent for memory, having only mild difficulty and using self-initiated or  environmental cues to remember.    Therapy Mode Minutes  Occupational Therapy: [facility]  Physical Therapy: [facility]  Speech Language Pathology:  [place]    Signed by: Bárbara De La Rosa RN

## 2017-12-06 NOTE — PROGRESS NOTES
Inpatient Rehabilitation Functional Measures Assessment    Functional Measures  KYM Eating:  [facility] Grooming: [facility] Bathing:  [facility] Upper Body Dressing:  [facility] Lower Body Dressing:  [facility] Toileting:  [facility] Bladder Management  Level of Assistance:  [place]  Frequency/Number of Accidents this Shift:  [facility] Bowel Management  Level of Assistance: [place]  Frequency/Number of Accidents this Shift: [facility] Bed/Chair/Wheelchair Transfer:  [facility] Toilet Transfer:  [facility] Tub/Shower Transfer:  [place]    Previously Documented Mode of Locomotion at Discharge: Field  KYM Expected Mode of Locomotion at Discharge: [facility] Walk/Wheelchair:  [facility] Stairs:  [facility] Comprehension:  Auditory comprehension is the usual mode. Comprehension  Score = 6, Modified [place].  Patient comprehends complex/abstract  information in their primary language, requiring: Additional time.  KYM Expression:  Vocal expression is the usual mode. Expression Score = 6,  Modified Independent.  Patient expresses complex/abstract information in their  primary language, requiring:  KYM Social Interaction:  Social Interaction Score = 6, Modified Independent.  Patient is modified independent for social interaction, requiring: Requires  additional time.  KYM Problem Solving:  Activity was not observed.  KYM Memory:  Activity was not observed.    Therapy Mode Minutes  Occupational Therapy: [place]  Physical Therapy: [place]  Speech Language Pathology:  [place]    Signed by: Stephanie Sharma

## 2017-12-06 NOTE — NURSING NOTE
Reviewed written discharge instructions with pt and spouse.  Both verbalized understanding - pt's spouse able to repeat back medication instructions.  Both repeated back instructions for home O2.  Waiting on cardiology to place Zio patch prior to d/c.  TWYLA Sharma RN

## 2017-12-07 ENCOUNTER — APPOINTMENT (OUTPATIENT)
Dept: GENERAL RADIOLOGY | Facility: HOSPITAL | Age: 77
End: 2017-12-07

## 2017-12-07 ENCOUNTER — HOSPITAL ENCOUNTER (OUTPATIENT)
Facility: HOSPITAL | Age: 77
Setting detail: OBSERVATION
Discharge: NURSING, REHAB OR LONG-TERM CARE | End: 2017-12-08
Attending: EMERGENCY MEDICINE | Admitting: HOSPITALIST

## 2017-12-07 DIAGNOSIS — M25.00 HEMARTHROSIS: Primary | ICD-10-CM

## 2017-12-07 DIAGNOSIS — R26.89 DECREASED MOBILITY: ICD-10-CM

## 2017-12-07 DIAGNOSIS — Z74.09 IMMOBILITY: ICD-10-CM

## 2017-12-07 PROBLEM — E78.5 HLD (HYPERLIPIDEMIA): Status: ACTIVE | Noted: 2017-12-07

## 2017-12-07 PROBLEM — I10 HTN (HYPERTENSION): Status: ACTIVE | Noted: 2017-12-07

## 2017-12-07 LAB
ANION GAP SERPL CALCULATED.3IONS-SCNC: 11.6 MMOL/L
APPEARANCE FLD: ABNORMAL
APTT PPP: 20 SECONDS (ref 22.7–35.4)
BASOPHILS # BLD AUTO: 0.02 10*3/MM3 (ref 0–0.2)
BASOPHILS NFR BLD AUTO: 0.5 % (ref 0–1.5)
BUN BLD-MCNC: 13 MG/DL (ref 8–23)
BUN/CREAT SERPL: 16.5 (ref 7–25)
CALCIUM SPEC-SCNC: 9.3 MG/DL (ref 8.6–10.5)
CHLORIDE SERPL-SCNC: 104 MMOL/L (ref 98–107)
CO2 SERPL-SCNC: 28.4 MMOL/L (ref 22–29)
COLOR FLD: ABNORMAL
CREAT BLD-MCNC: 0.79 MG/DL (ref 0.57–1)
CRYSTALS FLD MICRO: NORMAL
DEPRECATED RDW RBC AUTO: 52.8 FL (ref 37–54)
EOSINOPHIL # BLD AUTO: 0.25 10*3/MM3 (ref 0–0.7)
EOSINOPHIL NFR BLD AUTO: 5.7 % (ref 0.3–6.2)
ERYTHROCYTE [DISTWIDTH] IN BLOOD BY AUTOMATED COUNT: 13.9 % (ref 11.7–13)
GFR SERPL CREATININE-BSD FRML MDRD: 70 ML/MIN/1.73
GLUCOSE BLD-MCNC: 132 MG/DL (ref 65–99)
HCT VFR BLD AUTO: 31.1 % (ref 35.6–45.5)
HGB BLD-MCNC: 9.6 G/DL (ref 11.9–15.5)
IMM GRANULOCYTES # BLD: 0 10*3/MM3 (ref 0–0.03)
IMM GRANULOCYTES NFR BLD: 0 % (ref 0–0.5)
INR PPP: 0.98 (ref 0.9–1.1)
LYMPHOCYTES # BLD AUTO: 1.23 10*3/MM3 (ref 0.9–4.8)
LYMPHOCYTES NFR BLD AUTO: 28 % (ref 19.6–45.3)
LYMPHOCYTES NFR FLD MANUAL: 14 %
MCH RBC QN AUTO: 32.2 PG (ref 26.9–32)
MCHC RBC AUTO-ENTMCNC: 30.9 G/DL (ref 32.4–36.3)
MCV RBC AUTO: 104.4 FL (ref 80.5–98.2)
MONOCYTES # BLD AUTO: 0.44 10*3/MM3 (ref 0.2–1.2)
MONOCYTES NFR BLD AUTO: 10 % (ref 5–12)
MONOCYTES NFR FLD: 8 %
NEUTROPHILS # BLD AUTO: 2.46 10*3/MM3 (ref 1.9–8.1)
NEUTROPHILS NFR BLD AUTO: 55.8 % (ref 42.7–76)
NEUTROPHILS NFR FLD MANUAL: 78 %
PLATELET # BLD AUTO: 233 10*3/MM3 (ref 140–500)
PMV BLD AUTO: 10.3 FL (ref 6–12)
POTASSIUM BLD-SCNC: 3.7 MMOL/L (ref 3.5–5.2)
PROTHROMBIN TIME: 12.6 SECONDS (ref 11.7–14.2)
RBC # BLD AUTO: 2.98 10*6/MM3 (ref 3.9–5.2)
RBC # FLD AUTO: ABNORMAL /MM3
SODIUM BLD-SCNC: 144 MMOL/L (ref 136–145)
WBC # FLD: 820 /MM3
WBC NRBC COR # BLD: 4.4 10*3/MM3 (ref 4.5–10.7)

## 2017-12-07 PROCEDURE — G0378 HOSPITAL OBSERVATION PER HR: HCPCS

## 2017-12-07 PROCEDURE — 85730 THROMBOPLASTIN TIME PARTIAL: CPT | Performed by: HOSPITALIST

## 2017-12-07 PROCEDURE — 85610 PROTHROMBIN TIME: CPT | Performed by: HOSPITALIST

## 2017-12-07 PROCEDURE — 99284 EMERGENCY DEPT VISIT MOD MDM: CPT

## 2017-12-07 PROCEDURE — 73560 X-RAY EXAM OF KNEE 1 OR 2: CPT

## 2017-12-07 PROCEDURE — 89060 EXAM SYNOVIAL FLUID CRYSTALS: CPT | Performed by: NURSE PRACTITIONER

## 2017-12-07 PROCEDURE — 71010 HC CHEST PA OR AP: CPT

## 2017-12-07 PROCEDURE — 87015 SPECIMEN INFECT AGNT CONCNTJ: CPT | Performed by: NURSE PRACTITIONER

## 2017-12-07 PROCEDURE — 73552 X-RAY EXAM OF FEMUR 2/>: CPT

## 2017-12-07 PROCEDURE — 80048 BASIC METABOLIC PNL TOTAL CA: CPT | Performed by: HOSPITALIST

## 2017-12-07 PROCEDURE — 87205 SMEAR GRAM STAIN: CPT | Performed by: NURSE PRACTITIONER

## 2017-12-07 PROCEDURE — 87070 CULTURE OTHR SPECIMN AEROBIC: CPT | Performed by: NURSE PRACTITIONER

## 2017-12-07 PROCEDURE — 85025 COMPLETE CBC W/AUTO DIFF WBC: CPT | Performed by: HOSPITALIST

## 2017-12-07 PROCEDURE — 89051 BODY FLUID CELL COUNT: CPT | Performed by: NURSE PRACTITIONER

## 2017-12-07 RX ORDER — DIPHENHYDRAMINE HCL 25 MG
50 CAPSULE ORAL
COMMUNITY
End: 2017-12-08 | Stop reason: HOSPADM

## 2017-12-07 RX ORDER — BISACODYL 5 MG/1
5 TABLET, DELAYED RELEASE ORAL DAILY PRN
Status: DISCONTINUED | OUTPATIENT
Start: 2017-12-07 | End: 2017-12-08 | Stop reason: HOSPADM

## 2017-12-07 RX ORDER — ATENOLOL 50 MG/1
TABLET ORAL
Qty: 180 TABLET | Refills: 3 | Status: SHIPPED | OUTPATIENT
Start: 2017-12-07 | End: 2017-12-07 | Stop reason: SDUPTHER

## 2017-12-07 RX ORDER — GABAPENTIN 800 MG/1
800 TABLET ORAL EVERY MORNING
Status: ON HOLD | COMMUNITY
End: 2017-12-08

## 2017-12-07 RX ORDER — ACETAMINOPHEN 325 MG/1
650 TABLET ORAL EVERY 4 HOURS PRN
Status: DISCONTINUED | OUTPATIENT
Start: 2017-12-07 | End: 2017-12-08 | Stop reason: HOSPADM

## 2017-12-07 RX ORDER — UREA 10 %
2 LOTION (ML) TOPICAL NIGHTLY
Status: DISCONTINUED | OUTPATIENT
Start: 2017-12-08 | End: 2017-12-08 | Stop reason: HOSPADM

## 2017-12-07 RX ORDER — BISACODYL 10 MG
10 SUPPOSITORY, RECTAL RECTAL DAILY PRN
Status: DISCONTINUED | OUTPATIENT
Start: 2017-12-07 | End: 2017-12-08 | Stop reason: HOSPADM

## 2017-12-07 RX ORDER — PANTOPRAZOLE SODIUM 40 MG/1
40 TABLET, DELAYED RELEASE ORAL DAILY
COMMUNITY
End: 2018-02-13 | Stop reason: SDUPTHER

## 2017-12-07 RX ORDER — ASPIRIN 325 MG
81 TABLET ORAL DAILY
COMMUNITY
End: 2017-12-20 | Stop reason: ALTCHOICE

## 2017-12-07 RX ORDER — ONDANSETRON 4 MG/1
4 TABLET, FILM COATED ORAL EVERY 6 HOURS PRN
Status: DISCONTINUED | OUTPATIENT
Start: 2017-12-07 | End: 2017-12-08 | Stop reason: HOSPADM

## 2017-12-07 RX ORDER — LEVOTHYROXINE SODIUM 0.1 MG/1
100 TABLET ORAL DAILY
COMMUNITY
End: 2018-12-07

## 2017-12-07 RX ORDER — GABAPENTIN 400 MG/1
1600 CAPSULE ORAL NIGHTLY
Status: DISCONTINUED | OUTPATIENT
Start: 2017-12-08 | End: 2017-12-08 | Stop reason: HOSPADM

## 2017-12-07 RX ORDER — OXYCODONE HYDROCHLORIDE AND ACETAMINOPHEN 5; 325 MG/1; MG/1
1 TABLET ORAL EVERY 4 HOURS PRN
Status: DISCONTINUED | OUTPATIENT
Start: 2017-12-07 | End: 2017-12-08 | Stop reason: HOSPADM

## 2017-12-07 RX ORDER — GABAPENTIN 800 MG/1
1600 TABLET ORAL NIGHTLY
COMMUNITY
End: 2017-12-08 | Stop reason: HOSPADM

## 2017-12-07 RX ORDER — SODIUM CHLORIDE 0.9 % (FLUSH) 0.9 %
1-10 SYRINGE (ML) INJECTION AS NEEDED
Status: DISCONTINUED | OUTPATIENT
Start: 2017-12-07 | End: 2017-12-08 | Stop reason: HOSPADM

## 2017-12-07 RX ORDER — HYDROMORPHONE HYDROCHLORIDE 1 MG/ML
0.25 INJECTION, SOLUTION INTRAMUSCULAR; INTRAVENOUS; SUBCUTANEOUS
Status: DISCONTINUED | OUTPATIENT
Start: 2017-12-07 | End: 2017-12-08 | Stop reason: HOSPADM

## 2017-12-07 RX ORDER — SODIUM CHLORIDE 0.9 % (FLUSH) 0.9 %
10 SYRINGE (ML) INJECTION AS NEEDED
Status: DISCONTINUED | OUTPATIENT
Start: 2017-12-07 | End: 2017-12-07

## 2017-12-07 RX ORDER — ATENOLOL 100 MG/1
50 TABLET ORAL DAILY
COMMUNITY
End: 2020-12-18 | Stop reason: SDUPTHER

## 2017-12-07 RX ORDER — ASPIRIN 325 MG
325 TABLET ORAL DAILY
Status: DISCONTINUED | OUTPATIENT
Start: 2017-12-08 | End: 2017-12-08 | Stop reason: HOSPADM

## 2017-12-07 RX ORDER — GABAPENTIN 400 MG/1
800 CAPSULE ORAL EVERY MORNING
Status: DISCONTINUED | OUTPATIENT
Start: 2017-12-08 | End: 2017-12-08 | Stop reason: HOSPADM

## 2017-12-07 RX ORDER — FUROSEMIDE 20 MG/1
20 TABLET ORAL DAILY
Status: DISCONTINUED | OUTPATIENT
Start: 2017-12-08 | End: 2017-12-08 | Stop reason: HOSPADM

## 2017-12-07 RX ORDER — ONDANSETRON 2 MG/ML
4 INJECTION INTRAMUSCULAR; INTRAVENOUS EVERY 6 HOURS PRN
Status: DISCONTINUED | OUTPATIENT
Start: 2017-12-07 | End: 2017-12-08 | Stop reason: HOSPADM

## 2017-12-07 RX ORDER — ONDANSETRON 4 MG/1
4 TABLET, ORALLY DISINTEGRATING ORAL ONCE
Status: COMPLETED | OUTPATIENT
Start: 2017-12-07 | End: 2017-12-07

## 2017-12-07 RX ORDER — NALOXONE HCL 0.4 MG/ML
0.4 VIAL (ML) INJECTION
Status: DISCONTINUED | OUTPATIENT
Start: 2017-12-07 | End: 2017-12-08 | Stop reason: HOSPADM

## 2017-12-07 RX ORDER — ESCITALOPRAM OXALATE 20 MG/1
20 TABLET ORAL DAILY
COMMUNITY
End: 2019-02-26 | Stop reason: SDUPTHER

## 2017-12-07 RX ORDER — LEVOTHYROXINE SODIUM 0.1 MG/1
100 TABLET ORAL
Status: DISCONTINUED | OUTPATIENT
Start: 2017-12-08 | End: 2017-12-08 | Stop reason: HOSPADM

## 2017-12-07 RX ORDER — ESCITALOPRAM OXALATE 20 MG/1
20 TABLET ORAL DAILY
Status: DISCONTINUED | OUTPATIENT
Start: 2017-12-08 | End: 2017-12-08 | Stop reason: HOSPADM

## 2017-12-07 RX ORDER — CLOPIDOGREL BISULFATE 75 MG/1
75 TABLET ORAL DAILY
Status: DISCONTINUED | OUTPATIENT
Start: 2017-12-08 | End: 2017-12-08 | Stop reason: HOSPADM

## 2017-12-07 RX ORDER — ROSUVASTATIN CALCIUM 20 MG/1
20 TABLET, COATED ORAL DAILY
Status: DISCONTINUED | OUTPATIENT
Start: 2017-12-08 | End: 2017-12-08 | Stop reason: HOSPADM

## 2017-12-07 RX ORDER — LIDOCAINE HYDROCHLORIDE AND EPINEPHRINE 10; 10 MG/ML; UG/ML
10 INJECTION, SOLUTION INFILTRATION; PERINEURAL ONCE
Status: COMPLETED | OUTPATIENT
Start: 2017-12-07 | End: 2017-12-07

## 2017-12-07 RX ORDER — PANTOPRAZOLE SODIUM 40 MG/1
40 TABLET, DELAYED RELEASE ORAL
Status: DISCONTINUED | OUTPATIENT
Start: 2017-12-08 | End: 2017-12-08 | Stop reason: HOSPADM

## 2017-12-07 RX ORDER — HYDROCODONE BITARTRATE AND ACETAMINOPHEN 5; 325 MG/1; MG/1
1 TABLET ORAL EVERY 6 HOURS PRN
Qty: 12 TABLET | Refills: 0 | Status: SHIPPED | OUTPATIENT
Start: 2017-12-07 | End: 2017-12-08 | Stop reason: HOSPADM

## 2017-12-07 RX ORDER — OXYCODONE HYDROCHLORIDE AND ACETAMINOPHEN 5; 325 MG/1; MG/1
1 TABLET ORAL ONCE
Status: COMPLETED | OUTPATIENT
Start: 2017-12-07 | End: 2017-12-07

## 2017-12-07 RX ORDER — ONDANSETRON 4 MG/1
4 TABLET, ORALLY DISINTEGRATING ORAL EVERY 6 HOURS PRN
Status: DISCONTINUED | OUTPATIENT
Start: 2017-12-07 | End: 2017-12-08 | Stop reason: HOSPADM

## 2017-12-07 RX ORDER — ATENOLOL 50 MG/1
100 TABLET ORAL DAILY
Status: DISCONTINUED | OUTPATIENT
Start: 2017-12-08 | End: 2017-12-08 | Stop reason: HOSPADM

## 2017-12-07 RX ORDER — ROSUVASTATIN CALCIUM 20 MG/1
20 TABLET, COATED ORAL DAILY
COMMUNITY
End: 2018-10-16

## 2017-12-07 RX ADMIN — OXYCODONE HYDROCHLORIDE AND ACETAMINOPHEN 1 TABLET: 5; 325 TABLET ORAL at 14:46

## 2017-12-07 RX ADMIN — ONDANSETRON 4 MG: 4 TABLET, ORALLY DISINTEGRATING ORAL at 14:45

## 2017-12-07 RX ADMIN — LIDOCAINE HYDROCHLORIDE AND EPINEPHRINE 10 ML: 10; 10 INJECTION, SOLUTION INFILTRATION; PERINEURAL at 17:43

## 2017-12-08 ENCOUNTER — TRANSCRIBE ORDERS (OUTPATIENT)
Dept: PHYSICAL MEDICINE AND REHAB | Facility: HOSPITAL | Age: 77
End: 2017-12-08

## 2017-12-08 VITALS
BODY MASS INDEX: 28.85 KG/M2 | RESPIRATION RATE: 18 BRPM | OXYGEN SATURATION: 96 % | TEMPERATURE: 98.9 F | HEIGHT: 64 IN | WEIGHT: 169 LBS | DIASTOLIC BLOOD PRESSURE: 68 MMHG | HEART RATE: 64 BPM | SYSTOLIC BLOOD PRESSURE: 112 MMHG

## 2017-12-08 DIAGNOSIS — R42 VERTIGO: ICD-10-CM

## 2017-12-08 DIAGNOSIS — Z86.73 HISTORY OF RECENT STROKE: Primary | ICD-10-CM

## 2017-12-08 PROBLEM — T45.7X5A: Status: ACTIVE | Noted: 2017-12-08

## 2017-12-08 LAB
ANION GAP SERPL CALCULATED.3IONS-SCNC: 11 MMOL/L
BUN BLD-MCNC: 13 MG/DL (ref 8–23)
BUN/CREAT SERPL: 17.6 (ref 7–25)
CALCIUM SPEC-SCNC: 8.9 MG/DL (ref 8.6–10.5)
CHLORIDE SERPL-SCNC: 104 MMOL/L (ref 98–107)
CO2 SERPL-SCNC: 27 MMOL/L (ref 22–29)
CREAT BLD-MCNC: 0.74 MG/DL (ref 0.57–1)
DEPRECATED RDW RBC AUTO: 51.5 FL (ref 37–54)
ERYTHROCYTE [DISTWIDTH] IN BLOOD BY AUTOMATED COUNT: 13.6 % (ref 11.7–13)
GFR SERPL CREATININE-BSD FRML MDRD: 76 ML/MIN/1.73
GLUCOSE BLD-MCNC: 109 MG/DL (ref 65–99)
HCT VFR BLD AUTO: 33.1 % (ref 35.6–45.5)
HGB BLD-MCNC: 10.2 G/DL (ref 11.9–15.5)
MAGNESIUM SERPL-MCNC: 2.5 MG/DL (ref 1.6–2.4)
MCH RBC QN AUTO: 32.3 PG (ref 26.9–32)
MCHC RBC AUTO-ENTMCNC: 30.8 G/DL (ref 32.4–36.3)
MCV RBC AUTO: 104.7 FL (ref 80.5–98.2)
PHOSPHATE SERPL-MCNC: 3.7 MG/DL (ref 2.5–4.5)
PLATELET # BLD AUTO: 241 10*3/MM3 (ref 140–500)
PMV BLD AUTO: 10.5 FL (ref 6–12)
POTASSIUM BLD-SCNC: 4.1 MMOL/L (ref 3.5–5.2)
RBC # BLD AUTO: 3.16 10*6/MM3 (ref 3.9–5.2)
SODIUM BLD-SCNC: 142 MMOL/L (ref 136–145)
WBC NRBC COR # BLD: 7.11 10*3/MM3 (ref 4.5–10.7)

## 2017-12-08 PROCEDURE — 97162 PT EVAL MOD COMPLEX 30 MIN: CPT

## 2017-12-08 PROCEDURE — 80048 BASIC METABOLIC PNL TOTAL CA: CPT | Performed by: HOSPITALIST

## 2017-12-08 PROCEDURE — 84100 ASSAY OF PHOSPHORUS: CPT | Performed by: HOSPITALIST

## 2017-12-08 PROCEDURE — G8979 MOBILITY GOAL STATUS: HCPCS

## 2017-12-08 PROCEDURE — G0378 HOSPITAL OBSERVATION PER HR: HCPCS

## 2017-12-08 PROCEDURE — 85027 COMPLETE CBC AUTOMATED: CPT | Performed by: HOSPITALIST

## 2017-12-08 PROCEDURE — G8978 MOBILITY CURRENT STATUS: HCPCS

## 2017-12-08 PROCEDURE — 83735 ASSAY OF MAGNESIUM: CPT | Performed by: HOSPITALIST

## 2017-12-08 PROCEDURE — 99218 PR INITIAL OBSERVATION CARE/DAY 30 MINUTES: CPT | Performed by: ORTHOPAEDIC SURGERY

## 2017-12-08 RX ORDER — OXYCODONE HYDROCHLORIDE AND ACETAMINOPHEN 5; 325 MG/1; MG/1
1 TABLET ORAL EVERY 4 HOURS PRN
Qty: 12 TABLET | Refills: 0 | Status: SHIPPED | OUTPATIENT
Start: 2017-12-08 | End: 2017-12-17

## 2017-12-08 RX ORDER — GABAPENTIN 800 MG/1
TABLET ORAL
Qty: 8 TABLET | Refills: 0 | Status: SHIPPED | OUTPATIENT
Start: 2017-12-08 | End: 2018-03-16 | Stop reason: SDUPTHER

## 2017-12-08 RX ORDER — BUDESONIDE AND FORMOTEROL FUMARATE DIHYDRATE 160; 4.5 UG/1; UG/1
2 AEROSOL RESPIRATORY (INHALATION)
Refills: 12
Start: 2017-12-08 | End: 2019-11-05 | Stop reason: ALTCHOICE

## 2017-12-08 RX ADMIN — PANTOPRAZOLE SODIUM 40 MG: 40 TABLET, DELAYED RELEASE ORAL at 06:25

## 2017-12-08 RX ADMIN — ATENOLOL 100 MG: 50 TABLET ORAL at 08:33

## 2017-12-08 RX ADMIN — LEVOTHYROXINE SODIUM 100 MCG: 100 TABLET ORAL at 06:24

## 2017-12-08 RX ADMIN — CLOPIDOGREL 75 MG: 75 TABLET, FILM COATED ORAL at 08:33

## 2017-12-08 RX ADMIN — ASPIRIN 325 MG: 325 TABLET ORAL at 08:33

## 2017-12-08 RX ADMIN — ESCITALOPRAM 20 MG: 20 TABLET, FILM COATED ORAL at 08:33

## 2017-12-08 RX ADMIN — ROSUVASTATIN CALCIUM 20 MG: 20 TABLET, FILM COATED ORAL at 08:34

## 2017-12-08 RX ADMIN — OXYCODONE HYDROCHLORIDE AND ACETAMINOPHEN 1 TABLET: 5; 325 TABLET ORAL at 12:10

## 2017-12-08 RX ADMIN — FUROSEMIDE 20 MG: 20 TABLET ORAL at 08:34

## 2017-12-08 RX ADMIN — GABAPENTIN 800 MG: 400 CAPSULE ORAL at 06:25

## 2017-12-08 RX ADMIN — OXYCODONE HYDROCHLORIDE AND ACETAMINOPHEN 1 TABLET: 5; 325 TABLET ORAL at 08:34

## 2017-12-08 RX ADMIN — OXYCODONE HYDROCHLORIDE AND ACETAMINOPHEN 1 TABLET: 5; 325 TABLET ORAL at 04:49

## 2017-12-08 RX ADMIN — OXYCODONE HYDROCHLORIDE AND ACETAMINOPHEN 1 TABLET: 5; 325 TABLET ORAL at 17:42

## 2017-12-10 LAB
BACTERIA FLD CULT: NORMAL
GRAM STN SPEC: NORMAL
GRAM STN SPEC: NORMAL

## 2017-12-11 ENCOUNTER — APPOINTMENT (OUTPATIENT)
Dept: PHYSICAL THERAPY | Facility: HOSPITAL | Age: 77
End: 2017-12-11
Attending: PHYSICAL MEDICINE & REHABILITATION

## 2017-12-12 ENCOUNTER — EPISODE CHANGES (OUTPATIENT)
Dept: CASE MANAGEMENT | Facility: OTHER | Age: 77
End: 2017-12-12

## 2017-12-13 ENCOUNTER — APPOINTMENT (OUTPATIENT)
Dept: PHYSICAL THERAPY | Facility: HOSPITAL | Age: 77
End: 2017-12-13
Attending: PHYSICAL MEDICINE & REHABILITATION

## 2017-12-14 ENCOUNTER — PATIENT OUTREACH (OUTPATIENT)
Dept: CASE MANAGEMENT | Facility: OTHER | Age: 77
End: 2017-12-14

## 2017-12-15 NOTE — PROGRESS NOTES
Changed to match Epic documentation.                         Week 1                       Week 2  Speech-Language  Individual           -                            300    Signed by: Venkatesh Quinones RN

## 2017-12-15 NOTE — PROGRESS NOTES
PPS CMG Coordinator  Inpatient Rehabilitation Discharge    Mode of Locomotion: Walking.    Discharge Against Medical Advice:  No.  Discharge Information  Patient Discharged Alive:  Yes  Discharge Destination/Living Setting: Home.  At discharge, the patient was discharged to live (with) (02)  Family / Relatives    Diagnosis for Interruption/Death: ICD    Impairment Group: Stroke: 01.2 Right Body Involvement (Left Brain)    Comorbidities: ICD    Complications: ICD      KYM Bladder Accidents: 0  - Accidents.  Bladder Score = 7. Patient has not had an accident.  KYM Bowel Accident: 0  - Accidents.  Bowel Score = 6. Patient has no accidents, but uses a device/medications.      QUALITY INDICATORS  Health Conditions: Fall(s) Since Admission:  No  Section M. Skin Conditions Discharge:  Unhealed Pressure Ulcer(s) at Stage 1 or  Higher:  No    . Current Number of Unhealed Pressure Ulcers  [place]    . Worsening in Pressure Ulcer Status Since Admission:  [place]    . Healed Pressure Ulcer(s):    Number of Healed Stage 1: 0  Number of Healed Stage 2: 0  Number of Healed Stage 3: 0  Number of Healed Stage 4: 0    . Influenza Vaccine - Discharge  Received in this facility for this year's influenza vaccination season:  No.  Influenza Vaccine Not Received Due To: Received outside of this facility.    Date Influenza Vaccine Received (if applicable)    Signed by: Venkatesh Quinones RN

## 2017-12-18 ENCOUNTER — APPOINTMENT (OUTPATIENT)
Dept: PHYSICAL THERAPY | Facility: HOSPITAL | Age: 77
End: 2017-12-18
Attending: PHYSICAL MEDICINE & REHABILITATION

## 2017-12-20 ENCOUNTER — APPOINTMENT (OUTPATIENT)
Dept: PHYSICAL THERAPY | Facility: HOSPITAL | Age: 77
End: 2017-12-20
Attending: PHYSICAL MEDICINE & REHABILITATION

## 2017-12-20 ENCOUNTER — OFFICE VISIT (OUTPATIENT)
Dept: FAMILY MEDICINE CLINIC | Facility: CLINIC | Age: 77
End: 2017-12-20

## 2017-12-20 VITALS
DIASTOLIC BLOOD PRESSURE: 76 MMHG | HEIGHT: 64 IN | HEART RATE: 66 BPM | BODY MASS INDEX: 30.05 KG/M2 | OXYGEN SATURATION: 98 % | WEIGHT: 176 LBS | SYSTOLIC BLOOD PRESSURE: 120 MMHG

## 2017-12-20 DIAGNOSIS — M25.00 HEMARTHROSIS: ICD-10-CM

## 2017-12-20 DIAGNOSIS — J44.1 COPD EXACERBATION (HCC): ICD-10-CM

## 2017-12-20 DIAGNOSIS — I63.512 LEFT MIDDLE CEREBRAL ARTERY STROKE (HCC): Primary | ICD-10-CM

## 2017-12-20 PROCEDURE — 99495 TRANSJ CARE MGMT MOD F2F 14D: CPT | Performed by: FAMILY MEDICINE

## 2017-12-20 RX ORDER — ASPIRIN 81 MG/1
81 TABLET ORAL DAILY
COMMUNITY
End: 2022-04-16 | Stop reason: HOSPADM

## 2017-12-20 NOTE — PROGRESS NOTES
Transitional Care Follow Up Visit  Subjective     Paloma Carr is a 77 y.o. female who presents for a transitional care management visit.    Within 48 business hours after discharge our office contacted her via telephone to coordinate her care and needs.      I reviewed and discussed the details of that call along with the discharge summary, hospital problems, inpatient lab results, inpatient diagnostic studies, and consultation reports with Paloma.     Current outpatient and discharge medications have been reconciled for the patient.    Date of TCM Phone Call 12/6/2017   [facility]   Date of Admission 11/19/2017   [place] [facility] [place]     Risk for Readmission (LACE) Score: 7    History of Present Illness   Course During Hospital Stay:     Avelina was  admitted to [facility] [place], operated by [facility] on 12/7/2017 with a painful swollen right knee.  She was discharged from the ICU service on 11/1917 after an acute stroke which required emergent thrombectomy.  She went to subacute rehabilitation and was just home for a day or 2.  She noticed swelling in the right knee which worsened and called me and I advised her to go to the emergency room.  She was seen by orthopedics and had joint aspiration.  Her knee is feeling better.  She is still fairly weak.  She then went rehab for about a week at [facility].  She is on Symbicort /Combivewnt for her COPD .  She has suspected sleep apnea.  She has required oxygen at night prior to this admission.    Because of the nature of her stroke and the emergent thrombectomy last month, dual antiplatelet therapy is recommended to continue.        The following portions of the patient's history were reviewed and updated as appropriate: allergies, current medications, past medical history, past social history, past surgical history and problem list.    Review of Systems   Constitutional: Positive for fatigue.   Musculoskeletal: Positive for joint swelling.       Objective    Physical Exam   Constitutional: She appears well-developed and well-nourished.   Eyes: EOM are normal. Pupils are equal, round, and reactive to light.   Cardiovascular: Normal rate and regular rhythm.    Pulmonary/Chest: Effort normal. She has no wheezes.   Musculoskeletal: Normal range of motion. She exhibits edema.   Nursing note and vitals reviewed.      Assessment/Plan   Paloma was seen today for transitional care management.    Diagnoses and all orders for this visit:    Left middle cerebral artery stroke  Stable today and doing well post rehab.    Hemarthrosis  Improved with rest    COPD exacerbation  Will be followed by Dr. Barillas tomorrow.

## 2017-12-21 ENCOUNTER — TELEPHONE (OUTPATIENT)
Dept: FAMILY MEDICINE CLINIC | Facility: CLINIC | Age: 77
End: 2017-12-21

## 2017-12-21 DIAGNOSIS — I63.9 CEREBROVASCULAR ACCIDENT (CVA), UNSPECIFIED MECHANISM (HCC): Primary | ICD-10-CM

## 2017-12-21 NOTE — TELEPHONE ENCOUNTER
----- Message from Charlette Mc sent at 12/21/2017  8:46 AM EST -----  [facility] called, patient does not meet criteria for home health, she is not considered home bound,since they tried to schedule her multiple times but patient was not at home.  Home Health has suggested to the patient to try outpatient therapy at the hospital.  Patient has agreed and is asking to have order placed for outpatient therapy.

## 2017-12-22 ENCOUNTER — PATIENT OUTREACH (OUTPATIENT)
Dept: CASE MANAGEMENT | Facility: OTHER | Age: 77
End: 2017-12-22

## 2017-12-22 NOTE — OUTREACH NOTE
Pt. Was discharged [place] [facility] 12/16/17, she had Home Health orders but due to not being homebound she now will attend OP physical tx. Explained role of Care Advisor and contact information given to patient.No other questions or concerns voiced at this time.

## 2017-12-25 ENCOUNTER — APPOINTMENT (OUTPATIENT)
Dept: PHYSICAL THERAPY | Facility: HOSPITAL | Age: 77
End: 2017-12-25
Attending: PHYSICAL MEDICINE & REHABILITATION

## 2017-12-27 ENCOUNTER — APPOINTMENT (OUTPATIENT)
Dept: PHYSICAL THERAPY | Facility: HOSPITAL | Age: 77
End: 2017-12-27
Attending: PHYSICAL MEDICINE & REHABILITATION

## 2017-12-27 RX ORDER — ROSUVASTATIN CALCIUM 20 MG/1
TABLET, COATED ORAL
Qty: 90 TABLET | Refills: 0 | Status: SHIPPED | OUTPATIENT
Start: 2017-12-27 | End: 2018-02-13 | Stop reason: SDUPTHER

## 2017-12-28 PROCEDURE — 0298T ZIO PATCH: CPT | Performed by: INTERNAL MEDICINE

## 2018-01-03 ENCOUNTER — APPOINTMENT (OUTPATIENT)
Dept: PHYSICAL THERAPY | Facility: HOSPITAL | Age: 78
End: 2018-01-03
Attending: PHYSICAL MEDICINE & REHABILITATION

## 2018-01-03 DIAGNOSIS — I63.512 LEFT MIDDLE CEREBRAL ARTERY STROKE (HCC): Primary | ICD-10-CM

## 2018-01-05 ENCOUNTER — TELEPHONE (OUTPATIENT)
Dept: SURGERY | Facility: CLINIC | Age: 78
End: 2018-01-05

## 2018-01-05 NOTE — TELEPHONE ENCOUNTER
June 5, 2018 note from Dr. Linden Martinez.  He plans to complete the course of letrozole in late 2019.

## 2018-01-08 ENCOUNTER — APPOINTMENT (OUTPATIENT)
Dept: PHYSICAL THERAPY | Facility: HOSPITAL | Age: 78
End: 2018-01-08
Attending: PHYSICAL MEDICINE & REHABILITATION

## 2018-01-10 ENCOUNTER — APPOINTMENT (OUTPATIENT)
Dept: PHYSICAL THERAPY | Facility: HOSPITAL | Age: 78
End: 2018-01-10
Attending: PHYSICAL MEDICINE & REHABILITATION

## 2018-01-15 ENCOUNTER — APPOINTMENT (OUTPATIENT)
Dept: PHYSICAL THERAPY | Facility: HOSPITAL | Age: 78
End: 2018-01-15
Attending: PHYSICAL MEDICINE & REHABILITATION

## 2018-01-17 ENCOUNTER — APPOINTMENT (OUTPATIENT)
Dept: PHYSICAL THERAPY | Facility: HOSPITAL | Age: 78
End: 2018-01-17
Attending: PHYSICAL MEDICINE & REHABILITATION

## 2018-01-22 ENCOUNTER — APPOINTMENT (OUTPATIENT)
Dept: PHYSICAL THERAPY | Facility: HOSPITAL | Age: 78
End: 2018-01-22
Attending: PHYSICAL MEDICINE & REHABILITATION

## 2018-01-22 ENCOUNTER — HOSPITAL ENCOUNTER (OUTPATIENT)
Dept: PHYSICAL THERAPY | Facility: HOSPITAL | Age: 78
Setting detail: THERAPIES SERIES
Discharge: HOME | End: 2018-01-22

## 2018-01-22 ENCOUNTER — HOSPITAL ENCOUNTER (OUTPATIENT)
Dept: OCCUPATIONAL THERAPY | Facility: HOSPITAL | Age: 78
Setting detail: THERAPIES SERIES
Discharge: HOME | End: 2018-01-22

## 2018-01-22 DIAGNOSIS — I69.854: Primary | ICD-10-CM

## 2018-01-22 DIAGNOSIS — I63.512 LEFT MIDDLE CEREBRAL ARTERY STROKE (HCC): Primary | ICD-10-CM

## 2018-01-22 PROCEDURE — G8979 MOBILITY GOAL STATUS: HCPCS

## 2018-01-22 PROCEDURE — 97165 OT EVAL LOW COMPLEX 30 MIN: CPT | Performed by: OCCUPATIONAL THERAPIST

## 2018-01-22 PROCEDURE — G8987 SELF CARE CURRENT STATUS: HCPCS | Performed by: OCCUPATIONAL THERAPIST

## 2018-01-22 PROCEDURE — 97162 PT EVAL MOD COMPLEX 30 MIN: CPT

## 2018-01-22 PROCEDURE — G8988 SELF CARE GOAL STATUS: HCPCS | Performed by: OCCUPATIONAL THERAPIST

## 2018-01-22 PROCEDURE — G8989 SELF CARE D/C STATUS: HCPCS | Performed by: OCCUPATIONAL THERAPIST

## 2018-01-22 PROCEDURE — G8980 MOBILITY D/C STATUS: HCPCS

## 2018-01-22 PROCEDURE — G8978 MOBILITY CURRENT STATUS: HCPCS

## 2018-01-22 RX ORDER — FUROSEMIDE 20 MG/1
TABLET ORAL
Qty: 180 TABLET | Refills: 1 | Status: SHIPPED | OUTPATIENT
Start: 2018-01-22 | End: 2018-02-13 | Stop reason: SDUPTHER

## 2018-01-22 RX ORDER — PANTOPRAZOLE SODIUM 40 MG/1
TABLET, DELAYED RELEASE ORAL
Qty: 90 TABLET | Refills: 1 | Status: SHIPPED | OUTPATIENT
Start: 2018-01-22 | End: 2018-07-27 | Stop reason: SDUPTHER

## 2018-01-22 RX ORDER — ROSUVASTATIN CALCIUM 20 MG/1
TABLET, COATED ORAL
Qty: 90 TABLET | Refills: 1 | Status: SHIPPED | OUTPATIENT
Start: 2018-01-22 | End: 2018-02-13 | Stop reason: SDUPTHER

## 2018-01-22 NOTE — THERAPY DISCHARGE NOTE
Outpatient Occupational Therapy Neuro Initial Evaluation/Discharge  [facility]     Patient Name: Paloma Carr  : 1940  MRN: [id number]  Today's Date: 2018      Visit Date: 2018    Patient Active Problem List   Diagnosis   • Soft tissue swelling of chest wall   • Abnormal ultrasound of breast   • Postmastectomy lymphedema syndrome   • Malignant neoplasm of upper-outer quadrant of right female breast   • Cerebrovascular accident (CVA)   • COPD exacerbation   • Stroke   • Hemarthrosis   • HTN (hypertension)   • HLD (hyperlipidemia)   • Adverse effect of antiplatelet agent   • Left middle cerebral artery stroke        Past Medical History:   Diagnosis Date   • Anxiety    • Arthritis    • COPD (chronic obstructive pulmonary disease)     oxygen at 2L aths   • CVA (cerebral infarction)    • CVD (cardiovascular disease)    • Depression    • Hyperlipemia    • Hypertension    • Hypothyroid    • Macular degeneration    • Meningioma     OCCIPITAL MENINGIOMA-REMOVED BY DR TOLLIVER    • Overweight    • Stroke 11/15/2017   • Vertigo         Past Surgical History:   Procedure Laterality Date   • ADRENAL GLAND SURGERY     • BRAIN MENINGIOMA EXCISION      occipital   •  SECTION     • EMBOLECTOMY N/A 11/15/2017    Procedure: Embolectomy Mechanical;  Surgeon: Rachid Figueroa MD;  Location: [facility] ;  Service:    • TOTAL SHOULDER REPLACEMENT      x2         Visit Dx:    ICD-10-CM ICD-9-CM   1. Left middle cerebral artery stroke I63.512 434.91             Patient History       18 1000          History    Brief Description of Current Complaint Pt has had recent CVA and was on [facility] acute rehab unit and d/c on . The next day she was admitted to [facility] due to R knee pain. She was d/c to [facility] and was there until 17. Pt comes to OP OT this date per  request. Pt states that her  would like to begin traveling and wanted to make sure that she was safe  to do so.  -SO      Patient/Caregiver Goals --   N/A, pt states she is doing well  -SO      Current Tobacco Use none  -SO      Smoking Status former  -SO      Hand Dominance right-handed  -SO      Are you or can you be pregnant No  -SO      Pain     Pain Location Knee   R and L, intermittent  -SO      Pain at Present 2  -SO      Pain at Best 0  -SO      Pain at Worst 2  -SO      Pain Frequency Intermittent  -SO      Pain Description Aching  -SO      Fall Risk Assessment    Any falls in the past year: No  -SO      Services    Prior Rehab/Home Health Experiences --   See above  -SO      Daily Activities    Primary Language English  -SO      Are you able to read Yes  -SO      Are you able to write Yes  -SO      How does patient learn best? Listening;Reading;Demonstration;Pictures/Video  -SO      Teaching needs identified Home Safety  -SO      Patient is concerned about/has problems with --   N/A, pt states independent  -SO      Does patient have problems with the following? None  -SO      Barriers to learning None  -SO      Functional Status --   Independent  -SO      Explanation of Functional Status Problem --   Pt states she is independently  -SO      Pt Participated in POC and Goals Yes  -SO      Safety    Are you being hurt, hit, or frightened by anyone at home or in your life? No  -SO      Are you being neglected by a caregiver No  -SO        User Key  (r) = Recorded By, (t) = Taken By, (c) = Cosigned By    Initials Name Provider Type    SO Hortensia Paredes, OTR Occupational Therapist                OT Neuro       01/22/18 1000          Subjective Comments    Subjective Comments Pt states that her  would like her to have an OT eval to make sure there are no functional deficits. Pt states she does not think she needs OT and has been independent in the home with no safety risks.  -SO      Precautions and Contraindications    Precautions/Limitations no known precautions/limitations  -SO      Subjective  Pain    Able to rate subjective pain? yes  -SO      Pre-Treatment Pain Level 2  -SO      Subjective Pain Comment States knees hurt intermittently, occasionally some shoulder pain as well  -SO      Home Living    Living Arrangements house  -SO      Living Environment Comment States she has a walk in shower and a bath tub, has grab bars in both. Has shower chair but does not use.  -SO      Cognitive Assessment/Intervention    Current Cognitive/Communication Assessment functional  -SO      Orientation Status oriented x 4  -SO      Follows Commands/Answers Questions 100% of the time  -SO      Personal Safety WNL/WFL  -SO      Personal Safety Interventions fall prevention program maintained  -SO      Sensation    Sensation WNL? WNL  -SO      Coordination    Coordination WNL? WFL  -SO      Coordination Tests Box and Blocks;9-Hole Peg  -SO      Box and Blocks Left 46  -SO      Box and Blocks Right 53  -SO      9-Hole Peg Left 27  -SO      9-Hole Peg Right 24  -SO      ROM (Range of Motion)    General ROM no range of motion deficits identified  -SO      General ROM Detail BUE WFL  -SO      MMT (Manual Muscle Testing)    General MMT Assessment no strength deficits identified  -SO      General MMT Assessment Detail Bilat shoulders 4/5, distally 4+/5  -SO      Bed Mobility    Bed Mobility, Comment Indep  -SO      Transfers    Transfer, Comment Indep  -SO      Functional Mobility    Functional Mobility- Comment Mod I, states uses rollator out in community, no AD in the home  -SO      ADL Assessment/Intervention    ADL's Assessed? Toileting   Pt indep with all ADLs per pt report  -SO      Toileting Assessment/Training    Toileting Assess/Train, Comment Performed in clinic with no balance deificts at indep level  -SO        User Key  (r) = Recorded By, (t) = Taken By, (c) = Cosigned By    Initials Name Provider Type    SO Hortensia Paredes OTR Occupational Therapist             Hand Therapy (last 24 hours)      Hand Eval        01/22/18 1000          Hand  Strength     Strength Affected Side Right;Left  -SO       Strength Right    # Reps 3  -SO      Right Rung 2  -SO      Right  Test 1 45  -SO      Right  Test 2 35  -SO      Right  Test 3 41  -SO       Strength Average Right 40.33  -SO       Strength Left    # Reps 3  -SO      Left Rung 2  -SO      Left  Test 1 45  -SO      Left  Test 2 46  -SO      Left  Test 3 42  -SO       Strength Average Left 44.33  -SO      Pinch Strength    Affected Side --  -SO      Right Hand Strength - Pinch (lbs)    Lateral 9 lbs  -SO      Tip (2 point) 8 lbs  -SO      Left Hand Strength - Pinch (lbs)    Lateral 12 lbs  -SO      Tip (2 point) 7.6 lbs  -SO        User Key  (r) = Recorded By, (t) = Taken By, (c) = Cosigned By    Initials Name Provider Type    CARLITO Paredes OTR Occupational Therapist              Therapy Education  Education Details:  (N/A.)          OT Goals       01/22/18 1000       OT Short Term Goals    STG Date to Achieve 01/22/18  -SO     STG 1 Pt agreeable with OT assessment and that no OT required at this time.   -SO     STG 1 Progress Met  -SO     Time Calculation    OT Goal Re-Cert Due Date 01/22/18  -SO       User Key  (r) = Recorded By, (t) = Taken By, (c) = Cosigned By    Initials Name Provider Type    CARLITO Paredes OTR Occupational Therapist                OT Assessment/Plan       01/22/18 1049       OT Assessment    Functional Limitations --   No functional limitations at this time  -SO     Impairments --   No impairments seen in clinic for ADLs or UE  -SO     Assessment Comments Pt has come to OT eval per  request to ensure that she is safe to do things in community and home such as traveling. Pt states that she feels she is safe and at her baseline, she is currently performing ADLs independently and safely. She uses a rollator in the community but was able to walk to the bathroom and perform  toileting and hygiene without AD and no LOB. Pt has no UE deficits but she does complain of some bilat shoulder arthritis. She states that she is able to do every day activities without difficlty but somethings such as gardening, house work, and changing lightbulbs overhead. These are things her  is able to assist her with. In the clinic, she does not have significant UE weakness,  or pinch strength deficits, or FMC/GMC deficits.   -SO     Please refer to paper survey for additional self-reported information Yes  -SO     OT Diagnosis CVA  -SO     OT Rehab Potential Excellent  -SO     Patient/caregiver participated in establishment of treatment plan and goals Yes  -SO     Patient would benefit from skilled therapy intervention No  -SO     OT Plan    Predicted Duration of Therapy Intervention (days/wks) D/c pt this date.  -SO     OT Plan Comments OT plans to d/c pt this date as she is at baseline and has no functional deficits as far as ADLs or UE at this time. This is the pt request and OT agreeable with no further OT unless needed in the future.  -SO       User Key  (r) = Recorded By, (t) = Taken By, (c) = Cosigned By    Initials Name Provider Type    SO Hortensia Paredes, OTR Occupational Therapist                   Outcome Measure Options: Disabilities of the Arm, Shoulder, and Hand (DASH)  9 Hole Peg  9-Hole Peg Left: 27  9-Hole Peg Right: 24  Box and Blocks  Box and Blocks Left: 46  Box and Blocks Right: 53  DASH  Open a tight or new jar.: Mild Difficulty  Write: No Difficulty  Turn a key: Mild Difficulty  Prepare a meal: Mild Difficulty  Push open a heavy door: No Difficulty  Place an object on a shelf above your head: Mild Difficulty  Do heavy household chores (e.g., wash walls, wash floors): Unable  Garden or do yard work: Unable  Make a bed: Mild Difficulty  Carry a shopping bag or briefcase: Mild Difficulty  Carry a heavy object (over 10 lbs): Mild Difficulty  Change a lightbulb overhead:  Unable  Wash or blow dry your hair: No Difficulty  Wash your back: Unable  Put on a pullover sweater: No Difficulty  Use a knife to cut food: No Difficulty  Recreational activities in which require little effort (e.g., cardplaying, knitting, etc.): No Difficulty  Recreational activities in which you take some force or impact through your arm, should or hand (e.g. golf, hammering, tennis, etc.): No Difficulty  Recreational Activities in which you move your arm freely (e.g., frisbee, badminton, etc.): Severe Difficulty  Manage transportation needs (getting from one place to another): No Difficulty  During the past week, to what extent has your arm, shoulder, or hand problem interfered with your normal social activites with family, friends, neighbors or groups?: Slightly  During the past week, were you limited in your work or other regular daily activities as a result of your arm, shoulder or hand problem?: Not limited at all  Arm, Shoulder, or hand pain: Moderate  Arm, shoulder or hand pain when you performed any specific activity: Mild  Tingling (pins and needles) in your arm, shoulder, or hand: None  Weakness in your arm, shoulder or hand: None  Stiffness in your arm, shoulder or hand: None  During the past week, how much difficulty have you had sleeping because of the pain in your arm, shoulder or hand?: No difficulty  I feel less capable, less confident or less useful because of my arm, shoulder or hand problem: Strongly disagree  DASH Sum : 59  Number of Questions Answered: 29  DASH Score: 25.86         Time Calculation:   OT Start Time: 0930  OT Stop Time: 1005  OT Time Calculation (min): 35 min       Therapy Charges for Today     Code Description Service Date Service Provider Modifiers Qty    [id number]  OT SELFCARE CURRENT 1/22/2018 KEON Barnhart CJ 1    [id number] HC OT SELFCARE PROJECTED 1/22/2018 KEON Barnhart CJ 1    [id number]  OT SELFCARE DISCHARGE 1/22/2018  Hortensia Paredes OTR , CJ 1    [id number]  OT EVAL LOW COMPLEXITY 3 1/22/2018 Hortensia Paredes OTR GO 1          OT G-codes  OT Functional Scales Options: Disabilities of the Arm, Shoulder, and Hand (DASH)  Functional Limitation: Self care  Self Care Current Status (): At least 20 percent but less than 40 percent impaired, limited or restricted  Self Care Goal Status (): At least 20 percent but less than 40 percent impaired, limited or restricted  Self Care Discharge Status (): At least 20 percent but less than 40 percent impaired, limited or restricted     OP OT Discharge Summary  Date of Discharge: 01/22/18  Reason for Discharge: Independent, At baseline function  Outcomes Achieved: Able to achieve all goals within established timeline  Discharge Destination: Home without follow-up  Discharge Instructions: Pt is at baseline and does not with to have OP OT at this time.      Hortensia Paredes OTR  1/22/2018

## 2018-01-22 NOTE — THERAPY DISCHARGE NOTE
Outpatient Physical Therapy Neuro Initial Evaluation/Discharge  [facility]     Patient Name: Paloma Carr  : 1940  MRN: [id number]  Today's Date: 2018      Visit Date: 2018    Patient Active Problem List   Diagnosis   • Soft tissue swelling of chest wall   • Abnormal ultrasound of breast   • Postmastectomy lymphedema syndrome   • Malignant neoplasm of upper-outer quadrant of right female breast   • Cerebrovascular accident (CVA)   • COPD exacerbation   • Stroke   • Hemarthrosis   • HTN (hypertension)   • HLD (hyperlipidemia)   • Adverse effect of antiplatelet agent   • Left middle cerebral artery stroke        Past Medical History:   Diagnosis Date   • Anxiety    • Arthritis    • COPD (chronic obstructive pulmonary disease)     oxygen at 2L aths   • CVA (cerebral infarction)    • CVD (cardiovascular disease)    • Depression    • Hyperlipemia    • Hypertension    • Hypothyroid    • Macular degeneration    • Meningioma     OCCIPITAL MENINGIOMA-REMOVED BY DR TOLLIVER    • Overweight    • Stroke 11/15/2017   • Vertigo         Past Surgical History:   Procedure Laterality Date   • ADRENAL GLAND SURGERY     • BRAIN MENINGIOMA EXCISION      occipital   •  SECTION     • EMBOLECTOMY N/A 11/15/2017    Procedure: Embolectomy Mechanical;  Surgeon: Rachid Figueroa MD;  Location: [facility] ;  Service:    • TOTAL SHOULDER REPLACEMENT      x2         Visit Dx:     ICD-10-CM ICD-9-CM   1. 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 The next day she was admitted to [facility] due to R knee pain and swelling. She was d/c to [facility] and was there until 17. Pt comes to OP OT this date per   "request. Pt states that her  would like to begin traveling and wanted to make sure that she was safe to do so.  -LB Pt has had recent CVA and was on [facility] acute rehab unit and d/c on 12/7. The next day she was admitted to [facility] due to R knee pain. She was d/c to [facility] and was there until 12/16/17. Pt comes to OP OT this date per  request. Pt states that her  would like to begin traveling and wanted to make sure that she was safe to do so.  -SO     Previous treatment for THIS PROBLEM Rehabilitation  -LB      Onset Date- PT 1/22/18  -LB      Patient/Caregiver Goals Other (comment)   \"Doing well.\" \"Balance.\"  -LB --   N/A, pt states she is doing well  -SO     Current Tobacco Use  none  -SO     Smoking Status  former  -SO     Hand Dominance right-handed  -LB right-handed  -SO     Are you or can you be pregnant  No  -SO     Pain     Pain Location Knee   R and L intermittent  -LB Knee   R and L, intermittent  -SO     Pain at Present 2  -LB 2  -SO     Pain at Best 0  -LB 0  -SO     Pain at Worst 2  -LB 2  -SO     Pain Frequency Intermittent  -LB Intermittent  -SO     Pain Description Aching  -LB Aching  -SO     Fall Risk Assessment    Any falls in the past year: No  -LB No  -SO     Services    Prior Rehab/Home Health Experiences  --   See above  -SO     Daily Activities    Primary Language English  -LB English  -SO     Are you able to read Yes  -LB Yes  -SO     Are you able to write Yes  -LB Yes  -SO     How does patient learn best? Listening;Reading;Demonstration;Pictures/Video  -LB Listening;Reading;Demonstration;Pictures/Video  -SO     Teaching needs identified Other (comment)   Assistive device adjustment  -LB Home Safety  -SO     Patient is concerned about/has problems with --   Pt wrote balance but reported having no problems,  -LB --   N/A, pt states independent  -SO     Does patient have problems with the following? None  -LB None  -SO     Barriers to learning None  -LB None  -SO     " "Functional Status  --   Independent  -SO     Explanation of Functional Status Problem  --   Pt states she is independently  -SO     Pt Participated in POC and Goals  Yes  -SO     Safety    Are you being hurt, hit, or frightened by anyone at home or in your life? No  -LB No  -SO     Are you being neglected by a caregiver No  -LB No  -SO       User Key  (r) = Recorded By, (t) = Taken By, (c) = Cosigned By    Initials Name Provider Type    LB Savana Fernández, PT Physical Therapist    SO Hortensia Paredes, OTR Occupational Therapist             Hand Therapy (last 24 hours)      Hand Eval       01/22/18 1000          Hand  Strength     Strength Affected Side Right;Left  -SO       Strength Right    # Reps 3  -SO      Right Rung 2  -SO      Right  Test 1 45  -SO      Right  Test 2 35  -SO      Right  Test 3 41  -SO       Strength Average Right 40.33  -SO       Strength Left    # Reps 3  -SO      Left Rung 2  -SO      Left  Test 1 45  -SO      Left  Test 2 46  -SO      Left  Test 3 42  -SO       Strength Average Left 44.33  -SO      Pinch Strength    Affected Side --  -SO      Right Hand Strength - Pinch (lbs)    Lateral 9 lbs  -SO      Tip (2 point) 8 lbs  -SO      Left Hand Strength - Pinch (lbs)    Lateral 12 lbs  -SO      Tip (2 point) 7.6 lbs  -SO        User Key  (r) = Recorded By, (t) = Taken By, (c) = Cosigned By    Initials Name Provider Type    SO Hortensia Paredes, OTR Occupational Therapist                PT Neuro       01/22/18 1015          Subjective Comments    Subjective Comments \"I don't use the walker in the house useless my knees hurt. I use the walker when I go out and need to walk long distances.\" \"I am able to cook and  -LB      Precautions and Contraindications    Precautions none   -LB      Subjective Pain    Able to rate subjective pain? yes  -LB      Pre-Treatment Pain Level 2  -LB      Post-Treatment Pain Level 2  -LB      Home Living    " Living Arrangements house  -LB      Home Accessibility no concerns  -LB      Stair Railings at Home inside, present at both sides  -LB      Home Equipment Grab bars;Rollator;Quad cane   tub seat  -LB      Vision-Basic Assessment    Current Vision Wears glasses all the time  -LB      Cognition    Overall Cognitive Status WFL  -LB      Orientation Level Oriented X4  -LB      Safety Judgment Good awareness of safety precautions  -LB      Proprioception    Proprioception intact bilateral LE's   -LB      Posture/Observations    Posture/Observations Comments Pt arrived in PT with her rollator with light weight on her UE's.   -LB      ROM (Range of Motion)    General ROM no range of motion deficits identified  -LB      MMT (Manual Muscle Testing)    General MMT Assessment no strength deficits identified  -LB      Bed Mobility, Assessment/Treatment    Bed Mobility, Comment deferred  -LB      Transfers    Transfers, Sit-Stand [place] independent  -LB      Transfers, Stand-Sit [place] independent  -LB      Transfer, Comment from armless chair without use of UE's   -LB      Gait Assessment/Treatment    Gait, [place] Level independent  -LB      Gait, Assistive Device rollator;other (see comments)   and no device  -LB      Gait, Distance (Feet) 200  -LB      Gait, Gait Deviations other (see comments)   none noted   -LB      Gait, Comment Pt able to change direction and turn her head without any loss of balance. Refer to Dynamic Gait Index.  -LB      Stairs Assessment/Treatment    Number of Stairs 4  -LB      Stairs, Handrail Location right side (ascending)  -LB      Stairs, [place] Level conditional independence  -LB      Stairs, Assistive Device --   none   -LB      Stairs, Technique Used step over step (ascending);step over step (descending)  -LB      Stairs, Comment Pt was able to ascend and descend a curb independently without a device. Pt was able to ascend and descend a ramp with her rollator. Pt's  rollator was adjusted too high and was lowered 2 notches. Pt reporting it felt better.  -LB      Balance Skills Training    Standing-Level of Assistance Independent  -LB      Static Standing Balance Support No upper extremity supported  -LB      Standing-Balance Activities Retrieve object from floor;Reaching for objects  -LB      Gait Balance-Level of Assistance Independent  -LB      Gait Balance Support No upper extremity supported  -LB      Gait Balance Activities side-stepping;backwards;scanning environment R/L;stepping over object  -LB      Rhomberg 30 seconds eyes opened and closed  -LB      Balance Comments Pt was independent with sidestepping and backward stepping 4' without a device.  -LB        User Key  (r) = Recorded By, (t) = Taken By, (c) = Cosigned By    Initials Name Provider Type    ABEL Fernández PT Physical Therapist                  Therapy Education  Education Details: Pt's rollator was lowered 2 notches. Pt was educated on proper height and placement of UE's with transfers with a rollator. Also, reviewed technique on steps with minimal HHA on the right or use of her SBQC on steps without a rail. Reviewd pt's scores on the outcome measures.   How Provided: Verbal, Demonstration  Provided to: Patient  Level of Understanding: Verbalized, Teach back education performed, Demonstrated          PT OP Goals       01/22/18 1300       Long Term Goals    LTG 1 Pt to be independent with transfers with proper hand placement with rollator.   -LB     LTG 1 Progress Met  -LB       User Key  (r) = Recorded By, (t) = Taken By, (c) = Cosigned By    Initials Name Provider Type    ABEL Fernández PT Physical Therapist                PT Assessment/Plan       01/22/18 1319 01/22/18 1312    PT Assessment    Assessment Comments  Pt arrived to PT independently ambulating with her rollator. Pt was diagnosed with a CVA with right hemiparesis on 11/15/17. Pt received IP rehab. Pt reports she does very well at home  "without a device but uses her rollator to take pressure off her knees. Pt's strength in her LE's overall 5/5. Pt demonstrating no gait deviaitons and denies any falls this year. Pt's scores on the BENSON balance scale and  the Dynamic Gait Index indicate  safe ambulation and low risk for falls. No skilled Pt is recommended at this time. Pt agreed and was pleased with adjustment of her rollator.    -LB    Rehab Potential  Good  -LB    Patient/caregiver participated in establishment of treatment plan and goals  Yes  -LB    Patient would benefit from skilled therapy intervention  No  -LB    PT Plan    PT Frequency  One time visit  -LB    Planned CPT's? PT EVAL MOD COMPLELITY: 59248  -LB       01/22/18 1049       PT Plan    Predicted Duration of Therapy Intervention (days/wks) D/c pt this date.  -SO       User Key  (r) = Recorded By, (t) = Taken By, (c) = Cosigned By    Initials Name Provider Type    ABEL Fernández, PT Physical Therapist    SO Hortensia Paredes, OTR Occupational Therapist                 Exercises       01/22/18 1015          Subjective Comments    Subjective Comments \"I don't use the walker in the house useless my knees hurt. I use the walker when I go out and need to walk long distances.\" \"I am able to cook and  -LB      Subjective Pain    Able to rate subjective pain? yes  -LB      Pre-Treatment Pain Level 2  -LB      Post-Treatment Pain Level 2  -LB        User Key  (r) = Recorded By, (t) = Taken By, (c) = Cosigned By    Initials Name Provider Type    ABEL Fernández, PT Physical Therapist                      Outcome Measure Options: Benson Balance, Dynamic Gait Index  Benson Balance Scale  Sitting to Standing: able to stand without using hands and stabilize independently  Standing Unsupported: able to stand safely for 2 minutes  Sitting with Back Unsupported but Feet Supported on Floor or on Stool: able to sit safely and securely for 2 minutes  Standing to Sitting: sits safely with minimal use " of hands  Transfers: able to transfer safely with minor use of hands  Standing Unsupported with Eyes Closed: able to stand 10 seconds safely  Standing Unsupported with Feet Together: able to place feet together independently and stand 1 minute safely  Reaching Forward with Outstretched Arm While Standing: can reach forward confidently 25 cm (10 inches)   Object From the Floor From a Standing Position: able to  object safely and easily  Turning to Look Behind Over Left and Right Shoulders While Standing: looks behind from both sides and weight shifts well  Turn 360 Degrees: able to turn 360 degrees safely in 4 seconds or less  Place Alternate Foot on Step or Stool While Standing Unsupported: able to stand independently and complete 8 steps in > 20 seconds  Standing Unsupported with One Foot in Front: able to place foot ahead independently and hold 30 seconds  Standing on One Leg: able to lift leg independently and hold >/equal to 3 seconds  Bright Total Score: 52  Dynamic Gait Index (DGI)  Gait Level Surface: Normal: walks 20’, no assistive devices, good speed, no evidence for imbalance, normal gait pattern  Change in Gait Speed: Normal: Able to smoothly change walking speed without loss of balance or gait deviation. Shows significant difference in walking speeds between normal, fast and slow paces.  Gait with Horizontal Head Turns: Normal: Performs head turns smoothly with no change in gait.  Gait with Vertical Head Turns: Normal: Performs head turns smoothly with no change in gait.  Gait and Pivot Turn: Normal: Pivot turns safely within 3 seconds and stops quickly with no loss of balance.  Step Over Obstacle: Mild impairment: Is able to step over shoe box, but must slow down and adjust steps to clear box safely.  Step Around Obstacles: Normal: Is able to walk safely around cones safely without changing gait speed, no evidence of imbalance.  Steps: Mild impairment: Alternating feet, must use  rachelil.  Dynamic Gait Index Score: 22  Dynamic Gait Index Comments: safe ambulation      Time Calculation:   Start Time: 1015  Stop Time: 1102  Time Calculation (min): 47 min     Therapy Charges for Today     Code Description Service Date Service Provider Modifiers Qty    87685503396 HC PT MOBILITY CURRENT 1/22/2018 Savana Fernández, PT GP, CI 1    65682318209 HC PT MOBILITY PROJECTED 1/22/2018 Savana Fernández, PT GP, CI 1    [id number] HC PT MOBILITY DISCHARGE 1/22/2018 Savana Fernández, PT GP, CI 1    [id number] HC PT EVAL MOD COMPLEXITY 4 1/22/2018 Savana Fernández, PT GP 1          PT G-Codes  PT Professional Judgement Used?: Yes  Outcome Measure Options: Bright Balance, Dynamic Gait Index  Functional Limitation: Mobility: Walking and moving around  Mobility: Walking and Moving Around Current Status (): At least 1 percent but less than 20 percent impaired, limited or restricted  Mobility: Walking and Moving Around Goal Status (): At least 1 percent but less than 20 percent impaired, limited or restricted  Mobility: Walking and Moving Around Discharge Status (): At least 1 percent but less than 20 percent impaired, limited or restricted     OP PT Discharge Summary  Date of Discharge: 01/22/18  Reason for Discharge: All goals achieved  Discharge Destination: Other (comment) (Home. pt to use rollator as needed to reduce pressure on her knees. )        Savana Fernández, PT  1/22/2018

## 2018-01-24 ENCOUNTER — APPOINTMENT (OUTPATIENT)
Dept: PHYSICAL THERAPY | Facility: HOSPITAL | Age: 78
End: 2018-01-24
Attending: PHYSICAL MEDICINE & REHABILITATION

## 2018-01-25 ENCOUNTER — APPOINTMENT (OUTPATIENT)
Dept: OCCUPATIONAL THERAPY | Facility: HOSPITAL | Age: 78
End: 2018-01-25

## 2018-01-25 ENCOUNTER — APPOINTMENT (OUTPATIENT)
Dept: PHYSICAL THERAPY | Facility: HOSPITAL | Age: 78
End: 2018-01-25

## 2018-01-29 ENCOUNTER — APPOINTMENT (OUTPATIENT)
Dept: OCCUPATIONAL THERAPY | Facility: HOSPITAL | Age: 78
End: 2018-01-29

## 2018-01-29 ENCOUNTER — APPOINTMENT (OUTPATIENT)
Dept: PHYSICAL THERAPY | Facility: HOSPITAL | Age: 78
End: 2018-01-29

## 2018-01-29 ENCOUNTER — APPOINTMENT (OUTPATIENT)
Dept: PHYSICAL THERAPY | Facility: HOSPITAL | Age: 78
End: 2018-01-29
Attending: PHYSICAL MEDICINE & REHABILITATION

## 2018-01-31 ENCOUNTER — APPOINTMENT (OUTPATIENT)
Dept: PHYSICAL THERAPY | Facility: HOSPITAL | Age: 78
End: 2018-01-31
Attending: PHYSICAL MEDICINE & REHABILITATION

## 2018-02-01 ENCOUNTER — APPOINTMENT (OUTPATIENT)
Dept: OCCUPATIONAL THERAPY | Facility: HOSPITAL | Age: 78
End: 2018-02-01

## 2018-02-01 ENCOUNTER — APPOINTMENT (OUTPATIENT)
Dept: PHYSICAL THERAPY | Facility: HOSPITAL | Age: 78
End: 2018-02-01

## 2018-02-05 ENCOUNTER — APPOINTMENT (OUTPATIENT)
Dept: OCCUPATIONAL THERAPY | Facility: HOSPITAL | Age: 78
End: 2018-02-05

## 2018-02-05 ENCOUNTER — APPOINTMENT (OUTPATIENT)
Dept: PHYSICAL THERAPY | Facility: HOSPITAL | Age: 78
End: 2018-02-05

## 2018-02-08 ENCOUNTER — APPOINTMENT (OUTPATIENT)
Dept: OCCUPATIONAL THERAPY | Facility: HOSPITAL | Age: 78
End: 2018-02-08

## 2018-02-08 ENCOUNTER — APPOINTMENT (OUTPATIENT)
Dept: PHYSICAL THERAPY | Facility: HOSPITAL | Age: 78
End: 2018-02-08

## 2018-02-12 ENCOUNTER — APPOINTMENT (OUTPATIENT)
Dept: PHYSICAL THERAPY | Facility: HOSPITAL | Age: 78
End: 2018-02-12

## 2018-02-12 ENCOUNTER — APPOINTMENT (OUTPATIENT)
Dept: OCCUPATIONAL THERAPY | Facility: HOSPITAL | Age: 78
End: 2018-02-12

## 2018-02-13 ENCOUNTER — OFFICE VISIT (OUTPATIENT)
Dept: NEUROLOGY | Facility: CLINIC | Age: 78
End: 2018-02-13

## 2018-02-13 ENCOUNTER — LAB (OUTPATIENT)
Dept: LAB | Facility: HOSPITAL | Age: 78
End: 2018-02-13

## 2018-02-13 VITALS
OXYGEN SATURATION: 95 % | HEART RATE: 63 BPM | BODY MASS INDEX: 40.5 KG/M2 | HEIGHT: 55 IN | SYSTOLIC BLOOD PRESSURE: 136 MMHG | DIASTOLIC BLOOD PRESSURE: 73 MMHG | WEIGHT: 175 LBS

## 2018-02-13 DIAGNOSIS — I63.412 CEREBROVASCULAR ACCIDENT (CVA) DUE TO EMBOLISM OF LEFT MIDDLE CEREBRAL ARTERY (HCC): Primary | ICD-10-CM

## 2018-02-13 DIAGNOSIS — I70.90 ATHEROSCLEROSIS: ICD-10-CM

## 2018-02-13 DIAGNOSIS — E78.2 MIXED HYPERLIPIDEMIA: ICD-10-CM

## 2018-02-13 DIAGNOSIS — I63.412 CEREBROVASCULAR ACCIDENT (CVA) DUE TO EMBOLISM OF LEFT MIDDLE CEREBRAL ARTERY (HCC): ICD-10-CM

## 2018-02-13 DIAGNOSIS — I10 ESSENTIAL HYPERTENSION: ICD-10-CM

## 2018-02-13 LAB — FIBRINOGEN PPP-MCNC: 411 MG/DL (ref 219–464)

## 2018-02-13 PROCEDURE — 81240 F2 GENE: CPT

## 2018-02-13 PROCEDURE — 83520 IMMUNOASSAY QUANT NOS NONAB: CPT

## 2018-02-13 PROCEDURE — 36415 COLL VENOUS BLD VENIPUNCTURE: CPT

## 2018-02-13 PROCEDURE — 85670 THROMBIN TIME PLASMA: CPT

## 2018-02-13 PROCEDURE — 85598 HEXAGNAL PHOSPH PLTLT NEUTRL: CPT

## 2018-02-13 PROCEDURE — 83090 ASSAY OF HOMOCYSTEINE: CPT

## 2018-02-13 PROCEDURE — 86147 CARDIOLIPIN ANTIBODY EA IG: CPT

## 2018-02-13 PROCEDURE — 85730 THROMBOPLASTIN TIME PARTIAL: CPT

## 2018-02-13 PROCEDURE — 85613 RUSSELL VIPER VENOM DILUTED: CPT

## 2018-02-13 PROCEDURE — 85240 CLOT FACTOR VIII AHG 1 STAGE: CPT

## 2018-02-13 PROCEDURE — 85384 FIBRINOGEN ACTIVITY: CPT

## 2018-02-13 PROCEDURE — 86146 BETA-2 GLYCOPROTEIN ANTIBODY: CPT

## 2018-02-13 PROCEDURE — 85300 ANTITHROMBIN III ACTIVITY: CPT

## 2018-02-13 PROCEDURE — 85610 PROTHROMBIN TIME: CPT

## 2018-02-13 PROCEDURE — 99213 OFFICE O/P EST LOW 20 MIN: CPT | Performed by: NURSE PRACTITIONER

## 2018-02-13 PROCEDURE — 85597 PHOSPHOLIPID PLTLT NEUTRALIZ: CPT

## 2018-02-13 PROCEDURE — 85732 THROMBOPLASTIN TIME PARTIAL: CPT

## 2018-02-13 NOTE — PROGRESS NOTES
"DOS: 2018  NAME: Paloma Carr   : 1940  PCP: Goran Carr MD    Chief Complaint   Patient presents with   • Stroke      Neurological Problem and Interval History:  77 y.o. RHW female with a HTN, HLD, breast CA wh and h/o stroke and meningioma resection who presents today to follow-up for stroke and is accompanied by her spouse.     Ms. Carr presented to [facility] on 11/15/17 with sudden onset speech difficulty and right side weakness. She received IV TPA and underwent emergent LMCA mechanical embolectomy. The etiology of her event was unclear. Her cardiac evaluation included a JEFE that was unrevealing and she was discharged on ASA, Plavix and prolonged cardiac monitoring with a ZIO Patch. She was readmitted in early December with right knee swelling. She has a h/o knee replacement 6 years prior and was seen by orthopedics and had aspiration revealing blood. Per patient her ASA was reduced from 325 to 81 mg, which she continues today. She is tolerating well with no new evidence of bleeding.     She denies any recurrence of speech difficulty, unilateral weakness or new stroke/TIA symptoms. She does report having h/o vertigo and has been followed as an outpatient by the [facility] clinic and states at times she is unsteady. She uses a walker to ambulate long distances. She had outpatient rehab and states she feels essentially back to her baseline except for complaints of increased \"fatigue\" since her stroke and not feeling as mentally \"sharp\". She has upcoming appointment for possible sleep apnea with Dr. Armenta, pulmonology. She does not get any regular activity as she has knee and shoulder joint pain that per patient limit her activity. She takes her BP regularly and states it is well controlled. She is followed as an outpatient at [facility] for vertigo and states she is \"unsteady\". She denies smoking. Occasional alcohol use.     Review of Systems:        Review of Systems   Constitutional: Positive for " "fatigue. Negative for activity change, appetite change, chills, diaphoresis, fever and unexpected weight change.   HENT: Positive for tinnitus, trouble swallowing (she recently has had problems swallowing liquid and pills) and voice change. Negative for drooling and hearing loss.    Eyes: Positive for pain (left eye). Negative for photophobia and visual disturbance.   Respiratory: Positive for chest tightness and shortness of breath.    Cardiovascular: Positive for leg swelling (both legs). Negative for chest pain and palpitations.   Gastrointestinal: Negative for blood in stool, nausea and vomiting.   Endocrine: Negative for cold intolerance and heat intolerance.   Genitourinary: Negative for difficulty urinating.   Musculoskeletal: Positive for gait problem. Negative for neck pain and neck stiffness.   Skin: Negative for rash.   Neurological: Negative for dizziness, tremors, seizures, syncope, facial asymmetry, speech difficulty, weakness, light-headedness, numbness and headaches.   Hematological: Bruises/bleeds easily.   Psychiatric/Behavioral: Negative for agitation, behavioral problems, confusion, hallucinations, sleep disturbance and suicidal ideas. The patient is not nervous/anxious.        \"The following portions of the patient's history were reviewed and updated as appropriate: allergies, current medications, past family history, past medical history, past social history, past surgical history and problem list.\"  Review of Imaging:  CT/CTP 11/15/17 IMPRESSION:  Complete occlusion of the left MCA division with an abnormal perfusion  study demonstrating an extensive area of perfusion abnormality involving  the lateral aspect of the left temporal and parietal lobes within the  left MCA distribution. No area of completed acute infarction is seen on  either the CT perfusion study or the noncontrast head CT. As such, there  is a sizable mismatch within this area of perfusion abnormality and this  is estimated to " be 52 mL by rapid software reconstruction.  Cerebral Angiogram 11/15/17: IMPRESSION:The patient is status post successful mechanical embolectomy of a left middle cerebral artery thrombus  CT head 11/16/17:Per Dr. Figueroa, Chronic small vessel ischemic changes and evidence of previous meningioma resection changes otherwise normal.      TTE 11/16/17: LVEF >70%, normal size; RV nromal size; LA borderline increased, saline tests negative; RA normal size  JEFE 11/17/17: LVEF 56-60%, LA mildly dilated, no thrombus, saline test results negative; RA normal size; Valves normal  ZIO Patch 12/6/17: Monitored for 14 days. No afib reported.     Laboratory Results:             Lab Results   Component Value Date    HGBA1C 5.46 11/16/2017     Lab Results   Component Value Date    CHOL 139 11/16/2017     Lab Results   Component Value Date    HDL 40 11/16/2017    HDL 39 (L) 04/21/2017     Lab Results   Component Value Date    LDL 63 11/16/2017    LDL 81 04/21/2017     Lab Results   Component Value Date    TRIG 180 (H) 11/16/2017    TRIG 240 (H) 04/21/2017     No components found for: CHOLHDL  No results found for: RPR  Lab Results   Component Value Date    TSH 2.280 11/16/2017     No results found for: [id number]    Physical Examination: NIHSS: 0 mRS: 0  General Appearance:   Well developed, well nourished, well groomed, alert, and cooperative.  HEENT: Normocephalic.    Neck and Spine: Normal range of motion.  Normal alignment. No mass or tenderness.   Cardiac: Regular rate and rhythm.   Peripheral Vasculature: Radial pulses are equal and symmetric. No signs of distal embolization.  Extremities:    No edema or deformities. Decreased ROM of bilateral knees.   Skin:    No rashes or birth marks.    Neurological examination:  Higher Integrative  Function: Oriented to time, place and person. Normal registration, recall (3/3), attention span and concentration. Normal language including comprehension, spontaneous speech, repetition,  reading, writing, naming and vocabulary. No neglect. Normal fund of knowledge and higher integrative function.  CN II: Pupils are equal, round, and reactive to light. Normal visual acuity and visual fields.    CN III IV VI: Extraocular movements are full without nystagmus.   CN V: Normal facial sensation and strength of muscles of mastication.  CN VII: Facial movements are symmetric. No weakness.  CN VIII:   Auditory acuity is normal.  CN IX & X:   Symmetric palatal movement.  CN XI: Sternocleidomastoid and trapezius are normal.  No weakness.  CN XII:   The tongue is midline.  No atrophy or fasciculations.  Motor: Normal muscle strength, bulk and tone in upper extremities. BLE decreased hip flexor strength at least 4/5.  No fasciculations, rigidity, spasticity, or abnormal movements.  Sensation: Normal to light touch temperature, and proprioception in arms and legs. Normal graphesthesia and no extinction on DSS. Romberg negative  Station and Gait: Nearly normal gait and station, mildly antalgic.  Coordination: Finger to nose test shows no dysmetria.  Heel to shin normal.    Diagnoses / Discussion:  Ms. Carr is doing well following her LMCA stroke s/p successful emergent mechanical embolectomy she underwent in November 2017. She has essentially returned to her neurologic baseline except for c/o increased fatigue and some cognitive slowing. The question is the etiology of her event. Her vessel imaging did not show any significant irregularities to explain her event. Her cardiac work-up has included a JEFE as well as prolonged cardiac monitoring with a ZIO Patch that have also been unrevealing for source. Will check a hypercoaguable panel. She continues on ASA 81 mg, Plavix and Crestor. We discussed importance of risk factor control for stroke prevention, including BP and cholesterol control as well as importance of regular physical activity. She will continue to monitor her BP regularly and follow-up with her PCP for  continued cholesterol surveillance. We discussed importance of calling 911 for any signs/symptoms of stroke.  Follow-up in 9 months, sooner if symptoms warrant.     Plan:   Continue ASA and Plavix   Hypercoaguable panel   Continue to monitor BP regularly and record   Blood pressure control to <130/80   F/U with PCP for continued cholesterol surveillance, goal LDL < 70   Serum glucose < 140   Encouraged regular physical activity     Call 911 for stroke any stroke symptoms   Follow-up in 9 months.   Paloma was seen today for stroke.    Diagnoses and all orders for this visit:    Cerebrovascular accident (CVA) due to embolism of left middle cerebral artery  -     Homocysteine; Future  -     Fibrinogen; Future  -     Factor II, DNA Analysis; Future  -     Antithrombin III; Future  -     Anticardiolipin Antibody, IgG / M, Qn; Future  -     Lupus Anticoagulant Panel; Future  -     Antiphosphotidyl Antibodies Panel II; Future  -     Factor 8 Activity; Future    Atherosclerosis   -     Homocysteine; Future    Essential hypertension    Mixed hyperlipidemia      Coding  ]

## 2018-02-14 LAB — HCYS SERPL-SCNC: 9.9 UMOL/L (ref 0–15)

## 2018-02-15 ENCOUNTER — APPOINTMENT (OUTPATIENT)
Dept: OCCUPATIONAL THERAPY | Facility: HOSPITAL | Age: 78
End: 2018-02-15

## 2018-02-15 ENCOUNTER — APPOINTMENT (OUTPATIENT)
Dept: PHYSICAL THERAPY | Facility: HOSPITAL | Age: 78
End: 2018-02-15

## 2018-02-17 LAB
APTT HEX PL PPP: 2 SEC
APTT IMM NP PPP: ABNORMAL SEC
APTT PPP 1:1 SALINE: ABNORMAL SEC
APTT PPP: 22.2 SEC
AT III PPP CHRO-ACNC: 109 % (ref 90–134)
B2 GLYCOPROT1 IGA SER-ACNC: <10 SAU
B2 GLYCOPROT1 IGG SER-ACNC: <10 SGU
B2 GLYCOPROT1 IGM SER-ACNC: <10 SMU
CARDIOLIPIN IGG SER IA-ACNC: 12 GPL
CARDIOLIPIN IGM SER IA-ACNC: <10 MPL
CONFIRM DRVVT: ABNORMAL SEC
FACT VIII ACT/NOR PPP: 167 % ACTIVITY (ref 60–150)
INR PPP: 0.9 RATIO
LAC INTERPRETATION: ABNORMAL
Lab: ABNORMAL
PLATELET NEUTRALIZATION: 0 SEC
PROTHROMBIN TIME: 9.8 SEC
SCREEN DRVVT/NORMAL: ABNORMAL RATIO
SCREEN DRVVT: 32.2 SEC
THROMBIN TIME: 18.9 SEC

## 2018-02-19 ENCOUNTER — APPOINTMENT (OUTPATIENT)
Dept: OCCUPATIONAL THERAPY | Facility: HOSPITAL | Age: 78
End: 2018-02-19

## 2018-02-19 ENCOUNTER — APPOINTMENT (OUTPATIENT)
Dept: PHYSICAL THERAPY | Facility: HOSPITAL | Age: 78
End: 2018-02-19

## 2018-02-19 ENCOUNTER — TELEPHONE (OUTPATIENT)
Dept: NEUROLOGY | Facility: CLINIC | Age: 78
End: 2018-02-19

## 2018-02-19 LAB
F2 GENE MUT ANL BLD/T: NORMAL
Lab: NORMAL

## 2018-02-19 NOTE — TELEPHONE ENCOUNTER
I called and spoke with Ms. Carr.  She said she is feeling well.  Has already followed up with Savana TA.  Her only deficit appears to some cognitive issues but she said this is very much improved.  She was concerned no one cleared her to drive and her  wont let her.  I suggested she call Dr. Carr, PCP and ask him about this.  mRS 2.  INDIRA Chi RN

## 2018-02-21 RX ORDER — ATORVASTATIN CALCIUM 20 MG/1
40 TABLET, FILM COATED ORAL DAILY
Status: DISCONTINUED | OUTPATIENT
Start: 2018-02-21 | End: 2019-11-05

## 2018-02-22 ENCOUNTER — APPOINTMENT (OUTPATIENT)
Dept: OCCUPATIONAL THERAPY | Facility: HOSPITAL | Age: 78
End: 2018-02-22

## 2018-02-22 ENCOUNTER — TELEPHONE (OUTPATIENT)
Dept: NEUROLOGY | Facility: CLINIC | Age: 78
End: 2018-02-22

## 2018-02-22 ENCOUNTER — APPOINTMENT (OUTPATIENT)
Dept: PHYSICAL THERAPY | Facility: HOSPITAL | Age: 78
End: 2018-02-22

## 2018-02-22 DIAGNOSIS — I63.9 CRYPTOGENIC STROKE (HCC): Primary | ICD-10-CM

## 2018-02-22 LAB
ANTI-PHOSPHATIDIC ACID: NORMAL
ANTI-PHOSPHATIDIC,IGA: 4.6 U/ML
ANTI-PHOSPHATIDIC,IGG: 3.4 U/ML
ANTI-PHOSPHATIDIC,IGM: 1.9 U/ML
ANTI-PHOSPHATIDYL GLYCEROL, IGA: 4.4 U/ML
ANTI-PHOSPHATIDYL GLYCEROL, IGG: 4.5 U/ML
ANTI-PHOSPHATIDYL GLYCEROL, IGM: 1.3 U/ML
ANTI-PHOSPHATIDYL GLYCEROL: NORMAL
ANTI-PHOSPHATIDYL INOSITOL: NORMAL
ANTI-PHOSPHATIDYL,IGA: 2.7 U/ML
ANTI-PHOSPHATIDYL,IGG: 5.7 U/ML
ANTI-PHOSPHATIDYL,IGM: 1.1 U/ML
ANTI-PHOSPHATIDYLETHANOLAMINE, IGA: 11.4 U/ML
ANTI-PHOSPHATIDYLETHANOLAMINE, IGG: 3.5 U/ML
ANTI-PHOSPHATIDYLETHANOLAMINE, IGM: 1.2 U/ML
ANTI-PHOSPHATIDYLETHANOLAMINE: NORMAL

## 2018-02-22 NOTE — TELEPHONE ENCOUNTER
----- Message from CELY Perez sent at 2/22/2018  2:33 PM EST -----  Please notify patient that I have reviewed her hypercoaguable panel and it is essentially normal with one lab (factor VIII) that is slightly elevated. Therefore I would like to recheck a factor VIII in 2-3 months. I will put order in. Thanks.

## 2018-02-22 NOTE — TELEPHONE ENCOUNTER
I called and left a message for Ms. Carr.  I let her know that Savana TA reviewed her hypercoag blood panel she had drawn recently.  It is essentially normal except the Factor V111 which is slightly elevated.  We would like a redraw in 2-3 months.  The order is in the computer for 2-3 months.  I asked that she call me back to let me know she received this message.  INDIRA Chi RN

## 2018-02-23 ENCOUNTER — TELEPHONE (OUTPATIENT)
Dept: FAMILY MEDICINE CLINIC | Facility: CLINIC | Age: 78
End: 2018-02-23

## 2018-02-23 NOTE — TELEPHONE ENCOUNTER
I left a message on an identified VM that only one factor was slightly elevated and needs to be re-checked . I also noted I was happy to talk to them when they return to town next week.

## 2018-02-26 ENCOUNTER — APPOINTMENT (OUTPATIENT)
Dept: PHYSICAL THERAPY | Facility: HOSPITAL | Age: 78
End: 2018-02-26

## 2018-02-26 ENCOUNTER — APPOINTMENT (OUTPATIENT)
Dept: OCCUPATIONAL THERAPY | Facility: HOSPITAL | Age: 78
End: 2018-02-26

## 2018-02-26 ENCOUNTER — TELEPHONE (OUTPATIENT)
Dept: NEUROLOGY | Facility: CLINIC | Age: 78
End: 2018-02-26

## 2018-02-26 NOTE — TELEPHONE ENCOUNTER
I called the numbers listed and left another message.  I let her know her recent hypercoag panel was essentially normal except the Factor VIII was slightly elevated. Savana TA has placed and order for 2-3 months for a Factor VIII redraw.  I asked that she call me back when she receives this.  INDIRA Chi RN

## 2018-02-28 ENCOUNTER — TELEPHONE (OUTPATIENT)
Dept: NEUROLOGY | Facility: CLINIC | Age: 78
End: 2018-02-28

## 2018-02-28 NOTE — TELEPHONE ENCOUNTER
I called and spoke with Mr. Carr.  He was concerned they might be out of town in 2-3 months for the lab redraw.  I let him know the order was already in the computer and she can come to the outpatient lab anytime at their convenience after 2 months has passed.  He said he understood this.  INDIRA Chi RN

## 2018-03-01 ENCOUNTER — APPOINTMENT (OUTPATIENT)
Dept: PHYSICAL THERAPY | Facility: HOSPITAL | Age: 78
End: 2018-03-01

## 2018-03-01 ENCOUNTER — APPOINTMENT (OUTPATIENT)
Dept: OCCUPATIONAL THERAPY | Facility: HOSPITAL | Age: 78
End: 2018-03-01

## 2018-03-02 ENCOUNTER — TELEPHONE (OUTPATIENT)
Dept: FAMILY MEDICINE CLINIC | Facility: CLINIC | Age: 78
End: 2018-03-02

## 2018-03-05 ENCOUNTER — APPOINTMENT (OUTPATIENT)
Dept: OCCUPATIONAL THERAPY | Facility: HOSPITAL | Age: 78
End: 2018-03-05

## 2018-03-05 ENCOUNTER — APPOINTMENT (OUTPATIENT)
Dept: PHYSICAL THERAPY | Facility: HOSPITAL | Age: 78
End: 2018-03-05

## 2018-03-08 ENCOUNTER — APPOINTMENT (OUTPATIENT)
Dept: OCCUPATIONAL THERAPY | Facility: HOSPITAL | Age: 78
End: 2018-03-08

## 2018-03-08 ENCOUNTER — APPOINTMENT (OUTPATIENT)
Dept: PHYSICAL THERAPY | Facility: HOSPITAL | Age: 78
End: 2018-03-08

## 2018-03-12 ENCOUNTER — APPOINTMENT (OUTPATIENT)
Dept: OCCUPATIONAL THERAPY | Facility: HOSPITAL | Age: 78
End: 2018-03-12

## 2018-03-12 ENCOUNTER — APPOINTMENT (OUTPATIENT)
Dept: PHYSICAL THERAPY | Facility: HOSPITAL | Age: 78
End: 2018-03-12

## 2018-03-15 ENCOUNTER — APPOINTMENT (OUTPATIENT)
Dept: OCCUPATIONAL THERAPY | Facility: HOSPITAL | Age: 78
End: 2018-03-15

## 2018-03-15 ENCOUNTER — APPOINTMENT (OUTPATIENT)
Dept: PHYSICAL THERAPY | Facility: HOSPITAL | Age: 78
End: 2018-03-15

## 2018-03-16 ENCOUNTER — OFFICE VISIT (OUTPATIENT)
Dept: FAMILY MEDICINE CLINIC | Facility: CLINIC | Age: 78
End: 2018-03-16

## 2018-03-16 VITALS
HEART RATE: 65 BPM | SYSTOLIC BLOOD PRESSURE: 120 MMHG | OXYGEN SATURATION: 96 % | DIASTOLIC BLOOD PRESSURE: 80 MMHG | RESPIRATION RATE: 16 BRPM | WEIGHT: 172.2 LBS | BODY MASS INDEX: 190.57 KG/M2

## 2018-03-16 DIAGNOSIS — G89.4 CHRONIC PAIN SYNDROME: Primary | ICD-10-CM

## 2018-03-16 DIAGNOSIS — M48.061 SPINAL STENOSIS OF LUMBAR REGION WITHOUT NEUROGENIC CLAUDICATION: ICD-10-CM

## 2018-03-16 PROCEDURE — 99213 OFFICE O/P EST LOW 20 MIN: CPT | Performed by: FAMILY MEDICINE

## 2018-03-16 RX ORDER — GABAPENTIN 800 MG/1
800 TABLET ORAL 3 TIMES DAILY
Qty: 270 TABLET | Refills: 1 | Status: SHIPPED | OUTPATIENT
Start: 2018-03-16 | End: 2018-09-22 | Stop reason: SDUPTHER

## 2018-03-16 NOTE — PROGRESS NOTES
Subjective   Paloma Carr is a 77 y.o. female.     History of Present Illness   Paloma is here for refill on her Gabapentin. She reports that the medication works well for her. She is not having any side effects.   She has had multiple strokes and is recovering pretty well.  She has pain in both knees, right knee is replaced.  She is interested in driving again.  The following portions of the patient's history were reviewed and updated as appropriate: allergies, current medications, past medical history, past social history, past surgical history and problem list.    Review of Systems   All other systems reviewed and are negative.      Objective   Physical Exam   Constitutional: She is oriented to person, place, and time. She appears well-developed.   Cardiovascular: Normal rate.    Pulmonary/Chest: Effort normal.   Neurological: She is alert and oriented to person, place, and time.   Psychiatric: She has a normal mood and affect. Her behavior is normal. Judgment and thought content normal.   Nursing note and vitals reviewed.      Assessment/Plan   Paloma was seen today for seizures.    Diagnoses and all orders for this visit:    Chronic pain syndrome  -     gabapentin (NEURONTIN) 800 MG tablet; Take 1 tablet by mouth 3 (Three) Times a Day. 1 q am and 2 q pm    Spinal stenosis of lumbar region without neurogenic claudication      The patient has read and signed the [facility] Controlled Substance Contract.  I will continue to see patient for regular follow up appointments.  She are well controlled on current medication.  THOMPSON has been reviewed by me and is updated every 3 months. The patient is aware of the potential for addiction and dependence.    I have advised waiting a few more weeks and [facility]

## 2018-03-18 PROBLEM — Z98.890 HISTORY OF LUMBAR SURGERY: Status: ACTIVE | Noted: 2018-03-02

## 2018-03-18 PROBLEM — Z96.651 HISTORY OF TOTAL RIGHT KNEE REPLACEMENT: Status: ACTIVE | Noted: 2018-03-18

## 2018-03-20 ENCOUNTER — APPOINTMENT (OUTPATIENT)
Dept: SLEEP MEDICINE | Facility: HOSPITAL | Age: 78
End: 2018-03-20

## 2018-06-01 RX ORDER — LEVOTHYROXINE SODIUM 0.12 MG/1
TABLET ORAL
Qty: 90 TABLET | Refills: 0 | Status: SHIPPED | OUTPATIENT
Start: 2018-06-01 | End: 2018-08-27 | Stop reason: SDUPTHER

## 2018-06-01 RX ORDER — ESCITALOPRAM OXALATE 20 MG/1
TABLET ORAL
Qty: 90 TABLET | Refills: 0 | Status: SHIPPED | OUTPATIENT
Start: 2018-06-01 | End: 2018-07-27 | Stop reason: SDUPTHER

## 2018-07-27 RX ORDER — ESCITALOPRAM OXALATE 20 MG/1
TABLET ORAL
Qty: 90 TABLET | Refills: 1 | Status: SHIPPED | OUTPATIENT
Start: 2018-07-27 | End: 2018-10-16

## 2018-07-27 RX ORDER — PANTOPRAZOLE SODIUM 40 MG/1
TABLET, DELAYED RELEASE ORAL
Qty: 90 TABLET | Refills: 1 | Status: SHIPPED | OUTPATIENT
Start: 2018-07-27 | End: 2019-02-11 | Stop reason: SDUPTHER

## 2018-08-06 NOTE — PROGRESS NOTES
Inpatient Rehabilitation Functional Measures Assessment    Functional Measures  KYM Eating:  [facility] Grooming: [facility] Bathing:  [facility] Upper Body Dressing:  [facility] Lower Body Dressing:  [facility] Toileting:  [facility] Bladder Management  Level of Assistance:  Happy  Frequency/Number of Accidents this Shift:  [facility] Bowel Management  Level of Assistance: Happy  Frequency/Number of Accidents this Shift: [facility] Bed/Chair/Wheelchair Transfer:  Bed/chair/wheelchair Transfer Score = 5.  Patient is supervision/set-up for transferring to and from the  bed/chair/wheelchair, requiring: Stand by assistance. Patient requires the  following assistive device(s): Walker.  [facility] Toilet Transfer:  [facility] Tub/Shower Transfer:  Happy    Previously Documented Mode of Locomotion at Discharge: Field  YKM Expected Mode of Locomotion at Discharge: [facility] Walk/Wheelchair:  WHEELCHAIR OBSERVATION   Activity was not observed.    WALK OBSERVATION   Walk Distance Scale = 3.  Distance walked is greater than 150 feet. Walk Score  = 5.  Patient requires supervision or set up for walking. Stand by assistance.  Patient walked a distance of  160 feet. Patient requires the following assistive  device(s): Rolling walker.  KY[facility] Stairs:  Activity was not observed.    KYM Comprehension:  Branch  KYM Expression:  Branch  [facility] Social Interaction:  [facility] Problem Solving:  [facility] Memory:  Happy    Therapy Mode Minutes  Occupational Therapy: Happy  Physical Therapy: Individual: 60 minutes.  Speech Language Pathology:  Happy    Signed by: Savana Dickey PT     9

## 2018-08-08 ENCOUNTER — TELEPHONE (OUTPATIENT)
Dept: FAMILY MEDICINE CLINIC | Facility: CLINIC | Age: 78
End: 2018-08-08

## 2018-08-08 DIAGNOSIS — R05.9 COUGH: ICD-10-CM

## 2018-08-08 DIAGNOSIS — J06.9 ACUTE URI OF MULTIPLE SITES: Primary | ICD-10-CM

## 2018-08-08 RX ORDER — BENZONATATE 100 MG/1
100 CAPSULE ORAL 3 TIMES DAILY PRN
Qty: 60 CAPSULE | Refills: 0 | Status: SHIPPED | OUTPATIENT
Start: 2018-08-08 | End: 2018-10-16

## 2018-08-08 RX ORDER — AZITHROMYCIN 250 MG/1
TABLET, FILM COATED ORAL
Qty: 6 TABLET | Refills: 0 | Status: SHIPPED | OUTPATIENT
Start: 2018-08-08 | End: 2018-10-16

## 2018-08-08 RX ORDER — LETROZOLE 2.5 MG/1
2.5 TABLET, FILM COATED ORAL
COMMUNITY
Start: 2018-05-07 | End: 2018-10-16 | Stop reason: SDUPTHER

## 2018-08-08 NOTE — TELEPHONE ENCOUNTER
Please call Avelina and let her know I sent over a Zpak and  Tessalon Perles to help with her cough and upper respiratory infection to her pharmacy at [facility],    _________________  : Patient called on my personal cellphone this morning at *:50 am requesting medication to help with URI and cough. She is in partial hospitalization now and working on alcoholism.

## 2018-08-15 ENCOUNTER — TRANSCRIBE ORDERS (OUTPATIENT)
Dept: ADMINISTRATIVE | Facility: HOSPITAL | Age: 78
End: 2018-08-15

## 2018-08-15 DIAGNOSIS — Z12.39 SCREENING BREAST EXAMINATION: Primary | ICD-10-CM

## 2018-08-27 RX ORDER — LEVOTHYROXINE SODIUM 0.12 MG/1
TABLET ORAL
Qty: 90 TABLET | Refills: 0 | Status: SHIPPED | OUTPATIENT
Start: 2018-08-27 | End: 2018-08-28 | Stop reason: SDUPTHER

## 2018-08-28 RX ORDER — LEVOTHYROXINE SODIUM 0.12 MG/1
125 TABLET ORAL DAILY
Qty: 90 TABLET | Refills: 0 | Status: SHIPPED | OUTPATIENT
Start: 2018-08-28 | End: 2018-10-16

## 2018-09-22 DIAGNOSIS — G89.4 CHRONIC PAIN SYNDROME: ICD-10-CM

## 2018-09-24 RX ORDER — ROSUVASTATIN CALCIUM 20 MG/1
TABLET, COATED ORAL
Qty: 90 TABLET | Refills: 3 | Status: SHIPPED | OUTPATIENT
Start: 2018-09-24 | End: 2018-12-28 | Stop reason: SDUPTHER

## 2018-09-24 RX ORDER — GABAPENTIN 800 MG/1
TABLET ORAL
Qty: 270 TABLET | Refills: 3 | Status: SHIPPED | OUTPATIENT
Start: 2018-09-24 | End: 2018-12-28 | Stop reason: SDUPTHER

## 2018-09-29 ENCOUNTER — DOCUMENTATION (OUTPATIENT)
Dept: FAMILY MEDICINE CLINIC | Facility: CLINIC | Age: 78
End: 2018-09-29

## 2018-10-02 RX ORDER — CLOPIDOGREL BISULFATE 75 MG/1
75 TABLET ORAL DAILY
Qty: 90 TABLET | Refills: 0 | Status: SHIPPED | OUTPATIENT
Start: 2018-10-02 | End: 2018-12-05 | Stop reason: SDUPTHER

## 2018-10-16 ENCOUNTER — HOSPITAL ENCOUNTER (EMERGENCY)
Facility: HOSPITAL | Age: 78
Discharge: HOME | End: 2018-10-16
Attending: FAMILY MEDICINE | Admitting: FAMILY MEDICINE

## 2018-10-16 ENCOUNTER — OFFICE VISIT (OUTPATIENT)
Dept: FAMILY MEDICINE CLINIC | Facility: CLINIC | Age: 78
End: 2018-10-16

## 2018-10-16 ENCOUNTER — APPOINTMENT (OUTPATIENT)
Dept: GENERAL RADIOLOGY | Facility: HOSPITAL | Age: 78
End: 2018-10-16

## 2018-10-16 ENCOUNTER — APPOINTMENT (OUTPATIENT)
Dept: CT IMAGING | Facility: HOSPITAL | Age: 78
End: 2018-10-16

## 2018-10-16 VITALS
WEIGHT: 174 LBS | DIASTOLIC BLOOD PRESSURE: 59 MMHG | BODY MASS INDEX: 29.71 KG/M2 | HEIGHT: 64 IN | HEART RATE: 72 BPM | OXYGEN SATURATION: 94 % | SYSTOLIC BLOOD PRESSURE: 124 MMHG | RESPIRATION RATE: 18 BRPM | TEMPERATURE: 98.7 F

## 2018-10-16 VITALS
TEMPERATURE: 99.1 F | WEIGHT: 171 LBS | RESPIRATION RATE: 20 BRPM | HEIGHT: 64 IN | DIASTOLIC BLOOD PRESSURE: 72 MMHG | OXYGEN SATURATION: 97 % | HEART RATE: 72 BPM | SYSTOLIC BLOOD PRESSURE: 125 MMHG | BODY MASS INDEX: 29.19 KG/M2

## 2018-10-16 DIAGNOSIS — J20.9 ACUTE BRONCHITIS, UNSPECIFIED ORGANISM: Primary | ICD-10-CM

## 2018-10-16 DIAGNOSIS — J32.9 SINUSITIS, UNSPECIFIED CHRONICITY, UNSPECIFIED LOCATION: ICD-10-CM

## 2018-10-16 DIAGNOSIS — R06.09 DYSPNEA ON EXERTION: Primary | ICD-10-CM

## 2018-10-16 DIAGNOSIS — J40 BRONCHITIS: ICD-10-CM

## 2018-10-16 DIAGNOSIS — H10.9 CONJUNCTIVITIS OF LEFT EYE, UNSPECIFIED CONJUNCTIVITIS TYPE: ICD-10-CM

## 2018-10-16 LAB
ALBUMIN SERPL-MCNC: 4.4 G/DL (ref 3.5–5.2)
ALBUMIN/GLOB SERPL: 1.2 G/DL
ALP SERPL-CCNC: 106 U/L (ref 39–117)
ALT SERPL W P-5'-P-CCNC: 22 U/L (ref 1–33)
ANION GAP SERPL CALCULATED.3IONS-SCNC: 15.4 MMOL/L
AST SERPL-CCNC: 31 U/L (ref 1–32)
BASOPHILS # BLD AUTO: 0.03 10*3/MM3 (ref 0–0.2)
BASOPHILS NFR BLD AUTO: 0.3 % (ref 0–1.5)
BILIRUB SERPL-MCNC: 0.6 MG/DL (ref 0.1–1.2)
BUN BLD-MCNC: 12 MG/DL (ref 8–23)
BUN/CREAT SERPL: 12.9 (ref 7–25)
CALCIUM SPEC-SCNC: 9.9 MG/DL (ref 8.6–10.5)
CHLORIDE SERPL-SCNC: 100 MMOL/L (ref 98–107)
CO2 SERPL-SCNC: 23.6 MMOL/L (ref 22–29)
CREAT BLD-MCNC: 0.93 MG/DL (ref 0.57–1)
D-LACTATE SERPL-SCNC: 1.2 MMOL/L (ref 0.5–2)
DEPRECATED RDW RBC AUTO: 46.7 FL (ref 37–54)
EOSINOPHIL # BLD AUTO: 0.04 10*3/MM3 (ref 0–0.7)
EOSINOPHIL NFR BLD AUTO: 0.4 % (ref 0.3–6.2)
ERYTHROCYTE [DISTWIDTH] IN BLOOD BY AUTOMATED COUNT: 13 % (ref 11.7–13)
FLUAV AG NPH QL: NEGATIVE
FLUBV AG NPH QL IA: NEGATIVE
GFR SERPL CREATININE-BSD FRML MDRD: 58 ML/MIN/1.73
GLOBULIN UR ELPH-MCNC: 3.7 GM/DL
GLUCOSE BLD-MCNC: 122 MG/DL (ref 65–99)
HCT VFR BLD AUTO: 41 % (ref 35.6–45.5)
HGB BLD-MCNC: 13.5 G/DL (ref 11.9–15.5)
HOLD SPECIMEN: NORMAL
IMM GRANULOCYTES # BLD: 0.02 10*3/MM3 (ref 0–0.03)
IMM GRANULOCYTES NFR BLD: 0.2 % (ref 0–0.5)
LYMPHOCYTES # BLD AUTO: 0.87 10*3/MM3 (ref 0.9–4.8)
LYMPHOCYTES NFR BLD AUTO: 9.4 % (ref 19.6–45.3)
MCH RBC QN AUTO: 32.5 PG (ref 26.9–32)
MCHC RBC AUTO-ENTMCNC: 32.9 G/DL (ref 32.4–36.3)
MCV RBC AUTO: 98.6 FL (ref 80.5–98.2)
MONOCYTES # BLD AUTO: 1.52 10*3/MM3 (ref 0.2–1.2)
MONOCYTES NFR BLD AUTO: 16.4 % (ref 5–12)
NEUTROPHILS # BLD AUTO: 6.83 10*3/MM3 (ref 1.9–8.1)
NEUTROPHILS NFR BLD AUTO: 73.5 % (ref 42.7–76)
PLATELET # BLD AUTO: 259 10*3/MM3 (ref 140–500)
PMV BLD AUTO: 11.1 FL (ref 6–12)
POTASSIUM BLD-SCNC: 4.5 MMOL/L (ref 3.5–5.2)
PROCALCITONIN SERPL-MCNC: 0.03 NG/ML (ref 0.1–0.25)
PROT SERPL-MCNC: 8.1 G/DL (ref 6–8.5)
RBC # BLD AUTO: 4.16 10*6/MM3 (ref 3.9–5.2)
SODIUM BLD-SCNC: 139 MMOL/L (ref 136–145)
WBC NRBC COR # BLD: 9.29 10*3/MM3 (ref 4.5–10.7)
WHOLE BLOOD HOLD SPECIMEN: NORMAL
WHOLE BLOOD HOLD SPECIMEN: NORMAL

## 2018-10-16 PROCEDURE — 83605 ASSAY OF LACTIC ACID: CPT | Performed by: NURSE PRACTITIONER

## 2018-10-16 PROCEDURE — 99214 OFFICE O/P EST MOD 30 MIN: CPT | Performed by: NURSE PRACTITIONER

## 2018-10-16 PROCEDURE — 96374 THER/PROPH/DIAG INJ IV PUSH: CPT

## 2018-10-16 PROCEDURE — 71046 X-RAY EXAM CHEST 2 VIEWS: CPT

## 2018-10-16 PROCEDURE — 94640 AIRWAY INHALATION TREATMENT: CPT

## 2018-10-16 PROCEDURE — 0 IOPAMIDOL PER 1 ML: Performed by: FAMILY MEDICINE

## 2018-10-16 PROCEDURE — 80053 COMPREHEN METABOLIC PANEL: CPT | Performed by: NURSE PRACTITIONER

## 2018-10-16 PROCEDURE — 87804 INFLUENZA ASSAY W/OPTIC: CPT | Performed by: NURSE PRACTITIONER

## 2018-10-16 PROCEDURE — 99284 EMERGENCY DEPT VISIT MOD MDM: CPT

## 2018-10-16 PROCEDURE — 25010000002 METHYLPREDNISOLONE PER 125 MG: Performed by: NURSE PRACTITIONER

## 2018-10-16 PROCEDURE — 85025 COMPLETE CBC W/AUTO DIFF WBC: CPT | Performed by: NURSE PRACTITIONER

## 2018-10-16 PROCEDURE — 84145 PROCALCITONIN (PCT): CPT | Performed by: NURSE PRACTITIONER

## 2018-10-16 PROCEDURE — 70487 CT MAXILLOFACIAL W/DYE: CPT

## 2018-10-16 RX ORDER — METHYLPREDNISOLONE 4 MG/1
TABLET ORAL
Qty: 1 EACH | Refills: 0 | Status: SHIPPED | OUTPATIENT
Start: 2018-10-16 | End: 2018-10-25

## 2018-10-16 RX ORDER — DOXYCYCLINE HYCLATE 100 MG/1
100 TABLET, DELAYED RELEASE ORAL 2 TIMES DAILY
Qty: 20 TABLET | Refills: 0 | Status: SHIPPED | OUTPATIENT
Start: 2018-10-16 | End: 2018-11-02

## 2018-10-16 RX ORDER — ERYTHROMYCIN 5 MG/G
OINTMENT OPHTHALMIC
Qty: 1 EACH | Refills: 0 | Status: SHIPPED | OUTPATIENT
Start: 2018-10-16 | End: 2018-11-02

## 2018-10-16 RX ORDER — METHYLPREDNISOLONE SODIUM SUCCINATE 125 MG/2ML
125 INJECTION, POWDER, LYOPHILIZED, FOR SOLUTION INTRAMUSCULAR; INTRAVENOUS ONCE
Status: COMPLETED | OUTPATIENT
Start: 2018-10-16 | End: 2018-10-16

## 2018-10-16 RX ORDER — ALBUTEROL SULFATE 90 UG/1
2 AEROSOL, METERED RESPIRATORY (INHALATION) EVERY 4 HOURS PRN
Qty: 1 INHALER | Refills: 0 | Status: SHIPPED | OUTPATIENT
Start: 2018-10-16 | End: 2022-05-18

## 2018-10-16 RX ORDER — IPRATROPIUM BROMIDE AND ALBUTEROL SULFATE 2.5; .5 MG/3ML; MG/3ML
3 SOLUTION RESPIRATORY (INHALATION) ONCE
Status: COMPLETED | OUTPATIENT
Start: 2018-10-16 | End: 2018-10-16

## 2018-10-16 RX ADMIN — IOPAMIDOL 50 ML: 755 INJECTION, SOLUTION INTRAVENOUS at 12:19

## 2018-10-16 RX ADMIN — IPRATROPIUM BROMIDE AND ALBUTEROL SULFATE 3 ML: 2.5; .5 SOLUTION RESPIRATORY (INHALATION) at 11:34

## 2018-10-16 RX ADMIN — METHYLPREDNISOLONE SODIUM SUCCINATE 125 MG: 125 INJECTION, POWDER, FOR SOLUTION INTRAMUSCULAR; INTRAVENOUS at 11:24

## 2018-10-16 NOTE — DISCHARGE INSTRUCTIONS
Home to rest  Take steroids as directed  Use antibiotic ointment as directed  Take antibiotics as directed   Use albuterol inhaler as needed for shortness of breath or cough   Return if worse or new concerns   Continue care with your primary care physician and have your blood pressure regularly checked and managed. Normal blood pressure is 120/80.

## 2018-10-16 NOTE — ED PROVIDER NOTES
" EMERGENCY DEPARTMENT ENCOUNTER    CHIEF COMPLAINT  Chief Complaint: generally unwell  History given by: patient  History limited by: none  Room Number: 23/23  PMD: Goran Carr MD      HPI:  Pt is a 78 y.o. female who presents complaining of feeling generally unwell for 3 days. Pt states she feels like she has a cold to her chest, eyes, and ears. Pt states she has a yellow/white discharge from her left eye and blurred vision. Pt states that she had a subjective fever at home. Pt has a productive cough with sputum. She saw her PCP this morning who did not run any test and sent her here for a CXR and further evaluation. Pt states she has a hx of COPD.  Per family, pt is on Plavix and had epistaxis two days ago. Pt denies smoking and takes inhalers regularly.     Duration:  Several days  Onset: gradual  Timing: constant  Location: generalized  Radiation: NA  Quality: \"cold sx\"  Intensity/Severity: moderate  Progression: unchanged  Associated Symptoms: productive cough with sputum, eye pain/discharge, ear pain, subjective fever, epistaxis, blurred vision.   Aggravating Factors: none  Alleviating Factors: none  Previous Episodes: Pt has COPD.  Treatment before arrival: She saw her PCP this morning who did not run any test and sent her here for a CXR and further evaluation.    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   • HTN (hypertension) 12/07/2017   • HLD (hyperlipidemia) 12/07/2017   • Adverse effect of antiplatelet agent 12/08/2017   • Left middle cerebral artery stroke (CMS/[facility]) " 2017   • Carcinoma of central portion of right breast in female, estrogen receptor positive (CMS/[facility]) 2015   • Chronic bilateral low back pain with left-sided sciatica 2016   • History of lumbar surgery 2018   • Seizure disorder, simple partial, without intractable epilepsy (CMS/[facility]) 2014   • Spinal stenosis of lumbar region without neurogenic claudication 2016   • Status post craniectomy 2016   • History of total right knee replacement 2018     Resolved Ambulatory Problems     Diagnosis Date Noted   • No Resolved Ambulatory Problems     Past Medical History:   Diagnosis Date   • Anxiety    • Arthritis    • COPD (chronic obstructive pulmonary disease) (CMS/[facility])    • CVA (cerebral infarction)    • CVD (cardiovascular disease)    • Depression    • Hyperlipemia    • Hypertension    • Hypothyroid    • Macular degeneration    • Meningioma (CMS/[facility])    • Overweight    • Stroke (CMS/[facility]) 11/15/2017   • Vertigo        PAST SURGICAL HISTORY  Past Surgical History:   Procedure Laterality Date   • ADRENAL GLAND SURGERY     • BRAIN MENINGIOMA EXCISION  1974    occipital   •  SECTION     • EMBOLECTOMY N/A 11/15/2017    Procedure: Embolectomy Mechanical;  Surgeon: Rachid Figueroa MD;  Location: [facility] ;  Service:    • REPLACEMENT TOTAL KNEE Right    • TOTAL SHOULDER REPLACEMENT      x2       FAMILY HISTORY  Family History   Problem Relation Age of Onset   • Hypertension Mother    • Hyperlipidemia Mother        SOCIAL HISTORY  Social History     Social History   • Marital status:      Spouse name: Vitaly   • Number of children: 4   • Years of education: N/A     Occupational History   • artist      Social History Main Topics   • Smoking status: Former Smoker     Packs/day: 2.00     Years: 40.00     Types: Cigarettes   • Smokeless tobacco: Never Used      Comment: non-smoker since    • Alcohol use Yes      Comment: social   • Drug use: No   •  Sexual activity: Defer     Other Topics Concern   • Not on file     Social History Narrative    Lives at home with        ALLERGIES  Penicillins    REVIEW OF SYSTEMS  Review of Systems   Constitutional: Positive for fever (subjective).   HENT: Positive for ear pain and nosebleeds (resolved). Negative for sore throat.         (+) eye pain/discharge   Eyes: Positive for pain and discharge.        (+) blurred vision     Respiratory: Positive for cough (productive). Negative for shortness of breath.    Cardiovascular: Positive for chest pain.   Gastrointestinal: Negative for abdominal pain, diarrhea and vomiting.   Genitourinary: Negative for dysuria.   Musculoskeletal: Negative for neck pain.   Skin: Negative for rash.   Allergic/Immunologic: Negative.    Neurological: Negative for weakness, numbness and headaches.   Hematological: Negative.    Psychiatric/Behavioral: Negative.    All other systems reviewed and are negative.      PHYSICAL EXAM  ED Triage Vitals   Temp Heart Rate Resp BP SpO2   10/16/18 1022 10/16/18 1022 10/16/18 1030 10/16/18 1031 10/16/18 1022   100.1 °F (37.8 °C) 74 18 138/91 94 %      Temp src Heart Rate Source Patient Position BP Location FiO2 (%)   10/16/18 1022 10/16/18 1022 10/16/18 1031 10/16/18 1031 --   Tympanic Monitor Sitting Left arm        Physical Exam   Constitutional: She is oriented to person, place, and time. No distress.   HENT:   Head: Normocephalic and atraumatic.   Eyes: Pupils are equal, round, and reactive to light. EOM are normal.   Left eye has periorbital erythema   Pain with EOMS  Mild edema    Neck: Normal range of motion. Neck supple.   Cardiovascular: Normal rate, regular rhythm and normal heart sounds.    Pulmonary/Chest: Effort normal and breath sounds normal. No respiratory distress.   Abdominal: Soft. There is no tenderness. There is no rebound and no guarding.   Musculoskeletal: Normal range of motion. She exhibits no edema.   Neurological: She is alert and  oriented to person, place, and time. She has normal sensation and normal strength.   Skin: Skin is warm and dry. No rash noted.   Psychiatric: Mood and affect normal.   Nursing note and vitals reviewed.      LAB RESULTS  Lab Results (last 24 hours)     Procedure Component Value Units Date/Time    CBC & Differential [[id number]] Collected:  10/16/18 1041    Specimen:  Blood Updated:  10/16/18 1125    Narrative:       The following orders were created for panel order CBC & Differential.  Procedure                               Abnormality         Status                     ---------                               -----------         ------                     CBC Auto Differential[[id number]]        Abnormal            Final result                 Please view results for these tests on the individual orders.    Comprehensive Metabolic Panel [[id number]]  (Abnormal) Collected:  10/16/18 1041    Specimen:  Blood Updated:  10/16/18 1208     Glucose 122 (H) mg/dL      BUN 12 mg/dL      Creatinine 0.93 mg/dL      Sodium 139 mmol/L      Potassium 4.5 mmol/L      Chloride 100 mmol/L      CO2 23.6 mmol/L      Calcium 9.9 mg/dL      Total Protein 8.1 g/dL      Albumin 4.40 g/dL      ALT (SGPT) 22 U/L      AST (SGOT) 31 U/L      Comment: Specimen hemolyzed.  Results may be affected.        Alkaline Phosphatase 106 U/L      Total Bilirubin 0.6 mg/dL      eGFR Non African Amer 58 (L) mL/min/1.73      Globulin 3.7 gm/dL      A/G Ratio 1.2 g/dL      BUN/Creatinine Ratio 12.9     Anion Gap 15.4 mmol/L     Narrative:       The MDRD GFR formula is only valid for adults with stable renal function between ages 18 and 70.    Procalcitonin [[id number]]  (Abnormal) Collected:  10/16/18 1041    Specimen:  Blood Updated:  10/16/18 1200     Procalcitonin 0.03 (L) ng/mL     Narrative:       As a Marker for Sepsis (Non-Neonates):   1. <0.5 ng/mL represents a low risk of severe sepsis and/or septic shock.  1. >2 ng/mL represents a high risk of severe  "sepsis and/or septic shock.    As a Marker for Lower Respiratory Tract Infections that require antibiotic therapy:  PCT on Admission     Antibiotic Therapy             6-12 Hrs later  > 0.5                Strongly Recommended            >0.25 - <0.5         Recommended  0.1 - 0.25           Discouraged                   Remeasure/reassess PCT  <0.1                 Strongly Discouraged          Remeasure/reassess PCT      As 28 day mortality risk marker: \"Change in Procalcitonin Result\" (> 80 % or <=80 %) if Day 0 (or Day 1) and Day 4 values are available. Refer to http://www.[facility]-pct-calculator.com/   Change in PCT <=80 %   A decrease of PCT levels below or equal to 80 % defines a positive change in PCT test result representing a higher risk for 28-day all-cause mortality of patients diagnosed with severe sepsis or septic shock.  Change in PCT > 80 %   A decrease of PCT levels of more than 80 % defines a negative change in PCT result representing a lower risk for 28-day all-cause mortality of patients diagnosed with severe sepsis or septic shock.                CBC Auto Differential [[id number]]  (Abnormal) Collected:  10/16/18 1041    Specimen:  Blood Updated:  10/16/18 1125     WBC 9.29 10*3/mm3      RBC 4.16 10*6/mm3      Hemoglobin 13.5 g/dL      Hematocrit 41.0 %      MCV 98.6 (H) fL      MCH 32.5 (H) pg      MCHC 32.9 g/dL      RDW 13.0 %      RDW-SD 46.7 fl      MPV 11.1 fL      Platelets 259 10*3/mm3      Neutrophil % 73.5 %      Lymphocyte % 9.4 (L) %      Monocyte % 16.4 (H) %      Eosinophil % 0.4 %      Basophil % 0.3 %      Immature Grans % 0.2 %      Neutrophils, Absolute 6.83 10*3/mm3      Lymphocytes, Absolute 0.87 (L) 10*3/mm3      Monocytes, Absolute 1.52 (H) 10*3/mm3      Eosinophils, Absolute 0.04 10*3/mm3      Basophils, Absolute 0.03 10*3/mm3      Immature Grans, Absolute 0.02 10*3/mm3     Influenza Antigen, Rapid - Swab, Nasopharynx [[id number]]  (Normal) Collected:  10/16/18 1122    Specimen: "  Swab from Nasopharynx Updated:  10/16/18 1152     Influenza A Ag, EIA Negative     Influenza B Ag, EIA Negative    Lactic Acid, Plasma [[id number]]  (Normal) Collected:  10/16/18 1123    Specimen:  Blood Updated:  10/16/18 1156     Lactate 1.2 mmol/L           I ordered the above labs and reviewed the results    RADIOLOGY  CT Facial Bones With Contrast   Final Result   There are moderate changes of inflammatory paranasal sinus disease.   Otherwise, no other significant acute abnormality is appreciated.   Apparently, the patient has a left periorbital cellulitis. This is not   well delineated on this CT examination.       Incidentally noted are prominent atherosclerotic calcifications within   the common carotid artery bifurcations and the proximal internal   carotids. The extent of stenoses could be further assessed with Doppler   evaluation as clinically indicated.        These findings and recommendations were directly discussed with Keren Murrell of the emergency room staff on 10/16/2018 at approximately 12:30   PM.       Radiation dose reduction techniques were utilized, including automated   exposure control and exposure modulation based on body size.       This report was finalized on 10/16/2018 1:54 PM by Dr. Juan Bishop M.D.          XR Chest 2 View   Final Result   No acute cardiovascular or pulmonary process identified        I ordered the above noted radiological studies. Reviewed by me in PACS.       PROCEDURES  Procedures      PROGRESS AND CONSULTS  ED Course as of Oct 16 1405   Tue Oct 16, 2018   1234 Dr Altman : Sinusitis , bilateral carotid arteries appear stenosed but not well evaluated on this exam. Require outpatient doppler imaging   [JS]      ED Course User Index  [JS] Keren Murrell, APRMODESTO     1116-Ordered lab work, CT facial bones, and CXR for further evaluation. Ordered Duo-Neb for COPD and Solu-medrol for inflammation.     1349-Discussed pt case with  (ED physician) who  agrees with the plan of care     MEDICAL DECISION MAKING  Results were reviewed/discussed with the patient and they were also made aware of online access. Pt also made aware that some labs, such as cultures, will not be resulted during ER visit and follow up with PMD is necessary.     MDM       DIAGNOSIS  Final diagnoses:   Acute bronchitis, unspecified organism   Sinusitis, unspecified chronicity, unspecified location       DISPOSITION  DISCHARGE    Patient discharged in stable condition.    Reviewed implications of results, diagnosis, meds, responsibility to follow up, warning signs and symptoms of possible worsening, potential complications and reasons to return to ER.    Patient/Family voiced understanding of above instructions.    Discussed plan for discharge, as there is no emergent indication for admission. Patient referred to primary care provider for BP management due to today's BP. Pt/family is agreeable and understands need for follow up and repeat testing.  Pt is aware that discharge does not mean that nothing is wrong but it indicates no emergency is present that requires admission and they must continue care with follow-up as given below or physician of their choice.     FOLLOW-UP  Goran Carr MD  [address] [id number] [phone]    Call            Medication List      New Prescriptions    doxycycline 100 MG enteric coated tablet  Commonly known as:  DORYX  Take 1 tablet by mouth 2 (Two) Times a Day.     erythromycin 5 MG/GM ophthalmic ointment  Commonly known as:  ROMYCIN  Administer  to the right eye Every 4 (Four) Hours While Awake.     MethylPREDNISolone 4 MG tablet  Commonly known as:  MEDROL (JUWAN)  Take as directed on package instructions.              Latest Documented Vital Signs:  As of 2:05 PM  BP- 155/76 HR- 77 Temp- 99.1 °F (37.3 °C) (Tympanic) O2 sat- 92%    --  Documentation assistance provided by irene Alvarado for CELY Pereyra.  Information  recorded by the scribe was done at my direction and has been verified and validated by me.            Laure Alvarado  10/16/18 1306       Laure Alvarado  10/16/18 1405       Keren Murrell APRN  10/23/18 [id number]

## 2018-10-16 NOTE — PROGRESS NOTES
"Paloma Carr is a 78 y.o. female.Patient presents with chest/nasal congestion along with bilateral eye drainage that has been present for 3 days.     Worst she has ever felt, having trouble breathing, H/O pneumonia  Assessment/Plan   Problem List Items Addressed This Visit     None      Visit Diagnoses     Dyspnea on exertion    -  Primary    Bronchitis                 No Follow-up on file.  There are no Patient Instructions on file for this visit.    Chief Complaint   Patient presents with   • Cough     Social History   Substance Use Topics   • Smoking status: Former Smoker     Packs/day: 2.00     Years: 40.00     Types: Cigarettes   • Smokeless tobacco: Never Used      Comment: non-smoker since 2003   • Alcohol use Yes      Comment: social       History of Present Illness     The following portions of the patient's history were reviewed and updated as appropriate:PMHroutine: Social history , Allergies, Current Medications, Active Problem List and Health Maintenance    Review of Systems   Constitutional: Positive for activity change, appetite change, fatigue and fever.   HENT: Positive for ear pain and sinus pressure.    Respiratory: Positive for cough and shortness of breath.    Cardiovascular: Negative for chest pain.   Neurological: Positive for dizziness and headaches.       Objective   Vitals:    10/16/18 0936   BP: 125/72   Pulse: 72   Resp: 20   Temp: 99.1 °F (37.3 °C)   SpO2: 97%   Weight: 77.6 kg (171 lb)   Height: 162.6 cm (64\")     Body mass index is 29.35 kg/m².  Physical Exam   Constitutional: She appears well-developed.   Nontoxic but ill appearing   HENT:   Right Ear: External ear normal.   Left Ear: External ear normal.   Mouth/Throat: Oropharynx is clear and moist.   Notable edema to bilateral lower eye lids left greater than right   Eyes: EOM are normal.   Cardiovascular: Normal rate and regular rhythm.    Pulmonary/Chest: She is in respiratory distress. She has rales.   Musculoskeletal: Normal " range of motion.   Neurological: She is alert.   Nursing note and vitals reviewed.    Reviewed Data:  Admission on 10/16/2018, Discharged on 10/16/2018   Component Date Value Ref Range Status   • Extra Tube 10/16/2018 hold for add-on   Final    Auto resulted   • Extra Tube 10/16/2018 Hold for add-ons.   Final    Auto resulted.   • Extra Tube 10/16/2018 hold for add-on   Final    Auto resulted   • Glucose 10/16/2018 122* 65 - 99 mg/dL Final   • BUN 10/16/2018 12  8 - 23 mg/dL Final   • Creatinine 10/16/2018 0.93  0.57 - 1.00 mg/dL Final   • Sodium 10/16/2018 139  136 - 145 mmol/L Final   • Potassium 10/16/2018 4.5  3.5 - 5.2 mmol/L Final   • Chloride 10/16/2018 100  98 - 107 mmol/L Final   • CO2 10/16/2018 23.6  22.0 - 29.0 mmol/L Final   • Calcium 10/16/2018 9.9  8.6 - 10.5 mg/dL Final   • Total Protein 10/16/2018 8.1  6.0 - 8.5 g/dL Final   • Albumin 10/16/2018 4.40  3.50 - 5.20 g/dL Final   • ALT (SGPT) 10/16/2018 22  1 - 33 U/L Final   • AST (SGOT) 10/16/2018 31  1 - 32 U/L Final    Specimen hemolyzed.  Results may be affected.   • Alkaline Phosphatase 10/16/2018 106  39 - 117 U/L Final   • Total Bilirubin 10/16/2018 0.6  0.1 - 1.2 mg/dL Final   • eGFR Non African Amer 10/16/2018 58* >60 mL/min/1.73 Final   • Globulin 10/16/2018 3.7  gm/dL Final   • A/G Ratio 10/16/2018 1.2  g/dL Final   • BUN/Creatinine Ratio 10/16/2018 12.9  7.0 - 25.0 Final   • Anion Gap 10/16/2018 15.4  mmol/L Final   • Influenza A Ag, EIA 10/16/2018 Negative  Negative Final   • Influenza B Ag, EIA 10/16/2018 Negative  Negative Final   • Lactate 10/16/2018 1.2  0.5 - 2.0 mmol/L Final   • Procalcitonin 10/16/2018 0.03* 0.10 - 0.25 ng/mL Final   • WBC 10/16/2018 9.29  4.50 - 10.70 10*3/mm3 Final   • RBC 10/16/2018 4.16  3.90 - 5.20 10*6/mm3 Final   • Hemoglobin 10/16/2018 13.5  11.9 - 15.5 g/dL Final   • Hematocrit 10/16/2018 41.0  35.6 - 45.5 % Final   • MCV 10/16/2018 98.6* 80.5 - 98.2 fL Final   • MCH 10/16/2018 32.5* 26.9 - 32.0 pg Final    • MCHC 10/16/2018 32.9  32.4 - 36.3 g/dL Final   • RDW 10/16/2018 13.0  11.7 - 13.0 % Final   • RDW-SD 10/16/2018 46.7  37.0 - 54.0 fl Final   • MPV 10/16/2018 11.1  6.0 - 12.0 fL Final   • Platelets 10/16/2018 259  140 - 500 10*3/mm3 Final   • Neutrophil % 10/16/2018 73.5  42.7 - 76.0 % Final   • Lymphocyte % 10/16/2018 9.4* 19.6 - 45.3 % Final   • Monocyte % 10/16/2018 16.4* 5.0 - 12.0 % Final   • Eosinophil % 10/16/2018 0.4  0.3 - 6.2 % Final   • Basophil % 10/16/2018 0.3  0.0 - 1.5 % Final   • Immature Grans % 10/16/2018 0.2  0.0 - 0.5 % Final   • Neutrophils, Absolute 10/16/2018 6.83  1.90 - 8.10 10*3/mm3 Final   • Lymphocytes, Absolute 10/16/2018 0.87* 0.90 - 4.80 10*3/mm3 Final   • Monocytes, Absolute 10/16/2018 1.52* 0.20 - 1.20 10*3/mm3 Final   • Eosinophils, Absolute 10/16/2018 0.04  0.00 - 0.70 10*3/mm3 Final   • Basophils, Absolute 10/16/2018 0.03  0.00 - 0.20 10*3/mm3 Final   • Immature Grans, Absolute 10/16/2018 0.02  0.00 - 0.03 10*3/mm3 Final     Report to Martha the triage nurse at [facility]  Pt in respiratory distress sent to the ER

## 2018-10-16 NOTE — ED PROVIDER NOTES
Pt is a 78 y.o. female who presents to the ED with multiple complaints. Pt reports left sinus pain, productive cough, and discharge from left eye.     On exam,  Constitutional: NAD  Cardiovascular: HRRR  Pulmonary: Lungs CTAB  Abdomen: soft, non-tender  Neurological: A and Ox3    Labs and imaging reviewed.     Plan: Discharge pt home with Abx, mucinex and Ilotycin.    1343  Initial encounter. Notified pt of negative lab work, including Flu swab and Lactate, and CT Facial bones that shows likely maxillary sinusitis. Discussed plan to discharge pt home with Abx for sinusitis and Abx ointment for conjunctivitis, and f/u with PCP. Advised pt to take Mucinex to help congestion. Pt understands and agrees with treatment plan, all concerns addressed.       MD ATTESTATION NOTE    The JACKY and I have discussed this patient's history, physical exam, and treatment plan.  I have reviewed the documentation and personally had a face to face interaction with the patient. I affirm the documentation and agree with the treatment and plan.  The attached note describes my personal findings.      Documentation assistance provided by irene Lan for Dr. Dallas. Information recorded by the irene was done at my direction and has been verified and validated by me.         Morales Lan  10/16/18 [id number]       Alonzo Dallas MD  10/16/18 0565

## 2018-10-17 ENCOUNTER — EPISODE CHANGES (OUTPATIENT)
Dept: SOCIAL WORK | Facility: HOSPITAL | Age: 78
End: 2018-10-17

## 2018-10-22 ENCOUNTER — HOSPITAL ENCOUNTER (OUTPATIENT)
Dept: MAMMOGRAPHY | Facility: HOSPITAL | Age: 78
Discharge: HOME | End: 2018-10-22
Admitting: INTERNAL MEDICINE

## 2018-10-22 DIAGNOSIS — Z12.39 SCREENING BREAST EXAMINATION: ICD-10-CM

## 2018-10-22 PROCEDURE — 77063 BREAST TOMOSYNTHESIS BI: CPT

## 2018-10-22 PROCEDURE — 77067 SCR MAMMO BI INCL CAD: CPT

## 2018-11-02 ENCOUNTER — OFFICE VISIT (OUTPATIENT)
Dept: NEUROLOGY | Facility: CLINIC | Age: 78
End: 2018-11-02

## 2018-11-02 VITALS
WEIGHT: 173 LBS | HEART RATE: 51 BPM | HEIGHT: 64 IN | OXYGEN SATURATION: 94 % | DIASTOLIC BLOOD PRESSURE: 70 MMHG | SYSTOLIC BLOOD PRESSURE: 120 MMHG | BODY MASS INDEX: 29.53 KG/M2

## 2018-11-02 DIAGNOSIS — I10 ESSENTIAL HYPERTENSION: ICD-10-CM

## 2018-11-02 DIAGNOSIS — I63.512 LEFT MIDDLE CEREBRAL ARTERY STROKE (HCC): Primary | ICD-10-CM

## 2018-11-02 DIAGNOSIS — E78.2 MIXED HYPERLIPIDEMIA: ICD-10-CM

## 2018-11-02 PROCEDURE — 99213 OFFICE O/P EST LOW 20 MIN: CPT | Performed by: NURSE PRACTITIONER

## 2018-11-02 NOTE — PROGRESS NOTES
"DOS: 2018  NAME: Paloma Carr   : 1940  PCP: Goran Carr MD    Chief Complaint   Patient presents with   • Stroke      SUBJECTIVE  Neurological Problem:  78 y.o. RHW female with HTN, HLD, TEOFILO (on CPAP) and h/o breast CA, lumbar stenosis, stroke (), right occipital meningioma resection () and seizures (maintained on neurontin) who presents today to follow-up for stroke and is accompanied by her spouse.     Interval History:   Ms. Carr presented to [facility] on [facility]17 with sudden onset speech difficulty and right side weakness. She received IV TPA and underwent emergent LMCA mechanical embolectomy. The etiology of her event was unclear. Her cardiac evaluation included a JEFE that was unrevealing and she was discharged on ASA, Plavix and prolonged cardiac monitoring with a ZIO Patch. She was readmitted in early December with right knee swelling with blood aspiration and her ASA was reduced from 325 to 81 mg. She followed-up with Dr. Berumen in March of this year for h/o seizures in which she has been previously maintained on neurontin.     She presents today and continues on ASA, Plavix and crestor 20 mg and is tolerating medications well. She also continues with neurontin. She states she recovered from her stroke and is about \"90%\" back to her baseline with residual cognitive issues. She describes some mild memory impairment and difficulty grasping complex articles. She reports having macular degeneration bilaterally with blurry vision that is worse at times and therefore does not read as much as previous. She denies any actual areas of vision loss or double vision, unilateral weakness, numbness, speech difficulty or any new stroke/TIA symptoms. She denies any changes in personality or behavior, denies seizure activity. She no longer goes [facility] and states that her symptoms have improved greatly. She does suffer from chronic back issues and radiculopathy. She uses a " walker to ambulate long distances. She was recently seen in ER on 10/16 for acute bronchitis and sinusitis. She states her BP is well controlled. Denies smoking, occasional alcohol use.     Review of Systems:Review of Systems   Constitutional: Negative for activity change, appetite change and fatigue.   HENT: Positive for ear pain (left ear). Negative for facial swelling and trouble swallowing.    Eyes: Positive for visual disturbance (blind spots). Negative for photophobia and pain.   Respiratory: Positive for choking. Negative for chest tightness and shortness of breath.    Cardiovascular: Positive for leg swelling. Negative for chest pain and palpitations.   Gastrointestinal: Negative for abdominal pain, constipation and nausea.   Endocrine: Negative for polydipsia, polyphagia and polyuria.   Genitourinary: Negative for difficulty urinating, frequency and urgency.   Musculoskeletal: Positive for back pain and gait problem. Negative for myalgias.   Skin: Negative for color change, rash and wound.   Allergic/Immunologic: Negative for environmental allergies, food allergies and immunocompromised state.   Neurological: Negative for dizziness, tremors, seizures, syncope, facial asymmetry, speech difficulty, weakness, light-headedness, numbness and headaches.   Hematological: Negative for adenopathy. Bruises/bleeds easily.   Psychiatric/Behavioral: Positive for decreased concentration and sleep disturbance (CPAP). Negative for agitation, behavioral problems, confusion, dysphoric mood, hallucinations, self-injury and suicidal ideas. The patient is not nervous/anxious and is not hyperactive.        Current Medications:   Current Outpatient Prescriptions:   •  albuterol (PROVENTIL HFA;VENTOLIN HFA) 108 (90 Base) MCG/ACT inhaler, Inhale 2 puffs Every 4 (Four) Hours As Needed for Wheezing., Disp: 1 inhaler, Rfl: 0  •  aspirin 81 MG EC tablet, Take 81 mg by mouth Daily., Disp: , Rfl:   •  atenolol (TENORMIN) 100 MG tablet,  Take 100 mg by mouth Daily., Disp: , Rfl:   •  budesonide-formoterol (SYMBICORT) 160-4.5 MCG/ACT inhaler, Inhale 2 puffs 2 (Two) Times a Day., Disp: , Rfl: 12  •  Cholecalciferol (VITAMIN D3) 5000 units capsule capsule, Take 5,000 Units by mouth Daily., Disp: , Rfl:   •  clopidogrel (PLAVIX) 75 MG tablet, Take 1 tablet by mouth Daily., Disp: 90 tablet, Rfl: 0  •  Cyanocobalamin (VITAMIN B 12 PO), Take 1,000 mcg by mouth Every Evening., Disp: , Rfl:   •  escitalopram (LEXAPRO) 20 MG tablet, Take 20 mg by mouth Daily., Disp: , Rfl:   •  folic acid-pyridoxine-cyanocobalamin (FOLBIC) 2.5-25-2 MG tablet tablet, Take 1 tablet by mouth Daily., Disp: , Rfl:   •  furosemide (LASIX) 20 MG tablet, Take 1 tablet by mouth Daily., Disp: 60 tablet, Rfl: 0  •  gabapentin (NEURONTIN) 800 MG tablet, TAKE 1 TABLET EVERY MORNING AND 2 TABLETS EVERY EVENING, Disp: 270 tablet, Rfl: 3  •  ipratropium-albuterol (COMBIVENT)  MCG/ACT inhaler, Inhale 2 puffs Every 6 (Six) Hours., Disp: , Rfl: 11  •  letrozole (FEMARA) 2.5 MG tablet, Take 2.5 mg by mouth Daily., Disp: , Rfl:   •  levothyroxine (SYNTHROID, LEVOTHROID) 100 MCG tablet, Take 100 mcg by mouth Daily., Disp: , Rfl:   •  MELATONIN PO, Take 1 tablet by mouth Every Night., Disp: , Rfl:   •  Multiple Vitamins-Minerals (MULTIVITAMIN ADULT PO), Take 1 tablet by mouth Daily., Disp: , Rfl:   •  pantoprazole (PROTONIX) 40 MG EC tablet, TAKE ONE TABLET BY MOUTH DAILY, Disp: 90 tablet, Rfl: 1  •  Probiotic Product (PROBIOTIC DAILY PO), Take 1 tablet by mouth Daily., Disp: , Rfl:   •  rosuvastatin (CRESTOR) 20 MG tablet, TAKE ONE TABLET BY MOUTH DAILY, Disp: 90 tablet, Rfl: 3  No current facility-administered medications for this visit.     Facility-Administered Medications Ordered in Other Visits:   •  atorvastatin (LIPITOR) tablet 40 mg, 40 mg, Oral, Daily, Norman Taylor MD    The following portions of the patient's history were reviewed and updated as appropriate: allergies,  current medications, past family history, past medical history, past social history, past surgical history and problem list.    OBJECTIVE  Diagnostics:    Laboratory Results:         Lab Results   Component Value Date    WBC 9.29 10/16/2018    HGB 13.5 10/16/2018    HCT 41.0 10/16/2018    MCV 98.6 (H) 10/16/2018     10/16/2018     Lab Results   Component Value Date    GLUCOSE 122 (H) 10/16/2018    BUN 12 10/16/2018    CREATININE 0.93 10/16/2018    EGFRIFNONA 58 (L) 10/16/2018    EGFRIFAFRI 71 04/21/2017    BCR 12.9 10/16/2018    K 4.5 10/16/2018    CO2 23.6 10/16/2018    CALCIUM 9.9 10/16/2018    PROTENTOTREF 7.4 04/21/2017    ALBUMIN 4.40 10/16/2018    LABIL2 1.6 04/21/2017    AST 31 10/16/2018    ALT 22 10/16/2018     Lab Results   Component Value Date    HGBA1C 5.46 11/16/2017     Lab Results   Component Value Date    CHOL 139 11/16/2017     Lab Results   Component Value Date    HDL 40 11/16/2017    HDL 39 (L) 04/21/2017     Lab Results   Component Value Date    LDL 63 11/16/2017    LDL 81 04/21/2017     Lab Results   Component Value Date    TRIG 180 (H) 11/16/2017    TRIG 240 (H) 04/21/2017     No results found for: RPR  Lab Results   Component Value Date    TSH 2.280 11/16/2017     No results found for: [id number]    Physical Examination: NIHSS: 0 mRS: 0  General Appearance:   Well developed, well nourished, well groomed, alert, and cooperative.  HEENT: Normocephalic.    Neck and Spine: Normal range of motion.  Normal alignment. No mass or tenderness. No bruits.  Cardiac: Regular rate and rhythm. No murmurs.  Peripheral Vasculature: Radial pulses are equal and symmetric. No signs of distal embolization.  Extremities:    No edema or deformities. Normal joint ROM.  Skin:    No rashes or birth marks.    Neurological examination:  Higher Integrative  Function: Oriented to time, place and person. Normal registration, recall (3/3 with 1 clue), attention span and concentration. Normal language including  comprehension, spontaneous speech, repetition, reading, writing, naming and vocabulary. No neglect with normal visual-spatial function and construction. Normal fund of knowledge and higher integrative function.  CN II: Pupils are equal, round, and reactive to light. VF are grossly normal.  CN III IV VI: Extraocular movements are full without nystagmus.   CN V: Normal facial sensation and strength of muscles of mastication.  CN VII: Facial movements are symmetric. No weakness.  CN VIII:   Auditory acuity is normal.  CN IX & X:   Symmetric palatal movement.  CN XI: Sternocleidomastoid and trapezius are normal.  No weakness.  CN XII:   The tongue is midline.  No atrophy or fasciculations.  Motor: Normal muscle strength, bulk and tone in upper extremities. Mildly decreased hip flexor and knee extensions bilaterally, at least 4/5.  No fasciculations, rigidity, spasticity, or abnormal movements.  Sensation: Normal to light touch, temperature, and proprioception in arms and legs. Normal graphesthesia and no extinction on DSS.  Station and Gait: Mildly antalgic gait. Okay with toe and heel ambulation, difficulty with tandem.    Coordination: Finger to nose test shows no dysmetria.  Heel to shin normal.    Impression:  Ms. Carr continues to do well following her LMCA stroke she suffered in Nov. 2017 s/p IV tPA and mechanical embolectomy. She has essentially remained at her neurologic baseline with mild residual cognitive slowing. The etiology of her event remains unclear and her work-up including JEEF, prolonged monitoring and hypercoagulable panel have been unrevealing. She continues on DAPT with ASA 81 mg and Plavix along with statin and is tolerating well. We discussed her personal risk factors for stroke and the importance of risk factor control for stroke prevention, including BP, cholesterol control and compliance with CPAP. She will monitor BP regularly and f/u with PCP for continued cholesterol surveillance. We also  discussed the importance of getting regular physical activity that given her h/o lumbar radiculopathy is challenging. We discussed alternatives such as water aerobics and cycling. She will continue to monitor her BP regularly and record. We discussed importance of calling 911 for any signs/symptoms of stroke. F/U in one year, sooner if symptoms warrant.    Plan:     Continue current medication regimen  Monitor BP regularly and record  Blood pressure control to <130/80  Goal LDL <70-recommend high dose statins-   Serum glucose < 140  Encouraged regular physical activity    Call 911 for stroke symptoms  Follow-up in one year    I spent 30 minutes face to face with patient, with  > 50% spent counseling patient regarding diagnosis, review of diagnostics, personal risk factors for stroke and importance of risk factor control for stroke prevention.     Paloma was seen today for stroke.    Diagnoses and all orders for this visit:    Left middle cerebral artery stroke (CMS/HCC)    Mixed hyperlipidemia    Essential hypertension        Coding

## 2018-11-07 ENCOUNTER — OFFICE VISIT (OUTPATIENT)
Dept: NEUROSURGERY | Facility: CLINIC | Age: 78
End: 2018-11-07

## 2018-11-07 VITALS
DIASTOLIC BLOOD PRESSURE: 72 MMHG | HEIGHT: 64 IN | BODY MASS INDEX: 29.53 KG/M2 | SYSTOLIC BLOOD PRESSURE: 118 MMHG | WEIGHT: 173 LBS | HEART RATE: 76 BPM

## 2018-11-07 DIAGNOSIS — M41.86 OTHER FORMS OF SCOLIOSIS, LUMBAR REGION: ICD-10-CM

## 2018-11-07 DIAGNOSIS — M48.062 SPINAL STENOSIS OF LUMBAR REGION WITH NEUROGENIC CLAUDICATION: ICD-10-CM

## 2018-11-07 DIAGNOSIS — M51.36 DDD (DEGENERATIVE DISC DISEASE), LUMBAR: Primary | ICD-10-CM

## 2018-11-07 PROBLEM — M51.369 DDD (DEGENERATIVE DISC DISEASE), LUMBAR: Status: ACTIVE | Noted: 2018-11-07

## 2018-11-07 PROCEDURE — 99204 OFFICE O/P NEW MOD 45 MIN: CPT | Performed by: NEUROLOGICAL SURGERY

## 2018-11-07 RX ORDER — GABAPENTIN 100 MG/1
100 CAPSULE ORAL 3 TIMES DAILY
Qty: 90 CAPSULE | Refills: 3 | Status: SHIPPED | OUTPATIENT
Start: 2018-11-07 | End: 2018-12-28

## 2018-11-07 NOTE — PROGRESS NOTES
Subjective   Patient ID: Paloma Carr is a 78 y.o. female is being seen for consultation today at the request ofsivan Goran Carr, low back pain and pain going down legs has swelling in right knee    Today patient report low back pain going down right leg no numbness or tingling today. Patient takes Tylenol as needed for pain.    Back Pain   This is a chronic problem. The current episode started more than 1 year ago. The problem occurs intermittently. The problem is unchanged. The pain is present in the lumbar spine. The quality of the pain is described as burning and aching. The pain radiates to the right thigh, right knee, right foot, left knee, left thigh and left foot. The pain is at a severity of 3/10. The pain is mild. The pain is the same all the time. The symptoms are aggravated by standing, bending and twisting. Stiffness is present in the morning. Associated symptoms include leg pain, numbness, tingling and weakness. She has tried home exercises, NSAIDs, analgesics, chiropractic manipulation, heat and ice for the symptoms. The treatment provided mild relief.   Leg Pain    The incident occurred more than 1 week ago. The pain is present in the left leg, left thigh, left knee, left hip, right hip, right thigh, right knee and right foot. The quality of the pain is described as burning and aching. The pain is at a severity of 3/10. The pain is mild. The pain has been intermittent since onset. Associated symptoms include muscle weakness, numbness and tingling. Pertinent negatives include no loss of motion. She has tried acetaminophen, elevation, heat, ice, immobilization and rest for the symptoms. The treatment provided mild relief.       The following portions of the patient's history were reviewed and updated as appropriate: allergies, current medications, past family history, past medical history, past social history, past surgical history and problem list.    Review of Systems   Musculoskeletal: Positive for  arthralgias, back pain and myalgias.   Neurological: Positive for tingling, weakness and numbness.   Psychiatric/Behavioral: Positive for sleep disturbance.   All other systems reviewed and are negative.    The patient is here for a 2nd opinion. I have been asked to assume her care. She is sent by her primary care physician, Dr. Carr, who is also her sister-in-law. She has a complicated medical history involving breast cancer, stroke, and a previous giant meningioma removed by Dr. Watkins some 30 years ago. She remains on gabapentin at 800 mg 3 times a day for seizure prophylaxis. She has had 2 strokes and had an embolectomy by the stroke neurologist last year and remains on aspirin and Plavix. She has had bilateral knee replacements and bilateral shoulder replacements. She has had about 15 years of pain in her back and intermittently the legs. Over the last year or so since her last stroke she has been having more pain in the right buttock and leg and also into the knee. It is worse from the knee down. There is some low back pain and not too much going on on the left side. She went to see Dr. Lamar in 2016, and he talked about doing instrumented decompression and fusion, but the patient is dead-set against any kind of major spinal fusion surgery. She also went to see Dr. Diaz at the request of Dr. Norman, her knee surgeon. She was sent to the pain doctor, Dr. Washington, in that practice, who wanted to do some transforaminal epidural blocks. The patient and her , who was with her today, were concerned about that in light of her history of strokes and have come to seek my opinion about this. First of all, I told them that I think that avoiding instrumented lumbar fusion is probably a good idea since that is a lot to put her through given her complicated medical history and I am not sure it would actually work. I think trying to treat her symptomatically in the way that Dr. Washington suggested is entirely  reasonable and transforaminal epidural blocks are not an unreasonable recommendation from my standpoint, nor is increasing the gabapentin incrementally from 800 mg t.i.d. to 900 mg t.i.d. I think that is probably the best way to treat her and focusing on those 2 modalities would be something that I would recommend. Will go ahead and raise the gabapentin as indicated above and I will check with the stroke service about if she can come off of her aspirin and Plavix for a brief period of time for the injections. If that is the case, will send her to Dr. Hinkle or Valdez to get those. Spinal cord stimulator is a backup option, but I think transforaminal blocks are the thing to do at this point.       Objective   Physical Exam   Constitutional: She is oriented to person, place, and time. She appears well-developed and well-nourished.   HENT:   Head: Normocephalic and atraumatic.   Eyes: Pupils are equal, round, and reactive to light. Conjunctivae and EOM are normal.   Fundoscopic exam:       The right eye shows no papilledema. The right eye shows venous pulsations.        The left eye shows no papilledema. The left eye shows venous pulsations.   Neck: Carotid bruit is not present.   Neurological: She is oriented to person, place, and time. She has a normal Finger-Nose-Finger Test and a normal Heel to Shin Test. Gait normal.   Reflex Scores:       Tricep reflexes are 2+ on the right side and 2+ on the left side.       Bicep reflexes are 2+ on the right side and 2+ on the left side.       Brachioradialis reflexes are 2+ on the right side and 2+ on the left side.       Patellar reflexes are 2+ on the right side and 2+ on the left side.       Achilles reflexes are 2+ on the right side and 2+ on the left side.  Psychiatric: Her speech is normal.     Neurologic Exam     Mental Status   Oriented to person, place, and time.   Registration of memory: Good recent and remote memory.   Attention: normal. Concentration: normal.    Speech: speech is normal   Level of consciousness: alert  Knowledge: consistent with education.     Cranial Nerves     CN II   Visual fields full to confrontation.   Visual acuity: normal    CN III, IV, VI   Pupils are equal, round, and reactive to light.  Extraocular motions are normal.     CN V   Facial sensation intact.   Right corneal reflex: normal  Left corneal reflex: normal    CN VII   Facial expression full, symmetric.   Right facial weakness: none  Left facial weakness: none    CN VIII   Hearing: intact    CN IX, X   Palate: symmetric    CN XI   Right sternocleidomastoid strength: normal  Left sternocleidomastoid strength: normal    CN XII   Tongue: not atrophic  Tongue deviation: none    Motor Exam   Muscle bulk: normal  Right arm tone: normal  Left arm tone: normal  Right leg tone: normal  Left leg tone: normal    Strength   Strength 5/5 except as noted.     Sensory Exam   Light touch normal.     Gait, Coordination, and Reflexes     Gait  Gait: normal    Coordination   Finger to nose coordination: normal  Heel to shin coordination: normal    Reflexes   Right brachioradialis: 2+  Left brachioradialis: 2+  Right biceps: 2+  Left biceps: 2+  Right triceps: 2+  Left triceps: 2+  Right patellar: 2+  Left patellar: 2+  Right achilles: 2+  Left achilles: 2+  Right : 2+  Left : 2+      Assessment/Plan   Independent Review of Radiographic Studies:    To the lumbar MRI done in June of this year which did show multilevel degenerative disc disease, thoraco-lumbar scoliosis and moderate spinal stenosis at L3-L4 and L4-L5.  Agree with the report .      Medical Decision Making:    We'll go ahead and increase the gabapentin to 900 mg 3 times a day.  I'll give her 100 mg tablets to take in addition to the 800 mg 3 times a day.  I'll check with stroke service about whether or not she can come off of her aspirin and Plavix for series of right sided transforaminal epidural blocks targeting the right L4 and right  L5 nerve roots.  If we can do that we'll get it set up and have her come back in 2 months.      Paloma was seen today for back pain.    Diagnoses and all orders for this visit:    DDD (degenerative disc disease), lumbar    Spinal stenosis of lumbar region with neurogenic claudication    Other forms of scoliosis, lumbar region    Other orders  -     gabapentin (NEURONTIN) 100 MG capsule; Take 1 capsule by mouth 3 (Three) Times a Day. Patient will take this dose in addition to her ongoing 800 mg TID for a total of 900 mg TID      Return in about 2 months (around 1/7/2019).

## 2018-11-13 DIAGNOSIS — M48.062 SPINAL STENOSIS OF LUMBAR REGION WITH NEUROGENIC CLAUDICATION: Primary | ICD-10-CM

## 2018-12-03 RX ORDER — LEVOTHYROXINE SODIUM 0.12 MG/1
TABLET ORAL
Qty: 90 TABLET | Refills: 0 | Status: SHIPPED | OUTPATIENT
Start: 2018-12-03 | End: 2019-03-05 | Stop reason: SDUPTHER

## 2018-12-07 ENCOUNTER — OFFICE VISIT (OUTPATIENT)
Dept: PAIN MEDICINE | Facility: CLINIC | Age: 78
End: 2018-12-07

## 2018-12-07 ENCOUNTER — RESULTS ENCOUNTER (OUTPATIENT)
Dept: PAIN MEDICINE | Facility: CLINIC | Age: 78
End: 2018-12-07

## 2018-12-07 VITALS
RESPIRATION RATE: 16 BRPM | SYSTOLIC BLOOD PRESSURE: 152 MMHG | WEIGHT: 171 LBS | TEMPERATURE: 98.1 F | DIASTOLIC BLOOD PRESSURE: 79 MMHG | BODY MASS INDEX: 29.19 KG/M2 | OXYGEN SATURATION: 95 % | HEART RATE: 58 BPM | HEIGHT: 64 IN

## 2018-12-07 DIAGNOSIS — M54.41 CHRONIC BILATERAL LOW BACK PAIN WITH RIGHT-SIDED SCIATICA: Primary | ICD-10-CM

## 2018-12-07 DIAGNOSIS — G89.29 CHRONIC PAIN OF RIGHT KNEE: ICD-10-CM

## 2018-12-07 DIAGNOSIS — Z96.651 HISTORY OF TOTAL RIGHT KNEE REPLACEMENT: ICD-10-CM

## 2018-12-07 DIAGNOSIS — M25.561 CHRONIC PAIN OF RIGHT KNEE: ICD-10-CM

## 2018-12-07 DIAGNOSIS — M54.41 CHRONIC BILATERAL LOW BACK PAIN WITH RIGHT-SIDED SCIATICA: ICD-10-CM

## 2018-12-07 DIAGNOSIS — G89.29 CHRONIC BILATERAL LOW BACK PAIN WITH RIGHT-SIDED SCIATICA: ICD-10-CM

## 2018-12-07 DIAGNOSIS — G89.29 CHRONIC BILATERAL LOW BACK PAIN WITH RIGHT-SIDED SCIATICA: Primary | ICD-10-CM

## 2018-12-07 LAB
POC AMPHETAMINES: NEGATIVE
POC BARBITURATES: NEGATIVE
POC BENZODIAZEPHINES: NEGATIVE
POC COCAINE: NEGATIVE
POC METHADONE: NEGATIVE
POC METHAMPHETAMINE SCREEN URINE: NEGATIVE
POC OPIATES: NEGATIVE
POC OXYCODONE: NEGATIVE
POC PHENCYCLIDINE: NEGATIVE
POC PROPOXYPHENE: NEGATIVE
POC THC: NEGATIVE
POC TRICYCLIC ANTIDEPRESSANTS: NEGATIVE

## 2018-12-07 PROCEDURE — 99204 OFFICE O/P NEW MOD 45 MIN: CPT | Performed by: PAIN MEDICINE

## 2018-12-07 PROCEDURE — 80305 DRUG TEST PRSMV DIR OPT OBS: CPT | Performed by: PAIN MEDICINE

## 2018-12-07 RX ORDER — ACETAMINOPHEN 325 MG/1
650 TABLET ORAL EVERY 6 HOURS PRN
COMMUNITY

## 2018-12-07 RX ORDER — CLOPIDOGREL BISULFATE 75 MG/1
TABLET ORAL
Qty: 90 TABLET | Refills: 0 | Status: SHIPPED | OUTPATIENT
Start: 2018-12-07 | End: 2019-06-27 | Stop reason: SDUPTHER

## 2018-12-07 NOTE — PROGRESS NOTES
"CHIEF COMPLAINT: Back Pain    Paloma Carr is a 78 y.o. female.   He was referred here by Dr Alex. He presents to the office for evaluation and treatment of Back Pain    HPI  Back Pain  Started pt has had significant LBP with R leg pain and numbness for more than 30 years; Pt has had brain surgery in 1974 for malignant brain tumor. Received large dose steroids for many years after surgery.   Has had 2 CVA ( most recently 1 year ago) and has significant memory deficit. Now on plavix.  The back pain is bilateral ,with R side worse than L, and Pt has daily R leg pain worsened by standing, walking, general physical activity. Pt states she is \"very inactive, off feet most of the day\". Pt also has bilateral thigh numbness after being on feet > 30 minutes.    Saw [facility] lat month who stated no surgical options at this time. Suggested series of lumbar epidural steroid injections and increased her gabapentin.     The patient states their pain is a 4 on a scale of 1-10.  The patient describes this pain as constant dull and ache.  The pain is located in bilateral low back and does radiate anterior and posterior right leg. This painful problem is aggravated by physical activity, standing and weight bearing and is alleviated by relaxation. Pain significantly interferes with daily activities - just lies around in bed for most of day due to pain.     Pt has had bilateral TKRs about 2003/2004 and does have significant R knee pain /swelling.    Followed by neurology for previous stroke. On plavix. Written by pcp.     Current pain medications:   Tylenol,   Gabapentin 900 tid    Past therapies:  Physical Therapy: yes Hx of accupuncture (no relief).  Chiropractor: yes  Massage Therapy: no  TENS: no  Neck or back surgery: no  Past pain management: [facility] about 10 years ago cannot recall where- no relief.    PEG Assessment   What number best describes your pain on average in the past week? 8  What number best describes how, during the " past week, pain has interfered with your enjoyment of life? 5  What number best describes how, during the past week, pain has interfered with your general activity? 4      Current Outpatient Medications:   •  acetaminophen (TYLENOL) 325 MG tablet, Take 650 mg by mouth As Needed for Mild Pain ., Disp: , Rfl:   •  albuterol (PROVENTIL HFA;VENTOLIN HFA) 108 (90 Base) MCG/ACT inhaler, Inhale 2 puffs Every 4 (Four) Hours As Needed for Wheezing., Disp: 1 inhaler, Rfl: 0  •  aspirin 81 MG EC tablet, Take 81 mg by mouth Daily., Disp: , Rfl:   •  atenolol (TENORMIN) 100 MG tablet, Take 100 mg by mouth Daily., Disp: , Rfl:   •  budesonide-formoterol (SYMBICORT) 160-4.5 MCG/ACT inhaler, Inhale 2 puffs 2 (Two) Times a Day., Disp: , Rfl: 12  •  Cholecalciferol (VITAMIN D3) 5000 units capsule capsule, Take 5,000 Units by mouth Daily., Disp: , Rfl:   •  clopidogrel (PLAVIX) 75 MG tablet, Take 1 tablet by mouth Daily., Disp: 90 tablet, Rfl: 0  •  Cyanocobalamin (VITAMIN B 12 PO), Take 1,000 mcg by mouth Every Evening., Disp: , Rfl:   •  escitalopram (LEXAPRO) 20 MG tablet, Take 20 mg by mouth Daily., Disp: , Rfl:   •  folic acid-pyridoxine-cyanocobalamin (FOLBIC) 2.5-25-2 MG tablet tablet, Take 1 tablet by mouth Daily., Disp: , Rfl:   •  furosemide (LASIX) 20 MG tablet, Take 1 tablet by mouth Daily., Disp: 60 tablet, Rfl: 0  •  gabapentin (NEURONTIN) 100 MG capsule, Take 1 capsule by mouth 3 (Three) Times a Day. Patient will take this dose in addition to her ongoing 800 mg TID for a total of 900 mg TID, Disp: 90 capsule, Rfl: 3  •  gabapentin (NEURONTIN) 800 MG tablet, TAKE 1 TABLET EVERY MORNING AND 2 TABLETS EVERY EVENING, Disp: 270 tablet, Rfl: 3  •  letrozole (FEMARA) 2.5 MG tablet, Take 2.5 mg by mouth Daily., Disp: , Rfl:   •  levothyroxine (SYNTHROID, LEVOTHROID) 125 MCG tablet, TAKE ONE TABLET BY MOUTH DAILY, Disp: 90 tablet, Rfl: 0  •  MELATONIN PO, Take 1 tablet by mouth Every Night., Disp: , Rfl:   •  Multiple  Vitamins-Minerals (MULTIVITAMIN ADULT PO), Take 1 tablet by mouth Daily., Disp: , Rfl:   •  pantoprazole (PROTONIX) 40 MG EC tablet, TAKE ONE TABLET BY MOUTH DAILY, Disp: 90 tablet, Rfl: 1  •  Probiotic Product (PROBIOTIC DAILY PO), Take 1 tablet by mouth Daily., Disp: , Rfl:   •  rosuvastatin (CRESTOR) 20 MG tablet, TAKE ONE TABLET BY MOUTH DAILY, Disp: 90 tablet, Rfl: 3  No current facility-administered medications for this visit.     Facility-Administered Medications Ordered in Other Visits:   •  atorvastatin (LIPITOR) tablet 40 mg, 40 mg, Oral, Daily, Norman Taylor MD    REVIEW OF PERTINENT MEDICAL DATA  Chart reviewed and summarization of all medical records up to new patient visit performed.  Brain surgery in past-1974 by Dr. Cabello- with memory deficits. Has been on gabapentin for years.   History of stroke. On plavix and asa. Followed by neurologist. Compliant with CPAP.     IMAGING  MRI LUMBAR SPINE WITHOUT CONTRAST  DATE: 10/15/2016 at 10:34 AM  FINDINGS:  Lumbar vertebra are normal in height. No fracture is identified. Retrolisthesis at T12-L1 is noted. Conus medullaris terminates at the L1 level.  Small disc protrusions are noted at T10-T11 and T11-T12 causing mild canal narrowing.  T12-L1: Retrolisthesis and broad-based disc bulging causes mild spinal stenosis. Facet arthropathy is minimal. The foramina are patent.  L1-L2: Mild facet arthropathy results in minimal left foraminal narrowing. Edematous left-sided degenerative disc narrowing is also noted. Minimal disc bulging is present.  L2-L3: Facet arthropathy and mild disc bulging results in mild left-sided canal and foraminal stenosis.  L3-L4: Moderate facet arthropathy and ligamentum flavum thickening are noted. Disc bulging is present. Moderate spinal stenosis is present. The foramina are mildly narrowed.  L4-L5: There is collapse the right side of the disc with lateral disc osteophyte complex formation. Facet arthropathy is also  "present. This mild to moderate right foraminal narrowing. Osteophytes abut the exiting right L4 nerve root. There is also mild   right lateral recess narrowing.  L5-S1: Disc bulging and facet arthropathy are present. There is mild spinal stenosis. Degenerative changes are greater on the right side causing moderate right foraminal narrowing disc osteophyte abuts the exiting right L5 nerve root.  IMPRESSION:  1. Moderate multilevel lumbar spondylosis with mild to moderate foraminal stenosis greatest on the right at L4-L5 and L5-S1   2. Mild to moderate spinal stenosis greatest at L3-L4.  3. Edematous endplate changes on the left at L1-L2 appear degenerative in nature.        PFSH:  The following portions of the patient's history were reviewed and updated as appropriate: problem list, past medical history, past surgery history, social history, family history, medications, and allergies    Review of Systems   Constitutional: Positive for activity change (decreased). Negative for appetite change.   HENT: Positive for hearing loss (mild) and trouble swallowing (mild).    Eyes: Positive for visual disturbance (occasional).   Respiratory: Positive for apnea (cpap) and shortness of breath. Negative for chest tightness. Choking: \"choke a lot\"    Cardiovascular: Negative for chest pain.   Gastrointestinal: Negative for constipation, diarrhea and nausea.   Genitourinary: Negative for difficulty urinating and dysuria.   Musculoskeletal: Positive for arthralgias (knees bilat.), back pain, gait problem and joint swelling.   Skin: Negative for rash and wound.   Neurological: Positive for weakness (legs bilat.) and numbness. Negative for dizziness and light-headedness. Seizures: \"visual seizures\"    Hematological: Bruises/bleeds easily.   Psychiatric/Behavioral: Positive for confusion (memory deficit) and sleep disturbance. Negative for suicidal ideas.   All other systems reviewed and are negative.      Vitals:    12/07/18 1046 " "  BP: 152/79   Pulse: 58   Resp: 16   Temp: 98.1 °F (36.7 °C)   SpO2: 95%   Weight: 77.6 kg (171 lb)   Height: 162.6 cm (64.02\")   PainSc:   4   PainLoc: Back  Comment: LBP bilaterally,R > L, ranges from 1-8/10       Physical Exam   Constitutional: She appears well-developed and well-nourished. No distress.   HENT:   Head: Normocephalic and atraumatic.   Nose: Nose normal.   Mouth/Throat: Oropharynx is clear and moist.   Eyes: Conjunctivae and EOM are normal.   Neck: Normal range of motion. Neck supple.   Cardiovascular: Normal rate, regular rhythm and normal heart sounds.   Pulmonary/Chest: Effort normal and breath sounds normal. No stridor. No respiratory distress.   Abdominal: Soft. Bowel sounds are normal. She exhibits no distension. There is no tenderness.   Musculoskeletal:        Right knee: She exhibits swelling. Tenderness found. Medial joint line and lateral joint line tenderness noted.        Left knee: She exhibits no swelling. No tenderness found.        Lumbar back: She exhibits decreased range of motion, tenderness (right side only) and pain.   Neurological: She is alert. She has normal strength. No cranial nerve deficit or sensory deficit.   Skin: Skin is warm and dry. No rash noted. She is not diaphoretic.   Psychiatric: She has a normal mood and affect. Her speech is normal and behavior is normal.   Nursing note and vitals reviewed.    Back Exam     Tests   Straight leg raise right: negative  Straight leg raise left: negative          Neurologic Exam     Mental Status   Speech: speech is normal     Cranial Nerves     CN III, IV, VI   Extraocular motions are normal.     Motor Exam     Strength   Strength 5/5 throughout.     Sensory Exam   Light touch normal.       No results found for: POCMETH, POCAMPHET, POCBARBITUR, POCBENZO, POCCOCAINE, POCMETHADO, POCOPIATES, POCOXYCODO, POCPHENCYC, POCPROPOXY, POCTHC, POCTRICYC    Comments: Reviewed POC today  - no medication    Date of last THOMPSON reviewed : " 12/07/18   Comments: Gio Dow was seen today for back pain.    Diagnoses and all orders for this visit:    Chronic bilateral low back pain with right-sided sciatica  -     Case Request    History of total right knee replacement    Chronic pain of right knee      Requested Prescriptions      No prescriptions requested or ordered in this encounter     - Imaging reviewed with patient.    - no surgical options at this time.   - Given lumbar radiculopathy follows the right L4/L5 dermatome distribution, patient would likely benefit from a right L4/L5 transforaminal epidural injection.  The procedure was described in detail and the risks, benefits and alternatives were discussed with the patient (including but not limited to: bleeding, infection, nerve damage, worsening of pain, inability to perform injection, paralysis, seizures, and death) who agreed to proceed.     - Will perform two injections approx 1 month apart.      - sent message to pcp and neurologist about holding plavix for 7 days for injection. Has held in past with lovenox bridge. Discussed this option with patient and she is comfortable performing.   - no medication changes made today.   - also wants to address right knee. Right genicular nerve block briefly discussed but will discuss more in future. Has to avoid nsaids. Tried brace with no benefit.       Wt Readings from Last 3 Encounters:   12/07/18 77.6 kg (171 lb)   11/07/18 78.5 kg (173 lb)   11/02/18 78.5 kg (173 lb)     Body mass index is 29.34 kg/m². Patient counseled on the importance of weight loss to help with overall health and pain control. Patient instructed to attempt weight loss.   Plan: Calorie counting reduce portion size    Follow-up after 2 injections.        Melanie Kellogg MD  Pain Management

## 2018-12-26 ENCOUNTER — DOCUMENTATION (OUTPATIENT)
Dept: PAIN MEDICINE | Facility: CLINIC | Age: 78
End: 2018-12-26

## 2018-12-26 ENCOUNTER — OUTSIDE FACILITY SERVICE (OUTPATIENT)
Dept: PAIN MEDICINE | Facility: CLINIC | Age: 78
End: 2018-12-26

## 2018-12-26 PROCEDURE — 64484 NJX AA&/STRD TFRM EPI L/S EA: CPT | Performed by: PAIN MEDICINE

## 2018-12-26 PROCEDURE — 64483 NJX AA&/STRD TFRM EPI L/S 1: CPT | Performed by: PAIN MEDICINE

## 2018-12-26 NOTE — PROGRESS NOTES
Lumbar Transforaminal Epidural Steroid Injection (two levels)  [facility]    PREOPERATIVE DIAGNOSIS:  Chronic low back pain and right Lumbar Radiculopathy    POSTOPERATIVE DIAGNOSIS:  Same as preop diagnosis    PROCEDURE:    1. CPT 65229 --  Diagnostic & Therapeutic Transforaminal Epidural Steroid Injection at the L4 level, on the right   2. CPT 75605 --  Diagnostic & Therapeutic Transforaminal Epidural Steroid Injection at the L5 level, on the right     PRE-PROCEDURE DISCUSSION WITH PATIENT:    Risks and complications were discussed with the patient prior to starting the procedure and informed consent was obtained.  We discussed various topics including but not limited to bleeding, infection, injury, nerve injury, paralysis, coma, death, postprocedural painful flare-up, postprocedural site soreness, and a lack of pain relief.  We discussed the diagnostic aspect of transforaminal epidural / selective nerve root blockade.    SURGEON:  Melanie Kellogg MD    REASON FOR PROCEDURE:    Diagnostic injection at this level is needed    SEDATION:  Versed 2mg & Fentanyl 50 mcg IV  ANESTHETIC:  Marcaine 0.25%  STEROID:  Methylprednisolone (DEPO MEDROL) 80mg/ml    DESCRIPTON OF PROCEDURE:  After obtaining informed consent, an I.V. was started in the preoperative area. The patient taken to the operating room and was placed in the prone position with a pillow under the abdomen.  All pressure points were well padded.  EKG, blood pressure, and pulse oximeter were monitored.  The lumbar area was prepped with Chloraprep and draped in a sterile fashion. Under fluoroscopic guidance in an oblique dimension on the above mentioned side, the transverse process of the first aforementioned vertebra at the junction of the body at 6 o'clock position was identified. Skin and subcutaneous tissue was anesthetized with 1% lidocaine. A 22-gauge spinal needle was introduced under fluoroscopic guidance at the above junction into  the foramen without parasthesias and into the epidural space. After confirming the position of the needle with PA, lateral, and oblique fluoroscopic views, aspiration was checked and was clear of blood or CSF.  Next, 1 mL of Omnipaque was injected. After seeing adequate spread on the corresponding nerve root, a total volume 2mL of injectate containing local anesthetic as above and half of the above mentioned corticosteroid was injected into the epidural space.  The needle was removed intact.      Next, in similar fashion, the second level mentioned above was addressed and a similar amount of injectate was delivered after similar imaging was achieved.      Vital signs were stable throughout.      ESTIMATED BLOOD LOSS:  <5 mL  SPECIMENS:  none    COMPLICATIONS:   No complications were noted. and There was no indication of vascular uptake on live injection of contrast dye.    TOLERANCE & DISCHARGE CONDITION:    The patient tolerated the procedure well.  The patient was transported to the recovery area without difficulties.  The patient was discharged to home under the care of family in stable and satisfactory condition.    PLAN OF CARE:  1. The patient was given our standard instruction sheet.  2. The patient will Repeat injection 4 wks.  3. The patient will resume all medications as per the medication reconciliation sheet.

## 2018-12-27 RX ORDER — CLOPIDOGREL BISULFATE 75 MG/1
TABLET ORAL
Qty: 90 TABLET | Refills: 0 | Status: SHIPPED | OUTPATIENT
Start: 2018-12-27 | End: 2019-01-16 | Stop reason: SDUPTHER

## 2018-12-28 DIAGNOSIS — G89.4 CHRONIC PAIN SYNDROME: ICD-10-CM

## 2018-12-28 RX ORDER — ROSUVASTATIN CALCIUM 20 MG/1
20 TABLET, COATED ORAL DAILY
Qty: 90 TABLET | Refills: 3 | Status: SHIPPED | OUTPATIENT
Start: 2018-12-28 | End: 2018-12-28 | Stop reason: SDUPTHER

## 2018-12-28 RX ORDER — GABAPENTIN 800 MG/1
800 TABLET ORAL 3 TIMES DAILY
Qty: 270 TABLET | Refills: 3 | Status: SHIPPED | OUTPATIENT
Start: 2018-12-28 | End: 2018-12-28

## 2018-12-28 RX ORDER — ROSUVASTATIN CALCIUM 20 MG/1
20 TABLET, COATED ORAL DAILY
Qty: 90 TABLET | Refills: 3 | Status: SHIPPED | OUTPATIENT
Start: 2018-12-28 | End: 2020-02-25

## 2018-12-28 RX ORDER — GABAPENTIN 800 MG/1
800 TABLET ORAL 3 TIMES DAILY
Qty: 270 TABLET | Refills: 3 | Status: SHIPPED | OUTPATIENT
Start: 2018-12-28 | End: 2019-09-27 | Stop reason: SDUPTHER

## 2019-01-11 ENCOUNTER — TELEPHONE (OUTPATIENT)
Dept: SURGERY | Facility: CLINIC | Age: 79
End: 2019-01-11

## 2019-01-11 NOTE — TELEPHONE ENCOUNTER
Note from Linden Martinez dated Tiffanie 10, 2019: Continue Femara.  Repeat DEXA scan in January 2019.  6 month office visit- sooner if needed.

## 2019-01-15 NOTE — PROGRESS NOTES
Subjective   Patient ID: Paloma Carr is a 78 y.o. female is here today for 2 month follow-up after lumbar ROXANE x 1 which did not help.  She is scheduled for 2nd next week.,  Patient was last seen 11.7.18 for low back pain and bilateral leg pain.  She was prescribed Gabapentin which did not help and she stopped taking it.  She states that her low back pain is intermittent and severe, nothing specific triggers the pain, patient states that right thigh, knee and calf pain.  Patient states that she has bilateral leg weakness and N/T.  She is taking Tylenol ES prn and it does help.     Back Pain   The problem occurs intermittently. The pain is present in the lumbar spine. The pain radiates to the right thigh. The pain is at a severity of 6/10. Associated symptoms include leg pain, numbness, tingling and weakness.   Leg Pain    The pain is present in the right thigh and right knee. The pain is at a severity of 6/10. Associated symptoms include numbness and tingling.   Extremity Weakness    The pain is present in the left lower leg, left upper leg, right upper leg and right lower leg. The problem occurs constantly. Associated symptoms include numbness and tingling.       The following portions of the patient's history were reviewed and updated as appropriate: allergies, current medications, past family history, past medical history, past social history, past surgical history and problem list.    Review of Systems   Musculoskeletal: Positive for arthralgias, back pain and extremity weakness. Negative for gait problem.   Neurological: Positive for tingling, weakness and numbness.   All other systems reviewed and are negative.    She is with her . She had 1 transforaminal epidural block by Dr. Kellogg and it really has not done that much. They changed to 900 mg t.i.d. that did not make much of a difference so she went back to her usual 800 mg t.i.d. Her complaint last time was back pain and right buttock and leg pain, but  today the knee is bothering her more than anything. She says that Dr. Norman, her orthopedic surgeon, was felt that everything was fine with the knee replacement. It could be that this is nerve related from the spine or just pain from soft tissue around the knee. It is hard to say but at some point I might need to speak with Dr. Norman about this. The point is that I do not think that an instrumented decompression and fusion would be of much help to her and if the pain management approach does not work then we could consider spinal cord stimulation to cover both the back pain, the right buttock and leg pain, and even the knee pain, but I still think we ought to go slow with this. She will come back in 1 month and we can reassess.           Objective   Physical Exam   Constitutional: She is oriented to person, place, and time. She appears well-developed and well-nourished.   HENT:   Head: Normocephalic and atraumatic.   Eyes: Conjunctivae and EOM are normal. Pupils are equal, round, and reactive to light.   Fundoscopic exam:       The right eye shows no papilledema. The right eye shows venous pulsations.        The left eye shows no papilledema. The left eye shows venous pulsations.   Neck: Carotid bruit is not present.   Neurological: She is oriented to person, place, and time. She has a normal Finger-Nose-Finger Test and a normal Heel to Shin Test. Gait normal.   Reflex Scores:       Tricep reflexes are 2+ on the right side and 2+ on the left side.       Bicep reflexes are 2+ on the right side and 2+ on the left side.       Brachioradialis reflexes are 2+ on the right side and 2+ on the left side.       Patellar reflexes are 2+ on the right side and 2+ on the left side.       Achilles reflexes are 2+ on the right side and 2+ on the left side.  Psychiatric: Her speech is normal.     Neurologic Exam     Mental Status   Oriented to person, place, and time.   Registration of memory: Good recent and remote memory.    Attention: normal. Concentration: normal.   Speech: speech is normal   Level of consciousness: alert  Knowledge: consistent with education.     Cranial Nerves     CN II   Visual fields full to confrontation.   Visual acuity: normal    CN III, IV, VI   Pupils are equal, round, and reactive to light.  Extraocular motions are normal.     CN V   Facial sensation intact.   Right corneal reflex: normal  Left corneal reflex: normal    CN VII   Facial expression full, symmetric.   Right facial weakness: none  Left facial weakness: none    CN VIII   Hearing: intact    CN IX, X   Palate: symmetric    CN XI   Right sternocleidomastoid strength: normal  Left sternocleidomastoid strength: normal    CN XII   Tongue: not atrophic  Tongue deviation: none    Motor Exam   Muscle bulk: normal  Right arm tone: normal  Left arm tone: normal  Right leg tone: normal  Left leg tone: normal    Strength   Strength 5/5 except as noted.     Sensory Exam   Light touch normal.     Gait, Coordination, and Reflexes     Gait  Gait: normal    Coordination   Finger to nose coordination: normal  Heel to shin coordination: normal    Reflexes   Right brachioradialis: 2+  Left brachioradialis: 2+  Right biceps: 2+  Left biceps: 2+  Right triceps: 2+  Left triceps: 2+  Right patellar: 2+  Left patellar: 2+  Right achilles: 2+  Left achilles: 2+  Right : 2+  Left : 2+      Assessment/Plan   Independent Review of Radiographic Studies:    The lumbar MRI done in June 2018 showed multilevel degenerative disc disease, thoraco-lumbar scoliosis and moderate spinal stenosis at L3-L4 and L4-L5.  Agree with the report.      Medical Decision Making:    I told her to finish out the series of blocks and the least of the second one and possibly the third.  She'll continue her gabapentin 800 mg 3 times a day and come back to see me in a months.  If she is not any better consider spinal cord stimulator trial, but I would need to touch base with Dr. Norman about  the status of her right knee.      Paloma was seen today for back pain, leg pain, knee pain, extremity weakness and numbness.    Diagnoses and all orders for this visit:    Spinal stenosis of lumbar region with neurogenic claudication    DDD (degenerative disc disease), lumbar    Other forms of scoliosis, lumbar region      Return in about 4 weeks (around 2/13/2019).

## 2019-01-16 ENCOUNTER — OFFICE VISIT (OUTPATIENT)
Dept: NEUROSURGERY | Facility: CLINIC | Age: 79
End: 2019-01-16

## 2019-01-16 VITALS
BODY MASS INDEX: 30.05 KG/M2 | DIASTOLIC BLOOD PRESSURE: 78 MMHG | SYSTOLIC BLOOD PRESSURE: 138 MMHG | WEIGHT: 176 LBS | HEIGHT: 64 IN

## 2019-01-16 DIAGNOSIS — M41.86 OTHER FORMS OF SCOLIOSIS, LUMBAR REGION: ICD-10-CM

## 2019-01-16 DIAGNOSIS — M51.36 DDD (DEGENERATIVE DISC DISEASE), LUMBAR: ICD-10-CM

## 2019-01-16 DIAGNOSIS — M48.062 SPINAL STENOSIS OF LUMBAR REGION WITH NEUROGENIC CLAUDICATION: Primary | ICD-10-CM

## 2019-01-16 PROCEDURE — 99214 OFFICE O/P EST MOD 30 MIN: CPT | Performed by: NEUROLOGICAL SURGERY

## 2019-01-28 ENCOUNTER — DOCUMENTATION (OUTPATIENT)
Dept: PAIN MEDICINE | Facility: CLINIC | Age: 79
End: 2019-01-28

## 2019-01-28 ENCOUNTER — OUTSIDE FACILITY SERVICE (OUTPATIENT)
Dept: PAIN MEDICINE | Facility: CLINIC | Age: 79
End: 2019-01-28

## 2019-01-28 PROCEDURE — 64483 NJX AA&/STRD TFRM EPI L/S 1: CPT | Performed by: PAIN MEDICINE

## 2019-01-28 PROCEDURE — 64484 NJX AA&/STRD TFRM EPI L/S EA: CPT | Performed by: PAIN MEDICINE

## 2019-01-28 NOTE — PROGRESS NOTES
Lumbar Transforaminal Epidural Steroid Injection (two levels)  [facility]      PREOPERATIVE DIAGNOSIS:  Chronic low back pain and right Lumbar Radiculopathy    POSTOPERATIVE DIAGNOSIS:  Same as preop diagnosis    PROCEDURE:    1. CPT 71137 --  Diagnostic & Therapeutic Transforaminal Epidural Steroid Injection at the L4 level, on the right   2. CPT 42228 --  Diagnostic & Therapeutic Transforaminal Epidural Steroid Injection at the L5 level, on the right     PRE-PROCEDURE DISCUSSION WITH PATIENT:    Risks and complications were discussed with the patient prior to starting the procedure and informed consent was obtained.  We discussed various topics including but not limited to bleeding, infection, injury, nerve injury, paralysis, coma, death, postprocedural painful flare-up, postprocedural site soreness, and a lack of pain relief.  We discussed the diagnostic aspect of transforaminal epidural / selective nerve root blockade.    SURGEON:  Melanie Kellogg MD    REASON FOR PROCEDURE:    Diagnostic injection at this level is needed    SEDATION:  Versed 2mg & Fentanyl 50 mcg IV  ANESTHETIC:  Marcaine 0.25%  STEROID:  Methylprednisolone (DEPO MEDROL) 80mg/ml    DESCRIPTON OF PROCEDURE:  After obtaining informed consent, an I.V. was started in the preoperative area. The patient taken to the operating room and was placed in the prone position with a pillow under the abdomen.  All pressure points were well padded.  EKG, blood pressure, and pulse oximeter were monitored.  The lumbar area was prepped with Chloraprep and draped in a sterile fashion. Under fluoroscopic guidance in an oblique dimension on the above mentioned side, the transverse process of the first aforementioned vertebra at the junction of the body at 6 o'clock position was identified. Skin and subcutaneous tissue was anesthetized with 1% lidocaine. A 22-gauge spinal needle was introduced under fluoroscopic guidance at the above junction  into the foramen without parasthesias and into the epidural space. After confirming the position of the needle with PA, lateral, and oblique fluoroscopic views, aspiration was checked and was clear of blood or CSF.  Next, 1 mL of Omnipaque was injected. After seeing adequate spread on the corresponding nerve root, a total volume 2mL of injectate containing local anesthetic as above and half of the above mentioned corticosteroid was injected into the epidural space.  The needle was removed intact.      Next, in similar fashion, the second level mentioned above was addressed and a similar amount of injectate was delivered after similar imaging was achieved.      Vital signs were stable throughout.      ESTIMATED BLOOD LOSS:  <5 mL  SPECIMENS:  none    COMPLICATIONS:   No complications were noted.    TOLERANCE & DISCHARGE CONDITION:    The patient tolerated the procedure well.  The patient was transported to the recovery area without difficulties.  The patient was discharged to home under the care of family in stable and satisfactory condition.    PLAN OF CARE:  1. The patient was given our standard instruction sheet.  2. The patient will Return to clinic 4 wks.  3. The patient will resume all medications as per the medication reconciliation sheet.

## 2019-02-07 NOTE — PROGRESS NOTES
Subjective   Patient ID: Paloma Carr is a 78 y.o. female is here today for follow-up after ROXANE x 2, which has helped.  She is scheduled for another one end of March. Patient was last seen 1.16.19 for low back, right thigh,knee and calf pain, bilateral leg weakness and N/T.    Patient states that she is currently having intermittent mild low back,, bilateral posterior thigh pain, she is unsure of leg weakness and intermittent  Bilateral leg N/T.  Patient states that since the ROXANE her pain is now more intermittent and not as severe.  She is taking Gabapentin 800 mg TID as prescribed by her PCP.        Back Pain   The problem occurs intermittently. The problem has been gradually improving since onset. The pain is present in the lumbar spine. The pain radiates to the left thigh and right thigh. The pain is at a severity of 4/10. The pain is mild. Associated symptoms include leg pain, numbness and tingling. Pertinent negatives include no weakness.   Leg Pain    The pain is present in the left leg and right leg. The pain is at a severity of 3/10. Associated symptoms include numbness and tingling.       The following portions of the patient's history were reviewed and updated as appropriate: allergies, current medications, past family history, past medical history, past social history, past surgical history and problem list.    Review of Systems   Musculoskeletal: Positive for arthralgias and back pain. Negative for gait problem.   Neurological: Positive for tingling and numbness. Negative for weakness.   All other systems reviewed and are negative.    This very nice lady is with her . She has had more transforaminal epidural blocks and they do seem to help her right leg and the knee in particular. We have presumed that the problem is from the spine and not from the knee itself. At least that is our provisional assessment. She is more comfortable and able to walk better. I told her we should stick with this strategy  for now, using intermittent blocks with Dr. Kellogg. Will see her in about 4-5 months. If the blocks no longer help, we might consider spinal cord stimulation, but I would like to keep that as a last resort. I did not think she needed a multilevel decompression and fusion with hardware.      Objective   Physical Exam   Constitutional: She is oriented to person, place, and time. She appears well-developed and well-nourished.   HENT:   Head: Normocephalic and atraumatic.   Eyes: Conjunctivae and EOM are normal. Pupils are equal, round, and reactive to light.   Fundoscopic exam:       The right eye shows no papilledema. The right eye shows venous pulsations.        The left eye shows no papilledema. The left eye shows venous pulsations.   Neck: Carotid bruit is not present.   Neurological: She is oriented to person, place, and time. She has a normal Finger-Nose-Finger Test and a normal Heel to Shin Test. Gait normal.   Reflex Scores:       Tricep reflexes are 2+ on the right side and 2+ on the left side.       Bicep reflexes are 2+ on the right side and 2+ on the left side.       Brachioradialis reflexes are 2+ on the right side and 2+ on the left side.       Patellar reflexes are 2+ on the right side and 2+ on the left side.       Achilles reflexes are 2+ on the right side and 2+ on the left side.  Psychiatric: Her speech is normal.     Neurologic Exam     Mental Status   Oriented to person, place, and time.   Registration of memory: Good recent and remote memory.   Attention: normal. Concentration: normal.   Speech: speech is normal   Level of consciousness: alert  Knowledge: consistent with education.     Cranial Nerves     CN II   Visual fields full to confrontation.   Visual acuity: normal    CN III, IV, VI   Pupils are equal, round, and reactive to light.  Extraocular motions are normal.     CN V   Facial sensation intact.   Right corneal reflex: normal  Left corneal reflex: normal    CN VII   Facial expression  full, symmetric.   Right facial weakness: none  Left facial weakness: none    CN VIII   Hearing: intact    CN IX, X   Palate: symmetric    CN XI   Right sternocleidomastoid strength: normal  Left sternocleidomastoid strength: normal    CN XII   Tongue: not atrophic  Tongue deviation: none    Motor Exam   Muscle bulk: normal  Right arm tone: normal  Left arm tone: normal  Right leg tone: normal  Left leg tone: normal    Strength   Strength 5/5 except as noted.     Sensory Exam   Light touch normal.     Gait, Coordination, and Reflexes     Gait  Gait: normal    Coordination   Finger to nose coordination: normal  Heel to shin coordination: normal    Reflexes   Right brachioradialis: 2+  Left brachioradialis: 2+  Right biceps: 2+  Left biceps: 2+  Right triceps: 2+  Left triceps: 2+  Right patellar: 2+  Left patellar: 2+  Right achilles: 2+  Left achilles: 2+  Right : 2+  Left : 2+      Assessment/Plan   Independent Review of Radiographic Studies:    I reviewed the lumbar MRI done in June 2018 which showed multilevel degenerative disc disease, thoraco-lumbar scoliosis, and moderate spinal stenosis at L3-L4 and L4-L5.  Agree with the report.      Medical Decision Making:    The transforaminal blocks seem to be helping.  She will continue working with Dr. Kellogg and get another block and see me in about 4-5 months.  If there is a severe flareup, she will let me know.  We can put considerations of spinal cord stimulation for now.      Paloma was seen today for back pain, leg pain and numbness.    Diagnoses and all orders for this visit:    Spinal stenosis of lumbar region with neurogenic claudication    DDD (degenerative disc disease), lumbar    Other forms of scoliosis, lumbar region      Return in about 18 weeks (around 6/17/2019).

## 2019-02-11 ENCOUNTER — OFFICE VISIT (OUTPATIENT)
Dept: NEUROSURGERY | Facility: CLINIC | Age: 79
End: 2019-02-11

## 2019-02-11 VITALS
DIASTOLIC BLOOD PRESSURE: 71 MMHG | BODY MASS INDEX: 30.05 KG/M2 | SYSTOLIC BLOOD PRESSURE: 117 MMHG | HEIGHT: 64 IN | WEIGHT: 176 LBS | HEART RATE: 55 BPM

## 2019-02-11 DIAGNOSIS — M41.86 OTHER FORMS OF SCOLIOSIS, LUMBAR REGION: ICD-10-CM

## 2019-02-11 DIAGNOSIS — M51.36 DDD (DEGENERATIVE DISC DISEASE), LUMBAR: ICD-10-CM

## 2019-02-11 DIAGNOSIS — M48.062 SPINAL STENOSIS OF LUMBAR REGION WITH NEUROGENIC CLAUDICATION: Primary | ICD-10-CM

## 2019-02-11 PROCEDURE — 99213 OFFICE O/P EST LOW 20 MIN: CPT | Performed by: NEUROLOGICAL SURGERY

## 2019-02-11 RX ORDER — GABAPENTIN 800 MG/1
800 TABLET ORAL 3 TIMES DAILY
COMMUNITY
End: 2019-02-11 | Stop reason: SDUPTHER

## 2019-02-12 RX ORDER — PANTOPRAZOLE SODIUM 40 MG/1
TABLET, DELAYED RELEASE ORAL
Qty: 90 TABLET | Refills: 0 | Status: SHIPPED | OUTPATIENT
Start: 2019-02-12 | End: 2019-04-30 | Stop reason: SDUPTHER

## 2019-02-12 RX ORDER — ESCITALOPRAM OXALATE 20 MG/1
TABLET ORAL
Qty: 90 TABLET | Refills: 0 | Status: SHIPPED | OUTPATIENT
Start: 2019-02-12 | End: 2019-05-28 | Stop reason: SDUPTHER

## 2019-02-12 RX ORDER — FUROSEMIDE 20 MG/1
TABLET ORAL
Qty: 180 TABLET | Refills: 0 | Status: SHIPPED | OUTPATIENT
Start: 2019-02-12 | End: 2019-02-26 | Stop reason: SDUPTHER

## 2019-02-22 ENCOUNTER — TELEPHONE (OUTPATIENT)
Dept: FAMILY MEDICINE CLINIC | Facility: CLINIC | Age: 79
End: 2019-02-22

## 2019-02-25 ENCOUNTER — OFFICE VISIT (OUTPATIENT)
Dept: FAMILY MEDICINE CLINIC | Facility: CLINIC | Age: 79
End: 2019-02-25

## 2019-02-25 VITALS
WEIGHT: 177 LBS | HEART RATE: 65 BPM | HEIGHT: 64 IN | SYSTOLIC BLOOD PRESSURE: 112 MMHG | BODY MASS INDEX: 30.22 KG/M2 | OXYGEN SATURATION: 97 % | DIASTOLIC BLOOD PRESSURE: 62 MMHG

## 2019-02-25 DIAGNOSIS — R35.0 URINE FREQUENCY: Primary | ICD-10-CM

## 2019-02-25 DIAGNOSIS — M79.10 MYALGIA: ICD-10-CM

## 2019-02-25 LAB
BILIRUB BLD-MCNC: NEGATIVE MG/DL
CLARITY, POC: CLEAR
COLOR UR: YELLOW
GLUCOSE UR STRIP-MCNC: NEGATIVE MG/DL
KETONES UR QL: NEGATIVE
LEUKOCYTE EST, POC: NEGATIVE
NITRITE UR-MCNC: NEGATIVE MG/ML
PH UR: 5 [PH] (ref 5–8)
PROT UR STRIP-MCNC: NEGATIVE MG/DL
RBC # UR STRIP: ABNORMAL /UL
SP GR UR: 1 (ref 1–1.03)
UROBILINOGEN UR QL: NORMAL

## 2019-02-25 PROCEDURE — 99213 OFFICE O/P EST LOW 20 MIN: CPT | Performed by: NURSE PRACTITIONER

## 2019-02-25 PROCEDURE — 81002 URINALYSIS NONAUTO W/O SCOPE: CPT | Performed by: NURSE PRACTITIONER

## 2019-02-26 ENCOUNTER — OFFICE VISIT (OUTPATIENT)
Dept: PAIN MEDICINE | Facility: CLINIC | Age: 79
End: 2019-02-26

## 2019-02-26 VITALS
SYSTOLIC BLOOD PRESSURE: 138 MMHG | DIASTOLIC BLOOD PRESSURE: 84 MMHG | RESPIRATION RATE: 18 BRPM | OXYGEN SATURATION: 95 % | HEART RATE: 58 BPM | WEIGHT: 178.4 LBS | TEMPERATURE: 98.4 F | BODY MASS INDEX: 30.46 KG/M2 | HEIGHT: 64 IN

## 2019-02-26 DIAGNOSIS — M51.36 DDD (DEGENERATIVE DISC DISEASE), LUMBAR: ICD-10-CM

## 2019-02-26 DIAGNOSIS — G89.29 CHRONIC BILATERAL LOW BACK PAIN WITH RIGHT-SIDED SCIATICA: Primary | ICD-10-CM

## 2019-02-26 DIAGNOSIS — M48.062 SPINAL STENOSIS OF LUMBAR REGION WITH NEUROGENIC CLAUDICATION: ICD-10-CM

## 2019-02-26 DIAGNOSIS — M54.41 CHRONIC BILATERAL LOW BACK PAIN WITH RIGHT-SIDED SCIATICA: Primary | ICD-10-CM

## 2019-02-26 LAB
ALBUMIN SERPL-MCNC: 4.5 G/DL (ref 3.5–4.8)
ALBUMIN/GLOB SERPL: 2.1 {RATIO} (ref 1.2–2.2)
ALP SERPL-CCNC: 108 IU/L (ref 39–117)
ALT SERPL-CCNC: 26 IU/L (ref 0–32)
AST SERPL-CCNC: 29 IU/L (ref 0–40)
BILIRUB SERPL-MCNC: 0.2 MG/DL (ref 0–1.2)
BUN SERPL-MCNC: 15 MG/DL (ref 8–27)
BUN/CREAT SERPL: 19 (ref 12–28)
CALCIUM SERPL-MCNC: 8.8 MG/DL (ref 8.7–10.3)
CHLORIDE SERPL-SCNC: 102 MMOL/L (ref 96–106)
CO2 SERPL-SCNC: 22 MMOL/L (ref 20–29)
CREAT SERPL-MCNC: 0.8 MG/DL (ref 0.57–1)
GLOBULIN SER CALC-MCNC: 2.1 G/DL (ref 1.5–4.5)
GLUCOSE SERPL-MCNC: 109 MG/DL (ref 65–99)
POTASSIUM SERPL-SCNC: 4.1 MMOL/L (ref 3.5–5.2)
PROT SERPL-MCNC: 6.6 G/DL (ref 6–8.5)
SODIUM SERPL-SCNC: 141 MMOL/L (ref 134–144)

## 2019-02-26 PROCEDURE — 99213 OFFICE O/P EST LOW 20 MIN: CPT | Performed by: PAIN MEDICINE

## 2019-02-26 RX ORDER — ALPRAZOLAM 0.5 MG/1
TABLET ORAL
Qty: 2 TABLET | Refills: 0 | Status: SHIPPED | OUTPATIENT
Start: 2019-02-26 | End: 2019-11-05 | Stop reason: ALTCHOICE

## 2019-02-26 NOTE — PROGRESS NOTES
CHIEF COMPLAINT: Back Pain    HPI  Paloma Carr is a 78 y.o. female.  She is here to follow up for Back Pain    Paloma Carr is a 78 y.o. female  who presents to the office for follow-up.  She completed a Lumbar Transforaminal Epidural Steroid Injection @ right L4,L5 on 12/26/18 and 1/28/19. Patient reports 50% relief from the procedure.   Since last visit their pain has improved. Able to perform more activities with less pain. Having bad day today.     Has bilateral knee pian. Hurts as bad as back. R>L. Intermittent dull ache but has also improved with injections.     The patient states their pain is a 5 on a scale of 1-10.  The patient describes this pain as episodic dull and ache.  The pain is located in bilateral low back and does radiate right leg. This painful problem is aggravated by physical activity and is alleviated by past injection.    Current pain medications:   Tylenol,   Gabapentin 900 tid     Past therapies:  Physical Therapy: yes Hx of accupuncture (no relief).  Chiropractor: yes  Massage Therapy: no  TENS: no  Neck or back surgery: no  Past pain management: [facility] about 10 years ago cannot recall where- no relief.     Previous Injection: Lumbar Transforaminal Epidural Steroid Injection @ right L4,L5 on 12/26/18 and 1/28/19  Effect of Injection (%): 50%  Length of Relief: 1 month after the 1st injection and 3 weeks after the 2nd injection     PEG Assessment   What number best describes your pain on average in the past week? 6  What number best describes how, during the past week, pain has interfered with your enjoyment of life? 4  What number best describes how, during the past week, pain has interfered with your general activity? 7      Current Outpatient Medications:   •  acetaminophen (TYLENOL) 325 MG tablet, Take 650 mg by mouth As Needed for Mild Pain ., Disp: , Rfl:   •  albuterol (PROVENTIL HFA;VENTOLIN HFA) 108 (90 Base) MCG/ACT inhaler, Inhale 2 puffs Every 4 (Four) Hours As Needed for  Wheezing., Disp: 1 inhaler, Rfl: 0  •  aspirin 81 MG EC tablet, Take 81 mg by mouth Daily., Disp: , Rfl:   •  atenolol (TENORMIN) 100 MG tablet, Take 100 mg by mouth Daily., Disp: , Rfl:   •  budesonide-formoterol (SYMBICORT) 160-4.5 MCG/ACT inhaler, Inhale 2 puffs 2 (Two) Times a Day., Disp: , Rfl: 12  •  Cholecalciferol (VITAMIN D3) 5000 units capsule capsule, Take 5,000 Units by mouth Daily., Disp: , Rfl:   •  clopidogrel (PLAVIX) 75 MG tablet, TAKE ONE TABLET BY MOUTH DAILY, Disp: 90 tablet, Rfl: 0  •  Cyanocobalamin (VITAMIN B 12 PO), Take 1,000 mcg by mouth Every Evening., Disp: , Rfl:   •  escitalopram (LEXAPRO) 20 MG tablet, TAKE ONE TABLET BY MOUTH DAILY, Disp: 90 tablet, Rfl: 0  •  folic acid-pyridoxine-cyanocobalamin (FOLBIC) 2.5-25-2 MG tablet tablet, Take 1 tablet by mouth Daily., Disp: , Rfl:   •  furosemide (LASIX) 20 MG tablet, Take 1 tablet by mouth Daily., Disp: 60 tablet, Rfl: 0  •  gabapentin (NEURONTIN) 800 MG tablet, Take 1 tablet by mouth 3 (Three) Times a Day., Disp: 270 tablet, Rfl: 3  •  letrozole (FEMARA) 2.5 MG tablet, Take 2.5 mg by mouth Daily., Disp: , Rfl:   •  levothyroxine (SYNTHROID, LEVOTHROID) 125 MCG tablet, TAKE ONE TABLET BY MOUTH DAILY, Disp: 90 tablet, Rfl: 0  •  MELATONIN PO, Take 1 tablet by mouth Every Night., Disp: , Rfl:   •  Multiple Vitamins-Minerals (MULTIVITAMIN ADULT PO), Take 1 tablet by mouth Daily., Disp: , Rfl:   •  pantoprazole (PROTONIX) 40 MG EC tablet, TAKE ONE TABLET BY MOUTH DAILY, Disp: 90 tablet, Rfl: 0  •  Probiotic Product (PROBIOTIC DAILY PO), Take 1 tablet by mouth Daily., Disp: , Rfl:   •  rosuvastatin (CRESTOR) 20 MG tablet, Take 1 tablet by mouth Daily., Disp: 90 tablet, Rfl: 3  •  ALPRAZolam (XANAX) 0.5 MG tablet, Take 1 tablet po 2 hours before procedure, may repeat in 1 hour as needed for anxiety, Disp: 2 tablet, Rfl: 0  No current facility-administered medications for this visit.     Facility-Administered Medications Ordered in Other Visits:    •  atorvastatin (LIPITOR) tablet 40 mg, 40 mg, Oral, Daily, Norman Taylor MD    IMAGING  MRI LUMBAR SPINE WITHOUT CONTRAST  DATE: 10/15/2016 at 10:34 AM  FINDINGS:  Lumbar vertebra are normal in height. No fracture is identified. Retrolisthesis at T12-L1 is noted. Conus medullaris terminates at the L1 level.  Small disc protrusions are noted at T10-T11 and T11-T12 causing mild canal narrowing.  T12-L1: Retrolisthesis and broad-based disc bulging causes mild spinal stenosis. Facet arthropathy is minimal. The foramina are patent.  L1-L2: Mild facet arthropathy results in minimal left foraminal narrowing. Edematous left-sided degenerative disc narrowing is also noted. Minimal disc bulging is present.  L2-L3: Facet arthropathy and mild disc bulging results in mild left-sided canal and foraminal stenosis.  L3-L4: Moderate facet arthropathy and ligamentum flavum thickening are noted. Disc bulging is present. Moderate spinal stenosis is present. The foramina are mildly narrowed.  L4-L5: There is collapse the right side of the disc with lateral disc osteophyte complex formation. Facet arthropathy is also present. This mild to moderate right foraminal narrowing. Osteophytes abut the exiting right L4 nerve root. There is also mild   right lateral recess narrowing.  L5-S1: Disc bulging and facet arthropathy are present. There is mild spinal stenosis. Degenerative changes are greater on the right side causing moderate right foraminal narrowing disc osteophyte abuts the exiting right L5 nerve root.  IMPRESSION:  1. Moderate multilevel lumbar spondylosis with mild to moderate foraminal stenosis greatest on the right at L4-L5 and L5-S1   2. Mild to moderate spinal stenosis greatest at L3-L4.  3. Edematous endplate changes on the left at L1-L2 appear degenerative in nature.     Imaging last reviewed: 02/26/19     PFSH:  The following portions of the patient's history were reviewed and updated as appropriate: problem  "list, past medical history, past surgery history, social history, family history, medications, and allergies    Review of Systems   Constitutional: Negative for fatigue.   HENT: Negative for congestion.    Eyes: Negative for visual disturbance.   Respiratory: Positive for wheezing. Negative for cough and shortness of breath.    Cardiovascular: Positive for leg swelling. Negative for chest pain and palpitations.   Gastrointestinal: Negative for constipation and diarrhea.   Musculoskeletal: Positive for back pain.   Allergic/Immunologic: Negative for immunocompromised state.   Neurological: Positive for weakness and numbness (bilateral thighs).   Hematological: Bruises/bleeds easily.   Psychiatric/Behavioral: Negative for sleep disturbance and suicidal ideas. The patient is not nervous/anxious.    All other systems reviewed and are negative.      Vitals:    02/26/19 1105   BP: 138/84   Pulse: 58   Resp: 18   Temp: 98.4 °F (36.9 °C)   SpO2: 95%   Weight: 80.9 kg (178 lb 6.4 oz)   Height: 162.6 cm (64\")   PainSc:   5   PainLoc: Back       Physical Exam   Constitutional: She appears well-developed and well-nourished. No distress.   HENT:   Head: Normocephalic and atraumatic.   Nose: Nose normal.   Mouth/Throat: Oropharynx is clear and moist.   Eyes: Conjunctivae and EOM are normal.   Neck: Normal range of motion. Neck supple.   Pulmonary/Chest: Effort normal and breath sounds normal. No stridor. No respiratory distress.   Musculoskeletal:        Right knee: She exhibits swelling. Tenderness found. Medial joint line and lateral joint line tenderness noted.        Left knee: She exhibits no swelling. Tenderness found.        Lumbar back: She exhibits decreased range of motion, tenderness (right side only) and pain.   Neurological: She is alert. She has normal strength. No cranial nerve deficit or sensory deficit.   Skin: Skin is warm and dry. No rash noted. She is not diaphoretic.   Psychiatric: She has a normal mood and " affect. Her speech is normal and behavior is normal.   Nursing note and vitals reviewed.    Ortho Exam  Neurologic Exam     Mental Status   Speech: speech is normal     Cranial Nerves     CN III, IV, VI   Extraocular motions are normal.     Motor Exam     Strength   Strength 5/5 throughout.       Lab Results   Component Value Date    POCMETH Negative 12/07/2018    POCAMPHET Negative 12/07/2018    POCBARBITUR Negative 12/07/2018    POCBENZO Negative 12/07/2018    POCCOCAINE Negative 12/07/2018    POCMETHADO Negative 12/07/2018    POCOPIATES Negative 12/07/2018    POCOXYCODO Negative 12/07/2018    POCPHENCYC Negative 12/07/2018    POCPROPOXY Negative 12/07/2018    POCTHC Negative 12/07/2018    POCTRICYC Negative 12/07/2018     Last UDS results reviewed: 02/26/19   Last UDS: 12/7/2018  Comments: Consistent     Date of last THOMPSON reviewed : 02/26/19   Comments: Gio Dow was seen today for back pain.    Diagnoses and all orders for this visit:    Chronic bilateral low back pain with right-sided sciatica  -     Case Request    Spinal stenosis of lumbar region with neurogenic claudication  -     Case Request    DDD (degenerative disc disease), lumbar  -     Case Request    Other orders  -     ALPRAZolam (XANAX) 0.5 MG tablet; Take 1 tablet po 2 hours before procedure, may repeat in 1 hour as needed for anxiety      Requested Prescriptions     Signed Prescriptions Disp Refills   • ALPRAZolam (XANAX) 0.5 MG tablet 2 tablet 0     Sig: Take 1 tablet po 2 hours before procedure, may repeat in 1 hour as needed for anxiety     - Imaging reviewed with patient.    - no surgical options at this time.   - good relief with two Lumbar TFESI. Will perform 3rd injection to maximize benefit. Will need to take a steroid break after this injection.   - surgical notes reviewed - no surgery planned at this time.   - Given lumbar radiculopathy follows the right L4/L5 dermatome distribution, patient would likely benefit from a  right L4/L5 transforaminal epidural injection.  The procedure was described in detail and the risks, benefits and alternatives were discussed with the patient (including but not limited to: bleeding, infection, nerve damage, worsening of pain, inability to perform injection, paralysis, seizures, and death) who agreed to proceed.     - sent message to pcp and neurologist about holding plavix for 7 days for injection.    - had a lot of anxiety about past injection. Will send over 2 tablets of xanex for her to take pre op to help.   - no medication changes made today.   - also wants to address right knee. Right genicular nerve block briefly discussed but will discuss more in future. Has to avoid nsaids. Tried brace with no benefit.     Wt Readings from Last 3 Encounters:   02/26/19 80.9 kg (178 lb 6.4 oz)   02/25/19 80.3 kg (177 lb)   02/11/19 79.8 kg (176 lb)     Body mass index is 30.62 kg/m². Patient counseled on the importance of weight loss to help with overall health and pain control. Patient instructed to attempt weight loss.   Plan: Calorie counting  reduce portion size, cut out extra servings and reduce fast food intake    Follow-up after injection    Melanie Kellogg MD  Pain Management

## 2019-03-06 RX ORDER — LEVOTHYROXINE SODIUM 0.12 MG/1
TABLET ORAL
Qty: 60 TABLET | Refills: 0 | Status: SHIPPED | OUTPATIENT
Start: 2019-03-06 | End: 2019-06-19 | Stop reason: DRUGHIGH

## 2019-03-07 ENCOUNTER — DOCUMENTATION (OUTPATIENT)
Dept: PAIN MEDICINE | Facility: CLINIC | Age: 79
End: 2019-03-07

## 2019-03-07 ENCOUNTER — OUTSIDE FACILITY SERVICE (OUTPATIENT)
Dept: PAIN MEDICINE | Facility: CLINIC | Age: 79
End: 2019-03-07

## 2019-03-07 PROCEDURE — 64483 NJX AA&/STRD TFRM EPI L/S 1: CPT | Performed by: ANESTHESIOLOGY

## 2019-03-07 NOTE — PROGRESS NOTES
Right S1 Lumbar Transforaminal Epidural Steroid Injection  [facility]      PREOPERATIVE DIAGNOSIS:  Lumbar Degenerative Disc Disease and Lumbar Spinal Stenosis WITH Neurogenic Claudication    POSTOPERATIVE DIAGNOSIS:  Same as preop diagnosis    PROCEDURE:  CPT 52439 --  Diagnostic Transforaminal Epidural Steroid Injection at the S1 level, on the RIGHT    PRE-PROCEDURE DISCUSSION WITH PATIENT:    Risks and complications were discussed with the patient prior to starting the procedure and informed consent was obtained.  We discussed various topics including but not limited to bleeding, infection, injury, nerve injury, paralysis, coma, death, postprocedural painful flare-up, postprocedural site soreness, and a lack of pain relief.  We discussed the diagnostic aspect of transforaminal epidural / selective nerve root blockade.    SURGEON:  Garth Hinkle MD    REASON FOR PROCEDURE:    Degenerative changes are noted in the area., Acute pain flareup is noted & problematic, and the injection is used to attempt to break the flareup.   and epidurograms at the L4 and L5 levels were of poor quality due to degenerative changes so I aborted attempts at these levels and entered the posterior s1 foramen easily.     SEDATION:  Versed 4mg & Fentanyl 100 mcg IV  ANESTHETIC:  Marcaine 0.25%  STEROID:  Methylprednisolone (DEPO MEDROL) 80mg/ml    DESCRIPTON OF PROCEDURE:  After obtaining informed consent, an I.V. was started in the preoperative area. The patient taken to the operating room and was placed in the prone position with a pillow under the abdomen.  All pressure points were well padded.  EKG, blood pressure, and pulse oximeter were monitored.  The lumbosacral area was prepped with Chloraprep and draped in a sterile fashion. Under fluoroscopic guidance the upper vertebral-equivalent level of the sacrum was identified, and in a slightly oblique view, the S1 posterior foramen was identified, and then a  point just below the foramen at 6 o'clock position was identified. Skin and subcutaneous tissue was anesthetized with 1% lidocaine. A 22-gauge spinal needle was introduced under fluoroscopic guidance at the above junction and then redirected slightly cephalad and into the foramen without parasthesias and into the epidural space. After confirming the position of the needle with PA, lateral, and oblique fluoroscopic views, aspiration was checked and was clear of blood or CSF.  Next, 1 mL of Omnipaque was injected. After seeing adequate spread on the corresponding nerve root, a total volume 3mL of injectate containing 1ml of the above mentioned local anesthetic, 1 ml saline,  and the above mentioned corticosteroid was injected into the epidural space.    The needle was removed intact.      ESTIMATED BLOOD LOSS:  <5 mL  SPECIMENS:  none    COMPLICATIONS:   No complications were noted., There was no indication of vascular uptake on live injection of contrast dye. and The patient did not have any signs of postprocedure numbness nor weakness.    TOLERANCE & DISCHARGE CONDITION:    The patient tolerated the procedure well.  The patient was transported to the recovery area without difficulties.  The patient was discharged to home under the care of family in stable and satisfactory condition.    PLAN OF CARE:  1. The patient was given our standard instruction sheet.  2. The patient will Return to clinic 4-6 wks.  3. The patient will resume all medications as per the medication reconciliation sheet.

## 2019-04-30 RX ORDER — PANTOPRAZOLE SODIUM 40 MG/1
TABLET, DELAYED RELEASE ORAL
Qty: 90 TABLET | Refills: 1 | Status: SHIPPED | OUTPATIENT
Start: 2019-04-30 | End: 2019-10-28 | Stop reason: SDUPTHER

## 2019-05-15 ENCOUNTER — DOCUMENTATION (OUTPATIENT)
Dept: PHYSICAL THERAPY | Facility: HOSPITAL | Age: 79
End: 2019-05-15

## 2019-05-15 DIAGNOSIS — G89.29 CHRONIC RIGHT SHOULDER PAIN: ICD-10-CM

## 2019-05-15 DIAGNOSIS — Z96.611 H/O TOTAL SHOULDER REPLACEMENT, RIGHT: ICD-10-CM

## 2019-05-15 DIAGNOSIS — R29.3 ABNORMAL POSTURE: ICD-10-CM

## 2019-05-15 DIAGNOSIS — Z17.0 MALIGNANT NEOPLASM OF UPPER-OUTER QUADRANT OF RIGHT BREAST IN FEMALE, ESTROGEN RECEPTOR POSITIVE (HCC): ICD-10-CM

## 2019-05-15 DIAGNOSIS — I97.2 POST-MASTECTOMY LYMPHEDEMA SYNDROME: ICD-10-CM

## 2019-05-15 DIAGNOSIS — M25.511 CHRONIC RIGHT SHOULDER PAIN: ICD-10-CM

## 2019-05-15 DIAGNOSIS — C50.411 MALIGNANT NEOPLASM OF UPPER-OUTER QUADRANT OF RIGHT BREAST IN FEMALE, ESTROGEN RECEPTOR POSITIVE (HCC): ICD-10-CM

## 2019-05-15 DIAGNOSIS — I97.2 POSTMASTECTOMY LYMPHEDEMA SYNDROME: Primary | ICD-10-CM

## 2019-05-15 NOTE — THERAPY DISCHARGE NOTE
Outpatient Physical Therapy Discharge Summary         Patient Name: Paloma Carr  : 1940  MRN: [id number]    Today's Date: 5/15/2019    Visit Dx:    ICD-10-CM ICD-9-CM   1. 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 Abnormal posture R29.3 781.92   3. Post-mastectomy lymphedema syndrome I97.2 457.0   4. H/O total shoulder replacement, right Z96.611 V43.61   5. Chronic right shoulder pain M25.511 719.41    G89.29 338.29   6. Malignant neoplasm of upper-outer quadrant of right breast in female, estrogen receptor positive (CMS/[facility]) C50.411 174.4    Z17.0 V86.0       PT OP Goals     Row Name 05/15/19 1400          PT Short Term Goals    STG 1  1.Patient independent and compliant with initial home exercise program focused on diaphragmatic breathing, range of motion, flexibility to decrease edema and improve lymphatic flow for decreased edema and decreased risk of infection.  -SC     STG 1 Progress  Met  -SC     STG 2  2.Patient demonstrate proper awareness of What is Lymphedema and 18 Steps of Prevention for improved prevention, management, care of symptoms and ease of transition to self-care of condition.  -SC     STG 2 Progress  Met  -SC     STG 3  3.Patient independent and compliant with self-massage techniques with spouse/family member as needed for improved lymphatic drainage, decreased edema/symptoms, desensitization, decreased axillary cording, decreased risk of infection and improved transition to self-care of condition.  -SC     STG 3 Progress  Met  -SC     STG 4  4.Patient independent and compliant with advanced Home Exercise Program and self-care techniques for self-management of condition.  -SC     STG 4 Progress  Met  -SC     STG 5  5..Patient demonstrate proper awareness of Care and Air Travel safety with compression/exercise as indicated for improved safety with travel and self-care of condition.  -SC     STG 5 Progress  Met  -SC        Long Term Goals    LTG 1  1.Patient score  </=18.8/100 on DASH for improved function and transition to self-management of condition.  -SC     LTG 1 Progress  Partially Met  -SC     LTG 2  2.Patient independent and compliant self MLD techniques for improved mobility, skin care and lymphatic flow.  -SC     LTG 2 Progress  Met  -SC     LTG 3  3. Pt. to have a decrease in UE circumfrential measurements of at least 2 cm.  -SC     LTG 3 Progress  Met  -SC       User Key  (r) = Recorded By, (t) = Taken By, (c) = Cosigned By    Initials Name Provider Type    Yadira Sequeira, PT Physical Therapist          OP PT Discharge Summary  Date of Discharge: 05/15/19  Reason for Discharge: All goals achieved, Maximum functional potential achieved  Outcomes Achieved: Able to achieve all goals within established timeline  Discharge Destination: Home with home program        Yadira Coleman, PT  5/15/2019

## 2019-05-28 RX ORDER — ESCITALOPRAM OXALATE 20 MG/1
TABLET ORAL
Qty: 90 TABLET | Refills: 0 | Status: SHIPPED | OUTPATIENT
Start: 2019-05-28 | End: 2019-08-17 | Stop reason: SDUPTHER

## 2019-06-12 DIAGNOSIS — E03.9 HYPOTHYROIDISM, UNSPECIFIED TYPE: Primary | ICD-10-CM

## 2019-06-12 DIAGNOSIS — I63.9 CEREBROVASCULAR ACCIDENT (CVA), UNSPECIFIED MECHANISM (HCC): ICD-10-CM

## 2019-06-12 DIAGNOSIS — E78.2 MIXED HYPERLIPIDEMIA: ICD-10-CM

## 2019-06-12 DIAGNOSIS — Z79.899 HIGH RISK MEDICATION USE: ICD-10-CM

## 2019-06-18 LAB
ERYTHROCYTE [DISTWIDTH] IN BLOOD BY AUTOMATED COUNT: 13.2 % (ref 12.3–15.4)
HCT VFR BLD AUTO: 42.3 % (ref 34–46.6)
HGB BLD-MCNC: 13.2 G/DL (ref 12–15.9)
MCH RBC QN AUTO: 32.3 PG (ref 26.6–33)
MCHC RBC AUTO-ENTMCNC: 31.2 G/DL (ref 31.5–35.7)
MCV RBC AUTO: 103.4 FL (ref 79–97)
PLATELET # BLD AUTO: 250 10*3/MM3 (ref 140–450)
RBC # BLD AUTO: 4.09 10*6/MM3 (ref 3.77–5.28)
WBC # BLD AUTO: 4.55 10*3/MM3 (ref 3.4–10.8)

## 2019-06-19 LAB
ALBUMIN SERPL-MCNC: 4.6 G/DL (ref 3.5–5.2)
ALBUMIN/GLOB SERPL: 2 G/DL
ALP SERPL-CCNC: 109 U/L (ref 39–117)
ALT SERPL-CCNC: 20 U/L (ref 1–33)
AST SERPL-CCNC: 34 U/L (ref 1–32)
BILIRUB SERPL-MCNC: 0.3 MG/DL (ref 0.2–1.2)
BUN SERPL-MCNC: 15 MG/DL (ref 8–23)
BUN/CREAT SERPL: 17.4 (ref 7–25)
CALCIUM SERPL-MCNC: 9.4 MG/DL (ref 8.6–10.5)
CHLORIDE SERPL-SCNC: 105 MMOL/L (ref 98–107)
CHOLEST SERPL-MCNC: 161 MG/DL (ref 0–200)
CO2 SERPL-SCNC: 27.3 MMOL/L (ref 22–29)
CREAT SERPL-MCNC: 0.86 MG/DL (ref 0.57–1)
GLOBULIN SER CALC-MCNC: 2.3 GM/DL
GLUCOSE SERPL-MCNC: 116 MG/DL (ref 65–99)
HDLC SERPL-MCNC: 39 MG/DL (ref 40–60)
LDLC SERPL CALC-MCNC: 82 MG/DL (ref 0–100)
LDLC/HDLC SERPL: 2.11 {RATIO}
POTASSIUM SERPL-SCNC: 4.4 MMOL/L (ref 3.5–5.2)
PROT SERPL-MCNC: 6.9 G/DL (ref 6–8.5)
SODIUM SERPL-SCNC: 144 MMOL/L (ref 136–145)
TRIGL SERPL-MCNC: 199 MG/DL (ref 0–150)
TSH SERPL DL<=0.005 MIU/L-ACNC: 20.6 MIU/ML (ref 0.27–4.2)
VLDLC SERPL CALC-MCNC: 39.8 MG/DL

## 2019-06-19 RX ORDER — LEVOTHYROXINE SODIUM 0.15 MG/1
150 TABLET ORAL DAILY
Qty: 30 TABLET | Refills: 1 | Status: SHIPPED | OUTPATIENT
Start: 2019-06-19 | End: 2019-07-26 | Stop reason: SDUPTHER

## 2019-06-27 RX ORDER — CLOPIDOGREL BISULFATE 75 MG/1
TABLET ORAL
Qty: 90 TABLET | Refills: 0 | Status: SHIPPED | OUTPATIENT
Start: 2019-06-27 | End: 2019-09-27 | Stop reason: SDUPTHER

## 2019-07-03 ENCOUNTER — OFFICE VISIT (OUTPATIENT)
Dept: FAMILY MEDICINE CLINIC | Facility: CLINIC | Age: 79
End: 2019-07-03

## 2019-07-03 VITALS
HEIGHT: 64 IN | HEART RATE: 66 BPM | SYSTOLIC BLOOD PRESSURE: 124 MMHG | DIASTOLIC BLOOD PRESSURE: 76 MMHG | BODY MASS INDEX: 30.61 KG/M2 | OXYGEN SATURATION: 98 %

## 2019-07-03 DIAGNOSIS — I63.412 CEREBROVASCULAR ACCIDENT (CVA) DUE TO EMBOLISM OF LEFT MIDDLE CEREBRAL ARTERY (HCC): ICD-10-CM

## 2019-07-03 DIAGNOSIS — M54.41 CHRONIC BILATERAL LOW BACK PAIN WITH RIGHT-SIDED SCIATICA: ICD-10-CM

## 2019-07-03 DIAGNOSIS — I10 ESSENTIAL HYPERTENSION: ICD-10-CM

## 2019-07-03 DIAGNOSIS — Z17.0 MALIGNANT NEOPLASM OF UPPER-OUTER QUADRANT OF RIGHT BREAST IN FEMALE, ESTROGEN RECEPTOR POSITIVE (HCC): ICD-10-CM

## 2019-07-03 DIAGNOSIS — G40.109: ICD-10-CM

## 2019-07-03 DIAGNOSIS — E78.2 MIXED HYPERLIPIDEMIA: ICD-10-CM

## 2019-07-03 DIAGNOSIS — M48.062 SPINAL STENOSIS OF LUMBAR REGION WITH NEUROGENIC CLAUDICATION: ICD-10-CM

## 2019-07-03 DIAGNOSIS — C50.411 MALIGNANT NEOPLASM OF UPPER-OUTER QUADRANT OF RIGHT BREAST IN FEMALE, ESTROGEN RECEPTOR POSITIVE (HCC): ICD-10-CM

## 2019-07-03 DIAGNOSIS — E03.9 ACQUIRED HYPOTHYROIDISM: ICD-10-CM

## 2019-07-03 DIAGNOSIS — Z00.00 ROUTINE GENERAL MEDICAL EXAMINATION AT A HEALTH CARE FACILITY: Primary | ICD-10-CM

## 2019-07-03 DIAGNOSIS — J44.9 CHRONIC OBSTRUCTIVE PULMONARY DISEASE, UNSPECIFIED COPD TYPE (HCC): ICD-10-CM

## 2019-07-03 DIAGNOSIS — F34.1 DYSTHYMIA: ICD-10-CM

## 2019-07-03 DIAGNOSIS — G89.29 CHRONIC BILATERAL LOW BACK PAIN WITH RIGHT-SIDED SCIATICA: ICD-10-CM

## 2019-07-03 PROBLEM — C44.729 SQUAMOUS CELL CARCINOMA OF LEG, LEFT: Status: ACTIVE | Noted: 2019-06-21

## 2019-07-03 PROBLEM — J44.1 COPD EXACERBATION: Status: ACTIVE | Noted: 2019-07-03

## 2019-07-03 PROBLEM — F32.A DEPRESSION: Status: ACTIVE | Noted: 2019-07-03

## 2019-07-03 PROCEDURE — 99214 OFFICE O/P EST MOD 30 MIN: CPT | Performed by: FAMILY MEDICINE

## 2019-07-03 PROCEDURE — G0439 PPPS, SUBSEQ VISIT: HCPCS | Performed by: FAMILY MEDICINE

## 2019-07-03 NOTE — PROGRESS NOTES
Medicare Subsequent Wellness Visit  Subjective   History of Present Illness    Paloma Carr is a 79 y.o. female who presents for an Medicare Wellness Visit. In addition, we addressed the following health issues:  Chronic low back pain and peripheral neuropathy. This is the primary issue for her. Between her back issues, multiple joint replacements, she is in pain most of the time. She has been managing with gabapentin and would like for me to refill for her as needed.  Seizure disorder - this is a chronic problem due to the benign meningioma in her brain. The gabapentin is doing double duty her and helping prevent seizures. She is no longer seeing a neurologist.   Paloma  Also has a hx of depression and states that she is getting good relief from symptoms on current medication, Lexapro. This is a chronic problem that is responding well to treatment. She  has no intolerable side effects to medication and reports that his helps lift mood and makes daily life, relationships better. No thoughts of self harm expressed.  Paloma has a history of chronic hypertension and has been well controlled on current medications.She is tolerating medications without side effect. She reports no vision changes, headaches or lightheadedness. She is requesting refills of medications.  She has a history of chronic hyperlipidemia and needs lab work today to evaluate response to therapy. She is tolerating medications well without side effects.  COPD- She has a past hx of smoking and is now on Symbicort and albuterol and feels she is dong well.  Hypothyoid - This is a chronic problem that is not well controlled. I increased her levothyroxine to 150 mcg 2 weeks ago and she is starting to feel a little better.   Breast Cancer with lymphedema. She is on Lasix, oral chemotherapy and followed by oncology.  History of CVA with embolism. She is now on Plavix  Ad aspirin and has recovered well.   PMH, PSH, SocHx, FamHx, Allergies, and Medications:  Reviewed and updated in the history section of chart.  Family History   Problem Relation Age of Onset   • Hypertension Mother    • Hyperlipidemia Mother    • Heart disease Mother    • Cancer Father    • Alzheimer's disease Sister    • No Known Problems Brother    • No Known Problems Sister        Social History     Social History Narrative    Lives at home with        Allergies   Allergen Reactions   • Penicillins Rash       Outpatient Medications Prior to Visit   Medication Sig Dispense Refill   • acetaminophen (TYLENOL) 325 MG tablet Take 650 mg by mouth As Needed for Mild Pain .     • albuterol (PROVENTIL HFA;VENTOLIN HFA) 108 (90 Base) MCG/ACT inhaler Inhale 2 puffs Every 4 (Four) Hours As Needed for Wheezing. 1 inhaler 0   • ALPRAZolam (XANAX) 0.5 MG tablet Take 1 tablet po 2 hours before procedure, may repeat in 1 hour as needed for anxiety 2 tablet 0   • aspirin 81 MG EC tablet Take 81 mg by mouth Daily.     • atenolol (TENORMIN) 100 MG tablet Take 100 mg by mouth Daily.     • budesonide-formoterol (SYMBICORT) 160-4.5 MCG/ACT inhaler Inhale 2 puffs 2 (Two) Times a Day.  12   • Cholecalciferol (VITAMIN D3) 5000 units capsule capsule Take 5,000 Units by mouth Daily.     • clopidogrel (PLAVIX) 75 MG tablet TAKE ONE TABLET BY MOUTH DAILY 90 tablet 0   • Cyanocobalamin (VITAMIN B 12 PO) Take 1,000 mcg by mouth Every Evening.     • escitalopram (LEXAPRO) 20 MG tablet TAKE ONE TABLET BY MOUTH DAILY 90 tablet 0   • folic acid-pyridoxine-cyanocobalamin (FOLBIC) 2.5-25-2 MG tablet tablet Take 1 tablet by mouth Daily.     • furosemide (LASIX) 20 MG tablet Take 1 tablet by mouth Daily. 60 tablet 0   • gabapentin (NEURONTIN) 800 MG tablet Take 1 tablet by mouth 3 (Three) Times a Day. 270 tablet 3   • letrozole (FEMARA) 2.5 MG tablet Take 2.5 mg by mouth Daily.     • levothyroxine (SYNTHROID) 150 MCG tablet Take 1 tablet by mouth Daily. 30 tablet 1   • MELATONIN PO Take 1 tablet by mouth Every Night.     • Multiple  Vitamins-Minerals (MULTIVITAMIN ADULT PO) Take 1 tablet by mouth Daily.     • pantoprazole (PROTONIX) 40 MG EC tablet TAKE ONE TABLET BY MOUTH DAILY 90 tablet 1   • Probiotic Product (PROBIOTIC DAILY PO) Take 1 tablet by mouth Daily.     • rosuvastatin (CRESTOR) 20 MG tablet Take 1 tablet by mouth Daily. 90 tablet 3     Facility-Administered Medications Prior to Visit   Medication Dose Route Frequency Provider Last Rate Last Dose   • atorvastatin (LIPITOR) tablet 40 mg  40 mg Oral Daily Norman Taylor MD            Patient Active Problem List   Diagnosis   • Postmastectomy lymphedema syndrome   • Malignant neoplasm of upper-outer quadrant of right female breast (CMS/HCC)   • Cerebrovascular accident (CVA) (CMS/HCC)   • Stroke (CMS/HCC)   • Hemarthrosis   • HTN (hypertension)   • HLD (hyperlipidemia)   • Adverse effect of antiplatelet agent   • Left middle cerebral artery stroke (CMS/HCC)   • Carcinoma of central portion of right breast in female, estrogen receptor positive (CMS/HCC)   • Chronic bilateral low back pain with right-sided sciatica   • History of lumbar surgery   • Seizure disorder, simple partial, without intractable epilepsy (CMS/HCC)   • Spinal stenosis of lumbar region with neurogenic claudication   • Status post craniectomy   • History of total right knee replacement   • Other forms of scoliosis, lumbar region   • DDD (degenerative disc disease), lumbar   • Benign meningioma of brain (CMS/HCC)   • Squamous cell carcinoma of leg, left   • Status post multiple cerebral infarctions   • Hypothyroid   • Depression   • COPD (chronic obstructive pulmonary disease) (CMS/HCC)         Patient Care Team:  Goran aCrr MD as PCP - General (Family Medicine)  Emi Mason APRN as PCP - Claims Attributed  Health Habits:  Current Diet: Well balanced diet  Dental Exam. UTD  Eye Exam. UTD  Exercise:none  Current exercise activities include:    Recent Hospitalizations:  none    Age-appropriate  Screening Schedule:  Refer to the list below for future screening recommendations based on patient's age. Orders for these recommended tests are listed in the plan section. The patient has been provided with a written plan.    Health Maintenance   Topic Date Due   • ZOSTER VACCINE (2 of 2) 05/27/2007   • INFLUENZA VACCINE  08/01/2019   • LIPID PANEL  06/18/2020   • MEDICARE ANNUAL WELLNESS  07/03/2020   • MAMMOGRAM  10/22/2020   • TDAP/TD VACCINES (3 - Td) 04/27/2027   • PNEUMOCOCCAL VACCINES (65+ LOW/MEDIUM RISK)  Completed       Depression Screen:   PHQ-2/PHQ-9 Depression Screening 3/16/2018   Little interest or pleasure in doing things 0   Feeling down, depressed, or hopeless 0   Trouble falling or staying asleep, or sleeping too much -   Feeling tired or having little energy -   Poor appetite or overeating -   Feeling bad about yourself - or that you are a failure or have let yourself or your family down -   Trouble concentrating on things, such as reading the newspaper or watching television -   Moving or speaking so slowly that other people could have noticed. Or the opposite - being so fidgety or restless that you have been moving around a lot more than usual -   Thoughts that you would be better off dead, or of hurting yourself in some way -   Total Score 0   If you checked off any problems, how difficult have these problems made it for you to do your work, take care of things at home, or get along with other people? -       Functional and Cognitive Screening:  Functional & Cognitive Status 7/3/2019   Do you have difficulty preparing food and eating? No   Do you have difficulty bathing yourself, getting dressed or grooming yourself? No   Do you have difficulty using the toilet? No   Do you have difficulty moving around from place to place? No   Do you have trouble with steps or getting out of a bed or a chair? No   In the past year have you fallen or experienced a near fall? No   Current Diet Well Balanced  "Diet   Dental Exam Up to date   Eye Exam Up to date   Exercise (times per week) 2 times per week   Current Exercise Activities Include Walking   Do you need help using the phone?  No   Are you deaf or do you have serious difficulty hearing?  No   Do you need help with transportation? No   Do you need help shopping? No   Do you need help preparing meals?  No   Do you need help with housework?  No   Do you need help with laundry? No   Do you need help taking your medications? No   Do you need help managing money? No   Do you ever drive or ride in a car without wearing a seat belt? No   Have you felt unusual stress, anger or loneliness in the last month? No   Who do you live with? Spouse   If you need help, do you have trouble finding someone available to you? No   Have you been bothered in the last four weeks by sexual problems? No     Does the patient have evidence of cognitive impairment? none      Review of Systems   Constitutional: Negative.    HENT: Negative.    Eyes: Negative.    Respiratory: Negative.    Cardiovascular: Negative.    Gastrointestinal: Negative.    Endocrine: Negative.    Genitourinary: Negative.    Musculoskeletal: Positive for arthralgias, back pain, myalgias and neck pain.   Skin: Negative.    Allergic/Immunologic: Negative.    Neurological: Negative.    Hematological: Negative.    Psychiatric/Behavioral: Positive for dysphoric mood.       Objective     Vitals:    07/03/19 1514   BP: 124/76   Pulse: 66   SpO2: 98%   Height: 162.6 cm (64.02\")       Body mass index is 30.61 kg/m².    PHYSICAL EXAM  Vitals reviewed and on chart.  HEENT: PERRLA, EOMI. Oral mucosa moist,   No LAD.  CV: RRR, no murmurs, rubs, clicks or gallops  LUNGS: CTA bilaterally  EXT: No edema, FROM in bilateral upper and lower ext  NEURO: CN II - XII grossly intact  PSYCH: normal mood, good insight, cognitive function intact      ASSESSMENT AND PLAN      Problem List Items Addressed This Visit        Cardiovascular and " Mediastinum    Cerebrovascular accident (CVA) (CMS/HCC)  Stable on Plavix and aspirin      HTN (hypertension)  Well controlled on current medication, will refill medication today and as needed. She will RTO for repeat B/P check in 6 months.      HLD (hyperlipidemia)  This is a chronic problem that has been well managed on current medications. Will refill as needed.          Respiratory    COPD (chronic obstructive pulmonary disease) (CMS/HCC)  This is a chronic problem that has been well managed on current medications. Will refill as needed.          Endocrine    Hypothyroid  She was not well controlled on recent labs and I have increased her meds to 150 mcg a day. She will RTO in 2 months for a repeat TSH.       Nervous and Auditory    Chronic bilateral low back pain with right-sided sciatica    Seizure disorder, simple partial, without intractable epilepsy (CMS/HCC)    Spinal stenosis of lumbar region with neurogenic claudication  She is managing on gabapentin. Contract and THOMPSON on chart and appropriate. She will RTO in 6 months for re-evaluation.       Other    Malignant neoplasm of upper-outer quadrant of right female breast (CMS/[facility])  Stable at this time and followed by Dr. Martinez.      Depression  She is well managed on current meds and reports no signs or symptoms of self harm, suicidal ideation. This medication helps with daily life and relationships. Will refill as needed and advised 6 month follow up.        Other Visit Diagnoses     Routine general medical examination at a health care facility    -  Primary        Labs reviewed and on chart.  Orders:  No orders of the defined types were placed in this encounter.      Follow Up:  Return in about 6 months (around 1/3/2020) for Recheck.     ADVANCED DIRECTIVES:     An After Visit Summary and PPPS with all of these plans were given to the patient.

## 2019-07-03 NOTE — PATIENT INSTRUCTIONS
Medicare Wellness  Personal Prevention Plan of Service     Date of Office Visit:  2019  Encounter Provider:  Goran Carr MD  Place of Service:  [facility]  Patient Name: Paloma Carr  :  1940    As part of the Medicare Wellness portion of your visit today, we are providing you with this personalized preventive plan of services (PPPS). This plan is based upon recommendations of the [place] Preventive Services Task Force (USPSTF) and the Advisory Committee on Immunization Practices (ACIP).    This lists the preventive care services that should be considered, and provides dates of when you are due. Items listed as completed are up-to-date and do not require any further intervention.    Health Maintenance   Topic Date Due   • ZOSTER VACCINE (2 of 2) 2007   • MEDICARE ANNUAL WELLNESS  2018   • INFLUENZA VACCINE  2019   • LIPID PANEL  2020   • MAMMOGRAM  10/22/2020   • TDAP/TD VACCINES (3 - Td) 2027   • PNEUMOCOCCAL VACCINES (65+ LOW/MEDIUM RISK)  Completed       No orders of the defined types were placed in this encounter.      Return in about 6 months (around 1/3/2020) for Recheck.

## 2019-07-26 RX ORDER — LEVOTHYROXINE SODIUM 0.15 MG/1
TABLET ORAL
Qty: 30 TABLET | Refills: 2 | Status: SHIPPED | OUTPATIENT
Start: 2019-07-26 | End: 2019-09-04 | Stop reason: DRUGHIGH

## 2019-08-19 RX ORDER — ESCITALOPRAM OXALATE 20 MG/1
TABLET ORAL
Qty: 90 TABLET | Refills: 1 | Status: SHIPPED | OUTPATIENT
Start: 2019-08-19 | End: 2020-01-27

## 2019-08-29 DIAGNOSIS — E03.9 HYPOTHYROIDISM, UNSPECIFIED TYPE: Primary | ICD-10-CM

## 2019-09-04 LAB — TSH SERPL DL<=0.005 MIU/L-ACNC: 0.02 UIU/ML (ref 0.45–4.5)

## 2019-09-04 RX ORDER — LEVOTHYROXINE SODIUM 0.12 MG/1
125 TABLET ORAL DAILY
Qty: 30 TABLET | Refills: 1 | Status: SHIPPED | OUTPATIENT
Start: 2019-09-04 | End: 2019-12-27

## 2019-09-27 DIAGNOSIS — G89.4 CHRONIC PAIN SYNDROME: ICD-10-CM

## 2019-09-27 RX ORDER — CLOPIDOGREL BISULFATE 75 MG/1
TABLET ORAL
Qty: 90 TABLET | Refills: 0 | Status: SHIPPED | OUTPATIENT
Start: 2019-09-27 | End: 2019-12-27

## 2019-09-27 RX ORDER — GABAPENTIN 800 MG/1
TABLET ORAL
Qty: 270 TABLET | Refills: 2 | Status: SHIPPED | OUTPATIENT
Start: 2019-09-27 | End: 2020-06-16

## 2019-10-28 RX ORDER — PANTOPRAZOLE SODIUM 40 MG/1
TABLET, DELAYED RELEASE ORAL
Qty: 90 TABLET | Refills: 0 | Status: SHIPPED | OUTPATIENT
Start: 2019-10-28 | End: 2020-01-27

## 2019-10-28 RX ORDER — FUROSEMIDE 20 MG/1
TABLET ORAL
Qty: 180 TABLET | Refills: 0 | Status: SHIPPED | OUTPATIENT
Start: 2019-10-28 | End: 2020-04-23

## 2019-11-05 ENCOUNTER — OFFICE VISIT (OUTPATIENT)
Dept: NEUROLOGY | Facility: CLINIC | Age: 79
End: 2019-11-05

## 2019-11-05 VITALS
SYSTOLIC BLOOD PRESSURE: 132 MMHG | OXYGEN SATURATION: 97 % | DIASTOLIC BLOOD PRESSURE: 82 MMHG | HEART RATE: 65 BPM | HEIGHT: 64 IN | WEIGHT: 176 LBS | BODY MASS INDEX: 30.05 KG/M2

## 2019-11-05 DIAGNOSIS — G47.33 OBSTRUCTIVE SLEEP APNEA: Primary | ICD-10-CM

## 2019-11-05 DIAGNOSIS — I10 ESSENTIAL HYPERTENSION: ICD-10-CM

## 2019-11-05 DIAGNOSIS — E78.2 MIXED HYPERLIPIDEMIA: ICD-10-CM

## 2019-11-05 DIAGNOSIS — I63.512 LEFT MIDDLE CEREBRAL ARTERY STROKE (HCC): ICD-10-CM

## 2019-11-05 PROCEDURE — 99214 OFFICE O/P EST MOD 30 MIN: CPT | Performed by: NURSE PRACTITIONER

## 2019-11-05 RX ORDER — ZOSTER VACCINE RECOMBINANT, ADJUVANTED 50 MCG/0.5
KIT INTRAMUSCULAR
COMMUNITY
Start: 2019-10-22 | End: 2020-01-03

## 2019-11-05 NOTE — PROGRESS NOTES
DOS: 2019  NAME: Paloma Carr   : 1940  PCP: Goran Carr MD    Chief Complaint   Patient presents with   • Stroke      SUBJECTIVE  Neurological Problem:  79 y.o. RHW female with HTN, HLD, TEOFILO (on CPAP) and h/o breast CA, lumbar stenosis, stroke (), right occipital meningioma resection () and seizures (maintained on neurontin) who presents today to follow-up for stroke and is accompanied by her spouse.     Interval History:   Ms. Carr presented to [facility] on [facility]/15[facility]17 with sudden onset speech difficulty and right side weakness. She received IV TPA and underwent emergent LMCA mechanical embolectomy. The etiology of her event was unclear. Her cardiac evaluation included a JEFE that was unrevealing and she was discharged on ASA, Plavix and prolonged cardiac monitoring with a ZIO Patch. She was on Plavix PTA. She was readmitted in early 2017 with right knee swelling with blood aspiration and her ASA was reduced from 325 to 81 mg. She followed-up with Dr. Berumen in 2018 for h/o seizures in which she has been previously maintained on neurontin.     She presents today and she continues on ASA 81 mg, Plavix and Crestor 20 mg, she is tolerating medications well.  She also continues on her Neurontin for seizure management.  Denies any signs/symptoms of bleeding.  She denies any recurrent speech difficulty, unilateral weakness, vertigo, vision changes or any new stroke/TIA symptoms.  She does ask about whether she needs to stay on ASA 81 mg due to easy bruising.  She denies any nosebleeds, BRBPR, anemia.  She denies any changes in her health since her last visit.  She is compliant with her CPAP.  She has a history of chronic back issues with radiculopathy.  She does not take her BP regularly but states it is well controlled.  She does not exercise regularly.  She denies smoking but does have a significant past 45-year history.  Occasional alcohol use.    Review of Systems:Review of  Systems   Constitutional: Negative for activity change, appetite change and fatigue.   HENT: Negative for ear pain, facial swelling and trouble swallowing.    Eyes: Negative for photophobia, pain and visual disturbance.   Cardiovascular: Negative for chest pain, palpitations and leg swelling.   Gastrointestinal: Negative for abdominal pain, nausea and vomiting.   Endocrine: Negative for cold intolerance, heat intolerance and polydipsia.   Musculoskeletal: Positive for back pain, gait problem and neck pain.   Allergic/Immunologic: Negative for environmental allergies, food allergies and immunocompromised state.   Neurological: Negative for dizziness, tremors, seizures, syncope, facial asymmetry, speech difficulty, weakness, light-headedness, numbness and headaches.   Hematological: Negative for adenopathy. Bruises/bleeds easily.   Psychiatric/Behavioral: Positive for sleep disturbance. Negative for agitation, behavioral problems, confusion, decreased concentration, dysphoric mood, hallucinations, self-injury and suicidal ideas. The patient is not nervous/anxious and is not hyperactive.     Above ROS reviewed    The following portions of the patient's history were reviewed and updated as appropriate: allergies, current medications, past family history, past medical history, past social history, past surgical history and problem list.    Current Medications:   Current Outpatient Medications:   •  acetaminophen (TYLENOL) 325 MG tablet, Take 650 mg by mouth As Needed for Mild Pain ., Disp: , Rfl:   •  albuterol (PROVENTIL HFA;VENTOLIN HFA) 108 (90 Base) MCG/ACT inhaler, Inhale 2 puffs Every 4 (Four) Hours As Needed for Wheezing., Disp: 1 inhaler, Rfl: 0  •  aspirin 81 MG EC tablet, Take 81 mg by mouth Daily., Disp: , Rfl:   •  atenolol (TENORMIN) 100 MG tablet, Take 100 mg by mouth Daily., Disp: , Rfl:   •  Cholecalciferol (VITAMIN D3) 5000 units capsule capsule, Take 5,000 Units by mouth Daily., Disp: , Rfl:   •   clopidogrel (PLAVIX) 75 MG tablet, TAKE ONE TABLET BY MOUTH DAILY, Disp: 90 tablet, Rfl: 0  •  Cyanocobalamin (VITAMIN B 12 PO), Take 1,000 mcg by mouth Every Evening., Disp: , Rfl:   •  escitalopram (LEXAPRO) 20 MG tablet, TAKE ONE TABLET BY MOUTH DAILY, Disp: 90 tablet, Rfl: 1  •  folic acid-pyridoxine-cyanocobalamin (FOLBIC) 2.5-25-2 MG tablet tablet, Take 1 tablet by mouth Daily., Disp: , Rfl:   •  furosemide (LASIX) 20 MG tablet, TAKE ONE TABLET BY MOUTH TWICE A DAY (Patient taking differently: Take 1 tablet by mouth daily), Disp: 180 tablet, Rfl: 0  •  gabapentin (NEURONTIN) 800 MG tablet, TAKE ONE TABLET BY MOUTH THREE TIMES A DAY, Disp: 270 tablet, Rfl: 2  •  letrozole (FEMARA) 2.5 MG tablet, Take 2.5 mg by mouth Daily., Disp: , Rfl:   •  levothyroxine (SYNTHROID) 125 MCG tablet, Take 1 tablet by mouth Daily. (Patient taking differently: Take 150 mcg by mouth Daily.), Disp: 30 tablet, Rfl: 1  •  MELATONIN PO, Take 1 tablet by mouth Every Night., Disp: , Rfl:   •  Multiple Vitamins-Minerals (MULTIVITAMIN ADULT PO), Take 1 tablet by mouth Daily., Disp: , Rfl:   •  pantoprazole (PROTONIX) 40 MG EC tablet, TAKE ONE TABLET BY MOUTH DAILY, Disp: 90 tablet, Rfl: 0  •  Probiotic Product (PROBIOTIC DAILY PO), Take 1 tablet by mouth Daily., Disp: , Rfl:   •  rosuvastatin (CRESTOR) 20 MG tablet, Take 1 tablet by mouth Daily., Disp: 90 tablet, Rfl: 3  •  SHINGRIX 50 MCG/0.5ML reconstituted suspension, , Disp: , Rfl:   No current facility-administered medications for this visit.       OBJECTIVE  Vitals:    11/05/19 0953   BP: 132/82   Pulse: 65   SpO2: 97%     Body mass index is 30.2 kg/m².    Diagnostics:    Laboratory Results:         Lab Results   Component Value Date    WBC 4.55 06/18/2019    HGB 13.2 06/18/2019    HCT 42.3 06/18/2019    .4 (H) 06/18/2019     06/18/2019     Lab Results   Component Value Date    GLUCOSE 122 (H) 10/16/2018    BUN 15 06/18/2019    CREATININE 0.86 06/18/2019    EGFRIFNONA  63 06/18/2019    EGFRIFAFRI 77 06/18/2019    BCR 17.4 06/18/2019    K 3.9 06/21/2019    CO2 27.3 06/18/2019    CALCIUM 9.4 06/18/2019    PROTENTOTREF 6.9 06/18/2019    ALBUMIN 4.60 06/18/2019    LABIL2 2.0 06/18/2019    AST 34 (H) 06/18/2019    ALT 20 06/18/2019     Lab Results   Component Value Date    HGBA1C 5.46 11/16/2017     Lab Results   Component Value Date    CHOL 139 11/16/2017     Lab Results   Component Value Date    HDL 39 (L) 06/18/2019    HDL 40 11/16/2017    HDL 39 (L) 04/21/2017     Lab Results   Component Value Date    LDL 82 06/18/2019    LDL 63 11/16/2017    LDL 81 04/21/2017     Lab Results   Component Value Date    TRIG 199 (H) 06/18/2019    TRIG 180 (H) 11/16/2017    TRIG 240 (H) 04/21/2017     No results found for: RPR  Lab Results   Component Value Date    TSH 0.021 (L) 09/03/2019     No results found for: [id number]    Physical Examination:   General Appearance:   Well developed, overweight, well groomed, alert, and cooperative.  HEENT: Normocephalic.    Neck and Spine: Normal range of motion.  Normal alignment. No mass or tenderness.   Cardiac: Regular rate and rhythm.   Peripheral Vasculature: Radial pulses are equal and symmetric. No signs of distal embolization.  Extremities:    No edema or deformities. Normal joint ROM.  Skin:    No rashes or birth marks.  Psychiatric:    Euthymic. Normal affect.    Neurological examination:  Higher Integrative  Function: Oriented to time, place and person. Normal registration, attention span and concentration. Normal language including comprehension, spontaneous speech and vocabulary. No neglect. Normal fund of knowledge and higher integrative function.  CN II: Pupils are equal, round, and reactive to light. Normal visual acuity and visual fields.    CN III IV VI: Extraocular movements are full without nystagmus.   CN V: Normal facial sensation and strength of muscles of mastication.  CN VII: Facial movements are symmetric. No weakness.   CN  VIII:   Auditory acuity is normal.  CN IX & X:   Symmetric palatal movement.  CN XI: Sternocleidomastoid and trapezius are normal.  No weakness.  CN XII:   The tongue is midline.  No atrophy or fasciculations.  Motor: Normal muscle strength, bulk and tone in upper and lower extremities except for mildly decreased hip flexor and knee extensions bilaterally, at least 4/5.  No fasciculations, rigidity, spasticity, or abnormal movements.   Sensation: Normal to light touch, vibration, temperature, and proprioception in arms and legs except for mildly decreased sensation to vibratory sensation below knee on left.   Station and Gait: Wide based, mildly antalgic gait. No assistive devices.   Coordination: Finger to nose test shows no dysmetria.  Heel to shin normal.    Impression:  Ms. Lilly del castillo needs to do well following her left MCA stroke she suffered in November 2017 s/p IV tPA and mechanical embolectomy.  She was on Plavix PTA with ?response ([id number]).  Etiology of her event is unclear as her  work-up including JEFE, prolonged monitoring and hypercoagulable panel have been unrevealing. She has since been maintained on ASA 81 mg, Plavix and statin with no recurrent or new stroke/TIA symptoms and an essentially normal neurologic exam today.  Has had complaints of some mild cognitive impairment since her stroke.    We reviewed the importance of risk factor control for stroke prevention including BP, cholesterol and compliance with CPAP.  We also discussed the importance of a healthy diet, keeping well-hydrated and regular physical activity.  We reviewed the signs and symptoms of stroke and the importance of calling 911 if she were to develop any of these.  She will follow-up here prn or in 1 year.  She and spouse voiced understanding and agree with above plan.    Plan:     Continue current medication regimen  Monitor BP regularly  Keep well-hydrated  Encouraged continued compliance with CPAP  Recommend daily physical  activity  Secondary stroke prevention: Ideal targets for stroke prevention would be Blood pressure < 130/80; B12 > 500 TSH in normal range and LDL < 70; HbA1c < 6.5 and smoking cessation if applicable.  Call 911 for stroke symptoms  Follow-up prn or in 1 year.    I spent 25 minutes face to face with patient, spouse, with  > 50% spent counseling patient regarding diagnosis, review of diagnostics, personal risk factors for stroke and importance of risk factor control for stroke prevention, including lifestyle modifications and preventative measures.   Paloma was seen today for stroke.    Diagnoses and all orders for this visit:    Obstructive sleep apnea    Left middle cerebral artery stroke (CMS/HCC)    Essential hypertension    Mixed hyperlipidemia        Coding      Dictated using Dragon

## 2019-12-27 RX ORDER — LEVOTHYROXINE SODIUM 0.12 MG/1
TABLET ORAL
Qty: 15 TABLET | Refills: 0 | Status: SHIPPED | OUTPATIENT
Start: 2019-12-27 | End: 2020-01-06 | Stop reason: DRUGHIGH

## 2019-12-27 RX ORDER — CLOPIDOGREL BISULFATE 75 MG/1
TABLET ORAL
Qty: 90 TABLET | Refills: 0 | Status: SHIPPED | OUTPATIENT
Start: 2019-12-27 | End: 2020-03-25

## 2020-01-03 ENCOUNTER — OFFICE VISIT (OUTPATIENT)
Dept: FAMILY MEDICINE CLINIC | Facility: CLINIC | Age: 80
End: 2020-01-03

## 2020-01-03 VITALS
WEIGHT: 179 LBS | DIASTOLIC BLOOD PRESSURE: 80 MMHG | HEART RATE: 62 BPM | BODY MASS INDEX: 30.56 KG/M2 | OXYGEN SATURATION: 96 % | HEIGHT: 64 IN | SYSTOLIC BLOOD PRESSURE: 130 MMHG

## 2020-01-03 DIAGNOSIS — E03.9 ACQUIRED HYPOTHYROIDISM: ICD-10-CM

## 2020-01-03 DIAGNOSIS — F34.1 DYSTHYMIA: ICD-10-CM

## 2020-01-03 DIAGNOSIS — I10 ESSENTIAL HYPERTENSION: Primary | ICD-10-CM

## 2020-01-03 PROCEDURE — 99214 OFFICE O/P EST MOD 30 MIN: CPT | Performed by: FAMILY MEDICINE

## 2020-01-03 NOTE — PROGRESS NOTES
"Subjective   Paloma Carr is a 79 y.o. female.     History of Present Illness   Paloma is here for her 6 month follow up on her thyroid issues.   She has been taking 125 mcg and day and states she is feeling good on this dose.  She has pain in her left ear and jaw and it hurts her to yawn  - she states she hears \"sand \" sometimes when she chews.  She has a past hx of vertigo.  Paloma has chronic hypertension and has been well controlled on current medications.She is tolerating medications without side effect. She reports no vision changes, headaches or lightheadedness. She is requesting refills of medications.  Paloma has chronic depression and  states that she is getting good relief from symptoms on current medication. This is a chronic problem that is responding well to treatment. She has no intolerable side effects to medication and reports that his helps lift mood and makes daily life, relationships better. No thoughts of self harm expressed.  The following portions of the patient's history were reviewed and updated as appropriate: allergies, current medications, past family history, past medical history, past social history, past surgical history and problem list.    Review of Systems   Constitutional: Positive for fatigue.   HENT: Positive for ear pain.         Left jaw pain   Eyes: Negative.    Respiratory: Negative.    Cardiovascular: Negative.    Gastrointestinal: Negative.    Genitourinary: Negative.    Musculoskeletal: Positive for arthralgias, back pain and myalgias.   Neurological: Negative.    Psychiatric/Behavioral: Negative.        Objective   Physical Exam   Constitutional: She is oriented to person, place, and time. She appears well-developed.   HENT:   Head: Normocephalic and atraumatic.   Eyes: Pupils are equal, round, and reactive to light. EOM are normal.   Neck: Normal range of motion. Neck supple.   Cardiovascular: Normal rate, regular rhythm and normal heart sounds.   Pulmonary/Chest: Effort " normal and breath sounds normal.   Musculoskeletal: Normal range of motion. She exhibits no edema.   Neurological: She is alert and oriented to person, place, and time.   Skin: Skin is warm and dry. No rash noted.   Psychiatric: She has a normal mood and affect. Her behavior is normal. Judgment and thought content normal.   Nursing note and vitals reviewed.        Assessment/Plan   Paloma was seen today for thyroid problem.    Diagnoses and all orders for this visit:    Essential hypertension  Well controlled on current medication, will refill medication today and as needed. She will RTO for repeat B/P check in 6 months.    Dysthymia  She is well managed on current meds and reports no signs or symptoms of self harm, suicidal ideation. This medication helps with daily life and relationships. Will refill as needed and advised 6 month follow up.    Acquired hypothyroidism  -     TSH  She is now taking 125 mcg of levothyroxine and she feels well on this dose.  I will check labs today and will let her know what we need to do next.

## 2020-01-04 LAB — TSH SERPL DL<=0.005 MIU/L-ACNC: 0.04 UIU/ML (ref 0.45–4.5)

## 2020-01-06 DIAGNOSIS — E03.9 ACQUIRED HYPOTHYROIDISM: Primary | ICD-10-CM

## 2020-01-06 RX ORDER — LEVOTHYROXINE SODIUM 0.1 MG/1
100 TABLET ORAL DAILY
Qty: 60 TABLET | Refills: 0 | Status: SHIPPED | OUTPATIENT
Start: 2020-01-06 | End: 2020-02-25

## 2020-01-27 RX ORDER — ESCITALOPRAM OXALATE 20 MG/1
TABLET ORAL
Qty: 90 TABLET | Refills: 0 | Status: SHIPPED | OUTPATIENT
Start: 2020-01-27 | End: 2020-04-23

## 2020-01-27 RX ORDER — PANTOPRAZOLE SODIUM 40 MG/1
TABLET, DELAYED RELEASE ORAL
Qty: 90 TABLET | Refills: 0 | Status: SHIPPED | OUTPATIENT
Start: 2020-01-27 | End: 2020-04-23

## 2020-01-30 ENCOUNTER — TRANSCRIBE ORDERS (OUTPATIENT)
Dept: ADMINISTRATIVE | Facility: HOSPITAL | Age: 80
End: 2020-01-30

## 2020-01-30 DIAGNOSIS — Z78.0 POST-MENOPAUSAL: Primary | ICD-10-CM

## 2020-01-30 DIAGNOSIS — Z12.31 OTHER SCREENING MAMMOGRAM: ICD-10-CM

## 2020-02-25 DIAGNOSIS — E03.9 ACQUIRED HYPOTHYROIDISM: ICD-10-CM

## 2020-02-25 RX ORDER — ROSUVASTATIN CALCIUM 20 MG/1
TABLET, COATED ORAL
Qty: 90 TABLET | Refills: 2 | Status: SHIPPED | OUTPATIENT
Start: 2020-02-25 | End: 2020-11-23

## 2020-02-25 RX ORDER — LEVOTHYROXINE SODIUM 0.1 MG/1
TABLET ORAL
Qty: 60 TABLET | Refills: 0 | Status: SHIPPED | OUTPATIENT
Start: 2020-02-25 | End: 2020-04-23

## 2020-03-04 ENCOUNTER — HOSPITAL ENCOUNTER (OUTPATIENT)
Dept: MAMMOGRAPHY | Facility: HOSPITAL | Age: 80
Discharge: HOME | End: 2020-03-04

## 2020-03-04 ENCOUNTER — HOSPITAL ENCOUNTER (OUTPATIENT)
Dept: BONE DENSITY | Facility: HOSPITAL | Age: 80
Discharge: HOME | End: 2020-03-04
Admitting: NURSE PRACTITIONER

## 2020-03-04 DIAGNOSIS — Z78.0 POST-MENOPAUSAL: ICD-10-CM

## 2020-03-04 DIAGNOSIS — Z12.31 OTHER SCREENING MAMMOGRAM: ICD-10-CM

## 2020-03-04 PROCEDURE — 77080 DXA BONE DENSITY AXIAL: CPT

## 2020-03-04 PROCEDURE — 77063 BREAST TOMOSYNTHESIS BI: CPT

## 2020-03-04 PROCEDURE — 77067 SCR MAMMO BI INCL CAD: CPT

## 2020-03-25 RX ORDER — CLOPIDOGREL BISULFATE 75 MG/1
TABLET ORAL
Qty: 90 TABLET | Refills: 0 | Status: SHIPPED | OUTPATIENT
Start: 2020-03-25 | End: 2020-06-15

## 2020-04-23 DIAGNOSIS — E03.9 ACQUIRED HYPOTHYROIDISM: ICD-10-CM

## 2020-04-23 RX ORDER — FUROSEMIDE 20 MG/1
20 TABLET ORAL DAILY
Qty: 90 TABLET | Refills: 0 | Status: SHIPPED | OUTPATIENT
Start: 2020-04-23 | End: 2020-07-24

## 2020-04-23 RX ORDER — ESCITALOPRAM OXALATE 20 MG/1
TABLET ORAL
Qty: 90 TABLET | Refills: 0 | Status: SHIPPED | OUTPATIENT
Start: 2020-04-23 | End: 2020-07-24

## 2020-04-23 RX ORDER — LEVOTHYROXINE SODIUM 0.1 MG/1
TABLET ORAL
Qty: 60 TABLET | Refills: 0 | Status: SHIPPED | OUTPATIENT
Start: 2020-04-23 | End: 2020-06-15

## 2020-04-23 RX ORDER — PANTOPRAZOLE SODIUM 40 MG/1
TABLET, DELAYED RELEASE ORAL
Qty: 90 TABLET | Refills: 0 | Status: SHIPPED | OUTPATIENT
Start: 2020-04-23 | End: 2020-07-24

## 2020-06-13 DIAGNOSIS — G89.4 CHRONIC PAIN SYNDROME: ICD-10-CM

## 2020-06-13 DIAGNOSIS — E03.9 ACQUIRED HYPOTHYROIDISM: ICD-10-CM

## 2020-06-13 DIAGNOSIS — I63.412 CEREBROVASCULAR ACCIDENT (CVA) DUE TO EMBOLISM OF LEFT MIDDLE CEREBRAL ARTERY (HCC): Primary | ICD-10-CM

## 2020-06-15 RX ORDER — LEVOTHYROXINE SODIUM 0.1 MG/1
TABLET ORAL
Qty: 60 TABLET | Refills: 1 | Status: SHIPPED | OUTPATIENT
Start: 2020-06-15 | End: 2020-10-26

## 2020-06-15 RX ORDER — CLOPIDOGREL BISULFATE 75 MG/1
TABLET ORAL
Qty: 90 TABLET | Refills: 1 | Status: SHIPPED | OUTPATIENT
Start: 2020-06-15 | End: 2020-12-18

## 2020-06-16 RX ORDER — GABAPENTIN 800 MG/1
TABLET ORAL
Qty: 270 TABLET | Refills: 1 | Status: SHIPPED | OUTPATIENT
Start: 2020-06-16 | End: 2020-12-18

## 2020-06-26 ENCOUNTER — TELEPHONE (OUTPATIENT)
Dept: FAMILY MEDICINE CLINIC | Facility: CLINIC | Age: 80
End: 2020-06-26

## 2020-06-26 NOTE — TELEPHONE ENCOUNTER
DELETE AFTER REVIEWING: Telephone encounter to be sent to the clinical pool     Caller: Avelina Carr    Relationship: Self    Best call back number: [phone]    Which medication are you concerned about: clopidogrel (PLAVIX) 75 MG tablet    What are your concerns: WHEN TO STOP TAKING PRESCRIPTION BEFORE SURGERY. IT IS A VERY SMALL SURGERY ON RT LEG TO REMOVE A CANCER SPOT.

## 2020-07-15 ENCOUNTER — APPOINTMENT (OUTPATIENT)
Dept: GENERAL RADIOLOGY | Facility: HOSPITAL | Age: 80
End: 2020-07-15

## 2020-07-15 ENCOUNTER — HOSPITAL ENCOUNTER (EMERGENCY)
Facility: HOSPITAL | Age: 80
Discharge: HOME | End: 2020-07-15
Attending: EMERGENCY MEDICINE | Admitting: EMERGENCY MEDICINE

## 2020-07-15 VITALS
HEIGHT: 61 IN | HEART RATE: 60 BPM | BODY MASS INDEX: 33.71 KG/M2 | OXYGEN SATURATION: 99 % | SYSTOLIC BLOOD PRESSURE: 129 MMHG | TEMPERATURE: 97.8 F | RESPIRATION RATE: 16 BRPM | DIASTOLIC BLOOD PRESSURE: 70 MMHG | WEIGHT: 178.57 LBS

## 2020-07-15 DIAGNOSIS — S61.412A SKIN TEAR OF LEFT HAND WITHOUT COMPLICATION, INITIAL ENCOUNTER: Primary | ICD-10-CM

## 2020-07-15 PROCEDURE — 99283 EMERGENCY DEPT VISIT LOW MDM: CPT

## 2020-07-15 PROCEDURE — 73130 X-RAY EXAM OF HAND: CPT

## 2020-07-15 RX ORDER — LIDOCAINE HYDROCHLORIDE AND EPINEPHRINE 10; 10 MG/ML; UG/ML
10 INJECTION, SOLUTION INFILTRATION; PERINEURAL ONCE
Status: COMPLETED | OUTPATIENT
Start: 2020-07-15 | End: 2020-07-15

## 2020-07-15 RX ADMIN — LIDOCAINE HYDROCHLORIDE,EPINEPHRINE BITARTRATE 10 ML: 10; .01 INJECTION, SOLUTION INFILTRATION; PERINEURAL at 18:40

## 2020-07-15 NOTE — ED NOTES
Pt reports she ran a screw  up her left thumb and has a moderaely-sized laceration. Bleeding is currently controlled.     French Ivey RN  07/15/20 4927

## 2020-07-15 NOTE — ED PROVIDER NOTES
EMERGENCY DEPARTMENT ENCOUNTER    Room Number:  35/35  Date seen:  7/15/2020  Time seen: 4:36 PM  PCP: Goran Carr MD  Historian: patient      HPI:  Chief Complaint: left hand pain    A complete HPI/ROS/PMH/PSH/SH/FH are unobtainable due to: none    Context: Avelina Carr is a 80 y.o. female who presents to the ED for evaluation of left hand pain that began at 2 PM today when she accidentally penetrated the hand with a screwdriver.  She states she was unscrewing some hardware from the bottom of a chair when the screwdriver slipped and stabbed her in the left hand.  She is right-hand dominant.  She had some bruising and swelling in the area and a significant amount of pain and therefore presents for further evaluation.  She is unsure of her last Tdap.  She denies any numbness tingling or weakness associated with the injury.        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/2017   • Carcinoma of central portion of right breast in female, estrogen receptor positive (CMS/[facility]) 03/25/2015   • Chronic bilateral low back pain with right-sided sciatica 12/09/2016   • History of lumbar surgery 03/02/2018   • Seizure disorder, simple partial, without intractable epilepsy (CMS/[facility]) 01/14/2014   • Spinal stenosis of lumbar region with neurogenic claudication 12/09/2016   • Status post craniectomy 12/09/2016   • History of total right knee replacement 03/18/2018   • Other forms of scoliosis, lumbar region 11/07/2018   • DDD (degenerative disc disease), lumbar  2018   • Benign meningioma of brain (CMS/[facility]) 2016   • Squamous cell carcinoma of leg, left 2019   • Status post multiple cerebral infarctions 2015   • Hypothyroid 2019   • Depression 2019   • COPD (chronic obstructive pulmonary disease) (CMS/[facility]) 2019   • Obstructive sleep apnea 2019     Resolved Ambulatory Problems     Diagnosis Date Noted   • No Resolved Ambulatory Problems     Past Medical History:   Diagnosis Date   • Anxiety    • Arthritis    • CVD (cardiovascular disease)    • Macular degeneration    • Peripheral neuropathy    • Vertigo          PAST SURGICAL HISTORY  Past Surgical History:   Procedure Laterality Date   • ADRENAL GLAND SURGERY     • BRAIN MENINGIOMA EXCISION      occipital   •  SECTION     • EMBOLECTOMY N/A 11/15/2017    Procedure: Embolectomy Mechanical;  Surgeon: Rachid Figueroa MD;  Location: [facility] ;  Service:    • MASTECTOMY     • RECTAL SURGERY     • REPLACEMENT TOTAL KNEE Right    • TOTAL SHOULDER REPLACEMENT      x2         FAMILY HISTORY  Family History   Problem Relation Age of Onset   • Hypertension Mother    • Hyperlipidemia Mother    • Heart disease Mother    • Cancer Father    • Alzheimer's disease Sister    • No Known Problems Brother    • No Known Problems Sister          SOCIAL HISTORY  Social History     Socioeconomic History   • Marital status:      Spouse name: Vitaly   • Number of children: 4   • Years of education: 12   • Highest education level: Not on file   Occupational History   • Occupation: artist   Tobacco Use   • Smoking status: Former Smoker     Packs/day: 2.00     Years: 40.00     Pack years: 80.00     Types: Cigarettes   • Smokeless tobacco: Never Used   • Tobacco comment: non-smoker since    Substance and Sexual Activity   • Alcohol use: No     Frequency: Never   • Drug use: No   • Sexual activity: Defer   Social History Narrative    Lives at home with           ALLERGIES  Penicillins        REVIEW OF SYSTEMS  Review of Systems     All systems reviewed and negative except for those discussed in HPI.       PHYSICAL EXAM  ED Triage Vitals   Temp Heart Rate Resp BP SpO2   07/15/20 1448 07/15/20 1448 07/15/20 1448 07/15/20 1513 07/15/20 1448   97.7 °F (36.5 °C) 67 14 140/69 98 %      Temp src Heart Rate Source Patient Position BP Location FiO2 (%)   -- 07/15/20 1448 -- -- --    Monitor            GENERAL: not distressed  HENT: atraumatic  EYES: no scleral icterus  CV: regular rate  RESPIRATORY: normal effort  ABDOMEN: nondistended  MUSCULOSKELETAL: There is swelling and bruising over the dorsal aspect of the hand along the second metacarpal with tenderness diffusely to the second metacarpal.  There is a small superficial opening in the skin with overlying skin tear with bleeding controlled.  Sensation and motor function are intact in radial ulnar and median nerve distributions.  Cap refill is brisk.  NEURO: alert, moves all extremities, follows commands  SKIN: warm, dry    Vital signs and nursing notes reviewed.      RADIOLOGY  XR Hand 3+ View Left   Final Result       As described.               This report was finalized on 7/15/2020 5:04 PM by Dr. Mike Alvarez M.D.              I ordered the above noted radiological studies. Reviewed by me and discussed with radiologist.  See dictation for official radiology interpretation.    PROCEDURES  Procedures        MEDICATIONS GIVEN IN ER  Medications   lidocaine-EPINEPHrine (XYLOCAINE W/EPI) 1 %-1:615637 injection 10 mL (10 mL Injection Given by Other 7/15/20 1840)             PROGRESS AND CONSULTS    DDX includes but not limited to [facility]    ED Course as of Jul 15 1943   Wed Jul 15, 2020   1606 Medical Chart Reviewed.  Last Tdap on 4/27/2017.    [KA]      ED Course User Index  [KA] Ruthy Hager PA     My interpretation of the left hand x-ray is no acute fracture.    Patient's wound appears pretty superficial  especially after cleansing and irrigation.  She does have a skin tear over top and wound care has been performed.  I cleansed and antibiotic ointment and a dressing was applied.  Wound care discussed and she stable for discharge.     Reviewed pt's history and workup with Dr. Barrios.  After a bedside evaluation; they agree with the plan of care      Patient was placed in face mask in first look. Patient was wearing facemask each time I entered the room and throughout our encounter. I wore protective equipment throughout this patient encounter including a face mask, eye shield and gloves. Hand hygiene was performed before donning protective equipment and after removal when leaving the room.        DIAGNOSIS  Final diagnoses:   Skin tear of left hand without complication, initial encounter               Latest Documented Vital Signs:  As of 19:43  BP- 129/70 HR- 60 Temp- 97.8 °F (36.6 °C) (Tympanic) O2 sat- 99%       Ruthy Hager PA  07/15/20 1943

## 2020-07-15 NOTE — ED NOTES
Pt and visitor wearing masks. This RN wearing mask and safety glasses. Pt with skin tear noted to hand with localized swelling noted. Neurovascular intact. Reports accidentally hitting area from the side with screw . Takes plavix. Reports mild bleeding to site when injury occurred. Reports last tetanus shot was likely over 5 years ago. Reassurance given; call light in reach. Pts breathing even and unlabored. Pt appears in NAD at this time. Family at bedside.        Nunu Moon, RN  07/15/20 5258

## 2020-07-15 NOTE — ED PROVIDER NOTES
16:08  Patient seen and examined with PA.  Briefly patient was building a chair with a screw  and cut her left hand.  NO weakness or numbness.  Tetanus up to date.      On exam patient has bruising to left hand and has superficial laceration to webspace of thumb and index finger.      Plan is xray and clean wound.  Possible sutures.        MD ATTESTATION NOTE    The JACKY and I have discussed this patient's history, physical exam, and treatment plan.  I have reviewed the documentation and personally had a face to face interaction with the patient. I affirm the documentation and agree with the treatment and plan.  The attached note describes my personal findings.       Tomasz Barrios MD  07/15/20 1949

## 2020-07-15 NOTE — DISCHARGE INSTRUCTIONS
Change the bandage 1-2 times daily. Try not to disturb the skin, apply antibiotic ointment and a clean dressing.  Keep it clean and dry.   Follow up with PCP in 3-5 days for wound check.  Follow up sooner or return to ER for swelling, redness, pus drainage, red streak from the area, any concerns.

## 2020-07-24 RX ORDER — ESCITALOPRAM OXALATE 20 MG/1
TABLET ORAL
Qty: 90 TABLET | Refills: 1 | Status: SHIPPED | OUTPATIENT
Start: 2020-07-24 | End: 2021-01-18

## 2020-07-24 RX ORDER — PANTOPRAZOLE SODIUM 40 MG/1
TABLET, DELAYED RELEASE ORAL
Qty: 90 TABLET | Refills: 0 | Status: SHIPPED | OUTPATIENT
Start: 2020-07-24 | End: 2020-10-26

## 2020-07-24 RX ORDER — FUROSEMIDE 20 MG/1
TABLET ORAL
Qty: 90 TABLET | Refills: 0 | Status: SHIPPED | OUTPATIENT
Start: 2020-07-24 | End: 2020-10-26

## 2020-07-29 ENCOUNTER — TELEPHONE (OUTPATIENT)
Dept: FAMILY MEDICINE CLINIC | Facility: CLINIC | Age: 80
End: 2020-07-29

## 2020-07-29 DIAGNOSIS — M54.41 CHRONIC BILATERAL LOW BACK PAIN WITH RIGHT-SIDED SCIATICA: Primary | ICD-10-CM

## 2020-07-29 DIAGNOSIS — G89.29 CHRONIC BILATERAL LOW BACK PAIN WITH RIGHT-SIDED SCIATICA: Primary | ICD-10-CM

## 2020-07-29 RX ORDER — TRAMADOL HYDROCHLORIDE 50 MG/1
50 TABLET ORAL EVERY 6 HOURS PRN
Qty: 30 TABLET | Refills: 0 | Status: SHIPPED | OUTPATIENT
Start: 2020-07-29 | End: 2020-09-16

## 2020-07-29 NOTE — TELEPHONE ENCOUNTER
I called and reviewed with . She is having a flare of pain. She has know spinal stenosis and has been evaluated by neurosurgery and is not considered a surgical candidate due to stroke hx. I will add tramadol, a short course, and advised her  that Avelina would need an appt with me for future refils.

## 2020-07-29 NOTE — TELEPHONE ENCOUNTER
Please advise if you want to send something in or have her schedule a video visit with Keren tomorrow because your schedule is full this week.

## 2020-07-29 NOTE — TELEPHONE ENCOUNTER
Caller: MACEY CHRISTIANSON    Relationship:     Best call back number: [phone]    What medication are you requesting: PAIN MEDICATION FOR PT'S SEVERE BACK PAIN    What are your current symptoms: SEVERE BACK PAIN AND DOWN RIGHT LEG (PT HAS SCIATICA), RELIANCE ON WALKER, DECREASED MOBILITY    How long have you been experiencing symptoms: TWO DAYS    Have you had these symptoms before:    [x] Yes  [] No    Have you been treated for these symptoms before:   [x] Yes  [] No    If a prescription is needed, what is your preferred pharmacy and phone number:  [facility] [address] [place] [address] [address]  - [phone]  - [phone]   [phone]

## 2020-08-01 ENCOUNTER — DOCUMENTATION (OUTPATIENT)
Dept: FAMILY MEDICINE CLINIC | Facility: CLINIC | Age: 80
End: 2020-08-01

## 2020-09-16 ENCOUNTER — OFFICE VISIT (OUTPATIENT)
Dept: FAMILY MEDICINE CLINIC | Facility: CLINIC | Age: 80
End: 2020-09-16

## 2020-09-16 VITALS
BODY MASS INDEX: 30.56 KG/M2 | HEIGHT: 64 IN | TEMPERATURE: 97.3 F | HEART RATE: 56 BPM | WEIGHT: 179 LBS | OXYGEN SATURATION: 97 % | RESPIRATION RATE: 16 BRPM | SYSTOLIC BLOOD PRESSURE: 120 MMHG | DIASTOLIC BLOOD PRESSURE: 80 MMHG

## 2020-09-16 DIAGNOSIS — R60.0 SALIVARY GLAND SWELLING: Primary | ICD-10-CM

## 2020-09-16 DIAGNOSIS — M54.41 CHRONIC BILATERAL LOW BACK PAIN WITH RIGHT-SIDED SCIATICA: ICD-10-CM

## 2020-09-16 DIAGNOSIS — G89.29 CHRONIC BILATERAL LOW BACK PAIN WITH RIGHT-SIDED SCIATICA: ICD-10-CM

## 2020-09-16 PROBLEM — C44.722 SQUAMOUS CELL CARCINOMA OF LEG, RIGHT: Status: ACTIVE | Noted: 2020-07-08

## 2020-09-16 PROCEDURE — 99214 OFFICE O/P EST MOD 30 MIN: CPT | Performed by: FAMILY MEDICINE

## 2020-09-16 RX ORDER — BUDESONIDE AND FORMOTEROL FUMARATE DIHYDRATE 80; 4.5 UG/1; UG/1
AEROSOL RESPIRATORY (INHALATION)
COMMUNITY
Start: 2020-05-05 | End: 2022-07-12

## 2020-09-16 RX ORDER — TRAMADOL HYDROCHLORIDE 50 MG/1
50 TABLET ORAL EVERY 6 HOURS PRN
Qty: 30 TABLET | Refills: 0 | Status: SHIPPED | OUTPATIENT
Start: 2020-09-16 | End: 2021-04-20

## 2020-09-16 NOTE — PROGRESS NOTES
Subjective   Avelina Carr is a 80 y.o. female.   Pain (neck pain left side)        History of Present Illness   Avelina is here for left sided neck pain and swollen glands on left side of jaw.  She notes that there is some fullness, and that this pain is worse with opening her mouth fully and that she has some pain with chewing.  She has not taken any medication to help with the discomfort.  She also has chronic right side sciatica and multiple other joint pains from joint replacements and arthritis and she is asking for refill of tramadol to help with her pain.  I have treated her pain in the past with tramadol.  The following portions of the patient's history were reviewed and updated as appropriate: allergies, current medications, past family history, past medical history, past social history, past surgical history and problem list.    Review of Systems   HENT: Positive for swollen glands.    Musculoskeletal: Positive for neck pain and neck stiffness.       Objective   Physical Exam  Vitals signs and nursing note reviewed.   Constitutional:       Appearance: Normal appearance.   HENT:      Head: Normocephalic and atraumatic.      Right Ear: Tympanic membrane, ear canal and external ear normal.      Left Ear: Tympanic membrane, ear canal and external ear normal.      Ears:      Comments: Left side at TMJ has about a 1 cm area of swelling and tenderness.  No warmth.     Mouth/Throat:      Mouth: Mucous membranes are moist.      Pharynx: Oropharynx is clear. No oropharyngeal exudate.   Neurological:      Mental Status: She is alert.           Assessment/Plan   Problem List Items Addressed This Visit        Nervous and Auditory    Chronic bilateral low back pain with right-sided sciatica  The patient has read and signed the [facility] Controlled Substance Contract.  I will continue to see patient for regular follow up appointments.  She are well controlled on current medication.  THOMPSON has been reviewed by me and  is updated every 3 months. The patient is aware of the potential for addiction and dependence.        Relevant Medications    traMADol (ULTRAM) 50 MG tablet      Other Visit Diagnoses     Salivary gland swelling    -  Primary  I have given and a handout on salivary gland stones and advised her what to do to help with her discomfort and we have referred her to ENT for evaluation.    Relevant Orders    Ambulatory Referral to ENT (Otolaryngology)               No follow-ups on file.

## 2020-09-16 NOTE — PATIENT INSTRUCTIONS
Salivary Stone    A salivary stone is a mineral deposit that builds up in the ducts that drain your salivary glands. Most salivary stones are made of calcium. When a stone forms, saliva can back up into the gland and cause painful swelling.  Your salivary glands are the glands that produce saliva. You have six major salivary glands. Each gland has a duct that carries saliva into your mouth. Saliva keeps your mouth moist and breaks down the food that you eat. It also helps prevent tooth decay.  Two salivary glands are located just in front of your ears (parotid). The ducts for these glands open up inside your cheeks, near your back teeth. You also have two glands under your tongue (sublingual) and two glands under your jaw (submandibular). The ducts for these glands open under your tongue. A stone can form in any salivary gland. The most common place for a salivary stone to develop is in a submandibular salivary gland.  What are the causes?  Salivary stones may be caused by any condition that reduces the flow of saliva. It is not known why some people form stones.  What increases the risk?  You are more likely to develop this condition if:  · You are male.  · You do not drink enough water.  · You smoke.  · You have any of the following:  ? High blood pressure.  ? Gout.  ? Diabetes.  What are the signs or symptoms?  The main sign of a salivary gland stone is sudden swelling of a salivary gland when eating. This usually happens under the jaw on one side. Other signs and symptoms may include:  · Swelling of the cheek or under the tongue when eating.  · Pain in the swollen area.  · Trouble chewing or swallowing.  · Swelling that goes down after eating.  How is this diagnosed?  This condition may be diagnosed based on:  · Your signs and symptoms.  · A physical exam. In many cases, your health care provider will be able to feel the stone in a duct inside your mouth.  · Imaging studies, such  as:  ? X-rays.  ? Ultrasound.  ? CT scan.  ? MRI.  You may need to see an ear, nose, and throat specialist (ENT or otolaryngologist) for diagnosis and treatment.  How is this treated?  Treatment for this condition depends on the size of the stone.  · A small stone that is not causing symptoms may be treated with home care.  · For a stone that is large enough to cause symptoms, the treatment options may include:  ? Probing and widening of the duct to allow the stone to pass.  ? Inserting a thin, flexible scope (endoscope) into the duct to locate and remove the stone.  ? Breaking up the stone with sound waves.  ? Removing the entire salivary gland.  Follow these instructions at home:    To relieve discomfort  · Follow these instructions every few hours:  ? Suck on a lemon candy to stimulate the flow of saliva.  ? Put a warm compress over the gland.  ? Gently massage the gland.  General instructions  · Drink enough fluid to keep your urine pale yellow.  · Do not use any products that contain nicotine or tobacco, such as cigarettes and e-cigarettes. If you need help quitting, ask your health care provider.  · Keep all follow-up visits as told by your health care provider. This is important.  Contact a health care provider if:  · You have pain and swelling in your face, jaw, or mouth after eating.  · You have persistent swelling in any of these places:  ? In front of your ear.  ? Under your jaw.  ? Inside your mouth.  Get help right away if:  · You have pain and swelling in your face, jaw, or mouth, and this is getting worse.  · Your pain and swelling make it hard to swallow or breathe.  Summary  · A salivary stone is a mineral deposit that builds up in the ducts that drain your salivary glands.  · When a stone forms, saliva can back up into the gland and cause painful swelling.  · Salivary stones may be caused by any condition that reduces the flow of saliva.  · Treatment for this condition depends on the size of the  stone.  This information is not intended to replace advice given to you by your health care provider. Make sure you discuss any questions you have with your health care provider.  Document Released: 01/25/2006 Document Revised: 01/28/2019 Document Reviewed: 01/28/2019  Elsevier Patient Education © 2020 [employer]

## 2020-09-24 NOTE — TELEPHONE ENCOUNTER
Caller: Avelina Carr    Relationship: Self    Best call back number: [phone]     Medication needed:   Requested Prescriptions     Pending Prescriptions Disp Refills   • folic acid-pyridoxine-cyanocobalamin (FOLBIC) 2.5-25-2 MG tablet tablet 30 each      Sig: Take 1 tablet by mouth Daily.       When do you need the refill by: 09/24/2020    What details did the patient provide when requesting the medication: PATIENT IS TOTALLY OUT OF THIS MEDICATION    Does the patient have less than a 3 day supply:  [x] Yes  [] No    What is the patient's preferred pharmacy:    [facility] [place] [address] [address] [facility] [facility] [phone] [facility] [phone] FX

## 2020-10-26 DIAGNOSIS — E03.9 ACQUIRED HYPOTHYROIDISM: ICD-10-CM

## 2020-10-26 RX ORDER — PANTOPRAZOLE SODIUM 40 MG/1
TABLET, DELAYED RELEASE ORAL
Qty: 90 TABLET | Refills: 0 | Status: SHIPPED | OUTPATIENT
Start: 2020-10-26 | End: 2021-02-22 | Stop reason: SDUPTHER

## 2020-10-26 RX ORDER — LEVOTHYROXINE SODIUM 0.1 MG/1
TABLET ORAL
Qty: 60 TABLET | Refills: 0 | Status: SHIPPED | OUTPATIENT
Start: 2020-10-26 | End: 2020-12-28 | Stop reason: SDUPTHER

## 2020-10-26 RX ORDER — FUROSEMIDE 20 MG/1
TABLET ORAL
Qty: 90 TABLET | Refills: 0 | Status: SHIPPED | OUTPATIENT
Start: 2020-10-26 | End: 2021-01-18

## 2020-10-29 ENCOUNTER — OFFICE VISIT (OUTPATIENT)
Dept: NEUROLOGY | Facility: CLINIC | Age: 80
End: 2020-10-29

## 2020-10-29 DIAGNOSIS — I10 ESSENTIAL HYPERTENSION: ICD-10-CM

## 2020-10-29 DIAGNOSIS — I63.512 LEFT MIDDLE CEREBRAL ARTERY STROKE (HCC): Primary | ICD-10-CM

## 2020-10-29 DIAGNOSIS — G47.33 OBSTRUCTIVE SLEEP APNEA: ICD-10-CM

## 2020-10-29 DIAGNOSIS — E78.2 MIXED HYPERLIPIDEMIA: ICD-10-CM

## 2020-10-29 PROCEDURE — 99442 PR PHYS/QHP TELEPHONE EVALUATION 11-20 MIN: CPT | Performed by: NURSE PRACTITIONER

## 2020-10-29 NOTE — PROGRESS NOTES
DOS: 10/29/2020  NAME: Avelina Carr   : 1940  PCP: Goran Carr MD    Chief Complaint   Patient presents with   • Stroke      I performed this clinical encounter via real-time telephone connection originating from the patient's location at home and my location at [facility]. Verbal consent to participate in this telephone clinical visit was obtained and any questions by the patient regarding the interaction were answered.  This visit occurred during the coronavirus (Covid-19) Public Health Emergency.     SUBJECTIVE  Neurological Problem:  80 y.o. RHW female with HTN, HLD, TEOFILO (on CPAP) and h/o breast CA, lumbar stenosis, stroke (), right occipital meningioma resection () and seizures (maintained on neurontin) who presents today via telephone to follow-up for stroke, spouse is also present.      Interval History:   Ms. Carr presented to [facility] on 11/15/17 with sudden onset speech difficulty and right side weakness. She received IV TPA and underwent emergent LMCA mechanical embolectomy. The etiology of her event was unclear. Her cardiac evaluation included a JEFE that was unrevealing and she was discharged on ASA, Plavix and prolonged cardiac monitoring with a ZIO Patch. She was on Plavix PTA. She was readmitted in early 2017 with right knee swelling with blood aspiration and her ASA was reduced from 325 to 81 mg. She followed-up with Dr. Berumen in 2018 for h/o seizures in which she has been previously maintained on neurontin.     She tells me today that she continues on ASA, Plavix and Crestor.  She is tolerating medications well.  She is also on Neurontin for seizure management (managed by Dr. Berumen).  She denies any signs or symptoms of bleeding, no recurrent or new stroke/TIA symptoms.  She does mention some difficulty with balance.  She denies falls.  She does not exercise regularly.  States her BP is well controlled.  She denies any changes in her health since  her last visit.    Review of Systems:Review of Systems   Constitutional: Negative for activity change, appetite change and fatigue.   HENT: Positive for tinnitus and trouble swallowing (occasionally). Negative for ear pain.    Eyes: Negative for photophobia, pain and visual disturbance.   Respiratory: Negative for cough, chest tightness and shortness of breath.    Cardiovascular: Positive for leg swelling. Negative for chest pain and palpitations.   Musculoskeletal: Positive for gait problem. Negative for back pain and neck pain.   Neurological: Positive for dizziness. Negative for tremors, seizures, syncope, facial asymmetry, speech difficulty, weakness, light-headedness, numbness and headaches.   Psychiatric/Behavioral: Negative for agitation, behavioral problems, confusion, decreased concentration, dysphoric mood, hallucinations, self-injury, sleep disturbance and suicidal ideas. The patient is not nervous/anxious and is not hyperactive.     Above ROS reviewed    The following portions of the patient's history were reviewed and updated as appropriate: allergies, current medications, past family history, past medical history, past social history, past surgical history and problem list.    Current Medications:   Current Outpatient Medications:   •  acetaminophen (TYLENOL) 325 MG tablet, Take 650 mg by mouth As Needed for Mild Pain ., Disp: , Rfl:   •  albuterol (PROVENTIL HFA;VENTOLIN HFA) 108 (90 Base) MCG/ACT inhaler, Inhale 2 puffs Every 4 (Four) Hours As Needed for Wheezing., Disp: 1 inhaler, Rfl: 0  •  aspirin 81 MG EC tablet, Take 81 mg by mouth Daily., Disp: , Rfl:   •  atenolol (TENORMIN) 100 MG tablet, Take 50 mg by mouth Daily., Disp: , Rfl:   •  budesonide-formoterol (Symbicort) 80-4.5 MCG/ACT inhaler, INHALE TWO PUFFS BY MOUTH TWICE A DAY, Disp: , Rfl:   •  Cholecalciferol (VITAMIN D3) 5000 units capsule capsule, Take 5,000 Units by mouth Daily., Disp: , Rfl:   •  clopidogrel (PLAVIX) 75 MG tablet, TAKE  ONE TABLET BY MOUTH DAILY, Disp: 90 tablet, Rfl: 1  •  Cyanocobalamin (VITAMIN B 12 PO), Take 1,000 mcg by mouth Every Evening., Disp: , Rfl:   •  escitalopram (LEXAPRO) 20 MG tablet, TAKE ONE TABLET BY MOUTH DAILY, Disp: 90 tablet, Rfl: 1  •  folic acid-pyridoxine-cyanocobalamin (FOLBIC) 2.5-25-2 MG tablet tablet, Take 1 tablet by mouth Daily., Disp: 30 each, Rfl: 2  •  furosemide (LASIX) 20 MG tablet, TAKE ONE TABLET BY MOUTH DAILY, Disp: 90 tablet, Rfl: 0  •  gabapentin (NEURONTIN) 800 MG tablet, TAKE ONE TABLET BY MOUTH THREE TIMES A DAY, Disp: 270 tablet, Rfl: 1  •  letrozole (FEMARA) 2.5 MG tablet, Take 2.5 mg by mouth Daily., Disp: , Rfl:   •  levothyroxine (SYNTHROID, LEVOTHROID) 100 MCG tablet, TAKE ONE TABLET BY MOUTH DAILY, Disp: 60 tablet, Rfl: 0  •  MELATONIN PO, Take 1 tablet by mouth Every Night., Disp: , Rfl:   •  Multiple Vitamins-Minerals (MULTIVITAMIN ADULT PO), Take 1 tablet by mouth Daily., Disp: , Rfl:   •  pantoprazole (PROTONIX) 40 MG EC tablet, TAKE ONE TABLET BY MOUTH DAILY, Disp: 90 tablet, Rfl: 0  •  Probiotic Product (PROBIOTIC DAILY PO), Take 1 tablet by mouth Daily., Disp: , Rfl:   •  rosuvastatin (CRESTOR) 20 MG tablet, TAKE ONE TABLET BY MOUTH DAILY, Disp: 90 tablet, Rfl: 2  •  traMADol (ULTRAM) 50 MG tablet, Take 1 tablet by mouth Every 6 (Six) Hours As Needed for Moderate Pain ., Disp: 30 tablet, Rfl: 0      OBJECTIVE  There were no vitals filed for this visit.  There is no height or weight on file to calculate BMI.    Diagnostics:    Laboratory Results:         Lab Results   Component Value Date    WBC 4.55 06/18/2019    HGB 13.2 06/18/2019    HCT 42.3 06/18/2019    .4 (H) 06/18/2019     06/18/2019     Lab Results   Component Value Date    GLUCOSE 122 (H) 10/16/2018    BUN 15 06/18/2019    CREATININE 0.86 06/18/2019    EGFRIFNONA 63 06/18/2019    EGFRIFAFRI 77 06/18/2019    BCR 17.4 06/18/2019    K 3.9 06/21/2019    CO2 27.3 06/18/2019    CALCIUM 9.4 06/18/2019     PROTENTOTREF 6.9 06/18/2019    ALBUMIN 4.60 06/18/2019    LABIL2 2.0 06/18/2019    AST 34 (H) 06/18/2019    ALT 20 06/18/2019     Lab Results   Component Value Date    HGBA1C 5.46 11/16/2017     Lab Results   Component Value Date    CHOL 139 11/16/2017     Lab Results   Component Value Date    HDL 39 (L) 06/18/2019    HDL 40 11/16/2017    HDL 39 (L) 04/21/2017     Lab Results   Component Value Date    LDL 82 06/18/2019    LDL 63 11/16/2017    LDL 81 04/21/2017     Lab Results   Component Value Date    TRIG 199 (H) 06/18/2019    TRIG 180 (H) 11/16/2017    TRIG 240 (H) 04/21/2017     No results found for: RPR  Lab Results   Component Value Date    TSH 0.039 (L) 01/03/2020     No results found for: [id number]    The following exam is limited due to format of today's visit.    Exam:   GENERAL: Alert, and cooperative  Psychiatric: Normal mood and affect.  Neurologic: AO. Language normal. No aphasia. No dysarthria.     Impression: Ms. Carr presents for follow-up of cryptogenic left MCA stroke she suffered in November 2015 s/p IV tPA and mechanical embolectomy.  She has since been maintained on antiplatelet therapy plus statin with no recurrent or new stroke/TIA symptoms.  She does complain of some balance issues today, I have encouraged her to continue physical activity at least at home due to restrictions from pandemic.  We reviewed her vascular risk factors and importance of RF control for stroke prevention.  She will follow-up here as needed or in 1 year.  She and spouse voiced understanding and agree with plan.    Plan:     Continue current medication regimen  Monitor BP regularly  Keep well hydrated  Encouraged continued compliance with CPAP  Recommend regular physical activity, healthy diet, social engagement as able  Secondary stroke prevention: Ideal targets for stroke prevention would be Blood pressure < 130/80; B12 > 500 TSH in normal range and LDL < 70; HbA1c < 6.5 and smoking cessation if applicable.  Call  911 for stroke symptoms  Follow-up in one year    Total time of telephone encounter was 15-minutes spent in direct communication with patient, spouse.    There are no diagnoses linked to this encounter.    Coding      Dictated using Dragon

## 2020-11-23 RX ORDER — ROSUVASTATIN CALCIUM 20 MG/1
TABLET, COATED ORAL
Qty: 90 TABLET | Refills: 1 | Status: SHIPPED | OUTPATIENT
Start: 2020-11-23 | End: 2021-05-17

## 2020-12-18 DIAGNOSIS — I63.412 CEREBROVASCULAR ACCIDENT (CVA) DUE TO EMBOLISM OF LEFT MIDDLE CEREBRAL ARTERY (HCC): ICD-10-CM

## 2020-12-18 DIAGNOSIS — G89.4 CHRONIC PAIN SYNDROME: ICD-10-CM

## 2020-12-18 RX ORDER — CLOPIDOGREL BISULFATE 75 MG/1
TABLET ORAL
Qty: 90 TABLET | Refills: 0 | Status: SHIPPED | OUTPATIENT
Start: 2020-12-18 | End: 2021-03-22

## 2020-12-18 RX ORDER — FOLIC ACID-PYRIDOXINE-CYANOCOBALAMIN TAB 2.5-25-2 MG 2.5-25-2 MG
TAB ORAL
Qty: 30 TABLET | Refills: 1 | Status: SHIPPED | OUTPATIENT
Start: 2020-12-18 | End: 2021-02-18 | Stop reason: SDUPTHER

## 2020-12-18 RX ORDER — GABAPENTIN 800 MG/1
TABLET ORAL
Qty: 270 TABLET | Refills: 0 | Status: SHIPPED | OUTPATIENT
Start: 2020-12-18 | End: 2021-03-22

## 2020-12-18 RX ORDER — ATENOLOL 25 MG/1
TABLET ORAL
COMMUNITY
Start: 2020-11-20 | End: 2021-09-09 | Stop reason: SDUPTHER

## 2020-12-24 DIAGNOSIS — E03.9 ACQUIRED HYPOTHYROIDISM: ICD-10-CM

## 2020-12-28 RX ORDER — LEVOTHYROXINE SODIUM 0.1 MG/1
TABLET ORAL
Qty: 60 TABLET | Refills: 0 | Status: SHIPPED | OUTPATIENT
Start: 2020-12-28 | End: 2021-02-18 | Stop reason: SDUPTHER

## 2021-01-18 RX ORDER — FUROSEMIDE 20 MG/1
TABLET ORAL
Qty: 90 TABLET | Refills: 0 | Status: SHIPPED | OUTPATIENT
Start: 2021-01-18 | End: 2021-04-19

## 2021-01-18 RX ORDER — ESCITALOPRAM OXALATE 20 MG/1
TABLET ORAL
Qty: 90 TABLET | Refills: 0 | Status: SHIPPED | OUTPATIENT
Start: 2021-01-18 | End: 2021-05-17

## 2021-01-28 ENCOUNTER — TRANSCRIBE ORDERS (OUTPATIENT)
Dept: ADMINISTRATIVE | Facility: HOSPITAL | Age: 81
End: 2021-01-28

## 2021-01-28 DIAGNOSIS — Z12.31 SCREENING MAMMOGRAM, ENCOUNTER FOR: Primary | ICD-10-CM

## 2021-02-18 DIAGNOSIS — E03.9 ACQUIRED HYPOTHYROIDISM: ICD-10-CM

## 2021-02-18 RX ORDER — LEVOTHYROXINE SODIUM 0.1 MG/1
100 TABLET ORAL DAILY
Qty: 30 TABLET | Refills: 0 | Status: SHIPPED | OUTPATIENT
Start: 2021-02-18 | End: 2021-02-22 | Stop reason: SDUPTHER

## 2021-02-18 RX ORDER — FOLIC ACID-PYRIDOXINE-CYANOCOBALAMIN TAB 2.5-25-2 MG 2.5-25-2 MG
1 TAB ORAL DAILY
Qty: 30 TABLET | Refills: 0 | Status: SHIPPED | OUTPATIENT
Start: 2021-02-18 | End: 2021-02-22 | Stop reason: SDUPTHER

## 2021-02-18 NOTE — TELEPHONE ENCOUNTER
Caller: Avelina Carr    Relationship: Self    Best call back number: [phone]    Medication needed:   Requested Prescriptions     Pending Prescriptions Disp Refills   • folic acid-pyridoxine-cyanocobalamin (Folbic) 2.5-25-2 MG tablet tablet 30 tablet 1     Sig: Take 1 tablet by mouth Daily.   • levothyroxine (SYNTHROID, LEVOTHROID) 100 MCG tablet 60 tablet 0     Sig: Take 1 tablet by mouth Daily.       When do you need the refill by: ASAP     What details did the patient provide when requesting the medication: PT REQUESTING A 90 DAY SUPPLY     Does the patient have less than a 3 day supply:  [x] Yes  [] No    What is the patient's preferred pharmacy: [facility] [address] [place] [address] [address] [facility] [address]  - [phone] [facility] [phone] FX

## 2021-02-22 ENCOUNTER — TELEPHONE (OUTPATIENT)
Dept: FAMILY MEDICINE CLINIC | Facility: CLINIC | Age: 81
End: 2021-02-22

## 2021-02-22 DIAGNOSIS — E03.9 ACQUIRED HYPOTHYROIDISM: ICD-10-CM

## 2021-02-22 RX ORDER — FOLIC ACID-PYRIDOXINE-CYANOCOBALAMIN TAB 2.5-25-2 MG 2.5-25-2 MG
1 TAB ORAL DAILY
Qty: 30 TABLET | Refills: 0 | Status: SHIPPED | OUTPATIENT
Start: 2021-02-22 | End: 2021-04-19

## 2021-02-22 RX ORDER — LEVOTHYROXINE SODIUM 0.1 MG/1
100 TABLET ORAL DAILY
Qty: 30 TABLET | Refills: 0 | Status: SHIPPED | OUTPATIENT
Start: 2021-02-22 | End: 2021-04-19

## 2021-02-22 RX ORDER — PANTOPRAZOLE SODIUM 40 MG/1
40 TABLET, DELAYED RELEASE ORAL DAILY
Qty: 90 TABLET | Refills: 0 | Status: SHIPPED | OUTPATIENT
Start: 2021-02-22 | End: 2021-05-17

## 2021-02-22 NOTE — TELEPHONE ENCOUNTER
levothyroxine (SYNTHROID, LEVOTHROID) 100 MCG tablet     pantoprazole (PROTONIX) 40 MG EC tablet    folic acid-pyridoxine-cyanocobalamin (Folbic) 2.5-25-2 MG tablet tablet        PATIENT IS NEEDING 90 DAY SUPPLY AND HAS CALLED 3 TIMES TO GET THIS CALLED IN TO THE PHARMACY.      PATIENT REQUESTS THIS BE SENT TO [facility] [address] [place] [address] - [address] [facility] [address]  - [phone]  - [phone]   [phone]    PLEASE CALL PATIENT TO VERIFY THAT THIS WAS CALLED IN FOR 90 DAY SUPPLY .  Avelina Carr ([facility]) [phone] (H)

## 2021-02-22 NOTE — TELEPHONE ENCOUNTER
Please call Avelina -  These prescriptions were signed and sent to [facility] on the 18th. I have sent them again. We did a 30 day supply of her thyroid medicine because it has been over a year since her as labs and we need her to come in for labs. We can do a telemedicine visit with me or one of the NPs but we have to meet these basic requirements to continue to fill her medications.

## 2021-02-23 DIAGNOSIS — E78.2 MIXED HYPERLIPIDEMIA: Primary | ICD-10-CM

## 2021-02-23 DIAGNOSIS — E03.9 HYPOTHYROIDISM, UNSPECIFIED TYPE: ICD-10-CM

## 2021-02-23 DIAGNOSIS — Z79.899 HIGH RISK MEDICATION USE: ICD-10-CM

## 2021-02-26 LAB
ALBUMIN SERPL-MCNC: 4.5 G/DL (ref 3.5–5.2)
ALBUMIN/GLOB SERPL: 2.4 G/DL
ALP SERPL-CCNC: 114 U/L (ref 39–117)
ALT SERPL-CCNC: 20 U/L (ref 1–33)
AST SERPL-CCNC: 30 U/L (ref 1–32)
BILIRUB SERPL-MCNC: 0.3 MG/DL (ref 0–1.2)
BUN SERPL-MCNC: 16 MG/DL (ref 8–23)
BUN/CREAT SERPL: 17.8 (ref 7–25)
CALCIUM SERPL-MCNC: 9.3 MG/DL (ref 8.6–10.5)
CHLORIDE SERPL-SCNC: 105 MMOL/L (ref 98–107)
CHOLEST SERPL-MCNC: 133 MG/DL (ref 0–200)
CO2 SERPL-SCNC: 25.9 MMOL/L (ref 22–29)
CREAT SERPL-MCNC: 0.9 MG/DL (ref 0.57–1)
ERYTHROCYTE [DISTWIDTH] IN BLOOD BY AUTOMATED COUNT: 12.4 % (ref 12.3–15.4)
GLOBULIN SER CALC-MCNC: 1.9 GM/DL
GLUCOSE SERPL-MCNC: 99 MG/DL (ref 65–99)
HCT VFR BLD AUTO: 38.4 % (ref 34–46.6)
HDLC SERPL-MCNC: 36 MG/DL (ref 40–60)
HGB BLD-MCNC: 12.6 G/DL (ref 12–15.9)
LDLC SERPL CALC-MCNC: 72 MG/DL (ref 0–100)
LDLC/HDLC SERPL: 1.9 {RATIO}
MCH RBC QN AUTO: 32.2 PG (ref 26.6–33)
MCHC RBC AUTO-ENTMCNC: 32.8 G/DL (ref 31.5–35.7)
MCV RBC AUTO: 98.2 FL (ref 79–97)
PLATELET # BLD AUTO: 232 10*3/MM3 (ref 140–450)
POTASSIUM SERPL-SCNC: 4.7 MMOL/L (ref 3.5–5.2)
PROT SERPL-MCNC: 6.4 G/DL (ref 6–8.5)
RBC # BLD AUTO: 3.91 10*6/MM3 (ref 3.77–5.28)
SODIUM SERPL-SCNC: 140 MMOL/L (ref 136–145)
TRIGL SERPL-MCNC: 143 MG/DL (ref 0–150)
TSH SERPL DL<=0.005 MIU/L-ACNC: 1.83 UIU/ML (ref 0.27–4.2)
VLDLC SERPL CALC-MCNC: 25 MG/DL (ref 5–40)
WBC # BLD AUTO: 4.8 10*3/MM3 (ref 3.4–10.8)

## 2021-03-03 ENCOUNTER — OFFICE VISIT (OUTPATIENT)
Dept: FAMILY MEDICINE CLINIC | Facility: CLINIC | Age: 81
End: 2021-03-03

## 2021-03-03 VITALS
WEIGHT: 182 LBS | BODY MASS INDEX: 31.24 KG/M2 | TEMPERATURE: 98 F | HEART RATE: 63 BPM | DIASTOLIC BLOOD PRESSURE: 80 MMHG | RESPIRATION RATE: 16 BRPM | SYSTOLIC BLOOD PRESSURE: 154 MMHG | OXYGEN SATURATION: 98 %

## 2021-03-03 DIAGNOSIS — E03.9 ACQUIRED HYPOTHYROIDISM: ICD-10-CM

## 2021-03-03 DIAGNOSIS — M54.41 CHRONIC BILATERAL LOW BACK PAIN WITH RIGHT-SIDED SCIATICA: Primary | ICD-10-CM

## 2021-03-03 DIAGNOSIS — G89.29 CHRONIC BILATERAL LOW BACK PAIN WITH RIGHT-SIDED SCIATICA: Primary | ICD-10-CM

## 2021-03-03 DIAGNOSIS — M25.50 PAIN IN JOINT, MULTIPLE SITES: ICD-10-CM

## 2021-03-03 DIAGNOSIS — F34.1 DYSTHYMIA: ICD-10-CM

## 2021-03-03 DIAGNOSIS — I10 ESSENTIAL HYPERTENSION: ICD-10-CM

## 2021-03-03 DIAGNOSIS — E78.2 MIXED HYPERLIPIDEMIA: ICD-10-CM

## 2021-03-03 DIAGNOSIS — G40.109: ICD-10-CM

## 2021-03-03 PROCEDURE — 99214 OFFICE O/P EST MOD 30 MIN: CPT | Performed by: FAMILY MEDICINE

## 2021-03-03 NOTE — PROGRESS NOTES
Subjective   Avelina Carr is a 80 y.o. female.   Hypertension    History of Present Illness   Avelina is here for blood pressure check and to go over recent lab test results and multiple chronic conditions.   She states she is feeling well but has constant pain in many areas of her body.  She has had multiple joint surgeries and replacements in shoulders knees over the years as well as chronic back pain.  She states she has some pain medication at home but rarely takes it.  Avelina is hoping to possibley get some medication for arthritis she could take on a daily basis.  She has been taking gabapentin 800 mg 3 times a day and in the past I had written tramadol for her but for reasons I do not fully understand she was not sure that it was safe for her to take.  Actually I prescribed it for her because my only things to say for her to take.  She is on blood thinners and she has been taking Tylenol.  She does not want to be nor do I want her to be on hydrocodone or oxycodone.  I do not think a referral to an arthritis specialist would make much difference.  She cannot take any nonsteroidal anti-inflammatories and she does not have a rheumatoid disorder.    Chronic low back pain and peripheral neuropathy. This is the primary issue for her. Between her back issues, multiple joint replacements, she is in pain most of the time. She has been managing with gabapentin but this is no longer enough to help her with her chronic pain.    Seizure disorder - this is a chronic problem due to the benign meningioma in her brain. The gabapentin is doing double duty her and helping prevent seizures. She is no longer seeing a neurologist.     Paloma also has a hx of depression and states that she is getting good relief from symptoms on current medication, Lexapro. This is a chronic problem that is responding well to treatment. She  has no intolerable side effects to medication and reports that his helps lift mood and makes daily life,  relationships better. No thoughts of self harm expressed.    Paloma has a history of chronic hypertension and has been well controlled on current medications.She is tolerating medications without side effect. She reports no vision changes, headaches or lightheadedness. She is requesting refills of medications.    She has a history of chronic hyperlipidemia and  lab work is on the chart today today to evaluate response to therapy. She is tolerating medications well without side effects.    COPD- She has a past hx of smoking and is now on Symbicort and albuterol and feels she is dong well.    Hypothyoid - This is a chronic problem that is well controlled on the current dose of levothyroxine 100 mcg a day.  Labs are on the chart to evaluate response to therapy..     The following portions of the patient's history were reviewed and updated as appropriate: allergies, current medications, past family history, past medical history, past social history, past surgical history and problem list.    Review of Systems   Constitutional: Positive for fatigue.   Eyes: Negative.    Respiratory: Negative.    Cardiovascular: Negative.    Gastrointestinal: Negative.    Genitourinary: Negative.    Musculoskeletal: Positive for arthralgias, back pain and myalgias.   Neurological: Negative.    Psychiatric/Behavioral: Negative.        Objective   Physical Exam  Vitals signs and nursing note reviewed.   Constitutional:       Appearance: She is well-developed.   HENT:      Head: Normocephalic and atraumatic.   Eyes:      Pupils: Pupils are equal, round, and reactive to light.   Cardiovascular:      Rate and Rhythm: Normal rate and regular rhythm.      Heart sounds: Normal heart sounds.   Pulmonary:      Effort: Pulmonary effort is normal.      Breath sounds: Normal breath sounds.   Skin:     General: Skin is warm and dry.   Neurological:      Mental Status: She is alert and oriented to person, place, and time.   Psychiatric:         Mood and  Affect: Mood normal.         Behavior: Behavior normal.           Assessment/Plan   Problem List Items Addressed This Visit        Cardiac and Vasculature    HTN (hypertension)    Overview     Well controlled on current medication, will refill medication today and as needed.  She is a falls risk so care has to be taken cannot let her systolic blood pressure get too low. She will RTO for repeat B/P check in 6 months.         HLD (hyperlipidemia)    Overview     She is well controlled on current medication, Crestor 20 mg a day.  She is tolerating well she is followed by her cardiologist to manage her cardiac disease.            Endocrine and Metabolic    Hypothyroid    Overview     Well managed on current dose of thyroid replacement. TSH reviewed and on chart.Patient will return to the office in 6 month for regularly scheduled review of treatment.   Well refill medications as needed.            Mental Health    Depression    Overview     She is well managed on current medication, Lexapro 20 mg daily, and reports no signs or symptoms of self harm, suicidal ideation. This medication helps with daily life and relationships. Will refill as needed and advised 6 month follow up.            Musculoskeletal and Injuries    Chronic bilateral low back pain with right-sided sciatica - Primary    Overview     Pain in her back is managed as best we can with gabapentin.  She is taking 800 mg 3 times a day.         Pain in joint, multiple sites    Overview     She has been advised to take tramadol twice a day as needed for pain.  She has had multiple joint replacements and she has significant back pain.  The patient has read and signed the [facility] Controlled Substance Contract.  I will continue to see patient for regular follow up appointments.  She are well controlled on current medication.  THOMPSON has been reviewed by me and is updated every 3 months. The patient is aware of the potential for addiction and dependence.                 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  She is taking gabapentin 800 mg 3 times a day and that is helping keep her seizures under good control.                    Return in about 6 months (around 9/3/2021) for Annual physical.

## 2021-03-15 ENCOUNTER — OFFICE VISIT (OUTPATIENT)
Dept: WOUND CARE | Facility: HOSPITAL | Age: 81
End: 2021-03-15

## 2021-03-15 PROCEDURE — G0463 HOSPITAL OUTPT CLINIC VISIT: HCPCS

## 2021-03-17 ENCOUNTER — OFFICE VISIT (OUTPATIENT)
Dept: WOUND CARE | Facility: HOSPITAL | Age: 81
End: 2021-03-17

## 2021-03-19 ENCOUNTER — OFFICE VISIT (OUTPATIENT)
Dept: WOUND CARE | Facility: HOSPITAL | Age: 81
End: 2021-03-19

## 2021-03-21 DIAGNOSIS — G89.4 CHRONIC PAIN SYNDROME: ICD-10-CM

## 2021-03-21 DIAGNOSIS — I63.412 CEREBROVASCULAR ACCIDENT (CVA) DUE TO EMBOLISM OF LEFT MIDDLE CEREBRAL ARTERY (HCC): ICD-10-CM

## 2021-03-22 ENCOUNTER — OFFICE VISIT (OUTPATIENT)
Dept: WOUND CARE | Facility: HOSPITAL | Age: 81
End: 2021-03-22

## 2021-03-22 RX ORDER — CLOPIDOGREL BISULFATE 75 MG/1
TABLET ORAL
Qty: 90 TABLET | Refills: 1 | Status: SHIPPED | OUTPATIENT
Start: 2021-03-22 | End: 2021-09-20

## 2021-03-22 RX ORDER — CYANOCOBALAMIN 1000 UG/ML
1000 INJECTION, SOLUTION INTRAMUSCULAR; SUBCUTANEOUS DAILY
Status: ON HOLD | COMMUNITY
End: 2022-04-12

## 2021-03-22 RX ORDER — GABAPENTIN 800 MG/1
TABLET ORAL
Qty: 270 TABLET | Refills: 1 | Status: SHIPPED | OUTPATIENT
Start: 2021-03-22 | End: 2021-09-20

## 2021-03-25 ENCOUNTER — TELEPHONE (OUTPATIENT)
Dept: NEUROLOGY | Facility: CLINIC | Age: 81
End: 2021-03-25

## 2021-03-25 NOTE — TELEPHONE ENCOUNTER
Provider: MILKA  Caller: PT  Relationship to Patient: SELF  Phone Number: [phone]  Reason for Call: PT IS EXPERIENCING DIFFICULTY SWALLOWING AND INCREASE DIZZINESS; WANTS TO KNOW IF SHE SHOULD BE SEEN IN PERSON OR VIA TELEPHONE.    PLEASE CALL & ADVISE.    THANK YOU.

## 2021-03-26 ENCOUNTER — OFFICE VISIT (OUTPATIENT)
Dept: WOUND CARE | Facility: HOSPITAL | Age: 81
End: 2021-03-26

## 2021-03-26 NOTE — TELEPHONE ENCOUNTER
Patient returned my call and I informed her of Savana's message. She stated her symptoms are not new. She has experienced them for quite sometime now. She sometimes chokes when she swallows. She has followed up with PCP as well. She was wanted to schedule a follow up with you if possible.

## 2021-03-29 ENCOUNTER — OFFICE VISIT (OUTPATIENT)
Dept: WOUND CARE | Facility: HOSPITAL | Age: 81
End: 2021-03-29

## 2021-04-01 ENCOUNTER — OFFICE VISIT (OUTPATIENT)
Dept: WOUND CARE | Facility: HOSPITAL | Age: 81
End: 2021-04-01

## 2021-04-01 ENCOUNTER — TELEPHONE (OUTPATIENT)
Dept: NEUROLOGY | Facility: CLINIC | Age: 81
End: 2021-04-01

## 2021-04-01 NOTE — TELEPHONE ENCOUNTER
Provider: CELY CASANOVA  Caller: SHALA CHRISTIANSON  Relationship to Patient: SELF      Reason for Call: pt calling stating that the wound doctor Panda Parr, wants the neurologist to look at her veins that is gong to the wound on her lt ankle? Pt went and seen him Monday and that's when he told her.      Please call her back at [phone]

## 2021-04-01 NOTE — TELEPHONE ENCOUNTER
I called and spoke with patient to get more information. She stated that Dr. Schultz wanted her to follow up in our office due to her ankle not healing after having skin cancer. She stated it has something to do with her veins. I called to get her records from Dr. Schultz office since its not showing anything in epic. Patient already has an appointment next week with you. I informed her that I will update you and see if you recommend anything.     I'm still waiting to receive the records.

## 2021-04-02 NOTE — TELEPHONE ENCOUNTER
Informed patient of Savana's message. I also informed her of the [facility] ([phone]). She stated thanks and understanding.

## 2021-04-05 ENCOUNTER — OFFICE VISIT (OUTPATIENT)
Dept: WOUND CARE | Facility: HOSPITAL | Age: 81
End: 2021-04-05

## 2021-04-08 ENCOUNTER — OFFICE VISIT (OUTPATIENT)
Dept: WOUND CARE | Facility: HOSPITAL | Age: 81
End: 2021-04-08

## 2021-04-09 ENCOUNTER — TELEPHONE (OUTPATIENT)
Dept: NEUROLOGY | Facility: CLINIC | Age: 81
End: 2021-04-09

## 2021-04-09 ENCOUNTER — OFFICE VISIT (OUTPATIENT)
Dept: NEUROLOGY | Facility: CLINIC | Age: 81
End: 2021-04-09

## 2021-04-09 VITALS
SYSTOLIC BLOOD PRESSURE: 118 MMHG | HEIGHT: 64 IN | WEIGHT: 181 LBS | OXYGEN SATURATION: 97 % | BODY MASS INDEX: 30.9 KG/M2 | HEART RATE: 61 BPM | DIASTOLIC BLOOD PRESSURE: 78 MMHG

## 2021-04-09 DIAGNOSIS — T17.308A CHOKING, INITIAL ENCOUNTER: Primary | ICD-10-CM

## 2021-04-09 DIAGNOSIS — R13.10 PROBLEMS WITH SWALLOWING: ICD-10-CM

## 2021-04-09 DIAGNOSIS — R42 DIZZINESS: ICD-10-CM

## 2021-04-09 DIAGNOSIS — I10 ESSENTIAL HYPERTENSION: ICD-10-CM

## 2021-04-09 DIAGNOSIS — T17.308A CHOKING, INITIAL ENCOUNTER: ICD-10-CM

## 2021-04-09 DIAGNOSIS — H81.10 BENIGN PAROXYSMAL POSITIONAL VERTIGO, UNSPECIFIED LATERALITY: ICD-10-CM

## 2021-04-09 DIAGNOSIS — I67.9 CEREBROVASCULAR DISEASE: ICD-10-CM

## 2021-04-09 PROCEDURE — 99215 OFFICE O/P EST HI 40 MIN: CPT | Performed by: NURSE PRACTITIONER

## 2021-04-09 NOTE — PROGRESS NOTES
"DOS: 2021  NAME: Avelina Carr   : 1940  PCP: Goran Carr MD    Chief Complaint   Patient presents with   • TROUBLE SWALLOWING   • Dizziness      SUBJECTIVE  Neurological Problem:  80 y.o. RHW female with stoke, HTN, HLD, TEOFILO (on CPAP) and h/o breast CA, lumbar stenosis, stroke (), right occipital meningioma resection (), seizures (maintained on neurontin) and BPPV (treated at [facility]) who presents today for f/u with new concerns about swallowing difficulty and dizziness. She is unaccompanied.     Interval History:   Ms. 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   She has since been maintained on antiplatelet therapy plus statin with no recurrent or new stroke/TIA symptoms.  She presents today with complaints of swallowing difficulty and dizziness.    According to patient, her spouse insisted she come due to her difficulty swallowing, states symptoms started about a year ago, able to swallow pills okay, can sometimes \"choke\" with those, seems to affect her most often at dinner, not so much at lunch. She does not eat breakfast. No vomiting. More difficulty with solids, no problems with liquids. States she often feels like something is \"stuck\" in her throat, like she swallowed her uvula.  She also reports dizziness when she turns over in bed and when she gets up from laying or seated position. She apparently was having low pressures, recently had her atenolol decreased, is followed by Dr. Mantilla. She has h/o BPPV, followed by [facility].     She recently had skin cancer removal from left lower leg, some difficulties with healing, has seen vascular, review of records indicate she had normal ABIs, is being [facility]. She denies any other changes in her health since her last visit.  Review of lab work from 2021 shows a CMP that is essentially WNL, GFR at 60; TSH 1.83; lipid panel with a total " of 133, HDL 36, LDL 72, ; CBC unremarkable. Her ambulation is little unsteady due to having to wear house slippers out due to wound.  She denies falls.  States her BP is well controlled, is on the low side.    Review of Systems:Review of Systems   Constitutional: Positive for fatigue. Negative for activity change and appetite change.   HENT: Positive for tinnitus and trouble swallowing. Negative for ear pain.    Eyes: Negative for photophobia, pain and visual disturbance.   Musculoskeletal: Positive for back pain, gait problem and neck pain.   Neurological: Positive for dizziness, light-headedness and numbness. Negative for tremors, seizures, syncope, facial asymmetry, speech difficulty, weakness and headaches.   Psychiatric/Behavioral: Negative for agitation, behavioral problems, confusion, decreased concentration, dysphoric mood, hallucinations, self-injury, sleep disturbance and suicidal ideas. The patient is not nervous/anxious and is not hyperactive.     Above ROS reviewed    The following portions of the patient's history were reviewed and updated as appropriate: allergies, current medications, past family history, past medical history, past social history, past surgical history and problem list.    Current Medications:   Current Outpatient Medications:   •  acetaminophen (TYLENOL) 325 MG tablet, Take 650 mg by mouth As Needed for Mild Pain ., Disp: , Rfl:   •  albuterol (PROVENTIL HFA;VENTOLIN HFA) 108 (90 Base) MCG/ACT inhaler, Inhale 2 puffs Every 4 (Four) Hours As Needed for Wheezing., Disp: 1 inhaler, Rfl: 0  •  aspirin 81 MG EC tablet, Take 81 mg by mouth Daily., Disp: , Rfl:   •  atenolol (TENORMIN) 25 MG tablet, , Disp: , Rfl:   •  budesonide-formoterol (Symbicort) 80-4.5 MCG/ACT inhaler, INHALE TWO PUFFS BY MOUTH TWICE A DAY, Disp: , Rfl:   •  Cholecalciferol (VITAMIN D3) 5000 units capsule capsule, Take 5,000 Units by mouth Daily., Disp: , Rfl:   •  clopidogrel (PLAVIX) 75 MG tablet, TAKE ONE  TABLET BY MOUTH DAILY, Disp: 90 tablet, Rfl: 1  •  cyanocobalamin 1000 MCG/ML injection, Take 1,000 mcg by mouth Daily., Disp: , Rfl:   •  escitalopram (LEXAPRO) 20 MG tablet, TAKE ONE TABLET BY MOUTH DAILY, Disp: 90 tablet, Rfl: 0  •  folic acid-pyridoxine-cyanocobalamin (Folbic) 2.5-25-2 MG tablet tablet, Take 1 tablet by mouth Daily., Disp: 30 tablet, Rfl: 0  •  furosemide (LASIX) 20 MG tablet, TAKE ONE TABLET BY MOUTH DAILY, Disp: 90 tablet, Rfl: 0  •  gabapentin (NEURONTIN) 800 MG tablet, TAKE ONE TABLET BY MOUTH THREE TIMES A DAY, Disp: 270 tablet, Rfl: 1  •  letrozole (FEMARA) 2.5 MG tablet, Take 2.5 mg by mouth Daily., Disp: , Rfl:   •  levothyroxine (SYNTHROID, LEVOTHROID) 100 MCG tablet, Take 1 tablet by mouth Daily., Disp: 30 tablet, Rfl: 0  •  MELATONIN PO, Take 1 tablet by mouth Every Night., Disp: , Rfl:   •  Multiple Vitamins-Minerals (MULTIVITAMIN ADULT PO), Take 1 tablet by mouth Daily., Disp: , Rfl:   •  pantoprazole (PROTONIX) 40 MG EC tablet, Take 1 tablet by mouth Daily., Disp: 90 tablet, Rfl: 0  •  Probiotic Product (PROBIOTIC DAILY PO), Take 1 tablet by mouth Daily., Disp: , Rfl:   •  rosuvastatin (CRESTOR) 20 MG tablet, TAKE ONE TABLET BY MOUTH DAILY, Disp: 90 tablet, Rfl: 1  •  traMADol (ULTRAM) 50 MG tablet, Take 1 tablet by mouth Every 6 (Six) Hours As Needed for Moderate Pain ., Disp: 30 tablet, Rfl: 0  **I did not stop or change the above medications.  Patient's medication list was updated to reflect medications they have reported as currently taking, including medication changes made by other providers.    OBJECTIVE  Vitals:    04/09/21 1015   BP: 118/78   Pulse: 61   SpO2: 97%     Body mass index is 31.05 kg/m².    Diagnostics:    Laboratory Results:         Lab Results   Component Value Date    WBC 4.80 02/25/2021    HGB 12.6 02/25/2021    HCT 38.4 02/25/2021    MCV 98.2 (H) 02/25/2021     02/25/2021     Lab Results   Component Value Date    GLUCOSE 122 (H) 10/16/2018    BUN 16  02/25/2021    CREATININE 0.90 02/25/2021    EGFRIFNONA 60 (L) 02/25/2021    EGFRIFAFRI 73 02/25/2021    BCR 17.8 02/25/2021    K 4.7 02/25/2021    CO2 25.9 02/25/2021    CALCIUM 9.3 02/25/2021    PROTENTOTREF 6.4 02/25/2021    ALBUMIN 4.50 02/25/2021    LABIL2 2.4 02/25/2021    AST 30 02/25/2021    ALT 20 02/25/2021     Lab Results   Component Value Date    HGBA1C 5.46 11/16/2017     Lab Results   Component Value Date    CHOL 139 11/16/2017     Lab Results   Component Value Date    HDL 36 (L) 02/25/2021    HDL 39 (L) 06/18/2019    HDL 40 11/16/2017     Lab Results   Component Value Date    LDL 72 02/25/2021    LDL 82 06/18/2019    LDL 63 11/16/2017     Lab Results   Component Value Date    TRIG 143 02/25/2021    TRIG 199 (H) 06/18/2019    TRIG 180 (H) 11/16/2017     No results found for: RPR  Lab Results   Component Value Date    TSH 1.830 02/25/2021     No results found for: [id number]    Physical Exam:  GENERAL: NAD  HEENT: Normocephalic, atraumatic   COR: RRR  Resp: Even and unlabored  Extremities: Decreased ROM of shoulders bilatarally  Skin: LLE leg wound wrapped  Psychiatric: Normal mood and affect.    Neurological:   MS: AO. Language normal. No neglect. Follows all commands.  CN: II-XII grossly normal, no facial weakness, palate elevated symmetrically.  Motor: Normal strength and tone throughout.  Sensory: Intact to light touch in arms and legs  Station and Gait: Antalgic gait, wearing house slippers, no assistive device.    Coordination: Normal finger to nose bilaterally    Impression: Ms. Carr has a h/o cryptogenic LMCA stroke (2015) treated with DAPT + statin with no recurrent symptoms and normal neurologic exam today.  Her main complaint today is swallowing and dizziness, the latter which by historoy is consistent with combination of orthostatic hypotension and BPPV.     Plan:     1. Choking, Swallowing difficulty  Referral to speech therapy, possible VFSS  F/U pending above    2. Dizziness -- by  history, likely two etiologies  For BPPV -- she has previously been treated at [facility], she will start doing her therapy exercises for this, will call [facility] if no improvement  For othostatic hypotension component - Recommend she increase her water intake, transition slowly and wear compression stockings when able.     3. LMCA stroke  Continue current medication regimen  Monitor BP regularly  Keep well hydrated  Encouraged continued compliance with CPAP  Recommend regular physical activity, healthy diet, social engagement as able  Secondary stroke prevention: Ideal targets for stroke prevention would be Blood pressure < 130/80; B12 > 500 TSH in normal range and LDL < 70; HbA1c < 6.5 and smoking cessation if applicable.  Call 911 for stroke symptoms    I spent a total of 45 minutes today in reviewing records, prior diagnostics, examination of patient as well as counseling and educating patient regarding diagnoses, symptoms, reviewing diagnostics with patient, pharmacologic treatment options including purpose, risk, benefits, possible side-effects, recommendations, lifestyle modifications, coordination of care and documenting plan of care.      Diagnoses and all orders for this visit:    1. Cerebrovascular disease    2. Choking, initial encounter  -     Ambulatory Referral to Speech Therapy    3. Problems with swallowing  -     Ambulatory Referral to Speech Therapy    4. Essential hypertension    5. Dizziness    6. Benign paroxysmal positional vertigo, unspecified laterality        Coding      Dictated using Dragon

## 2021-04-09 NOTE — TELEPHONE ENCOUNTER
Please review and advise. Rachelle is needing the order for speech pathology changed.     Thank you.

## 2021-04-09 NOTE — TELEPHONE ENCOUNTER
----- Message from Rachelle Solitario sent at 4/9/2021 11:12 AM EDT -----  Can you change order to VFSS (Videofluoroscopy Swallow Study).

## 2021-04-13 ENCOUNTER — OFFICE VISIT (OUTPATIENT)
Dept: WOUND CARE | Facility: HOSPITAL | Age: 81
End: 2021-04-13

## 2021-04-16 ENCOUNTER — OFFICE VISIT (OUTPATIENT)
Dept: WOUND CARE | Facility: HOSPITAL | Age: 81
End: 2021-04-16

## 2021-04-18 DIAGNOSIS — E03.9 ACQUIRED HYPOTHYROIDISM: ICD-10-CM

## 2021-04-19 RX ORDER — LEVOTHYROXINE SODIUM 0.1 MG/1
TABLET ORAL
Qty: 30 TABLET | Refills: 0 | Status: SHIPPED | OUTPATIENT
Start: 2021-04-19 | End: 2021-05-17

## 2021-04-19 RX ORDER — FOLIC ACID-PYRIDOXINE-CYANOCOBALAMIN TAB 2.5-25-2 MG 2.5-25-2 MG
TAB ORAL
Qty: 30 TABLET | Refills: 0 | Status: ON HOLD | OUTPATIENT
Start: 2021-04-19 | End: 2022-04-12

## 2021-04-19 RX ORDER — FUROSEMIDE 20 MG/1
TABLET ORAL
Qty: 90 TABLET | Refills: 0 | Status: SHIPPED | OUTPATIENT
Start: 2021-04-19 | End: 2021-07-19

## 2021-04-20 ENCOUNTER — OFFICE VISIT (OUTPATIENT)
Dept: WOUND CARE | Facility: HOSPITAL | Age: 81
End: 2021-04-20

## 2021-04-20 DIAGNOSIS — G89.29 CHRONIC BILATERAL LOW BACK PAIN WITH RIGHT-SIDED SCIATICA: ICD-10-CM

## 2021-04-20 DIAGNOSIS — M54.41 CHRONIC BILATERAL LOW BACK PAIN WITH RIGHT-SIDED SCIATICA: ICD-10-CM

## 2021-04-20 RX ORDER — TRAMADOL HYDROCHLORIDE 50 MG/1
TABLET ORAL
Qty: 30 TABLET | Refills: 0 | Status: SHIPPED | OUTPATIENT
Start: 2021-04-20 | End: 2021-05-18

## 2021-04-21 ENCOUNTER — TRANSCRIBE ORDERS (OUTPATIENT)
Dept: SLEEP MEDICINE | Facility: HOSPITAL | Age: 81
End: 2021-04-21

## 2021-04-21 DIAGNOSIS — Z01.818 OTHER SPECIFIED PRE-OPERATIVE EXAMINATION: Primary | ICD-10-CM

## 2021-04-23 ENCOUNTER — OFFICE VISIT (OUTPATIENT)
Dept: WOUND CARE | Facility: HOSPITAL | Age: 81
End: 2021-04-23

## 2021-04-27 ENCOUNTER — LAB (OUTPATIENT)
Dept: LAB | Facility: HOSPITAL | Age: 81
End: 2021-04-27

## 2021-04-27 ENCOUNTER — OFFICE VISIT (OUTPATIENT)
Dept: WOUND CARE | Facility: HOSPITAL | Age: 81
End: 2021-04-27

## 2021-04-27 DIAGNOSIS — Z01.818 OTHER SPECIFIED PRE-OPERATIVE EXAMINATION: ICD-10-CM

## 2021-04-27 PROCEDURE — U0004 COV-19 TEST NON-CDC HGH THRU: HCPCS

## 2021-04-27 PROCEDURE — C9803 HOPD COVID-19 SPEC COLLECT: HCPCS

## 2021-04-27 PROCEDURE — U0005 INFEC AGEN DETEC AMPLI PROBE: HCPCS

## 2021-04-28 LAB — SARS-COV-2 RNA RESP QL NAA+PROBE: NOT DETECTED

## 2021-04-29 ENCOUNTER — HOSPITAL ENCOUNTER (OUTPATIENT)
Dept: GENERAL RADIOLOGY | Facility: HOSPITAL | Age: 81
Discharge: HOME | End: 2021-04-29
Admitting: NURSE PRACTITIONER

## 2021-04-29 DIAGNOSIS — R13.10 PROBLEMS WITH SWALLOWING: ICD-10-CM

## 2021-04-29 DIAGNOSIS — T17.308A CHOKING, INITIAL ENCOUNTER: ICD-10-CM

## 2021-04-29 PROCEDURE — 63710000001 BARIUM SULFATE 40 % RECONSTITUTED SUSPENSION: Performed by: NURSE PRACTITIONER

## 2021-04-29 PROCEDURE — 92611 MOTION FLUOROSCOPY/SWALLOW: CPT

## 2021-04-29 PROCEDURE — 74230 X-RAY XM SWLNG FUNCJ C+: CPT

## 2021-04-29 PROCEDURE — 63710000001 BARIUM SULFATE 98 % RECONSTITUTED SUSPENSION: Performed by: NURSE PRACTITIONER

## 2021-04-29 PROCEDURE — A9270 NON-COVERED ITEM OR SERVICE: HCPCS | Performed by: NURSE PRACTITIONER

## 2021-04-29 RX ADMIN — BARIUM SULFATE 55 ML: 0.81 POWDER, FOR SUSPENSION ORAL at 09:52

## 2021-04-29 RX ADMIN — BARIUM SULFATE 4 ML: 980 POWDER, FOR SUSPENSION ORAL at 09:52

## 2021-04-29 NOTE — MBS/VFSS/FEES
Outpatient Speech Language Pathology   Adult Swallow Initial Evaluation  [facility]     Patient Name: Avelina Carr  : 1940  MRN: [id number]  Today's Date: 2021         Visit Date: 2021   Patient Active Problem List   Diagnosis   • Postmastectomy lymphedema syndrome   • Malignant neoplasm of upper-outer quadrant of right female breast (CMS/HCC)   • Cerebrovascular accident (CVA) (CMS/HCC)   • Stroke (CMS/HCC)   • Hemarthrosis   • HTN (hypertension)   • HLD (hyperlipidemia)   • Adverse effect of antiplatelet agent   • Left middle cerebral artery stroke (CMS/HCC)   • Carcinoma of central portion of right breast in female, estrogen receptor positive (CMS/HCC)   • Chronic bilateral low back pain with right-sided sciatica   • History of lumbar surgery   • Seizure disorder, simple partial, without intractable epilepsy (CMS/HCC)   • Spinal stenosis of lumbar region with neurogenic claudication   • Status post craniectomy   • History of total right knee replacement   • Other forms of scoliosis, lumbar region   • DDD (degenerative disc disease), lumbar   • Benign meningioma of brain (CMS/HCC)   • Squamous cell carcinoma of leg, left   • Status post multiple cerebral infarctions   • Hypothyroid   • Depression   • COPD (chronic obstructive pulmonary disease) (CMS/HCC)   • Obstructive sleep apnea   • Squamous cell carcinoma of leg, right   • Pain in joint, multiple sites        Past Medical History:   Diagnosis Date   • Anxiety    • Arthritis    • Breast cancer (CMS/HCC)    • COPD (chronic obstructive pulmonary disease) (CMS/HCC)     oxygen at 2L aths   • CVD (cardiovascular disease)    • Depression    • Hypothyroid    • Macular degeneration    • Peripheral neuropathy    • Vertigo         Past Surgical History:   Procedure Laterality Date   • ADRENAL GLAND SURGERY     • BRAIN MENINGIOMA EXCISION      occipital   •  SECTION     • EMBOLECTOMY N/A 11/15/2017    Procedure: Embolectomy  Mechanical;  Surgeon: Rachid Figueroa MD;  Location: Pending sale to [facility] OR 18/19;  Service:    • MASTECTOMY  2014   • RECTAL SURGERY  2003   • REPLACEMENT TOTAL KNEE Right    • TOTAL SHOULDER REPLACEMENT      x2         Visit Dx:     ICD-10-CM ICD-9-CM   1. Choking, initial encounter  T17.308A 933.1   2. Problems with swallowing  R13.10 787.20     Patient was not wearing a face mask during this therapy encounter. Therapist used appropriate personal protective equipment including mask, eye protection and gloves.  Mask used was standard procedure mask. Appropriate PPE was worn during the entire therapy session. Hand hygiene was completed before and after therapy session. Patient is not in enhanced droplet precautions.         SLP Adult Swallow Evaluation     Row Name 04/29/21 2157       Rehab Evaluation    Document Type  evaluation  -ML    Subjective Information  no complaints  -ML    Patient Observations  alert;cooperative  -ML    Care Plan Review  evaluation/treatment results reviewed  -ML    Patient Effort  excellent  -ML    Symptoms Noted During/After Treatment  none  -ML       General Information    Patient Profile Reviewed  yes  -ML    Pertinent History Of Current Problem  Pt reports choking during meals a couple times a week and globus sensation indicating area at mid sternum   -ML    Current Method of Nutrition  regular textures;thin liquids  -ML    Precautions/Limitations, Vision  WFL with corrective lenses;for purposes of eval  -ML    Precautions/Limitations, Hearing  WFL;for purposes of eval  -ML    Prior Level of Function-Swallowing  no diet consistency restrictions  -ML    Plans/Goals Discussed with  patient;agreed upon  -ML    Barriers to Rehab  none identified  -ML       Pain    Additional Documentation  Pain Scale: Numbers Pre/Post-Treatment (Group)  -ML       Pain Scale: Numbers Pre/Post-Treatment    Pretreatment Pain Rating  0/10 - no pain  -ML    Posttreatment Pain Rating  0/10 - no pain  -ML       Oral  Motor Structure and Function    Dentition Assessment  natural, present and adequate  -ML    Secretion Management  WNL/WFL  -ML    Mucosal Quality  moist, healthy  -ML       Oral Musculature and Cranial Nerve Assessment    Oral Motor General Assessment  WFL  -ML       General Eating/Swallowing Observations    Respiratory Support Currently in Use  room air  -ML       MBS/VFSS    Utensils Used  spoon;cup;straw  -ML    Consistencies Trialed  regular textures;soft textures;mixed consistency;pureed;thin liquids  -ML       MBS/VFSS Interpretation    VFSS Summary  Pt presents with functional oropharyngeal swallowing characterized by adequate mastication, bolus formation and oral transit without presence of significant oral residue and adequate hyolaryngeal excursion, tongue base retraction, and pharyngeal contraction. Pt does exhibit mildly delayed airway closure causing shallow transient penetration of thin liquids during the swallow, but is functional and material fully clears during the swallow. Pt also exhibited transient penetration of juice from mixed mechanical soft solid peaches before the swallow, which fully clears during the swallow. Did note small outpouching collections of material during the swallow (most noted during trials of puree) at the level of superior tongue base and inferior tongue base suggestive of pharyngoceles. This contributes to mild-moderate pharyngeal residue with puree which clears to trace after consecutive swallows/trials. No significant pharyngeal residue with regular solids. During esophageal scan, noted stasis of thin liquids in lower 1/3 of esophagus and retrograde flow. Pt reports that she often feels globus sensation when eating meals and that she eats big bites and very fast without drinking any liquids during meals. These esophageal issues noted in conjunction with pt's eating habits may be causing pt's symptoms. Recommend pt continue regular diet and thin liquids with use of  strategies of small bites, liquid wash every few bites, sitting fully upright during meals and at least 30-45 minutes afterwards, and slow rate of eating. Meds per pt preference.   -ML       SLP Communication to Radiology    Summary Statement  Pt presents with functional oropharyngeal swallowing characterized by adequate mastication, bolus formation and oral transit without presence of significant oral residue and adequate hyolaryngeal excursion, tongue base retraction, and pharyngeal contraction. Pt does exhibit mildly delayed airway closure causing shallow transient penetration of thin liquids during the swallow, but is functional and material fully clears during the swallow. Pt also exhibited transient penetration of juice from mixed mechanical soft solid peaches before the swallow, which fully clears during the swallow. Did note small outpouching collections of material during the swallow (most noted during trials of puree) at the level of superior tongue base and inferior tongue base suggestive of pharyngoceles. This contributes to mild-moderate pharyngeal residue with puree which clears to trace after consecutive swallows/trials. No significant pharyngeal residue with regular solids. During esophageal scan, noted stasis of thin liquids in lower 1/3 of esophagus and retrograde flow.   -ML       Clinical Impression    SLP Swallowing Diagnosis  swallow WFL  -ML    Functional Impact  no impact on function  -ML    Swallow Criteria for Skilled Therapeutic Interventions Met  no problems identified which require skilled intervention  -ML       Recommendations    SLP Diet Recommendation  regular textures;thin liquids  -ML    SLP Rec. for Method of Medication Administration  meds whole;with thin liquids  -ML      User Key  (r) = Recorded By, (t) = Taken By, (c) = Cosigned By    Initials Name Provider Type    Fabiola Almodovar MS CCC-SLP Speech and Language Pathologist                        OP SLP Education      Row Name 04/29/21 1149       Education    Barriers to Learning  No barriers identified  -ML    Education Provided  Described results of evaluation;Patient expressed understanding of evaluation  -ML    Assessed  Learning readiness  -ML    Learning Motivation  Strong  -ML    Learning Method  Explanation;Demonstration  -ML    Teaching Response  Verbalized understanding  -ML      User Key  (r) = Recorded By, (t) = Taken By, (c) = Cosigned By    Initials Name Effective Dates    DREW Rizo, MS BENNETT Hicks-SLP 10/04/18 -           OP SLP Assessment/Plan - 04/29/21 1149        SLP Assessment    Functional Problems  Swallowing   -ML    Clinical Impression: Swallowing  WNL   -ML    Prognosis  Good (comment)   -ML    Patient would benefit from skilled therapy intervention  No   -ML      User Key  (r) = Recorded By, (t) = Taken By, (c) = Cosigned By    Initials Name Provider Type    Fabiola Almodovar MS CCC-SLP Speech and Language Pathologist           SLP Outcome Measures (last 72 hours)      SLP Outcome Measures     Row Name 04/29/21 1100             SLP Outcome Measures    Outcome Measure Used?  Adult NOMS  -ML         Adult FCM Scores    FCM Chosen  Swallowing  -ML      Swallowing FCM Score  7  -ML        User Key  (r) = Recorded By, (t) = Taken By, (c) = Cosigned By    Initials Name Effective Dates    DREW Sallie MS BENNETT Hicks-SLP 10/04/18 -                Time Calculation:   SLP Start Time: 0930  Untimed Charges  SLP Eval/Re-eval : ST Motion Fluoro Eval Swallow - 37975  [id number] Motion Fluoro Eval Swallow Minutes: 75  Total Minutes  Untimed Charges Total Minutes: 75   Total Minutes: 75    Therapy Charges for Today     Code Description Service Date Service Provider Modifiers Qty    [id number]  ST MOTION FLUORO EVAL SWALLOW 5 4/29/2021 Fabiola Rizo MS CCC-SLP GN 1                   Fabiola Rizo MS CCC-SLP  4/29/2021

## 2021-04-30 ENCOUNTER — APPOINTMENT (OUTPATIENT)
Dept: WOUND CARE | Facility: HOSPITAL | Age: 81
End: 2021-04-30

## 2021-04-30 ENCOUNTER — OFFICE VISIT (OUTPATIENT)
Dept: WOUND CARE | Facility: HOSPITAL | Age: 81
End: 2021-04-30

## 2021-05-06 ENCOUNTER — TELEPHONE (OUTPATIENT)
Dept: NEUROLOGY | Facility: CLINIC | Age: 81
End: 2021-05-06

## 2021-05-06 NOTE — TELEPHONE ENCOUNTER
----- Message from CELY Perez sent at 5/4/2021 10:28 AM EDT -----  Can you let patient know that I reviewed her swallow study results as well as the speech therapy notes that indicated that there were no problems identified with her swallowing which require skilled intervention. Hopefully her symptoms have improved. Thanks.

## 2021-05-07 ENCOUNTER — OFFICE VISIT (OUTPATIENT)
Dept: WOUND CARE | Facility: HOSPITAL | Age: 81
End: 2021-05-07

## 2021-05-11 ENCOUNTER — OFFICE VISIT (OUTPATIENT)
Dept: WOUND CARE | Facility: HOSPITAL | Age: 81
End: 2021-05-11

## 2021-05-11 PROCEDURE — G0463 HOSPITAL OUTPT CLINIC VISIT: HCPCS

## 2021-05-13 ENCOUNTER — APPOINTMENT (OUTPATIENT)
Dept: MAMMOGRAPHY | Facility: HOSPITAL | Age: 81
End: 2021-05-13

## 2021-05-17 DIAGNOSIS — E03.9 ACQUIRED HYPOTHYROIDISM: ICD-10-CM

## 2021-05-17 RX ORDER — PANTOPRAZOLE SODIUM 40 MG/1
TABLET, DELAYED RELEASE ORAL
Qty: 90 TABLET | Refills: 0 | Status: SHIPPED | OUTPATIENT
Start: 2021-05-17 | End: 2021-08-11

## 2021-05-17 RX ORDER — ESCITALOPRAM OXALATE 20 MG/1
TABLET ORAL
Qty: 90 TABLET | Refills: 0 | Status: SHIPPED | OUTPATIENT
Start: 2021-05-17 | End: 2021-08-11

## 2021-05-17 RX ORDER — ROSUVASTATIN CALCIUM 20 MG/1
TABLET, COATED ORAL
Qty: 90 TABLET | Refills: 0 | Status: SHIPPED | OUTPATIENT
Start: 2021-05-17 | End: 2021-08-11

## 2021-05-17 RX ORDER — LEVOTHYROXINE SODIUM 0.1 MG/1
TABLET ORAL
Qty: 30 TABLET | Refills: 0 | Status: SHIPPED | OUTPATIENT
Start: 2021-05-17 | End: 2021-06-18

## 2021-05-18 DIAGNOSIS — G89.29 CHRONIC BILATERAL LOW BACK PAIN WITH RIGHT-SIDED SCIATICA: ICD-10-CM

## 2021-05-18 DIAGNOSIS — M54.41 CHRONIC BILATERAL LOW BACK PAIN WITH RIGHT-SIDED SCIATICA: ICD-10-CM

## 2021-05-18 RX ORDER — TRAMADOL HYDROCHLORIDE 50 MG/1
TABLET ORAL
Qty: 30 TABLET | Refills: 5 | Status: SHIPPED | OUTPATIENT
Start: 2021-05-18 | End: 2021-09-07

## 2021-05-24 ENCOUNTER — APPOINTMENT (OUTPATIENT)
Dept: WOUND CARE | Facility: HOSPITAL | Age: 81
End: 2021-05-24

## 2021-06-03 ENCOUNTER — OFFICE VISIT (OUTPATIENT)
Dept: WOUND CARE | Facility: HOSPITAL | Age: 81
End: 2021-06-03

## 2021-06-03 PROCEDURE — G0463 HOSPITAL OUTPT CLINIC VISIT: HCPCS

## 2021-06-15 ENCOUNTER — APPOINTMENT (OUTPATIENT)
Dept: GENERAL RADIOLOGY | Facility: HOSPITAL | Age: 81
End: 2021-06-15

## 2021-06-15 ENCOUNTER — HOSPITAL ENCOUNTER (EMERGENCY)
Facility: HOSPITAL | Age: 81
Discharge: HOME | End: 2021-06-15
Attending: EMERGENCY MEDICINE | Admitting: EMERGENCY MEDICINE

## 2021-06-15 VITALS
HEART RATE: 60 BPM | SYSTOLIC BLOOD PRESSURE: 161 MMHG | WEIGHT: 181 LBS | RESPIRATION RATE: 17 BRPM | DIASTOLIC BLOOD PRESSURE: 85 MMHG | BODY MASS INDEX: 30.9 KG/M2 | HEIGHT: 64 IN | TEMPERATURE: 97.9 F | OXYGEN SATURATION: 98 %

## 2021-06-15 DIAGNOSIS — R07.89 ATYPICAL CHEST PAIN: Primary | ICD-10-CM

## 2021-06-15 LAB
ALBUMIN SERPL-MCNC: 4.2 G/DL (ref 3.5–5.2)
ALBUMIN/GLOB SERPL: 1.6 G/DL
ALP SERPL-CCNC: 129 U/L (ref 39–117)
ALT SERPL W P-5'-P-CCNC: 17 U/L (ref 1–33)
ANION GAP SERPL CALCULATED.3IONS-SCNC: 7.5 MMOL/L (ref 5–15)
AST SERPL-CCNC: 24 U/L (ref 1–32)
BASOPHILS # BLD AUTO: 0.07 10*3/MM3 (ref 0–0.2)
BASOPHILS NFR BLD AUTO: 1.3 % (ref 0–1.5)
BILIRUB SERPL-MCNC: 0.2 MG/DL (ref 0–1.2)
BUN SERPL-MCNC: 12 MG/DL (ref 8–23)
BUN/CREAT SERPL: 12.9 (ref 7–25)
CALCIUM SPEC-SCNC: 9.1 MG/DL (ref 8.6–10.5)
CHLORIDE SERPL-SCNC: 103 MMOL/L (ref 98–107)
CO2 SERPL-SCNC: 27.5 MMOL/L (ref 22–29)
CREAT SERPL-MCNC: 0.93 MG/DL (ref 0.57–1)
D DIMER PPP FEU-MCNC: 0.55 MCGFEU/ML (ref 0–0.49)
DEPRECATED RDW RBC AUTO: 40.4 FL (ref 37–54)
EOSINOPHIL # BLD AUTO: 0.25 10*3/MM3 (ref 0–0.4)
EOSINOPHIL NFR BLD AUTO: 4.6 % (ref 0.3–6.2)
ERYTHROCYTE [DISTWIDTH] IN BLOOD BY AUTOMATED COUNT: 11.7 % (ref 12.3–15.4)
GFR SERPL CREATININE-BSD FRML MDRD: 58 ML/MIN/1.73
GLOBULIN UR ELPH-MCNC: 2.6 GM/DL
GLUCOSE SERPL-MCNC: 75 MG/DL (ref 65–99)
HCT VFR BLD AUTO: 38.2 % (ref 34–46.6)
HGB BLD-MCNC: 12.7 G/DL (ref 12–15.9)
HOLD SPECIMEN: NORMAL
HOLD SPECIMEN: NORMAL
IMM GRANULOCYTES # BLD AUTO: 0.02 10*3/MM3 (ref 0–0.05)
IMM GRANULOCYTES NFR BLD AUTO: 0.4 % (ref 0–0.5)
LYMPHOCYTES # BLD AUTO: 1.72 10*3/MM3 (ref 0.7–3.1)
LYMPHOCYTES NFR BLD AUTO: 31.8 % (ref 19.6–45.3)
MCH RBC QN AUTO: 31.9 PG (ref 26.6–33)
MCHC RBC AUTO-ENTMCNC: 33.2 G/DL (ref 31.5–35.7)
MCV RBC AUTO: 96 FL (ref 79–97)
MONOCYTES # BLD AUTO: 1.07 10*3/MM3 (ref 0.1–0.9)
MONOCYTES NFR BLD AUTO: 19.8 % (ref 5–12)
NEUTROPHILS NFR BLD AUTO: 2.28 10*3/MM3 (ref 1.7–7)
NEUTROPHILS NFR BLD AUTO: 42.1 % (ref 42.7–76)
NRBC BLD AUTO-RTO: 0 /100 WBC (ref 0–0.2)
NT-PROBNP SERPL-MCNC: 247.6 PG/ML (ref 0–1800)
PLATELET # BLD AUTO: 236 10*3/MM3 (ref 140–450)
PMV BLD AUTO: 10.3 FL (ref 6–12)
POTASSIUM SERPL-SCNC: 5.1 MMOL/L (ref 3.5–5.2)
PROT SERPL-MCNC: 6.8 G/DL (ref 6–8.5)
QT INTERVAL: 469 MS
RBC # BLD AUTO: 3.98 10*6/MM3 (ref 3.77–5.28)
SODIUM SERPL-SCNC: 138 MMOL/L (ref 136–145)
TROPONIN T SERPL-MCNC: <0.01 NG/ML (ref 0–0.03)
TROPONIN T SERPL-MCNC: <0.01 NG/ML (ref 0–0.03)
WBC # BLD AUTO: 5.41 10*3/MM3 (ref 3.4–10.8)
WHOLE BLOOD HOLD SPECIMEN: NORMAL

## 2021-06-15 PROCEDURE — 83880 ASSAY OF NATRIURETIC PEPTIDE: CPT | Performed by: EMERGENCY MEDICINE

## 2021-06-15 PROCEDURE — 85379 FIBRIN DEGRADATION QUANT: CPT | Performed by: EMERGENCY MEDICINE

## 2021-06-15 PROCEDURE — 80053 COMPREHEN METABOLIC PANEL: CPT | Performed by: EMERGENCY MEDICINE

## 2021-06-15 PROCEDURE — 93005 ELECTROCARDIOGRAM TRACING: CPT | Performed by: EMERGENCY MEDICINE

## 2021-06-15 PROCEDURE — 85025 COMPLETE CBC W/AUTO DIFF WBC: CPT | Performed by: EMERGENCY MEDICINE

## 2021-06-15 PROCEDURE — 84484 ASSAY OF TROPONIN QUANT: CPT | Performed by: EMERGENCY MEDICINE

## 2021-06-15 PROCEDURE — 93010 ELECTROCARDIOGRAM REPORT: CPT | Performed by: INTERNAL MEDICINE

## 2021-06-15 PROCEDURE — 36415 COLL VENOUS BLD VENIPUNCTURE: CPT

## 2021-06-15 PROCEDURE — 93005 ELECTROCARDIOGRAM TRACING: CPT

## 2021-06-15 PROCEDURE — 71046 X-RAY EXAM CHEST 2 VIEWS: CPT

## 2021-06-15 PROCEDURE — 99283 EMERGENCY DEPT VISIT LOW MDM: CPT

## 2021-06-15 RX ORDER — SODIUM CHLORIDE 0.9 % (FLUSH) 0.9 %
10 SYRINGE (ML) INJECTION AS NEEDED
Status: DISCONTINUED | OUTPATIENT
Start: 2021-06-15 | End: 2021-06-15 | Stop reason: HOSPADM

## 2021-06-15 NOTE — ED PROVIDER NOTES
EMERGENCY DEPARTMENT ENCOUNTER    Room Number:  30/30  Date of encounter:  6/15/2021  PCP: Goran Carr MD  Historian: Patient      HPI:  Chief Complaint: Chest pain  A complete HPI/ROS/PMH/PSH/SH/FH are unobtainable due to: N/A    Context: Avelina Carr is a 81 y.o. female who presents to the ED c/o sudden onset of mild, aching left-sided chest pain that started this morning around 7 AM and lasted a few hours.  It was constant.  It was mild shortness of breath associated.  It resolved on its own.  There is no associated nausea or vomiting.  The pain did not radiate.  No recent fevers or chills.  No recent cough or congestion.  She had a similar episode a few weeks ago but did not seek care at that time.  She has no prior history of coronary artery disease.  She has had prior strokes.  No prior history of DVT or PE.  She had recent skin cancer resection under local anesthesia a few weeks ago-she states the sutures were removed today and the wound is healing nicely.      The patient was placed in a mask in triage, hand hygiene was performed before and after my interaction with the patient.  I wore a mask, safety glasses and gloves during my entire interaction with the patient.    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   • Malignant neoplasm of upper-outer quadrant of right female breast (CMS/HCC) 03/04/2016   • Cerebrovascular accident (CVA) (CMS/[facility]) 11/15/2017   • Stroke (CMS/[facility]) 11/21/2017   • Hemarthrosis 12/07/2017   • HTN (hypertension) 12/07/2017   • HLD (hyperlipidemia) 12/07/2017   • Adverse effect of antiplatelet agent 12/08/2017   • Left middle cerebral artery stroke (CMS/HCC) 12/20/2017   • Carcinoma of central portion of right breast in female, estrogen receptor positive (CMS/HCC) 03/25/2015   • Chronic bilateral low back pain with right-sided sciatica 12/09/2016   • History of lumbar surgery 03/02/2018   •  Seizure disorder, simple partial, without intractable epilepsy (CMS/HCC) 2014   • Spinal stenosis of lumbar region with neurogenic claudication 2016   • Status post craniectomy 2016   • History of total right knee replacement 2018   • Other forms of scoliosis, lumbar region 2018   • DDD (degenerative disc disease), lumbar 2018   • Benign meningioma of brain (CMS/HCC) 2016   • Squamous cell carcinoma of leg, left 2019   • Status post multiple cerebral infarctions 2015   • Hypothyroid 2019   • Depression 2019   • COPD (chronic obstructive pulmonary disease) (CMS/HCC) 2019   • Obstructive sleep apnea 2019   • Squamous cell carcinoma of leg, right 2020   • Pain in joint, multiple sites 2021     Resolved Ambulatory Problems     Diagnosis Date Noted   • No Resolved Ambulatory Problems     Past Medical History:   Diagnosis Date   • Anxiety    • Arthritis    • Breast cancer (CMS/HCC)    • CVD (cardiovascular disease)    • Macular degeneration    • Peripheral neuropathy    • Vertigo          PAST SURGICAL HISTORY  Past Surgical History:   Procedure Laterality Date   • ADRENAL GLAND SURGERY     • BRAIN MENINGIOMA EXCISION      occipital   •  SECTION     • EMBOLECTOMY N/A 11/15/2017    Procedure: Embolectomy Mechanical;  Surgeon: Rachid Figueroa MD;  Location: [facility] ;  Service:    • MASTECTOMY     • RECTAL SURGERY     • REPLACEMENT TOTAL KNEE Right    • TOTAL SHOULDER REPLACEMENT      x2         FAMILY HISTORY  Family History   Problem Relation Age of Onset   • Hypertension Mother    • Hyperlipidemia Mother    • Heart disease Mother    • Cancer Father    • Alzheimer's disease Sister    • No Known Problems Brother    • No Known Problems Sister          SOCIAL HISTORY  Social History     Socioeconomic History   • Marital status:      Spouse name: Vitaly   • Number of children: 4   • Years of  education: 12   • Highest education level: Not on file   Tobacco Use   • Smoking status: Former Smoker     Packs/day: 2.00     Years: 40.00     Pack years: 80.00     Types: Cigarettes   • Smokeless tobacco: Never Used   • Tobacco comment: non-smoker since 2003   Substance and Sexual Activity   • Alcohol use: No   • Drug use: No   • Sexual activity: Defer         ALLERGIES  Penicillins        REVIEW OF SYSTEMS  Review of Systems   Constitutional: Negative for chills and fever.   Respiratory: Positive for chest tightness and shortness of breath.    Cardiovascular: Negative for palpitations and leg swelling.   Gastrointestinal: Negative for abdominal pain, diarrhea, nausea and vomiting.        All systems reviewed and negative except for those discussed in HPI.       PHYSICAL EXAM    I have reviewed the triage vital signs and nursing notes.    ED Triage Vitals   Temp Heart Rate Resp BP SpO2   06/15/21 1425 06/15/21 1425 06/15/21 1425 06/15/21 1601 06/15/21 1425   97.9 °F (36.6 °C) 65 20 148/82 93 %      Temp src Heart Rate Source Patient Position BP Location FiO2 (%)   06/15/21 1425 06/15/21 1425 06/15/21 1601 06/15/21 1601 --   Tympanic Monitor Lying Left arm        Physical Exam   Constitutional: Pt. is oriented to person, place, and time and well-developed, well-nourished, and in no distress.    HENT: Normocephalic and atraumatic.  Neck: Normal range of motion. Neck supple. No JVD present. No tracheal deviation present. No thyromegaly present.   Cardiovascular: Normal rate, regular rhythm and normal heart sounds. Exam reveals no gallop and no friction rub.   No murmur heard.  Pulmonary/Chest: Effort normal and breath sounds normal. No stridor. No respiratory distress. No wheezes, no rales.   Abdominal: Soft. Bowel sounds are normal. No distension. There is no tenderness. There is no rebound and no guarding.   Musculoskeletal: No edema, tenderness or deformity.   Neurological: Pt. is alert and oriented to person,  place, and time.  She has no focal neurologic deficits.  Skin: Skin is warm and dry. No rash noted. Pt. is not diaphoretic. No erythema.   Psychiatric: Mood, affect and judgment normal.  She is pleasant and cooperative.  Nursing note and vitals reviewed.        LAB RESULTS  Recent Results (from the past 24 hour(s))   ECG 12 Lead    Collection Time: 06/15/21  2:31 PM   Result Value Ref Range    QT Interval 469 ms   Comprehensive Metabolic Panel    Collection Time: 06/15/21  3:26 PM    Specimen: Blood   Result Value Ref Range    Glucose 75 65 - 99 mg/dL    BUN 12 8 - 23 mg/dL    Creatinine 0.93 0.57 - 1.00 mg/dL    Sodium 138 136 - 145 mmol/L    Potassium 5.1 3.5 - 5.2 mmol/L    Chloride 103 98 - 107 mmol/L    CO2 27.5 22.0 - 29.0 mmol/L    Calcium 9.1 8.6 - 10.5 mg/dL    Total Protein 6.8 6.0 - 8.5 g/dL    Albumin 4.20 3.50 - 5.20 g/dL    ALT (SGPT) 17 1 - 33 U/L    AST (SGOT) 24 1 - 32 U/L    Alkaline Phosphatase 129 (H) 39 - 117 U/L    Total Bilirubin 0.2 0.0 - 1.2 mg/dL    eGFR Non African Amer 58 (L) >60 mL/min/1.73    Globulin 2.6 gm/dL    A/G Ratio 1.6 g/dL    BUN/Creatinine Ratio 12.9 7.0 - 25.0    Anion Gap 7.5 5.0 - 15.0 mmol/L   Troponin    Collection Time: 06/15/21  3:26 PM    Specimen: Blood   Result Value Ref Range    Troponin T <0.010 0.000 - 0.030 ng/mL   Green Top (Gel)    Collection Time: 06/15/21  3:26 PM   Result Value Ref Range    Extra Tube Hold for add-ons.    Lavender Top    Collection Time: 06/15/21  3:26 PM   Result Value Ref Range    Extra Tube hold for add-on    Gold Top - SST    Collection Time: 06/15/21  3:26 PM   Result Value Ref Range    Extra Tube Hold for add-ons.    CBC Auto Differential    Collection Time: 06/15/21  3:26 PM    Specimen: Blood   Result Value Ref Range    WBC 5.41 3.40 - 10.80 10*3/mm3    RBC 3.98 3.77 - 5.28 10*6/mm3    Hemoglobin 12.7 12.0 - 15.9 g/dL    Hematocrit 38.2 34.0 - 46.6 %    MCV 96.0 79.0 - 97.0 fL    MCH 31.9 26.6 - 33.0 pg    MCHC 33.2 31.5 - 35.7 g/dL     RDW 11.7 (L) 12.3 - 15.4 %    RDW-SD 40.4 37.0 - 54.0 fl    MPV 10.3 6.0 - 12.0 fL    Platelets 236 140 - 450 10*3/mm3    Neutrophil % 42.1 (L) 42.7 - 76.0 %    Lymphocyte % 31.8 19.6 - 45.3 %    Monocyte % 19.8 (H) 5.0 - 12.0 %    Eosinophil % 4.6 0.3 - 6.2 %    Basophil % 1.3 0.0 - 1.5 %    Immature Grans % 0.4 0.0 - 0.5 %    Neutrophils, Absolute 2.28 1.70 - 7.00 10*3/mm3    Lymphocytes, Absolute 1.72 0.70 - 3.10 10*3/mm3    Monocytes, Absolute 1.07 (H) 0.10 - 0.90 10*3/mm3    Eosinophils, Absolute 0.25 0.00 - 0.40 10*3/mm3    Basophils, Absolute 0.07 0.00 - 0.20 10*3/mm3    Immature Grans, Absolute 0.02 0.00 - 0.05 10*3/mm3    nRBC 0.0 0.0 - 0.2 /100 WBC   BNP    Collection Time: 06/15/21  3:26 PM    Specimen: Blood   Result Value Ref Range    proBNP 247.6 0.0-1,800.0 pg/mL   D-dimer, Quantitative    Collection Time: 06/15/21  4:11 PM    Specimen: Blood   Result Value Ref Range    D-Dimer, Quantitative 0.55 (H) 0.00 - 0.49 MCGFEU/mL   Troponin    Collection Time: 06/15/21  5:24 PM    Specimen: Blood   Result Value Ref Range    Troponin T <0.010 0.000 - 0.030 ng/mL       Ordered the above labs and independently reviewed the results.        RADIOLOGY  XR Chest 2 View    Result Date: 6/15/2021  XR CHEST 2 VW-  HISTORY: Female who is 81 years-old,  chest pain  TECHNIQUE: Frontal and lateral views of the chest  COMPARISON: 10/16/2018  FINDINGS: The heart size is normal. Aorta is tortuous, calcified. Pulmonary vasculature is unremarkable. No focal pulmonary consolidation, pleural effusion, or pneumothorax. Old granulomatous disease is apparent. No acute osseous process.      No evidence for acute pulmonary process. Follow-up as clinical indications persist.  This report was finalized on 6/15/2021 4:07 PM by Dr. Mike Alvarez M.D.        I ordered the above noted radiological studies. Reviewed by me and discussed with radiologist.  See dictation for official radiology  interpretation.      PROCEDURES    Procedures      MEDICATIONS GIVEN IN ER    Medications   sodium chloride 0.9 % flush 10 mL (has no administration in time range)         PROGRESS, DATA ANALYSIS, CONSULTS, AND MEDICAL DECISION MAKING    Any/all labs have been independently reviewed by me.  Any/all radiology studies have been reviewed by me and discussed with radiologist dictating the report.   EKG's independently viewed and interpreted by me.  Discussion below represents my analysis of pertinent findings related to patient's condition, differential diagnosis, treatment plan and final disposition.      ED Course as of José Antonio 15 1842   Tue José Antonio 15, 2021   1555 EKG performed at 1431 and interpreted by me shows normal sinus rhythm with a rate of 59 bpm.  The intervals, QRS complexes and ST segments are unremarkable.  There is no significant change compared to 11/28/2017.    [WC]   1619 Normal   Troponin T: <0.010 [WC]   1650 Right adjusted for age and low clinical suspicion for PE, DVT is very unlikely.   D-Dimer, Quant(!): 0.55 [WC]   1816 Troponin T: <0.010 [WC]   1839 HEART SCORE    History Slightly or non-suspicious (0)  ECG Normal (0)  Age > or = 65 (2)  Risk factors 1 or 2 (1)  Troponin < or = Normal limit (0)    This patient's HEART score is 3    HEART Score Key:  Scores 0-3: 0.9-1.7% risk of adverse cardiac event. In the HEART Score study, these patients were discharged (0.99% in the retrospective study, 1.7% in the prospective study)  Scores 4-6: 12-16.6% risk of adverse cardiac event. In the HEART Score study, these patients were admitted to the hospital. (11.6% retrospective, 16.6% prospective)  Scores ?7: 50-65% risk of adverse cardiac event. In the HEART Score study, these patients were candidates for early invasive measures. (65.2% retrospective, 50.1% prospective)        [WC]   1839 Labs, EKG and imaging as above.  My clinical suspicion for a serious underlying pulmonary or cardiac etiology is low.  There is  no evidence to suggest pulmonary embolism, aortic dissection or acute coronary syndrome.  The patient was advised to return to the emergency department should the symptoms worsen or for any new concerns.  The patient can be safely followed as an outpatient for further workup as indicated.      [WC]      ED Course User Index  [WC] Norman Washington MD       AS OF 18:42 EDT VITALS:    BP - 148/82  HR - 60  TEMP - 97.9 °F (36.6 °C) (Tympanic)  02 SATS - 99%        DIAGNOSIS  Final diagnoses:   Atypical chest pain         DISPOSITION  Discharged           Norman Washington MD  06/15/21 [id number]

## 2021-06-15 NOTE — ED TRIAGE NOTES
Pt arrives via PV. Pt complains of chest pain, bilateral arm pain and pain with breathing that started this morning around 1000. Pt reports taking crushed 325mg ASA and it relieved her symptoms. Pt currently pain free.    Pt masked on arrival, staff masked

## 2021-06-18 DIAGNOSIS — E03.9 ACQUIRED HYPOTHYROIDISM: ICD-10-CM

## 2021-06-18 RX ORDER — LEVOTHYROXINE SODIUM 0.1 MG/1
100 TABLET ORAL DAILY
Qty: 30 TABLET | Refills: 2 | Status: SHIPPED | OUTPATIENT
Start: 2021-06-18 | End: 2021-09-20

## 2021-07-19 RX ORDER — FUROSEMIDE 20 MG/1
TABLET ORAL
Qty: 90 TABLET | Refills: 1 | Status: SHIPPED | OUTPATIENT
Start: 2021-07-19 | End: 2022-02-03

## 2021-07-21 ENCOUNTER — HOSPITAL ENCOUNTER (OUTPATIENT)
Dept: MAMMOGRAPHY | Facility: HOSPITAL | Age: 81
Discharge: HOME | End: 2021-07-21
Admitting: INTERNAL MEDICINE

## 2021-07-21 DIAGNOSIS — Z12.31 SCREENING MAMMOGRAM, ENCOUNTER FOR: ICD-10-CM

## 2021-07-21 PROCEDURE — 77063 BREAST TOMOSYNTHESIS BI: CPT

## 2021-07-21 PROCEDURE — 77067 SCR MAMMO BI INCL CAD: CPT

## 2021-08-11 RX ORDER — ESCITALOPRAM OXALATE 20 MG/1
TABLET ORAL
Qty: 90 TABLET | Refills: 0 | Status: SHIPPED | OUTPATIENT
Start: 2021-08-11 | End: 2021-10-15

## 2021-08-11 RX ORDER — ROSUVASTATIN CALCIUM 20 MG/1
TABLET, COATED ORAL
Qty: 90 TABLET | Refills: 0 | Status: SHIPPED | OUTPATIENT
Start: 2021-08-11 | End: 2021-08-23

## 2021-08-11 RX ORDER — PANTOPRAZOLE SODIUM 40 MG/1
TABLET, DELAYED RELEASE ORAL
Qty: 90 TABLET | Refills: 0 | Status: SHIPPED | OUTPATIENT
Start: 2021-08-11 | End: 2021-11-15

## 2021-08-23 RX ORDER — ROSUVASTATIN CALCIUM 20 MG/1
TABLET, COATED ORAL
Qty: 90 TABLET | Refills: 0 | Status: SHIPPED | OUTPATIENT
Start: 2021-08-23 | End: 2022-02-07

## 2021-09-07 DIAGNOSIS — G89.29 CHRONIC BILATERAL LOW BACK PAIN WITH RIGHT-SIDED SCIATICA: ICD-10-CM

## 2021-09-07 DIAGNOSIS — M54.41 CHRONIC BILATERAL LOW BACK PAIN WITH RIGHT-SIDED SCIATICA: ICD-10-CM

## 2021-09-07 RX ORDER — TRAMADOL HYDROCHLORIDE 50 MG/1
TABLET ORAL
Qty: 30 TABLET | Refills: 0 | Status: SHIPPED | OUTPATIENT
Start: 2021-09-07 | End: 2021-09-24

## 2021-09-09 ENCOUNTER — TELEMEDICINE (OUTPATIENT)
Dept: FAMILY MEDICINE CLINIC | Facility: CLINIC | Age: 81
End: 2021-09-09

## 2021-09-09 VITALS — BODY MASS INDEX: 30.73 KG/M2 | HEIGHT: 64 IN | WEIGHT: 180 LBS

## 2021-09-09 DIAGNOSIS — I10 ESSENTIAL HYPERTENSION: ICD-10-CM

## 2021-09-09 DIAGNOSIS — M51.36 DDD (DEGENERATIVE DISC DISEASE), LUMBAR: ICD-10-CM

## 2021-09-09 DIAGNOSIS — M48.062 SPINAL STENOSIS OF LUMBAR REGION WITH NEUROGENIC CLAUDICATION: ICD-10-CM

## 2021-09-09 DIAGNOSIS — E78.2 MIXED HYPERLIPIDEMIA: ICD-10-CM

## 2021-09-09 DIAGNOSIS — I97.2 POSTMASTECTOMY LYMPHEDEMA SYNDROME: Primary | ICD-10-CM

## 2021-09-09 DIAGNOSIS — E03.9 ACQUIRED HYPOTHYROIDISM: ICD-10-CM

## 2021-09-09 DIAGNOSIS — G40.109: ICD-10-CM

## 2021-09-09 DIAGNOSIS — E03.9 ACQUIRED HYPOTHYROIDISM: Primary | ICD-10-CM

## 2021-09-09 DIAGNOSIS — Z00.00 MEDICARE ANNUAL WELLNESS VISIT, SUBSEQUENT: ICD-10-CM

## 2021-09-09 DIAGNOSIS — R53.82 CHRONIC FATIGUE: ICD-10-CM

## 2021-09-09 PROBLEM — G47.19 EXCESSIVE DAYTIME SLEEPINESS: Status: ACTIVE | Noted: 2021-07-21

## 2021-09-09 PROBLEM — Z87.891 FORMER SMOKER: Status: ACTIVE | Noted: 2021-07-21

## 2021-09-09 PROBLEM — K21.9 GASTROESOPHAGEAL REFLUX DISEASE: Status: ACTIVE | Noted: 2021-07-21

## 2021-09-09 PROCEDURE — G0439 PPPS, SUBSEQ VISIT: HCPCS | Performed by: FAMILY MEDICINE

## 2021-09-09 PROCEDURE — 99214 OFFICE O/P EST MOD 30 MIN: CPT | Performed by: FAMILY MEDICINE

## 2021-09-09 RX ORDER — ATENOLOL 25 MG/1
25 TABLET ORAL 2 TIMES DAILY
COMMUNITY
Start: 2021-08-12

## 2021-09-09 NOTE — PROGRESS NOTES
Medicare Subsequent Wellness Visit  Subjective    You have chosen to receive care through a telehealth visit.  Do you consent to use a video/audio connection for your medical care today? Yes  History of Present Illness    Avelina Carr is a 81 y.o. female who presents for an Medicare Wellness Visit. In addition, we addressed the following health issues:  Chronic low back pain and peripheral neuropathy. Between her back issues, multiple joint replacements, she is in pain most of the time. She has been managing with gabapentin and tramadol and would like for me to refill for her as needed.    Seizure disorder - this is a chronic problem due to the benign meningioma in her brain. The gabapentin is doing double duty her and helping prevent seizures. She is no longer seeing a neurologist.     Paloma  also has a hx of depression and states that she is getting good relief from symptoms on current medication, Lexapro. This is a chronic problem that is responding well to treatment. She  has no intolerable side effects to medication and reports that his helps lift mood and makes daily life, relationships better. No thoughts of self harm expressed.    Paloma has a history of chronic hypertension and has been well controlled on current medications.She is tolerating medications without side effect. She reports no vision changes, headaches or lightheadedness. She is requesting refills of medications.  She has a history of chronic hyperlipidemia and needs lab work today to evaluate response to therapy. She is tolerating medications well without side effects.  COPD- She has a past hx of smoking and is now on Symbicort and albuterol and feels she is dong well.  Hypothyoid - This is a chronic problem that is well controlled.   Breast Cancer with lymphedema. She is on Lasix, oral chemotherapy and followed by oncology.  History of CVA with embolism. She is now on Plavix and  aspirin and has recovered    PMH, PSH, SocHx, FamHx, Allergies,  and Medications: Reviewed and updated in the history section of chart.  Family History   Problem Relation Age of Onset   • Hypertension Mother    • Hyperlipidemia Mother    • Heart disease Mother    • Cancer Father    • Alzheimer's disease Sister    • No Known Problems Brother    • No Known Problems Sister        Social History     Social History Narrative    Lives at home with        Allergies   Allergen Reactions   • Penicillins Rash       Outpatient Medications Prior to Visit   Medication Sig Dispense Refill   • acetaminophen (TYLENOL) 325 MG tablet Take 650 mg by mouth As Needed for Mild Pain .     • albuterol (PROVENTIL HFA;VENTOLIN HFA) 108 (90 Base) MCG/ACT inhaler Inhale 2 puffs Every 4 (Four) Hours As Needed for Wheezing. 1 inhaler 0   • aspirin 81 MG EC tablet Take 81 mg by mouth Daily.     • atenolol (TENORMIN) 25 MG tablet Take 25 mg by mouth.     • budesonide-formoterol (Symbicort) 80-4.5 MCG/ACT inhaler INHALE TWO PUFFS BY MOUTH TWICE A DAY     • Cholecalciferol (VITAMIN D3) 5000 units capsule capsule Take 5,000 Units by mouth Daily.     • clopidogrel (PLAVIX) 75 MG tablet TAKE ONE TABLET BY MOUTH DAILY 90 tablet 1   • cyanocobalamin 1000 MCG/ML injection Take 1,000 mcg by mouth Daily.     • escitalopram (LEXAPRO) 20 MG tablet TAKE ONE TABLET BY MOUTH DAILY 90 tablet 0   • Folbic 2.5-25-2 MG tablet tablet TAKE ONE TABLET BY MOUTH DAILY 30 tablet 0   • furosemide (LASIX) 20 MG tablet TAKE ONE TABLET BY MOUTH DAILY 90 tablet 1   • gabapentin (NEURONTIN) 800 MG tablet TAKE ONE TABLET BY MOUTH THREE TIMES A  tablet 1   • letrozole (FEMARA) 2.5 MG tablet Take 2.5 mg by mouth Daily.     • levothyroxine (SYNTHROID, LEVOTHROID) 100 MCG tablet Take 1 tablet by mouth Daily. 30 tablet 2   • MELATONIN PO Take 1 tablet by mouth Every Night.     • Multiple Vitamins-Minerals (MULTIVITAMIN ADULT PO) Take 1 tablet by mouth Daily.     • pantoprazole (PROTONIX) 40 MG EC tablet TAKE ONE TABLET BY MOUTH DAILY  90 tablet 0   • Probiotic Product (PROBIOTIC DAILY PO) Take 1 tablet by mouth Daily.     • rosuvastatin (CRESTOR) 20 MG tablet TAKE ONE TABLET BY MOUTH DAILY 90 tablet 0   • traMADol (ULTRAM) 50 MG tablet TAKE ONE TABLET BY MOUTH EVERY 6 HOURS AS NEEDED FOR MODERATE PAIN 30 tablet 0   • atenolol (TENORMIN) 25 MG tablet        No facility-administered medications prior to visit.        Patient Active Problem List   Diagnosis   • Postmastectomy lymphedema syndrome   • Malignant neoplasm of upper-outer quadrant of right female breast (CMS/HCC)   • Cerebrovascular accident (CVA) (CMS/HCC)   • Stroke (CMS/HCC)   • Hemarthrosis   • HTN (hypertension)   • HLD (hyperlipidemia)   • Adverse effect of antiplatelet agent   • Left middle cerebral artery stroke (CMS/HCC)   • Carcinoma of central portion of right breast in female, estrogen receptor positive (CMS/HCC)   • Chronic bilateral low back pain with right-sided sciatica   • History of lumbar surgery   • Seizure disorder, simple partial, without intractable epilepsy (CMS/HCC)   • Spinal stenosis of lumbar region with neurogenic claudication   • Status post craniectomy   • History of total right knee replacement   • Other forms of scoliosis, lumbar region   • DDD (degenerative disc disease), lumbar   • Benign meningioma of brain (CMS/HCC)   • Squamous cell carcinoma of leg, left   • Status post multiple cerebral infarctions   • Hypothyroid   • Depression   • COPD (chronic obstructive pulmonary disease) (CMS/HCC)   • Obstructive sleep apnea   • Squamous cell carcinoma of leg, right   • Pain in joint, multiple sites   • Excessive daytime sleepiness   • Former smoker   • Gastroesophageal reflux disease         Patient Care Team:  Goran Carr MD as PCP - General (Family Medicine)  Health Habits:  Current Diet: Well balanced diet  Tobacco use.  Pack years:  Dental Exam. UTD  Eye Exam. UTD  Exercise:very little due to multiple joint pains   Current exercise activities  include:  PSYCH: Good mood and positive affect  Recent Hospitalizations:  none    Age-appropriate Screening Schedule:  Refer to the list below for future screening recommendations based on patient's age. Orders for these recommended tests are listed in the plan section. The patient has been provided with a written plan.      Health Maintenance   Topic Date Due   • ANNUAL WELLNESS VISIT  07/03/2020   • INFLUENZA VACCINE  10/01/2021   • LIPID PANEL  02/25/2022   • DXA SCAN  03/04/2022   • MAMMOGRAM  07/21/2022   • TDAP/TD VACCINES (3 - Td or Tdap) 04/27/2027   • COVID-19 Vaccine  Completed   • Pneumococcal Vaccine 65+  Completed   • ZOSTER VACCINE  Completed       Depression Screen:   PHQ-2/PHQ-9 Depression Screening 9/9/2021   Little interest or pleasure in doing things 0   Feeling down, depressed, or hopeless 0   Trouble falling or staying asleep, or sleeping too much -   Feeling tired or having little energy -   Poor appetite or overeating -   Feeling bad about yourself - or that you are a failure or have let yourself or your family down -   Trouble concentrating on things, such as reading the newspaper or watching television -   Moving or speaking so slowly that other people could have noticed. Or the opposite - being so fidgety or restless that you have been moving around a lot more than usual -   Thoughts that you would be better off dead, or of hurting yourself in some way -   Total Score 0   If you checked off any problems, how difficult have these problems made it for you to do your work, take care of things at home, or get along with other people? -       Functional and Cognitive Screening:  Functional & Cognitive Status 9/9/2021   Do you have difficulty preparing food and eating? No   Do you have difficulty bathing yourself, getting dressed or grooming yourself? No   Do you have difficulty using the toilet? No   Do you have difficulty moving around from place to place? No   Do you have trouble with steps or  "getting out of a bed or a chair? No   Current Diet Well Balanced Diet   Dental Exam Up to date   Eye Exam Up to date   Exercise (times per week) 0 times per week   Current Exercises Include No Regular Exercise   Current Exercise Activities Include -   Do you need help using the phone?  No   Are you deaf or do you have serious difficulty hearing?  No   Do you need help with transportation? No   Do you need help shopping? No   Do you need help preparing meals?  No   Do you need help with housework?  No   Do you need help with laundry? No   Do you need help taking your medications? No   Do you need help managing money? No   Do you ever drive or ride in a car without wearing a seat belt? No   Have you felt unusual stress, anger or loneliness in the last month? No   Who do you live with? Spouse   If you need help, do you have trouble finding someone available to you? No   Have you been bothered in the last four weeks by sexual problems? No     Does the patient have evidence of cognitive impairment?     Compared to one year ago, the patient feels their physical health and mental health are the same.       Review of Systems   Constitutional: Positive for fatigue.   Gastrointestinal: Negative.    Musculoskeletal: Positive for arthralgias and back pain.   Neurological: Negative.    Psychiatric/Behavioral: Negative.        Objective     Vitals:    09/09/21 0917   Weight: 81.6 kg (180 lb)   Height: 162.6 cm (64\")       Body mass index is 30.9 kg/m².    PHYSICAL EXAM  Vitals reviewed and on chart.  HEENT: PERRLA, EOMI. Oral mucosa moist,   No LAD.  CV: RRR, no murmurs, rubs, clicks or gallops  LUNGS: CTA bilaterally  EXT: No edema, FROM in bilateral upper and lower ext  NEURO: CN II - XII grossly intact  PSYCH: good mood, positive affect, alert and engaged. No thoughts of self harm  expressed.    ASSESSMENT AND PLAN  Pap smear :  Mammogram:  Colon cancer screening :  Lung cancer screening :  Bone Density :      Problem List " Items Addressed This Visit        Cardiac and Vasculature    Postmastectomy lymphedema syndrome - Primary  She is followed by oncology and she is taking letrozole and she is also on a diuretic to help manage the edema.    Hypertension  She is well controlled on current medications which include atenolol and a diuretic.         Endocrine and Metabolic    Hypothyroid    Overview     Well managed on current dose of thyroid replacement. TSH needs to be checked and she will RTO for labs.eviewed and on chart.Patient will return to the office for labs.  Well refill medications as needed.         Relevant Medications    atenolol (TENORMIN) 25 MG tablet       Neuro    Seizure disorder, simple partial, without intractable epilepsy (CMS/[facility])    Overview     She is getting full duty from medication she is taking to help with pain in her back.  She is taking gabapentin 800 mg 3 times a day and that is helping keep her seizures under good control.  Thompson reviewed and on the chart.         Spinal stenosis of lumbar region with neurogenic claudication    DDD (degenerative disc disease), lumbar  She is getting some relief with tramadol and with gabapentin.  The patient has read and signed the [facility] Controlled Substance Contract.  I will continue to see patient for regular follow up appointments.  She are well controlled on current medication.  THOMPSON has been reviewed by me and is updated every 3 months. The patient is aware of the potential for addiction and dependence.          Other Visit Diagnoses     Medicare annual wellness visit, subsequent              Orders:  No orders of the defined types were placed in this encounter.      Follow Up:  Return in about 1 week (around 9/16/2021) for return for labs.     ADVANCED DIRECTIVES:   ACP discussion was held with the patient during this visit. Patient has an advance directive (not in EMR), copy requested.    An After Visit Summary and PPPS with all of these plans were  given to the patient.

## 2021-09-10 NOTE — PATIENT INSTRUCTIONS
Medicare Wellness  Personal Prevention Plan of Service     Date of Office Visit:  2021  Encounter Provider:  Goran Carr MD  Place of Service:  [facility]  Patient Name: Avelina Carr  :  1940    As part of the Medicare Wellness portion of your visit today, we are providing you with this personalized preventive plan of services (PPPS). This plan is based upon recommendations of the [place] Preventive Services Task Force (USPSTF) and the Advisory Committee on Immunization Practices (ACIP).    This lists the preventive care services that should be considered, and provides dates of when you are due. Items listed as completed are up-to-date and do not require any further intervention.    Health Maintenance   Topic Date Due   • ANNUAL WELLNESS VISIT  2020   • INFLUENZA VACCINE  10/01/2021   • LIPID PANEL  2022   • DXA SCAN  2022   • MAMMOGRAM  2022   • TDAP/TD VACCINES (3 - Td or Tdap) 2027   • COVID-19 Vaccine  Completed   • Pneumococcal Vaccine 65+  Completed   • ZOSTER VACCINE  Completed       No orders of the defined types were placed in this encounter.      Return in about 1 week (around 2021) for return for labs.

## 2021-09-19 DIAGNOSIS — E03.9 ACQUIRED HYPOTHYROIDISM: ICD-10-CM

## 2021-09-19 DIAGNOSIS — I63.412 CEREBROVASCULAR ACCIDENT (CVA) DUE TO EMBOLISM OF LEFT MIDDLE CEREBRAL ARTERY (HCC): ICD-10-CM

## 2021-09-19 DIAGNOSIS — G89.4 CHRONIC PAIN SYNDROME: ICD-10-CM

## 2021-09-20 RX ORDER — CLOPIDOGREL BISULFATE 75 MG/1
TABLET ORAL
Qty: 90 TABLET | Refills: 1 | Status: SHIPPED | OUTPATIENT
Start: 2021-09-20 | End: 2022-03-16

## 2021-09-20 RX ORDER — GABAPENTIN 800 MG/1
TABLET ORAL
Qty: 270 TABLET | Refills: 4 | Status: SHIPPED | OUTPATIENT
Start: 2021-09-20 | End: 2022-04-16 | Stop reason: HOSPADM

## 2021-09-20 RX ORDER — LEVOTHYROXINE SODIUM 0.1 MG/1
TABLET ORAL
Qty: 90 TABLET | Refills: 1 | Status: SHIPPED | OUTPATIENT
Start: 2021-09-20 | End: 2021-10-12 | Stop reason: SDUPTHER

## 2021-09-24 DIAGNOSIS — M54.41 CHRONIC BILATERAL LOW BACK PAIN WITH RIGHT-SIDED SCIATICA: ICD-10-CM

## 2021-09-24 DIAGNOSIS — G89.29 CHRONIC BILATERAL LOW BACK PAIN WITH RIGHT-SIDED SCIATICA: ICD-10-CM

## 2021-09-24 RX ORDER — TRAMADOL HYDROCHLORIDE 50 MG/1
TABLET ORAL
Qty: 30 TABLET | Refills: 0 | Status: SHIPPED | OUTPATIENT
Start: 2021-09-24 | End: 2021-10-15

## 2021-09-25 LAB
ALBUMIN SERPL-MCNC: 4.6 G/DL (ref 3.5–5.2)
ALBUMIN/GLOB SERPL: 2.2 G/DL
ALP SERPL-CCNC: 135 U/L (ref 39–117)
ALT SERPL-CCNC: 22 U/L (ref 1–33)
AST SERPL-CCNC: 29 U/L (ref 1–32)
BILIRUB SERPL-MCNC: 0.3 MG/DL (ref 0–1.2)
BUN SERPL-MCNC: 15 MG/DL (ref 8–23)
BUN/CREAT SERPL: 17 (ref 7–25)
CALCIUM SERPL-MCNC: 9.4 MG/DL (ref 8.6–10.5)
CHLORIDE SERPL-SCNC: 102 MMOL/L (ref 98–107)
CHOLEST SERPL-MCNC: 126 MG/DL (ref 0–200)
CHOLEST/HDLC SERPL: 3.23 {RATIO}
CO2 SERPL-SCNC: 26.6 MMOL/L (ref 22–29)
CREAT SERPL-MCNC: 0.88 MG/DL (ref 0.57–1)
ERYTHROCYTE [DISTWIDTH] IN BLOOD BY AUTOMATED COUNT: 13.3 % (ref 12.3–15.4)
GLOBULIN SER CALC-MCNC: 2.1 GM/DL
GLUCOSE SERPL-MCNC: 102 MG/DL (ref 65–99)
HCT VFR BLD AUTO: 37.6 % (ref 34–46.6)
HDLC SERPL-MCNC: 39 MG/DL (ref 40–60)
HGB BLD-MCNC: 12.5 G/DL (ref 12–15.9)
LDLC SERPL CALC-MCNC: 67 MG/DL (ref 0–100)
MCH RBC QN AUTO: 32.1 PG (ref 26.6–33)
MCHC RBC AUTO-ENTMCNC: 33.2 G/DL (ref 31.5–35.7)
MCV RBC AUTO: 96.7 FL (ref 79–97)
PLATELET # BLD AUTO: 258 10*3/MM3 (ref 140–450)
POTASSIUM SERPL-SCNC: 4.9 MMOL/L (ref 3.5–5.2)
PROT SERPL-MCNC: 6.7 G/DL (ref 6–8.5)
RBC # BLD AUTO: 3.89 10*6/MM3 (ref 3.77–5.28)
SODIUM SERPL-SCNC: 139 MMOL/L (ref 136–145)
TRIGL SERPL-MCNC: 106 MG/DL (ref 0–150)
TSH SERPL DL<=0.005 MIU/L-ACNC: 5.21 UIU/ML (ref 0.27–4.2)
VLDLC SERPL CALC-MCNC: 20 MG/DL (ref 5–40)
WBC # BLD AUTO: 5.04 10*3/MM3 (ref 3.4–10.8)

## 2021-09-27 DIAGNOSIS — E03.9 ACQUIRED HYPOTHYROIDISM: ICD-10-CM

## 2021-09-27 DIAGNOSIS — R79.89 ELEVATED TSH: ICD-10-CM

## 2021-09-27 DIAGNOSIS — R79.89 ABNORMAL TSH: Primary | ICD-10-CM

## 2021-10-12 DIAGNOSIS — E03.9 ACQUIRED HYPOTHYROIDISM: Primary | ICD-10-CM

## 2021-10-12 RX ORDER — LEVOTHYROXINE SODIUM 112 UG/1
112 TABLET ORAL DAILY
Qty: 90 TABLET | Refills: 1 | Status: SHIPPED | OUTPATIENT
Start: 2021-10-12 | End: 2022-03-04

## 2021-10-15 DIAGNOSIS — G89.29 CHRONIC BILATERAL LOW BACK PAIN WITH RIGHT-SIDED SCIATICA: ICD-10-CM

## 2021-10-15 DIAGNOSIS — M54.41 CHRONIC BILATERAL LOW BACK PAIN WITH RIGHT-SIDED SCIATICA: ICD-10-CM

## 2021-10-15 RX ORDER — TRAMADOL HYDROCHLORIDE 50 MG/1
TABLET ORAL
Qty: 30 TABLET | Refills: 5 | Status: SHIPPED | OUTPATIENT
Start: 2021-10-15 | End: 2022-03-08

## 2021-10-15 RX ORDER — ESCITALOPRAM OXALATE 20 MG/1
TABLET ORAL
Qty: 90 TABLET | Refills: 0 | Status: SHIPPED | OUTPATIENT
Start: 2021-10-15 | End: 2022-01-14 | Stop reason: SDUPTHER

## 2021-11-15 RX ORDER — PANTOPRAZOLE SODIUM 40 MG/1
TABLET, DELAYED RELEASE ORAL
Qty: 90 TABLET | Refills: 3 | Status: SHIPPED | OUTPATIENT
Start: 2021-11-15 | End: 2022-12-06

## 2021-11-16 ENCOUNTER — TELEPHONE (OUTPATIENT)
Dept: FAMILY MEDICINE CLINIC | Facility: CLINIC | Age: 81
End: 2021-11-16

## 2021-11-16 NOTE — TELEPHONE ENCOUNTER
Caller: Avelina Carr    Relationship: Self    Best call back number: [phone]     What orders are you requesting (i.e. lab or imaging): LAB    In what timeframe would the patient need to come in: Monday 11/22/21    Where will you receive your lab/imaging services: [facility]    Additional notes: PATIENT IS CALLING TO STATE SHE IS COMING IN FOR LABS ON THE FOLLOWING Monday.  SHE IS REQUESTING A TEST FOR GOUT TO BE ADDED TO THE ORDERS.  SHE STATES ONE OF HER GREAT TOES ACHES ALL THE TIME AND SHE WANTS TO CHECK FOR GOUT.      PLEASE ADVISE.

## 2021-11-19 DIAGNOSIS — E03.9 HYPOTHYROIDISM, UNSPECIFIED TYPE: Primary | ICD-10-CM

## 2021-11-22 DIAGNOSIS — M79.676 PAIN OF TOE, UNSPECIFIED LATERALITY: Primary | ICD-10-CM

## 2021-11-23 LAB
TSH SERPL DL<=0.005 MIU/L-ACNC: 2.82 UIU/ML (ref 0.45–4.5)
URATE SERPL-MCNC: 5.1 MG/DL (ref 3.1–7.9)

## 2021-12-13 ENCOUNTER — TELEPHONE (OUTPATIENT)
Dept: FAMILY MEDICINE CLINIC | Facility: CLINIC | Age: 81
End: 2021-12-13

## 2021-12-13 DIAGNOSIS — R42 VERTIGO: Primary | ICD-10-CM

## 2021-12-13 RX ORDER — MECLIZINE HYDROCHLORIDE 25 MG/1
25 TABLET ORAL 3 TIMES DAILY PRN
Qty: 30 TABLET | Refills: 1 | Status: ON HOLD | OUTPATIENT
Start: 2021-12-13 | End: 2022-04-12

## 2021-12-13 NOTE — TELEPHONE ENCOUNTER
I spoke with patient today after the phone call.  She notes that she has had vertigo off and on for over 20 years.  She is always kept a prescription of meclizine with her for these events but she is now out of that medication.  She states it was last filled several years ago and she only takes it very occasionally.  I have called in a prescription for her and advised her to notify me if this does not help resolve her dizziness.  She is also to alert me if she cannot keep food down the right now she says she is doing fine with eating and drinking.    Caller: Vitaly Carr    Relationship: Emergency Contact    Best call back number: [phone]    What medication are you requesting: UNSURE    What are your current symptoms: DIZZINESS AND NAUSEA     How long have you been experiencing symptoms: ALL DAY    Have you had these symptoms before:    [] Yes  [x] No    Have you been treated for these symptoms before:   [] Yes  [x] No    If a prescription is needed, what is your preferred pharmacy and phone number:    [facility] [place] [id number] AT [facility]. & [address] - [phone]  - [phone] FX  [phone]  Additional notes:   DID NOT WANT TO SCHEDULE AN APPOINTMENT WANTED TO ASK DR. CARR TO CALL SOMETHING IN FOR THE PATIENT.     PLEASE ADVISE.

## 2022-01-14 RX ORDER — ESCITALOPRAM OXALATE 20 MG/1
20 TABLET ORAL DAILY
Qty: 90 TABLET | Refills: 1 | Status: SHIPPED | OUTPATIENT
Start: 2022-01-14 | End: 2022-08-22

## 2022-02-03 RX ORDER — FUROSEMIDE 20 MG/1
TABLET ORAL
Qty: 90 TABLET | Refills: 1 | Status: SHIPPED | OUTPATIENT
Start: 2022-02-03 | End: 2022-08-29

## 2022-02-07 RX ORDER — ROSUVASTATIN CALCIUM 20 MG/1
TABLET, COATED ORAL
Qty: 90 TABLET | Refills: 1 | Status: SHIPPED | OUTPATIENT
Start: 2022-02-07 | End: 2022-04-16 | Stop reason: HOSPADM

## 2022-03-04 DIAGNOSIS — E03.9 ACQUIRED HYPOTHYROIDISM: ICD-10-CM

## 2022-03-04 RX ORDER — LEVOTHYROXINE SODIUM 112 UG/1
TABLET ORAL
Qty: 90 TABLET | Refills: 0 | Status: SHIPPED | OUTPATIENT
Start: 2022-03-04 | End: 2022-05-18

## 2022-03-08 DIAGNOSIS — G89.29 CHRONIC BILATERAL LOW BACK PAIN WITH RIGHT-SIDED SCIATICA: ICD-10-CM

## 2022-03-08 DIAGNOSIS — M54.41 CHRONIC BILATERAL LOW BACK PAIN WITH RIGHT-SIDED SCIATICA: ICD-10-CM

## 2022-03-08 RX ORDER — TRAMADOL HYDROCHLORIDE 50 MG/1
TABLET ORAL
Qty: 28 TABLET | Refills: 0 | Status: SHIPPED | OUTPATIENT
Start: 2022-03-08 | End: 2022-03-30

## 2022-03-16 DIAGNOSIS — I63.412 CEREBROVASCULAR ACCIDENT (CVA) DUE TO EMBOLISM OF LEFT MIDDLE CEREBRAL ARTERY: ICD-10-CM

## 2022-03-16 RX ORDER — CLOPIDOGREL BISULFATE 75 MG/1
TABLET ORAL
Qty: 90 TABLET | Refills: 1 | Status: SHIPPED | OUTPATIENT
Start: 2022-03-16 | End: 2022-10-17

## 2022-03-25 ENCOUNTER — OFFICE VISIT (OUTPATIENT)
Dept: FAMILY MEDICINE CLINIC | Facility: CLINIC | Age: 82
End: 2022-03-25

## 2022-03-25 VITALS
SYSTOLIC BLOOD PRESSURE: 178 MMHG | RESPIRATION RATE: 20 BRPM | TEMPERATURE: 98.2 F | BODY MASS INDEX: 30.9 KG/M2 | OXYGEN SATURATION: 98 % | DIASTOLIC BLOOD PRESSURE: 90 MMHG | WEIGHT: 181 LBS | HEIGHT: 64 IN | HEART RATE: 56 BPM

## 2022-03-25 DIAGNOSIS — J44.9 CHRONIC OBSTRUCTIVE PULMONARY DISEASE, UNSPECIFIED COPD TYPE: Primary | ICD-10-CM

## 2022-03-25 DIAGNOSIS — J40 BRONCHITIS WITH ACUTE WHEEZING: ICD-10-CM

## 2022-03-25 PROCEDURE — 99213 OFFICE O/P EST LOW 20 MIN: CPT | Performed by: NURSE PRACTITIONER

## 2022-03-25 RX ORDER — METHYLPREDNISOLONE 4 MG/1
1 TABLET ORAL DAILY
Qty: 21 TABLET | Refills: 0 | Status: SHIPPED | OUTPATIENT
Start: 2022-03-25 | End: 2022-03-30

## 2022-03-25 RX ORDER — BENZONATATE 100 MG/1
100 CAPSULE ORAL 3 TIMES DAILY PRN
Qty: 30 CAPSULE | Refills: 0 | Status: SHIPPED | OUTPATIENT
Start: 2022-03-25 | End: 2022-04-04

## 2022-03-25 NOTE — PROGRESS NOTES
Subjective   Avelina Carr is a 81 y.o. female.     History of Present Illness   Is new to this provider but known to the practice.  She is here today for acute fit in.    Patient presents today with chest congestion, cough, mild sore throat and runny nose, headache X 3 days.  Patient took home covid test yesterday which was negative. Patient is vaccinated against influenza, she has had 2 Covid vaccines but not the booster.  Patient denies sick contacts, she lives with  and he is well.  No fever no chills. She reports white sputum, takes OTC mucinex.  patient has a history of COPD (she claims this is asthma, she is a former smoker) which is managed on albuterol 2 puffs every 4 as needed and Symbicort 80-4 0.5 twice daily.      The following portions of the patient's history were reviewed and updated as appropriate: allergies, current medications, past family history, past medical history, past social history, past surgical history and problem list.    Review of Systems   Constitutional: Positive for fatigue. Negative for fever.   HENT: Positive for congestion, postnasal drip, rhinorrhea and sore throat.    Eyes: Negative for redness.   Respiratory: Positive for cough, shortness of breath and wheezing.    Cardiovascular: Positive for chest pain.   Gastrointestinal: Negative for nausea and vomiting.   Skin: Negative for rash.   Neurological: Positive for headache.       Objective   Physical Exam  Constitutional:       Appearance: Normal appearance.   HENT:      Head: Normocephalic.      Right Ear: Tympanic membrane normal. No swelling or tenderness.      Left Ear: Tympanic membrane normal. No swelling or tenderness.      Ears:      Comments: bilateral partial cerumen occulusion     Nose: Congestion present.      Mouth/Throat:      Mouth: Mucous membranes are moist.      Pharynx: Oropharynx is clear. Uvula midline. No pharyngeal swelling, oropharyngeal exudate or posterior oropharyngeal erythema.   Eyes:       Pupils: Pupils are equal, round, and reactive to light.   Cardiovascular:      Rate and Rhythm: Normal rate and regular rhythm.      Pulses: Normal pulses.   Pulmonary:      Effort: Pulmonary effort is normal.      Breath sounds: Wheezing present. No rhonchi or rales.   Chest:      Chest wall: No tenderness.   Abdominal:      General: Bowel sounds are normal.      Palpations: Abdomen is soft.   Musculoskeletal:      Cervical back: Normal range of motion.   Lymphadenopathy:      Cervical: No cervical adenopathy.   Skin:     General: Skin is warm.   Neurological:      General: No focal deficit present.      Mental Status: She is alert.   Psychiatric:         Mood and Affect: Mood normal.         Vitals:    03/25/22 1039   BP: 178/90   Pulse: 56   Resp: 20   Temp: 98.2 °F (36.8 °C)   SpO2: 98%     Body mass index is 31.07 kg/m².    Procedures    Assessment/Plan   Problems Addressed this Visit        Pulmonary and Pneumonias    COPD (chronic obstructive pulmonary disease) ([facility]) - Primary    Relevant Medications    methylPREDNISolone (MEDROL) 4 MG dose pack    benzonatate (Tessalon Perles) 100 MG capsule    Other Relevant Orders    COVID-19,[facility] ROUTINE, NP/OP SWAB IN TRANSPORT MEDIA OR ESWAB 72 HR TAT - Swab, Nasopharynx      Other Visit Diagnoses     Bronchitis with acute wheezing        Relevant Medications    benzonatate (Tessalon Perles) 100 MG capsule      Diagnoses       Codes Comments    Chronic obstructive pulmonary disease, unspecified COPD type ([facility])    -  Primary ICD-10-CM: J44.9  ICD-9-CM: 496     Bronchitis with acute wheezing     ICD-10-CM: J40  ICD-9-CM: 490         Acute bronchitis-continue with over-the-counter Mucinex, add saline nasal rinse, over-the-counter Tylenol as needed for discomfort, encourage salt water gargles, hot tea with lemon plenty of fluids, walking and deep breathing.  We will add Medrol Dosepak for wheezing and Tessalon Perls 100 3 times daily for spastic cough.  Continue with  albuterol every 4 hours as needed for wheezing and Symbicort twice daily.  Rest and hydrate.  Please notify us for sputum change, fever, chest pain or worsening symptoms.  We will check Covid PCR.  Advised patient to follow CDC guidelines for Covid precautions, mask, handwashing and social distance.     Return in 6 months for annual  Send any questions or concerns to the office  Education attached AVS for bronchitis and wheezing       Return in about 6 months (around 9/25/2022) for Annual.

## 2022-03-26 LAB
LABCORP SARS-COV-2, NAA 2 DAY TAT: NORMAL
SARS-COV-2 RNA RESP QL NAA+PROBE: NOT DETECTED

## 2022-03-30 DIAGNOSIS — M54.41 CHRONIC BILATERAL LOW BACK PAIN WITH RIGHT-SIDED SCIATICA: ICD-10-CM

## 2022-03-30 DIAGNOSIS — G89.29 CHRONIC BILATERAL LOW BACK PAIN WITH RIGHT-SIDED SCIATICA: ICD-10-CM

## 2022-03-30 RX ORDER — TRAMADOL HYDROCHLORIDE 50 MG/1
TABLET ORAL
Qty: 30 TABLET | Refills: 0 | Status: ON HOLD | OUTPATIENT
Start: 2022-03-30 | End: 2022-04-16 | Stop reason: SDUPTHER

## 2022-04-11 ENCOUNTER — HOSPITAL ENCOUNTER (INPATIENT)
Facility: HOSPITAL | Age: 82
LOS: 4 days | Discharge: NURSING, REHAB OR LONG-TERM CARE | End: 2022-04-16
Attending: EMERGENCY MEDICINE | Admitting: HOSPITALIST

## 2022-04-11 ENCOUNTER — APPOINTMENT (OUTPATIENT)
Dept: CT IMAGING | Facility: HOSPITAL | Age: 82
End: 2022-04-11

## 2022-04-11 ENCOUNTER — APPOINTMENT (OUTPATIENT)
Dept: GENERAL RADIOLOGY | Facility: HOSPITAL | Age: 82
End: 2022-04-11

## 2022-04-11 DIAGNOSIS — M54.41 CHRONIC BILATERAL LOW BACK PAIN WITH RIGHT-SIDED SCIATICA: ICD-10-CM

## 2022-04-11 DIAGNOSIS — R53.1 GENERALIZED WEAKNESS: Primary | ICD-10-CM

## 2022-04-11 DIAGNOSIS — G89.29 CHRONIC BILATERAL LOW BACK PAIN WITH RIGHT-SIDED SCIATICA: ICD-10-CM

## 2022-04-11 DIAGNOSIS — R47.9 DIFFICULTY WITH SPEECH: ICD-10-CM

## 2022-04-11 DIAGNOSIS — G40.109: ICD-10-CM

## 2022-04-11 LAB
ABO GROUP BLD: NORMAL
ALBUMIN SERPL-MCNC: 4.2 G/DL (ref 3.5–5.2)
ALBUMIN/GLOB SERPL: 1.8 G/DL
ALP SERPL-CCNC: 103 U/L (ref 39–117)
ALT SERPL W P-5'-P-CCNC: 19 U/L (ref 1–33)
ANION GAP SERPL CALCULATED.3IONS-SCNC: 13.8 MMOL/L (ref 5–15)
APTT PPP: 25.7 SECONDS (ref 22.7–35.4)
AST SERPL-CCNC: 27 U/L (ref 1–32)
BACTERIA UR QL AUTO: ABNORMAL /HPF
BASOPHILS # BLD AUTO: 0.08 10*3/MM3 (ref 0–0.2)
BASOPHILS NFR BLD AUTO: 1.3 % (ref 0–1.5)
BILIRUB SERPL-MCNC: 0.2 MG/DL (ref 0–1.2)
BILIRUB UR QL STRIP: NEGATIVE
BLD GP AB SCN SERPL QL: NEGATIVE
BUN SERPL-MCNC: 14 MG/DL (ref 8–23)
BUN/CREAT SERPL: 16.9 (ref 7–25)
CALCIUM SPEC-SCNC: 8.8 MG/DL (ref 8.6–10.5)
CHLORIDE SERPL-SCNC: 107 MMOL/L (ref 98–107)
CLARITY UR: CLEAR
CO2 SERPL-SCNC: 23.2 MMOL/L (ref 22–29)
COLOR UR: YELLOW
CREAT SERPL-MCNC: 0.83 MG/DL (ref 0.57–1)
DEPRECATED RDW RBC AUTO: 42.1 FL (ref 37–54)
EGFRCR SERPLBLD CKD-EPI 2021: 70.5 ML/MIN/1.73
EOSINOPHIL # BLD AUTO: 0.16 10*3/MM3 (ref 0–0.4)
EOSINOPHIL NFR BLD AUTO: 2.5 % (ref 0.3–6.2)
ERYTHROCYTE [DISTWIDTH] IN BLOOD BY AUTOMATED COUNT: 12.3 % (ref 12.3–15.4)
GLOBULIN UR ELPH-MCNC: 2.3 GM/DL
GLUCOSE SERPL-MCNC: 122 MG/DL (ref 65–99)
GLUCOSE UR STRIP-MCNC: NEGATIVE MG/DL
HCT VFR BLD AUTO: 36.1 % (ref 34–46.6)
HGB BLD-MCNC: 12.2 G/DL (ref 12–15.9)
HGB UR QL STRIP.AUTO: ABNORMAL
HYALINE CASTS UR QL AUTO: ABNORMAL /LPF
IMM GRANULOCYTES # BLD AUTO: 0.01 10*3/MM3 (ref 0–0.05)
IMM GRANULOCYTES NFR BLD AUTO: 0.2 % (ref 0–0.5)
INR PPP: 0.93 (ref 0.9–1.1)
KETONES UR QL STRIP: NEGATIVE
LEUKOCYTE ESTERASE UR QL STRIP.AUTO: NEGATIVE
LYMPHOCYTES # BLD AUTO: 1.6 10*3/MM3 (ref 0.7–3.1)
LYMPHOCYTES NFR BLD AUTO: 25.4 % (ref 19.6–45.3)
MCH RBC QN AUTO: 32.1 PG (ref 26.6–33)
MCHC RBC AUTO-ENTMCNC: 33.8 G/DL (ref 31.5–35.7)
MCV RBC AUTO: 95 FL (ref 79–97)
MONOCYTES # BLD AUTO: 0.9 10*3/MM3 (ref 0.1–0.9)
MONOCYTES NFR BLD AUTO: 14.3 % (ref 5–12)
NEUTROPHILS NFR BLD AUTO: 3.55 10*3/MM3 (ref 1.7–7)
NEUTROPHILS NFR BLD AUTO: 56.3 % (ref 42.7–76)
NITRITE UR QL STRIP: NEGATIVE
NRBC BLD AUTO-RTO: 0 /100 WBC (ref 0–0.2)
PH UR STRIP.AUTO: 6.5 [PH] (ref 5–8)
PLATELET # BLD AUTO: 202 10*3/MM3 (ref 140–450)
PMV BLD AUTO: 10.4 FL (ref 6–12)
POTASSIUM SERPL-SCNC: 3.6 MMOL/L (ref 3.5–5.2)
PROT SERPL-MCNC: 6.5 G/DL (ref 6–8.5)
PROT UR QL STRIP: NEGATIVE
PROTHROMBIN TIME: 12.4 SECONDS (ref 11.7–14.2)
RBC # BLD AUTO: 3.8 10*6/MM3 (ref 3.77–5.28)
RBC # UR STRIP: ABNORMAL /HPF
REF LAB TEST METHOD: ABNORMAL
RH BLD: POSITIVE
SODIUM SERPL-SCNC: 144 MMOL/L (ref 136–145)
SP GR UR STRIP: 1.01 (ref 1–1.03)
SQUAMOUS #/AREA URNS HPF: ABNORMAL /HPF
T&S EXPIRATION DATE: NORMAL
TROPONIN T SERPL-MCNC: <0.01 NG/ML (ref 0–0.03)
UROBILINOGEN UR QL STRIP: ABNORMAL
WBC # UR STRIP: ABNORMAL /HPF
WBC NRBC COR # BLD: 6.3 10*3/MM3 (ref 3.4–10.8)

## 2022-04-11 PROCEDURE — 71045 X-RAY EXAM CHEST 1 VIEW: CPT

## 2022-04-11 PROCEDURE — 80053 COMPREHEN METABOLIC PANEL: CPT | Performed by: EMERGENCY MEDICINE

## 2022-04-11 PROCEDURE — 86902 BLOOD TYPE ANTIGEN DONOR EA: CPT

## 2022-04-11 PROCEDURE — 86922 COMPATIBILITY TEST ANTIGLOB: CPT

## 2022-04-11 PROCEDURE — 85025 COMPLETE CBC W/AUTO DIFF WBC: CPT | Performed by: EMERGENCY MEDICINE

## 2022-04-11 PROCEDURE — 86901 BLOOD TYPING SEROLOGIC RH(D): CPT | Performed by: EMERGENCY MEDICINE

## 2022-04-11 PROCEDURE — 85730 THROMBOPLASTIN TIME PARTIAL: CPT | Performed by: EMERGENCY MEDICINE

## 2022-04-11 PROCEDURE — 85610 PROTHROMBIN TIME: CPT | Performed by: EMERGENCY MEDICINE

## 2022-04-11 PROCEDURE — 84484 ASSAY OF TROPONIN QUANT: CPT | Performed by: EMERGENCY MEDICINE

## 2022-04-11 PROCEDURE — P9612 CATHETERIZE FOR URINE SPEC: HCPCS

## 2022-04-11 PROCEDURE — 81001 URINALYSIS AUTO W/SCOPE: CPT | Performed by: EMERGENCY MEDICINE

## 2022-04-11 PROCEDURE — 99285 EMERGENCY DEPT VISIT HI MDM: CPT

## 2022-04-11 PROCEDURE — 86920 COMPATIBILITY TEST SPIN: CPT

## 2022-04-11 PROCEDURE — 86850 RBC ANTIBODY SCREEN: CPT | Performed by: EMERGENCY MEDICINE

## 2022-04-11 PROCEDURE — 93010 ELECTROCARDIOGRAM REPORT: CPT | Performed by: INTERNAL MEDICINE

## 2022-04-11 PROCEDURE — 93005 ELECTROCARDIOGRAM TRACING: CPT | Performed by: EMERGENCY MEDICINE

## 2022-04-11 PROCEDURE — 70450 CT HEAD/BRAIN W/O DYE: CPT

## 2022-04-11 PROCEDURE — 86900 BLOOD TYPING SEROLOGIC ABO: CPT | Performed by: EMERGENCY MEDICINE

## 2022-04-11 RX ORDER — SODIUM CHLORIDE 0.9 % (FLUSH) 0.9 %
10 SYRINGE (ML) INJECTION AS NEEDED
Status: DISCONTINUED | OUTPATIENT
Start: 2022-04-11 | End: 2022-04-16 | Stop reason: HOSPADM

## 2022-04-12 ENCOUNTER — APPOINTMENT (OUTPATIENT)
Dept: MRI IMAGING | Facility: HOSPITAL | Age: 82
End: 2022-04-12

## 2022-04-12 PROBLEM — R29.90 STROKE-LIKE SYMPTOMS: Status: ACTIVE | Noted: 2022-04-12

## 2022-04-12 PROBLEM — Z99.81 DEPENDENCE ON SUPPLEMENTAL OXYGEN: Status: ACTIVE | Noted: 2022-04-12

## 2022-04-12 PROBLEM — R53.1 GENERALIZED WEAKNESS: Status: ACTIVE | Noted: 2022-04-12

## 2022-04-12 LAB
CHOLEST SERPL-MCNC: 116 MG/DL (ref 0–200)
GLUCOSE BLDC GLUCOMTR-MCNC: 103 MG/DL (ref 70–130)
GLUCOSE BLDC GLUCOMTR-MCNC: 120 MG/DL (ref 70–130)
HBA1C MFR BLD: 5.9 % (ref 4.8–5.6)
HDLC SERPL-MCNC: 33 MG/DL (ref 40–60)
LDLC SERPL CALC-MCNC: 54 MG/DL (ref 0–100)
LDLC/HDLC SERPL: 1.46 {RATIO}
PA ADP PRP-ACNC: 178 PRU (ref 194–418)
QT INTERVAL: 434 MS
SARS-COV-2 RNA RESP QL NAA+PROBE: NOT DETECTED
TRIGL SERPL-MCNC: 174 MG/DL (ref 0–150)
TSH SERPL DL<=0.05 MIU/L-ACNC: 1.02 UIU/ML (ref 0.27–4.2)
VIT B12 BLD-MCNC: >2000 PG/ML (ref 211–946)
VLDLC SERPL-MCNC: 29 MG/DL (ref 5–40)

## 2022-04-12 PROCEDURE — 94640 AIRWAY INHALATION TREATMENT: CPT

## 2022-04-12 PROCEDURE — 80061 LIPID PANEL: CPT | Performed by: NURSE PRACTITIONER

## 2022-04-12 PROCEDURE — 94799 UNLISTED PULMONARY SVC/PX: CPT

## 2022-04-12 PROCEDURE — 82962 GLUCOSE BLOOD TEST: CPT

## 2022-04-12 PROCEDURE — U0005 INFEC AGEN DETEC AMPLI PROBE: HCPCS | Performed by: EMERGENCY MEDICINE

## 2022-04-12 PROCEDURE — 99222 1ST HOSP IP/OBS MODERATE 55: CPT | Performed by: STUDENT IN AN ORGANIZED HEALTH CARE EDUCATION/TRAINING PROGRAM

## 2022-04-12 PROCEDURE — 83036 HEMOGLOBIN GLYCOSYLATED A1C: CPT | Performed by: NURSE PRACTITIONER

## 2022-04-12 PROCEDURE — 94660 CPAP INITIATION&MGMT: CPT

## 2022-04-12 PROCEDURE — A9577 INJ MULTIHANCE: HCPCS | Performed by: HOSPITALIST

## 2022-04-12 PROCEDURE — U0003 INFECTIOUS AGENT DETECTION BY NUCLEIC ACID (DNA OR RNA); SEVERE ACUTE RESPIRATORY SYNDROME CORONAVIRUS 2 (SARS-COV-2) (CORONAVIRUS DISEASE [COVID-19]), AMPLIFIED PROBE TECHNIQUE, MAKING USE OF HIGH THROUGHPUT TECHNOLOGIES AS DESCRIBED BY CMS-2020-01-R: HCPCS | Performed by: EMERGENCY MEDICINE

## 2022-04-12 PROCEDURE — 0 GADOBENATE DIMEGLUMINE 529 MG/ML SOLUTION: Performed by: HOSPITALIST

## 2022-04-12 PROCEDURE — 84443 ASSAY THYROID STIM HORMONE: CPT | Performed by: NURSE PRACTITIONER

## 2022-04-12 PROCEDURE — 92610 EVALUATE SWALLOWING FUNCTION: CPT

## 2022-04-12 PROCEDURE — 97162 PT EVAL MOD COMPLEX 30 MIN: CPT

## 2022-04-12 PROCEDURE — 82607 VITAMIN B-12: CPT | Performed by: HOSPITALIST

## 2022-04-12 PROCEDURE — 97530 THERAPEUTIC ACTIVITIES: CPT

## 2022-04-12 PROCEDURE — 36415 COLL VENOUS BLD VENIPUNCTURE: CPT | Performed by: NURSE PRACTITIONER

## 2022-04-12 PROCEDURE — 85576 BLOOD PLATELET AGGREGATION: CPT | Performed by: NURSE PRACTITIONER

## 2022-04-12 PROCEDURE — 70553 MRI BRAIN STEM W/O & W/DYE: CPT

## 2022-04-12 PROCEDURE — 97166 OT EVAL MOD COMPLEX 45 MIN: CPT

## 2022-04-12 PROCEDURE — 97110 THERAPEUTIC EXERCISES: CPT

## 2022-04-12 RX ORDER — SODIUM CHLORIDE 9 MG/ML
75 INJECTION, SOLUTION INTRAVENOUS CONTINUOUS
Status: DISCONTINUED | OUTPATIENT
Start: 2022-04-12 | End: 2022-04-13

## 2022-04-12 RX ORDER — ATORVASTATIN CALCIUM 80 MG/1
80 TABLET, FILM COATED ORAL NIGHTLY
Status: DISCONTINUED | OUTPATIENT
Start: 2022-04-12 | End: 2022-04-12

## 2022-04-12 RX ORDER — FUROSEMIDE 20 MG/1
20 TABLET ORAL DAILY
Status: DISCONTINUED | OUTPATIENT
Start: 2022-04-12 | End: 2022-04-16 | Stop reason: HOSPADM

## 2022-04-12 RX ORDER — UREA 10 %
3 LOTION (ML) TOPICAL NIGHTLY PRN
Status: DISCONTINUED | OUTPATIENT
Start: 2022-04-12 | End: 2022-04-16 | Stop reason: HOSPADM

## 2022-04-12 RX ORDER — ACETAMINOPHEN 650 MG/1
650 SUPPOSITORY RECTAL EVERY 4 HOURS PRN
Status: DISCONTINUED | OUTPATIENT
Start: 2022-04-12 | End: 2022-04-16 | Stop reason: HOSPADM

## 2022-04-12 RX ORDER — ACETAMINOPHEN 325 MG/1
650 TABLET ORAL EVERY 6 HOURS PRN
Status: DISCONTINUED | OUTPATIENT
Start: 2022-04-12 | End: 2022-04-12 | Stop reason: SDUPTHER

## 2022-04-12 RX ORDER — TRAMADOL HYDROCHLORIDE 50 MG/1
50 TABLET ORAL EVERY 6 HOURS PRN
Status: DISCONTINUED | OUTPATIENT
Start: 2022-04-12 | End: 2022-04-16 | Stop reason: HOSPADM

## 2022-04-12 RX ORDER — ASPIRIN 325 MG
325 TABLET ORAL DAILY
Status: DISCONTINUED | OUTPATIENT
Start: 2022-04-12 | End: 2022-04-12

## 2022-04-12 RX ORDER — PANTOPRAZOLE SODIUM 40 MG/1
40 TABLET, DELAYED RELEASE ORAL DAILY
Status: DISCONTINUED | OUTPATIENT
Start: 2022-04-12 | End: 2022-04-16 | Stop reason: HOSPADM

## 2022-04-12 RX ORDER — ATENOLOL 25 MG/1
25 TABLET ORAL 2 TIMES DAILY
Status: DISCONTINUED | OUTPATIENT
Start: 2022-04-12 | End: 2022-04-16 | Stop reason: HOSPADM

## 2022-04-12 RX ORDER — MINERAL OIL, PETROLATUM 425; 573 MG/G; MG/G
OINTMENT OPHTHALMIC AS NEEDED
COMMUNITY
End: 2022-04-16 | Stop reason: HOSPADM

## 2022-04-12 RX ORDER — MELATONIN
5000 DAILY
Status: DISCONTINUED | OUTPATIENT
Start: 2022-04-12 | End: 2022-04-16 | Stop reason: HOSPADM

## 2022-04-12 RX ORDER — ACETAMINOPHEN 325 MG/1
650 TABLET ORAL EVERY 4 HOURS PRN
Status: DISCONTINUED | OUTPATIENT
Start: 2022-04-12 | End: 2022-04-16 | Stop reason: HOSPADM

## 2022-04-12 RX ORDER — IPRATROPIUM BROMIDE AND ALBUTEROL SULFATE 2.5; .5 MG/3ML; MG/3ML
3 SOLUTION RESPIRATORY (INHALATION) EVERY 4 HOURS PRN
Status: DISCONTINUED | OUTPATIENT
Start: 2022-04-12 | End: 2022-04-16 | Stop reason: HOSPADM

## 2022-04-12 RX ORDER — GABAPENTIN 400 MG/1
400 CAPSULE ORAL 3 TIMES DAILY
Refills: 4 | Status: DISCONTINUED | OUTPATIENT
Start: 2022-04-12 | End: 2022-04-16 | Stop reason: HOSPADM

## 2022-04-12 RX ORDER — ESCITALOPRAM OXALATE 20 MG/1
20 TABLET ORAL DAILY
Status: DISCONTINUED | OUTPATIENT
Start: 2022-04-12 | End: 2022-04-16 | Stop reason: HOSPADM

## 2022-04-12 RX ORDER — LETROZOLE 2.5 MG/1
2.5 TABLET, FILM COATED ORAL DAILY
Status: DISCONTINUED | OUTPATIENT
Start: 2022-04-12 | End: 2022-04-16 | Stop reason: HOSPADM

## 2022-04-12 RX ORDER — ASPIRIN 300 MG/1
300 SUPPOSITORY RECTAL DAILY
Status: DISCONTINUED | OUTPATIENT
Start: 2022-04-12 | End: 2022-04-12

## 2022-04-12 RX ORDER — ROSUVASTATIN CALCIUM 20 MG/1
20 TABLET, COATED ORAL DAILY
Status: DISCONTINUED | OUTPATIENT
Start: 2022-04-12 | End: 2022-04-15

## 2022-04-12 RX ORDER — LEVETIRACETAM 500 MG/1
500 TABLET ORAL 2 TIMES DAILY
Status: DISCONTINUED | OUTPATIENT
Start: 2022-04-12 | End: 2022-04-16 | Stop reason: HOSPADM

## 2022-04-12 RX ORDER — BUDESONIDE AND FORMOTEROL FUMARATE DIHYDRATE 80; 4.5 UG/1; UG/1
2 AEROSOL RESPIRATORY (INHALATION)
Status: DISCONTINUED | OUTPATIENT
Start: 2022-04-12 | End: 2022-04-12

## 2022-04-12 RX ORDER — BISACODYL 10 MG
10 SUPPOSITORY, RECTAL RECTAL DAILY PRN
Status: DISCONTINUED | OUTPATIENT
Start: 2022-04-12 | End: 2022-04-14 | Stop reason: SDUPTHER

## 2022-04-12 RX ORDER — ASPIRIN 81 MG/1
81 TABLET ORAL DAILY
Status: DISCONTINUED | OUTPATIENT
Start: 2022-04-12 | End: 2022-04-15

## 2022-04-12 RX ORDER — CLOPIDOGREL BISULFATE 75 MG/1
75 TABLET ORAL DAILY
Status: DISCONTINUED | OUTPATIENT
Start: 2022-04-12 | End: 2022-04-16 | Stop reason: HOSPADM

## 2022-04-12 RX ORDER — BUDESONIDE AND FORMOTEROL FUMARATE DIHYDRATE 80; 4.5 UG/1; UG/1
2 AEROSOL RESPIRATORY (INHALATION) 2 TIMES DAILY
Status: DISCONTINUED | OUTPATIENT
Start: 2022-04-12 | End: 2022-04-16 | Stop reason: HOSPADM

## 2022-04-12 RX ORDER — ONDANSETRON 2 MG/ML
4 INJECTION INTRAMUSCULAR; INTRAVENOUS EVERY 6 HOURS PRN
Status: DISCONTINUED | OUTPATIENT
Start: 2022-04-12 | End: 2022-04-16 | Stop reason: HOSPADM

## 2022-04-12 RX ADMIN — GABAPENTIN 400 MG: 400 CAPSULE ORAL at 20:38

## 2022-04-12 RX ADMIN — ASPIRIN 325 MG: 325 TABLET ORAL at 12:37

## 2022-04-12 RX ADMIN — Medication 5000 UNITS: at 15:43

## 2022-04-12 RX ADMIN — LEVETIRACETAM 500 MG: 500 TABLET, FILM COATED ORAL at 20:37

## 2022-04-12 RX ADMIN — CLOPIDOGREL 75 MG: 75 TABLET, FILM COATED ORAL at 15:43

## 2022-04-12 RX ADMIN — ESCITALOPRAM 20 MG: 20 TABLET, FILM COATED ORAL at 15:43

## 2022-04-12 RX ADMIN — PANTOPRAZOLE SODIUM 40 MG: 40 TABLET, DELAYED RELEASE ORAL at 15:43

## 2022-04-12 RX ADMIN — LETROZOLE 2.5 MG: 2.5 TABLET ORAL at 15:43

## 2022-04-12 RX ADMIN — GADOBENATE DIMEGLUMINE 16 ML: 529 INJECTION, SOLUTION INTRAVENOUS at 14:52

## 2022-04-12 RX ADMIN — SODIUM CHLORIDE 75 ML/HR: 9 INJECTION, SOLUTION INTRAVENOUS at 02:46

## 2022-04-12 RX ADMIN — SODIUM CHLORIDE 75 ML/HR: 9 INJECTION, SOLUTION INTRAVENOUS at 20:38

## 2022-04-12 RX ADMIN — GABAPENTIN 400 MG: 400 CAPSULE ORAL at 15:43

## 2022-04-12 RX ADMIN — FUROSEMIDE 20 MG: 20 TABLET ORAL at 15:43

## 2022-04-12 RX ADMIN — LEVETIRACETAM 500 MG: 500 TABLET, FILM COATED ORAL at 12:34

## 2022-04-12 RX ADMIN — ROSUVASTATIN CALCIUM 20 MG: 20 TABLET, FILM COATED ORAL at 15:43

## 2022-04-12 RX ADMIN — BUDESONIDE AND FORMOTEROL FUMARATE DIHYDRATE 2 PUFF: 80; 4.5 AEROSOL RESPIRATORY (INHALATION) at 21:46

## 2022-04-12 NOTE — PLAN OF CARE
Pt admitted to [facility] 2/2 to speech changes and weakness. MRI pending, stroke work up ongoing. Pmhx includes multiple strokes with the last one on left temporal stroke in 2015 status post mechanical thrombectomy and blood busting medicine, hypertension, hyperlipidemia, obstructive sleep apnea compliant with a CPAP machine, history of breast cancer, lumbar stenosis, seizure disorder maintained on Neurontin, benign paroxysmal positional vertigo. Upon arrival the pt was impulsively transferring back to the bed from the bedside chair with the nursing assistant. Pt with difficulty attending to her R side and delayed processing/sequencing steps of the tasks asked of her. Min-Mod A x2 for her SPS transfer 2/2 to weakness and safety. Max A x2 to assist into supine and scoot towards the HOB. Pt requiring total A for LBD and brief assistance. She remains generally confused and slurred speech remains. SNF rehab recommended - her  reports she was at [facility] after her last stroke.     Patient was not wearing a face mask during this therapy encounter. Therapist used appropriate personal protective equipment including mask and gloves. Mask used was standard procedure mask. Appropriate PPE was worn during the entire therapy session. Hand hygiene was completed before and after therapy session. Patient is not in enhanced droplet precautions.

## 2022-04-12 NOTE — THERAPY EVALUATION
Patient Name: Avelina Carr  : 1940    MRN: [id number]                              Today's Date: 2022       Admit Date: 2022    Visit Dx:     ICD-10-CM ICD-9-CM   1. Generalized weakness  R53.1 780.79   2. Difficulty with speech  R47.9 784.59     Patient Active Problem List   Diagnosis   • Postmastectomy lymphedema syndrome   • Malignant neoplasm of upper-outer quadrant of right female breast ([facility])   • Cerebrovascular accident (CVA) ([facility])   • Stroke ([facility])   • Hemarthrosis   • HTN (hypertension)   • HLD (hyperlipidemia)   • Adverse effect of antiplatelet agent   • Left middle cerebral artery stroke ([facility])   • Carcinoma of central portion of right breast in female, estrogen receptor positive ([facility])   • Chronic bilateral low back pain with right-sided sciatica   • History of lumbar surgery   • Seizure disorder, simple partial, without intractable epilepsy ([facility])   • Spinal stenosis of lumbar region with neurogenic claudication   • Status post craniectomy   • History of total right knee replacement   • Other forms of scoliosis, lumbar region   • DDD (degenerative disc disease), lumbar   • Benign meningioma of brain ([facility])   • Squamous cell carcinoma of leg, left   • History of cardioembolic cerebrovascular accident (CVA)   • Hypothyroid   • Depression   • COPD (chronic obstructive pulmonary disease) ([facility])   • Obstructive sleep apnea   • Squamous cell carcinoma of leg, right   • Pain in joint, multiple sites   • Excessive daytime sleepiness   • Former smoker   • Gastroesophageal reflux disease   • Migraines   • Generalized weakness   • Stroke-like symptoms   • Dependence on supplemental oxygen     Past Medical History:   Diagnosis Date   • Anxiety    • Arthritis    • Breast cancer ([facility])    • COPD (chronic obstructive pulmonary disease) ([facility])     oxygen at 2L aths   • CVD (cardiovascular disease)    • Depression    • Hypothyroid    • Macular degeneration    • Peripheral neuropathy    • Vertigo      Past  Surgical History:   Procedure Laterality Date   • ADRENAL GLAND SURGERY     • BRAIN MENINGIOMA EXCISION      occipital   •  SECTION     • EMBOLECTOMY N/A 11/15/2017    Procedure: Embolectomy Mechanical;  Surgeon: Rachid Figueroa MD;  Location: [facility] ;  Service:    • MASTECTOMY     • RECTAL SURGERY     • REPLACEMENT TOTAL KNEE Right    • TOTAL SHOULDER REPLACEMENT      x2      General Information     Row Name 22 1351          OT Time and Intention    Document Type evaluation  -RB     Mode of Treatment individual therapy;occupational therapy  -RB     Row Name 22 1351          General Information    Patient Profile Reviewed yes  -RB     Prior Level of Function min assist:;ADL's;transfer  -RB     Existing Precautions/Restrictions fall  -RB     Barriers to Rehab medically complex;cognitive status  -RB     Row Name 22 1351          Living Environment    People in Home spouse  -RB     Row Name 22 1351          Home Main Entrance    Number of Stairs, Main Entrance none  -RB     Row Name 22 1351          Cognition    Orientation Status (Cognition) oriented to;person;place;other (see comments)  general confusion present today.  -RB     Row Name 22 1351          Safety Issues, Functional Mobility    Safety Issues Affecting Function (Mobility) safety precautions follow-through/compliance;safety precaution awareness;insight into deficits/self-awareness;awareness of need for assistance;problem-solving;ability to follow commands;judgment;sequencing abilities  -RB     Impairments Affecting Function (Mobility) strength;balance;cognition;endurance/activity tolerance;postural/trunk control  -RB           User Key  (r) = Recorded By, (t) = Taken By, (c) = Cosigned By    Initials Name Provider Type    RB Delia Bergeron OT Occupational Therapist                 Mobility/ADL's     Row Name 22 1354          Bed Mobility    Bed Mobility sit-supine  -RB      Sit-Supine [place] (Bed Mobility) maximum assist (25% patient effort);2 person assist;nonverbal cues (demo/gesture);verbal cues  -RB     Assistive Device (Bed Mobility) bed rails;draw sheet  -RB     Comment, (Bed Mobility) pt impulsive with transfers and assist x2 to assist safely back to supine  -RB     Row Name 04/12/22 [facility]          Transfers    Transfers sit-stand transfer;stand-sit transfer  -RB     Comment, (Transfers) Pt found impulsively transferring back to bed from the chair with the nursing assistant. unsteady and pt with impaired command following  -RB     Sit-Stand [place] (Transfers) minimum assist (75% patient effort);2 person assist;nonverbal cues (demo/gesture);verbal cues;moderate assist (50% patient effort)  -RB     Row Name 04/12/22 [facility]          Sit-Stand Transfer    Assistive Device (Sit-Stand Transfers) walker, front-wheeled  -RB     Row Name 04/12/22 [facility]          Functional Mobility    Functional Mobility- Ind. Level not tested;unable to perform  -RB     Row Name 04/12/22 [facility]          Activities of Daily Living    BADL Assessment/Intervention lower body dressing  requires total A for brief management  -RB     Row Name 04/12/22 [facility]          Lower Body Dressing Assessment/Training    [place] Level (Lower Body Dressing) lower body dressing skills;dependent (less than 25% patient effort)  -RB     Position (Lower Body Dressing) supine  -RB           User Key  (r) = Recorded By, (t) = Taken By, (c) = Cosigned By    Initials Name Provider Type    RB Delia Bergeron OT Occupational Therapist               Obj/Interventions     Row Name 04/12/22 [facility]          Sensory Assessment (Somatosensory)    Sensory Assessment (Somatosensory) unable/difficult to assess  -RB     Row Name 04/12/22 [facility]          Vision Assessment/Intervention    Vision Assessment Comment r inattention/neglect seen during functional activity. L head tilt  -RB     Row Name 04/12/22 4933          Strength  Comprehensive (MMT)    Comment, General Manual Muscle Testing (MMT) Assessment RUE weakness > LLE weakness  -RB     Row Name 04/12/22 1353          Balance    Balance Assessment standing static balance  -RB     Static Standing Balance minimal assist;2-person assist;verbal cues;non-verbal cues (demo/gesture)  -RB     Dynamic Standing Balance moderate assist;verbal cues;non-verbal cues (demo/gesture);2-person assist  -RB     Position/Device Used, Standing Balance --  hha  -RB     Balance Interventions occupation based/functional task  -RB     Comment, Balance general unsteadiness.  -RB           User Key  (r) = Recorded By, (t) = Taken By, (c) = Cosigned By    Initials Name Provider Type    RB Delia Bergeron, OT Occupational Therapist               Goals/Plan     Row Name 04/12/22 7705          Bed Mobility Goal 1 (OT)    Activity/Assistive Device (Bed Mobility Goal 1, OT) bed mobility activities, all  -RB     [place] Level/Cues Needed (Bed Mobility Goal 1, OT) supervision required  -RB     Time Frame (Bed Mobility Goal 1, OT) short term goal (STG);2 weeks  -RB     Progress/Outcomes (Bed Mobility Goal 1, OT) continuing progress toward goal  -RB     Row Name 04/12/22 1355          Transfer Goal 1 (OT)    Activity/Assistive Device (Transfer Goal 1, OT) transfers, all  -RB     [place] Level/Cues Needed (Transfer Goal 1, OT) supervision required  -RB     Time Frame (Transfer Goal 1, OT) short term goal (STG);2 weeks  -RB     Progress/Outcome (Transfer Goal 1, OT) continuing progress toward goal  -RB     Row Name 04/12/22 1355          Dressing Goal 1 (OT)    Activity/Device (Dressing Goal 1, OT) dressing skills, all  -RB     [place]/Cues Needed (Dressing Goal 1, OT) supervision required  -RB     Time Frame (Dressing Goal 1, OT) short term goal (STG);2 weeks  -RB     Progress/Outcome (Dressing Goal 1, OT) continuing progress toward goal  -RB     Row Name 04/12/22 1355          Grooming Goal 1 (OT)     Activity/Device (Grooming Goal 1, OT) grooming skills, all  -RB     [place] (Grooming Goal 1, OT) supervision required  -RB     Time Frame (Grooming Goal 1, OT) short term goal (STG);2 weeks  -RB     Progress/Outcome (Grooming Goal 1, OT) continuing progress toward goal  -RB     Row Name 04/12/22 5271          Therapy Assessment/Plan (OT)    Planned Therapy Interventions (OT) transfer/mobility retraining;strengthening exercise;ROM/therapeutic exercise;BADL retraining;activity tolerance training;functional balance retraining;occupation/activity based interventions;patient/caregiver education/training;cognitive/visual perception retraining;neuromuscular control/coordination retraining  -RB           User Key  (r) = Recorded By, (t) = Taken By, (c) = Cosigned By    Initials Name Provider Type    RB Delia Bergeron, AZAM Occupational Therapist               Clinical Impression     Row Name 04/12/22 8262          Pain Assessment    Pretreatment Pain Rating 0/10 - no pain  -RB     Posttreatment Pain Rating 0/10 - no pain  -RB     Row Name 04/12/22 2415          Plan of Care Review    Plan of Care Reviewed With patient;spouse  -RB     Progress no change  -RB     Row Name 04/12/22 7179          Therapy Assessment/Plan (OT)    Rehab Potential (OT) good, to achieve stated therapy goals  -RB     Criteria for Skilled Therapeutic Interventions Met (OT) yes;skilled treatment is necessary  -RB     Therapy Frequency (OT) 5 times/wk  -RB     Row Name 04/12/22 0953          Therapy Plan Review/Discharge Plan (OT)    Anticipated Discharge Disposition (OT) skilled nursing facility  -RB     Row Name 04/12/22 8108          Vital Signs    O2 Delivery Pre Treatment room air  -RB     O2 Delivery Intra Treatment room air  -RB     O2 Delivery Post Treatment room air  -RB     Pre Patient Position Standing  -RB     Intra Patient Position Standing  -RB     Post Patient Position Supine  -RB     Row Name 04/12/22 1212          Positioning and  Restraints    Pre-Treatment Position standing in room  -RB     Post Treatment Position bed  -RB     In Bed notified nsg;supine;call light within reach;encouraged to call for assist;exit alarm on;legs elevated  -RB           User Key  (r) = Recorded By, (t) = Taken By, (c) = Cosigned By    Initials Name Provider Type    Delia Crouch OT Occupational Therapist               Outcome Measures     Row Name 04/12/22 1357          How much help from another is currently needed...    Putting on and taking off regular lower body clothing? 1  -RB     Bathing (including washing, rinsing, and drying) 1  -RB     Toileting (which includes using toilet bed pan or urinal) 1  -RB     Putting on and taking off regular upper body clothing 2  -RB     Taking care of personal grooming (such as brushing teeth) 2  -RB     Eating meals 2  -RB     AM-PAC 6 Clicks Score (OT) 9  -RB     Row Name 04/12/22 1309          How much help from another person do you currently need...    Turning from your back to your side while in flat bed without using bedrails? 3  -AR     Moving from lying on back to sitting on the side of a flat bed without bedrails? 3  -AR     Moving to and from a bed to a chair (including a wheelchair)? 3  -AR     Standing up from a chair using your arms (e.g., wheelchair, bedside chair)? 3  -AR     Climbing 3-5 steps with a railing? 1  -AR     To walk in hospital room? 3  -AR     AM-PAC 6 Clicks Score (PT) 16  -AR     Row Name 04/12/22 1357          Modified Gilbert Scale    Modified Jose Scale 4 - Moderately severe disability.  Unable to walk without assistance, and unable to attend to own bodily needs without assistance.  -RB     Row Name 04/12/22 6957 04/12/22 1309       Functional Assessment    Outcome Measure Options AM-PAC 6 Clicks Daily Activity (OT);Modified Gilbert  -RB AM-PAC 6 Clicks Basic Mobility (PT)  -AR          User Key  (r) = Recorded By, (t) = Taken By, (c) = Cosigned By    Initials Name Provider Type     Elvia Matthews, PT Physical Therapist    RB Delia Bergeron, OT Occupational Therapist                Occupational Therapy Education                 Title: PT OT SLP Therapies (In Progress)     Topic: Occupational Therapy (Not Started)     Point: ADL training (Not Started)     Description:   Instruct learner(s) on proper safety adaptation and remediation techniques during self care or transfers.   Instruct in proper use of assistive devices.              Learner Progress:  Not documented in this visit.          Point: Home exercise program (Not Started)     Description:   Instruct learner(s) on appropriate technique for monitoring, assisting and/or progressing therapeutic exercises/activities.              Learner Progress:  Not documented in this visit.          Point: Precautions (Not Started)     Description:   Instruct learner(s) on prescribed precautions during self-care and functional transfers.              Learner Progress:  Not documented in this visit.          Point: Body mechanics (Not Started)     Description:   Instruct learner(s) on proper positioning and spine alignment during self-care, functional mobility activities and/or exercises.              Learner Progress:  Not documented in this visit.                          OT Recommendation and Plan  Planned Therapy Interventions (OT): transfer/mobility retraining, strengthening exercise, ROM/therapeutic exercise, BADL retraining, activity tolerance training, functional balance retraining, occupation/activity based interventions, patient/caregiver education/training, cognitive/visual perception retraining, neuromuscular control/coordination retraining  Therapy Frequency (OT): 5 times/wk  Plan of Care Review  Plan of Care Reviewed With: patient, spouse  Progress: no change     Time Calculation:    Time Calculation- OT     Row Name 04/12/22 1350             Time Calculation- OT    OT Start Time 1020  -RB      OT Stop Time 1050  -RB      OT Time  Calculation (min) 30 min  -RB      Total Timed Code Minutes- OT 10 minute(s)  -RB      OT Received On 04/12/22  -RB      OT - Next Appointment 04/13/22  -RB      OT Goal Re-Cert Due Date 04/26/22  -RB              Timed Charges    45171 - OT Therapeutic Activity Minutes 10  -RB              Untimed Charges    OT Eval/Re-eval Minutes 20  -RB              Total Minutes    Timed Charges Total Minutes 10  -RB      Untimed Charges Total Minutes 20  -RB       Total Minutes 30  -RB            User Key  (r) = Recorded By, (t) = Taken By, (c) = Cosigned By    Initials Name Provider Type    RB Delia Bergeron OT Occupational Therapist              Therapy Charges for Today     Code Description Service Date Service Provider Modifiers Qty    64284939145 HC OT EVAL MOD COMPLEXITY 3 4/12/2022 Delia Bergeron OT GO 1    [id number]  OT THERAPEUTIC ACT EA 15 MIN 4/12/2022 Delia Bergeron OT GO 1               Delia Bergeron OT  4/12/2022

## 2022-04-12 NOTE — CASE MANAGEMENT/SOCIAL WORK
Discharge Planning Assessment  [facility]     Patient Name: Avelina Carr  MRN: [id number]  Today's Date: 4/12/2022    Admit Date: 4/11/2022     Discharge Needs Assessment     Row Name 04/12/22 1229       Living Environment    People in Home spouse    Name(s) of People in Home spouse Vitaly    Current Living Arrangements home  ss home with 1 step to enter/ rails    Primary Care Provided by self    Family Caregiver if Needed spouse    Able to Return to Prior Arrangements yes       Transition Planning    Patient/Family Anticipates Transition to home with family    Patient/Family Anticipated Services at Transition none       Discharge Needs Assessment    Equipment Currently Used at Home walker, standard    Concerns to be Addressed adjustment to diagnosis/illness               Discharge Plan     Row Name 04/12/22 2728       Plan    Plan Plans are home, CCP discussed HH, pt and spouse not sure if they will need yet.  CCP will follow.    Patient/Family in Agreement with Plan yes    Plan Comments CCP spoke with pt and spouse Vitaly @ bedside, confirmed face sheet and primary pharmacy as [facility] [employer] [employer] [facility].  Pt lives with spouse, uses a walker as needed, spouse is available to assist, and provides transport for pt.  Pt manages her own meds by placing in individual daily bags monthly, and says she has no trouble affording meds.  Pt has been to [facility], and [facility] for [facility], states they also had HH after SNU for short period.  Discussed dc plans, pt wants to return home, spouse will be available to help if needed.  Offered home health and explained what they could assist with ( ST, PT, or Nursing), and they would like to think about it. Aleksandra WAKEFIELD RN/CCP              Continued Care and Services - Admitted Since 4/11/2022    Coordination has not been started for this encounter.       Expected Discharge Date and Time     Expected Discharge Date Expected Discharge Time    Apr 14, 2022          Demographic  Summary    No documentation.                Functional Status     Row Name 04/12/22 1227       Functional Status    Usual Activity Tolerance good    Current Activity Tolerance moderate       Functional Status, IADL    Medications independent               Psychosocial    No documentation.                Abuse/Neglect    No documentation.                Legal     Row Name 04/12/22 1227       Financial/Legal    Who Manages Finances if Patient Unable Pt and spouse states pt has living will and spouse is HCS, upated them we do not have a copy and offered to upload if they provide copy               Substance Abuse    No documentation.                Patient Forms    No documentation.                   Aleksandra Baird RN

## 2022-04-12 NOTE — THERAPY EVALUATION
Acute Care - Speech Language Pathology   Swallow Initial Evaluation [facility]     Patient Name: Avelina Carr  : 1940  MRN: [id number]  Today's Date: 2022               Admit Date: 2022    Visit Dx:     ICD-10-CM ICD-9-CM   1. Generalized weakness  R53.1 780.79   2. Difficulty with speech  R47.9 784.59     Patient Active Problem List   Diagnosis   • Postmastectomy lymphedema syndrome   • Malignant neoplasm of upper-outer quadrant of right female breast (HCC)   • Cerebrovascular accident (CVA) (HCC)   • Stroke (HCC)   • Hemarthrosis   • HTN (hypertension)   • HLD (hyperlipidemia)   • Adverse effect of antiplatelet agent   • Left middle cerebral artery stroke (HCC)   • Carcinoma of central portion of right breast in female, estrogen receptor positive ([facility])   • Chronic bilateral low back pain with right-sided sciatica   • History of lumbar surgery   • Seizure disorder, simple partial, without intractable epilepsy ([facility])   • Spinal stenosis of lumbar region with neurogenic claudication   • Status post craniectomy   • History of total right knee replacement   • Other forms of scoliosis, lumbar region   • DDD (degenerative disc disease), lumbar   • Benign meningioma of brain ([facility])   • Squamous cell carcinoma of leg, left   • History of cardioembolic cerebrovascular accident (CVA)   • Hypothyroid   • Depression   • COPD (chronic obstructive pulmonary disease) ([facility])   • Obstructive sleep apnea   • Squamous cell carcinoma of leg, right   • Pain in joint, multiple sites   • Excessive daytime sleepiness   • Former smoker   • Gastroesophageal reflux disease   • Migraines   • Generalized weakness   • Stroke-like symptoms   • Dependence on supplemental oxygen     Past Medical History:   Diagnosis Date   • Anxiety    • Arthritis    • Breast cancer ([facility])    • COPD (chronic obstructive pulmonary disease) ([facility])     oxygen at 2L aths   • CVD (cardiovascular disease)    • Depression    • Hypothyroid    • Macular  degeneration    • Peripheral neuropathy    • Vertigo      Past Surgical History:   Procedure Laterality Date   • ADRENAL GLAND SURGERY     • BRAIN MENINGIOMA EXCISION      occipital   •  SECTION     • EMBOLECTOMY N/A 11/15/2017    Procedure: Embolectomy Mechanical;  Surgeon: Rachid Figueroa MD;  Location: [facility] ;  Service:    • MASTECTOMY     • RECTAL SURGERY     • REPLACEMENT TOTAL KNEE Right    • TOTAL SHOULDER REPLACEMENT      x2       SLP Recommendation and Plan  SLP Swallowing Diagnosis: suspected pharyngeal dysphagia (22)  SLP Diet Recommendation: mechanical soft with no mixed consistencies, nectar thick liquids, water between meals after oral care, with supervision, ice chips between meals after oral care, with supervision (22)  Recommended Precautions and Strategies: upright posture during/after eating, small bites of food and sips of liquid (22)  SLP Rec. for Method of Medication Administration: meds whole, with thick liquids, with pudding or applesauce, as tolerated (22)     Monitor for Signs of Aspiration: yes (22)  Recommended Diagnostics: reassess via clinical swallow evaluation (22)  Swallow Criteria for Skilled Therapeutic Interventions Met: demonstrates skilled criteria (22)  Anticipated Discharge Disposition (SLP): unknown, anticipate therapy at next level of care (22)  Rehab Potential/Prognosis, Swallowing: good, to achieve stated therapy goals (22)  Therapy Frequency (Swallow): PRN (22)  Predicted Duration Therapy Intervention (Days): until discharge (22)                                  Plan of Care Reviewed With: patient, spouse  Outcome Evaluation: Clinical swallow eval completed. Pt had s/s with thins and mixed. Recommend mech soft no mixed and NTL; meds w/ NTL or pureed; upright for meals and 30 min after; slow rate; small bites/sips.  Ice chips or sips of water are ok between meals (wait 30 min) after oral care. ST to follow for reeval and need for VFSS.    Patient was not wearing a face mask during this therapy encounter. Therapist used appropriate personal protective equipment including mask, eye protection and gloves.  Mask used was standard procedure mask. Appropriate PPE was worn during the entire therapy session. Hand hygiene was completed before and after therapy session. Patient is not in enhanced droplet precautions.       SWALLOW EVALUATION (last 72 hours)     SLP Adult Swallow Evaluation     Row Name 04/12/22 1200                   Rehab Evaluation    Document Type evaluation  -AW        Subjective Information no complaints  -AW        Patient Observations alert;cooperative;agree to therapy  -AW        Patient/Family/Caregiver Comments/Observations  at bedside.  -AW        Patient Effort good  -AW        Symptoms Noted During/After Treatment none  -AW                  General Information    Patient Profile Reviewed yes  -AW        Pertinent History Of Current Problem Pt admitted with difficulty walking and speech deficits. CT and chest xray were negative. MRI pending. PMH: CVAs, meningioma excision, seizures  -AW        Current Method of Nutrition NPO  -AW        Precautions/Limitations, Vision WFL;for purposes of eval  -AW        Precautions/Limitations, Hearing WFL;for purposes of eval  -AW        Prior Level of Function-Communication WFL  -AW        Prior Level of Function-Swallowing no diet consistency restrictions;other (see comments)  VFSS 4/29/21 showed fxnl swallow  -AW        Plans/Goals Discussed with patient;spouse/S.O.;agreed upon  -AW        Barriers to Rehab none identified  -AW        Patient's Goals for Discharge return home  -AW        Family Goals for Discharge family did not state  -AW                  Pain    Additional Documentation Pain Scale: Numbers Pre/Post-Treatment (Group)  -AW                  Pain  Scale: Numbers Pre/Post-Treatment    Pretreatment Pain Rating 0/10 - no pain  -AW        Posttreatment Pain Rating 0/10 - no pain  -AW                  Oral Motor Structure and Function    Dentition Assessment natural, present and adequate  -AW        Secretion Management WNL/WFL  -AW        Mucosal Quality moist, healthy  -AW        Volitional Swallow delayed  -AW                  Oral Musculature and Cranial Nerve Assessment    Oral Motor General Assessment WFL  -AW                  General Eating/Swallowing Observations    Respiratory Support Currently in Use room air  -AW        Eating/Swallowing Skills self-fed;fed by SLP  -AW        Positioning During Eating upright in bed  -AW        Utensils Used spoon;cup;straw  -AW        Consistencies Trialed thin liquids;nectar/syrup-thick liquids;pureed;soft textures;mixed consistency;regular textures  -AW        Pre SpO2 (%) 96  -AW        Post SpO2 (%) 96  -AW                  Clinical Swallow Eval    Pharyngeal Phase suspected pharyngeal impairment  -AW        Clinical Swallow Evaluation Summary Pt had immediate coughing with cup sips of thin. No s/s with NTL via cup or straw. Mastication was functional for regular solids. Throat clearing x1 and cough x1 noted with mixed consistency. Oral cavity clear post swallow. Swallow initiation was delayed, laryngeal elevation was adequate with palpation.  -AW                  SLP Evaluation Clinical Impression    SLP Swallowing Diagnosis suspected pharyngeal dysphagia  -AW        Functional Impact risk of aspiration/pneumonia  -AW        Rehab Potential/Prognosis, Swallowing good, to achieve stated therapy goals  -AW        Swallow Criteria for Skilled Therapeutic Interventions Met demonstrates skilled criteria  -AW                  Recommendations    Therapy Frequency (Swallow) PRN  -AW        Predicted Duration Therapy Intervention (Days) until discharge  -AW        SLP Diet Recommendation mechanical soft with no mixed  consistencies;nectar thick liquids;water between meals after oral care, with supervision;ice chips between meals after oral care, with supervision  -AW        Recommended Diagnostics reassess via clinical swallow evaluation  -AW        Recommended Precautions and Strategies upright posture during/after eating;small bites of food and sips of liquid  -AW        Oral Care Recommendations Oral Care before breakfast, after meals and PRN  -AW        SLP Rec. for Method of Medication Administration meds whole;with thick liquids;with pudding or applesauce;as tolerated  -AW        Monitor for Signs of Aspiration yes  -AW        Anticipated Discharge Disposition (SLP) unknown;anticipate therapy at next level of care  -AW                  Oral Nutrition/Hydration Goal 1 (SLP)    Oral Nutrition/Hydration Goal 1, SLP Pt will safely tolerate the least restrictive diet with no s/s of aspiration.  -AW        Time Frame (Oral Nutrition/Hydration Goal 1, SLP) by discharge  -AW              User Key  (r) = Recorded By, (t) = Taken By, (c) = Cosigned By    Initials Name Effective Dates    Ngoc Hudson MS CCC-SLP 06/16/21 -                 EDUCATION  The patient has been educated in the following areas:   Dysphagia (Swallowing Impairment) Oral Care/Hydration Modified Diet Instruction.        SLP GOALS     Row Name 04/12/22 1200             Oral Nutrition/Hydration Goal 1 (SLP)    Oral Nutrition/Hydration Goal 1, SLP Pt will safely tolerate the least restrictive diet with no s/s of aspiration.  -AW      Time Frame (Oral Nutrition/Hydration Goal 1, SLP) by discharge  -AW            User Key  (r) = Recorded By, (t) = Taken By, (c) = Cosigned By    Initials Name Provider Type    Ngoc Hudson MS CCC-SLP Speech and Language Pathologist              SLP Outcome Measures (last 72 hours)     SLP Outcome Measures     Row Name 04/12/22 1200             SLP Outcome Measures    Outcome Measure Used? Adult NOMS  -AW              Adult FCM  Scores    FCM Chosen Swallowing  -AW      Swallowing FCM Score 4  -AW            User Key  (r) = Recorded By, (t) = Taken By, (c) = Cosigned By    Initials Name Effective Dates    Ngoc Hudson MS CCC-SLP 06/16/21 -                  Time Calculation:    Time Calculation- SLP     Row Name 04/12/22 1448             Time Calculation- SLP    SLP Start Time 1200  -AW      SLP Received On 04/12/22  -AW            User Key  (r) = Recorded By, (t) = Taken By, (c) = Cosigned By    Initials Name Provider Type    Ngoc Hudson MS CCC-SLP Speech and Language Pathologist                Therapy Charges for Today     Code Description Service Date Service Provider Modifiers Qty    [id number] HC ST EVAL ORAL PHARYNG SWALLOW 4 4/12/2022 Ngoc Chanel MS CCC-SLP GN 1               Ngoc Chanel MS CCC-OMAR  4/12/2022

## 2022-04-12 NOTE — ED PROVIDER NOTES
EMERGENCY DEPARTMENT ENCOUNTER    CHIEF COMPLAINT  Chief Complaint: Weakness/speech difficulty  History given by: Patient  History limited by: None  Room Number: 23/23  PMD: Goran Carr MD      HPI:  Pt is a 81 y.o. female who presents complaining of generalized weakness with speech difficulty occurring approximately 2 to 3 hours prior to ED arrival.  The patient reports that she got up from eating dinner and had to call her  secondary to generalized weakness and an inability to walk.  She does have a previous history of stroke so they were both concerned as to a possible new stroke causing today's symptoms.  They do report that she was having some difficulty with word finding associated.  She denies headache, nausea/vomiting, chest pain, shortness of breath, or fevers and chills.    Duration: Ongoing for the past 2 to 3 hours  Onset: Sudden  Location: Generalized/neuro  Radiation: None  Quality: Speech abnormality/weakness  Intensity/Severity: Moderate  Progression: Worsening  Associated Symptoms: None  Aggravating Factors: None  Alleviating Factors: None  Previous Episodes: Previous history of stroke  Treatment before arrival: None    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   • Malignant neoplasm of upper-outer quadrant of right female breast ([facility]) 03/04/2016   • Cerebrovascular accident (CVA) ([facility]) 11/15/2017   • Stroke ([facility]) 11/21/2017   • Hemarthrosis 12/07/2017   • HTN (hypertension) 12/07/2017   • HLD (hyperlipidemia) 12/07/2017   • Adverse effect of antiplatelet agent 12/08/2017   • Left middle cerebral artery stroke ([facility]) 12/20/2017   • Carcinoma of central portion of right breast in female, estrogen receptor positive ([facility]) 03/25/2015   • Chronic bilateral low back pain with right-sided sciatica 12/09/2016   • History of lumbar surgery 03/02/2018   • Seizure disorder, simple partial, without intractable  epilepsy ([facility]) 2014   • Spinal stenosis of lumbar region with neurogenic claudication 2016   • Status post craniectomy 2016   • History of total right knee replacement 2018   • Other forms of scoliosis, lumbar region 2018   • DDD (degenerative disc disease), lumbar 2018   • Benign meningioma of brain ([facility]) 2016   • Squamous cell carcinoma of leg, left 2019   • Status post multiple cerebral infarctions 2015   • Hypothyroid 2019   • Depression 2019   • COPD (chronic obstructive pulmonary disease) ([facility]) 2019   • Obstructive sleep apnea 2019   • Squamous cell carcinoma of leg, right 2020   • Pain in joint, multiple sites 2021   • Excessive daytime sleepiness 2021   • Former smoker 2021   • Gastroesophageal reflux disease 2021   • Migraine 2015     Resolved Ambulatory Problems     Diagnosis Date Noted   • No Resolved Ambulatory Problems     Past Medical History:   Diagnosis Date   • Anxiety    • Arthritis    • Breast cancer ([facility])    • CVD (cardiovascular disease)    • Macular degeneration    • Peripheral neuropathy    • Vertigo        PAST SURGICAL HISTORY  Past Surgical History:   Procedure Laterality Date   • ADRENAL GLAND SURGERY     • BRAIN MENINGIOMA EXCISION      occipital   •  SECTION     • EMBOLECTOMY N/A 11/15/2017    Procedure: Embolectomy Mechanical;  Surgeon: Rachid Figueroa MD;  Location: [facility] ;  Service:    • MASTECTOMY     • RECTAL SURGERY     • REPLACEMENT TOTAL KNEE Right    • TOTAL SHOULDER REPLACEMENT      x2       FAMILY HISTORY  Family History   Problem Relation Age of Onset   • Hypertension Mother    • Hyperlipidemia Mother    • Heart disease Mother    • Cancer Father    • Alzheimer's disease Sister    • No Known Problems Brother    • No Known Problems Sister        SOCIAL HISTORY  Social History     Socioeconomic History   • Marital status:       Spouse name: Vitaly   • Number of children: 4   • Years of education: 12   Tobacco Use   • Smoking status: Former Smoker     Packs/day: 2.00     Years: 40.00     Pack years: 80.00     Types: Cigarettes   • Smokeless tobacco: Never Used   • Tobacco comment: non-smoker since 2003   Substance and Sexual Activity   • Alcohol use: No   • Drug use: No   • Sexual activity: Defer       ALLERGIES  Penicillins    REVIEW OF SYSTEMS  Review of Systems   Constitutional: Negative for fever.   HENT: Negative for sore throat.    Eyes: Negative.    Respiratory: Negative for cough and shortness of breath.    Cardiovascular: Negative for chest pain.   Gastrointestinal: Negative for abdominal pain, diarrhea and vomiting.   Genitourinary: Negative for dysuria.   Musculoskeletal: Negative for neck pain.   Skin: Negative for rash.   Allergic/Immunologic: Negative.    Neurological: Positive for speech difficulty and weakness. Negative for numbness and headaches.   Hematological: Negative.    Psychiatric/Behavioral: Negative.    All other systems reviewed and are negative.      PHYSICAL EXAM  ED Triage Vitals   Temp Pulse Resp BP SpO2   -- -- -- -- --      Temp src Heart Rate Source Patient Position BP Location FiO2 (%)   -- -- -- -- --       Physical Exam  Vitals and nursing note reviewed.   Constitutional:       General: She is not in acute distress.  HENT:      Head: Normocephalic and atraumatic.   Eyes:      Pupils: Pupils are equal, round, and reactive to light.   Cardiovascular:      Rate and Rhythm: Normal rate and regular rhythm.      Heart sounds: Normal heart sounds.   Pulmonary:      Effort: Pulmonary effort is normal. No respiratory distress.      Breath sounds: Normal breath sounds.   Abdominal:      Palpations: Abdomen is soft.      Tenderness: There is no abdominal tenderness. There is no guarding or rebound.   Musculoskeletal:         General: Normal range of motion.      Cervical back: Normal range of motion and  neck supple.   Skin:     General: Skin is warm and dry.      Findings: No rash.   Neurological:      Mental Status: She is alert and oriented to person, place, and time.      Sensory: Sensation is intact.   Psychiatric:         Mood and Affect: Mood and affect normal.         LAB RESULTS  Lab Results (last 24 hours)     Procedure Component Value Units Date/Time    CBC & Differential [[id number]]  (Abnormal) Collected: 04/11/22 2242    Specimen: Blood Updated: 04/11/22 2251    Narrative:      The following orders were created for panel order CBC & Differential.  Procedure                               Abnormality         Status                     ---------                               -----------         ------                     CBC Auto Differential[[id number]]        Abnormal            Final result                 Please view results for these tests on the individual orders.    Comprehensive Metabolic Panel [[id number]]  (Abnormal) Collected: 04/11/22 2242    Specimen: Blood Updated: 04/11/22 2331     Glucose 122 mg/dL      BUN 14 mg/dL      Creatinine 0.83 mg/dL      Sodium 144 mmol/L      Potassium 3.6 mmol/L      Comment: Slight hemolysis detected by analyzer. Results may be affected.        Chloride 107 mmol/L      CO2 23.2 mmol/L      Calcium 8.8 mg/dL      Total Protein 6.5 g/dL      Albumin 4.20 g/dL      ALT (SGPT) 19 U/L      AST (SGOT) 27 U/L      Alkaline Phosphatase 103 U/L      Total Bilirubin 0.2 mg/dL      Globulin 2.3 gm/dL      A/G Ratio 1.8 g/dL      BUN/Creatinine Ratio 16.9     Anion Gap 13.8 mmol/L      eGFR 70.5 mL/min/1.73      Comment: National Kidney Foundation and American Society of Nephrology (ASN) Task Force recommended calculation based on the Chronic Kidney Disease Epidemiology Collaboration (CKD-EPI) equation refit without adjustment for race.       Narrative:      GFR Normal >60  Chronic Kidney Disease <60  Kidney Failure <15      Protime-INR [[id number]]  (Normal) Collected:  04/11/22 2242    Specimen: Blood Updated: 04/11/22 2319     Protime 12.4 Seconds      INR 0.93    aPTT [[id number]]  (Normal) Collected: 04/11/22 2242    Specimen: Blood Updated: 04/11/22 2319     PTT 25.7 seconds     Troponin [[id number]]  (Normal) Collected: 04/11/22 2242    Specimen: Blood Updated: 04/11/22 2319     Troponin T <0.010 ng/mL     Narrative:      Troponin T Reference Range:  <= 0.03 ng/mL-   Negative for AMI  >0.03 ng/mL-     Abnormal for myocardial necrosis.  Clinicians would have to utilize clinical acumen, EKG, Troponin and serial changes to determine if it is an Acute Myocardial Infarction or myocardial injury due to an underlying chronic condition.       Results may be falsely decreased if patient taking Biotin.      CBC Auto Differential [[id number]]  (Abnormal) Collected: 04/11/22 2242    Specimen: Blood Updated: 04/11/22 2251     WBC 6.30 10*3/mm3      RBC 3.80 10*6/mm3      Hemoglobin 12.2 g/dL      Hematocrit 36.1 %      MCV 95.0 fL      MCH 32.1 pg      MCHC 33.8 g/dL      RDW 12.3 %      RDW-SD 42.1 fl      MPV 10.4 fL      Platelets 202 10*3/mm3      Neutrophil % 56.3 %      Lymphocyte % 25.4 %      Monocyte % 14.3 %      Eosinophil % 2.5 %      Basophil % 1.3 %      Immature Grans % 0.2 %      Neutrophils, Absolute 3.55 10*3/mm3      Lymphocytes, Absolute 1.60 10*3/mm3      Monocytes, Absolute 0.90 10*3/mm3      Eosinophils, Absolute 0.16 10*3/mm3      Basophils, Absolute 0.08 10*3/mm3      Immature Grans, Absolute 0.01 10*3/mm3      nRBC 0.0 /100 WBC     Urinalysis With Microscopic If Indicated (No Culture) - Urine, Catheter [[id number]]  (Abnormal) Collected: 04/11/22 2254    Specimen: Urine, Catheter Updated: 04/11/22 2310     Color, UA Yellow     Appearance, UA Clear     pH, UA 6.5     Specific Gravity, UA 1.013     Glucose, UA Negative     Ketones, UA Negative     Bilirubin, UA Negative     Blood, UA Trace     Protein, UA Negative     Leuk Esterase, UA Negative     Nitrite, UA Negative      Urobilinogen, UA 1.0 E.U./dL    Urinalysis, Microscopic Only - Urine, Catheter [[id number]]  (Abnormal) Collected: 04/11/22 2254    Specimen: Urine, Catheter Updated: 04/11/22 2321     RBC, UA 3-5 /HPF      WBC, UA 0-2 /HPF      Bacteria, UA None Seen /HPF      Squamous Epithelial Cells, UA 0-2 /HPF      Hyaline Casts, UA None Seen /LPF      Methodology Automated Microscopy          I ordered the above labs and reviewed the results    RADIOLOGY  CT Head Without Contrast   Final Result   Chronic infarct involving the anterior aspect of the left   temporal lobe. Chronic infarct or postoperative change within the medial   aspect of the right parietal lobe. Evidence of extensive small vessel   chronic ischemic change showing progression since the preceding CT in   2017. No definite acute intracranial abnormality is identified.       This report was finalized on 4/11/2022 11:43 PM by Dr. Gage Ji M.D.          XR Chest 1 View   Final Result           I ordered the above noted radiological studies. Interpreted by radiologist.  Reviewed by me in PACS.       PROCEDURES  Procedures  EKG          EKG time: 2245  Rhythm/Rate: NSR, 60  P waves and FL: nml  QRS, axis: nml, nml   ST and T waves: nml     Interpreted Contemporaneously by me, independently viewed  unchanged compared to prior 6/15/21      PROGRESS AND CONSULTS     The patient was wearing a facemask upon entrance into the room and remained in such throughout their visit.  I was wearing PPE including a facemask, eye protection, as well as gloves at any point entering the room and throughout the visit    0015  On reevaluation, the patient continues to be resting comfortably and without any acute obvious neurological deficits.  I did inform the patient and family that her labs, urinalysis, as well as CT scan of the head showed no acute abnormalities.  We will admit her to the hospital for further work-up and treatment of her ongoing condition.  They are in  agreement with the disposition plan and all questions have been answered.      0055  Case discussed with CELY Crooks for [facility], who agrees to admit the patient to the hospital for Dr. Mckeon.    MEDICAL DECISION MAKING  Results were reviewed/discussed with the patient and they were also made aware of online access. Pt also made aware that some labs, such as cultures, will not be resulted during ER visit and follow up with PMD is necessary.     MDM  Number of Diagnoses or Management Options     Amount and/or Complexity of Data Reviewed  Clinical lab tests: ordered and reviewed  Tests in the radiology section of CPT®: ordered and reviewed  Review and summarize past medical records: yes (Upon medical records review, the patient was last seen and evaluated on 6/15/2021 secondary to atypical chest pain)  Discuss the patient with other providers: yes (CELY Crooks for [facility], who agrees to admit the patient to the hospital for Dr. Mckeon)  Independent visualization of images, tracings, or specimens: yes (No acute abnormality seen on head CT)           DIAGNOSIS  Final diagnoses:   Generalized weakness   Difficulty with speech       DISPOSITION  ADMISSION    Discussed treatment plan and reason for admission with pt/family and admitting physician.  Pt/family voiced understanding of the plan for admission for further testing/treatment as needed.         Latest Documented Vital Signs:  As of 00:55 EDT  BP- 157/77 HR- 61 Temp- 97.1 °F (36.2 °C) (Tympanic) O2 sat- 95%         Manuel Garibay MD  04/12/22 0055

## 2022-04-12 NOTE — PLAN OF CARE
Goal Outcome Evaluation:  Plan of Care Reviewed With: patient, spouse           Outcome Evaluation: Clinical swallow eval completed. Pt had s/s with thins and mixed. Recommend mech soft no mixed and NTL; meds w/ NTL or pureed; upright for meals and 30 min after; slow rate; small bites/sips. Ice chips or sips of water are ok between meals (wait 30 min) after oral care. ST to follow for reeval and need for VFSS.

## 2022-04-12 NOTE — H&P
Patient Name:  Avelina Carr  YOB: 1940  MRN:  [id number]  Admit Date:  4/11/2022  Patient Care Team:  Goran Carr MD as PCP - General (Family Medicine)      Subjective   History Present Illness     Chief Complaint   Patient presents with   • Difficulty Walking   • Weakness - Generalized       Ms. Carr is a 81 y.o. with a history of CVAs, meningioma excision, seizures, chronic dysphagia,  COPD, oxygen dependence, hypothyroidism, depression, TEOFILO compliant with CPAP, BPPV that presents with stroke symptoms.  She was brought in by her  yesterday evening after patient began acting different at dinner.  After dinner she sat on the couch and an hour later he checked on her and she could not stand without assistance and was having difficulty speaking.  He also notes some right-sided weakness but but he thinks it could be chronic since her prior strokes but she disagrees.  No reported seizure activity.  No recent illness, fever, chills and she is compliant with medications on Plavix, aspirin and statin.  She ambulates with a walker.      History of Present Illness  Review of Systems   Constitutional: Negative for appetite change, chills and unexpected weight change.   HENT: Negative for congestion and trouble swallowing.    Respiratory: Negative for cough, choking, chest tightness and shortness of breath.    Cardiovascular: Negative for chest pain, palpitations and leg swelling.   Gastrointestinal: Negative for abdominal distention, abdominal pain, blood in stool, constipation, diarrhea, nausea and vomiting.   Musculoskeletal: Negative for back pain.   Skin: Negative for color change.   Neurological: Positive for speech difficulty and weakness (Generalized and right side). Negative for dizziness.   Hematological: Does not bruise/bleed easily.   Psychiatric/Behavioral: Positive for confusion.        Personal History     Past Medical History:   Diagnosis Date   • Anxiety    • Arthritis    •  Breast cancer (Formerly [facility])    • COPD (chronic obstructive pulmonary disease) (Formerly [facility])     oxygen at 2L aths   • CVD (cardiovascular disease)    • Depression    • Hypothyroid    • Macular degeneration    • Peripheral neuropathy    • Vertigo      Past Surgical History:   Procedure Laterality Date   • ADRENAL GLAND SURGERY     • BRAIN MENINGIOMA EXCISION      occipital   •  SECTION     • EMBOLECTOMY N/A 11/15/2017    Procedure: Embolectomy Mechanical;  Surgeon: Rachid Figueroa MD;  Location: [facility] ;  Service:    • MASTECTOMY     • RECTAL SURGERY     • REPLACEMENT TOTAL KNEE Right    • TOTAL SHOULDER REPLACEMENT      x2     Family History   Problem Relation Age of Onset   • Hypertension Mother    • Hyperlipidemia Mother    • Heart disease Mother    • Cancer Father    • Alzheimer's disease Sister    • No Known Problems Brother    • No Known Problems Sister      Social History     Tobacco Use   • Smoking status: Former Smoker     Packs/day: 2.00     Years: 40.00     Pack years: 80.00     Types: Cigarettes   • Smokeless tobacco: Never Used   • Tobacco comment: non-smoker since    Substance Use Topics   • Alcohol use: No   • Drug use: No     No current facility-administered medications on file prior to encounter.     Current Outpatient Medications on File Prior to Encounter   Medication Sig Dispense Refill   • acetaminophen (TYLENOL) 325 MG tablet Take 650 mg by mouth Every 6 (Six) Hours As Needed for Mild Pain .     • albuterol (PROVENTIL HFA;VENTOLIN HFA) 108 (90 Base) MCG/ACT inhaler Inhale 2 puffs Every 4 (Four) Hours As Needed for Wheezing. 1 inhaler 0   • aspirin 81 MG EC tablet Take 81 mg by mouth Daily.     • atenolol (TENORMIN) 25 MG tablet Take 25 mg by mouth 2 (Two) Times a Day.     • budesonide-formoterol (SYMBICORT) 80-4.5 MCG/ACT inhaler INHALE TWO PUFFS BY MOUTH TWICE A DAY     • Cholecalciferol (VITAMIN D3) 5000 units capsule capsule Take 5,000 Units by mouth Daily.     •  clopidogrel (PLAVIX) 75 MG tablet TAKE ONE TABLET BY MOUTH DAILY 90 tablet 1   • escitalopram (LEXAPRO) 20 MG tablet Take 1 tablet by mouth Daily. 90 tablet 1   • furosemide (LASIX) 20 MG tablet TAKE ONE TABLET BY MOUTH DAILY 90 tablet 1   • gabapentin (NEURONTIN) 800 MG tablet TAKE ONE TABLET BY MOUTH THREE TIMES A  tablet 4   • letrozole (FEMARA) 2.5 MG tablet Take 2.5 mg by mouth Daily.     • levothyroxine (SYNTHROID, LEVOTHROID) 112 MCG tablet TAKE ONE TABLET BY MOUTH DAILY 90 tablet 0   • MELATONIN PO Take 1 tablet by mouth Every Night.     • pantoprazole (PROTONIX) 40 MG EC tablet TAKE ONE TABLET BY MOUTH DAILY 90 tablet 3   • rosuvastatin (CRESTOR) 20 MG tablet TAKE ONE TABLET BY MOUTH DAILY 90 tablet 1   • traMADol (ULTRAM) 50 MG tablet TAKE ONE TABLET BY MOUTH EVERY 6 HOURS AS NEEDED FOR MODERATE PAIN 30 tablet 0   • [DISCONTINUED] cyanocobalamin 1000 MCG/ML injection Take 1,000 mcg by mouth Daily.     • [DISCONTINUED] Folbic 2.5-25-2 MG tablet tablet TAKE ONE TABLET BY MOUTH DAILY 30 tablet 0   • [DISCONTINUED] meclizine (ANTIVERT) 25 MG tablet Take 1 tablet by mouth 3 (Three) Times a Day As Needed for Dizziness. 30 tablet 1   • [DISCONTINUED] Multiple Vitamins-Minerals (MULTIVITAMIN ADULT PO) Take 1 tablet by mouth Daily.     • [DISCONTINUED] Probiotic Product (PROBIOTIC DAILY PO) Take 1 tablet by mouth Daily.       Allergies   Allergen Reactions   • Penicillins Rash       Objective    Objective     Vital Signs  Temp:  [97.1 °F (36.2 °C)-98 °F (36.7 °C)] 98 °F (36.7 °C)  Heart Rate:  [57-66] 57  Resp:  [14-16] 16  BP: (132-157)/(72-77) 157/72  SpO2:  [93 %-98 %] 95 %  on   ;      There is no height or weight on file to calculate BMI.    Physical Exam  Vitals and nursing note reviewed.   Constitutional:       Appearance: She is well-developed.      Comments: Chronically ill-appearing   HENT:      Head: Normocephalic and atraumatic.      Mouth/Throat:      Mouth: Mucous membranes are dry.   Eyes:       Extraocular Movements: Extraocular movements intact.      Conjunctiva/sclera: Conjunctivae normal.   Cardiovascular:      Rate and Rhythm: Normal rate and regular rhythm.      Heart sounds: Normal heart sounds.   Pulmonary:      Effort: Pulmonary effort is normal. No respiratory distress.      Breath sounds: Normal breath sounds.      Comments: Breath sounds decreased  Abdominal:      General: Bowel sounds are normal. There is no distension or abdominal bruit.      Palpations: Abdomen is soft. Abdomen is not rigid. There is no shifting dullness, fluid wave, mass or pulsatile mass.      Tenderness: There is no abdominal tenderness. There is no guarding.      Hernia: No hernia is present.   Musculoskeletal:         General: Normal range of motion.   Skin:     General: Skin is warm and dry.   Neurological:      Mental Status: She is alert and oriented to person, place, and time.      Motor: Weakness present.      Comments: Mild to moderate dysarthria present.  Mild weakness right upper extremity .    Psychiatric:         Behavior: Behavior normal.         Thought Content: Thought content normal.         Results Review:  I reviewed the patient's new clinical results.  I reviewed the patient's new imaging results and agree with the interpretation.  I reviewed the patient's other test results and agree with the interpretation  I personally viewed and interpreted the patient's EKG/Telemetry data  Discussed with ED provider.    Lab Results (last 24 hours)     Procedure Component Value Units Date/Time    CBC & Differential [[id number]]  (Abnormal) Collected: 04/11/22 2242    Specimen: Blood Updated: 04/11/22 2251    Narrative:      The following orders were created for panel order CBC & Differential.  Procedure                               Abnormality         Status                     ---------                               -----------         ------                     CBC Auto Differential[[id number]]        Abnormal             Final result                 Please view results for these tests on the individual orders.    Comprehensive Metabolic Panel [[id number]]  (Abnormal) Collected: 04/11/22 2242    Specimen: Blood Updated: 04/11/22 2331     Glucose 122 mg/dL      BUN 14 mg/dL      Creatinine 0.83 mg/dL      Sodium 144 mmol/L      Potassium 3.6 mmol/L      Comment: Slight hemolysis detected by analyzer. Results may be affected.        Chloride 107 mmol/L      CO2 23.2 mmol/L      Calcium 8.8 mg/dL      Total Protein 6.5 g/dL      Albumin 4.20 g/dL      ALT (SGPT) 19 U/L      AST (SGOT) 27 U/L      Alkaline Phosphatase 103 U/L      Total Bilirubin 0.2 mg/dL      Globulin 2.3 gm/dL      A/G Ratio 1.8 g/dL      BUN/Creatinine Ratio 16.9     Anion Gap 13.8 mmol/L      eGFR 70.5 mL/min/1.73      Comment: National Kidney Foundation and American Society of Nephrology (ASN) Task Force recommended calculation based on the Chronic Kidney Disease Epidemiology Collaboration (CKD-EPI) equation refit without adjustment for race.       Narrative:      GFR Normal >60  Chronic Kidney Disease <60  Kidney Failure <15      Protime-INR [[id number]]  (Normal) Collected: 04/11/22 2242    Specimen: Blood Updated: 04/11/22 2319     Protime 12.4 Seconds      INR 0.93    aPTT [[id number]]  (Normal) Collected: 04/11/22 2242    Specimen: Blood Updated: 04/11/22 2319     PTT 25.7 seconds     Troponin [[id number]]  (Normal) Collected: 04/11/22 2242    Specimen: Blood Updated: 04/11/22 2319     Troponin T <0.010 ng/mL     Narrative:      Troponin T Reference Range:  <= 0.03 ng/mL-   Negative for AMI  >0.03 ng/mL-     Abnormal for myocardial necrosis.  Clinicians would have to utilize clinical acumen, EKG, Troponin and serial changes to determine if it is an Acute Myocardial Infarction or myocardial injury due to an underlying chronic condition.       Results may be falsely decreased if patient taking Biotin.      CBC Auto Differential [[id number]]  (Abnormal)  Collected: 04/11/22 2242    Specimen: Blood Updated: 04/11/22 2251     WBC 6.30 10*3/mm3      RBC 3.80 10*6/mm3      Hemoglobin 12.2 g/dL      Hematocrit 36.1 %      MCV 95.0 fL      MCH 32.1 pg      MCHC 33.8 g/dL      RDW 12.3 %      RDW-SD 42.1 fl      MPV 10.4 fL      Platelets 202 10*3/mm3      Neutrophil % 56.3 %      Lymphocyte % 25.4 %      Monocyte % 14.3 %      Eosinophil % 2.5 %      Basophil % 1.3 %      Immature Grans % 0.2 %      Neutrophils, Absolute 3.55 10*3/mm3      Lymphocytes, Absolute 1.60 10*3/mm3      Monocytes, Absolute 0.90 10*3/mm3      Eosinophils, Absolute 0.16 10*3/mm3      Basophils, Absolute 0.08 10*3/mm3      Immature Grans, Absolute 0.01 10*3/mm3      nRBC 0.0 /100 WBC     Urinalysis With Microscopic If Indicated (No Culture) - Urine, Catheter [[id number]]  (Abnormal) Collected: 04/11/22 2254    Specimen: Urine, Catheter Updated: 04/11/22 2310     Color, UA Yellow     Appearance, UA Clear     pH, UA 6.5     Specific Gravity, UA 1.013     Glucose, UA Negative     Ketones, UA Negative     Bilirubin, UA Negative     Blood, UA Trace     Protein, UA Negative     Leuk Esterase, UA Negative     Nitrite, UA Negative     Urobilinogen, UA 1.0 E.U./dL    Urinalysis, Microscopic Only - Urine, Catheter [[id number]]  (Abnormal) Collected: 04/11/22 2254    Specimen: Urine, Catheter Updated: 04/11/22 2321     RBC, UA 3-5 /HPF      WBC, UA 0-2 /HPF      Bacteria, UA None Seen /HPF      Squamous Epithelial Cells, UA 0-2 /HPF      Hyaline Casts, UA None Seen /LPF      Methodology Automated Microscopy    COVID PRE-OP / PRE-PROCEDURE SCREENING ORDER (NO ISOLATION) - Swab, Nasopharynx [[id number]]  (Normal) Collected: 04/12/22 0105    Specimen: Swab from Nasopharynx Updated: 04/12/22 0151    Narrative:      The following orders were created for panel order COVID PRE-OP / PRE-PROCEDURE SCREENING ORDER (NO ISOLATION) - Swab, Nasopharynx.  Procedure                               Abnormality         Status                      ---------                               -----------         ------                     COVID-19,[facility] IN[facility]HOUSE...[[id number]]  Normal              Final result                 Please view results for these tests on the individual orders.    COVID-19,[facility] IN-HOU[facility] CEPHEID/GILLIAN NP SWAB IN TRANSPORT MEDIA 8-12 HR TAT - Swab, Nasopharynx [[id number]]  (Normal) Collected: 04/12/22 0105    Specimen: Swab from Nasopharynx Updated: 04/12/22 0151     COVID19 Not Detected    Narrative:      Fact sheet for providers: https://www.fda.gov/media/379815/download     Fact sheet for patients: https://www.fda.gov/media/089058/download    POC Glucose Once [[id number]]  (Normal) Collected: 04/12/22 0612    Specimen: Blood Updated: 04/12/22 0614     Glucose 103 mg/dL      Comment: Meter: [id number] : [id number] Robert Henley CNA             Imaging Results (Last 24 Hours)     Procedure Component Value Units Date/Time    CT Head Without Contrast [[id number]] Collected: 04/11/22 2338     Updated: 04/11/22 2347    Narrative:      CT HEAD WO CONTRAST-     CLINICAL HISTORY: Weakness. Speech difficulty.     TECHNIQUE: Transverse 3 mm thick images were obtained from the base of  the skull to the vertex without IV contrast.     Radiation dose reduction techniques were utilized, including automated  exposure control and exposure modulation based on body size.     COMPARISON: CT head dated 11/18/2017.     FINDINGS: There is a closed craniotomy defect involving portions of the  right frontal bone and right parietal bone that is unchanged. This  appears chronic. There is localized encephalomalacia within the anterior  aspect of the left temporal lobe that is consistent with a chronic  infarct. This appeared to be in it's acute or subacute phase of  evolution on the previous CT scan. Focal encephalomalacia in the medial  aspect of the right parietal lobe is unchanged. There is patchy  ill-defined abnormal diminished attenuation  throughout the white matter  of both cerebral hemispheres that is more prominent than on the previous  CT scan. This is consistent with sequela of extensive small vessel  chronic ischemic change. No acute infarct or hemorrhage is evident.  However, an acute white matter infarct would be impossible to exclude  given the background white matter changes. There are no masses. The  visualized paranasal sinuses and mastoid air cells and middle ear  cavities appear well aerated.       Impression:      Chronic infarct involving the anterior aspect of the left  temporal lobe. Chronic infarct or postoperative change within the medial  aspect of the right parietal lobe. Evidence of extensive small vessel  chronic ischemic change showing progression since the preceding CT in  2017. No definite acute intracranial abnormality is identified.     This report was finalized on 4/11/2022 11:43 PM by Dr. Gage Ji M.D.       XR Chest 1 View [[id number]] Collected: 04/11/22 2311     Updated: 04/11/22 2315    Narrative:      PORTABLE CHEST 04/11/2022 AT 10:55 PM     CLINICAL HISTORY: Weakness     Compared to the previous chest dated 06/15/2021.     The lungs are somewhat poorly inflated but appear free of infiltrates.  There are no pleural effusions. The cardiomediastinal silhouette is  unremarkable.     IMPRESSIONS: No evidence of acute disease within the chest.     This report was finalized on 4/11/2022 11:12 PM by Dr. Gage Ji M.D.             Results for orders placed during the hospital encounter of 11/15/17    Adult Transesophageal Echo (JEFE) W/ Cont if Necessary Per Protocol    Interpretation Summary  · No evidence of a left atrial thrombus present. No evidence of a left atrial appendage thrombus was present.  · Left atrial cavity size is mildly dilated.  · Lipomatous hypertrophy of the interatrial septum present. Saline test results are negative. No evidence of interatrial shunt noted with marginally adequate agitated  saline contrast study.  · Left ventricular systolic function is normal. Estimated EF appears to be in the range of 56 - 60%. Normal left ventricular cavity size noted.    A two-dimensional transesophageal echocardiogram was performed.  Complete color flow velocity mapping and Doppler interrogation was performed.      ECG 12 Lead   Preliminary Result   HEART RATE= 60  bpm   RR Interval= 1000  ms   RI Interval= 208  ms   P Horizontal Axis= -51  deg   P Front Axis= 43  deg   QRSD Interval= 89  ms   QT Interval= 434  ms   QRS Axis= -3  deg   T Wave Axis= 29  deg   - ABNORMAL ECG -   Sinus rhythm   Atrial premature complex   Inferior infarct, old   Electronically Signed By:    Date and Time of Study: 2022-04-11 22:45:19           Assessment/Plan     Active Hospital Problems    Diagnosis  POA   • **Stroke-like symptoms [R29.90]  Yes   • Generalized weakness [R53.1]  Yes   • Dependence on supplemental oxygen [Z99.81]  Not Applicable   • Gastroesophageal reflux disease [K21.9]  Yes   • Former smoker [Z87.891]  Not Applicable   • Hypothyroid [E03.9]  Yes   • COPD (chronic obstructive pulmonary disease) (HCC) [J44.9]  Yes   • HTN (hypertension) [I10]  Yes   • HLD (hyperlipidemia) [E78.5]  Yes   • Status post craniectomy [Z98.890]  Not Applicable   • Migraines [G43.909]  Yes   • History of cardioembolic cerebrovascular accident (CVA) [Z86.73]  Not Applicable   • Seizure disorder, simple partial, 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       Ms. Carr is a 81 y.o.  Admitted with weakness and dysarthria   · Admit to a neuromonitored bed.  · Neuro checks per stroke protocol. NIHSS every shift.  · ASA and statin and plavix from home   · Maintain hydration with NS @ 75 cc/hr.  · Appreciate neurology. Concerned about seizure so started keppra BID.  · Await speech therapy, OT and PT. Bedside swallow evaluation per speech therapy to be completed within  24hours.  · Awaiting B12, TSH, lipids and HgbA1c    · MRI brain per neuro given CVA risk and malignancy risk.    · DVT prophylaxis w/ SCDs.    Seizure history/ hx of meningioma  · Resume home medications with addition of keppra.       COPD w/o exacerbation   · No wheezing on exam. Continue home regimen.     GERD  Resume home PPI    Continue home medications, if appropriate, when medication reconciliation completed by nursing staff.    I discussed the patient's findings and my recommendations with patient, family, nursing staff and Dr. Grey.    VTE Prophylaxis - SCDs.  Code Status - Full code.       CELY Aguirre  [facility]  04/12/22  09:56 EDT

## 2022-04-12 NOTE — CONSULTS
Acute rehab referral received via stroke order set. Therapies recommending SNF. Will sign off.     Thank you!    Venkatesh Quinones RN  p

## 2022-04-12 NOTE — CONSULTS
Neurology Consult Note    Consult Date: 4/12/2022    Referring MD: Nicole Mckeon, DO    Reason for Consult I have been asked to see the patient in neurological consultation to render advice and opinion regarding word finding difficulty and generalized weakness.    Avelina Carr is a 81 y.o. right-handed white female with past medical history of multiple strokes with the last one on left temporal stroke in 2015 status post mechanical thrombectomy and blood busting medicine, hypertension, hyperlipidemia, obstructive sleep apnea compliant with a CPAP machine, history of breast cancer, lumbar stenosis, seizure disorder maintained on Neurontin, benign paroxysmal positional vertigo who follows with Savana Estrella for multiple neurological complaints last seen in April/2021 who presented to the hospital yesterday complaining of generalized weakness, slurred speech and word finding difficulty with confusion.  CT head in the emergency department showed chronic infarction on the anterior aspect of the left temporal lobe and medial aspect of the right parietal lobe with extensive small vessel disease but no acute finding.  Her  denied recent tonic-clonic seizures, urinary or fecal incontinence, tongue bite but he stated that sometimes the patient would stare when watching TV and would not response for 30 seconds to 1 minute and that yesterday she became confused with her generalized weakness.  She is compliant with her medications.  At home she is on Plavix 75 mg, aspirin 81 mg and Crestor 20 mg and she stated compliant with her medications.  She denied focal neurological symptoms.  Denied recent urinary tract infection, chest infection or contact with sick people.  At baseline she would use a walker intermittently to help with her gait.  She does have baseline swallow issues for several years since her last stroke.    Past Medical History:   Diagnosis Date   • Anxiety    • Arthritis    • Breast cancer (HCC) 2014    • COPD (chronic obstructive pulmonary disease) ([facility])     oxygen at 2L aths   • CVD (cardiovascular disease)    • Depression    • Hypothyroid    • Macular degeneration    • Peripheral neuropathy    • Vertigo        ROS:  Generalized weakness, generalized numbness  Word finding difficulty currently improved  Confusion currently improved  Difficulty swallowing  Dizziness/vertigo currently improved  Back pain, gait problem  Slurred speech  Joint pain and swelling, varicose veins on the lower extremities bilaterally  No diarrhea nausea or vomiting  Denies anxiety or depression      Exam  /72 (BP Location: Left arm, Patient Position: Lying)   Pulse 57   Temp 98 °F (36.7 °C) (Oral)   Resp 16   SpO2 95%   Gen: NAD, flat affect, slow to respond, vitals reviewed  MS: oriented x3, recent/remote memory impaired, normal attention/concentration, language grossly intact she was able to name, repeat and comprehend, no neglect.  CN: visual acuity grossly normal, PERRL, EOMI, no facial droop, moderate dysarthria  Motor: 4+5 on the upper extremities bilaterally, 3 -/5 on the lower extremities bilaterally, normal tone  Sensory exam: Normal to light touch throughout  Coordination: Dysmetria on the upper extremities bilaterally more on the right than the left  Reflexes: Decreased throughout with downgoing plantars bilaterally  Gait: Not assessed due to risk of falls        DATA:    Lab Results   Component Value Date    GLUCOSE 122 (H) 04/11/2022    CALCIUM 8.8 04/11/2022     04/11/2022    K 3.6 04/11/2022    CO2 23.2 04/11/2022     04/11/2022    BUN 14 04/11/2022    CREATININE 0.83 04/11/2022    EGFRIFAFRI 75 09/24/2021    EGFRIFNONA 62 09/24/2021    BCR 16.9 04/11/2022    ANIONGAP 13.8 04/11/2022     Lab Results   Component Value Date    WBC 6.30 04/11/2022    HGB 12.2 04/11/2022    HCT 36.1 04/11/2022    MCV 95.0 04/11/2022     04/11/2022       Lab review: Glucose 122, sodium 144, potassium 3.6, BUN 14,  "creatinine 0.83, WBC 6.3    Imaging review: I personally reviewed her CT head that showed an old strokes on the left temporal and parietal with extensive small vessel disease.    Diagnoses:  Generalized weakness with forward finding difficulty and dysarthria, etiology could be seizures from her prior left temporal stroke versus new cerebellar strokes  Seizure disorder on gabapentin  Prior multiple strokes with the last one on the left temporal in 2015  Obstructive sleep apnea compliant with a CPAP machine  Essential hypertension  Mixed hyperlipidemia  History of breast cancer      PLAN:   1.  Given the location of the stroke on the left temporal lobe there is high risk for seizures.  Gabapentin is a week medicine to control seizures I discussed with the patient and her  regarding starting a new medicine.  I will start her on Keppra 500 mg twice daily in addition to the gabapentin.  2.  Giving the multiple risk factors for stroke I would repeat another MRI brain with and without contrast given her prior history of breast cancer to rule out strokes and brain metastasis.  3.  Right now, we will continue with the Plavix and the aspirin home dose  4.  Continue Crestor 20 mg daily we will check LDL level and adjust if needed.  5.  Follow-up as an outpatient with Savana Estrella for prolonged EEG monitoring.    We will continue to follow and advise, thank you for the consult    Plan discussed with the patient,  and nursing staff.    \"Dictated utilizing Dragon dictation\".        "

## 2022-04-12 NOTE — ED TRIAGE NOTES
Patient here via EMS for difficulty walking and expressive aphasia since 2000 after eating dinner. Patient AOx4, slow speech pattern. Patient c/o intermittent frontal HA.      Run # [id number]

## 2022-04-12 NOTE — PLAN OF CARE
Goal Outcome Evaluation:  Plan of Care Reviewed With: patient, spouse           Outcome Evaluation: Pt admitted with speech changes and weakness.  CT -, MRI pending.  H/o CVA, spouse reports pt normally uses walker, no significant weakness after previous CVAs, reports mostly balance issues.  Today pt demonstrates weakness R weaker than L, some R inattention/neglect, impaired balance and gait pattern from bsaelinebut able to ambulate 22' w/ RW and min A.  Verbal cues for safety and looking towards R.  Currently recommend DC to SNU

## 2022-04-12 NOTE — PLAN OF CARE
Goal Outcome Evaluation:  Plan of Care Reviewed With: patient        Progress: no change  Outcome Evaluation: AOx4, NIH=4, NPO for SLP eval, Echo and MRI ordered, AM labs pending, no s/s distress observed, will cont to monitor.

## 2022-04-12 NOTE — ED NOTES
Nursing report ED to floor  Avelina Carr  81 y.o.  female    HPI :   Chief Complaint   Patient presents with   • Difficulty Walking   • Weakness - Generalized       Admitting doctor:   Nicole Mckeon DO    Admitting diagnosis:   The primary encounter diagnosis was Generalized weakness. A diagnosis of Difficulty with speech was also pertinent to this visit.    Code status:   Current Code Status     Date Active Code Status Order ID Comments User Context       4/12/2022 0057 CPR (Attempt to Resuscitate) [id number]  Keren Abdullahi APRN ED     Advance Care Planning Activity      Questions for Current Code Status     Question Answer    Code Status (Patient has no pulse and is not breathing) CPR (Attempt to Resuscitate)    Medical Interventions (Patient has pulse or is breathing) Full Support          Allergies:   Penicillins    Intake and Output  No intake or output data in the 24 hours ending 04/12/22 0110    Weight:   There were no vitals filed for this visit.    Most recent vitals:   Vitals:    04/11/22 2253 04/11/22 2254 04/11/22 2300 04/11/22 2302   BP:  157/77     Pulse: 65 66 66 61   Resp:       Temp:       TempSrc:       SpO2: 96% 93% 95% 95%       Active LDAs/IV Access:   Lines, Drains & Airways     Active LDAs     Name Placement date Placement time Site Days    Peripheral IV 04/11/22 2236 Left Antecubital 04/11/22 2236  Antecubital  less than 1    External Urinary Catheter 04/12/22  0009  --  less than 1                Labs (abnormal labs have a star):   Labs Reviewed   COMPREHENSIVE METABOLIC PANEL - Abnormal; Notable for the following components:       Result Value    Glucose 122 (*)     All other components within normal limits    Narrative:     GFR Normal >60  Chronic Kidney Disease <60  Kidney Failure <15     URINALYSIS W/ MICROSCOPIC IF INDICATED (NO CULTURE) - Abnormal; Notable for the following components:    Blood, UA Trace (*)     All other components within normal limits   CBC WITH AUTO  DIFFERENTIAL - Abnormal; Notable for the following components:    Monocyte % 14.3 (*)     All other components within normal limits   URINALYSIS, MICROSCOPIC ONLY - Abnormal; Notable for the following components:    RBC, UA 3-5 (*)     All other components within normal limits   PROTIME-INR - Normal   APTT - Normal   TROPONIN (IN-HOUSE) - Normal    Narrative:     Troponin T Reference Range:  <= 0.03 ng/mL-   Negative for AMI  >0.03 ng/mL-     Abnormal for myocardial necrosis.  Clinicians would have to utilize clinical acumen, EKG, Troponin and serial changes to determine if it is an Acute Myocardial Infarction or myocardial injury due to an underlying chronic condition.       Results may be falsely decreased if patient taking Biotin.     COVID PRE-OP / PRE-PROCEDURE SCREENING ORDER (NO ISOLATION)    Narrative:     The following orders were created for panel order COVID PRE-OP / PRE-PROCEDURE SCREENING ORDER (NO ISOLATION) - Swab, Nasopharynx.  Procedure                               Abnormality         Status                     ---------                               -----------         ------                     COVID-19,[facility] IN-HOUSE...[[id number]]                                                   Please view results for these tests on the individual orders.   COVID-19, [facility] IN-HOUSE CEPHEID/GILLIAN, NP SWAB IN TRANSPORT MEDIA 8-12 HR TAT   CBC (NO DIFF)   COMPREHENSIVE METABOLIC PANEL   HEMOGLOBIN A1C   LIPID PANEL   TSH   POCT GLUCOSE FINGERSTICK   POCT GLUCOSE FINGERSTICK   POCT GLUCOSE FINGERSTICK   POCT GLUCOSE FINGERSTICK   TYPE AND SCREEN   PREPARE RBC   CBC AND DIFFERENTIAL    Narrative:     The following orders were created for panel order CBC & Differential.  Procedure                               Abnormality         Status                     ---------                               -----------         ------                     CBC Auto Differential[[id number]]        Abnormal            Final result                  Please view results for these tests on the individual orders.       EKG:   ECG 12 Lead   Preliminary Result   HEART RATE= 60  bpm   RR Interval= 1000  ms   IA Interval= 208  ms   P Horizontal Axis= -51  deg   P Front Axis= 43  deg   QRSD Interval= 89  ms   QT Interval= 434  ms   QRS Axis= -3  deg   T Wave Axis= 29  deg   - ABNORMAL ECG -   Sinus rhythm   Atrial premature complex   Inferior infarct, old   Electronically Signed By:    Date and Time of Study: 2022-04-11 22:45:19          Meds given in ED:   Medications   sodium chloride 0.9 % flush 10 mL (has no administration in time range)   sodium chloride 0.9 % infusion (has no administration in time range)   acetaminophen (TYLENOL) tablet 650 mg (has no administration in time range)     Or   acetaminophen (TYLENOL) suppository 650 mg (has no administration in time range)   aspirin tablet 325 mg (has no administration in time range)     Or   aspirin suppository 300 mg (has no administration in time range)   atorvastatin (LIPITOR) tablet 80 mg (has no administration in time range)   ondansetron (ZOFRAN) injection 4 mg (has no administration in time range)   bisacodyl (DULCOLAX) suppository 10 mg (has no administration in time range)       Imaging results:  CT Head Without Contrast    Result Date: 4/11/2022  Chronic infarct involving the anterior aspect of the left temporal lobe. Chronic infarct or postoperative change within the medial aspect of the right parietal lobe. Evidence of extensive small vessel chronic ischemic change showing progression since the preceding CT in 2017. No definite acute intracranial abnormality is identified.  This report was finalized on 4/11/2022 11:43 PM by Dr. Gage Ji M.D.        Ambulatory status:   - assist x1; baseline ad bryan    Social issues:   Social History     Socioeconomic History   • Marital status:      Spouse name: Vitaly   • Number of children: 4   • Years of education: 12   Tobacco Use   • Smoking  status: Former Smoker     Packs/day: 2.00     Years: 40.00     Pack years: 80.00     Types: Cigarettes   • Smokeless tobacco: Never Used   • Tobacco comment: non-smoker since 2003   Substance and Sexual Activity   • Alcohol use: No   • Drug use: No   • Sexual activity: Defer       NIH Stroke Scale:        Nursing report ED to floor:

## 2022-04-12 NOTE — THERAPY EVALUATION
Patient Name: Avelina Carr  : 1940    MRN: [id number]                              Today's Date: 2022       Admit Date: 2022    Visit Dx:     ICD-10-CM ICD-9-CM   1. Generalized weakness  R53.1 780.79   2. Difficulty with speech  R47.9 784.59     Patient Active Problem List   Diagnosis   • Postmastectomy lymphedema syndrome   • Malignant neoplasm of upper-outer quadrant of right female breast ([facility])   • Cerebrovascular accident (CVA) ([facility])   • Stroke ([facility])   • Hemarthrosis   • HTN (hypertension)   • HLD (hyperlipidemia)   • Adverse effect of antiplatelet agent   • Left middle cerebral artery stroke ([facility])   • Carcinoma of central portion of right breast in female, estrogen receptor positive ([facility])   • Chronic bilateral low back pain with right-sided sciatica   • History of lumbar surgery   • Seizure disorder, simple partial, without intractable epilepsy ([facility])   • Spinal stenosis of lumbar region with neurogenic claudication   • Status post craniectomy   • History of total right knee replacement   • Other forms of scoliosis, lumbar region   • DDD (degenerative disc disease), lumbar   • Benign meningioma of brain ([facility])   • Squamous cell carcinoma of leg, left   • History of cardioembolic cerebrovascular accident (CVA)   • Hypothyroid   • Depression   • COPD (chronic obstructive pulmonary disease) ([facility])   • Obstructive sleep apnea   • Squamous cell carcinoma of leg, right   • Pain in joint, multiple sites   • Excessive daytime sleepiness   • Former smoker   • Gastroesophageal reflux disease   • Migraines   • Generalized weakness   • Stroke-like symptoms   • Dependence on supplemental oxygen     Past Medical History:   Diagnosis Date   • Anxiety    • Arthritis    • Breast cancer ([facility])    • COPD (chronic obstructive pulmonary disease) ([facility])     oxygen at 2L aths   • CVD (cardiovascular disease)    • Depression    • Hypothyroid    • Macular degeneration    • Peripheral neuropathy    • Vertigo      Past  Surgical History:   Procedure Laterality Date   • ADRENAL GLAND SURGERY     • BRAIN MENINGIOMA EXCISION      occipital   •  SECTION     • EMBOLECTOMY N/A 11/15/2017    Procedure: Embolectomy Mechanical;  Surgeon: Rachid Figueroa MD;  Location: [facility] ;  Service:    • MASTECTOMY     • RECTAL SURGERY     • REPLACEMENT TOTAL KNEE Right    • TOTAL SHOULDER REPLACEMENT      x2      General Information     Row Name 22 1302          Physical Therapy Time and Intention    Document Type evaluation  -AR     Mode of Treatment physical therapy  -AR     Row Name 22 1302          General Information    Patient Profile Reviewed yes  -AR     Prior Level of Function min assist:  h/o strokes, reports no residual weakness, mostly balance deficits after previous CVAs  -AR     Existing Precautions/Restrictions fall  -AR     Barriers to Rehab none identified  -AR     Row Name 22 1302          Living Environment    People in Home spouse  -AR     Row Name 22 1302          Home Main Entrance    Number of Stairs, Main Entrance none  -AR     Row Name 22 1302          Cognition    Orientation Status (Cognition) oriented x 3  -AR     Row Name 22 1302          Safety Issues, Functional Mobility    Impairments Affecting Function (Mobility) balance;cognition;strength;endurance/activity tolerance  -AR           User Key  (r) = Recorded By, (t) = Taken By, (c) = Cosigned By    Initials Name Provider Type    AR Elvia Bryant PT Physical Therapist               Mobility     Row Name 22 1303          Bed Mobility    Bed Mobility supine-sit;sit-supine  -AR     Supine-Sit [place] (Bed Mobility) minimum assist (75% patient effort)  -AR     Sit-Supine [place] (Bed Mobility) not tested  -AR     Assistive Device (Bed Mobility) bed rails;head of bed elevated  -AR     Comment, (Bed Mobility) increased time and verbal cues  -AR     Row Name 22 1301           Transfers    Comment, (Transfers) cues for UE placement, sit<>stand 3x  -AR     Row Name 04/12/22 1303          Sit-Stand Transfer    Sit-Stand [place] (Transfers) minimum assist (75% patient effort)  -AR     Assistive Device (Sit-Stand Transfers) walker, front-wheeled  -AR     Row Name 04/12/22 1303          Gait/Stairs (Locomotion)    [place] Level (Gait) minimum assist (75% patient effort)  -AR     Assistive Device (Gait) walker, front-wheeled  -AR     Distance in Feet (Gait) 2' +20' with sitting rest break  -AR     Deviations/Abnormal Patterns (Gait) zabrina decreased;festinating/shuffling  -AR     Bilateral Gait Deviations forward flexed posture;heel strike decreased  -AR           User Key  (r) = Recorded By, (t) = Taken By, (c) = Cosigned By    Initials Name Provider Type    AR Elvia Bryant, PT Physical Therapist               Obj/Interventions     Row Name 04/12/22 1304          Range of Motion Comprehensive    Comment, General Range of Motion B LE WFL  -AR     Row Name 04/12/22 1304          Strength Comprehensive (MMT)    Comment, General Manual Muscle Testing (MMT) Assessment R LE -4/5, LLE 4/5  -AR     Row Name 04/12/22 1304          Motor Skills    Therapeutic Exercise --  B AP, LAQ, standing marches 5-10x  -AR     Row Name 04/12/22 1304          Balance    Balance Assessment standing dynamic balance  -AR     Dynamic Standing Balance minimal assist  -AR     Position/Device Used, Standing Balance walker, rolling  -AR           User Key  (r) = Recorded By, (t) = Taken By, (c) = Cosigned By    Initials Name Provider Type    AR Elvia Bryant, PT Physical Therapist               Goals/Plan     Row Name 04/12/22 1309          Bed Mobility Goal 1 (PT)    Activity/Assistive Device (Bed Mobility Goal 1, PT) bed mobility activities, all  -AR     [place] Level/Cues Needed (Bed Mobility Goal 1, PT) standby assist  -AR     Time Frame (Bed Mobility Goal 1, PT) 1 week  -AR     Row Name 04/12/22  1309          Transfer Goal 1 (PT)    Activity/Assistive Device (Transfer Goal 1, PT) sit-to-stand/stand-to-sit;bed-to-chair/chair-to-bed;walker, rolling  -AR     [place] Level/Cues Needed (Transfer Goal 1, PT) standby assist  -AR     Time Frame (Transfer Goal 1, PT) 1 week  -AR     Row Name 04/12/22 1309          Gait Training Goal 1 (PT)    Activity/Assistive Device (Gait Training Goal 1, PT) gait (walking locomotion);walker, rolling  -AR     [place] Level (Gait Training Goal 1, PT) contact guard required  -AR     Distance (Gait Training Goal 1, PT) 150  -AR     Time Frame (Gait Training Goal 1, PT) 1 week  -AR     Row Name 04/12/22 1304          Therapy Assessment/Plan (PT)    Planned Therapy Interventions (PT) balance training;bed mobility training;gait training;home exercise program;patient/family education;transfer training;ROM (range of motion);stair training;strengthening  -AR           User Key  (r) = Recorded By, (t) = Taken By, (c) = Cosigned By    Initials Name Provider Type    AR Elvia Bryant, PT Physical Therapist               Clinical Impression     Row Name 04/12/22 1300          Pain    Pretreatment Pain Rating 0/10 - no pain  -AR     Posttreatment Pain Rating 0/10 - no pain  -AR     Pain Intervention(s) Repositioned  -AR     Row Name 04/12/22 1301          Plan of Care Review    Plan of Care Reviewed With patient;spouse  -AR     Outcome Evaluation Pt admitted with speech changes and weakness.  CT -, MRI pending.  H/o CVA, spouse reports pt normally uses walker, no significant weakness after previous CVAs, reports mostly balance issues.  Today pt demonstrates weakness R weaker than L, some R inattention/neglect, impaired balance and gait pattern from bsaelinebut able to ambulate 22' w/ RW and min A.  Verbal cues for safety and looking towards R.  Currently recommend DC to SNU  -AR     Row Name 04/12/22 0933          Therapy Assessment/Plan (PT)    Rehab Potential (PT) good, to achieve  stated therapy goals  -AR     Criteria for Skilled Interventions Met (PT) yes  -AR     Therapy Frequency (PT) 6 times/wk  -AR     Row Name 04/12/22 1304          Vital Signs    O2 Delivery Pre Treatment supplemental O2  -AR     Row Name 04/12/22 1304          Positioning and Restraints    Pre-Treatment Position in bed  -AR     Post Treatment Position chair  -AR     In Chair reclined;notified nsg;sitting;call light within reach;encouraged to call for assist;exit alarm on;with family/caregiver  -AR           User Key  (r) = Recorded By, (t) = Taken By, (c) = Cosigned By    Initials Name Provider Type    AR Elvia Bryant, PT Physical Therapist               Outcome Measures     Row Name 04/12/22 1309          How much help from another person do you currently need...    Turning from your back to your side while in flat bed without using bedrails? 3  -AR     Moving from lying on back to sitting on the side of a flat bed without bedrails? 3  -AR     Moving to and from a bed to a chair (including a wheelchair)? 3  -AR     Standing up from a chair using your arms (e.g., wheelchair, bedside chair)? 3  -AR     Climbing 3-5 steps with a railing? 1  -AR     To walk in hospital room? 3  -AR     AM-PAC 6 Clicks Score (PT) 16  -AR     Row Name 04/12/22 1309          Functional Assessment    Outcome Measure Options AM-PAC 6 Clicks Basic Mobility (PT)  -AR           User Key  (r) = Recorded By, (t) = Taken By, (c) = Cosigned By    Initials Name Provider Type    Elvia Matthews PT Physical Therapist                             Physical Therapy Education                 Title: PT OT SLP Therapies (In Progress)     Topic: Physical Therapy (In Progress)     Point: Mobility training (In Progress)     Learning Progress Summary           Patient Acceptance, E, NR by AR at 4/12/2022 1310                   Point: Home exercise program (In Progress)     Learning Progress Summary           Patient Acceptance, E, NR by AR at 4/12/2022  1310                   Point: Body mechanics (In Progress)     Learning Progress Summary           Patient Acceptance, E, NR by AR at 4/12/2022 1310                   Point: Precautions (In Progress)     Learning Progress Summary           Patient Acceptance, E, NR by AR at 4/12/2022 1310                               User Key     Initials Effective Dates Name Provider Type Discipline    AR 06/16/21 -  Elvia Bryant PT Physical Therapist PT              PT Recommendation and Plan  Planned Therapy Interventions (PT): balance training, bed mobility training, gait training, home exercise program, patient/family education, transfer training, ROM (range of motion), stair training, strengthening  Plan of Care Reviewed With: patient, spouse  Outcome Evaluation: Pt admitted with speech changes and weakness.  CT -, MRI pending.  H/o CVA, spouse reports pt normally uses walker, no significant weakness after previous CVAs, reports mostly balance issues.  Today pt demonstrates weakness R weaker than L, some R inattention/neglect, impaired balance and gait pattern from bsaelinebut able to ambulate 22' w/ RW and min A.  Verbal cues for safety and looking towards R.  Currently recommend DC to SNU     Time Calculation:    PT Charges     Row Name 04/12/22 1301             Time Calculation    Start Time 0929  -AR      Stop Time 0954  -AR      Time Calculation (min) 25 min  -AR      PT Received On 04/12/22  -AR      PT - Next Appointment 04/13/22  -AR      PT Goal Re-Cert Due Date 04/19/22  -AR            User Key  (r) = Recorded By, (t) = Taken By, (c) = Cosigned By    Initials Name Provider Type    AR lEvia Bryant PT Physical Therapist              Therapy Charges for Today     Code Description Service Date Service Provider Modifiers Qty    96116786101 HC PT EVAL MOD COMPLEXITY 4 4/12/2022 Elvia Bryant, PT GP 1    05837182076 HC PT THER PROC EA 15 MIN 4/12/2022 Elvia Bryant, PT GP 1    21977063621 HC PT THER SUPP EA  15 MIN 4/12/2022 Elvia Bryant, PT GP 2          PT G-Codes  Outcome Measure Options: AM-PAC 6 Clicks Basic Mobility (PT)  AM-PAC 6 Clicks Score (PT): 16    Elvia Bryant, PT  4/12/2022

## 2022-04-13 ENCOUNTER — TELEPHONE (OUTPATIENT)
Dept: NEUROLOGY | Facility: CLINIC | Age: 82
End: 2022-04-13

## 2022-04-13 PROBLEM — I63.9 ACUTE CVA (CEREBROVASCULAR ACCIDENT) (HCC): Status: ACTIVE | Noted: 2022-04-12

## 2022-04-13 LAB
ANION GAP SERPL CALCULATED.3IONS-SCNC: 11.5 MMOL/L (ref 5–15)
ASA PLATELET INHIBITION: 384 ARU
BUN SERPL-MCNC: 8 MG/DL (ref 8–23)
BUN/CREAT SERPL: 14.5 (ref 7–25)
CALCIUM SPEC-SCNC: 9.3 MG/DL (ref 8.6–10.5)
CHLORIDE SERPL-SCNC: 109 MMOL/L (ref 98–107)
CO2 SERPL-SCNC: 23.5 MMOL/L (ref 22–29)
CREAT SERPL-MCNC: 0.55 MG/DL (ref 0.57–1)
DEPRECATED RDW RBC AUTO: 44.2 FL (ref 37–54)
EGFRCR SERPLBLD CKD-EPI 2021: 91.6 ML/MIN/1.73
ERYTHROCYTE [DISTWIDTH] IN BLOOD BY AUTOMATED COUNT: 12.8 % (ref 12.3–15.4)
GLUCOSE SERPL-MCNC: 96 MG/DL (ref 65–99)
HCT VFR BLD AUTO: 39.1 % (ref 34–46.6)
HGB BLD-MCNC: 13.1 G/DL (ref 12–15.9)
MCH RBC QN AUTO: 31.4 PG (ref 26.6–33)
MCHC RBC AUTO-ENTMCNC: 33.5 G/DL (ref 31.5–35.7)
MCV RBC AUTO: 93.8 FL (ref 79–97)
PLATELET # BLD AUTO: 201 10*3/MM3 (ref 140–450)
PMV BLD AUTO: 10.7 FL (ref 6–12)
POTASSIUM SERPL-SCNC: 3.6 MMOL/L (ref 3.5–5.2)
RBC # BLD AUTO: 4.17 10*6/MM3 (ref 3.77–5.28)
SODIUM SERPL-SCNC: 144 MMOL/L (ref 136–145)
WBC NRBC COR # BLD: 6.41 10*3/MM3 (ref 3.4–10.8)

## 2022-04-13 PROCEDURE — 85027 COMPLETE CBC AUTOMATED: CPT | Performed by: NURSE PRACTITIONER

## 2022-04-13 PROCEDURE — 99233 SBSQ HOSP IP/OBS HIGH 50: CPT | Performed by: NURSE PRACTITIONER

## 2022-04-13 PROCEDURE — 94799 UNLISTED PULMONARY SVC/PX: CPT

## 2022-04-13 PROCEDURE — 85576 BLOOD PLATELET AGGREGATION: CPT | Performed by: STUDENT IN AN ORGANIZED HEALTH CARE EDUCATION/TRAINING PROGRAM

## 2022-04-13 PROCEDURE — 80048 BASIC METABOLIC PNL TOTAL CA: CPT | Performed by: NURSE PRACTITIONER

## 2022-04-13 PROCEDURE — 97535 SELF CARE MNGMENT TRAINING: CPT | Performed by: OCCUPATIONAL THERAPIST

## 2022-04-13 PROCEDURE — 94664 DEMO&/EVAL PT USE INHALER: CPT

## 2022-04-13 PROCEDURE — 94761 N-INVAS EAR/PLS OXIMETRY MLT: CPT

## 2022-04-13 RX ADMIN — PANTOPRAZOLE SODIUM 40 MG: 40 TABLET, DELAYED RELEASE ORAL at 08:51

## 2022-04-13 RX ADMIN — BUDESONIDE AND FORMOTEROL FUMARATE DIHYDRATE 2 PUFF: 80; 4.5 AEROSOL RESPIRATORY (INHALATION) at 08:57

## 2022-04-13 RX ADMIN — ATENOLOL 25 MG: 25 TABLET ORAL at 08:51

## 2022-04-13 RX ADMIN — ATENOLOL 25 MG: 25 TABLET ORAL at 23:18

## 2022-04-13 RX ADMIN — ACETAMINOPHEN 650 MG: 325 TABLET ORAL at 23:55

## 2022-04-13 RX ADMIN — GABAPENTIN 400 MG: 400 CAPSULE ORAL at 08:51

## 2022-04-13 RX ADMIN — Medication 5000 UNITS: at 08:51

## 2022-04-13 RX ADMIN — GABAPENTIN 400 MG: 400 CAPSULE ORAL at 17:00

## 2022-04-13 RX ADMIN — GABAPENTIN 400 MG: 400 CAPSULE ORAL at 22:00

## 2022-04-13 RX ADMIN — LETROZOLE 2.5 MG: 2.5 TABLET ORAL at 08:51

## 2022-04-13 RX ADMIN — CLOPIDOGREL 75 MG: 75 TABLET, FILM COATED ORAL at 08:51

## 2022-04-13 RX ADMIN — FUROSEMIDE 20 MG: 20 TABLET ORAL at 08:51

## 2022-04-13 RX ADMIN — ACETAMINOPHEN 650 MG: 325 TABLET ORAL at 13:21

## 2022-04-13 RX ADMIN — BUDESONIDE AND FORMOTEROL FUMARATE DIHYDRATE 2 PUFF: 80; 4.5 AEROSOL RESPIRATORY (INHALATION) at 21:34

## 2022-04-13 RX ADMIN — ROSUVASTATIN CALCIUM 20 MG: 20 TABLET, FILM COATED ORAL at 08:51

## 2022-04-13 RX ADMIN — ASPIRIN 81 MG: 81 TABLET, COATED ORAL at 08:51

## 2022-04-13 RX ADMIN — ESCITALOPRAM 20 MG: 20 TABLET, FILM COATED ORAL at 08:51

## 2022-04-13 RX ADMIN — LEVETIRACETAM 500 MG: 500 TABLET, FILM COATED ORAL at 08:51

## 2022-04-13 RX ADMIN — LEVETIRACETAM 500 MG: 500 TABLET, FILM COATED ORAL at 22:00

## 2022-04-13 RX ADMIN — Medication 3 MG: at 23:55

## 2022-04-13 NOTE — PROGRESS NOTES
DOS: 2022  NAME: Avelina Carr   : 1940  PCP: Goran Carr MD  Chief Complaint   Patient presents with   • Difficulty Walking   • Weakness - Generalized   Patient seen in follow-up today; new to me      Stroke      Subjective: No acute events overnight.  Patient with persistent dysarthria and generalized weakness; lower greater than upper extremities and reported gait instability.  She denies any new complaints and or concerns on my exam.     at bedside.     Objective:  Vital signs: /79   Pulse 66   Temp 97.3 °F (36.3 °C) (Oral)   Resp 18   SpO2 94%       HEENT: Normocephalic, atraumatic   COR: RRR  Resp: Even and unlabored  Extremities: Equal pulses, nondistal embolization    Neurological:   MS: AO. Language dysarthric-intermittent word finding difficulty and thick/slurred speech. No obvious neglect. Higher integrative function normal  CN: II-XII normal mild generalized lower facial weakness left shoulder shrug  Motor: 4+/5 bilateral upper extremities and 3 -/5 bilateral lower extremities with normal tone throughout  Reflexes: Toes downgoing  Sensory: Intact-to light touch  Coordination: Normal-finger-to-nose although slowed    Laboratory results:  Lab Results   Component Value Date    GLUCOSE 96 2022    CALCIUM 9.3 2022     2022    K 3.6 2022    CO2 23.5 2022     (H) 2022    BUN 8 2022    CREATININE 0.55 (L) 2022    EGFRIFAFRI 75 2021    EGFRIFNONA 62 2021    BCR 14.5 2022    ANIONGAP 11.5 2022     Lab Results   Component Value Date    WBC 6.41 2022    HGB 13.1 2022    HCT 39.1 2022    MCV 93.8 2022     2022     Lab Results   Component Value Date    CHOL 116 2022    CHOL 139 2017     Lab Results   Component Value Date    HDL 33 (L) 2022    HDL 39 (L) 2021    HDL 36 (L) 2021     Lab Results   Component Value Date    LDL 54  04/12/2022    LDL 67 09/24/2021    LDL 72 02/25/2021     Lab Results   Component Value Date    TRIG 174 (H) 04/12/2022    TRIG 106 09/24/2021    TRIG 143 02/25/2021         Lab 04/12/22  1129   HEMOGLOBIN A1C 5.90*            Review and interpretation of imaging:  MRI OF THE BRAIN WITH AND WITHOUT CONTRAST     CLINICAL HISTORY: History of meningioma. Slurred speech, vertigo, and  dizziness.     TECHNIQUE: MRI of the brain was obtained with sagittal pre and post  gadolinium T1, axial pre and postgadolinium T1, coronal postgadolinium  T1, axial postgadolinium 3-D SPGR, axial FLAIR, axial T2, axial  diffusion, and axial gradient echo images.     COMPARISON: Comparison is made to previous postcontrast head CT dated  11/15/2017.     FINDINGS:     There are foci of acute infarction within the left aspect of the naeem  within basilar  distribution which measure up to approximately  0.8 x 1.4 cm in greatest axial dimensions.     The patient is status post a right posterior lateral parietal  craniotomy. Deep to the craniotomy flap along the superior aspect of the  posterior portion of the right frontal lobe, there is a dural based  lesion which measures up to approximately 2.0 x 0.6 cm in greatest  coronal dimensions and approximately 1.6 cm in greatest AP dimensions.  There is a peripheral shell of calcification at this site. It is unclear  whether this represents a meningioma or some type of postoperative  change as it is located immediately deep to the craniotomy site.  Regardless, the findings are unchanged when compared to the prior CT  study dated 11/15/2017. Along the lateral aspect of the right frontal  parietal junction, there is another dural based focus that is seen  immediately deep to the site of a craniotomy which measures up to  approximately 1.4 x 0.4 cm in greatest axial dimensions. Again, the  findings are unchanged when compared to the prior CT and again it is  unclear whether this represents a  meningioma or some type of  postoperative change at the site of the craniotomy.     There are findings much more characteristic of meningiomas along the  posterior aspect of the right parietal and occipital lobes in addition  to the medial aspect of the right occipital lobe. Along the medial  aspect of the inferior portion of the right occipital lobe, there is a  dural based contrast enhancing focus measuring up to approximately 12 x  9 mm in greatest axial dimensions that is most suggestive of a  meningioma and this demonstrates no significant interval change. There  is likely a component of this meningioma that invades the superior  sagittal sinus measuring up to 0.6 x 0.7 cm in greatest axial  dimensions. 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 There are 3 additional dural based enhancing  lesions that are most suggestive of meningiomas along the posterior  aspect of the right parietal and occipital lobes with the largest  measuring up to approximately 2.2 x 0.8 cm in greatest axial dimensions.  The next larger lesion posterior to the right occipital lobe measures up  to 1.7 x 0.8 cm in greatest axial dimensions and the smallest of the  lesions measures up to approximately 1.0 x 0.6 cm in greatest axial  dimensions and is located posterior to the right occipital lobe. All of  these lesions are very similar in appearance when compared to the prior  CT study and there is no convincing interval change and any of these  lesions. Additional nonspecific dural thickening is noted along the  superior aspect of the right frontal and parietal lobes deep to the  craniotomy flap.     There is a focus of encephalomalacia within the lateral aspect of the  anterior portion of the left temporal lobe which measures up to 4.0 x  2.4 cm in greatest axial dimensions. The findings are compatible with a  chronic infarct within the left MCA distribution. This abnormality is  new when compared to the  prior study. A focus of encephalitis is  identified within the posterior aspect the right occipital lobe  measuring up to 2.8 x 2.8 cm in greatest axial dimensions and this  finding was present on the prior study could be related to a chronic  infarct within the right PCA distribution or chronic postoperative  change. Similarly, there are small areas of encephalomalacia within the  undersurface of the more lateral aspect of the right occipital lobe with  the largest measuring up to approximately 1.1 cm in diameter. Again,  these findings were present on the prior study and could represent  chronic infarcts within the right PCA distribution or postoperative  areas of encephalomalacia. Tiny subcentimeter chronic infarcts are  identified within the right cerebellar hemisphere within the right PICA  distribution.     There are severe and confluent changes of chronic small vessel ischemic  phenomena. There are no abnormal areas of susceptibility artifact within  the brain parenchyma. The major intracranial flow related signal voids  are within normal limits.     IMPRESSION:     There are foci of acute infarction within the left aspect of the naeem  that are located within basilar  distribution.     There are 4 dural based homogeneously enhancing lesions that are located  along the posterior aspect of the right parietal and occipital lobes  that are most compatible with meningiomas. 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 Overall,  when compared to the prior postcontrast CT scan of the head as part of  the CT angiogram study, there is no convincing interval change, given  differences in modalities. Additionally, deep to the craniotomy sites  along the lateral aspect the right frontal parietal junction and along  the  superior aspect of the posterior portion of the right frontal lobe,  there are some dural based extra-axial enhancing foci which could be  related to postoperative change from the craniotomy flaps or additional  meningiomas. These findings are also unchanged when compared to the  prior study.     There is a focus of encephalomalacia within the lateral aspect of the  left temporal lobe which is new when compared to the previous exam dated  11/15/2017 and is compatible with a chronic infarct within the left MCA  distribution. Additional areas of encephalomalacia are noted within the  inferior aspect of the right occipital lobe and the posterior medial  aspect of the right occipital lobe that were evident on the prior study  and are compatible with either chronic infarcts within the right PCA  distribution or postoperative areas of encephalomalacia.     Moderate-to-severe changes of chronic small vessel ischemic phenomena  are noted.     These findings were discussed with Dr. Bolton on 04/12/2022 at  approximately 4:20 PM.     This report was finalized on 4/13/2022 7:30 AM by Dr. Juan Bishop M.D.     CT HEAD WO CONTRAST-     CLINICAL HISTORY: Weakness. Speech difficulty.     TECHNIQUE: Transverse 3 mm thick images were obtained from the base of  the skull to the vertex without IV contrast.     Radiation dose reduction techniques were utilized, including automated  exposure control and exposure modulation based on body size.     COMPARISON: CT head dated 11/18/2017.     FINDINGS: There is a closed craniotomy defect involving portions of the  right frontal bone and right parietal bone that is unchanged. This  appears chronic. There is localized encephalomalacia within the anterior  aspect of the left temporal lobe that is consistent with a chronic  infarct. This appeared to be in it's acute or subacute phase of  evolution on the previous CT scan. Focal encephalomalacia in the medial  aspect of the right parietal  lobe is unchanged. There is patchy  ill-defined abnormal diminished attenuation throughout the white matter  of both cerebral hemispheres that is more prominent than on the previous  CT scan. This is consistent with sequela of extensive small vessel  chronic ischemic change. No acute infarct or hemorrhage is evident.  However, an acute white matter infarct would be impossible to exclude  given the background white matter changes. There are no masses. The  visualized paranasal sinuses and mastoid air cells and middle ear  cavities appear well aerated.     IMPRESSION:  Chronic infarct involving the anterior aspect of the left  temporal lobe. Chronic infarct or postoperative change within the medial  aspect of the right parietal lobe. Evidence of extensive small vessel  chronic ischemic change showing progression since the preceding CT in  2017. No definite acute intracranial abnormality is identified.     This report was finalized on 4/11/2022 11:43 PM by Dr. Gage Ji M.D.  PORTABLE CHEST 04/11/2022 AT 10:55 PM     CLINICAL HISTORY: Weakness     Compared to the previous chest dated 06/15/2021.     The lungs are somewhat poorly inflated but appear free of infiltrates.  There are no pleural effusions. The cardiomediastinal silhouette is  unremarkable.     IMPRESSIONS: No evidence of acute disease within the chest.     This report was finalized on 4/11/2022 11:12 PM by Dr. Gage Ji M.D.    Impression: This is a 81-year-old right-handed female with history of multiple prior strokes last: Left temporal lobe disease 2015) status post mechanical thrombectomy and antithrombolytic therapy/TPA, hypertension, hyperlipidemia, obstructive sleep apnea (100% compliant with treatment), prior breast CA, lumbar stenosis, seizure disorder (previously maintained on gabapentin), BPPV who is followed in our outpatient clinic by Savana LANDIN last appointment April 2021 who presented to [facility] 4/11 due to  complaint of generalized weakness/slurred speech/word finding difficulty with associated confusion for which our service was consulted.  Initial CT of the head showed chronic infarct in the anterior aspect of the left temporal lobe and medial aspect of the right parietal lobe with extensive small vessel disease without any acute mass and/or stroke noted.  Family denied any seizure like activity specifically no tonic/clonic seizure/loss of bowel or bladder function/tongue biting and/or prolonged postictal state although  noted the patient with symptoms stare when watching the TV would not respond for 30seconds to 1 minute.  Prior to arrival patient on Plavix 75 mg daily, aspirin 81 mg daily and Crestor 20 mg daily and reports compliance with medications.  MRI of the brain this admission revealed acute infarction in the left aspect of the naeem etiology felt to be secondary to uncontrolled blood pressure.  P2 Y 12 was completed which showed adequate response at 178.  Awaiting aspirin response; the plan to escalate aspirin to dialysis regardless of findings due to breakthrough stroke despite report of compliance-awaiting completion of lab before escalating.    Neurologically, stable moderate dysarthria/word finding difficulty/gait instability and generalized weakness still present.  Recommendations below. PT/OT/ST. CCP to assist with discharge planning. Call RRT for any acute neurological changes and/or concerns. We will continue to follow and advise.         Diagnoses:   1.  Acute infarct naeem; left -cryprogenic etiology although elevated blood pressure felt to be contributory  2.  History of prior stroke  3.  Obstructive sleep   4.  Hypertension  5.  Hyperlipidemia  6.  Seizure disorder        Plan:  TTE-ordered  CTA head and neck-ordered  Long-term Holter at discharge  Recommend consideration of acute/subacute rehab at discharge  Keppra 500mg BID; on gabapentin 800 g 3 times daily prior to-since decreased to  400 mg 3 times daily per attending neurologist for seizure coverage  Aspirin 81 mg daily -on PTA   [id number] mg daily - on PTA   Crestor 20 mg daily -LDL 54 (on PTA)   Neurochecks  BP control  Stroke Education  [place]/SCDs  PT/OT/ST      Case reviewed with attending neurologist  and he agrees with plan above.     Samantha Nichols APRN

## 2022-04-13 NOTE — PROGRESS NOTES
Name: Avelina Carr ADMIT: 2022   : 1940  PCP: Goran Carr MD    MRN: [id number] LOS: 1 days   AGE/SEX: 81 y.o. female  ROOM: [facility]     Subjective   Subjective   Still feels speech is not at baseline.  Doing some physical therapy was able to get to a bedside commode.  Does not like her modified diet but no nausea or vomiting.    Review of Systems     Objective   Objective   Vital Signs  Temp:  [97.3 °F (36.3 °C)-98 °F (36.7 °C)] 97.3 °F (36.3 °C)  Heart Rate:  [57-66] 66  Resp:  [16-18] 18  BP: (137-183)/(65-99) 165/79  SpO2:  [94 %-96 %] 94 %  on   ;   Device (Oxygen Therapy): room air  There is no height or weight on file to calculate BMI.  Physical Exam  Vitals and nursing note reviewed.   Constitutional:       General: She is not in acute distress.     Appearance: She is ill-appearing.   Neck:      Vascular: No JVD.      Trachea: No tracheal deviation.   Cardiovascular:      Rate and Rhythm: Normal rate and regular rhythm.      Heart sounds: Normal heart sounds.   Pulmonary:      Effort: Pulmonary effort is normal. No respiratory distress.      Breath sounds: Normal breath sounds.   Abdominal:      General: Bowel sounds are normal.      Palpations: Abdomen is soft.      Tenderness: There is no abdominal tenderness.   Skin:     General: Skin is warm and dry.      Findings: Bruising present.   Neurological:      Mental Status: She is alert and oriented to person, place, and time.      Motor: Weakness present.   Psychiatric:         Behavior: Behavior normal. Behavior is cooperative.          Results Review:       I reviewed the patient's new clinical results.  Results from last 7 days   Lab Units 22  0548 22  2242   WBC 10*3/mm3 6.41 6.30   HEMOGLOBIN g/dL 13.1 12.2   PLATELETS 10*3/mm3 201 202     Results from last 7 days   Lab Units 22  0548 22  2242   SODIUM mmol/L 144 144   POTASSIUM mmol/L 3.6 3.6   CHLORIDE mmol/L 109* 107   CO2 mmol/L 23.5 23.2   BUN mg/dL 8 14    CREATININE mg/dL 0.55* 0.83   GLUCOSE mg/dL 96 122*   EGFR mL/min/1.73 91.6 70.5     Results from last 7 days   Lab Units 04/13/22  0548 04/11/22  2242   CALCIUM mg/dL 9.3 8.8   ALBUMIN g/dL  --  4.20     Results from last 7 days   Lab Units 04/12/22  1129   HDL CHOL mg/dL 33*   LDL CHOL mg/dL 54   HEMOGLOBIN A1C % 5.90*   VITAMIN B 12 pg/mL >2,000*   TSH uIU/mL 1.020     Glucose   Date/Time Value Ref Range Status   04/12/2022 1103 120 70 - 130 mg/dL Final     Comment:     Meter: [id number] : [id number] King Stepan GILMAN   04/12/2022 0612 103 70 - 130 mg/dL Final     Comment:     Meter: [id number] : [id number] Robert Henley CNA       MRI Brain With & Without Contrast  4/12  Impression:    There are foci of acute infarction within the left aspect of the naeem  that are located within basilar  distribution.     There are 4 dural based homogeneously enhancing lesions that are located  along the posterior aspect of the right parietal and occipital lobes  that are most compatible with meningiomas. 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 Overall,  when compared to the prior postcontrast CT scan of the head as part of  the CT angiogram study, there is no convincing interval change, given  differences in modalities. Additionally, deep to the craniotomy sites  along the lateral aspect the right frontal parietal junction and along  the superior aspect of the posterior portion of the right frontal lobe,  there are some dural based extra-axial enhancing foci which could be  related to postoperative change from the craniotomy flaps or additional  meningiomas. These findings are also unchanged when compared to the  prior study.     There is a focus of encephalomalacia within the lateral aspect of the  left temporal lobe  which is new when compared to the previous exam dated  11/15/2017 and is compatible with a chronic infarct within the left MCA  distribution. Additional areas of encephalomalacia are noted within the  inferior aspect of the right occipital lobe and the posterior medial  aspect of the right occipital lobe that were evident on the prior study  and are compatible with either chronic infarcts within the right PCA  distribution or postoperative areas of encephalomalacia.     Moderate-to-severe changes of chronic small vessel ischemic phenomena  are noted.       Scheduled Medications  aspirin, 81 mg, Oral, Daily  atenolol, 25 mg, Oral, BID  budesonide-formoterol, 2 puff, Inhalation, BID  cholecalciferol, 5,000 Units, Oral, Daily  clopidogrel, 75 mg, Oral, Daily  escitalopram, 20 mg, Oral, Daily  furosemide, 20 mg, Oral, Daily  gabapentin, 400 mg, Oral, TID  letrozole, 2.5 mg, Oral, Daily  levETIRAcetam, 500 mg, Oral, BID  pantoprazole, 40 mg, Oral, Daily  rosuvastatin, 20 mg, Oral, Daily    Infusions  sodium chloride, 75 mL/hr, Last Rate: 75 mL/hr (04/12/22 2038)    Diet  Diet Dysphagia; IV - Mechanical Soft No Mixed Consistencies; Nectar / Syrup Thick         Assessment/Plan     Active Hospital Problems    Diagnosis  POA   • **Acute CVA (cerebrovascular accident) ([facility]) [I63.9]  Yes   • Generalized weakness [R53.1]  Yes   • Dependence on supplemental oxygen [Z99.81]  Not Applicable   • COPD (chronic obstructive pulmonary disease) ([facility]) [J44.9]  Yes   • HTN (hypertension) [I10]  Yes   • HLD (hyperlipidemia) [E78.5]  Yes   • Migraines [G43.909]  Yes   • History of cardioembolic cerebrovascular accident (CVA) [Z86.73]  Not Applicable   • Seizure disorder, simple partial, without intractable epilepsy ([facility]) [G40.109]  Yes      Resolved Hospital Problems   No resolved problems to display.       81 y.o. female with Acute CVA (cerebrovascular accident) ([facility]).    MRI does show evidence of acute stroke.  Await neurology follow-up  recommendations.  Continue on aspirin/Plavix.  Will stop IV fluids.  Stop Keppra?  BP is stable on current regimen.  COPD stable.  Will have respiratory make adjustments in her home CPAP.  Will need rehab.      · SCDs for DVT prophylaxis.  · Full code.  · Discussed with patient, family and care team on multidisciplinary rounds.  · Anticipate discharge to SNU facility later this week.      Antoni Grey MD  [facility]  04/13/22  12:46 EDT

## 2022-04-13 NOTE — PLAN OF CARE
Goal Outcome Evaluation:  Plan of Care Reviewed With: patient        Progress: improving  Outcome Evaluation: Pt does well with OT this am, sits up EOB for grooming tasks. Some decrease in sitting balance with fatigue but able to grasp and use R hand for brushing teeth. Also transfers to BSC mod A, mod A also needed for toileting/hygiene. She will continue to benefit from OT while inpt.

## 2022-04-13 NOTE — THERAPY TREATMENT NOTE
Acute Care - Occupational Therapy Treatment Note  [facility]     Patient Name: Avelina Carr  : 1940  MRN: [id number]  Today's Date: 2022             Admit Date: 2022       ICD-10-CM ICD-9-CM   1. Generalized weakness  R53.1 780.79   2. Difficulty with speech  R47.9 784.59     Patient Active Problem List   Diagnosis   • Postmastectomy lymphedema syndrome   • Malignant neoplasm of upper-outer quadrant of right female breast ([facility])   • Cerebrovascular accident (CVA) ([facility])   • Stroke ([facility])   • Hemarthrosis   • HTN (hypertension)   • HLD (hyperlipidemia)   • Adverse effect of antiplatelet agent   • Left middle cerebral artery stroke ([facility])   • Carcinoma of central portion of right breast in female, estrogen receptor positive ([facility])   • Chronic bilateral low back pain with right-sided sciatica   • History of lumbar surgery   • Seizure disorder, simple partial, without intractable epilepsy ([facility])   • Spinal stenosis of lumbar region with neurogenic claudication   • Status post craniectomy   • History of total right knee replacement   • Other forms of scoliosis, lumbar region   • DDD (degenerative disc disease), lumbar   • Benign meningioma of brain ([facility])   • Squamous cell carcinoma of leg, left   • History of cardioembolic cerebrovascular accident (CVA)   • Hypothyroid   • Depression   • COPD (chronic obstructive pulmonary disease) ([facility])   • Obstructive sleep apnea   • Squamous cell carcinoma of leg, right   • Pain in joint, multiple sites   • Excessive daytime sleepiness   • Former smoker   • Gastroesophageal reflux disease   • Migraines   • Generalized weakness   • Stroke-like symptoms   • Dependence on supplemental oxygen     Past Medical History:   Diagnosis Date   • Anxiety    • Arthritis    • Breast cancer ([facility])    • COPD (chronic obstructive pulmonary disease) ([facility])     oxygen at 2L aths   • CVD (cardiovascular disease)    • Depression    • Hypothyroid    • Macular degeneration    • Peripheral  neuropathy    • Vertigo      Past Surgical History:   Procedure Laterality Date   • ADRENAL GLAND SURGERY     • BRAIN MENINGIOMA EXCISION      occipital   •  SECTION     • EMBOLECTOMY N/A 11/15/2017    Procedure: Embolectomy Mechanical;  Surgeon: Rachid Figueroa MD;  Location: [facility] ;  Service:    • MASTECTOMY     • RECTAL SURGERY     • REPLACEMENT TOTAL KNEE Right    • TOTAL SHOULDER REPLACEMENT      x2         OT ASSESSMENT FLOWSHEET (last 12 hours)     OT Evaluation and Treatment     Row Name 22 0951                   OT Time and Intention    Subjective Information complains of;fatigue  -SG        Document Type therapy note (daily note)  -SG        Mode of Treatment individual therapy;occupational therapy  -SG        Patient Effort good  -SG        Symptoms Noted During/After Treatment none  -SG                  General Information    General Observations of Patient Pt supine in bed  -SG                  Pain Assessment    Pretreatment Pain Rating 0/10 - no pain  -SG        Posttreatment Pain Rating 0/10 - no pain  -SG                  Cognition    Affect/Mental Status (Cognition) WNL  -SG        Orientation Status (Cognition) oriented to;person;place  -SG        Follows Commands (Cognition) follows one-step commands;75-90% accuracy;repetition of directions required;verbal cues/prompting required  -SG                  Activities of Daily Living    BADL Assessment/Intervention grooming;toileting  -SG                  Grooming Assessment/Training    [place] Level (Grooming) oral care regimen;set up;hair care, combing/brushing;minimum assist (75% patient effort)  -SG        Position (Grooming) edge of bed sitting;supported sitting  -SG        Comment, (Grooming) Difficulty manipulating with R hand  -SG                  Toileting Assessment/Training    [place] Level (Toileting) adjust/manage clothing;change pad/brief;perform perineal hygiene;moderate assist (50%  patient effort)  -SG        Assistive Devices (Toileting) commode, bedside with drop arms  -SG        Position (Toileting) supported sitting;supported standing  -SG        Comment, (Toileting) Decreased standing balance while performing hygiene  -SG                  BADL Safety/Performance    Impairments, BADL Safety/Performance balance;grasp/prehension;strength;trunk/postural control;motor planning  -SG                  Bed Mobility    Supine-Sit [place] (Bed Mobility) minimum assist (75% patient effort);verbal cues  -SG        Sit-Supine [place] (Bed Mobility) not tested  -SG                  Transfer Assessment/Treatment    Transfers toilet transfer  -SG        Comment, (Transfers) BSC to chair mod A stand pivot  -SG                  Transfers    Sit-Stand [place] (Transfers) nonverbal cues (demo/gesture);verbal cues;moderate assist (50% patient effort)  -SG        [place] Level (Toilet Transfer) moderate assist (50% patient effort);verbal cues;nonverbal cues (demo/gesture)  -SG        Assistive Device (Toilet Transfer) commode, bedside with drop arms  -SG                  Toilet Transfer    Type (Toilet Transfer) stand pivot/stand step  -SG                  Safety Issues, Functional Mobility    Impairments Affecting Function (Mobility) balance;motor planning;postural/trunk control  -SG                  Plan of Care Review    Plan of Care Reviewed With patient  -SG        Progress improving  -SG        Outcome Evaluation Pt does well with OT this am, sits up EOB for grooming tasks. Some decrease in sitting balance with fatigue but able to grasp and use R hand for brushing teeth. Also transfers to [facility] mod A, mod A also needed for toileting/hygiene. She will continue to benefit from OT while inpt.  -SG                  Positioning and Restraints    Pre-Treatment Position in bed  -SG        Post Treatment Position chair  -SG        In Chair notified nsg;reclined;sitting;call light within  reach;encouraged to call for assist;exit alarm on  -SG              User Key  (r) = Recorded By, (t) = Taken By, (c) = Cosigned By    Initials Name Effective Dates    Hortensia Lord, OTR 06/16/21 -                  Occupational Therapy Education                 Title: PT OT SLP Therapies (In Progress)     Topic: Occupational Therapy (Not Started)     Point: ADL training (Not Started)     Description:   Instruct learner(s) on proper safety adaptation and remediation techniques during self care or transfers.   Instruct in proper use of assistive devices.              Learner Progress:  Not documented in this visit.          Point: Home exercise program (Not Started)     Description:   Instruct learner(s) on appropriate technique for monitoring, assisting and/or progressing therapeutic exercises/activities.              Learner Progress:  Not documented in this visit.          Point: Precautions (Not Started)     Description:   Instruct learner(s) on prescribed precautions during self-care and functional transfers.              Learner Progress:  Not documented in this visit.          Point: Body mechanics (Not Started)     Description:   Instruct learner(s) on proper positioning and spine alignment during self-care, functional mobility activities and/or exercises.              Learner Progress:  Not documented in this visit.                              OT Recommendation and Plan     Plan of Care Review  Plan of Care Reviewed With: patient  Progress: improving  Outcome Evaluation: Pt does well with OT this am, sits up EOB for grooming tasks. Some decrease in sitting balance with fatigue but able to grasp and use R hand for brushing teeth. Also transfers to [facility] mod A, mod A also needed for toileting/hygiene. She will continue to benefit from OT while inpt.  Plan of Care Reviewed With: patient  Outcome Evaluation: Pt does well with OT this am, sits up EOB for grooming tasks. Some decrease in sitting balance with  fatigue but able to grasp and use R hand for brushing teeth. Also transfers to [facility] mod A, mod A also needed for toileting/hygiene. She will continue to benefit from OT while inpt.     Outcome Measures     Row Name 04/13/22 0956             How much help from another is currently needed...    Putting on and taking off regular lower body clothing? 2  -SG      Bathing (including washing, rinsing, and drying) 2  -SG      Toileting (which includes using toilet bed pan or urinal) 2  -SG      Putting on and taking off regular upper body clothing 2  -SG      Taking care of personal grooming (such as brushing teeth) 3  -SG      Eating meals 3  -SG      AM-PAC 6 Clicks Score (OT) 14  -SG              Functional Assessment    Outcome Measure Options AM-PAC 6 Clicks Daily Activity (OT)  -SG            User Key  (r) = Recorded By, (t) = Taken By, (c) = Cosigned By    Initials Name Provider Type    Hortensia Lord OTR Occupational Therapist                Time Calculation:    Time Calculation- OT     Row Name 04/13/22 0957             Time Calculation- OT    OT Start Time 0902  -SG      OT Stop Time 0926  -SG      OT Time Calculation (min) 24 min  -SG      Total Timed Code Minutes- OT 24 minute(s)  -SG      OT Received On 04/13/22  -SG      OT - Next Appointment 04/14/22  -              Timed Charges    33469 - OT Self Care/Mgmt Minutes 24  -SG              Total Minutes    Timed Charges Total Minutes 24  -SG       Total Minutes 24  -SG            User Key  (r) = Recorded By, (t) = Taken By, (c) = Cosigned By    Initials Name Provider Type    Hortensia Lord OTR Occupational Therapist              Therapy Charges for Today     Code Description Service Date Service Provider Modifiers Qty    16113013175 HC OT SELF CARE/MGMT/TRAIN EA 15 MIN 4/13/2022 Hortensia Duvall OTR GO 2               KEON Graham  4/13/2022

## 2022-04-13 NOTE — DISCHARGE PLACEMENT REQUEST
"Shala Carr (81 y.o. Female)             Date of Birth   1940    Social Security Number       Address   [address] [id number]    Home Phone   [phone]    MRN   [id number]       [employer]    Marital Status                               Admission Date   4/11/22    Admission Type   Emergency    Admitting Provider   Antoni Grey MD    Attending Provider   Antoni Grey MD    Department, Room/Bed   [address], [address]/1       Discharge Date       Discharge Disposition       Discharge Destination                               Attending Provider: Antoni Grey MD    Allergies: Penicillins    Isolation: None   Infection: None   Code Status: CPR   Advance Care Planning Activity    Ht: 162.6 cm (64\")   Wt: 82.1 kg (181 lb)    Admission Cmt: None   Principal Problem: Stroke-like symptoms [R29.90]                 Active Insurance as of 4/11/2022     Primary Coverage     Payor Plan Insurance Group Employer/Plan Group    MEDICARE MEDICARE A & B      Payor Plan Address Payor Plan Phone Number Payor Plan Fax Number Effective Dates    [address] [phone]  4/1/2004 - None Entered    [facility] [id number]       Subscriber Name Subscriber Birth Date Member ID       SHALA CARR 1940 [id number]           Secondary Coverage     Payor Plan Insurance Group Employer/Plan Group    [facility] SUPP [id number]     Payor Plan Address Payor Plan Phone Number Payor Plan Fax Number Effective Dates    [address]   12/1/2016 - None Entered    [facility] [id number]       Subscriber Name Subscriber Birth Date Member ID       SHALA CARR 1940 [id number]                 Emergency Contacts      (Rel.) Home Phone Work Phone Mobile Phone    Vitaly Carr (Spouse) [phone] -- [phone]              "

## 2022-04-13 NOTE — PLAN OF CARE
Goal Outcome Evaluation:  Plan of Care Reviewed With: patient, spouse        Progress: no change  Outcome Evaluation: AOx4, forgetful, NIH=4, MRI positive for acute CVA in the left naeem, placed on modified diet per speech therapy, first dose of keppra admin ovrnight, remains on seizure precautions, no s/s distress observed, will cont to monitor.

## 2022-04-13 NOTE — CASE MANAGEMENT/SOCIAL WORK
Continued Stay Note  [facility]     Patient Name: Avelina Carr  MRN: [id number]  Today's Date: 4/13/2022    Admit Date: 4/11/2022     Discharge Plan     Row Name 04/13/22 0844       Plan    Plan SNF - referrals pending.    Plan Comments S/W pt at bedside.  Discussed that therapies recommend SNF - pt is in agreement. She requests referrals to [facility] and [facility].  Referrals called, evals pending. ..............Charlette COVINGTON/ [facility]               Discharge Codes    No documentation.               Expected Discharge Date and Time     Expected Discharge Date Expected Discharge Time    Apr 14, 2022             Charlette Harrison RN

## 2022-04-14 ENCOUNTER — APPOINTMENT (OUTPATIENT)
Dept: CT IMAGING | Facility: HOSPITAL | Age: 82
End: 2022-04-14

## 2022-04-14 PROCEDURE — 94799 UNLISTED PULMONARY SVC/PX: CPT

## 2022-04-14 PROCEDURE — 70498 CT ANGIOGRAPHY NECK: CPT

## 2022-04-14 PROCEDURE — 97110 THERAPEUTIC EXERCISES: CPT

## 2022-04-14 PROCEDURE — 94664 DEMO&/EVAL PT USE INHALER: CPT

## 2022-04-14 PROCEDURE — 97116 GAIT TRAINING THERAPY: CPT

## 2022-04-14 PROCEDURE — 70496 CT ANGIOGRAPHY HEAD: CPT

## 2022-04-14 PROCEDURE — 92526 ORAL FUNCTION THERAPY: CPT

## 2022-04-14 PROCEDURE — 0 IOPAMIDOL PER 1 ML: Performed by: HOSPITALIST

## 2022-04-14 RX ORDER — POLYETHYLENE GLYCOL 3350 17 G/17G
17 POWDER, FOR SOLUTION ORAL DAILY PRN
Status: DISCONTINUED | OUTPATIENT
Start: 2022-04-14 | End: 2022-04-16 | Stop reason: HOSPADM

## 2022-04-14 RX ORDER — BISACODYL 10 MG
10 SUPPOSITORY, RECTAL RECTAL DAILY PRN
Status: DISCONTINUED | OUTPATIENT
Start: 2022-04-14 | End: 2022-04-16 | Stop reason: HOSPADM

## 2022-04-14 RX ORDER — AMOXICILLIN 250 MG
2 CAPSULE ORAL 2 TIMES DAILY
Status: DISCONTINUED | OUTPATIENT
Start: 2022-04-14 | End: 2022-04-16 | Stop reason: HOSPADM

## 2022-04-14 RX ORDER — BISACODYL 5 MG/1
5 TABLET, DELAYED RELEASE ORAL DAILY PRN
Status: DISCONTINUED | OUTPATIENT
Start: 2022-04-14 | End: 2022-04-16 | Stop reason: HOSPADM

## 2022-04-14 RX ADMIN — ROSUVASTATIN CALCIUM 20 MG: 20 TABLET, FILM COATED ORAL at 09:18

## 2022-04-14 RX ADMIN — ATENOLOL 25 MG: 25 TABLET ORAL at 09:17

## 2022-04-14 RX ADMIN — GABAPENTIN 400 MG: 400 CAPSULE ORAL at 21:42

## 2022-04-14 RX ADMIN — GABAPENTIN 400 MG: 400 CAPSULE ORAL at 16:03

## 2022-04-14 RX ADMIN — ESCITALOPRAM 20 MG: 20 TABLET, FILM COATED ORAL at 09:17

## 2022-04-14 RX ADMIN — Medication 3 MG: at 21:42

## 2022-04-14 RX ADMIN — DOCUSATE SODIUM 50MG AND SENNOSIDES 8.6MG 2 TABLET: 8.6; 5 TABLET, FILM COATED ORAL at 21:42

## 2022-04-14 RX ADMIN — ASPIRIN 81 MG: 81 TABLET, COATED ORAL at 09:18

## 2022-04-14 RX ADMIN — GABAPENTIN 400 MG: 400 CAPSULE ORAL at 09:17

## 2022-04-14 RX ADMIN — LEVETIRACETAM 500 MG: 500 TABLET, FILM COATED ORAL at 21:42

## 2022-04-14 RX ADMIN — ACETAMINOPHEN 650 MG: 325 TABLET ORAL at 21:42

## 2022-04-14 RX ADMIN — Medication 5000 UNITS: at 09:17

## 2022-04-14 RX ADMIN — BUDESONIDE AND FORMOTEROL FUMARATE DIHYDRATE 2 PUFF: 80; 4.5 AEROSOL RESPIRATORY (INHALATION) at 07:41

## 2022-04-14 RX ADMIN — LEVETIRACETAM 500 MG: 500 TABLET, FILM COATED ORAL at 09:18

## 2022-04-14 RX ADMIN — CLOPIDOGREL 75 MG: 75 TABLET, FILM COATED ORAL at 09:17

## 2022-04-14 RX ADMIN — Medication 10 ML: at 09:18

## 2022-04-14 RX ADMIN — PANTOPRAZOLE SODIUM 40 MG: 40 TABLET, DELAYED RELEASE ORAL at 09:17

## 2022-04-14 RX ADMIN — Medication 10 ML: at 21:42

## 2022-04-14 RX ADMIN — DOCUSATE SODIUM 50MG AND SENNOSIDES 8.6MG 2 TABLET: 8.6; 5 TABLET, FILM COATED ORAL at 12:20

## 2022-04-14 RX ADMIN — FUROSEMIDE 20 MG: 20 TABLET ORAL at 09:17

## 2022-04-14 RX ADMIN — BUDESONIDE AND FORMOTEROL FUMARATE DIHYDRATE 2 PUFF: 80; 4.5 AEROSOL RESPIRATORY (INHALATION) at 19:03

## 2022-04-14 RX ADMIN — IOPAMIDOL 95 ML: 755 INJECTION, SOLUTION INTRAVENOUS at 08:29

## 2022-04-14 RX ADMIN — GLYCERIN 1 DROP: .002; .002; .01 SOLUTION/ DROPS OPHTHALMIC at 09:18

## 2022-04-14 RX ADMIN — LETROZOLE 2.5 MG: 2.5 TABLET ORAL at 09:17

## 2022-04-14 RX ADMIN — ATENOLOL 25 MG: 25 TABLET ORAL at 21:42

## 2022-04-14 NOTE — PROGRESS NOTES
Name: Avelina Carr ADMIT: 2022   : 1940  PCP: Goran Carr MD    MRN: [id number] LOS: 2 days   AGE/SEX: 81 y.o. female  ROOM: [facility]     Subjective   Subjective   Complains of some constipation.  Mild abdominal discomfort.  Tolerating diet.  Feels her speech is improving somewhat.  Did work with physical therapy a little bit.    Review of Systems     Objective   Objective   Vital Signs  Temp:  [98.2 °F (36.8 °C)-98.4 °F (36.9 °C)] 98.2 °F (36.8 °C)  Heart Rate:  [57-64] 64  Resp:  [18] 18  BP: (134-180)/(64-92) 134/64  SpO2:  [93 %-96 %] 94 %  on   ;   Device (Oxygen Therapy): room air  There is no height or weight on file to calculate BMI.  Physical Exam  Vitals and nursing note reviewed.   Constitutional:       General: She is not in acute distress.     Appearance: She is ill-appearing.   Neck:      Vascular: No JVD.      Trachea: No tracheal deviation.   Cardiovascular:      Rate and Rhythm: Normal rate and regular rhythm.      Heart sounds: Normal heart sounds.   Pulmonary:      Effort: Pulmonary effort is normal. No respiratory distress.      Breath sounds: Normal breath sounds.   Abdominal:      General: Bowel sounds are normal.      Palpations: Abdomen is soft.      Tenderness: There is no abdominal tenderness.   Skin:     General: Skin is warm and dry.      Findings: Bruising present.   Neurological:      Mental Status: She is alert and oriented to person, place, and time.      Motor: Weakness present.   Psychiatric:         Behavior: Behavior normal. Behavior is cooperative.          Results Review:       I reviewed the patient's new clinical results.  Results from last 7 days   Lab Units 22  0548 22  2242   WBC 10*3/mm3 6.41 6.30   HEMOGLOBIN g/dL 13.1 12.2   PLATELETS 10*3/mm3 201 202     Results from last 7 days   Lab Units 22  0548 22  2242   SODIUM mmol/L 144 144   POTASSIUM mmol/L 3.6 3.6   CHLORIDE mmol/L 109* 107   CO2 mmol/L 23.5 23.2   BUN mg/dL 8 14    CREATININE mg/dL 0.55* 0.83   GLUCOSE mg/dL 96 122*   EGFR mL/min/1.73 91.6 70.5     Results from last 7 days   Lab Units 04/13/22  0548 04/11/22  2242   CALCIUM mg/dL 9.3 8.8   ALBUMIN g/dL  --  4.20     Results from last 7 days   Lab Units 04/12/22  1129   HDL CHOL mg/dL 33*   LDL CHOL mg/dL 54   HEMOGLOBIN A1C % 5.90*   VITAMIN B 12 pg/mL >2,000*   TSH uIU/mL 1.020     Glucose   Date/Time Value Ref Range Status   04/12/2022 1103 120 70 - 130 mg/dL Final     Comment:     Meter: [id number] : [id number] King Stepan GILMAN   04/12/2022 0612 103 70 - 130 mg/dL Final     Comment:     Meter: [id number] : [id number] Robert Henley CNA       MRI Brain With & Without Contrast  4/12  Impression:    There are foci of acute infarction within the left aspect of the naeem  that are located within basilar  distribution.     There are 4 dural based homogeneously enhancing lesions that are located  along the posterior aspect of the right parietal and occipital lobes  that are most compatible with meningiomas. 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 Overall,  when compared to the prior postcontrast CT scan of the head as part of  the CT angiogram study, there is no convincing interval change, given  differences in modalities. Additionally, deep to the craniotomy sites  along the lateral aspect the right frontal parietal junction and along  the superior aspect of the posterior portion of the right frontal lobe,  there are some dural based extra-axial enhancing foci which could be  related to postoperative change from the craniotomy flaps or additional  meningiomas. These findings are also unchanged when compared to the  prior study.     There is a focus of encephalomalacia within the lateral aspect of the  left temporal lobe  which is new when compared to the previous exam dated  11/15/2017 and is compatible with a chronic infarct within the left MCA  distribution. Additional areas of encephalomalacia are noted within the  inferior aspect of the right occipital lobe and the posterior medial  aspect of the right occipital lobe that were evident on the prior study  and are compatible with either chronic infarcts within the right PCA  distribution or postoperative areas of encephalomalacia.     Moderate-to-severe changes of chronic small vessel ischemic phenomena  are noted.       Scheduled Medications  aspirin, 81 mg, Oral, Daily  atenolol, 25 mg, Oral, BID  budesonide-formoterol, 2 puff, Inhalation, BID  cholecalciferol, 5,000 Units, Oral, Daily  clopidogrel, 75 mg, Oral, Daily  escitalopram, 20 mg, Oral, Daily  furosemide, 20 mg, Oral, Daily  gabapentin, 400 mg, Oral, TID  letrozole, 2.5 mg, Oral, Daily  levETIRAcetam, 500 mg, Oral, BID  pantoprazole, 40 mg, Oral, Daily  rosuvastatin, 20 mg, Oral, Daily  senna-docusate sodium, 2 tablet, Oral, BID    Infusions   Diet  Diet Dysphagia; IV - Mechanical Soft No Mixed Consistencies; Nectar / Syrup Thick         Assessment/Plan     Active Hospital Problems    Diagnosis  POA   • **Acute CVA (cerebrovascular accident) ([facility]) [I63.9]  Yes   • Generalized weakness [R53.1]  Yes   • Dependence on supplemental oxygen [Z99.81]  Not Applicable   • COPD (chronic obstructive pulmonary disease) ([facility]) [J44.9]  Yes   • HTN (hypertension) [I10]  Yes   • HLD (hyperlipidemia) [E78.5]  Yes   • Migraines [G43.909]  Yes   • History of cardioembolic cerebrovascular accident (CVA) [Z86.73]  Not Applicable   • Seizure disorder, simple partial, without intractable epilepsy ([facility]) [G40.109]  Yes      Resolved Hospital Problems   No resolved problems to display.       81 y.o. female with Acute CVA (cerebrovascular accident) ([facility]).    CTA done this morning, results pending.  Echocardiogram yet to be performed.  Await  follow-up recommendations from neurology.  Note plans for prolonged Holter at discharge.  Neurology continues to recommend Keppra.  Patient will need rehab at discharge.    Bowel management protocol ordered.      · SCDs for DVT prophylaxis.  · Full code.  · Discussed with patient, family and care team on multidisciplinary rounds.  · Anticipate discharge to SNU facility when cleared by consultants.  Tomorrow?      Antoni Grey MD  [facility]  04/14/22  10:52 EDT

## 2022-04-14 NOTE — THERAPY RE-EVALUATION
Acute Care - Speech Language Pathology   Swallow Re-Evaluation [facility]     Patient Name: Avelina Carr  : 1940  MRN: [id number]  Today's Date: 2022               Admit Date: 2022    Visit Dx:     ICD-10-CM ICD-9-CM   1. Generalized weakness  R53.1 780.79   2. Difficulty with speech  R47.9 784.59     Patient Active Problem List   Diagnosis   • Postmastectomy lymphedema syndrome   • Malignant neoplasm of upper-outer quadrant of right female breast (HCC)   • Cerebrovascular accident (CVA) ([facility])   • Stroke ([facility])   • Hemarthrosis   • HTN (hypertension)   • HLD (hyperlipidemia)   • Adverse effect of antiplatelet agent   • Left middle cerebral artery stroke (HCC)   • Carcinoma of central portion of right breast in female, estrogen receptor positive ([facility])   • Chronic bilateral low back pain with right-sided sciatica   • History of lumbar surgery   • Seizure disorder, simple partial, without intractable epilepsy ([facility])   • Spinal stenosis of lumbar region with neurogenic claudication   • Status post craniectomy   • History of total right knee replacement   • Other forms of scoliosis, lumbar region   • DDD (degenerative disc disease), lumbar   • Benign meningioma of brain ([facility])   • Squamous cell carcinoma of leg, left   • History of cardioembolic cerebrovascular accident (CVA)   • Hypothyroid   • Depression   • COPD (chronic obstructive pulmonary disease) ([facility])   • Obstructive sleep apnea   • Squamous cell carcinoma of leg, right   • Pain in joint, multiple sites   • Excessive daytime sleepiness   • Former smoker   • Gastroesophageal reflux disease   • Migraines   • Generalized weakness   • Acute CVA (cerebrovascular accident) ([facility])   • Dependence on supplemental oxygen     Past Medical History:   Diagnosis Date   • Anxiety    • Arthritis    • Breast cancer ([facility])    • COPD (chronic obstructive pulmonary disease) ([facility])     oxygen at 2L aths   • CVD (cardiovascular disease)    • Depression    •  Hypothyroid    • Macular degeneration    • Peripheral neuropathy    • Vertigo      Past Surgical History:   Procedure Laterality Date   • ADRENAL GLAND SURGERY     • BRAIN MENINGIOMA EXCISION      occipital   •  SECTION     • EMBOLECTOMY N/A 11/15/2017    Procedure: Embolectomy Mechanical;  Surgeon: Rachid Figueroa MD;  [facility] ;  Service:    • MASTECTOMY     • RECTAL SURGERY     • REPLACEMENT TOTAL KNEE Right    • TOTAL SHOULDER REPLACEMENT      x2       SLP Recommendation and Plan                                                                      Plan of Care Reviewed With: patient, spouse  Outcome Evaluation: Re-eval of swallow completed. Recommend continue with mechanical soft no mixed and nectar thick liquids. Meds whole/crushed with puree. water protocol ok, 30 mins after oral care between meals. General aspiration precautions, upright, slow rate, alternate liquids/solids, and small sips. Recommend VFSS.      SWALLOW EVALUATION (last 72 hours)     SLP Adult Swallow Evaluation     Row Name 22 1559 22 1200                Rehab Evaluation    Document Type re-evaluation  -OC evaluation  -AW       Subjective Information no complaints  -OC no complaints  -AW       Patient Observations alert;agree to therapy;cooperative  -OC alert;cooperative;agree to therapy  -AW       Patient/Family/Caregiver Comments/Observations --  at bedside.  -AW       Patient Effort good  -OC good  -AW       Symptoms Noted During/After Treatment none  -OC none  -AW                General Information    Patient Profile Reviewed yes  -OC yes  -AW       Pertinent History Of Current Problem -- Pt admitted with difficulty walking and speech deficits. CT and chest xray were negative. MRI pending. PMH: CVAs, meningioma excision, seizures  -AW       Current Method of Nutrition -- NPO  -AW       Precautions/Limitations, Vision -- WFL;for purposes of eval  -AW        Precautions/Limitations, Hearing -- WFL;for purposes of eval  -AW       Prior Level of Function-Communication -- WFL  -AW       Prior Level of Function-Swallowing -- no diet consistency restrictions;other (see comments)  VFSS 4/29/21 showed fxnl swallow  -AW       Plans/Goals Discussed with -- patient;spouse/S.O.;agreed upon  -AW       Barriers to Rehab -- none identified  -AW       Patient's Goals for Discharge -- return home  -AW       Family Goals for Discharge -- family did not state  -AW                Pain    Additional Documentation -- Pain Scale: Numbers Pre/Post-Treatment (Group)  -AW                Pain Scale: Numbers Pre/Post-Treatment    Pretreatment Pain Rating 0/10 - no pain  -OC 0/10 - no pain  -AW       Posttreatment Pain Rating 0/10 - no pain  -OC 0/10 - no pain  -AW                Oral Motor Structure and Function    Dentition Assessment -- natural, present and adequate  -AW       Secretion Management -- WNL/WFL  -AW       Mucosal Quality -- moist, healthy  -AW       Volitional Swallow -- delayed  -AW                Oral Musculature and Cranial Nerve Assessment    Oral Motor General Assessment -- WFL  -AW                General Eating/Swallowing Observations    Respiratory Support Currently in Use -- room air  -AW       Eating/Swallowing Skills -- self-fed;fed by SLP  -AW       Positioning During Eating -- upright in bed  -AW       Utensils Used -- spoon;cup;straw  -AW       Consistencies Trialed -- thin liquids;nectar/syrup-thick liquids;pureed;soft textures;mixed consistency;regular textures  -AW       Pre SpO2 (%) -- 96  -AW       Post SpO2 (%) -- 96  -AW                Clinical Swallow Eval    Pharyngeal Phase -- suspected pharyngeal impairment  -AW       Clinical Swallow Evaluation Summary -- Pt had immediate coughing with cup sips of thin. No s/s with NTL via cup or straw. Mastication was functional for regular solids. Throat clearing x1 and cough x1 noted with mixed consistency. Oral cavity  clear post swallow. Swallow initiation was delayed, laryngeal elevation was adequate with palpation.  -AW                SLP Evaluation Clinical Impression    SLP Swallowing Diagnosis -- suspected pharyngeal dysphagia  -AW       Functional Impact -- risk of aspiration/pneumonia  -AW       Rehab Potential/Prognosis, Swallowing -- good, to achieve stated therapy goals  -AW       Swallow Criteria for Skilled Therapeutic Interventions Met -- demonstrates skilled criteria  -AW                Recommendations    Therapy Frequency (Swallow) -- PRN  -AW       Predicted Duration Therapy Intervention (Days) -- until discharge  -AW       SLP Diet Recommendation -- mechanical soft with no mixed consistencies;nectar thick liquids;water between meals after oral care, with supervision;ice chips between meals after oral care, with supervision  -AW       Recommended Diagnostics -- reassess via clinical swallow evaluation  -AW       Recommended Precautions and Strategies -- upright posture during/after eating;small bites of food and sips of liquid  -AW       Oral Care Recommendations -- Oral Care before breakfast, after meals and PRN  -AW       SLP Rec. for Method of Medication Administration -- meds whole;with thick liquids;with pudding or applesauce;as tolerated  -AW       Monitor for Signs of Aspiration -- yes  -AW       Anticipated Discharge Disposition (SLP) -- unknown;anticipate therapy at next level of care  -AW                Oral Nutrition/Hydration Goal 1 (SLP)    Oral Nutrition/Hydration Goal 1, SLP -- Pt will safely tolerate the least restrictive diet with no s/s of aspiration.  -AW       Time Frame (Oral Nutrition/Hydration Goal 1, SLP) -- by discharge  -AW             User Key  (r) = Recorded By, (t) = Taken By, (c) = Cosigned By    Initials Name Effective Dates    Viky Ballesteros MA,[facility]-SLP 06/16/21 -     Ngoc Hudson MS [facility]-SLP 06/16/21 -                 EDUCATION  The patient has been educated in the following  areas:   Dysphagia (Swallowing Impairment).        SLP GOALS     Row Name 04/14/22 1559 04/12/22 1200          Oral Nutrition/Hydration Goal 1 (SLP)    Oral Nutrition/Hydration Goal 1, SLP Pt will safely tolerate the least restrictive diet with no s/s of aspiration.  -OC Pt will safely tolerate the least restrictive diet with no s/s of aspiration.  -AW     Time Frame (Oral Nutrition/Hydration Goal 1, SLP) by discharge  -OC by discharge  -AW     Barriers (Oral Nutrition/Hydration Goal 1, SLP) Re-eval completed. Patient continues to demonstrated overt s/s aspiration with thin via cup and mixed consistencies. Inconsistent throat clear s/p thin via cup, with reduced distractions, patient appears to tolerate better. Patient demonstrated significant coughing s/p mixed consistency trial, of note patient attempted to talk while masticating. No overt s/s aspiration with dry solid.  -OC --           User Key  (r) = Recorded By, (t) = Taken By, (c) = Cosigned By    Initials Name Provider Type    Viky Ballesteros MA,[facility]-SLP Speech and Language Pathologist    Ngoc Hudson, MS CCC-SLP Speech and Language Pathologist              SLP Outcome Measures (last 72 hours)     SLP Outcome Measures     Row Name 04/14/22 1600 04/12/22 1200          SLP Outcome Measures    Outcome Measure Used? Adult NOMS  -OC Adult NOMS  -AW            Adult FCM Scores    FCM Chosen -- Swallowing  -AW     Swallowing FCM Score 4  -OC 4  -AW           User Key  (r) = Recorded By, (t) = Taken By, (c) = Cosigned By    Initials Name Effective Dates    Viky Ballesteros MA,[facility]-SLP 06/16/21 -     Ngoc Hudson MS [facility]-SLP 06/16/21 -                  Time Calculation:    Time Calculation- SLP     Row Name 04/14/22 1610             Time Calculation- SLP    SLP Start Time 1445  -OC      SLP Received On 04/14/22  -OC              Untimed Charges    SLP Treatment ST Treatment Swallow Minutes  - 19003  -OC      28442-IH Treatment Swallow Minutes 90  -OC               Total Minutes    Untimed Charges Total Minutes 90  -OC       Total Minutes 90  -OC            User Key  (r) = Recorded By, (t) = Taken By, (c) = Cosigned By    Initials Name Provider Type    Viky Ballesteros MA,CCC-SLP Speech and Language Pathologist                Therapy Charges for Today     Code Description Service Date Service Provider Modifiers Qty    85577476905  ST TREATMENT SWALLOW 6 4/14/2022 Viky Ramires MA,BENNETT-SLP GN 1               Viky Ramires MA, CCC-OMAR  4/14/2022

## 2022-04-14 NOTE — THERAPY TREATMENT NOTE
Patient Name: Avelina Carr  : 1940    MRN: [id number]                              Today's Date: 2022       Admit Date: 2022    Visit Dx:     ICD-10-CM ICD-9-CM   1. Generalized weakness  R53.1 780.79   2. Difficulty with speech  R47.9 784.59     Patient Active Problem List   Diagnosis   • Postmastectomy lymphedema syndrome   • Malignant neoplasm of upper-outer quadrant of right female breast ([facility])   • Cerebrovascular accident (CVA) ([facility])   • Stroke ([facility])   • Hemarthrosis   • HTN (hypertension)   • HLD (hyperlipidemia)   • Adverse effect of antiplatelet agent   • Left middle cerebral artery stroke ([facility])   • Carcinoma of central portion of right breast in female, estrogen receptor positive ([facility])   • Chronic bilateral low back pain with right-sided sciatica   • History of lumbar surgery   • Seizure disorder, simple partial, without intractable epilepsy ([facility])   • Spinal stenosis of lumbar region with neurogenic claudication   • Status post craniectomy   • History of total right knee replacement   • Other forms of scoliosis, lumbar region   • DDD (degenerative disc disease), lumbar   • Benign meningioma of brain ([facility])   • Squamous cell carcinoma of leg, left   • History of cardioembolic cerebrovascular accident (CVA)   • Hypothyroid   • Depression   • COPD (chronic obstructive pulmonary disease) ([facility])   • Obstructive sleep apnea   • Squamous cell carcinoma of leg, right   • Pain in joint, multiple sites   • Excessive daytime sleepiness   • Former smoker   • Gastroesophageal reflux disease   • Migraines   • Generalized weakness   • Acute CVA (cerebrovascular accident) ([facility])   • Dependence on supplemental oxygen     Past Medical History:   Diagnosis Date   • Anxiety    • Arthritis    • Breast cancer ([facility])    • COPD (chronic obstructive pulmonary disease) ([facility])     oxygen at 2L aths   • CVD (cardiovascular disease)    • Depression    • Hypothyroid    • Macular degeneration    • Peripheral neuropathy     • Vertigo      Past Surgical History:   Procedure Laterality Date   • ADRENAL GLAND SURGERY     • BRAIN MENINGIOMA EXCISION      occipital   •  SECTION     • EMBOLECTOMY N/A 11/15/2017    Procedure: Embolectomy Mechanical;  Surgeon: Rachid Figueroa MD;  Location: [facility] ;  Service:    • MASTECTOMY     • RECTAL SURGERY     • REPLACEMENT TOTAL KNEE Right    • TOTAL SHOULDER REPLACEMENT      x2      General Information     Row Name 22 1543          Physical Therapy Time and Intention    Document Type therapy note (daily note)  -     Mode of Treatment physical therapy  -     Row Name 22 1543          General Information    Existing Precautions/Restrictions fall  -     Row Name 22 1543          Cognition    Orientation Status (Cognition) oriented x 3  -           User Key  (r) = Recorded By, (t) = Taken By, (c) = Cosigned By    Initials Name Provider Type     Yadira Garibay, DIO Physical Therapist Assistant               Mobility     Row Name 22 1544          Bed Mobility    Bed Mobility supine-sit  -     Supine-Sit [place] (Bed Mobility) supervision  -     Assistive Device (Bed Mobility) bed rails;head of bed elevated  -     Row Name 22 1544          Sit-Stand Transfer    Sit-Stand [place] (Transfers) contact guard  -     Assistive Device (Sit-Stand Transfers) walker, front-wheeled  -     Row Name 22 1544          Gait/Stairs (Locomotion)    [place] Level (Gait) contact guard;minimum assist (75% patient effort)  -     Assistive Device (Gait) walker, front-wheeled  -     Distance in Feet (Gait) 50  -     Deviations/Abnormal Patterns (Gait) antalgic;zabrina decreased;festinating/shuffling;stride length decreased  -     Bilateral Gait Deviations forward flexed posture  -     Right Sided Gait Deviations heel strike decreased;weight shift ability decreased  -     Comment, (Gait/Stairs) limited d/t  weakness/fatigue  -SM           User Key  (r) = Recorded By, (t) = Taken By, (c) = Cosigned By    Initials Name Provider Type    Yadira Randall PTA Physical Therapist Assistant               Obj/Interventions     Row Name 04/14/22 1544          Motor Skills    Therapeutic Exercise --  seated AP, LAQ, marches, hip abd/add x10 reps  -SM           User Key  (r) = Recorded By, (t) = Taken By, (c) = Cosigned By    Initials Name Provider Type    Yadira Randall PTA Physical Therapist Assistant               Goals/Plan    No documentation.                Clinical Impression     Row Name 04/14/22 1545          Pain    Pretreatment Pain Rating 0/10 - no pain  -SM     Posttreatment Pain Rating 2/10  -SM     Pain Location - Side/Orientation Right  -SM     Pain Location lower  -SM     Pain Location - extremity  -SM     Pain Intervention(s) Repositioned;Ambulation/increased activity;Rest  -SM     Row Name 04/14/22 1542          Positioning and Restraints    Pre-Treatment Position in bed  -SM     Post Treatment Position chair  -SM     In Chair reclined;call light within reach;encouraged to call for assist;exit alarm on;notified nsg  -SM           User Key  (r) = Recorded By, (t) = Taken By, (c) = Cosigned By    Initials Name Provider Type    Yadira Randall PTA Physical Therapist Assistant               Outcome Measures     Row Name 04/14/22 1546          How much help from another person do you currently need...    Turning from your back to your side while in flat bed without using bedrails? 3  -SM     Moving from lying on back to sitting on the side of a flat bed without bedrails? 3  -SM     Moving to and from a bed to a chair (including a wheelchair)? 3  -SM     Standing up from a chair using your arms (e.g., wheelchair, bedside chair)? 3  -SM     Climbing 3-5 steps with a railing? 2  -SM     To walk in hospital room? 3  -SM     AM-PAC 6 Clicks Score (PT) 17  -SM     Row Name 04/14/22 1542           Functional Assessment    Outcome Measure Options AM-PAC 6 Clicks Basic Mobility (PT)  -           User Key  (r) = Recorded By, (t) = Taken By, (c) = Cosigned By    Initials Name Provider Type     Yadira Garibay PTA Physical Therapist Assistant                             Physical Therapy Education                 Title: PT OT SLP Therapies (In Progress)     Topic: Physical Therapy (Done)     Point: Mobility training (Done)     Learning Progress Summary           Patient Acceptance, E,TB,D, VU,NR by  at 4/14/2022 1546    Acceptance, E, NR by AR at 4/12/2022 1310                   Point: Home exercise program (Done)     Learning Progress Summary           Patient Acceptance, E,TB,D, VU,NR by  at 4/14/2022 1546    Acceptance, E, NR by AR at 4/12/2022 1310                   Point: Body mechanics (Done)     Learning Progress Summary           Patient Acceptance, E,TB,D, VU,NR by  at 4/14/2022 1546    Acceptance, E, NR by AR at 4/12/2022 1310                   Point: Precautions (Done)     Learning Progress Summary           Patient Acceptance, E,TB,D, VU,NR by  at 4/14/2022 1546    Acceptance, E, NR by AR at 4/12/2022 1310                               User Key     Initials Effective Dates Name Provider Type Discipline    AR 06/16/21 -  Elvia Bryant, PT Physical Therapist PT     03/07/18 -  Yadira Garibay PTA Physical Therapist Assistant PT              PT Recommendation and Plan     Plan of Care Reviewed With: patient  Progress: improving  Outcome Evaluation: Pt tolerated treatment well this date. Increased gait distance to 50ft w/ Rw and CGA-min A. C/o R LE pain, though very minimal. Limited also d/t fatigue. Instructed pt on a few seated LE exercises, and encouraged pt to sit up in chair until at least dinner.     Time Calculation:    PT Charges     Row Name 04/14/22 1548             Time Calculation    Start Time 1452  -      Stop Time 1518  -      Time Calculation (min) 26 min  -       PT Received On 04/14/22  -TAMMIE      PT - Next Appointment 04/15/22  -            User Key  (r) = Recorded By, (t) = Taken By, (c) = Cosigned By    Initials Name Provider Type    Yadira Randall PTA Physical Therapist Assistant              Therapy Charges for Today     Code Description Service Date Service Provider Modifiers Qty    44479828433  PT THER PROC EA 15 MIN 4/14/2022 Yadira Garibay PTA GP 1    [id number] HC GAIT TRAINING EA 15 MIN 4/14/2022 Yadira Garibay PTA GP 1          PT G-Codes  Outcome Measure Options: AM-PAC 6 Clicks Basic Mobility (PT)  AM-PAC 6 Clicks Score (PT): 17  AM-PAC 6 Clicks Score (OT): 14  Modified [place] Scale: 4 - Moderately severe disability.  Unable to walk without assistance, and unable to attend to own bodily needs without assistance.    Yadira Garibay PTA  4/14/2022

## 2022-04-14 NOTE — PLAN OF CARE
Goal Outcome Evaluation:  Plan of Care Reviewed With: patient, spouse           Outcome Evaluation: Re-eval of swallow completed. Recommend continue with mechanical soft no mixed and nectar thick liquids. Meds whole/crushed with puree. water protocol ok, 30 mins after oral care between meals. General aspiration precautions, upright, slow rate, alternate liquids/solids, and small sips. Recommend VFSS.    Patient was not wearing a face mask during this therapy encounter. Therapist used appropriate personal protective equipment including mask, eye protection and gloves.  Mask used was standard procedure mask. Appropriate PPE was worn during the entire therapy session. Hand hygiene was completed before and after therapy session. Patient is not in enhanced droplet precautions.

## 2022-04-14 NOTE — PLAN OF CARE
Goal Outcome Evaluation:  Plan of Care Reviewed With: patient        Progress: improving  Outcome Evaluation: Pt tolerated treatment well this date. Increased gait distance to 50ft w/ Rw and CGA-min A. C/o R LE pain, though very minimal. Limited also d/t fatigue. Instructed pt on a few seated LE exercises, and encouraged pt to sit up in chair until at least dinner.

## 2022-04-14 NOTE — CASE MANAGEMENT/SOCIAL WORK
Continued Stay Note  [facility]     Patient Name: Avelina Carr  MRN: [id number]  Today's Date: 4/14/2022    Admit Date: 4/11/2022     Discharge Plan     Row Name 04/14/22 1426       Plan    Plan [facility]    Plan Comments [facility]/W [facility]/ [facility] - bed available at [facility] today or tomorrow.  Pt and her son notified and are in agreement.  Family can provide transport.............Charlette COVINGTON/ ALIN               Discharge Codes    No documentation.               Expected Discharge Date and Time     Expected Discharge Date Expected Discharge Time    Apr 15, 2022             Charlette Harrison RN

## 2022-04-15 ENCOUNTER — APPOINTMENT (OUTPATIENT)
Dept: MRI IMAGING | Facility: HOSPITAL | Age: 82
End: 2022-04-15

## 2022-04-15 ENCOUNTER — APPOINTMENT (OUTPATIENT)
Dept: GENERAL RADIOLOGY | Facility: HOSPITAL | Age: 82
End: 2022-04-15

## 2022-04-15 ENCOUNTER — APPOINTMENT (OUTPATIENT)
Dept: CARDIOLOGY | Facility: HOSPITAL | Age: 82
End: 2022-04-15

## 2022-04-15 ENCOUNTER — APPOINTMENT (OUTPATIENT)
Dept: ULTRASOUND IMAGING | Facility: HOSPITAL | Age: 82
End: 2022-04-15

## 2022-04-15 LAB
AORTIC DIMENSIONLESS INDEX: 0.8 (DI)
BH BB BLOOD EXPIRATION DATE: NORMAL
BH BB BLOOD EXPIRATION DATE: NORMAL
BH BB BLOOD TYPE BARCODE: 5100
BH BB BLOOD TYPE BARCODE: 5100
BH BB DISPENSE STATUS: NORMAL
BH BB DISPENSE STATUS: NORMAL
BH BB PRODUCT CODE: NORMAL
BH BB PRODUCT CODE: NORMAL
BH BB UNIT NUMBER: NORMAL
BH BB UNIT NUMBER: NORMAL
BH CV ECHO MEAS - AO MAX PG: 9.4 MMHG
BH CV ECHO MEAS - AO MEAN PG: 4.8 MMHG
BH CV ECHO MEAS - AO V2 MAX: 153.6 CM/SEC
BH CV ECHO MEAS - AO V2 VTI: 40.4 CM
BH CV ECHO MEAS - AVA(I,D): 3.1 CM2
BH CV ECHO MEAS - EDV(CUBED): 112.9 ML
BH CV ECHO MEAS - EDV(MOD-SP2): 62 ML
BH CV ECHO MEAS - EDV(MOD-SP4): 56 ML
BH CV ECHO MEAS - EF(MOD-BP): 62.7 %
BH CV ECHO MEAS - EF(MOD-SP2): 61.3 %
BH CV ECHO MEAS - EF(MOD-SP4): 66.1 %
BH CV ECHO MEAS - ESV(CUBED): 26.6 ML
BH CV ECHO MEAS - ESV(MOD-SP2): 24 ML
BH CV ECHO MEAS - ESV(MOD-SP4): 19 ML
BH CV ECHO MEAS - FS: 38.3 %
BH CV ECHO MEAS - IVS/LVPW: 0.96 CM
BH CV ECHO MEAS - IVSD: 0.89 CM
BH CV ECHO MEAS - LAT PEAK E' VEL: 5.7 CM/SEC
BH CV ECHO MEAS - LV DIASTOLIC VOL/BSA (35-75): 29.9 CM2
BH CV ECHO MEAS - LV MASS(C)D: 150.8 GRAMS
BH CV ECHO MEAS - LV MAX PG: 6.1 MMHG
BH CV ECHO MEAS - LV MEAN PG: 3.3 MMHG
BH CV ECHO MEAS - LV SYSTOLIC VOL/BSA (12-30): 10.1 CM2
BH CV ECHO MEAS - LV V1 MAX: 123 CM/SEC
BH CV ECHO MEAS - LV V1 VTI: 32.1 CM
BH CV ECHO MEAS - LVIDD: 4.8 CM
BH CV ECHO MEAS - LVIDS: 3 CM
BH CV ECHO MEAS - LVOT AREA: 4 CM2
BH CV ECHO MEAS - LVOT DIAM: 2.25 CM
BH CV ECHO MEAS - LVPWD: 0.93 CM
BH CV ECHO MEAS - MED PEAK E' VEL: 3.9 CM/SEC
BH CV ECHO MEAS - MV A DUR: 0.16 SEC
BH CV ECHO MEAS - MV A MAX VEL: 87.4 CM/SEC
BH CV ECHO MEAS - MV DEC SLOPE: 282.5 CM/SEC2
BH CV ECHO MEAS - MV DEC TIME: 306 MSEC
BH CV ECHO MEAS - MV E MAX VEL: 88.3 CM/SEC
BH CV ECHO MEAS - MV E/A: 1.01
BH CV ECHO MEAS - MV MAX PG: 3.3 MMHG
BH CV ECHO MEAS - MV MEAN PG: 1.21 MMHG
BH CV ECHO MEAS - MV V2 VTI: 33 CM
BH CV ECHO MEAS - MVA(VTI): 3.9 CM2
BH CV ECHO MEAS - PA ACC TIME: 0.08 SEC
BH CV ECHO MEAS - PA PR(ACCEL): 43.3 MMHG
BH CV ECHO MEAS - PA V2 MAX: 93.7 CM/SEC
BH CV ECHO MEAS - RAP SYSTOLE: 15 MMHG
BH CV ECHO MEAS - RVSP: 35.4 MMHG
BH CV ECHO MEAS - SI(MOD-SP2): 20.3 ML/M2
BH CV ECHO MEAS - SI(MOD-SP4): 19.7 ML/M2
BH CV ECHO MEAS - SV(LVOT): 127.3 ML
BH CV ECHO MEAS - SV(MOD-SP2): 38 ML
BH CV ECHO MEAS - SV(MOD-SP4): 37 ML
BH CV ECHO MEAS - TAPSE (>1.6): 1.7 CM
BH CV ECHO MEAS - TR MAX PG: 20.4 MMHG
BH CV ECHO MEAS - TR MAX VEL: 225.6 CM/SEC
BH CV ECHO MEASUREMENTS AVERAGE E/E' RATIO: 18.4
BH CV XLRA - RV BASE: 2.7 CM
BH CV XLRA - RV LENGTH: 6.2 CM
BH CV XLRA - RV MID: 3.4 CM
BH CV XLRA - TDI S': 8.7 CM/SEC
CROSSMATCH INTERPRETATION: NORMAL
CROSSMATCH INTERPRETATION: NORMAL
LEFT ATRIUM VOLUME INDEX: 50.9 ML/M2
MAXIMAL PREDICTED HEART RATE: 137 BPM
STRESS TARGET HR: 116 BPM
UNIT  ABO: NORMAL
UNIT  ABO: NORMAL
UNIT  RH: NORMAL
UNIT  RH: NORMAL

## 2022-04-15 PROCEDURE — 94640 AIRWAY INHALATION TREATMENT: CPT

## 2022-04-15 PROCEDURE — 99221 1ST HOSP IP/OBS SF/LOW 40: CPT | Performed by: STUDENT IN AN ORGANIZED HEALTH CARE EDUCATION/TRAINING PROGRAM

## 2022-04-15 PROCEDURE — 94664 DEMO&/EVAL PT USE INHALER: CPT

## 2022-04-15 PROCEDURE — A9577 INJ MULTIHANCE: HCPCS | Performed by: HOSPITALIST

## 2022-04-15 PROCEDURE — 92610 EVALUATE SWALLOWING FUNCTION: CPT

## 2022-04-15 PROCEDURE — 94799 UNLISTED PULMONARY SVC/PX: CPT

## 2022-04-15 PROCEDURE — 94761 N-INVAS EAR/PLS OXIMETRY MLT: CPT

## 2022-04-15 PROCEDURE — 74230 X-RAY XM SWLNG FUNCJ C+: CPT

## 2022-04-15 PROCEDURE — 97116 GAIT TRAINING THERAPY: CPT

## 2022-04-15 PROCEDURE — 0 GADOBENATE DIMEGLUMINE 529 MG/ML SOLUTION: Performed by: HOSPITALIST

## 2022-04-15 PROCEDURE — 99233 SBSQ HOSP IP/OBS HIGH 50: CPT | Performed by: NURSE PRACTITIONER

## 2022-04-15 PROCEDURE — 93306 TTE W/DOPPLER COMPLETE: CPT

## 2022-04-15 PROCEDURE — 70543 MRI ORBT/FAC/NCK W/O &W/DYE: CPT

## 2022-04-15 PROCEDURE — 97530 THERAPEUTIC ACTIVITIES: CPT

## 2022-04-15 PROCEDURE — 76536 US EXAM OF HEAD AND NECK: CPT

## 2022-04-15 RX ORDER — ATORVASTATIN CALCIUM 20 MG/1
40 TABLET, FILM COATED ORAL NIGHTLY
Status: DISCONTINUED | OUTPATIENT
Start: 2022-04-15 | End: 2022-04-16 | Stop reason: HOSPADM

## 2022-04-15 RX ORDER — ASPIRIN 325 MG
325 TABLET ORAL DAILY
Status: DISCONTINUED | OUTPATIENT
Start: 2022-04-16 | End: 2022-04-16 | Stop reason: HOSPADM

## 2022-04-15 RX ADMIN — GABAPENTIN 400 MG: 400 CAPSULE ORAL at 19:55

## 2022-04-15 RX ADMIN — PANTOPRAZOLE SODIUM 40 MG: 40 TABLET, DELAYED RELEASE ORAL at 10:14

## 2022-04-15 RX ADMIN — ESCITALOPRAM 20 MG: 20 TABLET, FILM COATED ORAL at 10:15

## 2022-04-15 RX ADMIN — DOCUSATE SODIUM 50MG AND SENNOSIDES 8.6MG 2 TABLET: 8.6; 5 TABLET, FILM COATED ORAL at 19:55

## 2022-04-15 RX ADMIN — Medication 10 ML: at 10:15

## 2022-04-15 RX ADMIN — LEVETIRACETAM 500 MG: 500 TABLET, FILM COATED ORAL at 10:15

## 2022-04-15 RX ADMIN — Medication 5000 UNITS: at 10:15

## 2022-04-15 RX ADMIN — BARIUM SULFATE 50 ML: 400 SUSPENSION ORAL at 08:03

## 2022-04-15 RX ADMIN — ATENOLOL 25 MG: 25 TABLET ORAL at 10:15

## 2022-04-15 RX ADMIN — ASPIRIN 81 MG: 81 TABLET, COATED ORAL at 10:14

## 2022-04-15 RX ADMIN — TRAMADOL HYDROCHLORIDE 50 MG: 50 TABLET, COATED ORAL at 03:08

## 2022-04-15 RX ADMIN — FUROSEMIDE 20 MG: 20 TABLET ORAL at 10:15

## 2022-04-15 RX ADMIN — Medication 3 MG: at 21:25

## 2022-04-15 RX ADMIN — BARIUM SULFATE 55 ML: 0.81 POWDER, FOR SUSPENSION ORAL at 08:03

## 2022-04-15 RX ADMIN — LEVETIRACETAM 500 MG: 500 TABLET, FILM COATED ORAL at 19:55

## 2022-04-15 RX ADMIN — GABAPENTIN 400 MG: 400 CAPSULE ORAL at 10:14

## 2022-04-15 RX ADMIN — ATENOLOL 25 MG: 25 TABLET ORAL at 19:55

## 2022-04-15 RX ADMIN — LETROZOLE 2.5 MG: 2.5 TABLET ORAL at 10:14

## 2022-04-15 RX ADMIN — BARIUM SULFATE 1 TEASPOON(S): 0.6 CREAM ORAL at 08:03

## 2022-04-15 RX ADMIN — BUDESONIDE AND FORMOTEROL FUMARATE DIHYDRATE 2 PUFF: 80; 4.5 AEROSOL RESPIRATORY (INHALATION) at 19:20

## 2022-04-15 RX ADMIN — DOCUSATE SODIUM 50MG AND SENNOSIDES 8.6MG 2 TABLET: 8.6; 5 TABLET, FILM COATED ORAL at 10:14

## 2022-04-15 RX ADMIN — IPRATROPIUM BROMIDE AND ALBUTEROL SULFATE 3 ML: .5; 3 SOLUTION RESPIRATORY (INHALATION) at 16:26

## 2022-04-15 RX ADMIN — GADOBENATE DIMEGLUMINE 17 ML: 529 INJECTION, SOLUTION INTRAVENOUS at 11:55

## 2022-04-15 RX ADMIN — BARIUM SULFATE 4 ML: 980 POWDER, FOR SUSPENSION ORAL at 08:03

## 2022-04-15 RX ADMIN — GABAPENTIN 400 MG: 400 CAPSULE ORAL at 16:05

## 2022-04-15 RX ADMIN — CLOPIDOGREL 75 MG: 75 TABLET, FILM COATED ORAL at 10:15

## 2022-04-15 RX ADMIN — ATORVASTATIN CALCIUM 40 MG: 20 TABLET, FILM COATED ORAL at 19:55

## 2022-04-15 RX ADMIN — BUDESONIDE AND FORMOTEROL FUMARATE DIHYDRATE 2 PUFF: 80; 4.5 AEROSOL RESPIRATORY (INHALATION) at 07:19

## 2022-04-15 NOTE — PLAN OF CARE
Goal Outcome Evaluation:  Plan of Care Reviewed With: patient, spouse           Outcome Evaluation: VFSS completed. Recommend regular textures, no mixed consistencies, and nectar thick liquids. Meds whole with puree or nectar thick liquids. Recommend upright, slow rate, alternate liquids/solids. Water protocol ok, 30 mins after oral care between meals. Recommend continued dysphagia therapy with use of 3 second hold. When independent with 3 second hold, SLP may upgrade to thin liquids at bedside.    Patient was not wearing a face mask during this therapy encounter. Therapist used appropriate personal protective equipment including mask, eye protection and gloves.  Mask used was standard procedure mask. Appropriate PPE was worn during the entire therapy session. Hand hygiene was completed before and after therapy session. Patient is not in enhanced droplet precautions.

## 2022-04-15 NOTE — CASE MANAGEMENT/SOCIAL WORK
Continued Stay Note  [facility]     Patient Name: Avelina Carr  MRN: [id number]  Today's Date: 4/15/2022    Admit Date: 4/11/2022     Discharge Plan     Row Name 04/15/22 1532       Plan    Plan [facility]    Plan Comments [facility]W [facility]/ [facility] - bed will be available over weekend if pt is ready for DC.  Pt and her spouse Toño notified and are in agreement.  [facility] for [facility].  Family can provide transport.  Will need Covid test on day of DC. ...........Charlette COVINGTON/ ALIN               Discharge Codes    No documentation.               Expected Discharge Date and Time     Expected Discharge Date Expected Discharge Time    Apr 15, 2022             Charlette Harrison RN

## 2022-04-15 NOTE — CONSULTS
General Surgery Consult     Summary:    Mrs. Avelina Carr is a 82 y.o. year old lady with a possible posterior triangle of the neck and right thyroid abnormality.  Will follow-up thyroid ultrasound.  Likely would benefit from repeat interval imaging after her acute issues have resolved.  She can follow-up with me as an outpatient for continued monitoring. Will be available as needed.     Chief Complaint:    Abnormal neck imaging    History of Present Illness:    Mrs. Avelina Carr is a 82 y.o. year old lady admitted 4/12/2022 with new diagnosis of an acute stroke.  She had difficulty with her speech as well as right-sided weakness. Her speech is improved although still somewhat slurred. During her work-up she underwent an MRI of the neck.  During this it was noted there was an 11 x 14 x 23 mm nonenhancing mass new from 2017.  A cystic or pathologic node cannot be excluded.  There was also an area of the right lobe of the thyroid that appeared abnormal.  Thyroid ultrasound was recommended.  She smoked for several years but quit over 20 years ago.  She has no history of head and neck cancer.  She does have a history of a right breast cancer for which she completed therapy 4 years ago.  She has noticed no lymphadenopathy.    Past Medical History:   • History of right breast cancer s/p mastectomy and XRT  • COPD on 2L O2   • Hypothyroidism     Past Surgical History:    • Right mastectomy   • R TKA   • Total shoulder replacement  • C section   • Brain meningioma excision   • Adrenalectomy     Family History:    • No family history of colon cancer    Social History:    • Denies tobacco use  • Denies alcohol use    Allergies:   Allergies   Allergen Reactions   • Penicillins Rash     Medications:     Current Facility-Administered Medications:   •  acetaminophen (TYLENOL) tablet 650 mg, 650 mg, Oral, Q4H PRN, 650 mg at 04/14/22 2142 **OR** acetaminophen (TYLENOL) suppository 650 mg, 650 mg, Rectal, Q4H PRN, Keren Abdullahi  CELY Guan  •  [START ON 4/16/2022] aspirin tablet 325 mg, 325 mg, Oral, Daily, Mallorie Chowdary APRN  •  atenolol (TENORMIN) tablet 25 mg, 25 mg, Oral, BID, Norma Garibay APRN, 25 mg at 04/15/22 1015  •  atorvastatin (LIPITOR) tablet 40 mg, 40 mg, Oral, Nightly, Dustin Bolton MD  •  sennosides-docusate (PERICOLACE) 8.6-50 MG per tablet 2 tablet, 2 tablet, Oral, BID, 2 tablet at 04/15/22 1014 **AND** polyethylene glycol (MIRALAX) packet 17 g, 17 g, Oral, Daily PRN **AND** bisacodyl (DULCOLAX) EC tablet 5 mg, 5 mg, Oral, Daily PRN **AND** bisacodyl (DULCOLAX) suppository 10 mg, 10 mg, Rectal, Daily PRN, Antoni Grey MD  •  budesonide-formoterol (SYMBICORT) 80-4.5 MCG/ACT inhaler 2 puff, 2 puff, Inhalation, BID, Norma Garibay APRN, 2 puff at 04/15/22 0719  •  cholecalciferol (VITAMIN D3) tablet 5,000 Units, 5,000 Units, Oral, Daily, Norma Garibay APRN, 5,000 Units at 04/15/22 1015  •  clopidogrel (PLAVIX) tablet 75 mg, 75 mg, Oral, Daily, Norma Garibay APRN, 75 mg at 04/15/22 1015  •  escitalopram (LEXAPRO) tablet 20 mg, 20 mg, Oral, Daily, Norma Garibay APRN, 20 mg at 04/15/22 1015  •  furosemide (LASIX) tablet 20 mg, 20 mg, Oral, Daily, Norma Garibay APRN, 20 mg at 04/15/22 1015  •  gabapentin (NEURONTIN) capsule 400 mg, 400 mg, Oral, TID, Norma Garibay APRN, 400 mg at 04/15/22 1605  •  Glycerin-Hypromellose- (ARTIFICIAL TEARS) 0.2-0.2-1 % ophthalmic solution solution 1 drop, 1 drop, Both Eyes, Q1H PRN, Mallorie Sloan APRN, 1 drop at 04/14/22 0918  •  ipratropium-albuterol (DUO-NEB) nebulizer solution 3 mL, 3 mL, Nebulization, Q4H PRN, Norma Garibay APRN, 3 mL at 04/15/22 1626  •  letrozole (FEMARA) tablet 2.5 mg, 2.5 mg, Oral, Daily, Norma Garibay APRN, 2.5 mg at 04/15/22 1014  •  levETIRAcetam (KEPPRA) tablet 500 mg, 500 mg, Oral, BID, Dustin Bolton MD, 500 mg at 04/15/22 1015  •  melatonin tablet 3 mg, 3 mg, Oral,  Nightly PRN, Norma Garibay APRN, 3 mg at 04/14/22 2142  •  ondansetron (ZOFRAN) injection 4 mg, 4 mg, Intravenous, Q6H PRN, Keren Abdullahi APRN  •  pantoprazole (PROTONIX) EC tablet 40 mg, 40 mg, Oral, Daily, Norma Garibay APRN, 40 mg at 04/15/22 1014  •  [COMPLETED] Insert peripheral IV, , , Once **AND** sodium chloride 0.9 % flush 10 mL, 10 mL, Intravenous, PRN, Manuel Garibay MD, 10 mL at 04/15/22 1015  •  traMADol (ULTRAM) tablet 50 mg, 50 mg, Oral, Q6H PRN, Norma Garibay APRN, 50 mg at 04/15/22 0308    Radiology/Endoscopy:    • 4/11/2022 MRI Neck:   IMPRESSION:  1.  There is a T2 hyperintense and nonenhancing mass involving the posterior triangle of the neck on the right measuring approximately 11 x 14 x 23 mm in size.  This is new versus the CT angiogram of the neck  performed on 11/15/2017. The inferior and medial aspect is somewhat heterogeneous. This may be artifactual or represent a more solid or complex component. No convincing enhancement was identified, but the study is limited by patient motion. A cystic or pathologic node could not be excluded.  2.  There is an area of T1 and T2 hyperintensity involving the right lobe of the thyroid gland. This very well may be artifactual and likely is artifactual. Given the limited nature of the examination and the potential of a cystic or necrotic node, thyroid ultrasound is suggested. At that time, the inferior and medial aspect of the cyst or nodes could be evaluated as well.    Labs:    WBC 6.4 Hgb 13.1 platelets 201 Cr 0.55     Review of Systems:   Influenza-like illness: no fever, no  cough, no  sore throat, no  body aches, no loss of sense of taste or smell, no known exposure to person with Covid-19.  Constitutional: Negative for fevers or chills  HENT: Negative for hearing loss or runny nose  Eyes: Negative for vision changes or scleral icterus  Respiratory: Negative for cough or shortness of breath  Cardiovascular: Negative  for chest pain or heart palpitations  Gastrointestinal: Negative for abdominal pain, nausea, vomiting, constipation, melena, or hematochezia  Genitourinary: Negative for hematuria or dysuria  Musculoskeletal: Negative for joint swelling or gait instability  Neurologic: Negative for tremors or seizures  Psychiatric: Negative for suicidal ideations or depression  All other systems reviewed and negative    Physical Exam:   • Constitutional: Well-developed, well-nourished, no acute distress  • Vital signs: T 98.4 HR 56 RR 16 /84 O2 95%   • Eyes: Conjunctiva normal, sclera nonicteric  • ENMT: Hearing grossly normal, oral mucosa moist  • Neck: Supple, trachea midline, no cervical and supraclavicular adenopathy   • Respiratory: On room air, no increased work of breathing, normal inspiratory effort  • Cardiovascular: Regular rate, no peripheral edema, no jugular venous distention  • Gastrointestinal: Soft, nontender  • Skin:  Warm, dry, no rash on visualized skin surfaces; no cervical or axillary adenopathy bilaterally, no thyroid nodules palpated  • Musculoskeletal: Symmetric strength, normal gait  • Psychiatric: Alert and oriented ×3, normal affect, slightly slurred speech, mild word finding difficulties    ARIANA YE M.D.  General and Endoscopic Surgery  [facility]    [address], [address]  [place], [place], [address]  P: [phone]  F: [phone]

## 2022-04-15 NOTE — PROGRESS NOTES
Name: Avelina Carr ADMIT: 2022   : 1940  PCP: Goran Carr MD    MRN: [id number] LOS: 3 days   AGE/SEX: 82 y.o. female  ROOM: [facility]     Subjective   Subjective   Says she feels better.  Had a bowel movement.  No new issues.    Review of Systems     Objective   Objective   Vital Signs  Temp:  [97.2 °F (36.2 °C)-98.7 °F (37.1 °C)] 98.4 °F (36.9 °C)  Heart Rate:  [54-64] 58  Resp:  [16-18] 16  BP: (132-180)/(63-81) 166/81  SpO2:  [92 %-96 %] 94 %  on   ;   Device (Oxygen Therapy): CPAP  There is no height or weight on file to calculate BMI.  Physical Exam  Vitals and nursing note reviewed.   Constitutional:       General: She is not in acute distress.     Appearance: She is ill-appearing.   Neck:      Vascular: No JVD.      Trachea: No tracheal deviation.   Cardiovascular:      Rate and Rhythm: Normal rate and regular rhythm.      Heart sounds: Normal heart sounds.   Pulmonary:      Effort: Pulmonary effort is normal. No respiratory distress.      Breath sounds: Normal breath sounds.   Abdominal:      General: Bowel sounds are normal.      Palpations: Abdomen is soft.      Tenderness: There is no abdominal tenderness.   Skin:     General: Skin is warm and dry.      Findings: Bruising present.   Neurological:      Mental Status: She is alert and oriented to person, place, and time.      Motor: Weakness present.   Psychiatric:         Behavior: Behavior normal. Behavior is cooperative.          Results Review:       I reviewed the patient's new clinical results.  Results from last 7 days   Lab Units 22  0548 22  2242   WBC 10*3/mm3 6.41 6.30   HEMOGLOBIN g/dL 13.1 12.2   PLATELETS 10*3/mm3 201 202     Results from last 7 days   Lab Units 22  0548 22  2242   SODIUM mmol/L 144 144   POTASSIUM mmol/L 3.6 3.6   CHLORIDE mmol/L 109* 107   CO2 mmol/L 23.5 23.2   BUN mg/dL 8 14   CREATININE mg/dL 0.55* 0.83   GLUCOSE mg/dL 96 122*   EGFR mL/min/1.73 91.6 70.5     Results from last  7 days   Lab Units 04/13/22  0548 04/11/22  2242   CALCIUM mg/dL 9.3 8.8   ALBUMIN g/dL  --  4.20     Results from last 7 days   Lab Units 04/12/22  1129   HDL CHOL mg/dL 33*   LDL CHOL mg/dL 54   HEMOGLOBIN A1C % 5.90*   VITAMIN B 12 pg/mL >2,000*   TSH uIU/mL 1.020     Glucose   Date/Time Value Ref Range Status   04/12/2022 1103 120 70 - 130 mg/dL Final     Comment:     Meter: [id number] : [id number] King Stepan GILMAN   04/12/2022 0612 103 70 - 130 mg/dL Final     Comment:     Meter: [id number] : [id number] Robert Henley CNA       MRI Brain With & Without Contrast  4/12  Impression:    There are foci of acute infarction within the left aspect of the naeem  that are located within basilar  distribution.     There are 4 dural based homogeneously enhancing lesions that are located  along the posterior aspect of the right parietal and occipital lobes  that are most compatible with meningiomas. 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 Overall,  when compared to the prior postcontrast CT scan of the head as part of  the CT angiogram study, there is no convincing interval change, given  differences in modalities. Additionally, deep to the craniotomy sites  along the lateral aspect the right frontal parietal junction and along  the superior aspect of the posterior portion of the right frontal lobe,  there are some dural based extra-axial enhancing foci which could be  related to postoperative change from the craniotomy flaps or additional  meningiomas. These findings are also unchanged when compared to the  prior study.     There is a focus of encephalomalacia within the lateral aspect of the  left temporal lobe which is new when compared to the previous exam dated  11/15/2017 and is compatible with a chronic  infarct within the left MCA  distribution. Additional areas of encephalomalacia are noted within the  inferior aspect of the right occipital lobe and the posterior medial  aspect of the right occipital lobe that were evident on the prior study  and are compatible with either chronic infarcts within the right PCA  distribution or postoperative areas of encephalomalacia.     Moderate-to-severe changes of chronic small vessel ischemic phenomena  are noted.     CT ANGIOGRAM OF THE HEAD AND NECK WITH CONTRAST   IMPRESSION:     There is an indeterminate cystic-appearing structure within the right  side of the neck deep to the sternocleidomastoid muscle at the C5-6  level and this lesion measures up to approximately 1.1 x 2.0 cm in  greatest axial dimensions. The precise etiology and significance of this  finding is uncertain. It could represent a dilated lymphatic structure  although a cystic metastatic lymph node cannot be entirely excluded. I  recommend further characterization with an MRI of the soft tissues of  the neck with and without contrast.     There is a known acute infarct within the left aspect of the basis  pontis which is not well delineated on CT imaging.     On the prior study, there was a moderate stenosis involving the  parietooccipital branch of the left PCA. The calcarine and  parietooccipital branches of the PCAs are not well evaluated on this  study. However, the vertebrobasilar system and the proximal posterior  cerebral arteries are unremarkable in appearance.     On the previous study, the inferior division of the left MCA was  occluded and this vessel is now patent.     There is a moderate degree of stenosis involving the origin of the left  subclavian artery and this is stable. Moderate degrees of stenoses are  appreciated within the origins of the left common carotid artery and  innominate artery which have progressed in the interval. A  mild-to-moderate degree of stenosis at the origin of the  left vertebral  artery and a moderate degree of stenosis at the origin of the right  vertebral artery are additionally stable. The most proximal aspect of  the left common carotid artery, the innominate artery, the proximal  aspect of the right subclavian artery, and the most proximal aspect of  the right common carotid artery are poorly evaluated due to  beam-hardening artifact from adjacent dense venous opacification. Again  noted is an approximate 45% NASCET stenosis within the left ICA. There  is a dense calcific atherosclerotic plaque at the origin of the right  internal carotid artery and given the dense calcific nature of this  plaque, the degree of stenosis is difficult to precisely assess although  there appears to be an approximate 60% NASCET stenosis within the right  ICA which demonstrates no convincing interval change.     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  Multiple meningiomas are again identified and with regards to further  detail pertaining to the meningiomas, please refer to yesterday's brain  MRI report.     There is a focus of encephalomalacia within the left temporal lobe  compatible with a chronic infarct within the left MCA distribution.  Additional foci of encephalomalacia are noted within the inferior aspect  of the right occipital lobe and the posterior medial aspect of the right  occipital lobe compatible with either chronic infarcts within the right  PCA distribution or postoperative areas of encephalomalacia. The patient  is status post a right parietooccipital craniotomy.      Scheduled Medications  aspirin, 81 mg, Oral, Daily  atenolol, 25 mg, Oral, BID  budesonide-formoterol, 2 puff, Inhalation, BID  cholecalciferol, 5,000 Units, Oral, Daily  clopidogrel, 75 mg, Oral, Daily  escitalopram, 20 mg, Oral, Daily  furosemide, 20 mg, Oral, Daily  gabapentin, 400 mg, Oral,  TID  letrozole, 2.5 mg, Oral, Daily  levETIRAcetam, 500 mg, Oral, BID  pantoprazole, 40 mg, Oral, Daily  rosuvastatin, 20 mg, Oral, Daily  senna-docusate sodium, 2 tablet, Oral, BID    Infusions   Diet  Diet Dysphagia; IV - Mechanical Soft No Mixed Consistencies; Nectar / Syrup Thick         Assessment/Plan     Active Hospital Problems    Diagnosis  POA   • **Acute CVA (cerebrovascular accident) ([facility]) [I63.9]  Yes   • Generalized weakness [R53.1]  Yes   • Dependence on supplemental oxygen [Z99.81]  Not Applicable   • COPD (chronic obstructive pulmonary disease) ([facility]) [J44.9]  Yes   • HTN (hypertension) [I10]  Yes   • HLD (hyperlipidemia) [E78.5]  Yes   • Migraines [G43.909]  Yes   • History of cardioembolic cerebrovascular accident (CVA) [Z86.73]  Not Applicable   • Seizure disorder, simple partial, without intractable epilepsy ([facility]) [G40.109]  Yes      Resolved Hospital Problems   No resolved problems to display.       82 y.o. female with Acute CVA (cerebrovascular accident) ([facility]).    Not seen by neurology yesterday.  CTA performed with results outlined above.  Echocardiogram still not done.  Await neurology follow-up recommendations.  Continue same treatment otherwise for now.  Will get MRI soft tissues neck to follow-up abnormality seen on CTA of doubtful clinical significance.  Plan discharge to skilled nursing facility once work-up has been completed.  Hopefully by tomorrow.    Zio patch ordered for discharge.    Neurology continues to recommend Keppra.  Patient will need rehab at discharge.      · SCDs for DVT prophylaxis.  · Full code.  · Discussed with patient, family and care team on multidisciplinary rounds.  · Anticipate discharge to SNU facility when cleared by consultants.        Antoni Grey MD  [facility]  04/15/22  05:45 EDT

## 2022-04-15 NOTE — MBS/VFSS/FEES
Acute Care - Speech Language Pathology   Swallow Initial Evaluation [facility]     Patient Name: Avelina Carr  : 1940  MRN: [id number]  Today's Date: 4/15/2022               Admit Date: 2022    Visit Dx:     ICD-10-CM ICD-9-CM   1. Generalized weakness  R53.1 780.79   2. Difficulty with speech  R47.9 784.59     Patient Active Problem List   Diagnosis   • Postmastectomy lymphedema syndrome   • Malignant neoplasm of upper-outer quadrant of right female breast (HCC)   • Cerebrovascular accident (CVA) ([facility])   • Stroke (HCC)   • Hemarthrosis   • HTN (hypertension)   • HLD (hyperlipidemia)   • Adverse effect of antiplatelet agent   • Left middle cerebral artery stroke (HCC)   • Carcinoma of central portion of right breast in female, estrogen receptor positive (HCC)   • Chronic bilateral low back pain with right-sided sciatica   • History of lumbar surgery   • Seizure disorder, simple partial, without intractable epilepsy ([facility])   • Spinal stenosis of lumbar region with neurogenic claudication   • Status post craniectomy   • History of total right knee replacement   • Other forms of scoliosis, lumbar region   • DDD (degenerative disc disease), lumbar   • Benign meningioma of brain ([facility])   • Squamous cell carcinoma of leg, left   • History of cardioembolic cerebrovascular accident (CVA)   • Hypothyroid   • Depression   • COPD (chronic obstructive pulmonary disease) ([facility])   • Obstructive sleep apnea   • Squamous cell carcinoma of leg, right   • Pain in joint, multiple sites   • Excessive daytime sleepiness   • Former smoker   • Gastroesophageal reflux disease   • Migraines   • Generalized weakness   • Acute CVA (cerebrovascular accident) ([facility])   • Dependence on supplemental oxygen     Past Medical History:   Diagnosis Date   • Anxiety    • Arthritis    • Breast cancer ([facility])    • COPD (chronic obstructive pulmonary disease) ([facility])     oxygen at 2L aths   • CVD (cardiovascular disease)    • Depression    •  Hypothyroid    • Macular degeneration    • Peripheral neuropathy    • Vertigo      Past Surgical History:   Procedure Laterality Date   • ADRENAL GLAND SURGERY     • BRAIN MENINGIOMA EXCISION      occipital   •  SECTION     • EMBOLECTOMY N/A 11/15/2017    Procedure: Embolectomy Mechanical;  Surgeon: Rachid Figueroa MD;  Location: [facility] ;  Service:    • MASTECTOMY     • RECTAL SURGERY     • REPLACEMENT TOTAL KNEE Right    • TOTAL SHOULDER REPLACEMENT      x2       SLP Recommendation and Plan  SLP Swallowing Diagnosis: mild-moderate, oral dysphagia, pharyngeal dysphagia (04/15/22 0730)  SLP Diet Recommendation: regular textures, nectar thick liquids, water between meals after oral care, with supervision, other (see comments) (no mixed consistencies) (04/15/22 0730)  Recommended Precautions and Strategies: upright posture during/after eating, small bites of food and sips of liquid (04/15/22 0730)  SLP Rec. for Method of Medication Administration: meds whole, with thick liquids, with pudding or applesauce, as tolerated (04/15/22 0730)     Monitor for Signs of Aspiration: yes (04/15/22 0730)     Swallow Criteria for Skilled Therapeutic Interventions Met: demonstrates skilled criteria (04/15/22 0730)  Anticipated Discharge Disposition (SLP): anticipate therapy at next level of care (04/15/22 0730)  Rehab Potential/Prognosis, Swallowing: good, to achieve stated therapy goals (04/15/22 0730)  Therapy Frequency (Swallow): PRN (04/15/22 0730)  Predicted Duration Therapy Intervention (Days): until discharge (04/15/22 0730)                                  Plan of Care Reviewed With: patient, spouse  Outcome Evaluation: VFSS completed. Recommend regular textures, no mixed consistencies, and nectar thick liquids. Meds whole with puree or nectar thick liquids. Recommend upright, slow rate, alternate liquids/solids. Water protocol ok, 30 mins after oral care between meals. Recommend continued  dysphagia therapy with use of 3 second hold. When independent with 3 second hold, SLP may upgrade to thin liquids at bedside.      SWALLOW EVALUATION (last 72 hours)     SLP Adult Swallow Evaluation     Row Name 04/15/22 0730 04/14/22 1555                Rehab Evaluation    Document Type evaluation  -OC re-evaluation  -OC       Subjective Information no complaints  -OC no complaints  -OC       Patient Observations alert;cooperative;agree to therapy  -OC alert;agree to therapy;cooperative  -OC       Patient Effort good  -OC good  -OC       Symptoms Noted During/After Treatment none  -OC none  -OC                General Information    Patient Profile Reviewed yes  -OC yes  -OC       Current Method of Nutrition mechanical soft, no mixed consistencies;nectar/syrup-thick liquids  -OC --       Precautions/Limitations, Vision WFL;for purposes of eval  -OC --       Precautions/Limitations, Hearing WFL;for purposes of eval  -OC --       Prior Level of Function-Communication WFL  -OC --       Prior Level of Function-Swallowing no diet consistency restrictions;other (see comments)  -OC --       Plans/Goals Discussed with patient;spouse/S.O.;agreed upon  -OC --       Barriers to Rehab none identified  -OC --       Patient's Goals for Discharge return to regular diet  -OC --                Pain Scale: Numbers Pre/Post-Treatment    Pretreatment Pain Rating 0/10 - no pain  -OC 0/10 - no pain  -OC       Posttreatment Pain Rating 0/10 - no pain  -OC 0/10 - no pain  -OC                MBS/VFSS Interpretation    VFSS Summary Radiologist completed: Dr. Casanova. Patient presents with mild-moderate oropharyngeal dysphagia characterized by mistiming, decreased coordination, reduced base of tongue retraction, and decreased pharyngeal constriction. Patient demonstrated penetration with initial trial of nectar thick liquids, no further penetration with subsequent trials nectar thick. Patient demonstrated posterior penetration and aspiration  thin liquids via cup. Immediate coughing with aspiration. Three second hold aided in elimination of aspiration, patient unable to perform independently at end of study. Patient demonstrated no penetration or aspiration with puree, mechanical soft, and dry solid. Functional mastication and mild pharyngeal residue. Penetration mixed consistencies.  -OC --                SLP Communication to Radiology    Summary Statement Radiologist completed: Dr. Casanova. Patient presents with mild-moderate oropharyngeal dysphagia characterized by mistiming, decreased coordination, reduced base of tongue retraction, and decreased pharyngeal constriction. Patient demonstrated penetration with initial trial of nectar thick liquids, no further penetration with subsequent trials nectar thick. Patient demonstrated posterior penetration and aspiration thin liquids via cup. Immediate coughing with aspiration. Three second hold aided in elimination of aspiration, patient unable to perform independently at end of study. Patient demonstrated no penetration or aspiration with puree, mechanical soft, and dry solid. Functional mastication and mild pharyngeal residue. Penetration mixed consistencies.  -OC --                SLP Evaluation Clinical Impression    SLP Swallowing Diagnosis mild-moderate;oral dysphagia;pharyngeal dysphagia  -OC --       Functional Impact risk of aspiration/pneumonia  -OC --       Rehab Potential/Prognosis, Swallowing good, to achieve stated therapy goals  -OC --       Swallow Criteria for Skilled Therapeutic Interventions Met demonstrates skilled criteria  -OC --                Recommendations    Therapy Frequency (Swallow) PRN  -OC --       Predicted Duration Therapy Intervention (Days) until discharge  -OC --       SLP Diet Recommendation regular textures;nectar thick liquids;water between meals after oral care, with supervision;other (see comments)  no mixed consistencies  -OC --       Recommended Precautions and  Strategies upright posture during/after eating;small bites of food and sips of liquid  -OC --       Oral Care Recommendations Oral Care before breakfast, after meals and PRN  -OC --       SLP Rec. for Method of Medication Administration meds whole;with thick liquids;with pudding or applesauce;as tolerated  -OC --       Monitor for Signs of Aspiration yes  -OC --       Anticipated Discharge Disposition (SLP) anticipate therapy at next level of care  -OC --                Oral Nutrition/Hydration Goal 1 (SLP)    Oral Nutrition/Hydration Goal 1, SLP Pt will safely tolerate the least restrictive diet with no s/s of aspiration.  -OC --       Time Frame (Oral Nutrition/Hydration Goal 1, SLP) by discharge  -OC --             User Key  (r) = Recorded By, (t) = Taken By, (c) = Cosigned By    Initials Name Effective Dates    OC Ike, MANNY Salmon,[facility]-SLP 06/16/21 -                 EDUCATION  The patient has been educated in the following areas:   Dysphagia (Swallowing Impairment).        SLP GOALS     Row Name 04/15/22 0730 04/14/22 1559          Oral Nutrition/Hydration Goal 1 (SLP)    Oral Nutrition/Hydration Goal 1, SLP Pt will safely tolerate the least restrictive diet with no s/s of aspiration.  -OC Pt will safely tolerate the least restrictive diet with no s/s of aspiration.  -OC     Time Frame (Oral Nutrition/Hydration Goal 1, SLP) by discharge  -OC by discharge  -OC     Barriers (Oral Nutrition/Hydration Goal 1, SLP) -- Re-eval completed. Patient continues to demonstrated overt s/s aspiration with thin via cup and mixed consistencies. Inconsistent throat clear s/p thin via cup, with reduced distractions, patient appears to tolerate better. Patient demonstrated significant coughing s/p mixed consistency trial, of note patient attempted to talk while masticating. No overt s/s aspiration with dry solid.  -OC           User Key  (r) = Recorded By, (t) = Taken By, (c) = Cosigned By    Initials Name Provider Type    OC Card,  MANNY Salmon,CCC-SLP Speech and Language Pathologist              SLP Outcome Measures (last 72 hours)     SLP Outcome Measures     Row Name 04/15/22 1200 04/14/22 1600          SLP Outcome Measures    Outcome Measure Used? Adult NOMS  -OC Adult NOMS  -OC            Adult FCM Scores    Swallowing FCM Score 5  -OC 4  -OC           User Key  (r) = Recorded By, (t) = Taken By, (c) = Cosigned By    Initials Name Effective Dates    Viky Ballesteros MA,BENNETT-SLP 06/16/21 -                  Time Calculation:    Time Calculation- SLP     Row Name 04/15/22 1244             Time Calculation- SLP    SLP Start Time 0730  -OC      SLP Received On 04/15/22  -OC              Untimed Charges    SLP Eval/Re-eval  [facility] Fluoro Eval Swallow - 22218  -OC      [id number] Eval Oral Pharyng Swallow Minutes 90  -OC              Total Minutes    Untimed Charges Total Minutes 90  -OC       Total Minutes 90  -OC            User Key  (r) = Recorded By, (t) = Taken By, (c) = Cosigned By    Initials Name Provider Type    Viky Ballesteros MA,BENNETT-SLP Speech and Language Pathologist                Therapy Charges for Today     Code Description Service Date Service Provider Modifiers Qty    01024130370 HC ST TREATMENT SWALLOW 6 4/14/2022 Viky Ramires MA,BENNTET-SLP GN 1    44376778856 HC ST EVAL ORAL PHARYNG SWALLOW 6 4/15/2022 Viky Ramires MA,BENNETT-SLP GN 1               Viky Ramires MA, CCC-SLP  4/15/2022

## 2022-04-15 NOTE — THERAPY TREATMENT NOTE
Patient Name: Avelina Carr  : 1940    MRN: [id number]                              Today's Date: 4/15/2022       Admit Date: 2022    Visit Dx:     ICD-10-CM ICD-9-CM   1. Generalized weakness  R53.1 780.79   2. Difficulty with speech  R47.9 784.59     Patient Active Problem List   Diagnosis   • Postmastectomy lymphedema syndrome   • Malignant neoplasm of upper-outer quadrant of right female breast ([facility])   • Cerebrovascular accident (CVA) ([facility])   • Stroke ([facility])   • Hemarthrosis   • HTN (hypertension)   • HLD (hyperlipidemia)   • Adverse effect of antiplatelet agent   • Left middle cerebral artery stroke ([facility])   • Carcinoma of central portion of right breast in female, estrogen receptor positive ([facility])   • Chronic bilateral low back pain with right-sided sciatica   • History of lumbar surgery   • Seizure disorder, simple partial, without intractable epilepsy ([facility])   • Spinal stenosis of lumbar region with neurogenic claudication   • Status post craniectomy   • History of total right knee replacement   • Other forms of scoliosis, lumbar region   • DDD (degenerative disc disease), lumbar   • Benign meningioma of brain ([facility])   • Squamous cell carcinoma of leg, left   • History of cardioembolic cerebrovascular accident (CVA)   • Hypothyroid   • Depression   • COPD (chronic obstructive pulmonary disease) ([facility])   • Obstructive sleep apnea   • Squamous cell carcinoma of leg, right   • Pain in joint, multiple sites   • Excessive daytime sleepiness   • Former smoker   • Gastroesophageal reflux disease   • Migraines   • Generalized weakness   • Acute CVA (cerebrovascular accident) ([facility])   • Dependence on supplemental oxygen     Past Medical History:   Diagnosis Date   • Anxiety    • Arthritis    • Breast cancer ([facility])    • COPD (chronic obstructive pulmonary disease) ([facility])     oxygen at 2L aths   • CVD (cardiovascular disease)    • Depression    • Hypothyroid    • Macular degeneration    • Peripheral neuropathy     • Vertigo      Past Surgical History:   Procedure Laterality Date   • ADRENAL GLAND SURGERY     • BRAIN MENINGIOMA EXCISION      occipital   •  SECTION     • EMBOLECTOMY N/A 11/15/2017    Procedure: Embolectomy Mechanical;  Surgeon: Rachid Figueroa MD;  Location: [facility] ;  Service:    • MASTECTOMY     • RECTAL SURGERY     • REPLACEMENT TOTAL KNEE Right    • TOTAL SHOULDER REPLACEMENT      x2      General Information     Row Name 04/15/22 1613          Physical Therapy Time and Intention    Document Type therapy note (daily note)  -     Mode of Treatment physical therapy  -     Row Name 04/15/22 1613          General Information    Existing Precautions/Restrictions fall  -     Row Name 04/15/22 1613          Cognition    Orientation Status (Cognition) oriented x 3  -           User Key  (r) = Recorded By, (t) = Taken By, (c) = Cosigned By    Initials Name Provider Type     Yadira Garibay, DIO Physical Therapist Assistant               Mobility     Row Name 04/15/22 161          Bed Mobility    Bed Mobility supine-sit;sit-supine  -     Supine-Sit Latimer (Bed Mobility) standby assist  -     Sit-Supine Latimer (Bed Mobility) standby assist  -     Assistive Device (Bed Mobility) bed rails;head of bed elevated  -     Row Name 04/15/22 161          Sit-Stand Transfer    Sit-Stand Latimer (Transfers) contact guard;minimum assist (75% patient effort)  -     Assistive Device (Sit-Stand Transfers) walker, front-wheeled  -[facility] Name 04/15/22 1617          Gait/Stairs (Locomotion)    Latimer Level (Gait) contact guard  -     Assistive Device (Gait) walker, front-wheeled  -     Distance in Feet (Gait) 80  -     Deviations/Abnormal Patterns (Gait) zabrina decreased;stride length decreased  -     Bilateral Gait Deviations forward flexed posture  -     Comment, (Gait/Stairs) cues to increase step lengths  -           User Key  (r)  = Recorded By, (t) = Taken By, (c) = Cosigned By    Initials Name Provider Type    Yadira Randall PTA Physical Therapist Assistant               Obj/Interventions    No documentation.                Goals/Plan    No documentation.                Clinical Impression     Row Name 04/15/22 1622          Pain    Pretreatment Pain Rating 3/10  -SM     Posttreatment Pain Rating 3/10  -SM     Pain Location - Side/Orientation Bilateral  -SM     Pain Location lower  -SM     Pain Location - extremity  -SM     Pain Intervention(s) Repositioned;Ambulation/increased activity;Rest  -SM     Row Name 04/15/22 1622          Positioning and Restraints    Pre-Treatment Position in bed  -SM     Post Treatment Position bed  -SM     In Bed supine;call light within reach;encouraged to call for assist;with nsg  -SM           User Key  (r) = Recorded By, (t) = Taken By, (c) = Cosigned By    Initials Name Provider Type    Yadira Randall PTA Physical Therapist Assistant               Outcome Measures     Row Name 04/15/22 1623          How much help from another person do you currently need...    Turning from your back to your side while in flat bed without using bedrails? 3  -SM     Moving from lying on back to sitting on the side of a flat bed without bedrails? 3  -SM     Moving to and from a bed to a chair (including a wheelchair)? 3  -SM     Standing up from a chair using your arms (e.g., wheelchair, bedside chair)? 3  -SM     Climbing 3-5 steps with a railing? 2  -SM     To walk in hospital room? 3  -SM     AM-PAC 6 Clicks Score (PT) 17  -SM     [facility] Name 04/15/22 1623          Functional Assessment    Outcome Measure Options AM-PAC 6 Clicks Basic Mobility (PT)  -SM           User Key  (r) = Recorded By, (t) = Taken By, (c) = Cosigned By    Initials Name Provider Type    Yadira Randall PTA Physical Therapist Assistant                             Physical Therapy Education                 Title: PT OT SLP  Therapies (In Progress)     Topic: Physical Therapy (Done)     Point: Mobility training (Done)     Learning Progress Summary           Patient Acceptance, E,TB,D, VU,NR by  at 4/15/2022 1623    Acceptance, E,TB,D, VU,NR by  at 4/14/2022 1546    Acceptance, E, NR by AR at 4/12/2022 1310                   Point: Home exercise program (Done)     Learning Progress Summary           Patient Acceptance, E,TB,D, VU,NR by  at 4/15/2022 1623    Acceptance, E,TB,D, VU,NR by  at 4/14/2022 1546    Acceptance, E, NR by AR at 4/12/2022 1310                   Point: Body mechanics (Done)     Learning Progress Summary           Patient Acceptance, E,TB,D, VU,NR by  at 4/15/2022 1623    Acceptance, E,TB,D, VU,NR by  at 4/14/2022 1546    Acceptance, E, NR by AR at 4/12/2022 1310                   Point: Precautions (Done)     Learning Progress Summary           Patient Acceptance, E,TB,D, VU,NR by  at 4/15/2022 1623    Acceptance, E,TB,D, VU,NR by  at 4/14/2022 1546    Acceptance, E, NR by AR at 4/12/2022 1310                               User Key     Initials Effective Dates Name Provider Type Discipline    AR 06/16/21 -  Elvia Bryant, PT Physical Therapist PT     03/07/18 -  Yadira Garibay, PTA Physical Therapist Assistant PT              PT Recommendation and Plan     Plan of Care Reviewed With: patient  Progress: improving  Outcome Evaluation: Pt tolerated treatment well this date. Increased gait distance to 80ft w/ Rw and CGA. Cues needed to increase step lengths, and improved zabrina noted today. Pt c/o some LE pain and SOA even when just lying in bed. O2 sats were around 95% after ambulation, and RN present. Encouraged pt to ambulate more w/ nsg.     Time Calculation:    PT Charges     Row Name 04/15/22 1630             Time Calculation    Start Time 1545  -      Stop Time 1610  -      Time Calculation (min) 25 min  -      PT Received On 04/15/22  -      PT - Next Appointment 04/16/22  -             User Key  (r) = Recorded By, (t) = Taken By, (c) = Cosigned By    Initials Name Provider Type     Yadira Garibay PTA Physical Therapist Assistant              Therapy Charges for Today     Code Description Service Date Service Provider Modifiers Qty    51494220735 HC PT THER PROC EA 15 MIN 4/14/2022 Yadira Garibay, PTA GP 1    30197945162 HC GAIT TRAINING EA 15 MIN 4/14/2022 Yadira Garibay, DIO GP 1    94889172977 HC PT THERAPEUTIC ACT EA 15 MIN 4/15/2022 Yadira Garibay, PTA GP 1    81345659009 HC GAIT TRAINING EA 15 MIN 4/15/2022 Yadira Garibay, DIO GP 1          PT G-Codes  Outcome Measure Options: AM-PAC 6 Clicks Basic Mobility (PT)  AM-PAC 6 Clicks Score (PT): 17  AM-PAC 6 Clicks Score (OT): 14  Modified Randall Scale: 4 - Moderately severe disability.  Unable to walk without assistance, and unable to attend to own bodily needs without assistance.    Yadira Garibay PTA  4/15/2022

## 2022-04-15 NOTE — PROGRESS NOTES
DOS: 4/15/2022  NAME: Avelina Carr   : 1940  PCP: Goran Carr MD  Chief Complaint   Patient presents with   • Difficulty Walking   • Weakness - Generalized     Stroke    Subjective: She is c/o pain in her knees and hips. Speech is still slurred, though  notes a little improvement. She has more difficulty with movement in her R arm and leg. She denies vision change; reports chronically poor vision related to macular degeneration. Pt seen in follow up today, however the problem is new to the examiner.      Objective:  Vital signs: /84 (BP Location: Left arm, Patient Position: Sitting)   Pulse 54   Temp 98.4 °F (36.9 °C) (Oral)   Resp 16   SpO2 93%       General appearance: Well developed, well nourished, alert and cooperative.   HEENT: Normocephalic.     Cardiac: Regular rate and rhythm. .   Peripheral Vasculature: Radial pulses are equal and symmetric.  Chest Exam: Intermittent exp wheeze PADMINI.  Extremities: Normal, no edema.   Skin: No rashes or birthmarks.     Higher integrative function: Oriented to time, place, person, intact recent and remote memory, attention span, concentration and language. Spontaneous speech, fund of vocabulary are normal.   Cns: VF intact, PERRL, EOMI, normal facial sensation/face symmetric, shoulder shrug symmetric, tongue midline, mild dysarthria..   Motor: Normal muscle strength, bulk, and tone in upper and lower extremities except RUE pronator drift.  No fasciculations, rigidity, spasticity or abnormal movements.   Sensation: Intact/symmetric to LT in arms/legs.   Station and gait: Deferred.  Coordination: Finger to nose test showed no dysmetria. Ataxia in RLE with HTS.     Scheduled Meds:[START ON 2022] aspirin, 325 mg, Oral, Daily  atenolol, 25 mg, Oral, BID  atorvastatin, 40 mg, Oral, Nightly  budesonide-formoterol, 2 puff, Inhalation, BID  cholecalciferol, 5,000 Units, Oral, Daily  clopidogrel, 75 mg, Oral, Daily  escitalopram, 20 mg, Oral,  Daily  furosemide, 20 mg, Oral, Daily  gabapentin, 400 mg, Oral, TID  letrozole, 2.5 mg, Oral, Daily  levETIRAcetam, 500 mg, Oral, BID  pantoprazole, 40 mg, Oral, Daily  senna-docusate sodium, 2 tablet, Oral, BID      Continuous Infusions:   PRN Meds:.•  acetaminophen **OR** acetaminophen  •  senna-docusate sodium **AND** polyethylene glycol **AND** bisacodyl **AND** bisacodyl  •  Glycerin-Hypromellose-  •  ipratropium-albuterol  •  melatonin  •  ondansetron  •  [COMPLETED] Insert peripheral IV **AND** sodium chloride  •  traMADol      Laboratory results:  Lab Results   Component Value Date    GLUCOSE 96 04/13/2022    CALCIUM 9.3 04/13/2022     04/13/2022    K 3.6 04/13/2022    CO2 23.5 04/13/2022     (H) 04/13/2022    BUN 8 04/13/2022    CREATININE 0.55 (L) 04/13/2022    EGFRIFAFRI 75 09/24/2021    EGFRIFNONA 62 09/24/2021    BCR 14.5 04/13/2022    ANIONGAP 11.5 04/13/2022     Lab Results   Component Value Date    WBC 6.41 04/13/2022    HGB 13.1 04/13/2022    HCT 39.1 04/13/2022    MCV 93.8 04/13/2022     04/13/2022     Lab Results   Component Value Date    CHOL 116 04/12/2022    CHOL 139 11/16/2017     Lab Results   Component Value Date    HDL 33 (L) 04/12/2022    HDL 39 (L) 09/24/2021    HDL 36 (L) 02/25/2021     Lab Results   Component Value Date    LDL 54 04/12/2022    LDL 67 09/24/2021    LDL 72 02/25/2021     Lab Results   Component Value Date    TRIG 174 (H) 04/12/2022    TRIG 106 09/24/2021    TRIG 143 02/25/2021         Lab 04/12/22  1129   HEMOGLOBIN A1C 5.90*      Review and interpretation of imaging:    Impression:  82-year-old right-handed female with HTN, HLD, TEOFILO (compliant with CPAP), right occipital meningioma s/p resection (1970 (with additional meningiomas, lumbar stenosis, prior strokes (2011 and in 2015 L MCA stroke s/p TPA and mechanical thrombectomy), seizures (maintained on Neurontin prior to this admission), and BPPV who presented 4/11 with complaints of word  finding difficulty and generalized weakness.  Her  also reported that the patient would sometimes stare when watching TV and would not respond for 30 seconds to 1 minute.    She is followed by CELY Buckley as an outpatient.  She was taking aspirin 81 mg, Plavix 75 mg, and Crestor 20 mg for stroke prevention prior to arrival as well as gabapentin 800 mg 3 times daily for seizure.  MRI brain this admission showed pontine stroke and in addition there was a right neck mass and right thyroid hyperintensity which may be artifactual.    Work-up:  MRI brain with and without contrast: Foci of acute infarct in the left naeem and the basilar  distribution.  For dural based enhancing lesions in the right parietal occipital lobes, most compatible with meningiomas, stable since previous CT with contrast in 2017.  Evidence of previous right craniotomy.  2D echo: EF 62.7%, mild to moderately dilated left atrial cavity, mild mitral valve regurgitation, saline test result negative.  CTA head/neck: Moderate stenosis of the left subclavian artery, moderate stenosis of the left CCA, mild to moderate stenosis at the origin of the left vertebral artery and moderate stenosis of the right vertebral artery, 45% left ICA stenosis, 60% right ICA stenosis.  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  Labs:B12 greater than 2000, TSH 1.02, LDL 54, HDL 33, triglycerides 174, total cholesterol 116, hemoglobin A1c 5.90%, P2Y12 178, aspirin platelet response 384    Diagnoses:  Left pontine stroke with worsening of baseline dysphagia, right weakness/incoordination, and dysarthria  Cerebrovascular disease with asymptomatic bilateral carotid stenoses  History of right parietal meningioma status post resection with stable meningiomas noted this admission  Seizure disorder  Right neck mass  Thyroid abnormality  TEOFILO compliant with  CPAP  HTN  HLD    Plan:  Further evaluation of right neck mass and right thyroid abnormality per primary team, MRI neck soft tissue with and without contrast and thyroid ultrasound have been ordered, general surgery consult placed  ASA increased to 325 mg daily this admission  Plavix 75 mg daily continued  Statin changed to Lipitor 40 mg daily  Keppra 500 mg twice daily added for seizure coverage  Gabapentin dose was reduced to 400 mg 3 times daily  Neurochecks  BP control-normalize  Zio Patch to be placed at discharge  Stroke Education  [facility]/SCDs  PT/OT/ST-to subacute rehab at discharge  D/W Dr Bolton today. No further neurology work up.  Neurology will sign off but please call if further questions or concerns.  Patient to follow-up with Savana Estrella as outpatient in 3 months.

## 2022-04-15 NOTE — PLAN OF CARE
Goal Outcome Evaluation:  Plan of Care Reviewed With: patient        Progress: improving  Outcome Evaluation: Pt tolerated treatment well this date. Increased gait distance to 80ft w/ Rw and CGA. Cues needed to increase step lengths, and improved zabrina noted today. Pt c/o some LE pain and SOA even when just lying in bed. O2 sats were around 95% after ambulation, and RN present. Encouraged pt to ambulate more w/ nsg.

## 2022-04-16 ENCOUNTER — APPOINTMENT (OUTPATIENT)
Dept: CARDIOLOGY | Facility: HOSPITAL | Age: 82
End: 2022-04-16

## 2022-04-16 VITALS
TEMPERATURE: 97.9 F | HEART RATE: 58 BPM | OXYGEN SATURATION: 94 % | DIASTOLIC BLOOD PRESSURE: 75 MMHG | RESPIRATION RATE: 16 BRPM | SYSTOLIC BLOOD PRESSURE: 168 MMHG

## 2022-04-16 LAB — SARS-COV-2 RNA RESP QL NAA+PROBE: NOT DETECTED

## 2022-04-16 PROCEDURE — U0005 INFEC AGEN DETEC AMPLI PROBE: HCPCS | Performed by: HOSPITALIST

## 2022-04-16 PROCEDURE — 94799 UNLISTED PULMONARY SVC/PX: CPT

## 2022-04-16 PROCEDURE — 93246 EXT ECG>7D<15D RECORDING: CPT

## 2022-04-16 PROCEDURE — U0003 INFECTIOUS AGENT DETECTION BY NUCLEIC ACID (DNA OR RNA); SEVERE ACUTE RESPIRATORY SYNDROME CORONAVIRUS 2 (SARS-COV-2) (CORONAVIRUS DISEASE [COVID-19]), AMPLIFIED PROBE TECHNIQUE, MAKING USE OF HIGH THROUGHPUT TECHNOLOGIES AS DESCRIBED BY CMS-2020-01-R: HCPCS | Performed by: HOSPITALIST

## 2022-04-16 RX ORDER — TRAMADOL HYDROCHLORIDE 50 MG/1
50 TABLET ORAL EVERY 6 HOURS PRN
Qty: 6 TABLET | Refills: 0 | Status: SHIPPED | OUTPATIENT
Start: 2022-04-16 | End: 2022-05-09

## 2022-04-16 RX ORDER — LEVETIRACETAM 500 MG/1
500 TABLET ORAL 2 TIMES DAILY
Start: 2022-04-16 | End: 2022-06-12 | Stop reason: SDUPTHER

## 2022-04-16 RX ORDER — POLYETHYLENE GLYCOL 3350 17 G/17G
17 POWDER, FOR SOLUTION ORAL DAILY PRN
Start: 2022-04-16 | End: 2022-09-16

## 2022-04-16 RX ORDER — ASPIRIN 325 MG
325 TABLET ORAL DAILY
Start: 2022-04-17 | End: 2022-05-18

## 2022-04-16 RX ORDER — ATORVASTATIN CALCIUM 40 MG/1
40 TABLET, FILM COATED ORAL NIGHTLY
Start: 2022-04-16 | End: 2022-05-31 | Stop reason: SDUPTHER

## 2022-04-16 RX ORDER — GABAPENTIN 400 MG/1
400 CAPSULE ORAL 3 TIMES DAILY
Qty: 9 CAPSULE | Refills: 0 | Status: SHIPPED | OUTPATIENT
Start: 2022-04-16 | End: 2022-05-18 | Stop reason: SDUPTHER

## 2022-04-16 RX ORDER — IPRATROPIUM BROMIDE AND ALBUTEROL SULFATE 2.5; .5 MG/3ML; MG/3ML
3 SOLUTION RESPIRATORY (INHALATION) EVERY 4 HOURS PRN
Qty: 360 ML
Start: 2022-04-16 | End: 2022-05-04

## 2022-04-16 RX ORDER — LANOLIN ALCOHOL/MO/W.PET/CERES
3 CREAM (GRAM) TOPICAL NIGHTLY PRN
Qty: 30 TABLET
Start: 2022-04-16 | End: 2022-05-04

## 2022-04-16 RX ADMIN — Medication 10 ML: at 09:09

## 2022-04-16 RX ADMIN — LEVETIRACETAM 500 MG: 500 TABLET, FILM COATED ORAL at 09:08

## 2022-04-16 RX ADMIN — LETROZOLE 2.5 MG: 2.5 TABLET ORAL at 09:08

## 2022-04-16 RX ADMIN — ATENOLOL 25 MG: 25 TABLET ORAL at 09:08

## 2022-04-16 RX ADMIN — CLOPIDOGREL 75 MG: 75 TABLET, FILM COATED ORAL at 09:08

## 2022-04-16 RX ADMIN — ASPIRIN 325 MG: 325 TABLET ORAL at 09:08

## 2022-04-16 RX ADMIN — GABAPENTIN 400 MG: 400 CAPSULE ORAL at 09:08

## 2022-04-16 RX ADMIN — Medication 5000 UNITS: at 09:09

## 2022-04-16 RX ADMIN — PANTOPRAZOLE SODIUM 40 MG: 40 TABLET, DELAYED RELEASE ORAL at 09:08

## 2022-04-16 RX ADMIN — TRAMADOL HYDROCHLORIDE 50 MG: 50 TABLET, COATED ORAL at 09:23

## 2022-04-16 RX ADMIN — FUROSEMIDE 20 MG: 20 TABLET ORAL at 09:08

## 2022-04-16 RX ADMIN — BUDESONIDE AND FORMOTEROL FUMARATE DIHYDRATE 2 PUFF: 80; 4.5 AEROSOL RESPIRATORY (INHALATION) at 11:12

## 2022-04-16 RX ADMIN — ESCITALOPRAM 20 MG: 20 TABLET, FILM COATED ORAL at 09:08

## 2022-04-16 NOTE — DISCHARGE SUMMARY
Patient Name: Avelina Carr  : 1940  MRN: [id number]    Date of Admission: 2022  Date of Discharge:  2022  Primary Care Physician: Goran Carr MD      Chief Complaint:   Difficulty Walking and Weakness - Generalized      Discharge Diagnoses     Active Hospital Problems    Diagnosis  POA   • **Acute CVA (cerebrovascular accident) (HCC) [I63.9]  Yes   • Generalized weakness [R53.1]  Yes   • Dependence on supplemental oxygen [Z99.81]  Not Applicable   • COPD (chronic obstructive pulmonary disease) (HCC) [J44.9]  Yes   • HTN (hypertension) [I10]  Yes   • HLD (hyperlipidemia) [E78.5]  Yes   • Migraines [G43.909]  Yes   • History of cardioembolic cerebrovascular accident (CVA) [Z86.73]  Not Applicable   • Seizure disorder, simple partial, 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        Hospital Course     Ms. Carr is a 81-year-old right-handed former smoker female with history of multiple prior strokes last: Left temporal lobe disease ) status post mechanical thrombectomy and antithrombolytic therapy/TPA, hypertension, hyperlipidemia, obstructive sleep apnea (100% compliant with treatment), prior breast CA, lumbar stenosis, seizure disorder (previously maintained on gabapentin) and BPPV who presented to [facility]  due to complaint of generalized weakness/slurred speech/word finding difficulty with associated confusion for which she was admitted to the hospital.  Initial CT of the head showed chronic infarct in the anterior aspect of the left temporal lobe and medial aspect of the right parietal lobe with extensive small vessel disease without any acute mass and/or stroke noted.  Family denied any seizure like activity.  MRI of the brain revealed acute infarction in the left aspect of the naeem etiology felt to be secondary to uncontrolled blood pressure.  P2 Y 12 was completed which showed  "adequate response.  Aspirin was increased from 81 mg to 325 mg daily.  Statin therapy was intensified to Lipitor 40 mg daily.  Zio patch to be placed at time of discharge.  Neurology will follow up with her in the outpatient setting.    CTA head and neck showed an incidental \"mass\" on the right side of her neck as well as a thyroid abnormality.  These were followed up with MRI soft tissues neck and thyroid ultrasound.  I did ask general surgery to evaluate her for these issues and they have agreed to follow-up with her as an outpatient.  No indication for further evaluation or intervention this hospitalization.      Day of Discharge     Subjective:  No new complaints today.    Physical Exam:  Temp:  [97.9 °F (36.6 °C)-98.5 °F (36.9 °C)] 97.9 °F (36.6 °C)  Heart Rate:  [54-66] 55  Resp:  [16-18] 17  BP: (139-168)/(73-84) 168/75  There is no height or weight on file to calculate BMI.  Physical Exam  Vitals and nursing note reviewed.   Constitutional:       General: She is not in acute distress.     Appearance: She is ill-appearing.   Neck:      Vascular: No JVD.      Trachea: No tracheal deviation.   Cardiovascular:      Rate and Rhythm: Normal rate and regular rhythm.      Heart sounds: Normal heart sounds.   Pulmonary:      Effort: Pulmonary effort is normal. No respiratory distress.      Breath sounds: Normal breath sounds.   Abdominal:      General: Bowel sounds are normal.      Palpations: Abdomen is soft.      Tenderness: There is no abdominal tenderness.   Skin:     General: Skin is warm and dry.      Findings: Bruising present.   Neurological:      Mental Status: She is alert and oriented to person, place, and time.      Motor: Weakness present.   Psychiatric:         Behavior: Behavior normal. Behavior is cooperative.         Consultants     Consult Orders (all) (From admission, onward)     Start     Ordered    04/15/22 1403  Inpatient General Surgery Consult  Once        Specialty:  General Surgery  " Provider:  Mildred Lorenzo MD    04/15/22 1403    04/12/22 0957  Inpatient Neurology Consult Stroke  Once        Specialty:  Neurology  Provider:  Yosi Jesus MD    04/12/22 0956    04/12/22 0057  Notify Stroke Coordinator  Once        Provider:  (Not yet assigned)    04/12/22 0057 04/12/22 0057  Inpatient Rehab Admission Consult  Once        Provider:  (Not yet assigned)    04/12/22 0057 04/12/22 0057  Consult to Case Management   Once        Provider:  (Not yet assigned)    04/12/22 0057 04/12/22 0057  Consult to Diabetes Educator  Once,   Status:  Canceled        Provider:  (Not yet assigned)    04/12/22 0057 04/12/22 0057  Inpatient Neurology Consult Stroke  Once        Specialty:  Neurology  Provider:  Dustin Bolton MD    04/12/22 0057              Procedures     Imaging Results (All)     Procedure Component Value Units Date/Time    US Thyroid [[id number]] Collected: 04/15/22 1929     Updated: 04/15/22 1940    Narrative:      THYROID SONOGRAM     HISTORY: Neck CT demonstrated a lesion deep to the right  sternocleidomastoid muscle which appeared cystic on MRI where thyroid  lesions were also questioned.     TECHNIQUE: Thyroid sonogram is provided and correlated with the recent  CT and MRI scans of the neck on which the thyroid gland is very poorly  demonstrated.     FINDINGS: The gland appears small and heterogeneous in its echotexture.  The right lobe measures 3.2 x 1.6 x 1.9 cm and the left lobe measures  3.1 x 1.3 x 1.7 cm. The isthmus measures 5 mm thick.     At the right thyroid upper pole there is a 9 x 7 x 8 mm solid lesion,  TI-RADS 3.     At the upper pole of the left lobe, there is a 7 x 5 x 8 mm nodule,  TI-RADS 4.       Impression:      Subcentimeter thyroid nodules. Follow-up sonogram could be  considered in one year.     This report was finalized on 4/15/2022 7:37 PM by Dr. Russel Mendez M.D.       FL Video Swallow With Speech Single Contrast  [[id number]] Collected: 04/15/22 1409     Updated: 04/15/22 1540    Narrative:      VIDEO ESOPHAGRAM     HISTORY: Dysphagia.     The patient exhibits mild-to-moderate oropharyngeal dysphagia with  mistiming of swallowing. There was penetration with initial trial with  nectar consistency. This did not occur with additional sips. There was  some posterior penetration and aspiration of thin liquids via cup and  prompting coughing reflex. Please also see the speech therapist's  report.     12 imaging sequences were obtained and the fluoroscopy time measures 1  minute and 55 seconds.     This report was finalized on 4/15/2022 3:37 PM by Dr. Willis Curtis M.D.       MRI Neck Soft Tissue Only With & Without Contrast [[id number]] Collected: 04/15/22 1342     Updated: 04/15/22 1449    Narrative:        HISTORY: Cystic lesion.     COMPARISON: CT angiogram of the neck performed on 04/14/2022.     FINDINGS:     The study is limited by patient motion. The axial T2 sequence  demonstrates an ovoid area of T2 hyperintensity involving the posterior  triangle of the neck on the right lateral to and abutting the internal  jugular vein.  This measures 11 x 14 mm in transverse dimension. It  measures approximately 23 mm in craniocaudal dimension. There is  questionable heterogeneous signal involving the inferior and medial  aspect of this lesion. This may be artifactual. No convincing  enhancement is identified after contrast administration. No other T2  hyperintense or cystic masses were appreciated. The parotid and  submandibular glands appear unremarkable.     There is an area of T2 and T1 hyperintensity involving the right lobe of  the thyroid gland. Evaluation is markedly limited by motion. This  measures 8 mm in size. It very well may be artifactual.       Impression:      1.  There is a T2 hyperintense and nonenhancing mass involving the  posterior triangle of the neck on the right measuring approximately 11 x  14 x 23 mm in  size.  This is new versus the CT angiogram of the neck  performed on 11/15/2017. The inferior and medial aspect is somewhat  heterogeneous. This may be artifactual or represent a more solid or  complex component. No convincing enhancement was identified, but the  study is limited by patient motion. A cystic or pathologic node could  not be excluded.  2.  There is an area of T1 and T2 hyperintensity involving the right  lobe of the thyroid gland. This very well may be artifactual and likely  is artifactual. Given the limited nature of the examination and the  potential of a cystic or necrotic node, thyroid ultrasound is suggested.  At that time, the inferior and medial aspect of the cyst or nodes could  be evaluated as well.     This report was finalized on 4/15/2022 2:46 PM by Dr. Constantino Hitchcock M.D.       CT Angiogram Head [[id number]] Collected: 04/14/22 1511     Updated: 04/14/22 1719    Narrative:      CT ANGIOGRAM OF THE HEAD AND NECK WITH CONTRAST     CLINICAL HISTORY: Difficulty walking. Weakness.     TECHNIQUE: CT angiogram of the head and neck was obtained with 1 mm  axial images following the administration of IV contrast. Sagittal,  coronal, and 3-dimensional reconstructed images were obtained.  Additionally, a CT scan of the head was obtained with 3 mm axial images  before and after the administration of IV contrast.     COMPARISON: Previous MRI of the brain dated 04/12/2022 and previous CT  angiogram of the head and neck dated 11/15/2017.     FINDINGS:     CTA NECK: There is a classic configuration of the aortic arch. There is  a moderate degree of stenosis of the proximal aspect of the left  subclavian artery. Similar findings were noted on the prior exam. Again,  this is noted just proximal to the origin of the left vertebral artery.  A mild-to-moderate degree of stenosis is again appreciated at the origin  of the left vertebral artery. A moderate degree of stenosis is again  appreciated at the origin  of the right vertebral artery. Moderate  degrees of stenoses are appreciated within the innominate artery and  left common carotid artery origins which demonstrate interval  progression since the prior study. The most proximal aspect of the left  common carotid artery, the innominate artery, the most proximal aspect  of the right common carotid artery, and the most proximal aspect of the  right subclavian artery are poorly visualized due to marked beam  hardening artifact from adjacent dense venous opacification. Again noted  is an approximate 45% stenosis by NASCET criteria within the proximal  portion of the left ICA. No significant interval change is noted when  compared to the prior study. The precise degree of stenosis within the  proximal portion of the right ICA is difficult to assess due to the  dense calcification resulting in prominent beam artifact obscuring the  intraluminal enhancement. However, there is an approximate 60% stenosis  by NASCET criteria and there is no convincing significant interval  change when compared to the prior study.     There is a cystic-appearing structure deep to the right  sternocleidomastoid muscle which measures up to approximately 1.1 x 2.0  cm in greatest axial dimensions and is seen just lateral to the right  internal jugular vein. This lesion is new when compared to the prior  study and is of uncertain precise etiology and significance. It  represents a dilated lymphatic structure or a cystic lymph node. Further  evaluation is recommended with MR imaging of the neck for further  characterization.     CTA HEAD: The vertebral arteries, basilar artery, and proximal posterior  cerebral arteries are unremarkable. Prior study demonstrated a moderate  stenosis involving the parietooccipital branch of the left PCA. The  parietoccipital branches and calcarine branches are not well evaluated  on this study. The internal carotid arteries are remarkable for  atherosclerotic changes  within the cavernous segments of the internal  carotids resulting in mild degrees of luminal compromise. The anterior  cerebral arteries are unremarkable in appearance. On the prior CT  angiogram, there was occlusion of the inferior division of the left  middle cerebral artery. This vessel is now patent on the current exam.  The middle cerebral arteries are unremarkable in appearance.     There is a known area of acute infarction within the left aspect of the  basis pontis which measured up to approximately 1.4 x 0.8 cm in greatest  axial dimensions on yesterday's brain MRI that is not well delineated on  this CT study.     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  Four dural-based enhancing lesions are noted along the posterior aspect  of the right parietal and occipital lobes most compatible with  meningiomas. Again, one of these meningiomas abuts the right lateral  aspect of the posterior and inferior portion of the superior sagittal  sinus and invades the sinus. The patient is status post a right  posterior lateral parietal craniotomy and deep to the craniotomy sites,  there are some areas of dural thickening and enhancement that I think  are related to postoperative change rather than additional meningiomas.  For further detail regarding these meningiomas, please refer to  yesterday's brain MRI report.     Again noted is a focus of encephalomalacia within the lateral aspect of  the left temporal lobe measuring up to approximately 3.9 x 2.4 cm in  greatest axial dimensions compatible with a chronic infarct within the  left MCA distribution. Additional foci of encephalomalacia are noted  within the inferior right occipital lobe and the posterior medial aspect  of the right occipital lobe compatible with chronic infarcts within the  right PCA distribution or postoperative areas of encephalomalacia.  Moderate to  severe changes of chronic small vessel ischemic phenomena  are noted. All of these findings were evident on yesterday's brain MRI.       Impression:         There is an indeterminate cystic-appearing structure within the right  side of the neck deep to the sternocleidomastoid muscle at the C5-6  level and this lesion measures up to approximately 1.1 x 2.0 cm in  greatest axial dimensions. The precise etiology and significance of this  finding is uncertain. It could represent a dilated lymphatic structure  although a cystic metastatic lymph node cannot be entirely excluded. I  recommend further characterization with an MRI of the soft tissues of  the neck with and without contrast.     There is a known acute infarct within the left aspect of the basis  pontis which is not well delineated on CT imaging.     On the prior study, there was a moderate stenosis involving the  parietooccipital branch of the left PCA. The calcarine and  parietooccipital branches of the PCAs are not well evaluated on this  study. However, the vertebrobasilar system and the proximal posterior  cerebral arteries are unremarkable in appearance.     On the previous study, the inferior division of the left MCA was  occluded and this vessel is now patent.     There is a moderate degree of stenosis involving the origin of the left  subclavian artery and this is stable. Moderate degrees of stenoses are  appreciated within the origins of the left common carotid artery and  innominate artery which have progressed in the interval. A  mild-to-moderate degree of stenosis at the origin of the left vertebral  artery and a moderate degree of stenosis at the origin of the right  vertebral artery are additionally stable. The most proximal aspect of  the left common carotid artery, the innominate artery, the proximal  aspect of the right subclavian artery, and the most proximal aspect of  the right common carotid artery are poorly evaluated due  to  beam-hardening artifact from adjacent dense venous opacification. Again  noted is an approximate 45% NASCET stenosis within the left ICA. There  is a dense calcific atherosclerotic plaque at the origin of the right  internal carotid artery and given the dense calcific nature of this  plaque, the degree of stenosis is difficult to precisely assess although  there appears to be an approximate 60% NASCET stenosis within the right  ICA which demonstrates no convincing interval change.     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  Multiple meningiomas are again identified and with regards to further  detail pertaining to the meningiomas, please refer to yesterday's brain  MRI report.     There is a focus of encephalomalacia within the left temporal lobe  compatible with a chronic infarct within the left MCA distribution.  Additional foci of encephalomalacia are noted within the inferior aspect  of the right occipital lobe and the posterior medial aspect of the right  occipital lobe compatible with either chronic infarcts within the right  PCA distribution or postoperative areas of encephalomalacia. The patient  is status post a right parietooccipital craniotomy.              Radiation dose reduction techniques were utilized, including automated  exposure control and exposure modulation based on body size.     This report was finalized on 4/14/2022 5:16 PM by Dr. Juan Bishop M.D.       CT Angiogram Neck [[id number]] Collected: 04/14/22 1511     Updated: 04/14/22 1719    Narrative:      CT ANGIOGRAM OF THE HEAD AND NECK WITH CONTRAST     CLINICAL HISTORY: Difficulty walking. Weakness.     TECHNIQUE: CT angiogram of the head and neck was obtained with 1 mm  axial images following the administration of IV contrast. Sagittal,  coronal, and 3-dimensional reconstructed images were obtained.  Additionally, a CT scan of the  head was obtained with 3 mm axial images  before and after the administration of IV contrast.     COMPARISON: Previous MRI of the brain dated 04/12/2022 and previous CT  angiogram of the head and neck dated 11/15/2017.     FINDINGS:     CTA NECK: There is a classic configuration of the aortic arch. There is  a moderate degree of stenosis of the proximal aspect of the left  subclavian artery. Similar findings were noted on the prior exam. Again,  this is noted just proximal to the origin of the left vertebral artery.  A mild-to-moderate degree of stenosis is again appreciated at the origin  of the left vertebral artery. A moderate degree of stenosis is again  appreciated at the origin of the right vertebral artery. Moderate  degrees of stenoses are appreciated within the innominate artery and  left common carotid artery origins which demonstrate interval  progression since the prior study. The most proximal aspect of the left  common carotid artery, the innominate artery, the most proximal aspect  of the right common carotid artery, and the most proximal aspect of the  right subclavian artery are poorly visualized due to marked beam  hardening artifact from adjacent dense venous opacification. Again noted  is an approximate 45% stenosis by NASCET criteria within the proximal  portion of the left ICA. No significant interval change is noted when  compared to the prior study. The precise degree of stenosis within the  proximal portion of the right ICA is difficult to assess due to the  dense calcification resulting in prominent beam artifact obscuring the  intraluminal enhancement. However, there is an approximate 60% stenosis  by NASCET criteria and there is no convincing significant interval  change when compared to the prior study.     There is a cystic-appearing structure deep to the right  sternocleidomastoid muscle which measures up to approximately 1.1 x 2.0  cm in greatest axial dimensions and is seen just  lateral to the right  internal jugular vein. This lesion is new when compared to the prior  study and is of uncertain precise etiology and significance. It  represents a dilated lymphatic structure or a cystic lymph node. Further  evaluation is recommended with MR imaging of the neck for further  characterization.     CTA HEAD: The vertebral arteries, basilar artery, and proximal posterior  cerebral arteries are unremarkable. Prior study demonstrated a moderate  stenosis involving the parietooccipital branch of the left PCA. The  parietoccipital branches and calcarine branches are not well evaluated  on this study. The internal carotid arteries are remarkable for  atherosclerotic changes within the cavernous segments of the internal  carotids resulting in mild degrees of luminal compromise. The anterior  cerebral arteries are unremarkable in appearance. On the prior CT  angiogram, there was occlusion of the inferior division of the left  middle cerebral artery. This vessel is now patent on the current exam.  The middle cerebral arteries are unremarkable in appearance.     There is a known area of acute infarction within the left aspect of the  basis pontis which measured up to approximately 1.4 x 0.8 cm in greatest  axial dimensions on yesterday's brain MRI that is not well delineated on  this CT study.     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  Four dural-based enhancing lesions are noted along the posterior aspect  of the right parietal and occipital lobes most compatible with  meningiomas. Again, one of these meningiomas abuts the right lateral  aspect of the posterior and inferior portion of the superior sagittal  sinus and invades the sinus. The patient is status post a right  posterior lateral parietal craniotomy and deep to the craniotomy sites,  there are some areas of dural thickening and enhancement that  I think  are related to postoperative change rather than additional meningiomas.  For further detail regarding these meningiomas, please refer to  yesterday's brain MRI report.     Again noted is a focus of encephalomalacia within the lateral aspect of  the left temporal lobe measuring up to approximately 3.9 x 2.4 cm in  greatest axial dimensions compatible with a chronic infarct within the  left MCA distribution. Additional foci of encephalomalacia are noted  within the inferior right occipital lobe and the posterior medial aspect  of the right occipital lobe compatible with chronic infarcts within the  right PCA distribution or postoperative areas of encephalomalacia.  Moderate to severe changes of chronic small vessel ischemic phenomena  are noted. All of these findings were evident on yesterday's brain MRI.       Impression:         There is an indeterminate cystic-appearing structure within the right  side of the neck deep to the sternocleidomastoid muscle at the C5-6  level and this lesion measures up to approximately 1.1 x 2.0 cm in  greatest axial dimensions. The precise etiology and significance of this  finding is uncertain. It could represent a dilated lymphatic structure  although a cystic metastatic lymph node cannot be entirely excluded. I  recommend further characterization with an MRI of the soft tissues of  the neck with and without contrast.     There is a known acute infarct within the left aspect of the basis  pontis which is not well delineated on CT imaging.     On the prior study, there was a moderate stenosis involving the  parietooccipital branch of the left PCA. The calcarine and  parietooccipital branches of the PCAs are not well evaluated on this  study. However, the vertebrobasilar system and the proximal posterior  cerebral arteries are unremarkable in appearance.     On the previous study, the inferior division of the left MCA was  occluded and this vessel is now patent.     There is  a moderate degree of stenosis involving the origin of the left  subclavian artery and this is stable. Moderate degrees of stenoses are  appreciated within the origins of the left common carotid artery and  innominate artery which have progressed in the interval. A  mild-to-moderate degree of stenosis at the origin of the left vertebral  artery and a moderate degree of stenosis at the origin of the right  vertebral artery are additionally stable. The most proximal aspect of  the left common carotid artery, the innominate artery, the proximal  aspect of the right subclavian artery, and the most proximal aspect of  the right common carotid artery are poorly evaluated due to  beam-hardening artifact from adjacent dense venous opacification. Again  noted is an approximate 45% NASCET stenosis within the left ICA. There  is a dense calcific atherosclerotic plaque at the origin of the right  internal carotid artery and given the dense calcific nature of this  plaque, the degree of stenosis is difficult to precisely assess although  there appears to be an approximate 60% NASCET stenosis within the right  ICA which demonstrates no convincing interval change.     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  Multiple meningiomas are again identified and with regards to further  detail pertaining to the meningiomas, please refer to yesterday's brain  MRI report.     There is a focus of encephalomalacia within the left temporal lobe  compatible with a chronic infarct within the left MCA distribution.  Additional foci of encephalomalacia are noted within the inferior aspect  of the right occipital lobe and the posterior medial aspect of the right  occipital lobe compatible with either chronic infarcts within the right  PCA distribution or postoperative areas of encephalomalacia. The patient  is status post a right parietooccipital  craniotomy.              Radiation dose reduction techniques were utilized, including automated  exposure control and exposure modulation based on body size.     This report was finalized on 4/14/2022 5:16 PM by Dr. Juan Bishop M.D.       MRI Brain With & Without Contrast [[id number]] Collected: 04/12/22 1653     Updated: 04/13/22 0733    Narrative:      MRI OF THE BRAIN WITH AND WITHOUT CONTRAST     CLINICAL HISTORY: History of meningioma. Slurred speech, vertigo, and  dizziness.     TECHNIQUE: MRI of the brain was obtained with sagittal pre and post  gadolinium T1, axial pre and postgadolinium T1, coronal postgadolinium  T1, axial postgadolinium 3-D SPGR, axial FLAIR, axial T2, axial  diffusion, and axial gradient echo images.     COMPARISON: Comparison is made to previous postcontrast head CT dated  11/15/2017.     FINDINGS:     There are foci of acute infarction within the left aspect of the naeem  within basilar  distribution which measure up to approximately  0.8 x 1.4 cm in greatest axial dimensions.     The patient is status post a right posterior lateral parietal  craniotomy. Deep to the craniotomy flap along the superior aspect of the  posterior portion of the right frontal lobe, there is a dural based  lesion which measures up to approximately 2.0 x 0.6 cm in greatest  coronal dimensions and approximately 1.6 cm in greatest AP dimensions.  There is a peripheral shell of calcification at this site. It is unclear  whether this represents a meningioma or some type of postoperative  change as it is located immediately deep to the craniotomy site.  Regardless, the findings are unchanged when compared to the prior CT  study dated 11/15/2017. Along the lateral aspect of the right frontal  parietal junction, there is another dural based focus that is seen  immediately deep to the site of a craniotomy which measures up to  approximately 1.4 x 0.4 cm in greatest axial dimensions. Again, the  findings are  unchanged when compared to the prior CT and again it is  unclear whether this represents a meningioma or some type of  postoperative change at the site of the craniotomy.     There are findings much more characteristic of meningiomas along the  posterior aspect of the right parietal and occipital lobes in addition  to the medial aspect of the right occipital lobe. Along the medial  aspect of the inferior portion of the right occipital lobe, there is a  dural based contrast enhancing focus measuring up to approximately 12 x  9 mm in greatest axial dimensions that is most suggestive of a  meningioma and this demonstrates no significant interval change. There  is likely a component of this meningioma that invades the superior  sagittal sinus measuring up to 0.6 x 0.7 cm in greatest axial  dimensions. 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 There are 3 additional dural based enhancing  lesions that are most suggestive of meningiomas along the posterior  aspect of the right parietal and occipital lobes with the largest  measuring up to approximately 2.2 x 0.8 cm in greatest axial dimensions.  The next larger lesion posterior to the right occipital lobe measures up  to 1.7 x 0.8 cm in greatest axial dimensions and the smallest of the  lesions measures up to approximately 1.0 x 0.6 cm in greatest axial  dimensions and is located posterior to the right occipital lobe. All of  these lesions are very similar in appearance when compared to the prior  CT study and there is no convincing interval change and any of these  lesions. Additional nonspecific dural thickening is noted along the  superior aspect of the right frontal and parietal lobes deep to the  craniotomy flap.     There is a focus of encephalomalacia within the lateral aspect of the  anterior portion of the left temporal lobe which measures up to 4.0 x  2.4 cm in greatest axial dimensions. The findings are compatible with a  chronic  infarct within the left MCA distribution. This abnormality is  new when compared to the prior study. A focus of encephalitis is  identified within the posterior aspect the right occipital lobe  measuring up to 2.8 x 2.8 cm in greatest axial dimensions and this  finding was present on the prior study could be related to a chronic  infarct within the right PCA distribution or chronic postoperative  change. Similarly, there are small areas of encephalomalacia within the  undersurface of the more lateral aspect of the right occipital lobe with  the largest measuring up to approximately 1.1 cm in diameter. Again,  these findings were present on the prior study and could represent  chronic infarcts within the right PCA distribution or postoperative  areas of encephalomalacia. Tiny subcentimeter chronic infarcts are  identified within the right cerebellar hemisphere within the right PICA  distribution.     There are severe and confluent changes of chronic small vessel ischemic  phenomena. There are no abnormal areas of susceptibility artifact within  the brain parenchyma. The major intracranial flow related signal voids  are within normal limits.       Impression:         There are foci of acute infarction within the left aspect of the naeem  that are located within basilar  distribution.     There are 4 dural based homogeneously enhancing lesions that are located  along the posterior aspect of the right parietal and occipital lobes  that are most compatible with meningiomas. 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 Overall,  when compared to the prior postcontrast CT scan of the head as part of  the CT angiogram study, there is no convincing interval change, given  differences in modalities. Additionally, deep to the  craniotomy sites  along the lateral aspect the right frontal parietal junction and along  the superior aspect of the posterior portion of the right frontal lobe,  there are some dural based extra-axial enhancing foci which could be  related to postoperative change from the craniotomy flaps or additional  meningiomas. These findings are also unchanged when compared to the  prior study.     There is a focus of encephalomalacia within the lateral aspect of the  left temporal lobe which is new when compared to the previous exam dated  11/15/2017 and is compatible with a chronic infarct within the left MCA  distribution. Additional areas of encephalomalacia are noted within the  inferior aspect of the right occipital lobe and the posterior medial  aspect of the right occipital lobe that were evident on the prior study  and are compatible with either chronic infarcts within the right PCA  distribution or postoperative areas of encephalomalacia.     Moderate-to-severe changes of chronic small vessel ischemic phenomena  are noted.     These findings were discussed with Dr. Bolton on 04/12/2022 at  approximately 4:20 PM.     This report was finalized on 4/13/2022 7:30 AM by Dr. Juan Bishop M.D.       CT Head Without Contrast [[id number]] Collected: 04/11/22 2338     Updated: 04/11/22 2347    Narrative:      CT HEAD WO CONTRAST-     CLINICAL HISTORY: Weakness. Speech difficulty.     TECHNIQUE: Transverse 3 mm thick images were obtained from the base of  the skull to the vertex without IV contrast.     Radiation dose reduction techniques were utilized, including automated  exposure control and exposure modulation based on body size.     COMPARISON: CT head dated 11/18/2017.     FINDINGS: There is a closed craniotomy defect involving portions of the  right frontal bone and right parietal bone that is unchanged. This  appears chronic. There is localized encephalomalacia within the anterior  aspect of the left temporal lobe  that is consistent with a chronic  infarct. This appeared to be in it's acute or subacute phase of  evolution on the previous CT scan. Focal encephalomalacia in the medial  aspect of the right parietal lobe is unchanged. There is patchy  ill-defined abnormal diminished attenuation throughout the white matter  of both cerebral hemispheres that is more prominent than on the previous  CT scan. This is consistent with sequela of extensive small vessel  chronic ischemic change. No acute infarct or hemorrhage is evident.  However, an acute white matter infarct would be impossible to exclude  given the background white matter changes. There are no masses. The  visualized paranasal sinuses and mastoid air cells and middle ear  cavities appear well aerated.       Impression:      Chronic infarct involving the anterior aspect of the left  temporal lobe. Chronic infarct or postoperative change within the medial  aspect of the right parietal lobe. Evidence of extensive small vessel  chronic ischemic change showing progression since the preceding CT in  2017. No definite acute intracranial abnormality is identified.     This report was finalized on 4/11/2022 11:43 PM by Dr. Gage Ji M.D.       XR Chest 1 View [[id number]] Collected: 04/11/22 2311     Updated: 04/11/22 2315    Narrative:      PORTABLE CHEST 04/11/2022 AT 10:55 PM     CLINICAL HISTORY: Weakness     Compared to the previous chest dated 06/15/2021.     The lungs are somewhat poorly inflated but appear free of infiltrates.  There are no pleural effusions. The cardiomediastinal silhouette is  unremarkable.     IMPRESSIONS: No evidence of acute disease within the chest.     This report was finalized on 4/11/2022 11:12 PM by Dr. Gage Ji M.D.           Results for orders placed during the hospital encounter of 04/11/22    Adult Transthoracic Echo Complete W/ Cont if Necessary Per Protocol    Interpretation Summary  · Saline test results are negative.  ·  Estimated left ventricular EF was in agreement with the calculated left ventricular EF. Left ventricular ejection fraction appears to be 61 - 65%. Left ventricular systolic function is normal.  · Left ventricular diastolic function was normal.    Pertinent Labs     Results from last 7 days   Lab Units 04/13/22  0548 04/11/22  2242   WBC 10*3/mm3 6.41 6.30   HEMOGLOBIN g/dL 13.1 12.2   PLATELETS 10*3/mm3 201 202     Results from last 7 days   Lab Units 04/13/22  0548 04/11/22  2242   SODIUM mmol/L 144 144   POTASSIUM mmol/L 3.6 3.6   CHLORIDE mmol/L 109* 107   CO2 mmol/L 23.5 23.2   BUN mg/dL 8 14   CREATININE mg/dL 0.55* 0.83   GLUCOSE mg/dL 96 122*   EGFR mL/min/1.73 91.6 70.5     Results from last 7 days   Lab Units 04/11/22  2242   ALBUMIN g/dL 4.20   BILIRUBIN mg/dL 0.2   ALK PHOS U/L 103   AST (SGOT) U/L 27   ALT (SGPT) U/L 19     Results from last 7 days   Lab Units 04/13/22  0548 04/11/22  2242   CALCIUM mg/dL 9.3 8.8   ALBUMIN g/dL  --  4.20       Results from last 7 days   Lab Units 04/11/22  2242   TROPONIN T ng/mL <0.010       Results from last 7 days   Lab Units 04/12/22  1129   CHOLESTEROL mg/dL 116   TRIGLYCERIDES mg/dL 174*   HDL CHOL mg/dL 33*   LDL CHOL mg/dL 54         Results from last 7 days   Lab Units 04/12/22  0105   COVID19  Not Detected       Test Results Pending at Discharge       Discharge Details        Discharge Medications      New Medications      Instructions Start Date   aspirin 325 MG tablet  Replaces: aspirin 81 MG EC tablet   325 mg, Oral, Daily   Start Date: April 17, 2022     atorvastatin 40 MG tablet  Commonly known as: LIPITOR   40 mg, Oral, Nightly      gabapentin 400 MG capsule  Commonly known as: NEURONTIN  Replaces: gabapentin 800 MG tablet   400 mg, Oral, 3 Times Daily      Glycerin-Hypromellose- 0.2-0.2-1 % solution ophthalmic solution  Commonly known as: ARTIFICIAL TEARS   1 drop, Both Eyes, Every 1 Hour PRN      ipratropium-albuterol 0.5-2.5 mg/3 ml  nebulizer  Commonly known as: DUO-NEB   3 mL, Nebulization, Every 4 Hours PRN      levETIRAcetam 500 MG tablet  Commonly known as: KEPPRA   500 mg, Oral, 2 Times Daily      polyethylene glycol 17 g packet  Commonly known as: MIRALAX   17 g, Oral, Daily PRN         Changes to Medications      Instructions Start Date   melatonin 3 MG tablet  What changed:   · medication strength  · how much to take  · when to take this  · reasons to take this   3 mg, Oral, Nightly PRN         Continue These Medications      Instructions Start Date   acetaminophen 325 MG tablet  Commonly known as: TYLENOL   650 mg, Oral, Every 6 Hours PRN      albuterol sulfate  (90 Base) MCG/ACT inhaler  Commonly known as: PROVENTIL HFA;VENTOLIN HFA;PROAIR HFA   2 puffs, Inhalation, Every 4 Hours PRN      atenolol 25 MG tablet  Commonly known as: TENORMIN   25 mg, Oral, 2 Times Daily      budesonide-formoterol 80-4.5 MCG/ACT inhaler  Commonly known as: SYMBICORT   INHALE TWO PUFFS BY MOUTH TWICE A DAY      clopidogrel 75 MG tablet  Commonly known as: PLAVIX   TAKE ONE TABLET BY MOUTH DAILY      escitalopram 20 MG tablet  Commonly known as: LEXAPRO   20 mg, Oral, Daily      furosemide 20 MG tablet  Commonly known as: LASIX   TAKE ONE TABLET BY MOUTH DAILY      letrozole 2.5 MG tablet  Commonly known as: FEMARA   2.5 mg, Oral, Daily      levothyroxine 112 MCG tablet  Commonly known as: SYNTHROID, LEVOTHROID   TAKE ONE TABLET BY MOUTH DAILY      pantoprazole 40 MG EC tablet  Commonly known as: PROTONIX   TAKE ONE TABLET BY MOUTH DAILY      traMADol 50 MG tablet  Commonly known as: ULTRAM   50 mg, Oral, Every 6 Hours PRN      vitamin D3 125 MCG (5000 UT) capsule capsule   5,000 Units, Oral, Daily         Stop These Medications    artificial tears ointment     aspirin 81 MG EC tablet  Replaced by: aspirin 325 MG tablet     gabapentin 800 MG tablet  Commonly known as: NEURONTIN  Replaced by: gabapentin 400 MG capsule     rosuvastatin 20 MG  tablet  Commonly known as: CRESTOR            Allergies   Allergen Reactions   • Penicillins Rash       Discharge Disposition:  Skilled Nursing Facility (DC - External)      Discharge Diet:  Diet Order   Procedures   • Diet Regular; Nectar / Syrup Thick       Discharge Activity:   Activity Instructions     Activity as Tolerated     Additional Activity Instructions:    **ZIO PATCH**           CODE STATUS:    Code Status and Medical Interventions:   Ordered at: 04/12/22 0057     Code Status (Patient has no pulse and is not breathing):    CPR (Attempt to Resuscitate)     Medical Interventions (Patient has pulse or is breathing):    Full Support       Future Appointments   Date Time Provider Department Center   7/13/2022  1:00 PM Savana Estrella APRN [facility]   9/30/2022 10:00 AM Goran Carr MD [facility]     Additional Instructions for the Follow-ups that You Need to Schedule     Discharge Follow-up with PCP   As directed       Currently Documented PCP:    Goran Carr MD    PCP Phone Number:    [phone]     Follow Up Details: 1 to 2 weeks (or sooner if problems)            Contact information for follow-up providers     Goran Carr MD .    Specialty: Family Medicine  Why: 1 to 2 weeks (or sooner if problems)  Contact information:  [address] [address]  [phone]                   Contact information for after-discharge care     Destination     [facility] .    Service: Skilled Nursing  Contact information:  [facility] [address]  [phone]                             Additional Instructions for the Follow-ups that You Need to Schedule     Discharge Follow-up with PCP   As directed       Currently Documented PCP:    Goran Carr MD    PCP Phone Number:    [phone]     Follow Up Details: 1 to 2 weeks (or sooner if problems)           Time Spent on Discharge:  Greater than 30 minutes      Antoni Grey  MD [facility]  04/16/22  10:11 EDT

## 2022-04-16 NOTE — PLAN OF CARE
Problem: Adult Inpatient Plan of Care  Goal: Plan of Care Review  Outcome: Met  Flowsheets (Taken 4/16/2022 1152)  Progress: improving  Plan of Care Reviewed With: patient  Outcome Evaluation: vss, no falls, c/o pain, prn tramadol, nih- 3, zio patch, dc to rehab today, continue to monitor  Goal: Patient-Specific Goal (Individualized)  Outcome: Met  Goal: Absence of Hospital-Acquired Illness or Injury  Outcome: Met  Intervention: Identify and Manage Fall Risk  Recent Flowsheet Documentation  Taken 4/16/2022 1040 by Hortensia Bass RN  Safety Promotion/Fall Prevention:   activity supervised   assistive device/personal items within reach   clutter free environment maintained   fall prevention program maintained   nonskid shoes/slippers when out of bed   room organization consistent   safety round/check completed  Taken 4/16/2022 0909 by Hortensia Bass RN  Safety Promotion/Fall Prevention:   activity supervised   assistive device/personal items within reach   clutter free environment maintained   fall prevention program maintained   nonskid shoes/slippers when out of bed   room organization consistent   safety round/check completed  Taken 4/16/2022 0802 by Hortensia Bass RN  Safety Promotion/Fall Prevention:   assistive device/personal items within reach   activity supervised   clutter free environment maintained   fall prevention program maintained   nonskid shoes/slippers when out of bed   room organization consistent   safety round/check completed  Intervention: Prevent Skin Injury  Recent Flowsheet Documentation  Taken 4/16/2022 0909 by Hortensia Bass RN  Body Position:   left   tilted  Intervention: Prevent and Manage VTE (Venous Thromboembolism) Risk  Recent Flowsheet Documentation  Taken 4/16/2022 1040 by Hortensia Bass RN  Activity Management: activity adjusted per tolerance  Taken 4/16/2022 0909 by Hortensia Bass RN  Activity Management: activity adjusted per tolerance  VTE  Prevention/Management: patient refused intervention  Taken 4/16/2022 0802 by Hortensia Bass RN  Activity Management: activity adjusted per tolerance  Goal: Optimal Comfort and Wellbeing  Outcome: Met  Intervention: Monitor Pain and Promote Comfort  Recent Flowsheet Documentation  Taken 4/16/2022 0909 by Hortensia Bass RN  Pain Management Interventions:   position adjusted   pillow support provided   see MAR  Goal: Readiness for Transition of Care  Outcome: Met     Problem: Hypertension Comorbidity  Goal: Blood Pressure in Desired Range  Outcome: Met     Problem: Obstructive Sleep Apnea Risk or Actual Comorbidity Management  Goal: Unobstructed Breathing During Sleep  Outcome: Met     Problem: Adjustment to Illness (Stroke, Ischemic/Transient Ischemic Attack)  Goal: Optimal Coping  Outcome: Met  Intervention: Support Psychosocial Response to Stroke  Recent Flowsheet Documentation  Taken 4/16/2022 0909 by Hortensia Bass RN  Supportive Measures: active listening utilized     Problem: Bowel Elimination Impaired (Stroke, Ischemic/Transient Ischemic Attack)  Goal: Effective Bowel Elimination  Outcome: Met     Problem: Cerebral Tissue Perfusion (Stroke, Ischemic/Transient Ischemic Attack)  Goal: Optimal Cerebral Tissue Perfusion  Outcome: Met     Problem: Cognitive Impairment (Stroke, Ischemic/Transient Ischemic Attack)  Goal: Optimal Cognitive Function  Outcome: Met  Intervention: Optimize Cognitive Function  Recent Flowsheet Documentation  Taken 4/16/2022 0909 by Hortensia Bass RN  Reorientation Measures: clock in view     Problem: Communication Impairment (Stroke, Ischemic/Transient Ischemic Attack)  Goal: Improved Communication Skills  Outcome: Met     Problem: Functional Ability Impaired (Stroke, Ischemic/Transient Ischemic Attack)  Goal: Optimal Functional Ability  Outcome: Met  Intervention: Optimize Functional Ability  Recent Flowsheet Documentation  Taken 4/16/2022 1040 by Hortensia Bass  RN  Activity Management: activity adjusted per tolerance  Taken 4/16/2022 0909 by Hortensia Bass RN  Activity Management: activity adjusted per tolerance  Taken 4/16/2022 0802 by Hortensia Bass RN  Activity Management: activity adjusted per tolerance     Problem: Respiratory Compromise (Stroke, Ischemic/Transient Ischemic Attack)  Goal: Effective Oxygenation and Ventilation  Outcome: Met  Intervention: Optimize Oxygenation and Ventilation  Recent Flowsheet Documentation  Taken 4/16/2022 0909 by Hortensia Bass RN  Head of Bed (HOB) Positioning: HOB at 20-30 degrees     Problem: Sensorimotor Impairment (Stroke, Ischemic/Transient Ischemic Attack)  Goal: Improved Sensorimotor Function  Outcome: Met  Intervention: Optimize Range of Motion, Motor Control and Function  Recent Flowsheet Documentation  Taken 4/16/2022 0909 by Hortensia Bass RN  Positioning/Transfer Devices:   pillows   in use  Intervention: Optimize Sensory and Perceptual Ability  Recent Flowsheet Documentation  Taken 4/16/2022 0909 by Hortensia Bass RN  Pressure Reduction Techniques:   frequent weight shift encouraged   heels elevated off bed   weight shift assistance provided  Pressure Reduction Devices:   alternating pressure pump (ADD)   heel offloading device utilized     Problem: Swallowing Impairment (Stroke, Ischemic/Transient Ischemic Attack)  Goal: Optimal Eating and Swallowing without Aspiration  Outcome: Met     Problem: Urinary Elimination Impaired (Stroke, Ischemic/Transient Ischemic Attack)  Goal: Effective Urinary Elimination  Outcome: Met     Problem: Fall Injury Risk  Goal: Absence of Fall and Fall-Related Injury  Outcome: Met  Intervention: Promote Injury-Free Environment  Recent Flowsheet Documentation  Taken 4/16/2022 1040 by Hortensia Bass RN  Safety Promotion/Fall Prevention:   activity supervised   assistive device/personal items within reach   clutter free environment maintained   fall prevention program  maintained   nonskid shoes/slippers when out of bed   room organization consistent   safety round/check completed  Taken 4/16/2022 0909 by Hortensia Bass RN  Safety Promotion/Fall Prevention:   activity supervised   assistive device/personal items within reach   clutter free environment maintained   fall prevention program maintained   nonskid shoes/slippers when out of bed   room organization consistent   safety round/check completed  Taken 4/16/2022 0802 by Hortensia Bass RN  Safety Promotion/Fall Prevention:   assistive device/personal items within reach   activity supervised   clutter free environment maintained   fall prevention program maintained   nonskid shoes/slippers when out of bed   room organization consistent   safety round/check completed     Problem: Skin Injury Risk Increased  Goal: Skin Health and Integrity  Outcome: Met  Intervention: Optimize Skin Protection  Recent Flowsheet Documentation  Taken 4/16/2022 0909 by Hortensia Bass RN  Pressure Reduction Techniques:   frequent weight shift encouraged   heels elevated off bed   weight shift assistance provided  Head of Bed (HOB) Positioning: HOB at 20-30 degrees  Pressure Reduction Devices:   alternating pressure pump (ADD)   heel offloading device utilized   Goal Outcome Evaluation:  Plan of Care Reviewed With: patient        Progress: improving  Outcome Evaluation: vss, no falls, c/o pain, prn tramadol, nih- 3, zio patch, dc to rehab today, continue to monitor

## 2022-04-16 NOTE — PLAN OF CARE
Goal Outcome Evaluation:  Plan of Care Reviewed With: patient        Progress: no change  Outcome Evaluation: VSS. No new complaints overnight. No neuro changes. Plan is for discharge to [facility].      Problem: Swallowing Impairment (Stroke, Ischemic/Transient Ischemic Attack)  Goal: Optimal Eating and Swallowing without Aspiration  Outcome: Ongoing, Progressing     Problem: Fall Injury Risk  Goal: Absence of Fall and Fall-Related Injury  Outcome: Ongoing, Progressing  Intervention: Identify and Manage Contributors  Recent Flowsheet Documentation  Taken 4/16/2022 0030 by Giulia Marsh RN  Medication Review/Management: medications reviewed  Taken 4/15/2022 2004 by Giulia Marsh RN  Medication Review/Management: medications reviewed  Intervention: Promote Injury-Free Environment  Recent Flowsheet Documentation  Taken 4/16/2022 0430 by Giulia Marsh RN  Safety Promotion/Fall Prevention:   activity supervised   assistive device/personal items within reach   clutter free environment maintained   fall prevention program maintained   nonskid shoes/slippers when out of bed   room organization consistent   safety round/check completed  Taken 4/16/2022 0246 by Giulia Marsh RN  Safety Promotion/Fall Prevention:   activity supervised   assistive device/personal items within reach   clutter free environment maintained   fall prevention program maintained   nonskid shoes/slippers when out of bed   room organization consistent   safety round/check completed  Taken 4/16/2022 0030 by Giulia Marsh RN  Safety Promotion/Fall Prevention:   activity supervised   assistive device/personal items within reach   clutter free environment maintained   fall prevention program maintained   nonskid shoes/slippers when out of bed   room organization consistent   safety round/check completed  Taken 4/15/2022 2214 by Giulia Marsh RN  Safety Promotion/Fall Prevention:   activity supervised   assistive device/personal items within reach    clutter free environment maintained   fall prevention program maintained   nonskid shoes/slippers when out of bed   room organization consistent   safety round/check completed  Taken 4/15/2022 2004 by Giulia Marsh RN  Safety Promotion/Fall Prevention:   activity supervised   assistive device/personal items within reach   clutter free environment maintained   fall prevention program maintained   nonskid shoes/slippers when out of bed   room organization consistent   safety round/check completed

## 2022-04-26 ENCOUNTER — TELEPHONE (OUTPATIENT)
Dept: NEUROLOGY | Facility: CLINIC | Age: 82
End: 2022-04-26

## 2022-04-26 NOTE — TELEPHONE ENCOUNTER
SHE HAS A FU APPT WITH [facility] IN July AND DOES NOT WANT TO WAIT UNTIL THEN TO DISCUSS WITH [facility]    PATIENT'S  STATES THAT SHE DID NOT HAVE A SEIZURE AND KEPPRA IS MAKING HER TO LETHARGIC AND WHICH MAKES IT HARD TO PARTICIPATE IN HER THERAPIES WHICH IS IMPEDING HER PROGRESS    SHE WAS DISCHARGED ON KEPPRA 500 MG BID

## 2022-04-26 NOTE — TELEPHONE ENCOUNTER
Patient  called in regarding the Keppra that the patient was prescribed and is still currently on since hospital visit for stroke. Patient  would like to know if we can stop Keppra as he feels this is impeding her [facility] and doing things. He says the medication is making her very tired. Please advise and call  back at [phone]      ADDENED TO SEND TO [facility]

## 2022-04-27 NOTE — TELEPHONE ENCOUNTER
Called  and informed of recommendation by lyndon:  Decrease Keppra to 250 am and 500 pm x 1 week. If well tolerated, can decrease to 250 mg BID x 1 week and report.  was in agreement with plan.  mentioned pt was taking both gabapentin and keppra.     I called [facility] ([phone]) and confirmed with [facility] pt is taking Gabapentin 400 mg TID and Keppra 500 mg BID.

## 2022-04-27 NOTE — TELEPHONE ENCOUNTER
Patient's  called and was very upset that no one has called him back.  Patient is on medicine ( HIS WORDS), that is not helping with rehab.  Patient's  would like patient off the KEPPRA to help with rehab    Mr. Carr states that she did not have a stroke and the hospital was not right for putting her on that medication.  Mr. Carr wants a call back asap.    Gave message to Rosa Freire, Clinical Coordinator

## 2022-05-03 ENCOUNTER — TRANSCRIBE ORDERS (OUTPATIENT)
Dept: HOME HEALTH SERVICES | Facility: HOME HEALTHCARE | Age: 82
End: 2022-05-03

## 2022-05-03 ENCOUNTER — HOME HEALTH ADMISSION (OUTPATIENT)
Dept: HOME HEALTH SERVICES | Facility: HOME HEALTHCARE | Age: 82
End: 2022-05-03

## 2022-05-03 DIAGNOSIS — I63.9 CEREBELLAR INFARCTION: Primary | ICD-10-CM

## 2022-05-04 ENCOUNTER — HOME CARE VISIT (OUTPATIENT)
Dept: HOME HEALTH SERVICES | Facility: HOME HEALTHCARE | Age: 82
End: 2022-05-04

## 2022-05-04 VITALS
BODY MASS INDEX: 30.22 KG/M2 | TEMPERATURE: 97.9 F | WEIGHT: 177 LBS | OXYGEN SATURATION: 95 % | SYSTOLIC BLOOD PRESSURE: 116 MMHG | HEIGHT: 64 IN | HEART RATE: 56 BPM | RESPIRATION RATE: 16 BRPM | DIASTOLIC BLOOD PRESSURE: 64 MMHG

## 2022-05-04 DIAGNOSIS — R47.01 APHASIA: Primary | ICD-10-CM

## 2022-05-04 PROCEDURE — G0299 HHS/HOSPICE OF RN EA 15 MIN: HCPCS

## 2022-05-05 ENCOUNTER — HOME CARE VISIT (OUTPATIENT)
Dept: HOME HEALTH SERVICES | Facility: HOME HEALTHCARE | Age: 82
End: 2022-05-05

## 2022-05-05 ENCOUNTER — TELEPHONE (OUTPATIENT)
Dept: FAMILY MEDICINE CLINIC | Facility: CLINIC | Age: 82
End: 2022-05-05

## 2022-05-05 VITALS
OXYGEN SATURATION: 96 % | DIASTOLIC BLOOD PRESSURE: 76 MMHG | TEMPERATURE: 97.2 F | HEART RATE: 66 BPM | RESPIRATION RATE: 18 BRPM | SYSTOLIC BLOOD PRESSURE: 126 MMHG

## 2022-05-05 PROCEDURE — G0151 HHCP-SERV OF PT,EA 15 MIN: HCPCS

## 2022-05-05 NOTE — TELEPHONE ENCOUNTER
[facility] REQUEST ORDER FOR SPEECH THERAPY    CALL BACK #: [phone]

## 2022-05-05 NOTE — HOME HEALTH
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 and antithrombolytic therapy/TPA, hypertension, hyperlipidemia, obstructive sleep apnea (100% compliant with treatment), prior breast CA, lumbar stenosis, seizure disorder, former smoker, Right shoulder replacement 10 years ago but has been painful now    PRIOR LEVEL OF FUNCTION: independent with rollator for mobility; has medical alert to use as needed    PLAN FOR NEXT VISIT: transfer training safety in out bed and on off chairs, gait training with rollator increasing safety and endurance as tolerated, review/reinstruct sitting/standing HEP exercises with upgrades for further strengthening/add balance activities in standing, and patient spouse education for safety, pain management and mobility assistance"

## 2022-05-06 ENCOUNTER — HOME CARE VISIT (OUTPATIENT)
Dept: HOME HEALTH SERVICES | Facility: HOME HEALTHCARE | Age: 82
End: 2022-05-06

## 2022-05-06 ENCOUNTER — TELEPHONE (OUTPATIENT)
Dept: NEUROLOGY | Facility: CLINIC | Age: 82
End: 2022-05-06

## 2022-05-06 VITALS
OXYGEN SATURATION: 96 % | HEART RATE: 61 BPM | SYSTOLIC BLOOD PRESSURE: 120 MMHG | RESPIRATION RATE: 18 BRPM | TEMPERATURE: 96.9 F | DIASTOLIC BLOOD PRESSURE: 60 MMHG

## 2022-05-06 PROCEDURE — G0299 HHS/HOSPICE OF RN EA 15 MIN: HCPCS

## 2022-05-06 PROCEDURE — G0152 HHCP-SERV OF OT,EA 15 MIN: HCPCS

## 2022-05-06 NOTE — TELEPHONE ENCOUNTER
I s/w patient's spouse via telephone today regarding his previous concerns of patient's lethargy on Keppra.  She is now home getting home health PT and soon to get ST and she is doing well. Lethargy has improved. She continues on keppra 500 mg BID and gabapentin 400 mg TID. Per spouse she is tolerating well and no adverse medication effects, no signs/symptoms of new or recurrent stroke. Will continue the same for now, patient will follow-up as scheduled in July. All questions answered, patient/spouse will call with any other worries or concerns. - LQ

## 2022-05-08 DIAGNOSIS — G89.29 CHRONIC BILATERAL LOW BACK PAIN WITH RIGHT-SIDED SCIATICA: ICD-10-CM

## 2022-05-08 DIAGNOSIS — M54.41 CHRONIC BILATERAL LOW BACK PAIN WITH RIGHT-SIDED SCIATICA: ICD-10-CM

## 2022-05-09 VITALS
SYSTOLIC BLOOD PRESSURE: 140 MMHG | HEART RATE: 55 BPM | OXYGEN SATURATION: 97 % | TEMPERATURE: 97.6 F | DIASTOLIC BLOOD PRESSURE: 84 MMHG

## 2022-05-09 LAB
MAXIMAL PREDICTED HEART RATE: 137 BPM
STRESS TARGET HR: 116 BPM

## 2022-05-09 PROCEDURE — 93248 EXT ECG>7D<15D REV&INTERPJ: CPT | Performed by: INTERNAL MEDICINE

## 2022-05-09 NOTE — HOME HEALTH
Reason for Referral:  Patient hospitalized [facility] 4-11 to 4-16-22 and to rehab at [facility] due to acute CVA, right hemiparesis    Medical History:COPD, HTN, HLD, Seizure disorder, Multiple prior strokes, Left temporal lobe disease, Breast Ca, Lumbar stenosis, Bilateral shoulder replacements    Subjective: My right shoulder is hurting more now    Home Environment: Patient lives with supportive     PLOF: Independent with adl's, kitchen tasks, transfers, mobility, used rollator at times    Medical Necessity: Patient requires skilled occupational therapy for remediation of deficits to improve safety in home and improve self care, functional transfers, mobility, strength, endurance, DME/adaptive equipment    Plan for Next Visit:  Shower transfer, bathing
06-Jul-2021 03:14

## 2022-05-09 NOTE — HOME HEALTH
Reviewed all meds with patient, appears all are in home and patient is taking correctly.  Helped spouse update their in home med-list.  Checked patient neuro status and it appears she is really only having problems with expressive aphasia.  CP assessment WNL.

## 2022-05-09 NOTE — HOME HEALTH
Patient referred to [facility] following hospitalization (4/11-4/16) and rehab stay at [facility] (4/16-5/3) for acute CVA, COPD, HTN, HLD< seizure d/o.  She has had prior strokes, and due to this last one she has some weakness to her right side upper and lower.  She used to use a rollator just first thing in the morning, overnight and for long distances.  Now she is using rollator for all transfers and ambulation.  He  is PCG and very invovled in care.  Supervision needed with transfers and ADL's.  Multiple new meds: Lipitor, artificial tears, Duo-nebs, Keppra, Miralax; changed meds: increased aspirin, decreased gabapentin and melatonin.  She has pain 0-10/10 to her right shoulder which she reports has been worse since her CVA.  She is on a regular diet with thin liquids.

## 2022-05-10 ENCOUNTER — HOME CARE VISIT (OUTPATIENT)
Dept: HOME HEALTH SERVICES | Facility: HOME HEALTHCARE | Age: 82
End: 2022-05-10

## 2022-05-10 VITALS
DIASTOLIC BLOOD PRESSURE: 66 MMHG | RESPIRATION RATE: 18 BRPM | HEART RATE: 59 BPM | OXYGEN SATURATION: 95 % | TEMPERATURE: 97.6 F | SYSTOLIC BLOOD PRESSURE: 128 MMHG

## 2022-05-10 VITALS
HEART RATE: 62 BPM | DIASTOLIC BLOOD PRESSURE: 60 MMHG | SYSTOLIC BLOOD PRESSURE: 104 MMHG | OXYGEN SATURATION: 96 % | TEMPERATURE: 96 F

## 2022-05-10 PROCEDURE — G0153 HHCP-SVS OF S/L PATH,EA 15MN: HCPCS

## 2022-05-10 PROCEDURE — G0299 HHS/HOSPICE OF RN EA 15 MIN: HCPCS

## 2022-05-10 RX ORDER — TRAMADOL HYDROCHLORIDE 50 MG/1
TABLET ORAL
Qty: 30 TABLET | Refills: 5 | Status: SHIPPED | OUTPATIENT
Start: 2022-05-10 | End: 2022-05-18 | Stop reason: SDUPTHER

## 2022-05-11 ENCOUNTER — HOME CARE VISIT (OUTPATIENT)
Dept: HOME HEALTH SERVICES | Facility: HOME HEALTHCARE | Age: 82
End: 2022-05-11

## 2022-05-11 PROCEDURE — G0157 HHC PT ASSISTANT EA 15: HCPCS

## 2022-05-11 PROCEDURE — G0152 HHCP-SERV OF OT,EA 15 MIN: HCPCS

## 2022-05-11 NOTE — HOME HEALTH
SPEECH LANGUAGE SWALLOWING EVALUATION    REASON FOR REFERRAL: Skilled Speech Therapy referral post hospitalization for CVA. Speech Therapy to evaluate and establish a plan of care.   PRIMARY DIAGNOSIS: Dysarthria,   Dysphagia   SECONDARY DIAGNOSIS: Hemiplegia and hemiparesis following cerebral infarction affecting right dominant side   PERTINENT HISTORY: Former smoker, Gastroesophageal reflux disease, Migraines, Generalized weakness, Acute CVA (cerebrovascular accident), Dependence on supplemental oxygen.  PRIOR LEVEL OF FUNCTION: Patient living in her patio home with her . Patient was independent in performing her ADLs.    RESPIRATORY STATUS: On oxygen    HEARING  Mild hearing deficits, reported by patient. Unaided  VISION  Macular degeneration. Loss of central vision in both eyes. (Prior to CVA)  HANDEDNESS  Right hand                                                       ORIENTATION Oriented to current month and year         FUNCTIONAL OBJECTIVE FINDINGS STATEMENT OF MEDICAL NECESSITY: Dysarthria therapy to increase overall intelligibility of speech to improve communication with family. Dysphagia therapy to restore swallowing function for intake of regular food textures and thin liquids without risk of aspiration/aspiration pneumonia. Speech Therapy interventions are needed due recent decline in swallowing function and intelligibility of speech impacting her ability to communicate and to safely intake nutrition and hydration.

## 2022-05-11 NOTE — HOME HEALTH
SUBJECTIVE: No new concerns at this time. Spouse present for training.     Fall since last visit: no    Medication changes: no

## 2022-05-12 ENCOUNTER — HOME CARE VISIT (OUTPATIENT)
Dept: HOME HEALTH SERVICES | Facility: HOME HEALTHCARE | Age: 82
End: 2022-05-12

## 2022-05-12 VITALS
HEART RATE: 55 BPM | OXYGEN SATURATION: 96 % | DIASTOLIC BLOOD PRESSURE: 64 MMHG | SYSTOLIC BLOOD PRESSURE: 124 MMHG | TEMPERATURE: 98 F

## 2022-05-12 PROCEDURE — G0153 HHCP-SVS OF S/L PATH,EA 15MN: HCPCS

## 2022-05-12 PROCEDURE — G0300 HHS/HOSPICE OF LPN EA 15 MIN: HCPCS

## 2022-05-13 ENCOUNTER — HOME CARE VISIT (OUTPATIENT)
Dept: HOME HEALTH SERVICES | Facility: HOME HEALTHCARE | Age: 82
End: 2022-05-13

## 2022-05-13 VITALS
OXYGEN SATURATION: 100 % | DIASTOLIC BLOOD PRESSURE: 70 MMHG | HEART RATE: 60 BPM | RESPIRATION RATE: 18 BRPM | TEMPERATURE: 97.5 F | SYSTOLIC BLOOD PRESSURE: 126 MMHG

## 2022-05-13 VITALS
TEMPERATURE: 96.9 F | SYSTOLIC BLOOD PRESSURE: 122 MMHG | HEART RATE: 64 BPM | DIASTOLIC BLOOD PRESSURE: 72 MMHG | RESPIRATION RATE: 18 BRPM | OXYGEN SATURATION: 98 %

## 2022-05-13 PROCEDURE — G0151 HHCP-SERV OF PT,EA 15 MIN: HCPCS

## 2022-05-13 PROCEDURE — G0152 HHCP-SERV OF OT,EA 15 MIN: HCPCS

## 2022-05-13 NOTE — HOME HEALTH
"Subjective: \"That walk was fatiguing but overall I'm doing better everyday.\"    Falls reported: none    Medication changes: none    Plan for next visit: Continue gait endurance and safety training with rollator and assess without device for short distances, transfer training reassess, reinstruct HEP with upgrades for further balance activities, and patient spouse education as needed"

## 2022-05-13 NOTE — HOME HEALTH
Patient greeted therapist and went to the kitchen table for the ST session.     PLAN FOR NEXT VISIT    MEDICAL NECESSITY FOR ONGOING SKILLED THERAPY: Dysarthria therapy to increase overall intelligibility of speech to improve communication with family. Dysphagia therapy to restore swallowing function for intake of regular food textures and thin liquids without risk of aspiration/aspiration pneumonia. Speech Therapy interventions are needed due recent decline in swallowing function and intelligibility of speech impacting her ability to communicate and to safely intake nutrition and hydration.  SPECIFIC INTERVENTIONS AND GOALS TO ADDRESS ON NEXT VISIT:  SLP will instruct in use of Dysarthria strategies to increase intelligibility of speech  Dysarthria exercies/Oral motor exercises/Speech Motor exercises  FREQUENCY AND DURATION: 2w3  ANY OTHER FOLLOW UP NEEDED: None  REASSESSMENT DUE DATE: 5/28/22

## 2022-05-16 VITALS
DIASTOLIC BLOOD PRESSURE: 64 MMHG | OXYGEN SATURATION: 95 % | HEART RATE: 50 BPM | SYSTOLIC BLOOD PRESSURE: 118 MMHG | TEMPERATURE: 97.6 F

## 2022-05-16 VITALS
DIASTOLIC BLOOD PRESSURE: 74 MMHG | TEMPERATURE: 97.6 F | SYSTOLIC BLOOD PRESSURE: 132 MMHG | OXYGEN SATURATION: 94 % | HEART RATE: 55 BPM

## 2022-05-17 ENCOUNTER — HOME CARE VISIT (OUTPATIENT)
Dept: HOME HEALTH SERVICES | Facility: HOME HEALTHCARE | Age: 82
End: 2022-05-17

## 2022-05-17 VITALS
RESPIRATION RATE: 18 BRPM | OXYGEN SATURATION: 97 % | SYSTOLIC BLOOD PRESSURE: 120 MMHG | DIASTOLIC BLOOD PRESSURE: 80 MMHG | TEMPERATURE: 97.5 F | HEART RATE: 60 BPM

## 2022-05-17 VITALS
RESPIRATION RATE: 18 BRPM | TEMPERATURE: 97.5 F | HEART RATE: 64 BPM | OXYGEN SATURATION: 96 % | SYSTOLIC BLOOD PRESSURE: 120 MMHG | DIASTOLIC BLOOD PRESSURE: 70 MMHG

## 2022-05-17 PROCEDURE — G0300 HHS/HOSPICE OF LPN EA 15 MIN: HCPCS

## 2022-05-17 PROCEDURE — G0180 MD CERTIFICATION HHA PATIENT: HCPCS | Performed by: FAMILY MEDICINE

## 2022-05-17 PROCEDURE — G0153 HHCP-SVS OF S/L PATH,EA 15MN: HCPCS

## 2022-05-17 PROCEDURE — G0151 HHCP-SERV OF PT,EA 15 MIN: HCPCS

## 2022-05-17 NOTE — HOME HEALTH
"Subjective: \"I don't have any pain today but I'm tired and feeling a little down today.\"    Falls reported: none    Medication changes: none    Plan for next visit: Continue gait training with rollator reassessing steps out front door and walk up inclined sidewalk to get to car to leave home, reinstruct HEP and upgrade with further balance activities as tolerated, and patient spouse education as needed"

## 2022-05-18 ENCOUNTER — HOME CARE VISIT (OUTPATIENT)
Dept: HOME HEALTH SERVICES | Facility: HOME HEALTHCARE | Age: 82
End: 2022-05-18

## 2022-05-18 ENCOUNTER — OFFICE VISIT (OUTPATIENT)
Dept: FAMILY MEDICINE CLINIC | Facility: CLINIC | Age: 82
End: 2022-05-18

## 2022-05-18 ENCOUNTER — TELEPHONE (OUTPATIENT)
Dept: NEUROLOGY | Facility: CLINIC | Age: 82
End: 2022-05-18

## 2022-05-18 VITALS
SYSTOLIC BLOOD PRESSURE: 114 MMHG | BODY MASS INDEX: 31.07 KG/M2 | OXYGEN SATURATION: 99 % | HEART RATE: 65 BPM | RESPIRATION RATE: 18 BRPM | DIASTOLIC BLOOD PRESSURE: 68 MMHG | WEIGHT: 181 LBS

## 2022-05-18 DIAGNOSIS — I10 PRIMARY HYPERTENSION: ICD-10-CM

## 2022-05-18 DIAGNOSIS — E78.2 MIXED HYPERLIPIDEMIA: ICD-10-CM

## 2022-05-18 DIAGNOSIS — G89.29 CHRONIC BILATERAL LOW BACK PAIN WITH RIGHT-SIDED SCIATICA: ICD-10-CM

## 2022-05-18 DIAGNOSIS — F34.1 DYSTHYMIA: ICD-10-CM

## 2022-05-18 DIAGNOSIS — R53.1 GENERALIZED WEAKNESS: ICD-10-CM

## 2022-05-18 DIAGNOSIS — I63.412 CEREBROVASCULAR ACCIDENT (CVA) DUE TO EMBOLISM OF LEFT MIDDLE CEREBRAL ARTERY: ICD-10-CM

## 2022-05-18 DIAGNOSIS — E03.9 ACQUIRED HYPOTHYROIDISM: ICD-10-CM

## 2022-05-18 DIAGNOSIS — E03.9 HYPOTHYROIDISM, UNSPECIFIED TYPE: Primary | ICD-10-CM

## 2022-05-18 DIAGNOSIS — M54.41 CHRONIC BILATERAL LOW BACK PAIN WITH RIGHT-SIDED SCIATICA: ICD-10-CM

## 2022-05-18 DIAGNOSIS — G40.109: ICD-10-CM

## 2022-05-18 PROCEDURE — G0152 HHCP-SERV OF OT,EA 15 MIN: HCPCS

## 2022-05-18 PROCEDURE — 99215 OFFICE O/P EST HI 40 MIN: CPT | Performed by: FAMILY MEDICINE

## 2022-05-18 RX ORDER — GABAPENTIN 400 MG/1
400 CAPSULE ORAL 3 TIMES DAILY
Qty: 180 CAPSULE | Refills: 1 | Status: SHIPPED | OUTPATIENT
Start: 2022-05-18 | End: 2022-09-07

## 2022-05-18 RX ORDER — CYANOCOBALAMIN (VITAMIN B-12) 500 MCG
1000 TABLET ORAL DAILY
COMMUNITY

## 2022-05-18 RX ORDER — LEVOTHYROXINE SODIUM 0.15 MG/1
150 TABLET ORAL DAILY
COMMUNITY
End: 2022-08-11 | Stop reason: SDUPTHER

## 2022-05-18 RX ORDER — TRAMADOL HYDROCHLORIDE 50 MG/1
50 TABLET ORAL EVERY 6 HOURS PRN
Qty: 90 TABLET | Refills: 2 | Status: SHIPPED | OUTPATIENT
Start: 2022-05-18 | End: 2022-08-10 | Stop reason: SDUPTHER

## 2022-05-18 NOTE — TELEPHONE ENCOUNTER
Attempted to reach patient/spouse to discuss Holter results.  Patient's , Vitaly, states that she cannot speak at this time and that he is preparing to do a Zoom meeting and requests a call back later.

## 2022-05-18 NOTE — PROGRESS NOTES
Subjective   Aveilna Carr is a 82 y.o. female.   Cerebrovascular Accident    History of Present Illness   Avelina is here accompanied by her .  She has given permission to speak about her health care issues in front of her .  She states she is doing pretty well but needs help to shower, prepare meals and take her medications.  She is ambulating with a walker.  On 4/11/2022 and presented to [facility] ER with complaints of generalized weakness, slurred speech, word finding difficulty with associated confusion for which she was admitted to the hospital.  Initial CT of the head showed a chronic infarct in the anterior aspect of the left temporal lobe and medial aspect of the right parietal lobe with extensive small vessel disease without any acute mass and/or stroke noted.  It did not appear to be associated with seizure.  The MRI of the brain revealed an acute infarction in the left aspect of the naeem etiology was felt to be secondary to uncontrolled blood pressure.  She was started on a higher dose of aspirin in the hospital but is now on Plavix.  Statin therapy was intensified to Lipitor 40 mg daily.  She was discharged to rehab with a Zio patch.  She was also given a neurology follow-up appointment when she was outpatient.  She was discharged to [facility] on 4/16/22 for help with speech and swallowing.  She also had an extensive weakness in both legs and in her right arm.  She was discharged home on 5/3/2022 and followed by [facility].  She is still getting physical therapy from [facility].  Today,  she is alert and cognitively intact.  She is in significant amount of pain which is not a new problem from a past history of multiple joint replacements that have been present for quite a while.  She has been taking tramadol at least once a day sometimes 2-3 times a day based on her pain and she is taking gabapentin to help with some of the nerve pain.  She is asking for refills on his  medications today.  She also has been taking levothyroxine but her dose dose was changed while hospitalized and she seems to be doing well on that dose.  She has chronic hypertension and she is doing well on atenolol 25 mg 2 times daily.  She has a history of chronic lymphedema in her legs and is taking Lasix 20 mg twice a day which I am sure is helping her blood pressure.  She has chronic depression and anxiety and is taking Lexapro 20 mg a day and she feels like it helps make a difference in how she is handling her current situation.  She has a history of breast cancer in the right breast estrogen receptor positive and she is on Femara 2.5 mg a day.  She has been followed until recently by oncology and will pick that up that support when she is able to ambulate better.  She has a chronic seizure disorder and she is on Keppra and doing well.  She feels she is working hard to move forward and she feels like it is taking a long time to get better.  The following portions of the patient's history were reviewed and updated as appropriate: allergies, current medications, past family history, past medical history, past social history, past surgical history and problem list.    Review of Systems   Constitutional: Positive for fatigue.   Eyes: Negative.    Respiratory: Negative.    Cardiovascular: Negative.    Gastrointestinal: Negative.    Genitourinary: Negative.    Musculoskeletal: Positive for arthralgias, back pain and myalgias.   Neurological: Positive for speech difficulty and weakness.   Psychiatric/Behavioral: Negative.        Objective   Physical Exam  Vitals and nursing note reviewed.   Constitutional:       Appearance: She is well-developed.   Eyes:      Pupils: Pupils are equal, round, and reactive to light.   Cardiovascular:      Rate and Rhythm: Normal rate and regular rhythm.      Heart sounds: Normal heart sounds.   Pulmonary:      Effort: Pulmonary effort is normal.      Breath sounds: Normal breath  sounds.   Musculoskeletal:      Comments: Able to stand up from chair and grab walker.  Only walks with assistance.  No edema noted in legs.   Skin:     General: Skin is warm and dry.   Neurological:      Mental Status: She is alert and oriented to person, place, and time.      Motor: Weakness present.      Comments: Mildly dysarthric speech but not in communications are coordinated and clear.  No signs of dementia but lots of effort to communicate.   Psychiatric:         Mood and Affect: Mood normal.         Behavior: Behavior normal.         Thought Content: Thought content normal.         Judgment: Judgment normal.           Assessment & Plan   Problem List Items Addressed This Visit        Cardiac and Vasculature    HTN (hypertension)    Overview     Well controlled on current medication, will refill medication today and as needed.  She is a falls risk so care has to be taken cannot let her systolic blood pressure get too low.  She is taking atenolol 25 mg twice a day and she is on Lasix to manage lower extremity edema and is taking 20 mg a day.           HLD (hyperlipidemia)    Overview     She is now taking Lipitor 40 mg a day post cerebrovascular accident on 4/11/2022.  She is tolerating this medication well and will follow up with me for labs at her next scheduled visit.              Endocrine and Metabolic    Hypothyroid - Primary    Overview     Well managed on current dose of thyroid replacement. TSH reviewed and on chart.Patient will return to the office in 6 month for regularly scheduled review of treatment.   Well refill medications as needed.           Relevant Medications    levothyroxine (SYNTHROID, LEVOTHROID) 150 MCG tablet       Mental Health    Depression    Overview     She is well managed on current medication, Lexapro 20 mg daily, and reports no signs or symptoms of self harm, suicidal ideation. This medication helps with daily life and relationships. Will refill as needed and advised 6 month  follow up.              Musculoskeletal and Injuries    Chronic bilateral low back pain with right-sided sciatica    Overview     Pain in her back is managed as best we can with gabapentin.  She is taking 800 mg 3 times a day.           Relevant Medications    traMADol (ULTRAM) 50 MG tablet       Neuro    Cerebrovascular accident (CVA) ([facility])    Overview     She has a history of stroke and is on Plavix to help prevent additional events.           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  She is taking gabapentin 800 mg 3 times a day and that is helping keep her seizures under good control.           Relevant Medications    gabapentin (NEURONTIN) 400 MG capsule       Symptoms and Signs    Generalized weakness      I spent over 75 minutes reviewing patient chart and current medications and changes that need to be made and 30 minutes of that time was spent spent  in face-to-face consultation with the patient and her .  This time was spent in evaluating Avelina's current status and mobility as well as swallowing and speech,  coordinating medications, correcting chart and ordering medications that required refills or new prescriptions in consultation with Avelina.        I will arrange for her to get lab work done in 2 weeks.  Return in about 3 months (around 8/18/2022) for Recheck.

## 2022-05-19 ENCOUNTER — HOME CARE VISIT (OUTPATIENT)
Dept: HOME HEALTH SERVICES | Facility: HOME HEALTHCARE | Age: 82
End: 2022-05-19

## 2022-05-19 VITALS
RESPIRATION RATE: 18 BRPM | OXYGEN SATURATION: 93 % | DIASTOLIC BLOOD PRESSURE: 66 MMHG | TEMPERATURE: 97.2 F | HEART RATE: 58 BPM | SYSTOLIC BLOOD PRESSURE: 118 MMHG

## 2022-05-19 VITALS
HEART RATE: 51 BPM | SYSTOLIC BLOOD PRESSURE: 110 MMHG | OXYGEN SATURATION: 96 % | TEMPERATURE: 97.7 F | RESPIRATION RATE: 18 BRPM | DIASTOLIC BLOOD PRESSURE: 64 MMHG

## 2022-05-19 VITALS
TEMPERATURE: 97.4 F | OXYGEN SATURATION: 97 % | SYSTOLIC BLOOD PRESSURE: 130 MMHG | DIASTOLIC BLOOD PRESSURE: 70 MMHG | RESPIRATION RATE: 18 BRPM | HEART RATE: 83 BPM

## 2022-05-19 DIAGNOSIS — Z79.899 HIGH RISK MEDICATION USE: ICD-10-CM

## 2022-05-19 DIAGNOSIS — E78.2 MIXED HYPERLIPIDEMIA: Primary | ICD-10-CM

## 2022-05-19 DIAGNOSIS — E03.9 HYPOTHYROIDISM, UNSPECIFIED TYPE: ICD-10-CM

## 2022-05-19 PROCEDURE — G0153 HHCP-SVS OF S/L PATH,EA 15MN: HCPCS

## 2022-05-19 PROCEDURE — G0151 HHCP-SERV OF PT,EA 15 MIN: HCPCS

## 2022-05-19 PROCEDURE — G0300 HHS/HOSPICE OF LPN EA 15 MIN: HCPCS

## 2022-05-19 NOTE — HOME HEALTH
Patient has progress well.  Cheif complaints at times patient easily fatigue. Nurse instructed to rest periodiacally through the day  Neuro WNL. Patient eating well w/out swalling difficulty  Last visit mention signing discharge patient was an error. Patient has few visit left

## 2022-05-19 NOTE — HOME HEALTH
"Subjective: \"My knees have felt weak at times and want to buckle at times. They are old knee replacements, so I need to be careful.\"    Falls reported: none    Medication changes: none    Plan for next visit: Continue HEP training for strengthening and balance in standing but watching knees for increased weakness/buckling per patient, gait training with rollator thru home and reassess steps out of home using aerobic step to decrease ht of porch step and gait up down inclined sidewalk to get to car, and patient spouse education as needed"

## 2022-05-20 ENCOUNTER — HOME CARE VISIT (OUTPATIENT)
Dept: HOME HEALTH SERVICES | Facility: HOME HEALTHCARE | Age: 82
End: 2022-05-20

## 2022-05-20 VITALS
TEMPERATURE: 97.9 F | RESPIRATION RATE: 18 BRPM | SYSTOLIC BLOOD PRESSURE: 118 MMHG | OXYGEN SATURATION: 93 % | HEART RATE: 58 BPM | DIASTOLIC BLOOD PRESSURE: 66 MMHG

## 2022-05-20 VITALS — SYSTOLIC BLOOD PRESSURE: 108 MMHG | HEART RATE: 56 BPM | DIASTOLIC BLOOD PRESSURE: 70 MMHG | OXYGEN SATURATION: 96 %

## 2022-05-20 PROCEDURE — G0152 HHCP-SERV OF OT,EA 15 MIN: HCPCS

## 2022-05-20 NOTE — HOME HEALTH
Patient reported being sleepy, due to taking a Melatonin tab by mistake. She was cooperative for the session.         PLAN FOR NEXT VISIT   MEDICAL NECESSITY FOR ONGOING SKILLED THERAPY: Dysarthria therapy to increase overall intelligibility of speech to improve communication with family. Dysphagia therapy to restore swallowing function for intake of regular food textures and thin liquids without risk of aspiration/aspiration pneumonia. Speech Therapy interventions are needed due recent decline in swallowing function and intelligibility of speech impacting her ability to communicate and to safely intake nutrition and hydration.   SPECIFIC INTERVENTIONS AND GOALS TO ADDRESS ON NEXT VISIT:   SLP will instruct in use of Dysarthria strategies to increase intelligibility of speech   Dysarthria exercies/Oral motor exercises/Speech Motor exercises   FREQUENCY AND DURATION: 2w3   ANY OTHER FOLLOW UP NEEDED: None   REASSESSMENT DUE DATE: 5/28/22

## 2022-05-21 NOTE — HOME HEALTH
Spoke with patient and patient 's  both believe patient has plateau i her condition.  Patient has met all skilled nurses goals and ready for nursing discharge. therapy still in progress

## 2022-05-22 VITALS
HEART RATE: 55 BPM | DIASTOLIC BLOOD PRESSURE: 80 MMHG | TEMPERATURE: 97.6 F | OXYGEN SATURATION: 98 % | SYSTOLIC BLOOD PRESSURE: 118 MMHG

## 2022-05-23 ENCOUNTER — HOSPITAL ENCOUNTER (OUTPATIENT)
Dept: GENERAL RADIOLOGY | Facility: HOSPITAL | Age: 82
Discharge: HOME | End: 2022-05-23
Admitting: FAMILY MEDICINE

## 2022-05-23 ENCOUNTER — TELEPHONE (OUTPATIENT)
Dept: FAMILY MEDICINE CLINIC | Facility: CLINIC | Age: 82
End: 2022-05-23

## 2022-05-23 DIAGNOSIS — G40.109: ICD-10-CM

## 2022-05-23 DIAGNOSIS — R47.01 APHASIA: ICD-10-CM

## 2022-05-23 PROCEDURE — A9270 NON-COVERED ITEM OR SERVICE: HCPCS | Performed by: FAMILY MEDICINE

## 2022-05-23 PROCEDURE — 63710000001 BARIUM SULFATE 40 % RECONSTITUTED SUSPENSION: Performed by: FAMILY MEDICINE

## 2022-05-23 PROCEDURE — 63710000001 BARIUM SULFATE 60 % CREAM: Performed by: FAMILY MEDICINE

## 2022-05-23 PROCEDURE — 63710000001 BARIUM SULFATE 98 % RECONSTITUTED SUSPENSION: Performed by: FAMILY MEDICINE

## 2022-05-23 PROCEDURE — 92611 MOTION FLUOROSCOPY/SWALLOW: CPT

## 2022-05-23 PROCEDURE — 74230 X-RAY XM SWLNG FUNCJ C+: CPT

## 2022-05-23 RX ADMIN — BARIUM SULFATE 4 ML: 980 POWDER, FOR SUSPENSION ORAL at 10:59

## 2022-05-23 RX ADMIN — BARIUM SULFATE 1 TEASPOON(S): 0.6 CREAM ORAL at 10:59

## 2022-05-23 RX ADMIN — BARIUM SULFATE 55 ML: 0.81 POWDER, FOR SUSPENSION ORAL at 10:59

## 2022-05-23 NOTE — THERAPY EVALUATION
Speech Language Pathology   MBS FEES / Discharge Summary  [facility]       Patient Name: Avelina Carr  : 1940  MRN: [id number]    Today's Date: 2022      Visit Date: 2022     Visit Dx:     ICD-10-CM ICD-9-CM   1. Aphasia  R47.01 784.3       Patient Active Problem List   Diagnosis   • Postmastectomy lymphedema syndrome   • Malignant neoplasm of upper-outer quadrant of right female breast (HCC)   • Cerebrovascular accident (CVA) ([facility])   • Stroke ([facility])   • Hemarthrosis   • HTN (hypertension)   • HLD (hyperlipidemia)   • Adverse effect of antiplatelet agent   • Left middle cerebral artery stroke (HCC)   • Carcinoma of central portion of right breast in female, estrogen receptor positive ([facility])   • Chronic bilateral low back pain with right-sided sciatica   • History of lumbar surgery   • Seizure disorder, simple partial, without intractable epilepsy ([facility])   • Spinal stenosis of lumbar region with neurogenic claudication   • Status post craniectomy   • History of total right knee replacement   • Other forms of scoliosis, lumbar region   • DDD (degenerative disc disease), lumbar   • Benign meningioma of brain ([facility])   • Squamous cell carcinoma of leg, left   • History of cardioembolic cerebrovascular accident (CVA)   • Hypothyroid   • Depression   • COPD (chronic obstructive pulmonary disease) ([facility])   • Obstructive sleep apnea   • Squamous cell carcinoma of leg, right   • Pain in joint, multiple sites   • Excessive daytime sleepiness   • Former smoker   • Gastroesophageal reflux disease   • Migraines   • Generalized weakness   • Acute CVA (cerebrovascular accident) ([facility])   • Dependence on supplemental oxygen        Past Medical History:   Diagnosis Date   • Anxiety    • Arthritis    • Breast cancer ([facility])    • COPD (chronic obstructive pulmonary disease) ([facility])     oxygen at 2L aths   • CVD (cardiovascular disease)    • Depression    • Hypothyroid    • Macular degeneration    • Peripheral neuropathy     • Vertigo         Past Surgical History:   Procedure Laterality Date   • ADRENAL GLAND SURGERY     • BRAIN MENINGIOMA EXCISION      occipital   •  SECTION     • EMBOLECTOMY N/A 11/15/2017    Procedure: Embolectomy Mechanical;  Surgeon: Rachid Figueroa MD;  Location: [facility] ;  Service:    • MASTECTOMY     • RECTAL SURGERY     • REPLACEMENT TOTAL KNEE Right    • TOTAL SHOULDER REPLACEMENT      x2                  OP SLP Assessment/Plan - 22 1030        SLP Assessment    Functional Problems Swallowing  -OC    Impact on Function: Swallowing Risk of aspiration;Risk of pneumonia  -OC    Clinical Impression: Swallowing Mild:  -OC    Functional Problems Comment Funcitonal swallow, mild  -OC    Clinical Impression Comments Funcitonal swallow  -OC    Please refer to paper survey for additional self-reported information No  -OC    Please refer to items scanned into chart for additional diagnostic informaiton and handouts as provided by clinician No  -OC    SLP Diagnosis Funcitonal swallow  -OC    Prognosis Good (comment)  -OC    Patient/caregiver participated in establishment of treatment plan and goals No  -OC    Patient would benefit from skilled therapy intervention Yes   establish HEP with home health -OC       SLP Plan    Frequency N/A  -OC    Duration N/A  -OC    Plan Comments Follow with home health, recommend HEP.  -OC          User Key  (r) = Recorded By, (t) = Taken By, (c) = Cosigned By    Initials Name Provider Type    OC Ike, MANNY Salmon,[facility]-SLP Speech and Language Pathologist                 SLP Adult Swallow Evaluation     Row Name 22 1030       Rehab Evaluation    Document Type evaluation  -OC    Subjective Information no complaints  -OC    Patient Observations alert;cooperative;agree to therapy  -OC    Patient Effort good  -OC    Symptoms Noted During/After Treatment none  -OC       General Information    Patient Profile Reviewed yes  -OC    Current Method of  Nutrition regular textures;thin liquids  -OC    Precautions/Limitations, Vision WFL  -OC    Precautions/Limitations, Hearing WFL  -OC    Prior Level of Function-Communication WFL  -OC    Prior Level of Function-Swallowing other (see comments)  hx NTL, on thin at home per report  -OC    Plans/Goals Discussed with patient  -OC    Barriers to Rehab none identified  -OC    Patient's Goals for Discharge patient did not state  -OC       Pain    Additional Documentation Pain Scale: Numbers Pre/Post-Treatment (Group)  -OC       Pain Scale: Numbers Pre/Post-Treatment    Pretreatment Pain Rating 0/10 - no pain  -OC    Posttreatment Pain Rating 0/10 - no pain  -OC       Oral Motor Structure and Function    Dentition Assessment natural, present and adequate  -OC    Secretion Management WNL/WFL  -OC    Mucosal Quality moist, healthy  -OC    Volitional Swallow WFL  -OC    Volitional Cough WFL  -OC       Oral Musculature and Cranial Nerve Assessment    Oral Motor General Assessment WFL  -OC       Clinical Swallow Eval    Clinical Swallow Evaluation Summary VFSS completed with Dr. Downs present. Patient presents with functional swallow. Patient demonstrated inconsistent oral holding and decreased coordination of swallow function. Patient with inconsistent posterior penetration thin liquids, no aspiration observed. Patient demonstrated mild oral residue pooling to the pyriforms, able to clear with double swallow. Patient demonstrated no penetration or aspiration with nectar thick, puree, and mechanical soft, and dry solid. No significant residue after trials of solids.  -OC       MBS/VFSS Interpretation    VFSS Summary VFSS completed with Dr. Downs present. Patient presents with functional swallow. Patient demonstrated inconsistent oral holding and decreased coordination of swallow function. Patient with inconsistent posterior penetration thin liquids, no aspiration observed. Patient demonstrated mild oral residue pooling to the  pyriforms, able to clear with double swallow. Patient demonstrated no penetration or aspiration with nectar thick, puree, and mechanical soft, and dry solid. No significant residue after trials of solids.  -OC       SLP Evaluation Clinical Impression    SLP Swallowing Diagnosis functional oral phase;functional pharyngeal phase  -OC    Functional Impact risk of aspiration/pneumonia  -OC    Rehab Potential/Prognosis, Swallowing good, to achieve stated therapy goals  -OC    Swallow Criteria for Skilled Therapeutic Interventions Met demonstrates skilled criteria;other (see comments)  current [facility], recommend HEP.  -OC       Recommendations    Therapy Frequency (Swallow) evaluation only  -OC    Predicted Duration Therapy Intervention (Days) until discharge  -OC    SLP Diet Recommendation regular textures;thin liquids  -OC    Recommended Precautions and Strategies upright posture during/after eating;small bites of food and sips of liquid;general aspiration precautions  -OC    Oral Care Recommendations Oral Care BID/PRN  -OC    SLP Rec. for Method of Medication Administration meds whole;meds crushed;with pudding or applesauce  -OC    Monitor for Signs of Aspiration yes;notify SLP if any concerns  -OC    Anticipated Discharge Disposition (SLP) home with assist  -OC          User Key  (r) = Recorded By, (t) = Taken By, (c) = Cosigned By    Initials Name Provider Type    OC Card, MANNY Salmon,CCC-SLP Speech and Language Pathologist                                OP SLP Education     Row Name 05/23/22 1030       Education    Barriers to Learning No barriers identified  -OC    Action Taken to Address Barriers  in waiting room, wife reports no need to review with   -OC    Education Provided Described results of evaluation;Patient expressed understanding of evaluation  -OC    Assessed Learning needs;Learning motivation;Learning preferences;Learning readiness  -OC    Learning Motivation Strong  -OC     Learning Method Explanation  -OC    Teaching Response Verbalized understanding  -OC    Education Comments Patient verbalized understanding of results.  -OC          User Key  (r) = Recorded By, (t) = Taken By, (c) = Cosigned By    Initials Name Effective Dates    Viky Ballesteros MA,BENNETT-SLP 06/16/21 -                 SLP Outcome Measures (last 72 hours)     SLP Outcome Measures     Row Name 05/23/22 1030             SLP Outcome Measures    Outcome Measure Used? Adult NOMS  -OC              Adult FCM Scores    FCM Chosen Swallowing  -OC      Swallowing FCM Score 6  -OC            User Key  (r) = Recorded By, (t) = Taken By, (c) = Cosigned By    Initials Name Effective Dates    Viky Ballesteros MA, CCC-SLP 06/16/21 -                                Time Calculation:   Untimed Charges  SLP Eval/Re-eval : ST Motion Fluoro Eval Swallow - 15835  08457-EZ Motion Fluoro Eval Swallow Minutes: 60  Total Minutes  Untimed Charges Total Minutes: 60   Total Minutes: 60    Therapy Charges for Today     Code Description Service Date Service Provider Modifiers Qty    [id number]  ST MOTION FLUORO EVAL SWALLOW 4 5/23/2022 Viky Ramires MA,CCC-SLP GN 1                  Viky Ramires MA,BENNETT-SLP  5/23/2022

## 2022-05-24 ENCOUNTER — HOME CARE VISIT (OUTPATIENT)
Dept: HOME HEALTH SERVICES | Facility: HOME HEALTHCARE | Age: 82
End: 2022-05-24

## 2022-05-25 ENCOUNTER — HOME CARE VISIT (OUTPATIENT)
Dept: HOME HEALTH SERVICES | Facility: HOME HEALTHCARE | Age: 82
End: 2022-05-25

## 2022-05-25 VITALS
HEART RATE: 50 BPM | RESPIRATION RATE: 18 BRPM | DIASTOLIC BLOOD PRESSURE: 60 MMHG | TEMPERATURE: 98.6 F | SYSTOLIC BLOOD PRESSURE: 100 MMHG | OXYGEN SATURATION: 95 %

## 2022-05-25 VITALS — OXYGEN SATURATION: 98 % | HEART RATE: 50 BPM

## 2022-05-25 PROCEDURE — G0157 HHC PT ASSISTANT EA 15: HCPCS

## 2022-05-25 PROCEDURE — G0153 HHCP-SVS OF S/L PATH,EA 15MN: HCPCS

## 2022-05-25 PROCEDURE — G0152 HHCP-SERV OF OT,EA 15 MIN: HCPCS

## 2022-05-25 NOTE — CASE COMMUNICATION
Patient missed a skilled nurse visit from [facility] on 5.24.22.     Reason: Patient has met all skilled nurse goals and ready for discipline discharge. per patient request      For your records only.   As per home health protocol, MD must be notified of missed/cancelled visits; therefore the prescribed frequency was not met.

## 2022-05-25 NOTE — HOME HEALTH
Patient just finished with speech therapy visit. Reported she made coffee this morning and fixed dinner last night.  Patient in agreement with OT discharge today.

## 2022-05-26 ENCOUNTER — HOME CARE VISIT (OUTPATIENT)
Dept: HOME HEALTH SERVICES | Facility: HOME HEALTHCARE | Age: 82
End: 2022-05-26

## 2022-05-26 VITALS
SYSTOLIC BLOOD PRESSURE: 118 MMHG | RESPIRATION RATE: 18 BRPM | DIASTOLIC BLOOD PRESSURE: 69 MMHG | TEMPERATURE: 97.5 F | HEART RATE: 68 BPM | OXYGEN SATURATION: 97 %

## 2022-05-26 VITALS
TEMPERATURE: 97.8 F | OXYGEN SATURATION: 98 % | HEART RATE: 55 BPM | DIASTOLIC BLOOD PRESSURE: 70 MMHG | SYSTOLIC BLOOD PRESSURE: 112 MMHG

## 2022-05-26 PROCEDURE — G0153 HHCP-SVS OF S/L PATH,EA 15MN: HCPCS

## 2022-05-26 NOTE — HOME HEALTH
SUBJECTIVE: Spouse reports fair adherence to HEP. Did ok with some short errands in town with rollator and S. Discussion of OP. Patient wants to discuss with spouse.     Fall since last visit: no    Medication changes: no

## 2022-05-26 NOTE — HOME HEALTH
PLAN FOR NEXT VISIT   MEDICAL NECESSITY FOR ONGOING SKILLED THERAPY: Dysarthria therapy to increase overall intelligibility of speech to improve communication with family. Dysphagia therapy to restore swallowing function for intake of regular food textures and thin liquids without risk of aspiration/aspiration pneumonia. Speech Therapy interventions are needed due recent decline in swallowing function and intelligibility of speech impacting her ability to communicate and to safely intake nutrition and hydration.   SPECIFIC INTERVENTIONS AND GOALS TO ADDRESS ON NEXT VISIT: Discharge instructions  FREQUENCY AND DURATION: Discharge  ANY OTHER FOLLOW UP NEEDED: None   REASSESSMENT DUE DATE: 5/28/22

## 2022-05-27 ENCOUNTER — HOME CARE VISIT (OUTPATIENT)
Dept: HOME HEALTH SERVICES | Facility: HOME HEALTHCARE | Age: 82
End: 2022-05-27

## 2022-05-31 DIAGNOSIS — E78.2 MIXED HYPERLIPIDEMIA: Primary | ICD-10-CM

## 2022-05-31 RX ORDER — ATORVASTATIN CALCIUM 40 MG/1
40 TABLET, FILM COATED ORAL NIGHTLY
Qty: 90 TABLET | Refills: 2
Start: 2022-05-31 | End: 2022-06-01 | Stop reason: SDUPTHER

## 2022-06-01 ENCOUNTER — HOME CARE VISIT (OUTPATIENT)
Dept: HOME HEALTH SERVICES | Facility: HOME HEALTHCARE | Age: 82
End: 2022-06-01

## 2022-06-01 VITALS
HEART RATE: 80 BPM | OXYGEN SATURATION: 96 % | DIASTOLIC BLOOD PRESSURE: 76 MMHG | RESPIRATION RATE: 18 BRPM | SYSTOLIC BLOOD PRESSURE: 136 MMHG | TEMPERATURE: 97.5 F

## 2022-06-01 DIAGNOSIS — E78.2 MIXED HYPERLIPIDEMIA: ICD-10-CM

## 2022-06-01 PROCEDURE — G0151 HHCP-SERV OF PT,EA 15 MIN: HCPCS

## 2022-06-01 RX ORDER — ATORVASTATIN CALCIUM 40 MG/1
40 TABLET, FILM COATED ORAL NIGHTLY
Qty: 90 TABLET | Refills: 2 | Status: SHIPPED | OUTPATIENT
Start: 2022-06-01 | End: 2023-02-16

## 2022-06-01 RX ORDER — ATORVASTATIN CALCIUM 40 MG/1
40 TABLET, FILM COATED ORAL NIGHTLY
Qty: 90 TABLET | Refills: 2
Start: 2022-06-01 | End: 2022-06-01 | Stop reason: SDUPTHER

## 2022-06-01 NOTE — HOME HEALTH
"Subjective: \"I'm doing pretty well. I might try to get my knee checked out as it is still painful.\"    Falls reported: none    Medication changes: none"

## 2022-06-02 DIAGNOSIS — E03.9 HYPOTHYROIDISM, UNSPECIFIED TYPE: ICD-10-CM

## 2022-06-02 DIAGNOSIS — N28.9 ABNORMAL KIDNEY FUNCTION: Primary | ICD-10-CM

## 2022-06-02 LAB
ALBUMIN SERPL-MCNC: 4.3 G/DL (ref 3.6–4.6)
ALBUMIN/GLOB SERPL: 2 {RATIO} (ref 1.2–2.2)
ALP SERPL-CCNC: 141 IU/L (ref 44–121)
ALT SERPL-CCNC: 22 IU/L (ref 0–32)
AST SERPL-CCNC: 26 IU/L (ref 0–40)
BILIRUB SERPL-MCNC: 0.4 MG/DL (ref 0–1.2)
BUN SERPL-MCNC: 16 MG/DL (ref 8–27)
BUN/CREAT SERPL: 14 (ref 12–28)
CALCIUM SERPL-MCNC: 9.6 MG/DL (ref 8.7–10.3)
CHLORIDE SERPL-SCNC: 102 MMOL/L (ref 96–106)
CHOLEST SERPL-MCNC: 174 MG/DL (ref 100–199)
CO2 SERPL-SCNC: 21 MMOL/L (ref 20–29)
CREAT SERPL-MCNC: 1.12 MG/DL (ref 0.57–1)
EGFRCR SERPLBLD CKD-EPI 2021: 49 ML/MIN/1.73
ERYTHROCYTE [DISTWIDTH] IN BLOOD BY AUTOMATED COUNT: 12.6 % (ref 11.7–15.4)
GLOBULIN SER CALC-MCNC: 2.2 G/DL (ref 1.5–4.5)
GLUCOSE SERPL-MCNC: 100 MG/DL (ref 65–99)
HCT VFR BLD AUTO: 42 % (ref 34–46.6)
HDLC SERPL-MCNC: 31 MG/DL
HGB BLD-MCNC: 13.7 G/DL (ref 11.1–15.9)
LDLC SERPL CALC-MCNC: 98 MG/DL (ref 0–99)
LDLC/HDLC SERPL: 3.2 RATIO (ref 0–3.2)
MCH RBC QN AUTO: 30.7 PG (ref 26.6–33)
MCHC RBC AUTO-ENTMCNC: 32.6 G/DL (ref 31.5–35.7)
MCV RBC AUTO: 94 FL (ref 79–97)
PLATELET # BLD AUTO: 280 X10E3/UL (ref 150–450)
POTASSIUM SERPL-SCNC: 5.2 MMOL/L (ref 3.5–5.2)
PROT SERPL-MCNC: 6.5 G/DL (ref 6–8.5)
RBC # BLD AUTO: 4.46 X10E6/UL (ref 3.77–5.28)
SODIUM SERPL-SCNC: 139 MMOL/L (ref 134–144)
TRIGL SERPL-MCNC: 265 MG/DL (ref 0–149)
TSH SERPL DL<=0.005 MIU/L-ACNC: 7.9 UIU/ML (ref 0.45–4.5)
VLDLC SERPL CALC-MCNC: 45 MG/DL (ref 5–40)
WBC # BLD AUTO: 4.9 X10E3/UL (ref 3.4–10.8)

## 2022-06-03 ENCOUNTER — HOME CARE VISIT (OUTPATIENT)
Dept: HOME HEALTH SERVICES | Facility: HOME HEALTHCARE | Age: 82
End: 2022-06-03

## 2022-06-03 VITALS
TEMPERATURE: 96.8 F | DIASTOLIC BLOOD PRESSURE: 70 MMHG | SYSTOLIC BLOOD PRESSURE: 116 MMHG | RESPIRATION RATE: 18 BRPM | OXYGEN SATURATION: 93 % | HEART RATE: 67 BPM

## 2022-06-03 PROCEDURE — G0299 HHS/HOSPICE OF RN EA 15 MIN: HCPCS

## 2022-06-08 ENCOUNTER — TRANSCRIBE ORDERS (OUTPATIENT)
Dept: ADMINISTRATIVE | Facility: HOSPITAL | Age: 82
End: 2022-06-08

## 2022-06-08 DIAGNOSIS — Z78.0 POST-MENOPAUSAL: Primary | ICD-10-CM

## 2022-06-08 DIAGNOSIS — Z12.31 SCREENING MAMMOGRAM, ENCOUNTER FOR: ICD-10-CM

## 2022-06-12 DIAGNOSIS — G40.109: Primary | ICD-10-CM

## 2022-06-12 RX ORDER — LEVETIRACETAM 500 MG/1
500 TABLET ORAL 2 TIMES DAILY
Qty: 180 TABLET | Refills: 2
Start: 2022-06-12 | End: 2022-06-13 | Stop reason: SDUPTHER

## 2022-06-13 ENCOUNTER — TELEPHONE (OUTPATIENT)
Dept: FAMILY MEDICINE CLINIC | Facility: CLINIC | Age: 82
End: 2022-06-13

## 2022-06-13 DIAGNOSIS — G40.109: ICD-10-CM

## 2022-06-13 RX ORDER — LEVETIRACETAM 500 MG/1
500 TABLET ORAL 2 TIMES DAILY
Qty: 180 TABLET | Refills: 2 | Status: SHIPPED | OUTPATIENT
Start: 2022-06-13 | End: 2023-03-07

## 2022-06-13 NOTE — TELEPHONE ENCOUNTER
Patient other provider called stating that patient  is at pharmacy trying to  her Rx but the pharmacy is saying they did not receive it. It looks like provider placed refill but no receipt confirmed by pharmacy. Please advise.

## 2022-06-17 LAB
ALBUMIN SERPL-MCNC: 4.7 G/DL (ref 3.6–4.6)
ALBUMIN/GLOB SERPL: 2 {RATIO} (ref 1.2–2.2)
ALP SERPL-CCNC: 157 IU/L (ref 44–121)
ALT SERPL-CCNC: 23 IU/L (ref 0–32)
AST SERPL-CCNC: 30 IU/L (ref 0–40)
BILIRUB SERPL-MCNC: 0.4 MG/DL (ref 0–1.2)
BUN SERPL-MCNC: 15 MG/DL (ref 8–27)
BUN/CREAT SERPL: 15 (ref 12–28)
CALCIUM SERPL-MCNC: 9.4 MG/DL (ref 8.7–10.3)
CHLORIDE SERPL-SCNC: 104 MMOL/L (ref 96–106)
CO2 SERPL-SCNC: 19 MMOL/L (ref 20–29)
CREAT SERPL-MCNC: 1.01 MG/DL (ref 0.57–1)
EGFRCR SERPLBLD CKD-EPI 2021: 55 ML/MIN/1.73
GLOBULIN SER CALC-MCNC: 2.4 G/DL (ref 1.5–4.5)
GLUCOSE SERPL-MCNC: 100 MG/DL (ref 65–99)
POTASSIUM SERPL-SCNC: 4.3 MMOL/L (ref 3.5–5.2)
PROT SERPL-MCNC: 7.1 G/DL (ref 6–8.5)
SODIUM SERPL-SCNC: 142 MMOL/L (ref 134–144)
TSH SERPL DL<=0.005 MIU/L-ACNC: 3.11 UIU/ML (ref 0.45–4.5)

## 2022-07-12 DIAGNOSIS — C50.111 CARCINOMA OF CENTRAL PORTION OF RIGHT BREAST IN FEMALE, ESTROGEN RECEPTOR POSITIVE: Primary | ICD-10-CM

## 2022-07-12 DIAGNOSIS — Z17.0 CARCINOMA OF CENTRAL PORTION OF RIGHT BREAST IN FEMALE, ESTROGEN RECEPTOR POSITIVE: Primary | ICD-10-CM

## 2022-07-12 RX ORDER — LETROZOLE 2.5 MG/1
2.5 TABLET, FILM COATED ORAL DAILY
Qty: 30 TABLET | Refills: 0 | Status: SHIPPED | OUTPATIENT
Start: 2022-07-12 | End: 2022-09-16

## 2022-07-12 RX ORDER — ALBUTEROL SULFATE 90 UG/1
AEROSOL, METERED RESPIRATORY (INHALATION)
COMMUNITY
Start: 2022-06-10

## 2022-07-13 ENCOUNTER — OFFICE VISIT (OUTPATIENT)
Dept: NEUROLOGY | Facility: CLINIC | Age: 82
End: 2022-07-13

## 2022-07-13 VITALS
HEART RATE: 60 BPM | HEIGHT: 64 IN | BODY MASS INDEX: 31.07 KG/M2 | OXYGEN SATURATION: 94 % | WEIGHT: 182 LBS | DIASTOLIC BLOOD PRESSURE: 66 MMHG | SYSTOLIC BLOOD PRESSURE: 130 MMHG

## 2022-07-13 DIAGNOSIS — Z86.73 RECENT CEREBROVASCULAR ACCIDENT (CVA): Primary | ICD-10-CM

## 2022-07-13 DIAGNOSIS — G40.109: ICD-10-CM

## 2022-07-13 DIAGNOSIS — R26.89 BALANCE DISORDER: ICD-10-CM

## 2022-07-13 DIAGNOSIS — I10 ESSENTIAL HYPERTENSION: ICD-10-CM

## 2022-07-13 DIAGNOSIS — Z91.81 RISK FOR FALLS: ICD-10-CM

## 2022-07-13 DIAGNOSIS — I67.9 CEREBROVASCULAR DISEASE: ICD-10-CM

## 2022-07-13 DIAGNOSIS — G47.33 OBSTRUCTIVE SLEEP APNEA: ICD-10-CM

## 2022-07-13 DIAGNOSIS — E78.2 MIXED HYPERLIPIDEMIA: ICD-10-CM

## 2022-07-13 PROCEDURE — 99215 OFFICE O/P EST HI 40 MIN: CPT | Performed by: NURSE PRACTITIONER

## 2022-07-13 NOTE — PROGRESS NOTES
"DOS: 2022  NAME: Avelina Carr   : 1940  PCP: Goran Carr MD    Chief Complaint   Patient presents with   • Stroke      SUBJECTIVE  Neurological Problem:  82 y.o. RHW female with stoke, HTN, HLD, TEOFILO (on CPAP) and h/o breast CA, lumbar stenosis, stroke (), right occipital meningioma resection (), seizures (maintained on neurontin) and BPPV (treated at [facility]) who presents today for f/u. She is accompanied by her spouse.     Interval History:   Ms. 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 She has since been maintained on antiplatelet therapy plus statin.  She was last seen by me in 2021.    She unfortunately was hospitalized in April of this year when she presented with word-finding difficulty, generalized weakness and staring episodes. She was found to have a left pontine infarct in basilar  distribution. She was compliant with antiplatelets at the time, ASA 81 mg and Plavix 75 mg, she showed good resposne with P2Y12 178 and .  Her ASA was increased to 325 mg. She was started on Keppra 500 mg BID and gabapentin reduced to 400 mg TID.  She was discharged to inpatient rehab along with 14-day Holter monitor.  Review of these results indicate an abnormal study, with several episodes of brief SVT, longest lasting 20 seconds, states that cannot rule out A. fib.  It does not appear that she has followed up with cardiology as of yet.    She presents today along with her spouse, continues with residual weakness, speech difficulty, \"80%\" back to her baseline per patient, \"50%\" per spouse at best. She needs walker, assistance with meds, balance issues.  Spouse does much of the cooking, etc.  She has an appointment for rheumatologist coming up, has significant arthritis, back problems that also exacerbate her mobility issues.  She has not been doing her PT exercises that she learned.  She does continue on "  mg and Plavix 75 mg along with Lipitor 40 mg.  She denies any new stroke/TIA symptoms.  Her previous swallowing difficulties have resolved.  She denies any seizure activity.  She continues on Keppra 500 mg twice daily and Neurontin 400 mg 3 times daily.  She is tolerating these doses with no issues with sedation.  Review of lab work from 6/16/2022 shows a CMP with a glucose of 100, creatinine 1.01, GFR 55, alk phos 157; TSH 3.11; on 6/1, and her total cholesterol was 174, HDL 31, LDL 98, ; CBC WNL.  She does continue with a walker, no falls since her discharge.    Review of Systems:Review of Systems   Musculoskeletal: Positive for back pain, gait problem and neck pain.   Allergic/Immunologic: Negative for environmental allergies, food allergies and immunocompromised state.   Neurological: Positive for speech difficulty, weakness and numbness. Negative for dizziness, tremors, seizures, syncope, facial asymmetry, light-headedness and headaches.   Hematological: Negative for adenopathy. Bruises/bleeds easily.   Psychiatric/Behavioral: Positive for confusion and decreased concentration. Negative for sleep disturbance. The patient is nervous/anxious.     Above ROS reviewed    The following portions of the patient's history were reviewed and updated as appropriate: allergies, current medications, past family history, past medical history, past social history, past surgical history and problem list.    Current Medications:   Current Outpatient Medications:   •  acetaminophen (TYLENOL) 325 MG tablet, Take 650 mg by mouth Every 6 (Six) Hours As Needed for Mild Pain ., Disp: , Rfl:   •  albuterol sulfate  (90 Base) MCG/ACT inhaler, , Disp: , Rfl:   •  atenolol (TENORMIN) 25 MG tablet, Take 25 mg by mouth 2 (Two) Times a Day., Disp: , Rfl:   •  atorvastatin (LIPITOR) 40 MG tablet, Take 1 tablet by mouth Every Night., Disp: 90 tablet, Rfl: 2  •  Cholecalciferol (VITAMIN D3) 5000 units capsule capsule, Take  5,000 Units by mouth Daily., Disp: , Rfl:   •  clopidogrel (PLAVIX) 75 MG tablet, TAKE ONE TABLET BY MOUTH DAILY, Disp: 90 tablet, Rfl: 1  •  Cyanocobalamin (Vitamin B 12) 500 MCG tablet, Take 1,000 mcg by mouth Daily., Disp: , Rfl:   •  escitalopram (LEXAPRO) 20 MG tablet, Take 1 tablet by mouth Daily., Disp: 90 tablet, Rfl: 1  •  furosemide (LASIX) 20 MG tablet, TAKE ONE TABLET BY MOUTH DAILY, Disp: 90 tablet, Rfl: 1  •  gabapentin (NEURONTIN) 400 MG capsule, Take 1 capsule by mouth 3 (Three) Times a Day., Disp: 180 capsule, Rfl: 1  •  Glycerin-Hypromellose- (ARTIFICIAL TEARS) 0.2-0.2-1 % solution ophthalmic solution, Administer 1 drop to both eyes Every 1 (One) Hour As Needed for Dry Eyes., Disp: , Rfl:   •  letrozole (FEMARA) 2.5 MG tablet, Take 1 tablet by mouth Daily., Disp: 30 tablet, Rfl: 0  •  levETIRAcetam (KEPPRA) 500 MG tablet, Take 1 tablet by mouth 2 (Two) Times a Day., Disp: 180 tablet, Rfl: 2  •  levothyroxine (SYNTHROID, LEVOTHROID) 150 MCG tablet, Take 150 mcg by mouth Daily., Disp: , Rfl:   •  Melatonin ER 10 MG tablet controlled-release, Take 1 tablet by mouth every night at bedtime., Disp: , Rfl:   •  Multiple Vitamins-Minerals (PRESERVISION AREDS 2 PO), Take  by mouth., Disp: , Rfl:   •  pantoprazole (PROTONIX) 40 MG EC tablet, TAKE ONE TABLET BY MOUTH DAILY, Disp: 90 tablet, Rfl: 3  •  polyethylene glycol (MIRALAX) 17 g packet, Take 17 g by mouth Daily As Needed (Use if senna-docusate is ineffective)., Disp: , Rfl:   •  Pyridoxine HCl (B-6 PO), Take  by mouth., Disp: , Rfl:   •  traMADol (ULTRAM) 50 MG tablet, Take 1 tablet by mouth Every 6 (Six) Hours As Needed for Moderate Pain ., Disp: 90 tablet, Rfl: 2  **I did not stop or change the above medications.  Patient's medication list was updated to reflect medications they have reported as currently taking, including medication changes made by other providers.    OBJECTIVE  Vitals:    07/13/22 1308   BP: 130/66   Pulse: 60   SpO2: 94%      Body mass index is 31.24 kg/m².    Diagnostics:  MRI brain, CTA, Zio patch during hospital visit and early April, reviewed as noted above    Laboratory Results:         Lab Results   Component Value Date    WBC 4.9 06/01/2022    HGB 13.7 06/01/2022    HCT 42.0 06/01/2022    MCV 94 06/01/2022     06/01/2022     Lab Results   Component Value Date    GLUCOSE 100 (H) 06/16/2022    BUN 15 06/16/2022    CREATININE 1.01 (H) 06/16/2022    EGFRIFNONA 62 09/24/2021    EGFRIFAFRI 75 09/24/2021    BCR 15 06/16/2022    K 4.3 06/16/2022    CO2 19 (L) 06/16/2022    CALCIUM 9.4 06/16/2022    PROTENTOTREF 7.1 06/16/2022    ALBUMIN 4.7 (H) 06/16/2022    LABIL2 2.0 06/16/2022    AST 30 06/16/2022    ALT 23 06/16/2022     Lab Results   Component Value Date    HGBA1C 5.90 (H) 04/12/2022     Lab Results   Component Value Date    CHOL 116 04/12/2022    CHOL 139 11/16/2017     Lab Results   Component Value Date    HDL 31 (L) 06/01/2022    HDL 33 (L) 04/12/2022    HDL 39 (L) 09/24/2021     Lab Results   Component Value Date    LDL 98 06/01/2022    LDL 54 04/12/2022    LDL 67 09/24/2021     Lab Results   Component Value Date    TRIG 265 (H) 06/01/2022    TRIG 174 (H) 04/12/2022    TRIG 106 09/24/2021     No results found for: RPR  Lab Results   Component Value Date    TSH 3.110 06/16/2022     Lab Results   Component Value Date    UVYVAFVC37 >2,000 (H) 04/12/2022       Physical Exam:  GENERAL: NAD, overweight  HEENT: Normocephalic, atraumatic   COR: RRR  Resp: Even and unlabored  Extremities: Decreased ROM of shoulders bilaterally, arthritic changes of hands and feet bilaterally  Skin: No rashes, lesions or ulcers.  Psychiatric: Normal mood and affect.    Neurological:   MS: Alert.  Oriented to self, place, date.  Naming intact.  Language normal. No neglect. Follows all commands.  CN: II-XII grossly normal  Motor: Decreased overall strength, at least 4 -/5.  No tremors or abnormal movements noted  Sensory: Intact to light touch in  arms and legs  Station and Gait: Slow, cautious, wide-based gait, antalgic, currently using Rollator.  Coordination: Normal finger to nose bilaterally    Impression/Plan:     1. Recent pontine stroke and h/o LMCA infarct  Continue ASA, Plavix daily  HLD: Continue Lipitor 40 mg, LDL not currently at goal, may consider increasing if repeat surveillance lipid panel continues to show levels over 70.  HTN: BP well controlled on today's vitals  TEOFILO: Encouraged continued compliance with CPAP  Secondary stroke prevention: Ideal targets for stroke prevention would be Blood pressure < 130/80; B12 > 500 TSH in normal range and LDL < 70; HbA1c < 6.5 and smoking cessation if applicable.  Call 911 for stroke symptoms    2.  Abnormal Holter monitor results --cannot rule out A. fib, encouraged to follow-up with cardiology as patient may need oral anticoagulation if she does indeed have PAF.      3.  Balance issues --multifactorial, including deconditioning, arthritis, likely peripheral neuropathy.  Strongly encouraged that she continue PT exercises.  Also discussed importance of falls risks and precautions.    4. Seizure disorder -- no recurrent episodes on current regimen  Continue Keppra 500 mg BID, Neurontin 400 mg TID    She will follow-up here in 6 months, sooner if symptoms warrant.    I spent a total of 51 minutes on day of visit in reviewing records, prior diagnostics, examination of patient including, counseling and educating patient, spouse regarding signs/symptoms of stroke and reviewing diagnostics, stroke prevention recommendations and discussion and documenting plan of care.          Diagnoses and all orders for this visit:    1. Recent cerebrovascular accident (CVA) (Primary)    2. Cerebrovascular disease    3. Essential hypertension    4. Mixed hyperlipidemia    5. Obstructive sleep apnea    6. Seizure disorder, simple partial, without intractable epilepsy (HCC)    7. Balance disorder    8. Risk for  falls        Coding      Dictated using Dragon

## 2022-07-22 RX ORDER — LEVOTHYROXINE SODIUM 112 UG/1
TABLET ORAL
Qty: 90 TABLET | Refills: 1 | Status: SHIPPED | OUTPATIENT
Start: 2022-07-22 | End: 2023-01-27

## 2022-08-10 ENCOUNTER — OFFICE VISIT (OUTPATIENT)
Dept: FAMILY MEDICINE CLINIC | Facility: CLINIC | Age: 82
End: 2022-08-10

## 2022-08-10 VITALS
DIASTOLIC BLOOD PRESSURE: 78 MMHG | HEART RATE: 64 BPM | HEIGHT: 64 IN | WEIGHT: 181 LBS | RESPIRATION RATE: 18 BRPM | OXYGEN SATURATION: 94 % | SYSTOLIC BLOOD PRESSURE: 132 MMHG | BODY MASS INDEX: 30.9 KG/M2

## 2022-08-10 DIAGNOSIS — G89.29 CHRONIC BILATERAL LOW BACK PAIN WITH RIGHT-SIDED SCIATICA: ICD-10-CM

## 2022-08-10 DIAGNOSIS — M54.41 CHRONIC BILATERAL LOW BACK PAIN WITH RIGHT-SIDED SCIATICA: ICD-10-CM

## 2022-08-10 DIAGNOSIS — E03.9 ACQUIRED HYPOTHYROIDISM: ICD-10-CM

## 2022-08-10 DIAGNOSIS — R26.89 BALANCE PROBLEM: ICD-10-CM

## 2022-08-10 DIAGNOSIS — N28.9 ABNORMAL KIDNEY FUNCTION: Primary | ICD-10-CM

## 2022-08-10 DIAGNOSIS — M25.50 PAIN IN JOINT, MULTIPLE SITES: ICD-10-CM

## 2022-08-10 DIAGNOSIS — R29.898 WEAKNESS OF BOTH LEGS: ICD-10-CM

## 2022-08-10 DIAGNOSIS — F34.1 DYSTHYMIA: ICD-10-CM

## 2022-08-10 DIAGNOSIS — I10 ESSENTIAL HYPERTENSION: ICD-10-CM

## 2022-08-10 PROBLEM — I47.1 PAROXYSMAL SUPRAVENTRICULAR TACHYCARDIA: Status: ACTIVE | Noted: 2022-07-25

## 2022-08-10 PROBLEM — I47.10 PAROXYSMAL SUPRAVENTRICULAR TACHYCARDIA: Status: ACTIVE | Noted: 2022-07-25

## 2022-08-10 PROBLEM — I48.0 PAROXYSMAL ATRIAL FIBRILLATION: Status: ACTIVE | Noted: 2022-07-25

## 2022-08-10 PROCEDURE — 99214 OFFICE O/P EST MOD 30 MIN: CPT | Performed by: FAMILY MEDICINE

## 2022-08-10 RX ORDER — TRAMADOL HYDROCHLORIDE 50 MG/1
50 TABLET ORAL EVERY 6 HOURS PRN
Qty: 90 TABLET | Refills: 2 | Status: SHIPPED | OUTPATIENT
Start: 2022-08-10 | End: 2023-04-06

## 2022-08-10 NOTE — PROGRESS NOTES
Subjective   Avelina Carr is a 82 y.o. female.   Hypothyroidism and Hypertension    History of Present Illness   Avelina is here accompanied by her .  She is here for follow up on blood presurue  and hypothyroidism.  She has chronic hypothyroidism and has been stable on current dose of medication her last labs were checked in June and were within good limits.  She is taking levothyroxine 112 mcg a day'    She was hospitalized in April 2022 because of a stroke.  She had a long recovery.  And is starting to do better but is still quite tired and still recovering.  She is stating that she is got chronic weakness in both of her lower legs and would like to get some physical therapy.    She now has a loop recorder and will follow up with cardiology.  She states she is weak and tired.   She would like to do some physical therapy for strengthening and balance.    Tramadol She has been taking tramadol at least once a day sometimes 2-3 times a day based on her pain and she is taking gabapentin to help with some of the nerve pain.  She is asking for refills on his medications today.    She has chronic hypertension and she is doing well on atenolol 25 mg 2 times daily.  She has a history of chronic lymphedema in her legs and is taking Lasix 20 mg twice a day which I am sure is helping her blood pressure.    She has chronic depression and anxiety and is taking Lexapro 20 mg a day and she feels like it helps make a difference in how she is handling her current situation.    She has a history of breast cancer in the right breast estrogen receptor positive and she is on Femara 2.5 mg a day.  She has been followed until recently by oncology and will pick that up that support when she is able to ambulate better.    She has a chronic seizure disorder and she is on Keppra and doing well.    She feels she is working hard to move forward and she feels like it is taking a long time to get better.  The following portions of the  patient's history were reviewed and updated as appropriate: allergies, current medications, past family history, past medical history, past social history, past surgical history and problem list.    Review of Systems   Constitutional: Positive for fatigue.   Eyes: Negative.    Respiratory: Negative.    Cardiovascular: Negative.    Gastrointestinal: Negative.    Genitourinary: Negative.    Musculoskeletal: Positive for arthralgias, back pain and myalgias.   Neurological: Positive for weakness. Negative for speech difficulty.   Hematological: Positive for adenopathy.   Psychiatric/Behavioral: Negative.        Objective   Physical Exam  Vitals and nursing note reviewed.   Constitutional:       Appearance: She is well-developed.   Eyes:      Pupils: Pupils are equal, round, and reactive to light.   Cardiovascular:      Rate and Rhythm: Normal rate and regular rhythm.      Heart sounds: Normal heart sounds.   Pulmonary:      Effort: Pulmonary effort is normal.      Breath sounds: Normal breath sounds.   Musculoskeletal:      Comments: Able to stand up from chair and grab walker.  Only walks with assistance.  No edema noted in legs.   Skin:     General: Skin is warm and dry.   Neurological:      General: No focal deficit present.      Mental Status: She is alert and oriented to person, place, and time.      Motor: Weakness present.   Psychiatric:         Mood and Affect: Mood normal.         Behavior: Behavior normal.         Thought Content: Thought content normal.         Judgment: Judgment normal.           Assessment & Plan   Problem List Items Addressed This Visit        Endocrine and Metabolic    Hypothyroid    Overview     Well managed on current dose of thyroid replacement. TSH reviewed and on chart.Patient will return to the office in 6 month for regularly scheduled review of treatment.   Well refill medications as needed.            Mental Health    Depression    Overview     She is well managed on current  medication, Lexapro 20 mg daily, and reports no signs or symptoms of self harm, suicidal ideation. This medication helps with daily life and relationships. Will refill as needed and advised 6 month follow up.            Musculoskeletal and Injuries    Chronic bilateral low back pain with right-sided sciatica    Overview     Pain in her back is managed as best we can with gabapentin.  She is taking 800 mg 3 times a day.         Relevant Medications    traMADol (ULTRAM) 50 MG tablet    Other Relevant Orders    Ambulatory Referral to Physical Therapy Evaluate and treat    Pain in joint, multiple sites    Overview     She has been advised to take tramadol twice a day as needed for pain.  She has had multiple joint replacements and she has significant back pain.  The patient has read and signed the [facility] Controlled Substance Contract.  I will continue to see patient for regular follow up appointments.  She are well controlled on current medication.  THOMPSON has been reviewed by me and is updated every 3 months. The patient is aware of the potential for addiction and dependence.               Other Visit Diagnoses     Abnormal kidney function    -  Primary    Relevant Orders    Comprehensive Metabolic Panel (Completed)    Essential hypertension        Relevant Orders    CBC (No Diff) (Completed)    Balance problem        Relevant Orders    Ambulatory Referral to Physical Therapy Evaluate and treat    Weakness of both legs        Relevant Orders    Ambulatory Referral to Physical Therapy Evaluate and treat               Return in about 6 months (around 2/10/2023) for Annual physical.   Answers for HPI/ROS submitted by the patient on 8/7/2022  Please describe your symptoms.: generalized pain  Have you had these symptoms before?: Yes  How long have you been having these symptoms?: Greater than 2 weeks  What is the primary reason for your visit?: Other

## 2022-08-11 LAB
ALBUMIN SERPL-MCNC: 4.1 G/DL (ref 3.6–4.6)
ALBUMIN/GLOB SERPL: 1.8 {RATIO} (ref 1.2–2.2)
ALP SERPL-CCNC: 167 IU/L (ref 44–121)
ALT SERPL-CCNC: 21 IU/L (ref 0–32)
AST SERPL-CCNC: 26 IU/L (ref 0–40)
BILIRUB SERPL-MCNC: 0.3 MG/DL (ref 0–1.2)
BUN SERPL-MCNC: 14 MG/DL (ref 8–27)
BUN/CREAT SERPL: 16 (ref 12–28)
CALCIUM SERPL-MCNC: 9.1 MG/DL (ref 8.7–10.3)
CHLORIDE SERPL-SCNC: 103 MMOL/L (ref 96–106)
CO2 SERPL-SCNC: 25 MMOL/L (ref 20–29)
CREAT SERPL-MCNC: 0.88 MG/DL (ref 0.57–1)
EGFRCR SERPLBLD CKD-EPI 2021: 65 ML/MIN/1.73
ERYTHROCYTE [DISTWIDTH] IN BLOOD BY AUTOMATED COUNT: 12 % (ref 11.7–15.4)
GLOBULIN SER CALC-MCNC: 2.3 G/DL (ref 1.5–4.5)
GLUCOSE SERPL-MCNC: 94 MG/DL (ref 65–99)
HCT VFR BLD AUTO: 38 % (ref 34–46.6)
HGB BLD-MCNC: 12.8 G/DL (ref 11.1–15.9)
MCH RBC QN AUTO: 32.2 PG (ref 26.6–33)
MCHC RBC AUTO-ENTMCNC: 33.7 G/DL (ref 31.5–35.7)
MCV RBC AUTO: 96 FL (ref 79–97)
PLATELET # BLD AUTO: 260 X10E3/UL (ref 150–450)
POTASSIUM SERPL-SCNC: 4 MMOL/L (ref 3.5–5.2)
PROT SERPL-MCNC: 6.4 G/DL (ref 6–8.5)
RBC # BLD AUTO: 3.98 X10E6/UL (ref 3.77–5.28)
SODIUM SERPL-SCNC: 142 MMOL/L (ref 134–144)
WBC # BLD AUTO: 5.1 X10E3/UL (ref 3.4–10.8)

## 2022-08-22 RX ORDER — ESCITALOPRAM OXALATE 20 MG/1
TABLET ORAL
Qty: 90 TABLET | Refills: 1 | Status: SHIPPED | OUTPATIENT
Start: 2022-08-22 | End: 2022-11-18

## 2022-08-29 RX ORDER — FUROSEMIDE 20 MG/1
TABLET ORAL
Qty: 90 TABLET | Refills: 1 | Status: SHIPPED | OUTPATIENT
Start: 2022-08-29 | End: 2023-02-16

## 2022-09-01 ENCOUNTER — HOSPITAL ENCOUNTER (OUTPATIENT)
Dept: PHYSICAL THERAPY | Facility: HOSPITAL | Age: 82
Setting detail: THERAPIES SERIES
Discharge: HOME | End: 2022-09-01

## 2022-09-01 DIAGNOSIS — M25.512 CHRONIC PAIN OF BOTH SHOULDERS: ICD-10-CM

## 2022-09-01 DIAGNOSIS — M54.50 CHRONIC BILATERAL LOW BACK PAIN WITHOUT SCIATICA: Primary | ICD-10-CM

## 2022-09-01 DIAGNOSIS — R53.1 GENERALIZED WEAKNESS: ICD-10-CM

## 2022-09-01 DIAGNOSIS — R26.2 DIFFICULTY WALKING: ICD-10-CM

## 2022-09-01 DIAGNOSIS — G89.29 CHRONIC BILATERAL LOW BACK PAIN WITHOUT SCIATICA: Primary | ICD-10-CM

## 2022-09-01 DIAGNOSIS — M25.511 CHRONIC PAIN OF BOTH SHOULDERS: ICD-10-CM

## 2022-09-01 DIAGNOSIS — G89.29 CHRONIC PAIN OF BOTH KNEES: ICD-10-CM

## 2022-09-01 DIAGNOSIS — M25.561 CHRONIC PAIN OF BOTH KNEES: ICD-10-CM

## 2022-09-01 DIAGNOSIS — M25.562 CHRONIC PAIN OF BOTH KNEES: ICD-10-CM

## 2022-09-01 DIAGNOSIS — G89.29 CHRONIC PAIN OF BOTH SHOULDERS: ICD-10-CM

## 2022-09-01 PROCEDURE — 97162 PT EVAL MOD COMPLEX 30 MIN: CPT

## 2022-09-01 PROCEDURE — 97530 THERAPEUTIC ACTIVITIES: CPT

## 2022-09-01 NOTE — THERAPY EVALUATION
Outpatient Physical Therapy Ortho Initial Evaluation  [facility]     Patient Name: Avelina Carr  : 1940  MRN: [id number]  Today's Date: 2022      Visit Date: 2022    Patient Active Problem List   Diagnosis   • Postmastectomy lymphedema syndrome   • Malignant neoplasm of upper-outer quadrant of right female breast (HCC)   • Cerebrovascular accident (CVA) (HCC)   • Stroke (HCC)   • Hemarthrosis   • Primary hypertension   • HLD (hyperlipidemia)   • Adverse effect of antiplatelet agent   • Left middle cerebral artery stroke (HCC)   • Carcinoma of central portion of right breast in female, estrogen receptor positive (HCC)   • Chronic bilateral low back pain with right-sided sciatica   • History of lumbar surgery   • Seizure disorder, simple partial, without intractable epilepsy (HCC)   • Spinal stenosis of lumbar region with neurogenic claudication   • Status post craniectomy   • History of total right knee replacement   • Other forms of scoliosis, lumbar region   • DDD (degenerative disc disease), lumbar   • Benign meningioma of brain (HCC)   • Squamous cell carcinoma of leg, left   • History of cardioembolic cerebrovascular accident (CVA)   • Hypothyroid   • Depression   • COPD (chronic obstructive pulmonary disease) (HCC)   • Obstructive sleep apnea   • Squamous cell carcinoma of leg, right   • Pain in joint, multiple sites   • Excessive daytime sleepiness   • Former smoker   • Gastroesophageal reflux disease   • Migraines   • Generalized weakness   • Acute CVA (cerebrovascular accident) (HCC)   • Dependence on supplemental oxygen   • Paroxysmal atrial fibrillation (HCC)   • Paroxysmal supraventricular tachycardia (HCC)        Past Medical History:   Diagnosis Date   • Anxiety    • Arthritis    • Breast cancer (HCC)    • COPD (chronic obstructive pulmonary disease) (HCC)     oxygen at 2L aths   • CVD (cardiovascular disease)    • Depression    • Hypothyroid    • Macular degeneration    •  Peripheral neuropathy    • Stroke (HCC)    • Vertigo         Past Surgical History:   Procedure Laterality Date   • ADRENAL GLAND SURGERY     • BRAIN MENINGIOMA EXCISION      occipital   •  SECTION     • EMBOLECTOMY N/A 11/15/2017    Procedure: Embolectomy Mechanical;  Surgeon: Rachid Figueroa MD;  Location: [facility] ;  Service:    • MASTECTOMY     • RECTAL SURGERY     • REPLACEMENT TOTAL KNEE Right    • TOTAL SHOULDER REPLACEMENT      x2       Visit Dx:     ICD-10-CM ICD-9-CM   1. Chronic bilateral low back pain without sciatica  M54.50 724.2    G89.29 338.29   2. Generalized weakness  R53.1 780.79   3. Difficulty walking  R26.2 719.7   4. Chronic pain of both knees  M25.561 719.46    M25.562 338.29    G89.29    5. Chronic pain of both shoulders  M25.511 719.41    G89.29 338.29    M25.512           Patient History     Row Name 22 0900             History    Chief Complaint Balance Problems;Pain;Muscle weakness  -RS      Type of Pain Back pain;Shoulder pain;Knee pain  -RS      Brief Description of Current Complaint The pt is an 81 yo female who presents with generalized weakness   PMH includes 3 strokes (most recent in 2022) which affected her balance, speech, strength, and vision (blurry/challenge with depth perception). B TKA approx 20 years ago and B TSA about 10-12 years prior. She had a brain tumor many years ago as well as breast cancer. She uses a rollator for ambulation and reports 1 fall in the past year. She has 1 LU[address] CARL[address] her home, her  helps navigate her rollator an she has a handrail.  She has pain in her L knee and R shoulder more than her  contralateral side. She reports N/T in B hands an feet, denies DM. She is able to stand for max of 10 min using rollator. She enjoys sewing, knitting, painting. R hand dominant.  -RS      Hand Dominance right-handed  -RS              Pain     Pain Location Back;Knee;Shoulder  -RS              Fall Risk Assessment     Any falls in the past year: Yes  -RS      Number of falls reported in the last 12 months 1  -RS              Services    Are you currently receiving Home Health services No  -RS              Daily Activities    Primary Language English  -RS      Pt Participated in POC and Goals Yes  -RS              Safety    Are you being hurt, hit, or frightened by anyone at home or in your life? No  -RS      Are you being neglected by a caregiver No  -RS            User Key  (r) = Recorded By, (t) = Taken By, (c) = Cosigned By    Initials Name Provider Type    RS Sofi Hinkle PT Physical Therapist                 PT Ortho     Row Name 09/01/22 1300       Vital Signs    Intra SpO2 (%) 98  -RS       Posture/Observations    Alignment Options Forward head;Thoracic kyphosis;Rounded shoulders  -RS    Forward Head Moderate  -RS    Thoracic Kyphosis Moderate  -RS    Rounded Shoulders Moderate  -RS       Quarter Clearing    Quarter Clearing Upper Quarter Clearing;Lower Quarter Clearing  -RS       Myotomal Screen- Upper Quarter Clearing    Shoulder flexion (C5) Right:;3+ (Fair +);Left:;4- (Good -)  -RS    Elbow flexion/wrist extension (C6) Bilateral:;4- (Good -)  -RS    Elbow extension/wrist flexion (C7) Bilateral:;4- (Good -)  -RS       Myotomal Screen- Lower Quarter Clearing    Hip flexion (L2) Right:;Left:;4- (Good -)  -RS    Knee extension (L3) Right:;4 (Good);Left:;4- (Good -)  -RS    Ankle DF (L4) Right:;4 (Good);Left:;4- (Good -)  -RS    Great toe extension (L5) Bilateral:;4- (Good -)  -RS    Ankle PF (S1) Bilateral:;3+ (Fair +)  -RS    Knee flexion (S2) Right:;4 (Good);Left:;4- (Good -)  -RS       General ROM    GENERAL ROM COMMENTS B shoulder scap 60% WNL  -RS       MMT (Manual Muscle Testing)    General MMT Comments full B knee extension, 110-1115 degrees B knee flex  -RS       Gait/Stairs (Locomotion)    Comment, (Gait/Stairs) altered gait pattern with wide JAZZMINE, decreased zabrina, forward flexed posture, dec heel strike at  IC  -RS          User Key  (r) = Recorded By, (t) = Taken By, (c) = Cosigned By    Initials Name Provider Type    RS Sofi Hinkle PT Physical Therapist                            Therapy Education  Education Details: Role of outpatient PT, POC, differential diagnosis, initial HEP, expectations, goals, anatomy. Access Code [email or web address]  Given: HEP, Symptoms/condition management, Pain management  Program: New  How Provided: Verbal, Demonstration, Written  Provided to: Patient  Level of Understanding: Verbalized, Demonstrated      PT OP Goals     Row Name 09/01/22 1300          PT Short Term Goals    STG Date to Achieve 09/22/22  -RS     STG 1 The pt will perform 5x STS from clinic chair without UE in no more than 18 sec to indicate improved functional mobility.  -RS     STG 1 Progress New  -RS     STG 2 The pt will deonstrate IND with initial HEP focused on improved LE strength.  -RS     STG 2 Progress New  -RS            Long Term Goals    LTG Date to Achieve 10/31/22  -RS     LTG 1 The pt will demonstrate IND and compliant with HEP focused on IND condition management and improved UE and LE strength.  -RS     LTG 1 Progress New  -RS     LTG 2 The pt will ambulate at least 350 ft with rollator in 2 min to indicate improved community navigation.  -RS     LTG 2 Progress New  -RS     LTG 3 The pt will demonstrate B LE strength to at least 4/5 to facilitate improved STS and stair navigation to enter home.  -RS     LTG 3 Progress New  -RS     LTG 4 The pt will report tolerance for walking to at least 15 min to facilitate improved community navigation and grocery shopping (using rollator).  -RS     LTG 4 Progress New  -RS            Time Calculation    PT Goal Re-Cert Due Date 11/30/22  -RS           User Key  (r) = Recorded By, (t) = Taken By, (c) = Cosigned By    Initials Name Provider Type    Sofi Moss PT Physical Therapist                 PT Assessment/Plan     Row Name 09/01/22 1500          PT Assessment     Functional Limitations Limitation in home management;Limitations in community activities;Performance in leisure activities;Performance in self-care ADL;Impaired gait;Decreased safety during functional activities  -RS     Impairments Impaired flexibility;Impaired lymphatic circulation;Impaired postural alignment;Joint mobility;Muscle strength;Pain;Poor body mechanics;Posture;Range of motion;Sensation  -RS     Assessment Comments Avelina Carr is a 82 y.o. female referred to physical therapy for low back pain and generalized weakness. PMHx includes L>R knee pain (hx B TKA) and R shoulder pain (Hx B TSA), breast and brain cancer, CVA (most recent April). She has fallen 1x in the past year. She lives in a home with 1 LU[address] CARL[address] with her .   She presents with an evolving clinical presentation that may impact her progress in the plan of care. Pt presents today with decreased BLE strength (L weaker than R), decreased B shoulder AROM scaption, decreased B UE strength,  Poor static and dynamic balance, altered gait pattern with wide JAZZMINE, decreased zabrina, forward flexed posture, dec heel strike at IC . her signs and symptoms are consistent with referring diagnosis. The previous impairments limit her ability to navigate her home and community safely, enter her home with 1 step, tolerate prolonged walking due to weakness and low back pain. She performs 5x STS in 24 sec and ambulates 283 feet with rollator in 2 minutes. The pt self scores 17% ability on the LEFS (100=full ability).Pt will benefit from skilled PT to address the previous impairments and return to PLOF.  -RS     Please refer to paper survey for additional self-reported information No  -RS     Rehab Potential Good  -RS     Patient/caregiver participated in establishment of treatment plan and goals Yes  -RS     Patient would benefit from skilled therapy intervention Yes  -RS            PT Plan    PT Frequency 1x/week;2x/week  -RS     Predicted Duration of Therapy  Intervention (PT) 16 sessions  -RS     Planned CPT's? PT EVAL MOD COMPLELITY: 83151;PT RE-EVAL: 91440;PT THER PROC EA 15 MIN: 17138;PT THER ACT EA 15 MIN: 63533;PT MANUAL THERAPY EA 15 MIN: 97935;PT NEUROMUSC RE-EDUCATION EA 15 MIN: 59106;PT GAIT TRAINING EA 15 MIN: 31209;PT AQUATIC THERAPY EA 15 MIN: 08159;PT SELF CARE/HOME MGMT/TRAIN EA 15: 92019;PT HOT OR COLD PACK TREAT MCARE  -RS     PT Plan Comments Assess IND with HEP, warm up on nustep, consider heel raise, side steps, wall wash  -RS           User Key  (r) = Recorded By, (t) = Taken By, (c) = Cosigned By    Initials Name Provider Type    Sofi Moss, PT Physical Therapist                   OP Exercises     Row Name 09/01/22 1300             Total Minutes    49493 - PT Therapeutic Activity Minutes 12  -RS              Exercise 1    Exercise Name 1 LTR  -RS      Cueing 1 Verbal;Demo  -RS      Reps 1 10  -RS              Exercise 2    Exercise Name 2 SKTC  -RS      Cueing 2 Verbal;Demo  -RS      Reps 2 3  -RS      Time 2 20  -RS              Exercise 3    Exercise Name 3 LAQ  -RS      Cueing 3 Verbal;Demo  -RS      Reps 3 15  -RS              Exercise 4    Exercise Name 4 STS  -RS      Cueing 4 Verbal;Demo  -RS      Reps 4 10  -RS            User Key  (r) = Recorded By, (t) = Taken By, (c) = Cosigned By    Initials Name Provider Type    RS Sofi Hinkle PT Physical Therapist                              Outcome Measure Options: Lower Extremity Functional Scale (LEFS)  2 Minute Walk Test  Gait, Assistive Device: rollator  Distance Ambulated in 2 Minutes: 283  5 Times Sit to Stand  5 Times Sit to Stand (seconds): 24 seconds         Time Calculation:     Start Time: 0920  Stop Time: 1000  Time Calculation (min): 40 min  Timed Charges  93997 - PT Therapeutic Activity Minutes: 12  Untimed Charges  PT Eval/Re-eval Minutes: 28  Total Minutes  Timed Charges Total Minutes: 12  Untimed Charges Total Minutes: 28   Total Minutes: 28     Therapy Charges for  Today     Code Description Service Date Service Provider Modifiers Qty    43174540896 HC PT THERAPEUTIC ACT EA 15 MIN 9/1/2022 Sofi Hinkle, PT GP 1    24429741650 HC PT EVAL MOD COMPLEXITY 2 9/1/2022 Sofi Hinkle, PT GP 1          PT G-Codes  Outcome Measure Options: Lower Extremity Functional Scale (LEFS)  Total: 14         Sofi Hinkle, PT  9/1/2022

## 2022-09-07 ENCOUNTER — HOSPITAL ENCOUNTER (OUTPATIENT)
Dept: PHYSICAL THERAPY | Facility: HOSPITAL | Age: 82
Setting detail: THERAPIES SERIES
Discharge: HOME | End: 2022-09-07

## 2022-09-07 DIAGNOSIS — G89.29 CHRONIC BILATERAL LOW BACK PAIN WITHOUT SCIATICA: Primary | ICD-10-CM

## 2022-09-07 DIAGNOSIS — R26.2 DIFFICULTY WALKING: ICD-10-CM

## 2022-09-07 DIAGNOSIS — R53.1 GENERALIZED WEAKNESS: ICD-10-CM

## 2022-09-07 DIAGNOSIS — M54.50 CHRONIC BILATERAL LOW BACK PAIN WITHOUT SCIATICA: Primary | ICD-10-CM

## 2022-09-07 DIAGNOSIS — G40.109: ICD-10-CM

## 2022-09-07 PROCEDURE — 97110 THERAPEUTIC EXERCISES: CPT

## 2022-09-07 PROCEDURE — 97530 THERAPEUTIC ACTIVITIES: CPT

## 2022-09-07 RX ORDER — GABAPENTIN 400 MG/1
CAPSULE ORAL
Qty: 180 CAPSULE | Refills: 5 | Status: SHIPPED | OUTPATIENT
Start: 2022-09-07 | End: 2023-03-07

## 2022-09-07 NOTE — THERAPY TREATMENT NOTE
Outpatient Physical Therapy Ortho Treatment Note  [facility]     Patient Name: Avelina Carr  : 1940  MRN: [id number]  Today's Date: 2022      Visit Date: 2022    Visit Dx:    ICD-10-CM ICD-9-CM   1. Chronic bilateral low back pain without sciatica  M54.50 724.2    G89.29 338.29   2. Generalized weakness  R53.1 780.79   3. Difficulty walking  R26.2 719.7       Patient Active Problem List   Diagnosis   • Postmastectomy lymphedema syndrome   • Malignant neoplasm of upper-outer quadrant of right female breast (HCC)   • Cerebrovascular accident (CVA) ([facility])   • Stroke ([facility])   • Hemarthrosis   • Primary hypertension   • HLD (hyperlipidemia)   • Adverse effect of antiplatelet agent   • Left middle cerebral artery stroke ([facility])   • Carcinoma of central portion of right breast in female, estrogen receptor positive ([facility])   • Chronic bilateral low back pain with right-sided sciatica   • History of lumbar surgery   • Seizure disorder, simple partial, without intractable epilepsy ([facility])   • Spinal stenosis of lumbar region with neurogenic claudication   • Status post craniectomy   • History of total right knee replacement   • Other forms of scoliosis, lumbar region   • DDD (degenerative disc disease), lumbar   • Benign meningioma of brain ([facility])   • Squamous cell carcinoma of leg, left   • History of cardioembolic cerebrovascular accident (CVA)   • Hypothyroid   • Depression   • COPD (chronic obstructive pulmonary disease) ([facility])   • Obstructive sleep apnea   • Squamous cell carcinoma of leg, right   • Pain in joint, multiple sites   • Excessive daytime sleepiness   • Former smoker   • Gastroesophageal reflux disease   • Migraines   • Generalized weakness   • Acute CVA (cerebrovascular accident) ([facility])   • Dependence on supplemental oxygen   • Paroxysmal atrial fibrillation ([facility])   • Paroxysmal supraventricular tachycardia ([facility])        Past Medical History:   Diagnosis Date   • Anxiety    • Arthritis    •  Breast cancer (HCC)    • COPD (chronic obstructive pulmonary disease) (HCC)     oxygen at 2L aths   • CVD (cardiovascular disease)    • Depression    • Hypothyroid    • Macular degeneration    • Peripheral neuropathy    • Stroke (HCC)    • Vertigo         Past Surgical History:   Procedure Laterality Date   • ADRENAL GLAND SURGERY     • BRAIN MENINGIOMA EXCISION      occipital   •  SECTION     • EMBOLECTOMY N/A 11/15/2017    Procedure: Embolectomy Mechanical;  Surgeon: Rachid Figueroa MD;  Location: [facility] ;  Service:    • MASTECTOMY     • RECTAL SURGERY     • REPLACEMENT TOTAL KNEE Right    • TOTAL SHOULDER REPLACEMENT      x2                        PT Assessment/Plan     Row Name 22 1617          PT Assessment    Assessment Comments Pt returns for initial follow up after evaluation reporting soreness following last session, however good tolerance overall. Pt with slight dizziness in supine and seated EOB today which resolved after several minutes. Pt with history of vertigo and educated to take time with transitions for maximal safety. Able to progress standing strengthening and balance tasks today and educated on expected soreness following PT.  -CN            PT Plan    PT Plan Comments Assess response to added exercises and continue to progress strengthening as tolerated.  -CN           User Key  (r) = Recorded By, (t) = Taken By, (c) = Cosigned By    Initials Name Provider Type    Lachelle Valle, PT Physical Therapist                   OP Exercises     Row Name 22 1500             Subjective Comments    Subjective Comments My balance isn't great today. I feel ok though.  -CN              Subjective Pain    Able to rate subjective pain? yes  -CN      Pre-Treatment Pain Level 0  -CN              Total Minutes    72128 - PT Therapeutic Exercise Minutes 25  -CN      30162 - PT Therapeutic Activity Minutes 15  -CN              Exercise 1    Exercise  Name 1 LTR  -CN      Cueing 1 Verbal;Demo  -CN      Reps 1 10  -CN              Exercise 2    Exercise Name 2 SKTC  -CN      Cueing 2 Verbal;Demo  -CN      Reps 2 3  -CN      Time 2 20  -CN              Exercise 3    Exercise Name 3 LAQ  -CN      Cueing 3 Verbal;Demo  -CN      Reps 3 15  -CN              Exercise 4    Exercise Name 4 STS  -CN      Cueing 4 Verbal;Demo  -CN      Reps 4 10  -CN              Exercise 5    Exercise Name 5 Nustep, L5  -CN      Cueing 5 Verbal  -CN      Time 5 4 min  -CN      Additional Comments stopped at 4 min per L knee/ankle pain  -CN              Exercise 6    Exercise Name 6 HR  -CN      Cueing 6 Verbal;Demo  -CN      Reps 6 20  -CN              Exercise 7    Exercise Name 7 Sidestepping at ballet bar  -CN      Cueing 7 Verbal;Demo  -CN      Reps 7 3 laps  -CN              Exercise 8    Exercise Name 8 Wall wash  -CN      Cueing 8 Verbal;Demo  -CN      Reps 8 10  -CN      Additional Comments follow with eyes  -CN              Exercise 9    Exercise Name 9 Tandem gait, forward/retro  -CN      Cueing 9 Verbal;Demo  -CN      Time 9 2 laps  -CN              Exercise 10    Exercise Name 10 Narrow JAZZMINE with EC  -CN      Cueing 10 Verbal;Demo  -CN      Reps 10 2  -CN      Time 10 20 sec  -CN            User Key  (r) = Recorded By, (t) = Taken By, (c) = Cosigned By    Initials Name Provider Type    Lachelle Valle, PT Physical Therapist                              PT OP Goals     Row Name 09/07/22 1600          PT Short Term Goals    STG Date to Achieve 09/22/22  -CN     STG 1 The pt will perform 5x STS from clinic chair without UE in no more than 18 sec to indicate improved functional mobility.  -CN     STG 1 Progress Ongoing  -CN     STG 2 The pt will deonstrate IND with initial HEP focused on improved LE strength.  -CN     STG 2 Progress Ongoing  -CN     STG 2 Progress Comments Reviewed today.  -CN            Long Term Goals    LTG Date to Achieve 10/31/22  -CN     LTG 1 The  pt will demonstrate IND and compliant with HEP focused on IND condition management and improved UE and LE strength.  -CN     LTG 1 Progress Ongoing  -CN     LTG 2 The pt will ambulate at least 350 ft with rollator in 2 min to indicate improved community navigation.  -CN     LTG 2 Progress Ongoing  -CN     LTG 3 The pt will demonstrate B LE strength to at least 4/5 to facilitate improved STS and stair navigation to enter home.  -CN     LTG 3 Progress Ongoing  -CN     LTG 4 The pt will report tolerance for walking to at least 15 min to facilitate improved community navigation and grocery shopping (using rollator).  -CN     LTG 4 Progress Ongoing  -CN           User Key  (r) = Recorded By, (t) = Taken By, (c) = Cosigned By    Initials Name Provider Type    Lachelle Valle, KAREN Physical Therapist                Therapy Education  Given: HEP, Symptoms/condition management, Pain management  Program: Reinforced, Progressed  How Provided: Verbal, Demonstration  Provided to: Patient  Level of Understanding: Verbalized, Demonstrated              Time Calculation:   Start Time: 1535  Stop Time: 1615  Time Calculation (min): 40 min  Timed Charges  34493 - PT Therapeutic Exercise Minutes: 25  13024 - PT Therapeutic Activity Minutes: 15  Total Minutes  Timed Charges Total Minutes: 40   Total Minutes: 40  Therapy Charges for Today     Code Description Service Date Service Provider Modifiers Qty    44290108308  PT THER PROC EA 15 MIN 9/7/2022 Lachelle Farrell, PT GP 2    [id number]  PT THERAPEUTIC ACT EA 15 MIN 9/7/2022 Lachelle Farrell, PT GP 1                    Lachelle Farrell PT  9/7/2022

## 2022-09-15 ENCOUNTER — HOSPITAL ENCOUNTER (OUTPATIENT)
Dept: PHYSICAL THERAPY | Facility: HOSPITAL | Age: 82
Setting detail: THERAPIES SERIES
Discharge: HOME | End: 2022-09-15

## 2022-09-15 DIAGNOSIS — M25.561 CHRONIC PAIN OF BOTH KNEES: ICD-10-CM

## 2022-09-15 DIAGNOSIS — R53.1 GENERALIZED WEAKNESS: ICD-10-CM

## 2022-09-15 DIAGNOSIS — M25.512 CHRONIC PAIN OF BOTH SHOULDERS: ICD-10-CM

## 2022-09-15 DIAGNOSIS — R26.2 DIFFICULTY WALKING: ICD-10-CM

## 2022-09-15 DIAGNOSIS — G89.29 CHRONIC PAIN OF BOTH SHOULDERS: ICD-10-CM

## 2022-09-15 DIAGNOSIS — G89.29 CHRONIC PAIN OF BOTH KNEES: ICD-10-CM

## 2022-09-15 DIAGNOSIS — M54.50 CHRONIC BILATERAL LOW BACK PAIN WITHOUT SCIATICA: Primary | ICD-10-CM

## 2022-09-15 DIAGNOSIS — M25.511 CHRONIC PAIN OF BOTH SHOULDERS: ICD-10-CM

## 2022-09-15 DIAGNOSIS — G89.29 CHRONIC BILATERAL LOW BACK PAIN WITHOUT SCIATICA: Primary | ICD-10-CM

## 2022-09-15 DIAGNOSIS — M25.562 CHRONIC PAIN OF BOTH KNEES: ICD-10-CM

## 2022-09-15 PROCEDURE — 97110 THERAPEUTIC EXERCISES: CPT

## 2022-09-15 PROCEDURE — 97530 THERAPEUTIC ACTIVITIES: CPT

## 2022-09-15 NOTE — THERAPY TREATMENT NOTE
Outpatient Physical Therapy Ortho Treatment Note  [facility]     Patient Name: Avelina Carr  : 1940  MRN: [id number]  Today's Date: 9/15/2022      Visit Date: 09/15/2022    Visit Dx:    ICD-10-CM ICD-9-CM   1. Chronic bilateral low back pain without sciatica  M54.50 724.2    G89.29 338.29   2. Generalized weakness  R53.1 780.79   3. Difficulty walking  R26.2 719.7   4. Chronic pain of both knees  M25.561 719.46    M25.562 338.29    G89.29    5. Chronic pain of both shoulders  M25.511 719.41    G89.29 338.29    M25.512        Patient Active Problem List   Diagnosis   • Postmastectomy lymphedema syndrome   • Malignant neoplasm of upper-outer quadrant of right female breast ([facility])   • Cerebrovascular accident (CVA) ([facility])   • Stroke ([facility])   • Hemarthrosis   • Primary hypertension   • HLD (hyperlipidemia)   • Adverse effect of antiplatelet agent   • Left middle cerebral artery stroke ([facility])   • Carcinoma of central portion of right breast in female, estrogen receptor positive ([facility])   • Chronic bilateral low back pain with right-sided sciatica   • History of lumbar surgery   • Seizure disorder, simple partial, without intractable epilepsy ([facility])   • Spinal stenosis of lumbar region with neurogenic claudication   • Status post craniectomy   • History of total right knee replacement   • Other forms of scoliosis, lumbar region   • DDD (degenerative disc disease), lumbar   • Benign meningioma of brain ([facility])   • Squamous cell carcinoma of leg, left   • History of cardioembolic cerebrovascular accident (CVA)   • Hypothyroid   • Depression   • COPD (chronic obstructive pulmonary disease) ([facility])   • Obstructive sleep apnea   • Squamous cell carcinoma of leg, right   • Pain in joint, multiple sites   • Excessive daytime sleepiness   • Former smoker   • Gastroesophageal reflux disease   • Migraines   • Generalized weakness   • Acute CVA (cerebrovascular accident) ([facility])   • Dependence on supplemental oxygen   • Paroxysmal  atrial fibrillation (HCC)   • Paroxysmal supraventricular tachycardia (HCC)        Past Medical History:   Diagnosis Date   • Anxiety    • Arthritis    • Breast cancer (HCC)    • COPD (chronic obstructive pulmonary disease) (HCC)     oxygen at 2L aths   • CVD (cardiovascular disease)    • Depression    • Hypothyroid    • Macular degeneration    • Peripheral neuropathy    • Stroke (HCC)    • Vertigo         Past Surgical History:   Procedure Laterality Date   • ADRENAL GLAND SURGERY     • BRAIN MENINGIOMA EXCISION      occipital   •  SECTION     • EMBOLECTOMY N/A 11/15/2017    Procedure: Embolectomy Mechanical;  Surgeon: Rachid Figueroa MD;  Location: [facility] OR ;  Service:    • MASTECTOMY     • RECTAL SURGERY     • REPLACEMENT TOTAL KNEE Right    • TOTAL SHOULDER REPLACEMENT      x2                        PT Assessment/Plan     Row Name 09/15/22 1217          PT Assessment    Assessment Comments Pt reporting soreness following last session, however resolved within 1 day following PT. Continued with current treatment plan and added bridges and step taps today with good tolerance. Pt reports sorenss in R LE at end of session but believes it just feels as if it has been worked. Pt slowly progressing toward therapy goals.  -CN            PT Plan    PT Plan Comments Assess response to current program and likely update HEP. Continue with functional strengthening as tolerated. May consider step up onto blue foam, rows and lateral step with UE reach in // bars next visit.  -CN           User Key  (r) = Recorded By, (t) = Taken By, (c) = Cosigned By    Initials Name Provider Type    Lachelle Valle, PT Physical Therapist                   OP Exercises     Row Name 09/15/22 1100             Subjective Comments    Subjective Comments I was sore after last time. It didn't last long though.  -CN              Subjective Pain    Able to rate subjective pain? yes  -CN       "Pre-Treatment Pain Level 0  -CN              Total Minutes    79637 - PT Therapeutic Exercise Minutes 25  -CN      40477 - PT Therapeutic Activity Minutes 15  -CN              Exercise 1    Exercise Name 1 LTR  -CN      Cueing 1 Verbal;Demo  -CN      Reps 1 10  -CN              Exercise 2    Exercise Name 2 SKTC  -CN      Cueing 2 Verbal;Demo  -CN      Reps 2 3  -CN      Time 2 20  -CN              Exercise 3    Exercise Name 3 Small range bridge with PPT  -CN      Cueing 3 Verbal;Demo  -CN      Reps 3 10  -CN              Exercise 4    Exercise Name 4 STS  -CN      Cueing 4 Verbal;Demo  -CN      Reps 4 10  -CN              Exercise 5    Exercise Name 5 Nustep, L5  -CN      Cueing 5 Verbal  -CN      Time 5 5 min  -CN              Exercise 6    Exercise Name 6 HR  -CN      Cueing 6 Verbal;Demo  -CN      Reps 6 20  -CN              Exercise 7    Exercise Name 7 Sidestepping at ballet bar  -CN      Cueing 7 Verbal;Demo  -CN      Reps 7 3 laps  -CN              Exercise 8    Exercise Name 8 Wall wash  -CN      Cueing 8 Verbal;Demo  -CN      Reps 8 10  -CN      Additional Comments follow with eyes  -CN              Exercise 9    Exercise Name 9 Tandem gait, forward/retro  -CN      Cueing 9 Verbal;Demo  -CN      Time 9 3 laps  -CN      Additional Comments cues for safety  -CN              Exercise 10    Exercise Name 10 Narrow JAZZMINE with EC  -CN      Cueing 10 Verbal;Demo  -CN      Reps 10 2  -CN      Time 10 20 sec  -CN              Exercise 11    Exercise Name 11 LAQ  -CN      Cueing 11 Verbal;Demo  -CN      Reps 11 10 B  -CN      Time 11 cues for TA  -CN              Exercise 12    Exercise Name 12 Step taps, 6\"  -CN      Cueing 12 Verbal;Demo  -CN      Reps 12 10 B  -CN      Additional Comments 1 UE  -CN            User Key  (r) = Recorded By, (t) = Taken By, (c) = Cosigned By    Initials Name Provider Type    Lachelle Valle, PT Physical Therapist                              PT OP Goals     Row Name " 09/15/22 1200          PT Short Term Goals    STG Date to Achieve 09/22/22  -CN     STG 1 The pt will perform 5x STS from clinic chair without UE in no more than 18 sec to indicate improved functional mobility.  -CN     STG 1 Progress Ongoing  -CN     STG 2 The pt will deonstrate IND with initial HEP focused on improved LE strength.  -CN     STG 2 Progress Ongoing  -CN     STG 2 Progress Comments Progressing balance and strengthening as tolerated.  -CN            Long Term Goals    LTG Date to Achieve 10/31/22  -CN     LTG 1 The pt will demonstrate IND and compliant with HEP focused on IND condition management and improved UE and LE strength.  -CN     LTG 1 Progress Ongoing  -CN     LTG 2 The pt will ambulate at least 350 ft with rollator in 2 min to indicate improved community navigation.  -CN     LTG 2 Progress Ongoing  -CN     LTG 3 The pt will demonstrate B LE strength to at least 4/5 to facilitate improved STS and stair navigation to enter home.  -CN     LTG 3 Progress Ongoing  -CN     LTG 4 The pt will report tolerance for walking to at least 15 min to facilitate improved community navigation and grocery shopping (using rollator).  -CN     LTG 4 Progress Ongoing  -CN           User Key  (r) = Recorded By, (t) = Taken By, (c) = Cosigned By    Initials Name Provider Type    Lachelle Valle, PT Physical Therapist                Therapy Education  Given: HEP, Symptoms/condition management, Pain management  Program: Reinforced, Progressed  How Provided: Verbal, Demonstration  Provided to: Patient  Level of Understanding: Verbalized, Demonstrated              Time Calculation:   Start Time: 1135  Stop Time: 1215  Time Calculation (min): 40 min  Timed Charges  21076 - PT Therapeutic Exercise Minutes: 25  94010 - PT Therapeutic Activity Minutes: 15  Total Minutes  Timed Charges Total Minutes: 40   Total Minutes: 40  Therapy Charges for Today     Code Description Service Date Service Provider Modifiers Qty     40671862125  PT THER PROC EA 15 MIN 9/15/2022 Lachelle Farrell, PT GP 2    [id number]  PT THERAPEUTIC ACT EA 15 MIN 9/15/2022 Lachelle Farrell, PT GP 1                    Lachelle Farrell, PT  9/15/2022

## 2022-09-16 ENCOUNTER — TELEPHONE (OUTPATIENT)
Dept: FAMILY MEDICINE CLINIC | Facility: CLINIC | Age: 82
End: 2022-09-16

## 2022-09-16 NOTE — TELEPHONE ENCOUNTER
Please call and let her know that she does not need to take potassium at this time. She is not taking any medication that depletes potasium and her last labs have been normal.

## 2022-09-16 NOTE — TELEPHONE ENCOUNTER
Caller: Avelina Carr    Relationship: Self    Best call back number: [phone]      What is the best time to reach you: ANYTIME    Who are you requesting to speak with (clinical staff, provider,  specific staff member): MA OR DOCTOR        What was the call regarding: PATIENT IS CALLING IN SHE IS WANTING TO KNOW IF SHE IS SUPPOSED TO BE TAKING POTASSIUM.    Do you require a callback: YES

## 2022-09-19 ENCOUNTER — HOSPITAL ENCOUNTER (OUTPATIENT)
Dept: PHYSICAL THERAPY | Facility: HOSPITAL | Age: 82
Setting detail: THERAPIES SERIES
Discharge: HOME | End: 2022-09-19

## 2022-09-19 DIAGNOSIS — G89.29 CHRONIC BILATERAL LOW BACK PAIN WITHOUT SCIATICA: Primary | ICD-10-CM

## 2022-09-19 DIAGNOSIS — M54.50 CHRONIC BILATERAL LOW BACK PAIN WITHOUT SCIATICA: Primary | ICD-10-CM

## 2022-09-19 DIAGNOSIS — R26.2 DIFFICULTY WALKING: ICD-10-CM

## 2022-09-19 DIAGNOSIS — R53.1 GENERALIZED WEAKNESS: ICD-10-CM

## 2022-09-19 PROCEDURE — 97530 THERAPEUTIC ACTIVITIES: CPT

## 2022-09-19 PROCEDURE — 97110 THERAPEUTIC EXERCISES: CPT

## 2022-09-19 NOTE — THERAPY TREATMENT NOTE
Outpatient Physical Therapy Ortho Treatment Note  [facility]     Patient Name: Avelina Carr  : 1940  MRN: [id number]  Today's Date: 2022      Visit Date: 2022    Visit Dx:    ICD-10-CM ICD-9-CM   1. Chronic bilateral low back pain without sciatica  M54.50 724.2    G89.29 338.29   2. Generalized weakness  R53.1 780.79   3. Difficulty walking  R26.2 719.7       Patient Active Problem List   Diagnosis   • Postmastectomy lymphedema syndrome   • Malignant neoplasm of upper-outer quadrant of right female breast (HCC)   • Cerebrovascular accident (CVA) ([facility])   • Stroke ([facility])   • Hemarthrosis   • Primary hypertension   • HLD (hyperlipidemia)   • Adverse effect of antiplatelet agent   • Left middle cerebral artery stroke ([facility])   • Carcinoma of central portion of right breast in female, estrogen receptor positive ([facility])   • Chronic bilateral low back pain with right-sided sciatica   • History of lumbar surgery   • Seizure disorder, simple partial, without intractable epilepsy ([facility])   • Spinal stenosis of lumbar region with neurogenic claudication   • Status post craniectomy   • History of total right knee replacement   • Other forms of scoliosis, lumbar region   • DDD (degenerative disc disease), lumbar   • Benign meningioma of brain ([facility])   • Squamous cell carcinoma of leg, left   • History of cardioembolic cerebrovascular accident (CVA)   • Hypothyroid   • Depression   • COPD (chronic obstructive pulmonary disease) ([facility])   • Obstructive sleep apnea   • Squamous cell carcinoma of leg, right   • Pain in joint, multiple sites   • Excessive daytime sleepiness   • Former smoker   • Gastroesophageal reflux disease   • Migraines   • Generalized weakness   • Acute CVA (cerebrovascular accident) ([facility])   • Dependence on supplemental oxygen   • Paroxysmal atrial fibrillation ([facility])   • Paroxysmal supraventricular tachycardia ([facility])        Past Medical History:   Diagnosis Date   • Anxiety    • Arthritis    •  Breast cancer (HCC)    • COPD (chronic obstructive pulmonary disease) (HCC)     oxygen at 2L aths   • CVD (cardiovascular disease)    • Depression    • Hypothyroid    • Macular degeneration    • Peripheral neuropathy    • Stroke (HCC)    • Vertigo         Past Surgical History:   Procedure Laterality Date   • ADRENAL GLAND SURGERY     • BRAIN MENINGIOMA EXCISION      occipital   •  SECTION     • EMBOLECTOMY N/A 11/15/2017    Procedure: Embolectomy Mechanical;  Surgeon: Rachid Figueroa MD;  Location: [facility] ;  Service:    • MASTECTOMY     • RECTAL SURGERY     • REPLACEMENT TOTAL KNEE Right    • TOTAL SHOULDER REPLACEMENT      x2                        PT Assessment/Plan     Row Name 22 1200          PT Assessment    Assessment Comments Pt reports intermittent HEP compliance. Continued with progression of core/general strengthening and balance progressions. Pt requires cues for form with most exercises.  -CC            PT Plan    PT Plan Comments Consider adding lateral step and reach, band to side steps.  -CC           User Key  (r) = Recorded By, (t) = Taken By, (c) = Cosigned By    Initials Name Provider Type    Karlee Cantu, PT Physical Therapist                   OP Exercises     Row Name 22 1100             Subjective Comments    Subjective Comments Pt reports he back is doing well, but does report able to do HEP every few days, although not daily.  -CC              Subjective Pain    Able to rate subjective pain? yes  -CC      Pre-Treatment Pain Level 0  -CC              Total Minutes    26505 - PT Therapeutic Exercise Minutes 25  -CC      11544 - PT Therapeutic Activity Minutes 13  -CC              Exercise 1    Exercise Name 1 LTR  -CC      Cueing 1 Verbal;Demo  -CC      Reps 1 10  -CC              Exercise 2    Exercise Name 2 SKTC  -CC      Cueing 2 Verbal;Demo  -CC      Reps 2 3  -CC      Time 2 20  -CC              Exercise 3    Exercise Name  "3 Small range bridge with PPT  -CC      Cueing 3 Verbal;Demo  -CC      Reps 3 10  -CC              Exercise 4    Exercise Name 4 STS  -CC      Cueing 4 Verbal;Demo  -CC      Reps 4 10  -CC              Exercise 5    Exercise Name 5 Nustep, L5  -CC      Cueing 5 Verbal  -CC      Time 5 5 min  -CC              Exercise 6    Exercise Name 6 HR  -CC      Cueing 6 Verbal;Demo  -CC      Reps 6 20  -CC              Exercise 7    Exercise Name 7 Sidestepping at ballet bar  -CC      Cueing 7 Verbal;Demo  -CC      Reps 7 3 laps  -CC              Exercise 8    Exercise Name 8 Wall wash  -CC      Cueing 8 Verbal;Demo  -CC      Reps 8 15  -CC      Additional Comments follow with eyes  -CC              Exercise 9    Exercise Name 9 Tandem gait, forward/retro  -CC      Cueing 9 Verbal;Demo  -CC      Reps 9 3 laps  -CC              Exercise 10    Exercise Name 10 NBOS w/ head turn  -CC      Cueing 10 Verbal  -CC      Reps 10 10 ea way  -CC      Time 10 no UE  -CC      Additional Comments SV/CGA  -CC              Exercise 11    Exercise Name 11 LAQ  -CC      Cueing 11 Verbal;Demo  -CC      Reps 11 10 B  -CC      Time 11 cues for TA  -CC              Exercise 12    Exercise Name 12 Step taps, 6\"  -CC      Cueing 12 Verbal;Demo  -CC      Reps 12 10 B  -CC      Additional Comments 1 UE  -CC              Exercise 13    Exercise Name 13 90/90 HS str  -CC      Cueing 13 Verbal  -CC      Reps 13 3 L  -CC      Time 13 20 sec  -CC      Additional Comments before bridge d/t crampin in HS  -CC            User Key  (r) = Recorded By, (t) = Taken By, (c) = Cosigned By    Initials Name Provider Type    CC Karlee Sherman, PT Physical Therapist                                                Time Calculation:   Start Time: 1134  Stop Time: 1212  Time Calculation (min): 38 min  Total Timed Code Minutes- PT: 38 minute(s)  Timed Charges  33720 - PT Therapeutic Exercise Minutes: 25  34989 - PT Therapeutic Activity Minutes: 13  Total Minutes  Timed " Charges Total Minutes: 38   Total Minutes: 38  Therapy Charges for Today     Code Description Service Date Service Provider Modifiers Qty    [id number]  PT THER PROC EA 15 MIN 9/19/2022 Karlee Sherman, PT GP 2    [id number]  PT THERAPEUTIC ACT EA 15 MIN 9/19/2022 Karlee Sherman, PT GP 1                    Karlee Sherman, PT  9/19/2022

## 2022-09-22 ENCOUNTER — HOSPITAL ENCOUNTER (OUTPATIENT)
Dept: PHYSICAL THERAPY | Facility: HOSPITAL | Age: 82
Setting detail: THERAPIES SERIES
Discharge: HOME | End: 2022-09-22

## 2022-09-22 DIAGNOSIS — G89.29 CHRONIC PAIN OF BOTH KNEES: ICD-10-CM

## 2022-09-22 DIAGNOSIS — R53.1 GENERALIZED WEAKNESS: ICD-10-CM

## 2022-09-22 DIAGNOSIS — M54.50 CHRONIC BILATERAL LOW BACK PAIN WITHOUT SCIATICA: Primary | ICD-10-CM

## 2022-09-22 DIAGNOSIS — G89.29 CHRONIC BILATERAL LOW BACK PAIN WITHOUT SCIATICA: Primary | ICD-10-CM

## 2022-09-22 DIAGNOSIS — G89.29 CHRONIC PAIN OF BOTH SHOULDERS: ICD-10-CM

## 2022-09-22 DIAGNOSIS — M25.511 CHRONIC PAIN OF BOTH SHOULDERS: ICD-10-CM

## 2022-09-22 DIAGNOSIS — M25.561 CHRONIC PAIN OF BOTH KNEES: ICD-10-CM

## 2022-09-22 DIAGNOSIS — R26.2 DIFFICULTY WALKING: ICD-10-CM

## 2022-09-22 DIAGNOSIS — M25.562 CHRONIC PAIN OF BOTH KNEES: ICD-10-CM

## 2022-09-22 DIAGNOSIS — M25.512 CHRONIC PAIN OF BOTH SHOULDERS: ICD-10-CM

## 2022-09-22 PROCEDURE — 97110 THERAPEUTIC EXERCISES: CPT

## 2022-09-22 PROCEDURE — 97530 THERAPEUTIC ACTIVITIES: CPT

## 2022-09-22 NOTE — THERAPY TREATMENT NOTE
Outpatient Physical Therapy Ortho Treatment Note  [facility]     Patient Name: Avelina Carr  : 1940  MRN: [id number]  Today's Date: 2022      Visit Date: 2022    Visit Dx:    ICD-10-CM ICD-9-CM   1. Chronic bilateral low back pain without sciatica  M54.50 724.2    G89.29 338.29   2. Generalized weakness  R53.1 780.79   3. Difficulty walking  R26.2 719.7   4. Chronic pain of both knees  M25.561 719.46    M25.562 338.29    G89.29    5. Chronic pain of both shoulders  M25.511 719.41    G89.29 338.29    M25.512        Patient Active Problem List   Diagnosis   • Postmastectomy lymphedema syndrome   • Malignant neoplasm of upper-outer quadrant of right female breast ([facility])   • Cerebrovascular accident (CVA) ([facility])   • Stroke ([facility])   • Hemarthrosis   • Primary hypertension   • HLD (hyperlipidemia)   • Adverse effect of antiplatelet agent   • Left middle cerebral artery stroke ([facility])   • Carcinoma of central portion of right breast in female, estrogen receptor positive ([facility])   • Chronic bilateral low back pain with right-sided sciatica   • History of lumbar surgery   • Seizure disorder, simple partial, without intractable epilepsy ([facility])   • Spinal stenosis of lumbar region with neurogenic claudication   • Status post craniectomy   • History of total right knee replacement   • Other forms of scoliosis, lumbar region   • DDD (degenerative disc disease), lumbar   • Benign meningioma of brain ([facility])   • Squamous cell carcinoma of leg, left   • History of cardioembolic cerebrovascular accident (CVA)   • Hypothyroid   • Depression   • COPD (chronic obstructive pulmonary disease) ([facility])   • Obstructive sleep apnea   • Squamous cell carcinoma of leg, right   • Pain in joint, multiple sites   • Excessive daytime sleepiness   • Former smoker   • Gastroesophageal reflux disease   • Migraines   • Generalized weakness   • Acute CVA (cerebrovascular accident) ([facility])   • Dependence on supplemental oxygen   • Paroxysmal  atrial fibrillation (HCC)   • Paroxysmal supraventricular tachycardia (HCC)        Past Medical History:   Diagnosis Date   • Anxiety    • Arthritis    • Breast cancer (HCC)    • COPD (chronic obstructive pulmonary disease) (HCC)     oxygen at 2L aths   • CVD (cardiovascular disease)    • Depression    • Hypothyroid    • Macular degeneration    • Peripheral neuropathy    • Stroke (HCC)    • Vertigo         Past Surgical History:   Procedure Laterality Date   • ADRENAL GLAND SURGERY     • BRAIN MENINGIOMA EXCISION      occipital   •  SECTION     • EMBOLECTOMY N/A 11/15/2017    Procedure: Embolectomy Mechanical;  Surgeon: Rachid Figueroa MD;  Location: [facility] OR ;  Service:    • MASTECTOMY     • RECTAL SURGERY     • REPLACEMENT TOTAL KNEE Right    • TOTAL SHOULDER REPLACEMENT      x2                        PT Assessment/Plan     Row Name 22 1401          PT Assessment    Assessment Comments Pt reporting soreness following therapy, however tolerating current program well. Updated HEP handouts with progressed supine, seated and standing tasks with emphasis on flexibility and strengthening. Added lateral step/reach with good balance noted and progressing well toward therapy goals.  -CN            PT Plan    PT Plan Comments Assess response to progressed HEP and consider YTB with sidestepping, monster walk and step up onto blue foam next visit.  -CN           User Key  (r) = Recorded By, (t) = Taken By, (c) = Cosigned By    Initials Name Provider Type    CN Lachelle Farrell, PT Physical Therapist                   OP Exercises     Row Name 22 1100             Subjective Comments    Subjective Comments I didn't do my exercises yesterday. I feel good today though.  -CN              Subjective Pain    Able to rate subjective pain? yes  -CN      Pre-Treatment Pain Level 0  -CN              Total Minutes    14201 - PT Therapeutic Exercise Minutes 27  -CN      29949 - PT  "Therapeutic Activity Minutes 13  -CN              Exercise 1    Exercise Name 1 LTR  -CN      Cueing 1 Verbal;Demo  -CN      Reps 1 10  -CN              Exercise 2    Exercise Name 2 SKTC  -CN      Cueing 2 Verbal;Demo  -CN      Reps 2 3  -CN      Time 2 20  -CN              Exercise 3    Exercise Name 3 Small range bridge with PPT  -CN      Cueing 3 Verbal;Demo  -CN      Sets 3 2  -CN      Reps 3 10  -CN              Exercise 4    Exercise Name 4 STS  -CN      Cueing 4 Verbal;Demo  -CN      Sets 4 2  -CN      Reps 4 10  -CN              Exercise 5    Exercise Name 5 Nustep, L5  -CN      Additional Comments all machines busy  -CN              Exercise 6    Exercise Name 6 HR  -CN      Cueing 6 Verbal;Demo  -CN      Reps 6 20  -CN              Exercise 7    Exercise Name 7 Sidestepping at ballet bar  -CN      Cueing 7 Verbal;Demo  -CN      Reps 7 3 laps  -CN              Exercise 8    Exercise Name 8 Wall wash  -CN      Cueing 8 Verbal;Demo  -CN      Reps 8 15  -CN      Additional Comments follow with eyes  -CN              Exercise 9    Exercise Name 9 Tandem gait, forward/retro  -CN      Cueing 9 Verbal;Demo  -CN      Reps 9 3 laps  -CN              Exercise 11    Exercise Name 11 LAQ  -CN      Cueing 11 Verbal;Demo  -CN      Sets 11 2  -CN      Reps 11 10 B  -CN      Time 11 cues for TA  -CN      Additional Comments 2#  -CN              Exercise 12    Exercise Name 12 Step taps, 6\"  -CN      Cueing 12 Verbal;Demo  -CN      Reps 12 10 B  -CN      Additional Comments 1 UE  -CN              Exercise 13    Exercise Name 13 90/90 HS str  -CN      Cueing 13 Verbal  -CN      Reps 13 3 L  -CN      Time 13 20 sec  -CN              Exercise 14    Exercise Name 14 Lateral step/reach with head turn  -CN      Cueing 14 Verbal;Demo  -CN      Reps 14 10 B  -CN            User Key  (r) = Recorded By, (t) = Taken By, (c) = Cosigned By    Initials Name Provider Type    Lachelle Valle, PT Physical Therapist          "                     PT OP Goals     Row Name 09/22/22 1400          PT Short Term Goals    STG Date to Achieve 09/22/22  -CN     STG 1 The pt will perform 5x STS from clinic chair without UE in no more than 18 sec to indicate improved functional mobility.  -CN     STG 1 Progress Ongoing  -CN     STG 1 Progress Comments Working toward functional strengthening as tolerated.  -CN     STG 2 The pt will deonstrate IND with initial HEP focused on improved LE strength.  -CN     STG 2 Progress Met  -CN            Long Term Goals    LTG Date to Achieve 10/31/22  -CN     LTG 1 The pt will demonstrate IND and compliant with HEP focused on IND condition management and improved UE and LE strength.  -CN     LTG 1 Progress Ongoing  -CN     LTG 2 The pt will ambulate at least 350 ft with rollator in 2 min to indicate improved community navigation.  -CN     LTG 2 Progress Ongoing  -CN     LTG 3 The pt will demonstrate B LE strength to at least 4/5 to facilitate improved STS and stair navigation to enter home.  -CN     LTG 3 Progress Ongoing  -CN     LTG 4 The pt will report tolerance for walking to at least 15 min to facilitate improved community navigation and grocery shopping (using rollator).  -CN     LTG 4 Progress Ongoing  -CN           User Key  (r) = Recorded By, (t) = Taken By, (c) = Cosigned By    Initials Name Provider Type    Lachelle Valle, PT Physical Therapist                Therapy Education  Given: HEP, Symptoms/condition management, Pain management  Program: Reinforced, Progressed  How Provided: Verbal, Demonstration  Provided to: Patient  Level of Understanding: Verbalized, Demonstrated              Time Calculation:   Start Time: 1133  Stop Time: 1215  Time Calculation (min): 42 min  Timed Charges  92317 - PT Therapeutic Exercise Minutes: 27  22467 - PT Therapeutic Activity Minutes: 13  Total Minutes  Timed Charges Total Minutes: 40   Total Minutes: 40  Therapy Charges for Today     Code Description  Service Date Service Provider Modifiers Qty    [id number]  PT THER PROC EA 15 MIN 9/22/2022 Lachelle Farrell, PT GP 2    [id number]  PT THERAPEUTIC ACT EA 15 MIN 9/22/2022 Lachelle Farrell, PT GP 1                    Lachelle Farrell, PT  9/22/2022

## 2022-09-26 ENCOUNTER — HOSPITAL ENCOUNTER (OUTPATIENT)
Dept: PHYSICAL THERAPY | Facility: HOSPITAL | Age: 82
Setting detail: THERAPIES SERIES
Discharge: HOME | End: 2022-09-26

## 2022-09-26 DIAGNOSIS — R26.2 DIFFICULTY WALKING: ICD-10-CM

## 2022-09-26 DIAGNOSIS — M25.561 CHRONIC PAIN OF BOTH KNEES: ICD-10-CM

## 2022-09-26 DIAGNOSIS — M25.562 CHRONIC PAIN OF BOTH KNEES: ICD-10-CM

## 2022-09-26 DIAGNOSIS — M54.50 CHRONIC BILATERAL LOW BACK PAIN WITHOUT SCIATICA: Primary | ICD-10-CM

## 2022-09-26 DIAGNOSIS — M25.511 CHRONIC PAIN OF BOTH SHOULDERS: ICD-10-CM

## 2022-09-26 DIAGNOSIS — G89.29 CHRONIC PAIN OF BOTH SHOULDERS: ICD-10-CM

## 2022-09-26 DIAGNOSIS — G89.29 CHRONIC PAIN OF BOTH KNEES: ICD-10-CM

## 2022-09-26 DIAGNOSIS — R53.1 GENERALIZED WEAKNESS: ICD-10-CM

## 2022-09-26 DIAGNOSIS — M25.512 CHRONIC PAIN OF BOTH SHOULDERS: ICD-10-CM

## 2022-09-26 DIAGNOSIS — G89.29 CHRONIC BILATERAL LOW BACK PAIN WITHOUT SCIATICA: Primary | ICD-10-CM

## 2022-09-26 PROCEDURE — 97110 THERAPEUTIC EXERCISES: CPT

## 2022-09-26 PROCEDURE — 97530 THERAPEUTIC ACTIVITIES: CPT

## 2022-09-26 NOTE — THERAPY TREATMENT NOTE
Outpatient Physical Therapy Ortho Treatment Note  Psychiatric     Patient Name: Avelina Carr  : 1940  MRN: [id number]  Today's Date: 2022      Visit Date: 2022    Visit Dx:    ICD-10-CM ICD-9-CM   1. Chronic bilateral low back pain without sciatica  M54.50 724.2    G89.29 338.29   2. Generalized weakness  R53.1 780.79   3. Difficulty walking  R26.2 719.7   4. Chronic pain of both knees  M25.561 719.46    M25.562 338.29    G89.29    5. Chronic pain of both shoulders  M25.511 719.41    G89.29 338.29    M25.512        Patient Active Problem List   Diagnosis   • Postmastectomy lymphedema syndrome   • Malignant neoplasm of upper-outer quadrant of right female breast ([facility])   • Cerebrovascular accident (CVA) ([facility])   • Stroke ([facility])   • Hemarthrosis   • Primary hypertension   • HLD (hyperlipidemia)   • Adverse effect of antiplatelet agent   • Left middle cerebral artery stroke ([facility])   • Carcinoma of central portion of right breast in female, estrogen receptor positive ([facility])   • Chronic bilateral low back pain with right-sided sciatica   • History of lumbar surgery   • Seizure disorder, simple partial, without intractable epilepsy ([facility])   • Spinal stenosis of lumbar region with neurogenic claudication   • Status post craniectomy   • History of total right knee replacement   • Other forms of scoliosis, lumbar region   • DDD (degenerative disc disease), lumbar   • Benign meningioma of brain ([facility])   • Squamous cell carcinoma of leg, left   • History of cardioembolic cerebrovascular accident (CVA)   • Hypothyroid   • Depression   • COPD (chronic obstructive pulmonary disease) ([facility])   • Obstructive sleep apnea   • Squamous cell carcinoma of leg, right   • Pain in joint, multiple sites   • Excessive daytime sleepiness   • Former smoker   • Gastroesophageal reflux disease   • Migraines   • Generalized weakness   • Acute CVA (cerebrovascular accident) ([facility])   • Dependence on supplemental oxygen   • Paroxysmal  atrial fibrillation (HCC)   • Paroxysmal supraventricular tachycardia (HCC)        Past Medical History:   Diagnosis Date   • Anxiety    • Arthritis    • Breast cancer (HCC)    • COPD (chronic obstructive pulmonary disease) (HCC)     oxygen at 2L aths   • CVD (cardiovascular disease)    • Depression    • Hypothyroid    • Macular degeneration    • Peripheral neuropathy    • Stroke (HCC)    • Vertigo         Past Surgical History:   Procedure Laterality Date   • ADRENAL GLAND SURGERY     • BRAIN MENINGIOMA EXCISION      occipital   •  SECTION     • EMBOLECTOMY N/A 11/15/2017    Procedure: Embolectomy Mechanical;  Surgeon: Rachid Figueroa MD;  Location: [facility] ;  Service:    • MASTECTOMY     • RECTAL SURGERY     • REPLACEMENT TOTAL KNEE Right    • TOTAL SHOULDER REPLACEMENT      x2                        PT Assessment/Plan     Row Name 22 1226          PT Assessment    Assessment Comments Pt reports stiffness in am, however low pain levels today. Able to perform 30 min of treatment standing without rest break, added blue foam to several exercises, monster walk and step ups today with good tolerance. Plan to continue working toward functional strengthening, balance and endurance training and D/C supine stretching to HEP.  -CN            PT Plan    PT Plan Comments Assess response to added exercises and consider rows in stagger stance next visit, update HEP.  -CN           User Key  (r) = Recorded By, (t) = Taken By, (c) = Cosigned By    Initials Name Provider Type    Lachelle Valle, PT Physical Therapist                   OP Exercises     Row Name 22 1100             Subjective Comments    Subjective Comments I still am having so much stiffness when I wake up in the morning.  -CN              Subjective Pain    Able to rate subjective pain? yes  -CN      Pre-Treatment Pain Level 0  -CN              Total Minutes    49609 - PT Therapeutic Exercise Minutes 27  " -CN      90378 - PT Therapeutic Activity Minutes 13  -CN              Exercise 1    Exercise Name 1 LTR  -CN      Cueing 1 Verbal;Demo  -CN      Reps 1 10  -CN              Exercise 2    Exercise Name 2 SKTC  -CN      Cueing 2 Verbal;Demo  -CN      Reps 2 3  -CN      Time 2 20  -CN              Exercise 3    Exercise Name 3 Small range bridge with PPT  -CN      Cueing 3 Verbal;Demo  -CN      Sets 3 2  -CN      Reps 3 10  -CN              Exercise 5    Exercise Name 5 Nustep, L5  -CN      Cueing 5 Verbal  -CN      Time 5 5 min  -CN      Additional Comments Pt reports slight L knee pain on machine  -CN              Exercise 6    Exercise Name 6 HR  -CN      Cueing 6 Verbal;Demo  -CN      Reps 6 20  -CN              Exercise 7    Exercise Name 7 Sidestepping at ballet bar  -CN      Cueing 7 Verbal;Demo  -CN      Reps 7 3 laps  -CN      Additional Comments LTB above knees  -CN              Exercise 8    Exercise Name 8 Wall wash  -CN      Cueing 8 Verbal;Demo  -CN      Reps 8 15  -CN      Additional Comments follow with eyes  -CN              Exercise 9    Exercise Name 9 Tandem gait, forward/retro  -CN      Cueing 9 Verbal;Demo  -CN      Reps 9 3 laps  -CN              Exercise 12    Exercise Name 12 Step taps, 6\"  -CN      Cueing 12 Verbal;Demo  -CN      Reps 12 10 B  -CN      Additional Comments 1 UE, standing on blue foam  -CN              Exercise 14    Exercise Name 14 Lateral step/reach with head turn  -CN      Cueing 14 Verbal;Demo  -CN      Reps 14 10 B  -CN              Exercise 15    Exercise Name 15 Monster walk with YTB, forward/retro  -CN      Cueing 15 Verbal;Demo  -CN      Reps 15 3 laps  -CN              Exercise 16    Exercise Name 16 Step up onto blue foam  -CN      Cueing 16 Verbal;Demo  -CN      Reps 16 10 B  -CN      Additional Comments c/o L great toe pain with push off  -CN            User Key  (r) = Recorded By, (t) = Taken By, (c) = Cosigned By    Initials Name Provider Type    KAREN Farrell, " Lachelle Gambino, PT Physical Therapist                              PT OP Goals     Row Name 09/26/22 1200          PT Short Term Goals    STG Date to Achieve 09/22/22  -CN     STG 1 The pt will perform 5x STS from clinic chair without UE in no more than 18 sec to indicate improved functional mobility.  -CN     STG 1 Progress Ongoing  -CN     STG 2 The pt will deonstrate IND with initial HEP focused on improved LE strength.  -CN     STG 2 Progress Met  -CN            Long Term Goals    LTG Date to Achieve 10/31/22  -CN     LTG 1 The pt will demonstrate IND and compliant with HEP focused on IND condition management and improved UE and LE strength.  -CN     LTG 1 Progress Ongoing  -CN     LTG 1 Progress Comments Progressing standing strengthening as tolerated.  -CN     LTG 2 The pt will ambulate at least 350 ft with rollator in 2 min to indicate improved community navigation.  -CN     LTG 2 Progress Ongoing  -CN     LTG 3 The pt will demonstrate B LE strength to at least 4/5 to facilitate improved STS and stair navigation to enter home.  -CN     LTG 3 Progress Ongoing  -CN     LTG 4 The pt will report tolerance for walking to at least 15 min to facilitate improved community navigation and grocery shopping (using rollator).  -CN     LTG 4 Progress Ongoing  -CN           User Key  (r) = Recorded By, (t) = Taken By, (c) = Cosigned By    Initials Name Provider Type    Lachelle Valle, PT Physical Therapist                Therapy Education  Given: HEP, Symptoms/condition management, Pain management  Program: Reinforced, Progressed  How Provided: Verbal, Demonstration  Provided to: Patient  Level of Understanding: Verbalized, Demonstrated              Time Calculation:   Start Time: 1132  Stop Time: 1215  Time Calculation (min): 43 min  Timed Charges  99145 - PT Therapeutic Exercise Minutes: 27  43870 - PT Therapeutic Activity Minutes: 13  Total Minutes  Timed Charges Total Minutes: 40   Total Minutes: 40  Therapy  Charges for Today     Code Description Service Date Service Provider Modifiers Qty    30604159981  PT THER PROC EA 15 MIN 9/26/2022 Lachelle Farrell, PT GP 2    [id number]  PT THERAPEUTIC ACT EA 15 MIN 9/26/2022 Lachelle Farrell, PT GP 1                    Lachelle Farrell, PT  9/26/2022

## 2022-09-29 ENCOUNTER — HOSPITAL ENCOUNTER (OUTPATIENT)
Dept: PHYSICAL THERAPY | Facility: HOSPITAL | Age: 82
Setting detail: THERAPIES SERIES
Discharge: HOME | End: 2022-09-29

## 2022-09-29 DIAGNOSIS — M25.561 CHRONIC PAIN OF BOTH KNEES: ICD-10-CM

## 2022-09-29 DIAGNOSIS — M25.511 CHRONIC PAIN OF BOTH SHOULDERS: ICD-10-CM

## 2022-09-29 DIAGNOSIS — G89.29 CHRONIC PAIN OF BOTH SHOULDERS: ICD-10-CM

## 2022-09-29 DIAGNOSIS — R26.2 DIFFICULTY WALKING: ICD-10-CM

## 2022-09-29 DIAGNOSIS — G89.29 CHRONIC PAIN OF BOTH KNEES: ICD-10-CM

## 2022-09-29 DIAGNOSIS — R53.1 GENERALIZED WEAKNESS: ICD-10-CM

## 2022-09-29 DIAGNOSIS — M25.562 CHRONIC PAIN OF BOTH KNEES: ICD-10-CM

## 2022-09-29 DIAGNOSIS — M25.512 CHRONIC PAIN OF BOTH SHOULDERS: ICD-10-CM

## 2022-09-29 DIAGNOSIS — G89.29 CHRONIC BILATERAL LOW BACK PAIN WITHOUT SCIATICA: Primary | ICD-10-CM

## 2022-09-29 DIAGNOSIS — M54.50 CHRONIC BILATERAL LOW BACK PAIN WITHOUT SCIATICA: Primary | ICD-10-CM

## 2022-09-29 PROCEDURE — 97110 THERAPEUTIC EXERCISES: CPT

## 2022-09-29 PROCEDURE — 97530 THERAPEUTIC ACTIVITIES: CPT

## 2022-09-29 NOTE — THERAPY TREATMENT NOTE
Outpatient Physical Therapy Ortho Treatment Note  [facility]     Patient Name: Avelina Carr  : 1940  MRN: [id number]  Today's Date: 2022      Visit Date: 2022    Visit Dx:    ICD-10-CM ICD-9-CM   1. Chronic bilateral low back pain without sciatica  M54.50 724.2    G89.29 338.29   2. Generalized weakness  R53.1 780.79   3. Difficulty walking  R26.2 719.7   4. Chronic pain of both knees  M25.561 719.46    M25.562 338.29    G89.29    5. Chronic pain of both shoulders  M25.511 719.41    G89.29 338.29    M25.512        Patient Active Problem List   Diagnosis   • Postmastectomy lymphedema syndrome   • Malignant neoplasm of upper-outer quadrant of right female breast ([facility])   • Cerebrovascular accident (CVA) ([facility])   • Stroke ([facility])   • Hemarthrosis   • Primary hypertension   • HLD (hyperlipidemia)   • Adverse effect of antiplatelet agent   • Left middle cerebral artery stroke ([facility])   • Carcinoma of central portion of right breast in female, estrogen receptor positive ([facility])   • Chronic bilateral low back pain with right-sided sciatica   • History of lumbar surgery   • Seizure disorder, simple partial, without intractable epilepsy ([facility])   • Spinal stenosis of lumbar region with neurogenic claudication   • Status post craniectomy   • History of total right knee replacement   • Other forms of scoliosis, lumbar region   • DDD (degenerative disc disease), lumbar   • Benign meningioma of brain ([facility])   • Squamous cell carcinoma of leg, left   • History of cardioembolic cerebrovascular accident (CVA)   • Hypothyroid   • Depression   • COPD (chronic obstructive pulmonary disease) ([facility])   • Obstructive sleep apnea   • Squamous cell carcinoma of leg, right   • Pain in joint, multiple sites   • Excessive daytime sleepiness   • Former smoker   • Gastroesophageal reflux disease   • Migraines   • Generalized weakness   • Acute CVA (cerebrovascular accident) ([facility])   • Dependence on supplemental oxygen   • Paroxysmal  atrial fibrillation ([facility])   • Paroxysmal supraventricular tachycardia ([facility]CC)        Past Medical History:   Diagnosis Date   • Anxiety    • Arthritis    • Breast cancer ([facility]CC)    • COPD (chronic obstructive pulmonary disease) ([facility])     oxygen at 2L aths   • CVD (cardiovascular disease)    • Depression    • Hypothyroid    • Macular degeneration    • Peripheral neuropathy    • Stroke (HCC)    • Vertigo         Past Surgical History:   Procedure Laterality Date   • ADRENAL GLAND SURGERY     • BRAIN MENINGIOMA EXCISION      occipital   •  SECTION     • EMBOLECTOMY N/A 11/15/2017    Procedure: Embolectomy Mechanical;  Surgeon: Rachid Figueroa MD;  Location: [facility] OR ;  Service:    • MASTECTOMY     • RECTAL SURGERY     • REPLACEMENT TOTAL KNEE Right    • TOTAL SHOULDER REPLACEMENT      x2                        PT Assessment/Plan     Row Name 22 1100          PT Assessment    Assessment Comments Pt reports no new changes this date, no pain at start of session. Focused on standing activities without seated rest this date, held mat activities. Added standing rows to therex program and resumed STS without UE assist with cues not to push LE back on chair. Pt requires CGA during rows and cues for scap retract and upright posture. Pt reports good compliance with HEP and remains appropriate for skilled PT.  -RS            PT Plan    PT Plan Comments Consider circuit style training, 4inch step up  -RS           User Key  (r) = Recorded By, (t) = Taken By, (c) = Cosigned By    Initials Name Provider Type    RS Sofi Hinkle PT Physical Therapist                   OP Exercises     Row Name 22 1000             Subjective Comments    Subjective Comments Pt reports she is feeling the same overall  -RS              Subjective Pain    Able to rate subjective pain? yes  -RS              Total Minutes    39956 - PT Therapeutic Exercise Minutes 25  -RS      63756 - PT Therapeutic  "Activity Minutes 13  -RS              Exercise 1    Exercise Name 1 --  -RS      Cueing 1 --  -RS      Reps 1 --  -RS              Exercise 2    Exercise Name 2 --  -RS      Cueing 2 --  -RS      Reps 2 --  -RS      Time 2 --  -RS              Exercise 3    Exercise Name 3 --  -RS      Cueing 3 --  -RS      Sets 3 --  -RS      Reps 3 --  -RS              Exercise 4    Exercise Name 4 STS cue not to push back on chair  -RS      Cueing 4 Verbal;Demo  -RS      Sets 4 2  -RS      Reps 4 10  -RS      Time 4 no UE chair with foam  -RS              Exercise 5    Exercise Name 5 Nustep, L5  -RS      Cueing 5 Verbal  -RS      Time 5 5 min  -RS              Exercise 6    Exercise Name 6 HR  -RS      Cueing 6 Verbal;Demo  -RS      Reps 6 20  -RS              Exercise 7    Exercise Name 7 Sidestepping at ballet bar  -RS      Cueing 7 Verbal;Demo  -RS      Reps 7 3 laps  -RS      Additional Comments RTB above knees  -RS              Exercise 8    Exercise Name 8 Wall wash  -RS      Cueing 8 Verbal;Demo  -RS      Reps 8 15  -RS      Additional Comments follow with eyes  -RS              Exercise 9    Exercise Name 9 Tandem gait, forward/retro  -RS      Cueing 9 Verbal;Demo  -RS      Reps 9 3 laps  -RS              Exercise 12    Exercise Name 12 Step taps, 6\"  -RS      Cueing 12 Verbal;Demo  -RS      Reps 12 15 B  -RS      Additional Comments 1 UE, standing on blue foam  -RS              Exercise 13    Exercise Name 13 rows  -RS      Cueing 13 Verbal;Demo  -RS      Reps 13 15  -RS      Time 13 YTB  -RS      Additional Comments cues for chest up/technique  -RS              Exercise 14    Exercise Name 14 Lateral step/reach with head turn  -RS      Cueing 14 Verbal;Demo  -RS      Reps 14 10 B  -RS              Exercise 15    Exercise Name 15 Monster walk with RTB, forward/retro  -RS      Cueing 15 Verbal;Demo  -RS      Reps 15 3 laps  -RS              Exercise 16    Exercise Name 16 Step up onto blue foam  -RS      Cueing 16 " Verbal;Demo  -RS      Reps 16 10 B  -RS            User Key  (r) = Recorded By, (t) = Taken By, (c) = Cosigned By    Initials Name Provider Type    RS Sofi Hinkle, PT Physical Therapist                                 Therapy Education  Given: HEP  Program: Reinforced  How Provided: Verbal, Demonstration  Provided to: Patient  Level of Understanding: Verbalized, Demonstrated              Time Calculation:   Start Time: 1045  Stop Time: 1125  Time Calculation (min): 40 min  Timed Charges  93801 - PT Therapeutic Exercise Minutes: 25  50962 - PT Therapeutic Activity Minutes: 13  Total Minutes  Timed Charges Total Minutes: 38   Total Minutes: 38  Therapy Charges for Today     Code Description Service Date Service Provider Modifiers Qty    17594054080  PT THER PROC EA 15 MIN 9/29/2022 Sofi Hinkle, PT GP 2    [id number]  PT THERAPEUTIC ACT EA 15 MIN 9/29/2022 Sofi Hinkle, PT GP 1                    Sofi Hinkle PT  9/29/2022

## 2022-10-03 ENCOUNTER — HOSPITAL ENCOUNTER (OUTPATIENT)
Dept: PHYSICAL THERAPY | Facility: HOSPITAL | Age: 82
Setting detail: THERAPIES SERIES
Discharge: HOME | End: 2022-10-03

## 2022-10-03 DIAGNOSIS — M25.562 CHRONIC PAIN OF BOTH KNEES: ICD-10-CM

## 2022-10-03 DIAGNOSIS — R53.1 GENERALIZED WEAKNESS: ICD-10-CM

## 2022-10-03 DIAGNOSIS — G89.29 CHRONIC PAIN OF BOTH SHOULDERS: ICD-10-CM

## 2022-10-03 DIAGNOSIS — M25.511 CHRONIC PAIN OF BOTH SHOULDERS: ICD-10-CM

## 2022-10-03 DIAGNOSIS — G89.29 CHRONIC BILATERAL LOW BACK PAIN WITHOUT SCIATICA: Primary | ICD-10-CM

## 2022-10-03 DIAGNOSIS — M25.561 CHRONIC PAIN OF BOTH KNEES: ICD-10-CM

## 2022-10-03 DIAGNOSIS — M25.512 CHRONIC PAIN OF BOTH SHOULDERS: ICD-10-CM

## 2022-10-03 DIAGNOSIS — R26.2 DIFFICULTY WALKING: ICD-10-CM

## 2022-10-03 DIAGNOSIS — M54.50 CHRONIC BILATERAL LOW BACK PAIN WITHOUT SCIATICA: Primary | ICD-10-CM

## 2022-10-03 DIAGNOSIS — G89.29 CHRONIC PAIN OF BOTH KNEES: ICD-10-CM

## 2022-10-03 PROCEDURE — 97530 THERAPEUTIC ACTIVITIES: CPT

## 2022-10-03 PROCEDURE — 97110 THERAPEUTIC EXERCISES: CPT

## 2022-10-03 NOTE — THERAPY EVALUATION
Outpatient Physical Therapy Ortho Initial Evaluation  [facility]     Patient Name: Avelina Carr  : 1940  MRN: [id number]  Today's Date: 10/3/2022      Visit Date: 10/03/2022    Patient Active Problem List   Diagnosis   • Postmastectomy lymphedema syndrome   • Malignant neoplasm of upper-outer quadrant of right female breast (HCC)   • Cerebrovascular accident (CVA) (HCC)   • Stroke (HCC)   • Hemarthrosis   • Primary hypertension   • HLD (hyperlipidemia)   • Adverse effect of antiplatelet agent   • Left middle cerebral artery stroke (HCC)   • Carcinoma of central portion of right breast in female, estrogen receptor positive (HCC)   • Chronic bilateral low back pain with right-sided sciatica   • History of lumbar surgery   • Seizure disorder, simple partial, without intractable epilepsy (HCC)   • Spinal stenosis of lumbar region with neurogenic claudication   • Status post craniectomy   • History of total right knee replacement   • Other forms of scoliosis, lumbar region   • DDD (degenerative disc disease), lumbar   • Benign meningioma of brain (HCC)   • Squamous cell carcinoma of leg, left   • History of cardioembolic cerebrovascular accident (CVA)   • Hypothyroid   • Depression   • COPD (chronic obstructive pulmonary disease) (HCC)   • Obstructive sleep apnea   • Squamous cell carcinoma of leg, right   • Pain in joint, multiple sites   • Excessive daytime sleepiness   • Former smoker   • Gastroesophageal reflux disease   • Migraines   • Generalized weakness   • Acute CVA (cerebrovascular accident) (HCC)   • Dependence on supplemental oxygen   • Paroxysmal atrial fibrillation (HCC)   • Paroxysmal supraventricular tachycardia (HCC)        Past Medical History:   Diagnosis Date   • Anxiety    • Arthritis    • Breast cancer (HCC)    • COPD (chronic obstructive pulmonary disease) (HCC)     oxygen at 2L aths   • CVD (cardiovascular disease)    • Depression    • Hypothyroid    • Macular degeneration    •  Peripheral neuropathy    • Stroke (HCC)    • Vertigo         Past Surgical History:   Procedure Laterality Date   • ADRENAL GLAND SURGERY     • BRAIN MENINGIOMA EXCISION      occipital   •  SECTION     • EMBOLECTOMY N/A 11/15/2017    Procedure: Embolectomy Mechanical;  Surgeon: Rachid Figueroa MD;  Location: [facility] ;  Service:    • MASTECTOMY     • RECTAL SURGERY     • REPLACEMENT TOTAL KNEE Right    • TOTAL SHOULDER REPLACEMENT      x2       Visit Dx:     ICD-10-CM ICD-9-CM   1. Chronic bilateral low back pain without sciatica  M54.50 724.2    G89.29 338.29   2. Generalized weakness  R53.1 780.79   3. Difficulty walking  R26.2 719.7   4. Chronic pain of both knees  M25.561 719.46    M25.562 338.29    G89.29    5. Chronic pain of both shoulders  M25.511 719.41    G89.29 338.29    M25.512                              Therapy Education  Given: HEP  Program: Reinforced  How Provided: Verbal, Demonstration  Provided to: Patient  Level of Understanding: Verbalized, Demonstrated      PT OP Goals     Row Name 10/03/22 1100          PT Short Term Goals    STG Date to Achieve 22  -CN     STG 1 The pt will perform 5x STS from clinic chair without UE in no more than 18 sec to indicate improved functional mobility.  -CN     STG 1 Progress Ongoing  -CN     STG 1 Progress Comments Pt performed in 22 sec today.  -CN     STG 2 The pt will deonstrate IND with initial HEP focused on improved LE strength.  -CN     STG 2 Progress Met  -CN            Long Term Goals    LTG Date to Achieve 10/31/22  -CN     LTG 1 The pt will demonstrate IND and compliant with HEP focused on IND condition management and improved UE and LE strength.  -CN     LTG 1 Progress Progressing  -CN     LTG 1 Progress Comments Progressing standing strengthening as tolerated.  -CN     LTG 2 The pt will ambulate at least 350 ft with rollator in 2 min to indicate improved community navigation.  -CN     LTG 2 Progress  Progressing  -CN     LTG 2 Progress Comments Pt able to ambulate 292' with rollator this visit.  -CN     LTG 3 The pt will demonstrate B LE strength to at least 4/5 to facilitate improved STS and stair navigation to enter home.  -CN     LTG 3 Progress Progressing  -CN     LTG 4 The pt will report tolerance for walking to at least 15 min to facilitate improved community navigation and grocery shopping (using rollator).  -CN     LTG 4 Progress Ongoing;Progressing  -CN     LTG 4 Progress Comments Pt has not attempted distance gait recently, however states she could perform 5-10 min without rest.  -CN           User Key  (r) = Recorded By, (t) = Taken By, (c) = Cosigned By    Initials Name Provider Type    Lachelle Valle, PT Physical Therapist                 PT Assessment/Plan     Row Name 10/03/22 4430          PT Assessment    Functional Limitations Limitation in home management;Limitations in community activities;Performance in leisure activities;Performance in self-care ADL;Impaired gait;Decreased safety during functional activities  -CN     Impairments Impaired flexibility;Impaired lymphatic circulation;Impaired postural alignment;Joint mobility;Muscle strength;Pain;Poor body mechanics;Posture;Range of motion;Sensation  -CN     Assessment Comments Avelina Carr has been seen for 8 physical therapy sessions for LBP, B knee pain, weakness and difficulty walking.  Treatment has included therapeutic exercise, therapeutic activity and patient education with home exercise program . Progress to physical therapy goals is fair. Pt has met 1/2 STG and 0/4 LTG. Pt has been tolerating increased time spent performing standing strengthening tasks over past several visits demonstrating increased activity endurance without need for rest breaks. Performed 5x STS and 2 min walk test with minimal improvement as well as significant fatigued noted by pt following 2 min walk test. Pt mentioned receiving flu vaccine and COVID  "booster 2 days ago which she feels is contributing to her fatigue today. Will likely reassess objective measurements next visit for improvement compared to evaluation. She will benefit from continued skilled physical therapy to address remaining impairments and functional limitations.  -CN     Please refer to paper survey for additional self-reported information No  -CN     Rehab Potential Good  -CN     Patient/caregiver participated in establishment of treatment plan and goals Yes  -CN     Patient would benefit from skilled therapy intervention Yes  -CN            PT Plan    PT Frequency 1x/week;2x/week  -CN     Predicted Duration of Therapy Intervention (PT) 4-8 visits  -CN     PT Plan Comments Retest 2 min walk test and 5x STS next visit. Initiate circuit training if able, consider 4\" step up.  -CN           User Key  (r) = Recorded By, (t) = Taken By, (c) = Cosigned By    Initials Name Provider Type    Lachelle Valle, PT Physical Therapist                   OP Exercises     Row Name 10/03/22 1100             Subjective Comments    Subjective Comments My finger is hurting but I am good otherwise.  -CN              Subjective Pain    Able to rate subjective pain? yes  -CN      Pre-Treatment Pain Level 0  -CN              Total Minutes    61463 - PT Therapeutic Exercise Minutes 28  -CN      86302 - PT Therapeutic Activity Minutes 13  -CN              Exercise 4    Exercise Name 4 STS cue not to push back on chair  -CN      Cueing 4 Verbal;Demo  -CN      Sets 4 2  -CN      Reps 4 10  -CN      Time 4 no UE chair with foam  -CN      Additional Comments first set with foam, second set without  -CN              Exercise 5    Exercise Name 5 Nustep, L5  -CN      Cueing 5 Verbal  -CN      Time 5 5 min  -CN              Exercise 6    Exercise Name 6 HR  -CN      Cueing 6 Verbal;Demo  -CN      Reps 6 20  -CN      Additional Comments on  blue foam  -CN              Exercise 7    Exercise Name 7 Sidestepping at " ballet bar  -CN      Cueing 7 Verbal;Demo  -CN      Reps 7 3 laps  -CN      Additional Comments RTB above knees  -CN              Exercise 8    Exercise Name 8 Wall wash  -CN      Cueing 8 Verbal;Demo  -CN      Reps 8 15  -CN      Additional Comments follow with eyes  -CN              Exercise 9    Exercise Name 9 Tandem gait, forward/retro  -CN      Cueing 9 Verbal;Demo  -CN      Reps 9 3 laps  -CN              Exercise 13    Exercise Name 13 rows  -CN      Cueing 13 Verbal;Demo  -CN      Sets 13 2  -CN      Reps 13 10  -CN      Time 13 YTB  -CN              Exercise 14    Exercise Name 14 Lateral step/reach with head turn  -CN      Cueing 14 Verbal;Demo  -CN      Reps 14 10 B  -CN              Exercise 15    Exercise Name 15 Monster walk with RTB, forward/retro  -CN      Cueing 15 Verbal;Demo  -CN      Reps 15 3 laps  -CN              Exercise 16    Exercise Name 16 Step up onto blue foam  -CN      Cueing 16 Verbal;Demo  -CN      Reps 16 10 B  -CN            User Key  (r) = Recorded By, (t) = Taken By, (c) = Cosigned By    Initials Name Provider Type    Lachelle Valle, PT Physical Therapist                              2 Minute Walk Test  Gait, Assistive Device: rollator  Distance Ambulated in 2 Minutes: 292  5 Times Sit to Stand  5 Times Sit to Stand (seconds): 22 seconds  5 Times Sit to Stand Comments: no UEs         Time Calculation:     Start Time: 1130  Stop Time: 1214  Time Calculation (min): 44 min  Timed Charges  99537 - PT Therapeutic Exercise Minutes: 28  28694 - PT Therapeutic Activity Minutes: 13  Total Minutes  Timed Charges Total Minutes: 41   Total Minutes: 41     Therapy Charges for Today     Code Description Service Date Service Provider Modifiers Qty    76210244446  PT THER PROC EA 15 MIN 10/3/2022 Lachelle Farrell, PT GP 2    57894812318 HC PT THERAPEUTIC ACT EA 15 MIN 10/3/2022 Lachelle Farrell, PT GP 1                    Lachelle Farrell PT  10/3/2022

## 2022-10-06 ENCOUNTER — APPOINTMENT (OUTPATIENT)
Dept: PHYSICAL THERAPY | Facility: HOSPITAL | Age: 82
End: 2022-10-06

## 2022-10-10 ENCOUNTER — HOSPITAL ENCOUNTER (OUTPATIENT)
Dept: PHYSICAL THERAPY | Facility: HOSPITAL | Age: 82
Setting detail: THERAPIES SERIES
Discharge: HOME | End: 2022-10-10

## 2022-10-10 DIAGNOSIS — R26.2 DIFFICULTY WALKING: ICD-10-CM

## 2022-10-10 DIAGNOSIS — M54.50 CHRONIC BILATERAL LOW BACK PAIN WITHOUT SCIATICA: Primary | ICD-10-CM

## 2022-10-10 DIAGNOSIS — R53.1 GENERALIZED WEAKNESS: ICD-10-CM

## 2022-10-10 DIAGNOSIS — G89.29 CHRONIC BILATERAL LOW BACK PAIN WITHOUT SCIATICA: Primary | ICD-10-CM

## 2022-10-10 PROCEDURE — 97530 THERAPEUTIC ACTIVITIES: CPT

## 2022-10-10 PROCEDURE — 97110 THERAPEUTIC EXERCISES: CPT

## 2022-10-10 NOTE — THERAPY TREATMENT NOTE
Outpatient Physical Therapy Ortho Treatment Note  [facility]     Patient Name: Avelina Carr  : 1940  MRN: [id number]  Today's Date: 10/10/2022      Visit Date: 10/10/2022    Visit Dx:    ICD-10-CM ICD-9-CM   1. Chronic bilateral low back pain without sciatica  M54.50 724.2    G89.29 338.29   2. Generalized weakness  R53.1 780.79   3. Difficulty walking  R26.2 719.7       Patient Active Problem List   Diagnosis   • Postmastectomy lymphedema syndrome   • Malignant neoplasm of upper-outer quadrant of right female breast (HCC)   • Cerebrovascular accident (CVA) ([facility])   • Stroke ([facility])   • Hemarthrosis   • Primary hypertension   • HLD (hyperlipidemia)   • Adverse effect of antiplatelet agent   • Left middle cerebral artery stroke ([facility])   • Carcinoma of central portion of right breast in female, estrogen receptor positive ([facility])   • Chronic bilateral low back pain with right-sided sciatica   • History of lumbar surgery   • Seizure disorder, simple partial, without intractable epilepsy ([facility])   • Spinal stenosis of lumbar region with neurogenic claudication   • Status post craniectomy   • History of total right knee replacement   • Other forms of scoliosis, lumbar region   • DDD (degenerative disc disease), lumbar   • Benign meningioma of brain ([facility])   • Squamous cell carcinoma of leg, left   • History of cardioembolic cerebrovascular accident (CVA)   • Hypothyroid   • Depression   • COPD (chronic obstructive pulmonary disease) ([facility])   • Obstructive sleep apnea   • Squamous cell carcinoma of leg, right   • Pain in joint, multiple sites   • Excessive daytime sleepiness   • Former smoker   • Gastroesophageal reflux disease   • Migraines   • Generalized weakness   • Acute CVA (cerebrovascular accident) ([facility])   • Dependence on supplemental oxygen   • Paroxysmal atrial fibrillation ([facility])   • Paroxysmal supraventricular tachycardia ([facility])        Past Medical History:   Diagnosis Date   • Anxiety    • Arthritis    •  Breast cancer (HCC)    • COPD (chronic obstructive pulmonary disease) (HCC)     oxygen at 2L aths   • CVD (cardiovascular disease)    • Depression    • Hypothyroid    • Macular degeneration    • Peripheral neuropathy    • Stroke (HCC)    • Vertigo         Past Surgical History:   Procedure Laterality Date   • ADRENAL GLAND SURGERY     • BRAIN MENINGIOMA EXCISION      occipital   •  SECTION     • EMBOLECTOMY N/A 11/15/2017    Procedure: Embolectomy Mechanical;  Surgeon: Rachid Figueroa MD;  Location: [facility] ;  Service:    • MASTECTOMY     • RECTAL SURGERY     • REPLACEMENT TOTAL KNEE Right    • TOTAL SHOULDER REPLACEMENT      x2                        PT Assessment/Plan     Row Name 10/10/22 1318          PT Assessment    Assessment Comments Pt reports slight improvement in fatigue since last visit and initiated circuit training today with addition of toe taps and trunk rotation. Pt verbalizes fatigue at end of session, however able to perform circuits without seated rest break. Will likely work toward speed changes with gait per pt with slow gait pattern with rollator.  -CN        PT Plan    PT Plan Comments May consider slow/fast and head turns with gait in back hallway. Assess response to circuit training.  -CN           User Key  (r) = Recorded By, (t) = Taken By, (c) = Cosigned By    Initials Name Provider Type    Lachelle Valle, PT Physical Therapist                   OP Exercises     Row Name 10/10/22 1000             Subjective Comments    Subjective Comments I am still feeling a little off from the flu shot. I'm better though.  -CN         Subjective Pain    Able to rate subjective pain? yes  -CN      Pre-Treatment Pain Level 0  -CN         Total Minutes    74533 - PT Therapeutic Exercise Minutes 28  -CN      09061 - PT Therapeutic Activity Minutes 12  -CN         Exercise 1    Exercise Name 1 Nustep, L5  -CN      Cueing 1 Verbal;Demo  -CN      Time 1 5 min  " -CN         Exercise 2    Exercise Name 2 CIRCUIT 1: STS from std chair with foam in seat  -CN      Cueing 2 Verbal  -CN      Reps 2 10  -CN      Additional Comments holdingL ball in hands witharms flex to 90  -CN         Exercise 3    Exercise Name 3 HR on blue foam  -CN      Cueing 3 Verbal  -CN      Reps 3 10  -CN         Exercise 4    Exercise Name 4 Step ups onto blue foam  -CN      Cueing 4 Verbal;Demo  -CN      Reps 4 5B  -CN      Additional Comments 1 HR; REPEAT CIRCIT 1 x2  -CN         Exercise 5    Exercise Name 5 CIRCUIT 2: Sidestepping RTB  -CN      Cueing 5 Verbal;Demo  -CN      Reps 5 2 laps  -CN         Exercise 6    Exercise Name 6 Wall wash with lift off  -CN      Cueing 6 Verbal;Demo  -CN      Reps 6 10  -CN         Exercise 7    Exercise Name 7 Row on blue foam  -CN      Cueing 7 Verbal;Demo  -CN      Reps 7 10  -CN      Time 7 YTB  -CN      Additional Comments REPEAT CIRCUIT 2 x2  -CN         Exercise 8    Exercise Name 8 CIRCUIT 3: Sidestep/reach with head turn  -CN      Cueing 8 Verbal;Demo  -CN      Reps 8 5 B  -CN         Exercise 9    Exercise Name 9 Narrow JAZZMINE with L1 ball trunk rotation  -CN      Cueing 9 Verbal;Demo  -CN      Reps 9 20  -CN         Exercise 10    Exercise Name 10 Quick toe taps on 6\" step  -CN      Cueing 10 Verbal;Demo  -CN      Reps 10 30 sec  -CN      Additional Comments Repeat CIRCUIT 3 x2  -CN            User Key  (r) = Recorded By, (t) = Taken By, (c) = Cosigned By    Initials Name Provider Type    Lachelle Valle, PT Physical Therapist                              PT OP Goals     Row Name 10/10/22 1300          PT Short Term Goals    STG Date to Achieve 09/22/22  -CN     STG 1 The pt will perform 5x STS from clinic chair without UE in no more than 18 sec to indicate improved functional mobility.  -CN     STG 1 Progress Ongoing  -CN     STG 2 The pt will deonstrate IND with initial HEP focused on improved LE strength.  -CN     STG 2 Progress Met  -CN     "    Long Term Goals    LTG Date to Achieve 10/31/22  -CN     LTG 1 The pt will demonstrate IND and compliant with HEP focused on IND condition management and improved UE and LE strength.  -CN     LTG 1 Progress Progressing  -CN     LTG 2 The pt will ambulate at least 350 ft with rollator in 2 min to indicate improved community navigation.  -CN     LTG 2 Progress Progressing  -CN     LTG 3 The pt will demonstrate B LE strength to at least 4/5 to facilitate improved STS and stair navigation to enter home.  -CN     LTG 3 Progress Progressing  -CN     LTG 4 The pt will report tolerance for walking to at least 15 min to facilitate improved community navigation and grocery shopping (using rollator).  -CN     LTG 4 Progress Ongoing;Progressing  -CN           User Key  (r) = Recorded By, (t) = Taken By, (c) = Cosigned By    Initials Name Provider Type    Lachelle Valle, KAREN Physical Therapist                Therapy Education  Given: HEP  Program: Reinforced  How Provided: Verbal, Demonstration  Provided to: Patient  Level of Understanding: Verbalized, Demonstrated              Time Calculation:   Start Time: 1047  Stop Time: 1130  Time Calculation (min): 43 min  Timed Charges  92125 - PT Therapeutic Exercise Minutes: 28  93836 - PT Therapeutic Activity Minutes: 12  Total Minutes  Timed Charges Total Minutes: 40   Total Minutes: 40  Therapy Charges for Today     Code Description Service Date Service Provider Modifiers Qty    82813164090 HC PT THER PROC EA 15 MIN 10/10/2022 Lachelle Farrell, PT GP 2    60441670474 HC PT THERAPEUTIC ACT EA 15 MIN 10/10/2022 Lachelle Farrell, PT GP 1                    Lachelle Farrell PT  10/10/2022

## 2022-10-12 ENCOUNTER — HOSPITAL ENCOUNTER (OUTPATIENT)
Dept: PHYSICAL THERAPY | Facility: HOSPITAL | Age: 82
Setting detail: THERAPIES SERIES
Discharge: HOME | End: 2022-10-12

## 2022-10-12 DIAGNOSIS — R26.2 DIFFICULTY WALKING: ICD-10-CM

## 2022-10-12 DIAGNOSIS — M25.511 CHRONIC PAIN OF BOTH SHOULDERS: ICD-10-CM

## 2022-10-12 DIAGNOSIS — M54.50 CHRONIC BILATERAL LOW BACK PAIN WITHOUT SCIATICA: Primary | ICD-10-CM

## 2022-10-12 DIAGNOSIS — G89.29 CHRONIC PAIN OF BOTH SHOULDERS: ICD-10-CM

## 2022-10-12 DIAGNOSIS — G89.29 CHRONIC PAIN OF BOTH KNEES: ICD-10-CM

## 2022-10-12 DIAGNOSIS — M25.562 CHRONIC PAIN OF BOTH KNEES: ICD-10-CM

## 2022-10-12 DIAGNOSIS — R53.1 GENERALIZED WEAKNESS: ICD-10-CM

## 2022-10-12 DIAGNOSIS — G89.29 CHRONIC BILATERAL LOW BACK PAIN WITHOUT SCIATICA: Primary | ICD-10-CM

## 2022-10-12 DIAGNOSIS — M25.512 CHRONIC PAIN OF BOTH SHOULDERS: ICD-10-CM

## 2022-10-12 DIAGNOSIS — M25.561 CHRONIC PAIN OF BOTH KNEES: ICD-10-CM

## 2022-10-12 PROCEDURE — 97110 THERAPEUTIC EXERCISES: CPT

## 2022-10-12 PROCEDURE — 97530 THERAPEUTIC ACTIVITIES: CPT

## 2022-10-12 NOTE — THERAPY TREATMENT NOTE
Outpatient Physical Therapy Ortho Treatment Note  [facility]     Patient Name: Avelina Carr  : 1940  MRN: [id number]  Today's Date: 10/12/2022      Visit Date: 10/12/2022    Visit Dx:    ICD-10-CM ICD-9-CM   1. Chronic bilateral low back pain without sciatica  M54.50 724.2    G89.29 338.29   2. Generalized weakness  R53.1 780.79   3. Difficulty walking  R26.2 719.7   4. Chronic pain of both knees  M25.561 719.46    M25.562 338.29    G89.29    5. Chronic pain of both shoulders  M25.511 719.41    G89.29 338.29    M25.512        Patient Active Problem List   Diagnosis   • Postmastectomy lymphedema syndrome   • Malignant neoplasm of upper-outer quadrant of right female breast ([facility])   • Cerebrovascular accident (CVA) ([facility])   • Stroke ([facility])   • Hemarthrosis   • Primary hypertension   • HLD (hyperlipidemia)   • Adverse effect of antiplatelet agent   • Left middle cerebral artery stroke ([facility])   • Carcinoma of central portion of right breast in female, estrogen receptor positive ([facility])   • Chronic bilateral low back pain with right-sided sciatica   • History of lumbar surgery   • Seizure disorder, simple partial, without intractable epilepsy ([facility])   • Spinal stenosis of lumbar region with neurogenic claudication   • Status post craniectomy   • History of total right knee replacement   • Other forms of scoliosis, lumbar region   • DDD (degenerative disc disease), lumbar   • Benign meningioma of brain ([facility])   • Squamous cell carcinoma of leg, left   • History of cardioembolic cerebrovascular accident (CVA)   • Hypothyroid   • Depression   • COPD (chronic obstructive pulmonary disease) ([facility])   • Obstructive sleep apnea   • Squamous cell carcinoma of leg, right   • Pain in joint, multiple sites   • Excessive daytime sleepiness   • Former smoker   • Gastroesophageal reflux disease   • Migraines   • Generalized weakness   • Acute CVA (cerebrovascular accident) ([facility])   • Dependence on supplemental oxygen   •  "Paroxysmal atrial fibrillation (HCC)   • Paroxysmal supraventricular tachycardia (HCC)        Past Medical History:   Diagnosis Date   • Anxiety    • Arthritis    • Breast cancer (HCC)    • COPD (chronic obstructive pulmonary disease) (HCC)     oxygen at 2L aths   • CVD (cardiovascular disease)    • Depression    • Hypothyroid    • Macular degeneration    • Peripheral neuropathy    • Stroke (HCC)    • Vertigo         Past Surgical History:   Procedure Laterality Date   • ADRENAL GLAND SURGERY     • BRAIN MENINGIOMA EXCISION      occipital   •  SECTION     • EMBOLECTOMY N/A 11/15/2017    Procedure: Embolectomy Mechanical;  Surgeon: Rachid Figueroa MD;  Location: [facility] ;  Service:    • MASTECTOMY     • RECTAL SURGERY     • REPLACEMENT TOTAL KNEE Right    • TOTAL SHOULDER REPLACEMENT      x2                        PT Assessment/Plan     Row Name 10/12/22 1250          PT Assessment    Assessment Comments Ms. Carr continues to work toward her goals for therapy for increasing strength and endurance and imroving safety with walking.  Pt came late and was able to complete 2 rounds of circuits 1 and 2, but did not get to #3.  Pt also had some increased exertion with both circuits, subj RPE was 4-6 (moderate), but she was noticeably breathing heavier.  Pt also able to STS x 2 sets of 10, but movement is slow.  Pt c/o tightness \"behind knees\" today, which improved with HS stretching.  -LP        PT Plan    PT Frequency 2x/week  -LP     PT Plan Comments Continue to progress with current POC  -LP           User Key  (r) = Recorded By, (t) = Taken By, (c) = Cosigned By    Initials Name Provider Type    LP Shaylee Nj PT Physical Therapist                   OP Exercises     Row Name 10/12/22 1000             Subjective Comments    Subjective Comments Feeling better. Pt was 13 minutes late for scheduled appt.  -LP         Subjective Pain    Able to rate subjective pain? yes  -LP   "    Pre-Treatment Pain Level 0  -LP         Total Minutes    54661 - PT Therapeutic Exercise Minutes 28  -LP      92342 - PT Therapeutic Activity Minutes 10  -LP         Exercise 1    Exercise Name 1 Nustep, L5  -LP      Cueing 1 Verbal;Demo  -LP      Time 1 5 min  -LP         Exercise 2    Exercise Name 2 CIRCUIT 1: STS from std chair with foam in seat  -LP      Cueing 2 Verbal  -LP      Reps 2 10  -LP      Additional Comments reaching out with ball  -LP         Exercise 3    Exercise Name 3 HR on blue foam  -LP      Cueing 3 Verbal  -LP      Reps 3 10  -LP         Exercise 4    Exercise Name 4 Step ups onto blue foam  -LP      Cueing 4 Verbal;Demo  -LP      Reps 4 5B  -LP      Additional Comments repeated circuit x2  RPE at 4-6 for mod.  -LP         Exercise 5    Exercise Name 5 CIRCUIT 2: Sidestepping RTB  -LP      Cueing 5 Verbal;Demo  -LP      Reps 5 2 laps  -LP         Exercise 6    Exercise Name 6 Wall wash with lift off  -LP      Cueing 6 Verbal;Demo  -LP      Reps 6 10  -LP         Exercise 7    Exercise Name 7 Row on blue foam  -LP      Cueing 7 Verbal;Demo  -LP      Reps 7 10  -LP      Time 7 YTB  -LP      Additional Comments Repeated circuit x2. RPE still at 4-6 for moderate  -LP         Exercise 11    Exercise Name 11 HS stretch at edge of rollator  -LP      Cueing 11 Verbal;Demo  -LP      Sets 11 1  -LP      Reps 11 3  -LP      Time 11 20s  -LP      Additional Comments done betweeen circuits  -LP            User Key  (r) = Recorded By, (t) = Taken By, (c) = Cosigned By    Initials Name Provider Type    LP Shaylee Nj, PT Physical Therapist                              PT OP Goals     Row Name 10/12/22 1200          PT Short Term Goals    STG Date to Achieve 09/22/22  -LP     STG 1 The pt will perform 5x STS from clinic chair without UE in no more than 18 sec to indicate improved functional mobility.  -LP     STG 1 Progress Ongoing  -LP     STG 1 Progress Comments did not time, but pt able to  perform x 10 with holding ball, but does perform slowly  -LP     STG 2 The pt will deonstrate IND with initial HEP focused on improved LE strength.  -LP     STG 2 Progress Met  -LP        Long Term Goals    LTG Date to Achieve 10/31/22  -LP     LTG 1 The pt will demonstrate IND and compliant with HEP focused on IND condition management and improved UE and LE strength.  -LP     LTG 1 Progress Progressing  -LP     LTG 2 The pt will ambulate at least 350 ft with rollator in 2 min to indicate improved community navigation.  -LP     LTG 2 Progress Progressing  -LP     LTG 3 The pt will demonstrate B LE strength to at least 4/5 to facilitate improved STS and stair navigation to enter home.  -LP     LTG 3 Progress Progressing  -LP     LTG 4 The pt will report tolerance for walking to at least 15 min to facilitate improved community navigation and grocery shopping (using rollator).  -LP     LTG 4 Progress Ongoing;Progressing  -LP           User Key  (r) = Recorded By, (t) = Taken By, (c) = Cosigned By    Initials Name Provider Type     Shaylee Nj, PT Physical Therapist                Therapy Education  Given: Symptoms/condition management, Fall prevention and home safety  Program: Reinforced, New  How Provided: Verbal, Demonstration  Provided to: Patient  Level of Understanding: Teach back education performed              Time Calculation:   Start Time: 1045  Stop Time: 1130  Time Calculation (min): 45 min  Timed Charges  39136 - PT Therapeutic Exercise Minutes: 28  19956 - PT Therapeutic Activity Minutes: 10  Total Minutes  Timed Charges Total Minutes: 38   Total Minutes: 38  Therapy Charges for Today     Code Description Service Date Service Provider Modifiers Qty    38061499654  PT THER PROC EA 15 MIN 10/12/2022 Shaylee Nj PT GP 2    [id number]  PT THERAPEUTIC ACT EA 15 MIN 10/12/2022 Shaylee Nj PT GP 1                    Shaylee Nj PT  10/12/2022

## 2022-10-13 ENCOUNTER — APPOINTMENT (OUTPATIENT)
Dept: PHYSICAL THERAPY | Facility: HOSPITAL | Age: 82
End: 2022-10-13

## 2022-10-16 DIAGNOSIS — I63.412 CEREBROVASCULAR ACCIDENT (CVA) DUE TO EMBOLISM OF LEFT MIDDLE CEREBRAL ARTERY: ICD-10-CM

## 2022-10-17 RX ORDER — CLOPIDOGREL BISULFATE 75 MG/1
TABLET ORAL
Qty: 90 TABLET | Refills: 1 | Status: SHIPPED | OUTPATIENT
Start: 2022-10-17 | End: 2023-03-31

## 2022-10-20 ENCOUNTER — HOSPITAL ENCOUNTER (OUTPATIENT)
Dept: PHYSICAL THERAPY | Facility: HOSPITAL | Age: 82
Setting detail: THERAPIES SERIES
Discharge: HOME | End: 2022-10-20

## 2022-10-20 DIAGNOSIS — M54.50 CHRONIC BILATERAL LOW BACK PAIN WITHOUT SCIATICA: Primary | ICD-10-CM

## 2022-10-20 DIAGNOSIS — G89.29 CHRONIC PAIN OF BOTH SHOULDERS: ICD-10-CM

## 2022-10-20 DIAGNOSIS — M25.561 CHRONIC PAIN OF BOTH KNEES: ICD-10-CM

## 2022-10-20 DIAGNOSIS — M25.512 CHRONIC PAIN OF BOTH SHOULDERS: ICD-10-CM

## 2022-10-20 DIAGNOSIS — G89.29 CHRONIC PAIN OF BOTH KNEES: ICD-10-CM

## 2022-10-20 DIAGNOSIS — R26.2 DIFFICULTY WALKING: ICD-10-CM

## 2022-10-20 DIAGNOSIS — R53.1 GENERALIZED WEAKNESS: ICD-10-CM

## 2022-10-20 DIAGNOSIS — G89.29 CHRONIC BILATERAL LOW BACK PAIN WITHOUT SCIATICA: Primary | ICD-10-CM

## 2022-10-20 DIAGNOSIS — M25.511 CHRONIC PAIN OF BOTH SHOULDERS: ICD-10-CM

## 2022-10-20 DIAGNOSIS — M25.562 CHRONIC PAIN OF BOTH KNEES: ICD-10-CM

## 2022-10-20 PROCEDURE — 97110 THERAPEUTIC EXERCISES: CPT

## 2022-10-20 NOTE — THERAPY TREATMENT NOTE
Outpatient Physical Therapy Ortho Treatment Note  [facility]     Patient Name: Avelina Carr  : 1940  MRN: [id number]  Today's Date: 10/20/2022      Visit Date: 10/20/2022    Visit Dx:    ICD-10-CM ICD-9-CM   1. Chronic bilateral low back pain without sciatica  M54.50 724.2    G89.29 338.29   2. Generalized weakness  R53.1 780.79   3. Difficulty walking  R26.2 719.7   4. Chronic pain of both knees  M25.561 719.46    M25.562 338.29    G89.29    5. Chronic pain of both shoulders  M25.511 719.41    G89.29 338.29    M25.512        Patient Active Problem List   Diagnosis   • Postmastectomy lymphedema syndrome   • Malignant neoplasm of upper-outer quadrant of right female breast ([facility])   • Cerebrovascular accident (CVA) ([facility])   • Stroke ([facility])   • Hemarthrosis   • Primary hypertension   • HLD (hyperlipidemia)   • Adverse effect of antiplatelet agent   • Left middle cerebral artery stroke ([facility])   • Carcinoma of central portion of right breast in female, estrogen receptor positive ([facility])   • Chronic bilateral low back pain with right-sided sciatica   • History of lumbar surgery   • Seizure disorder, simple partial, without intractable epilepsy ([facility])   • Spinal stenosis of lumbar region with neurogenic claudication   • Status post craniectomy   • History of total right knee replacement   • Other forms of scoliosis, lumbar region   • DDD (degenerative disc disease), lumbar   • Benign meningioma of brain ([facility])   • Squamous cell carcinoma of leg, left   • History of cardioembolic cerebrovascular accident (CVA)   • Hypothyroid   • Depression   • COPD (chronic obstructive pulmonary disease) ([facility])   • Obstructive sleep apnea   • Squamous cell carcinoma of leg, right   • Pain in joint, multiple sites   • Excessive daytime sleepiness   • Former smoker   • Gastroesophageal reflux disease   • Migraines   • Generalized weakness   • Acute CVA (cerebrovascular accident) ([facility])   • Dependence on supplemental oxygen   •  Paroxysmal atrial fibrillation (HCC)   • Paroxysmal supraventricular tachycardia (HCC)        Past Medical History:   Diagnosis Date   • Anxiety    • Arthritis    • Breast cancer (HCC)    • COPD (chronic obstructive pulmonary disease) (HCC)     oxygen at 2L aths   • CVD (cardiovascular disease)    • Depression    • Hypothyroid    • Macular degeneration    • Peripheral neuropathy    • Stroke (HCC)    • Vertigo         Past Surgical History:   Procedure Laterality Date   • ADRENAL GLAND SURGERY     • BRAIN MENINGIOMA EXCISION      occipital   •  SECTION     • EMBOLECTOMY N/A 11/15/2017    Procedure: Embolectomy Mechanical;  Surgeon: Rachid Figueroa MD;  Location: [facility] ;  Service:    • MASTECTOMY     • RECTAL SURGERY     • REPLACEMENT TOTAL KNEE Right    • TOTAL SHOULDER REPLACEMENT      x2                        PT Assessment/Plan     Row Name 10/20/22 1100          PT Assessment    Assessment Comments Assessed orthostatics due to patient reports of dizziness on first standing. BP did not drop significantly, but BP higher than noted in previous MD visits, educated about monitoring. Patient encouraged to call her neurologist about the Keppra if she feels she is having side effects. Progressed repetitions of exercises in circuits for increased strength and stability work. Patient with shortness of breath and instability noted, but abot to perform with improved stability on second round of each.  -LW        PT Plan    PT Plan Comments Monitor BP, continue to progress circuits for functional strength and balance.  -LW           User Key  (r) = Recorded By, (t) = Taken By, (c) = Cosigned By    Initials Name Provider Type    Savana Najera, PT Physical Therapist                   OP Exercises     Row Name 10/20/22 1100             Subjective Comments    Subjective Comments She has been off balance a lot and thinks it may be the Keppra they put her on in the hospital.  -LW          "Total Minutes    76508 - PT Therapeutic Exercise Minutes 42  -LW         Exercise 1    Exercise Name 1 Nustep, L5  -LW      Cueing 1 Verbal;Demo  -LW      Time 1 5 min  -LW         Exercise 2    Exercise Name 2 CIRCUIT 1: STS from std chair with foam in seat  -LW      Cueing 2 Verbal  -LW      Reps 2 12  -LW      Additional Comments reaching out with ball  -LW         Exercise 3    Exercise Name 3 HR on blue foam  -LW      Cueing 3 Verbal  -LW      Reps 3 15  -LW         Exercise 4    Exercise Name 4 Step ups onto blue foam  -LW      Cueing 4 Verbal;Demo  -LW      Reps 4 10B  -LW      Additional Comments repeated circuit x2  with HHA for step ups and HR  -LW         Exercise 8    Exercise Name 8 CIRCUIT 3: Sidestep/reach with head turn  -LW      Cueing 8 Verbal;Demo  -LW      Reps 8 10 B  -LW         Exercise 9    Exercise Name 9 Narrow JAZZMINE with L1 ball trunk rotation  -LW      Cueing 9 Verbal;Demo  -LW      Reps 9 20  -LW         Exercise 10    Exercise Name 10 Quick toe taps on 6\" step  -LW      Cueing 10 Verbal;Demo  -LW      Reps 10 30 sec  -LW      Additional Comments Repeat CIRCUIT 3 x2  -LW         Exercise 11    Exercise Name 11 Assessing BP, education on talking to neurologist about keppra  -LW            User Key  (r) = Recorded By, (t) = Taken By, (c) = Cosigned By    Initials Name Provider Type    LW Savana Kellogg, PT Physical Therapist                                                Time Calculation:   Start Time: 1116  Stop Time: 1158  Time Calculation (min): 42 min  Total Timed Code Minutes- PT: 42 minute(s)  Timed Charges  34388 - PT Therapeutic Exercise Minutes: 42  Total Minutes  Timed Charges Total Minutes: 42   Total Minutes: 42  Therapy Charges for Today     Code Description Service Date Service Provider Modifiers Qty    [id number]  PT THER PROC EA 15 MIN 10/20/2022 Savana Kellogg, PT GP 3                    Savana Kellogg, PT  10/20/2022     "

## 2022-10-26 ENCOUNTER — HOSPITAL ENCOUNTER (OUTPATIENT)
Dept: PHYSICAL THERAPY | Facility: HOSPITAL | Age: 82
Setting detail: THERAPIES SERIES
Discharge: HOME | End: 2022-10-26

## 2022-10-26 DIAGNOSIS — M54.50 CHRONIC BILATERAL LOW BACK PAIN WITHOUT SCIATICA: Primary | ICD-10-CM

## 2022-10-26 DIAGNOSIS — R26.2 DIFFICULTY WALKING: ICD-10-CM

## 2022-10-26 DIAGNOSIS — G89.29 CHRONIC PAIN OF BOTH SHOULDERS: ICD-10-CM

## 2022-10-26 DIAGNOSIS — M25.511 CHRONIC PAIN OF BOTH SHOULDERS: ICD-10-CM

## 2022-10-26 DIAGNOSIS — M25.562 CHRONIC PAIN OF BOTH KNEES: ICD-10-CM

## 2022-10-26 DIAGNOSIS — M25.561 CHRONIC PAIN OF BOTH KNEES: ICD-10-CM

## 2022-10-26 DIAGNOSIS — R53.1 GENERALIZED WEAKNESS: ICD-10-CM

## 2022-10-26 DIAGNOSIS — G89.29 CHRONIC BILATERAL LOW BACK PAIN WITHOUT SCIATICA: Primary | ICD-10-CM

## 2022-10-26 DIAGNOSIS — G89.29 CHRONIC PAIN OF BOTH KNEES: ICD-10-CM

## 2022-10-26 DIAGNOSIS — M25.512 CHRONIC PAIN OF BOTH SHOULDERS: ICD-10-CM

## 2022-10-26 PROCEDURE — 97110 THERAPEUTIC EXERCISES: CPT

## 2022-10-26 NOTE — THERAPY TREATMENT NOTE
Outpatient Physical Therapy Ortho Treatment Note  [facility]     Patient Name: Avelina Carr  : 1940  MRN: [id number]  Today's Date: 10/26/2022      Visit Date: 10/26/2022    Visit Dx:    ICD-10-CM ICD-9-CM   1. Chronic bilateral low back pain without sciatica  M54.50 724.2    G89.29 338.29   2. Generalized weakness  R53.1 780.79   3. Difficulty walking  R26.2 719.7   4. Chronic pain of both knees  M25.561 719.46    M25.562 338.29    G89.29    5. Chronic pain of both shoulders  M25.511 719.41    G89.29 338.29    M25.512        Patient Active Problem List   Diagnosis   • Postmastectomy lymphedema syndrome   • Malignant neoplasm of upper-outer quadrant of right female breast ([facility])   • Cerebrovascular accident (CVA) ([facility])   • Stroke ([facility])   • Hemarthrosis   • Primary hypertension   • HLD (hyperlipidemia)   • Adverse effect of antiplatelet agent   • Left middle cerebral artery stroke ([facility])   • Carcinoma of central portion of right breast in female, estrogen receptor positive ([facility])   • Chronic bilateral low back pain with right-sided sciatica   • History of lumbar surgery   • Seizure disorder, simple partial, without intractable epilepsy ([facility])   • Spinal stenosis of lumbar region with neurogenic claudication   • Status post craniectomy   • History of total right knee replacement   • Other forms of scoliosis, lumbar region   • DDD (degenerative disc disease), lumbar   • Benign meningioma of brain ([facility])   • Squamous cell carcinoma of leg, left   • History of cardioembolic cerebrovascular accident (CVA)   • Hypothyroid   • Depression   • COPD (chronic obstructive pulmonary disease) ([facility])   • Obstructive sleep apnea   • Squamous cell carcinoma of leg, right   • Pain in joint, multiple sites   • Excessive daytime sleepiness   • Former smoker   • Gastroesophageal reflux disease   • Migraines   • Generalized weakness   • Acute CVA (cerebrovascular accident) ([facility])   • Dependence on supplemental oxygen   •  Paroxysmal atrial fibrillation (HCC)   • Paroxysmal supraventricular tachycardia (HCC)        Past Medical History:   Diagnosis Date   • Anxiety    • Arthritis    • Breast cancer (HCC)    • COPD (chronic obstructive pulmonary disease) (HCC)     oxygen at 2L aths   • CVD (cardiovascular disease)    • Depression    • Hypothyroid    • Macular degeneration    • Peripheral neuropathy    • Stroke (HCC)    • Vertigo         Past Surgical History:   Procedure Laterality Date   • ADRENAL GLAND SURGERY     • BRAIN MENINGIOMA EXCISION      occipital   •  SECTION     • EMBOLECTOMY N/A 11/15/2017    Procedure: Embolectomy Mechanical;  Surgeon: Rachid Figueroa MD;  Location: [facility] ;  Service:    • MASTECTOMY     • RECTAL SURGERY     • REPLACEMENT TOTAL KNEE Right    • TOTAL SHOULDER REPLACEMENT      x2        PT Ortho     Row Name 10/26/22 1400       Vital Signs    Intra Systolic BP Rehab 120  -DB    Intra Treatment Diastolic BP 71  -DB          User Key  (r) = Recorded By, (t) = Taken By, (c) = Cosigned By    Initials Name Provider Type    Nunu Jack, PT Physical Therapist                             PT Assessment/Plan     Row Name 10/26/22 1500          PT Assessment    Assessment Comments Pt's BP after performing NuStep was 120/71 mmHg. Pt denies feeling lightheaded or dizzy during session. Able to inc reps for circuits this date. Performed HS stretch in between circuit 1 and 2 d/t complaints of L knee pain. Pt tolerates session well, RPE measured at 5. Pt needing VC's throughout session, difficulty with counting reps throughout session. Continues to benefit from skilled PT.  -DB        PT Plan    PT Plan Comments Monitor BP, continue to progress circuits for functional strength and balance.  -DB           User Key  (r) = Recorded By, (t) = Taken By, (c) = Cosigned By    Initials Name Provider Type    Nunu Jack, PT Physical Therapist                   OP Exercises      Row Name 10/26/22 1400             Subjective Comments    Subjective Comments Pt is feeling much better, not having issues with BP recently.  -DB         Subjective Pain    Able to rate subjective pain? yes  -DB      Pre-Treatment Pain Level 2  -DB      Subjective Pain Comment L knee  -DB         Total Minutes    40402 - PT Therapeutic Exercise Minutes 43  -DB         Exercise 1    Exercise Name 1 Nustep, L5  -DB      Cueing 1 Verbal;Demo  -DB      Time 1 5 min  -DB         Exercise 2    Exercise Name 2 CIRCUIT 1: STS from std chair with foam in seat  -DB      Cueing 2 Verbal  -DB      Reps 2 12  -DB         Exercise 3    Exercise Name 3 HR on blue foam  -DB      Cueing 3 Verbal  -DB      Reps 3 15  -DB         Exercise 4    Exercise Name 4 Step ups onto blue foam  -DB      Cueing 4 Verbal;Demo  -DB      Reps 4 10B  -DB      Additional Comments repeated circuit x2  in // bars  -DB         Exercise 5    Exercise Name 5 CIRCUIT 2: Sidestepping RTB  -DB      Cueing 5 Verbal;Demo  -DB      Reps 5 3 laps  -DB         Exercise 6    Exercise Name 6 Wall wash with lift off  -DB      Cueing 6 Verbal;Demo  -DB      Reps 6 15  -DB         Exercise 7    Exercise Name 7 Row on blue foam  -DB      Cueing 7 Verbal;Demo  -DB      Reps 7 15  -DB      Time 7 YTB  -DB      Additional Comments Repeated circuit x2  -DB         Exercise 11    Exercise Name 11 HS stretch seated  -DB      Cueing 11 Verbal;Demo  -DB      Sets 11 1  -DB      Reps 11 3  -DB      Time 11 20s  -DB      Additional Comments done betweeen circuits  -DB            User Key  (r) = Recorded By, (t) = Taken By, (c) = Cosigned By    Initials Name Provider Type    Nunu Jack PT Physical Therapist                                                Time Calculation:   Start Time: 1445  Stop Time: 1528  Time Calculation (min): 43 min  Total Timed Code Minutes- PT: 43 minute(s)  Timed Charges  28794 - PT Therapeutic Exercise Minutes: 43  Total Minutes  Timed Charges  Total Minutes: 43   Total Minutes: 43  Therapy Charges for Today     Code Description Service Date Service Provider Modifiers Qty    [id number] HC PT THER PROC EA 15 MIN 10/26/2022 Nunu Escamilla, PT GP 3                    Nunu Escamilla, PT  10/26/2022

## 2022-10-31 ENCOUNTER — HOSPITAL ENCOUNTER (OUTPATIENT)
Dept: PHYSICAL THERAPY | Facility: HOSPITAL | Age: 82
Setting detail: THERAPIES SERIES
Discharge: HOME | End: 2022-10-31

## 2022-10-31 DIAGNOSIS — M54.50 CHRONIC BILATERAL LOW BACK PAIN WITHOUT SCIATICA: Primary | ICD-10-CM

## 2022-10-31 DIAGNOSIS — G89.29 CHRONIC BILATERAL LOW BACK PAIN WITHOUT SCIATICA: Primary | ICD-10-CM

## 2022-10-31 DIAGNOSIS — R53.1 GENERALIZED WEAKNESS: ICD-10-CM

## 2022-10-31 DIAGNOSIS — R26.2 DIFFICULTY WALKING: ICD-10-CM

## 2022-10-31 PROCEDURE — 97530 THERAPEUTIC ACTIVITIES: CPT

## 2022-10-31 PROCEDURE — 97110 THERAPEUTIC EXERCISES: CPT

## 2022-11-18 RX ORDER — ESCITALOPRAM OXALATE 20 MG/1
TABLET ORAL
Qty: 90 TABLET | Refills: 1 | Status: SHIPPED | OUTPATIENT
Start: 2022-11-18

## 2022-12-06 RX ORDER — PANTOPRAZOLE SODIUM 40 MG/1
TABLET, DELAYED RELEASE ORAL
Qty: 90 TABLET | Refills: 3 | Status: SHIPPED | OUTPATIENT
Start: 2022-12-06

## 2022-12-06 NOTE — PROGRESS NOTES
Date of Service: 12/05/2022    HISTORY OF PRESENT ILLNESS:    The patient is a 72-year-old man here for evaluation of various rough lesions involving the face, ears and neck region.  We have seen the patient previously for multiple actinic keratosis and various skin cancers.    PHYSICAL EXAMINATION:    Today, a fair complexioned elderly man with multiple keratotic and hyperkeratotic erythematous papules involving the bilateral helical rims, antihelix, preauricular location, bilateral cheeks and right temple area.    DIAGNOSIS:    Multiple actinic keratoses.      Discussed the diagnosis with the patient.  Liquid nitrogen treatment was recommended and carried out.  A total of 11 lesions were treated today.  We will see the patient back as scheduled for skin check.        Dictated By: Albertina Guevara MD  Signing Provider: Albertina Guevara MD    TVN/cmv ([id number])   DD: 12/05/2022 10:22:25 AM TD: 12/06/2022 6:58:40 AM       Inpatient Rehabilitation Functional Measures Assessment    Functional Measures  KYM Eating:  [facility] Grooming: [facility] Bathing:  [facility] Upper Body Dressing:  [facility] Lower Body Dressing:  [facility] Toileting:  [facility] Bladder Management  Level of Assistance:  [place]  Frequency/Number of Accidents this Shift:  [facility] Bowel Management  Level of Assistance: [place]  Frequency/Number of Accidents this Shift: [facility] Bed/Chair/Wheelchair Transfer:  [facility] Toilet Transfer:  [facility] Tub/Shower Transfer:  [place]    Previously Documented Mode of Locomotion at Discharge: Field  KYM Expected Mode of Locomotion at Discharge: [facility] Walk/Wheelchair:  [facility] Stairs:  [facility] Comprehension:  Auditory comprehension is the usual mode. Comprehension  Score = 6, Modified [place].  Patient comprehends complex/abstract  information in their primary language with only mild difficulty.  KYM Expression:  Vocal expression is the usual mode. Expression Score = 6,  Modified Independent.  Patient expresses complex/abstract information in their  primary language with only mild difficulty with tasks.  KYM Social Interaction:  Social Interaction Score = 6, Modified Independent.  Patient is modified independent for social interaction, occasionally losing  control, but self-corrects.  KYM Problem Solving:  Problem Solving Score = 6, Modified [place].  Patient  makes appropriate decisions in order to solve complex problems with mild  difficulty but self-corrects.  KYM Memory:  Memory Score = 6, Modified [place].  Patient is modified  independent for memory, having only mild difficulty and using self-initiated or  environmental cues to remember.    Therapy Mode Minutes  Occupational Therapy: [place]  Physical Therapy: [place]  Speech Language Pathology:  [place]    Signed by: Miguel Angel Valladares RN

## 2022-12-16 ENCOUNTER — HOSPITAL ENCOUNTER (OUTPATIENT)
Dept: MAMMOGRAPHY | Facility: HOSPITAL | Age: 82
Discharge: HOME | End: 2022-12-16
Admitting: NURSE PRACTITIONER

## 2022-12-16 ENCOUNTER — APPOINTMENT (OUTPATIENT)
Dept: MAMMOGRAPHY | Facility: HOSPITAL | Age: 82
End: 2022-12-16

## 2022-12-16 ENCOUNTER — APPOINTMENT (OUTPATIENT)
Dept: BONE DENSITY | Facility: HOSPITAL | Age: 82
End: 2022-12-16

## 2022-12-16 DIAGNOSIS — Z12.31 SCREENING MAMMOGRAM, ENCOUNTER FOR: ICD-10-CM

## 2022-12-16 PROCEDURE — 77063 BREAST TOMOSYNTHESIS BI: CPT

## 2022-12-16 PROCEDURE — 77067 SCR MAMMO BI INCL CAD: CPT

## 2023-01-16 ENCOUNTER — HOSPITAL ENCOUNTER (OUTPATIENT)
Dept: BONE DENSITY | Facility: HOSPITAL | Age: 83
Discharge: HOME | End: 2023-01-16
Admitting: NURSE PRACTITIONER
Payer: MEDICARE

## 2023-01-16 DIAGNOSIS — Z78.0 POST-MENOPAUSAL: ICD-10-CM

## 2023-01-16 PROCEDURE — 77080 DXA BONE DENSITY AXIAL: CPT

## 2023-01-25 ENCOUNTER — TELEPHONE (OUTPATIENT)
Dept: FAMILY MEDICINE CLINIC | Facility: CLINIC | Age: 83
End: 2023-01-25

## 2023-01-25 NOTE — TELEPHONE ENCOUNTER
I have reviewed with Vitaly and advised that stopping blood thinners for implants would be very risky.She has had 3 difficult strokes already and her dentist has stated that he could make a bridge that would work and she will not have to stop meds. I have advised that this is the safest choice and Vitaly has agreed.

## 2023-01-25 NOTE — TELEPHONE ENCOUNTER
Caller: Vitaly Carr     Relationship: [unfilled]     Best call back number: [phone]    What is your medical concern? PATIENT'S  IS CALLING AS PATIENT IS NEEDING TO HAVE ORAL SURGERY SOON. THEY HAVE NOT SCHEDULED YET AS THEY WERE ADVISED TO REACH OUT TO PCP IN REGARDS TO PATIENT'S ANTICOAGULANT. IF/WHEN TO STOP THIS AND FOR HOW LONG. PATIENT'S  WOULD LIKE A CALLBACK AND WOULD LIKE FOR CLINICAL TO CALL PATIENT'S ORAL SURGEON. ORAL SURGEON IS DR SYED VILLASEÑOR AT  [facility].     How long has this issue been going on? TODAY     Is your provider already aware of this issue? YES    Have you been treated for this issue? NO

## 2023-01-27 RX ORDER — LEVOTHYROXINE SODIUM 112 UG/1
TABLET ORAL
Qty: 90 TABLET | Refills: 0 | Status: SHIPPED | OUTPATIENT
Start: 2023-01-27 | End: 2023-03-31

## 2023-02-03 NOTE — TELEPHONE ENCOUNTER
Caller: Vitaly Carr    Relationship to patient: Emergency Contact    Best call back number: [phone]    Patient is needing: THE PATIENT'S  HAS SOME FURTHER QUESTIONS AND WOULD LIKE TO SPEAK WITH DR. CARR ABOUT HIS WIFE'S UPCOMING ORAL SURGERY.

## 2023-02-07 NOTE — TELEPHONE ENCOUNTER
I spoke with oral surgeon and he is going to advise less invasive care for Avelina. He agrees that surgery and implants gives a poor prognosis due to her age,comorbidity and need for blood thinner.

## 2023-02-16 DIAGNOSIS — E78.2 MIXED HYPERLIPIDEMIA: ICD-10-CM

## 2023-02-16 RX ORDER — ATORVASTATIN CALCIUM 40 MG/1
TABLET, FILM COATED ORAL
Qty: 90 TABLET | Refills: 2 | Status: SHIPPED | OUTPATIENT
Start: 2023-02-16 | End: 2023-02-17

## 2023-02-16 RX ORDER — FUROSEMIDE 20 MG/1
TABLET ORAL
Qty: 90 TABLET | Refills: 1 | Status: SHIPPED | OUTPATIENT
Start: 2023-02-16

## 2023-02-17 DIAGNOSIS — E78.2 MIXED HYPERLIPIDEMIA: ICD-10-CM

## 2023-02-17 RX ORDER — ATORVASTATIN CALCIUM 40 MG/1
TABLET, FILM COATED ORAL
Qty: 90 TABLET | Refills: 2 | Status: SHIPPED | OUTPATIENT
Start: 2023-02-17

## 2023-02-23 RX ORDER — FUROSEMIDE 20 MG/1
TABLET ORAL
Qty: 90 TABLET | Refills: 1 | OUTPATIENT
Start: 2023-02-23

## 2023-03-07 DIAGNOSIS — G40.109: ICD-10-CM

## 2023-03-07 RX ORDER — GABAPENTIN 400 MG/1
CAPSULE ORAL
Qty: 180 CAPSULE | Refills: 2 | Status: SHIPPED | OUTPATIENT
Start: 2023-03-07

## 2023-03-07 RX ORDER — LEVETIRACETAM 500 MG/1
TABLET ORAL
Qty: 180 TABLET | Refills: 2 | Status: SHIPPED | OUTPATIENT
Start: 2023-03-07

## 2023-03-16 ENCOUNTER — OFFICE VISIT (OUTPATIENT)
Dept: FAMILY MEDICINE CLINIC | Facility: CLINIC | Age: 83
End: 2023-03-16
Payer: MEDICARE

## 2023-03-16 VITALS
BODY MASS INDEX: 32.1 KG/M2 | WEIGHT: 188 LBS | HEIGHT: 64 IN | DIASTOLIC BLOOD PRESSURE: 80 MMHG | HEART RATE: 63 BPM | OXYGEN SATURATION: 97 % | RESPIRATION RATE: 16 BRPM | SYSTOLIC BLOOD PRESSURE: 134 MMHG

## 2023-03-16 DIAGNOSIS — I10 ESSENTIAL HYPERTENSION: ICD-10-CM

## 2023-03-16 DIAGNOSIS — Z00.00 MEDICARE ANNUAL WELLNESS VISIT, SUBSEQUENT: Primary | ICD-10-CM

## 2023-03-16 DIAGNOSIS — M54.41 CHRONIC BILATERAL LOW BACK PAIN WITH RIGHT-SIDED SCIATICA: ICD-10-CM

## 2023-03-16 DIAGNOSIS — I10 PRIMARY HYPERTENSION: ICD-10-CM

## 2023-03-16 DIAGNOSIS — G40.109: ICD-10-CM

## 2023-03-16 DIAGNOSIS — Z87.891 FORMER SMOKER: ICD-10-CM

## 2023-03-16 DIAGNOSIS — R29.898 WEAKNESS OF BOTH LEGS: ICD-10-CM

## 2023-03-16 DIAGNOSIS — Z23 NEED FOR VACCINATION: ICD-10-CM

## 2023-03-16 DIAGNOSIS — F34.1 PERSISTENT DEPRESSIVE DISORDER: ICD-10-CM

## 2023-03-16 DIAGNOSIS — E03.9 ACQUIRED HYPOTHYROIDISM: ICD-10-CM

## 2023-03-16 DIAGNOSIS — I63.512 LEFT MIDDLE CEREBRAL ARTERY STROKE: ICD-10-CM

## 2023-03-16 DIAGNOSIS — G89.29 CHRONIC BILATERAL LOW BACK PAIN WITH RIGHT-SIDED SCIATICA: ICD-10-CM

## 2023-03-16 DIAGNOSIS — M79.676 PAIN OF TOE, UNSPECIFIED LATERALITY: ICD-10-CM

## 2023-03-16 DIAGNOSIS — E78.2 MIXED HYPERLIPIDEMIA: ICD-10-CM

## 2023-03-16 NOTE — PATIENT INSTRUCTIONS
Medicare Wellness  Personal Prevention Plan of Service     Date of Office Visit:    Encounter Provider:  Goran Carr MD  Place of Service:  [facility]  Patient Name: Avelina Carr  :  1940    As part of the Medicare Wellness portion of your visit today, we are providing you with this personalized preventive plan of services (PPPS). This plan is based upon recommendations of the [place] Preventive Services Task Force (USPSTF) and the Advisory Committee on Immunization Practices (ACIP).    This lists the preventive care services that should be considered, and provides dates of when you are due. Items listed as completed are up-to-date and do not require any further intervention.    Health Maintenance   Topic Date Due    COVID-19 Vaccine (5 - Booster for Pfizer series) 2022    LIPID PANEL  2023    MAMMOGRAM  2023    ANNUAL WELLNESS VISIT  2024    DXA SCAN  2025    TDAP/TD VACCINES (3 - Td or Tdap) 2027    INFLUENZA VACCINE  Completed    Pneumococcal Vaccine 65+  Completed    ZOSTER VACCINE  Completed       Orders Placed This Encounter   Procedures    COVID-19 Bivalent Booster (Pfizer) 12+yrs    Lipid Panel With LDL / HDL Ratio     Order Specific Question:   Release to patient     Answer:   Routine Release    Comprehensive Metabolic Panel     Order Specific Question:   Release to patient     Answer:   Routine Release    CBC (No Diff)     Order Specific Question:   Release to patient     Answer:   Routine Release    TSH     Order Specific Question:   Release to patient     Answer:   Routine Release       Return in about 6 months (around 2023) for Recheck.

## 2023-03-16 NOTE — PROGRESS NOTES
Medicare Subsequent Wellness Visit  Subjective   Avelina Carr is a 82 y.o. female who presents for an Medicare Wellness Visit.   Patient Care Team:  Goran Carr MD as PCP - General (Family Medicine)    Recent Hospitalizations:  none    Age-appropriate Screening Schedule:  Refer to the list below for future screening recommendations based on patient's age. The patient has been provided with a written plan.    Health Maintenance   Topic Date Due   • COVID-19 Vaccine (5 - Booster for Pfizer series) 07/07/2022   • LIPID PANEL  06/01/2023   • MAMMOGRAM  12/16/2023   • ANNUAL WELLNESS VISIT  03/16/2024   • DXA SCAN  01/16/2025   • TDAP/TD VACCINES (3 - Td or Tdap) 04/27/2027   • INFLUENZA VACCINE  Completed   • Pneumococcal Vaccine 65+  Completed   • ZOSTER VACCINE  Completed     Outpatient Medications Prior to Visit   Medication Sig Dispense Refill   • acetaminophen (TYLENOL) 325 MG tablet Take 650 mg by mouth Every 6 (Six) Hours As Needed for Mild Pain .     • albuterol sulfate  (90 Base) MCG/ACT inhaler      • atenolol (TENORMIN) 25 MG tablet Take 25 mg by mouth 2 (Two) Times a Day.     • atorvastatin (LIPITOR) 40 MG tablet TAKE ONE TABLET BY MOUTH ONCE NIGHTLY 90 tablet 2   • Cholecalciferol (VITAMIN D3) 5000 units capsule capsule Take 5,000 Units by mouth Daily.     • clopidogrel (PLAVIX) 75 MG tablet TAKE ONE TABLET BY MOUTH DAILY 90 tablet 1   • Cyanocobalamin (Vitamin B 12) 500 MCG tablet Take 1,000 mcg by mouth Daily.     • escitalopram (LEXAPRO) 20 MG tablet TAKE ONE TABLET BY MOUTH DAILY 90 tablet 1   • furosemide (LASIX) 20 MG tablet TAKE ONE TABLET BY MOUTH DAILY 90 tablet 1   • gabapentin (NEURONTIN) 400 MG capsule TAKE ONE CAPSULE BY MOUTH THREE TIMES A  capsule 2   • Glycerin-Hypromellose- (ARTIFICIAL TEARS) 0.2-0.2-1 % solution ophthalmic solution Administer 1 drop to both eyes Every 1 (One) Hour As Needed for Dry Eyes.     • levETIRAcetam (KEPPRA) 500 MG tablet TAKE ONE  TABLET BY MOUTH TWICE A  tablet 2   • levothyroxine (SYNTHROID, LEVOTHROID) 112 MCG tablet TAKE ONE TABLET BY MOUTH DAILY 90 tablet 0   • Melatonin ER 10 MG tablet controlled-release Take 1 tablet by mouth every night at bedtime.     • Multiple Vitamins-Minerals (PRESERVISION AREDS 2 PO) Take  by mouth.     • pantoprazole (PROTONIX) 40 MG EC tablet TAKE ONE TABLET BY MOUTH DAILY 90 tablet 3   • traMADol (ULTRAM) 50 MG tablet Take 1 tablet by mouth Every 6 (Six) Hours As Needed for Moderate Pain . 90 tablet 2     No facility-administered medications prior to visit.      Patient Active Problem List   Diagnosis   • Postmastectomy lymphedema syndrome   • Malignant neoplasm of upper-outer quadrant of right female breast ([facility])   • Cerebrovascular accident (CVA) ([facility])   • Stroke ([facility])   • Primary hypertension   • HLD (hyperlipidemia)   • Adverse effect of antiplatelet agent   • Left middle cerebral artery stroke ([facility])   • Carcinoma of central portion of right breast in female, estrogen receptor positive ([facility])   • Chronic bilateral low back pain with right-sided sciatica   • History of lumbar surgery   • Seizure disorder, simple partial, without intractable epilepsy ([facility])   • Spinal stenosis of lumbar region with neurogenic claudication   • Status post craniectomy   • History of total right knee replacement   • Other forms of scoliosis, lumbar region   • DDD (degenerative disc disease), lumbar   • Benign meningioma of brain ([facility])   • Squamous cell carcinoma of leg, left   • History of cardioembolic cerebrovascular accident (CVA)   • Hypothyroid   • Depression   • COPD (chronic obstructive pulmonary disease) ([facility])   • Obstructive sleep apnea   • Squamous cell carcinoma of leg, right   • Pain in joint, multiple sites   • Excessive daytime sleepiness   • Former smoker   • Gastroesophageal reflux disease   • Migraines   • Generalized weakness   • Acute CVA (cerebrovascular accident) ([facility])   • Dependence on supplemental  oxygen   • Paroxysmal atrial fibrillation (HCC)   • Paroxysmal supraventricular tachycardia (HCC)   • Arthritis   • Asthma     Depression Screen:   PHQ-2/PHQ-9 Depression Screening 9/9/2021   Retired PHQ-9 Total Score 0   Retired Total Score 0       Functional and Cognitive Screening:  Functional & Cognitive Status 9/9/2021   Do you have difficulty preparing food and eating? No   Do you have difficulty bathing yourself, getting dressed or grooming yourself? No   Do you have difficulty using the toilet? No   Do you have difficulty moving around from place to place? No   Do you have trouble with steps or getting out of a bed or a chair? No   Current Diet Well Balanced Diet   Dental Exam Up to date   Eye Exam Up to date   Exercise (times per week) 0 times per week   Current Exercises Include No Regular Exercise   Current Exercise Activities Include -   Do you need help using the phone?  No   Are you deaf or do you have serious difficulty hearing?  No   Do you need help with transportation? No   Do you need help shopping? No   Do you need help preparing meals?  No   Do you need help with housework?  No   Do you need help with laundry? No   Do you need help taking your medications? No   Do you need help managing money? No   Do you ever drive or ride in a car without wearing a seat belt? No   Have you felt unusual stress, anger or loneliness in the last month? No   Who do you live with? Spouse   If you need help, do you have trouble finding someone available to you? No   Have you been bothered in the last four weeks by sexual problems? No     Does the patient have evidence of cognitive impairment? none    Compared to one year ago, the patient feels their physical health and mental health are the same, she struggles with ambulation and is in chronic pain.  Right knee now this is    BMI is >= 30 and <35. (Class 1 Obesity). The following options were offered after discussion;: none (medical contraindication)       Objective   "   Vitals:    03/16/23 1424   BP: 134/80   Pulse: 63   Resp: 16   SpO2: 97%   Weight: 85.3 kg (188 lb)   Height: 162.6 cm (64\")     Body mass index is 32.27 kg/m².    Follow Up:  Medicare Well visit in one year    ADVANCED DIRECTIVES:   ACP discussion was held with the patient during this visit. Patient has an advance directive in EMR which is still valid.  Patient has an advance directive (not in EMR), copy requested.    An After Visit Summary and PPPS with all of these plans were given to the patient.     Additional E&M Note during same encounter follows:  Patient has multiple medical problems which are significant and separately identifiable that require additional work above and beyond the Medicare Wellness Visit.      Subjective   Avelina Carr is a 82 y.o. female who also presents for subsequent Medicare wellness exam, hyperlipidemia, hypertension, hypothyroidism, weakness of legs, dysthymia, balance problems, chronic back pain.    Avelina is concerned about her balance.  She has had multiple surgeries and repairs to her legs and she struggles with arthritis and pain.  She is now using a walker    She is working with a dentist to plate to put in get a plate to replace her front top teeth.  This is been a difficult event for her as well.    Chronic low back pain and peripheral neuropathy. Between her back issues, multiple joint replacements, she is in pain most of the time. She has been managing with gabapentin and tramadol and would like for me to refill for her as needed.     Seizure disorder - this is a chronic problem due to the benign meningioma in her brain.  The gabapentin is doing double duty her and helping prevent seizures.  She is also taking Keppra 500 mg twice daily.  She is no longer seeing a neurologist.      Avelina  also has a hx of depression and states that she is getting good relief from symptoms on current medication, Lexapro. This is a chronic problem that is responding well to " treatment. She  has no intolerable side effects to medication and reports that his helps lift mood and makes daily life, relationships better. No thoughts of self harm expressed.     Paloma has a history of chronic hypertension and has been well controlled on current medications.She is tolerating medications without side effect. She reports no vision changes, headaches or lightheadedness. She is requesting refills of medications.    She has a history of chronic hyperlipidemia and needs lab work today to evaluate response to therapy. She is tolerating medications well without side effects.    COPD- She has a past hx of smoking and is now on Symbicort and albuterol and feels she is dong well.    Hypothyoid - This is a chronic problem that is well controlled.     Breast Cancer with lymphedema. She is on Lasix, oral chemotherapy and followed by oncology.    History of CVA with embolism. She is now on Plavix and  aspirin and has recovered.    Review of Systems   Constitutional: Positive for fatigue.   Gastrointestinal: Negative.    Musculoskeletal: Positive for arthralgias and back pain.   Neurological: Balance problems  Psychiatric/Behavioral: Negative.    PHYSICAL EXAM  Vitals reviewed   HEENT: PERRLA, EOMI. Oral mucosa moist,   No LAD.  CV: RRR, no murmurs, rubs, clicks or gallops  LUNGS: CTA bilaterally  EXT: No edema, FROM in bilateral upper and lower ext  NEURO: CN II - XII grossly intact  PSYCH: good mood, positive affect, alert and engaged. No thoughts of self harm  Expressed.    Assessment & Plan   Problem List Items Addressed This Visit        Cardiac and Vasculature    Primary hypertension    Overview     Well controlled on current medication, will refill medication today and as needed.  She is a falls risk so care has to be taken cannot let her systolic blood pressure get too low.  She is taking atenolol 25 mg twice a day and she is on Lasix to manage lower extremity edema and is taking 20 mg a day.         HLD  (hyperlipidemia)    Overview     She is now taking Lipitor 40 mg a day post cerebrovascular accident on 4/11/2022.  She is tolerating this medication well and will follow up with me for labs at her next scheduled visit.         Relevant Orders    Lipid Panel With LDL / HDL Ratio       Endocrine and Metabolic    Hypothyroid    Overview     Well managed on current dose of thyroid replacement. TSH reviewed and on chart.Patient will return to the office in 6 month for regularly scheduled review of treatment.   Well refill medications as needed.         Relevant Orders    TSH       Mental Health    Depression    Overview     She is well managed on current medication, Lexapro 20 mg daily, and reports no signs or symptoms of self harm, suicidal ideation. This medication helps with daily life and relationships. Will refill as needed and advised 6 month follow up.            Musculoskeletal and Injuries    Chronic bilateral low back pain with right-sided sciatica    Overview     Pain in her back is managed as best we can with gabapentin.  She is taking 800 mg 3 times a day.            Neuro    Left middle cerebral artery stroke (HCC)    Seizure disorder, simple partial, without intractable epilepsy (HCC)    Overview     She is taking Keppra 500 mg twice a day and is followed by neurology.  She is also taking gabapentin to help with pain and I suspect that is contributing to seizure control as well.            Tobacco    Former smoker   Other Visit Diagnoses     Medicare annual wellness visit, subsequent    -  Primary    Need for vaccination        Relevant Orders    COVID-19 Bivalent Booster (Pfizer) 12+yrs    Essential hypertension        Relevant Orders    Comprehensive Metabolic Panel    CBC (No Diff)    Weakness of both legs      She has worked quite a bit with physical therapy in the past.  She now feels like there is not much more they can do and she is using a walker which I think is the best plan at this point.         Pain of toe, unspecified laterality      She will treat at home for now and alert me if she needs a referral.             Orders Placed This Encounter   Procedures   • COVID-19 Bivalent Booster (Pfizer) 12+yrs   • Lipid Panel With LDL / HDL Ratio   • Comprehensive Metabolic Panel   • CBC (No Diff)   • TSH        Return in about 6 months (around 9/16/2023) for Recheck.

## 2023-03-19 PROBLEM — M19.90 ARTHRITIS: Status: ACTIVE | Noted: 2023-03-19

## 2023-03-19 PROBLEM — J45.909 ASTHMA: Status: ACTIVE | Noted: 2022-06-10

## 2023-03-21 ENCOUNTER — TELEPHONE (OUTPATIENT)
Dept: NEUROLOGY | Facility: CLINIC | Age: 83
End: 2023-03-21
Payer: MEDICARE

## 2023-03-22 ENCOUNTER — OFFICE VISIT (OUTPATIENT)
Dept: NEUROLOGY | Facility: CLINIC | Age: 83
End: 2023-03-22
Payer: MEDICARE

## 2023-03-22 VITALS
WEIGHT: 188.8 LBS | HEART RATE: 97 BPM | OXYGEN SATURATION: 97 % | DIASTOLIC BLOOD PRESSURE: 76 MMHG | BODY MASS INDEX: 32.23 KG/M2 | HEIGHT: 64 IN | SYSTOLIC BLOOD PRESSURE: 132 MMHG

## 2023-03-22 DIAGNOSIS — I67.9 CEREBROVASCULAR DISEASE: Primary | ICD-10-CM

## 2023-03-22 DIAGNOSIS — G40.109: ICD-10-CM

## 2023-03-22 PROCEDURE — 3078F DIAST BP <80 MM HG: CPT | Performed by: PSYCHIATRY & NEUROLOGY

## 2023-03-22 PROCEDURE — 1160F RVW MEDS BY RX/DR IN RCRD: CPT | Performed by: PSYCHIATRY & NEUROLOGY

## 2023-03-22 PROCEDURE — 1159F MED LIST DOCD IN RCRD: CPT | Performed by: PSYCHIATRY & NEUROLOGY

## 2023-03-22 PROCEDURE — 3075F SYST BP GE 130 - 139MM HG: CPT | Performed by: PSYCHIATRY & NEUROLOGY

## 2023-03-22 PROCEDURE — 99214 OFFICE O/P EST MOD 30 MIN: CPT | Performed by: PSYCHIATRY & NEUROLOGY

## 2023-03-22 RX ORDER — BUDESONIDE AND FORMOTEROL FUMARATE DIHYDRATE 160; 4.5 UG/1; UG/1
AEROSOL RESPIRATORY (INHALATION)
COMMUNITY
Start: 2023-03-17

## 2023-03-22 NOTE — PROGRESS NOTES
Chief Complaint   Patient presents with   • Cerebrovascular Accident     Follow Up [facility]       Patient ID: Avelina Carr is a 82 y.o. female.    HPI: I have had the pleasure of seeing your patient today.  As you may know she is an 82-year-old female with a history of hypertension, hyperlipidemia, obstructive sleep apnea, breast cancer, lumbosacral spine stenosis and history of stroke.  She also has history of occipital meningioma resected in 1970.  She did have some seizures and is currently taking Keppra as well as gabapentin.  She has a history of benign paroxysmal positional vertigo and has been to [facility].  She is here for follow-up.  She had been seen by our nurse practitioner here in the clinic twice yearly.  This is her 6-month follow-up today.  I did perform a thorough chart review together history and information about previous therapies and assessments.  She does remain on both aspirin and Plavix.  She does perform PT exercises at home that she is learned.  She denies any seizure-like activity.  She is still taking her seizure medication as prescribed and has not had any significant issues with that.  No recent falls.  She does use a walker to assist with ambulation.    The following portions of the patient's history were reviewed and updated as appropriate: allergies, current medications, past family history, past medical history, past social history, past surgical history and problem list.    Review of Systems   Neurological: Positive for weakness and numbness (feet and legs). Negative for dizziness, tremors, light-headedness and headaches.   Psychiatric/Behavioral: Negative for agitation, behavioral problems, confusion, decreased concentration and sleep disturbance. The patient is nervous/anxious.       I have reviewed the review of systems above performed by my medical assistant.      Vitals:    03/22/23 1109   BP: 132/76   Pulse: 97   SpO2: 97%       Neurologic Exam     Mental Status   Oriented to  person, place, and time.   Concentration: normal.   Level of consciousness: alert  Knowledge: consistent with education (No deficits found.).     Cranial Nerves     CN II   Visual fields full to confrontation.     CN III, IV, VI   Pupils are equal, round, and reactive to light.  Extraocular motions are normal.   CN III: no CN III palsy  CN VI: no CN VI palsy    CN V   Facial sensation intact.     CN VII   Facial expression full, symmetric.     CN VIII   CN VIII normal.     CN IX, X   CN IX normal.   CN X normal.     CN XI   CN XI normal.     CN XII   CN XII normal.     Motor Exam     Strength   Right neck flexion: 5/5  Left neck flexion: 5/5  Right neck extension: 5/5  Left neck extension: 5/5  Right deltoid: 5/5  Left deltoid: 5/5  Right biceps: 5/5  Left biceps: 5/5  Right triceps: 5/5  Left triceps: 5/5  Right wrist flexion: 5/5  Left wrist flexion: 5/5  Right wrist extension: 5/5  Left wrist extension: 5/5  Right interossei: 5/5  Left interossei: 5/5  Right abdominals: 5/5  Left abdominals: 5/5  Right iliopsoas: 5/5  Left iliopsoas: 5/5  Right quadriceps: 5/5  Left quadriceps: 5/5  Right hamstrin/5  Left hamstrin/5  Right glutei: 5/5  Left glutei: 5/5  Right anterior tibial: 5/5  Left anterior tibial: 5/5  Right posterior tibial: 5/5  Left posterior tibial: 5/5  Right peroneal: 5/5  Left peroneal: 5/5  Right gastroc: 5/5  Left gastroc: 5/5    Sensory Exam   Light touch normal.   Vibration normal.     Gait, Coordination, and Reflexes     Gait  Gait: (Unsteady gait)    Reflexes   Right brachioradialis: 2+  Left brachioradialis: 2+  Right biceps: 2+  Left biceps: 2+  Right triceps: 2+  Left triceps: 2+  Right patellar: 0  Left patellar: 0  Right achilles: 0  Left achilles: 0  Right : 2+  Left : 2+Station is normal.       Physical Exam  Vitals reviewed.   Constitutional:       Appearance: She is well-developed.   HENT:      Head: Normocephalic and atraumatic.   Eyes:      Extraocular Movements: EOM  normal.      Pupils: Pupils are equal, round, and reactive to light.   Cardiovascular:      Rate and Rhythm: Normal rate and regular rhythm.   Pulmonary:      Breath sounds: Normal breath sounds.   Musculoskeletal:         General: Normal range of motion.   Skin:     General: Skin is warm.   Neurological:      Mental Status: She is oriented to person, place, and time.      Deep Tendon Reflexes:      Reflex Scores:       Tricep reflexes are 2+ on the right side and 2+ on the left side.       Bicep reflexes are 2+ on the right side and 2+ on the left side.       Brachioradialis reflexes are 2+ on the right side and 2+ on the left side.       Patellar reflexes are 0 on the right side and 0 on the left side.       Achilles reflexes are 0 on the right side and 0 on the left side.        Procedures    Assessment/Plan: We will continue the Keppra and gabapentin.  Continue the aspirin and Plavix.  We will see her back in 6 months or sooner if needed.  A total of 25 minutes was spent face-to-face with the patient today.  Of that greater than 50% of this time was spent discussing signs and symptoms of stroke, seizures, patient education, plan of care and prognosis.       Diagnoses and all orders for this visit:    1. Cerebrovascular disease (Primary)    2. Seizure disorder, simple partial, without intractable epilepsy (HCC)           Mike Buenrostro II, MD

## 2023-03-24 LAB
ALBUMIN SERPL-MCNC: 4.5 G/DL (ref 3.6–4.6)
ALBUMIN/GLOB SERPL: 2 {RATIO} (ref 1.2–2.2)
ALP SERPL-CCNC: 164 IU/L (ref 44–121)
ALT SERPL-CCNC: 24 IU/L (ref 0–32)
AST SERPL-CCNC: 28 IU/L (ref 0–40)
BILIRUB SERPL-MCNC: 0.3 MG/DL (ref 0–1.2)
BUN SERPL-MCNC: 13 MG/DL (ref 8–27)
BUN/CREAT SERPL: 13 (ref 12–28)
CALCIUM SERPL-MCNC: 9.4 MG/DL (ref 8.7–10.3)
CHLORIDE SERPL-SCNC: 104 MMOL/L (ref 96–106)
CHOLEST SERPL-MCNC: 151 MG/DL (ref 100–199)
CO2 SERPL-SCNC: 23 MMOL/L (ref 20–29)
CREAT SERPL-MCNC: 1.03 MG/DL (ref 0.57–1)
EGFRCR SERPLBLD CKD-EPI 2021: 54 ML/MIN/1.73
ERYTHROCYTE [DISTWIDTH] IN BLOOD BY AUTOMATED COUNT: 11.8 % (ref 11.7–15.4)
GLOBULIN SER CALC-MCNC: 2.3 G/DL (ref 1.5–4.5)
GLUCOSE SERPL-MCNC: 74 MG/DL (ref 70–99)
HCT VFR BLD AUTO: 39.2 % (ref 34–46.6)
HDLC SERPL-MCNC: 34 MG/DL
HGB BLD-MCNC: 13.1 G/DL (ref 11.1–15.9)
LDLC SERPL CALC-MCNC: 88 MG/DL (ref 0–99)
LDLC/HDLC SERPL: 2.6 RATIO (ref 0–3.2)
MCH RBC QN AUTO: 32.6 PG (ref 26.6–33)
MCHC RBC AUTO-ENTMCNC: 33.4 G/DL (ref 31.5–35.7)
MCV RBC AUTO: 98 FL (ref 79–97)
PLATELET # BLD AUTO: 241 X10E3/UL (ref 150–450)
POTASSIUM SERPL-SCNC: 5 MMOL/L (ref 3.5–5.2)
PROT SERPL-MCNC: 6.8 G/DL (ref 6–8.5)
RBC # BLD AUTO: 4.02 X10E6/UL (ref 3.77–5.28)
SODIUM SERPL-SCNC: 141 MMOL/L (ref 134–144)
TRIGL SERPL-MCNC: 169 MG/DL (ref 0–149)
TSH SERPL DL<=0.005 MIU/L-ACNC: 1.35 UIU/ML (ref 0.45–4.5)
VLDLC SERPL CALC-MCNC: 29 MG/DL (ref 5–40)
WBC # BLD AUTO: 5.9 X10E3/UL (ref 3.4–10.8)

## 2023-03-31 DIAGNOSIS — I63.412 CEREBROVASCULAR ACCIDENT (CVA) DUE TO EMBOLISM OF LEFT MIDDLE CEREBRAL ARTERY: ICD-10-CM

## 2023-03-31 RX ORDER — LEVOTHYROXINE SODIUM 112 UG/1
TABLET ORAL
Qty: 90 TABLET | Refills: 0 | Status: SHIPPED | OUTPATIENT
Start: 2023-03-31

## 2023-03-31 RX ORDER — CLOPIDOGREL BISULFATE 75 MG/1
TABLET ORAL
Qty: 90 TABLET | Refills: 1 | Status: SHIPPED | OUTPATIENT
Start: 2023-03-31

## 2023-04-06 DIAGNOSIS — M54.41 CHRONIC BILATERAL LOW BACK PAIN WITH RIGHT-SIDED SCIATICA: ICD-10-CM

## 2023-04-06 DIAGNOSIS — G89.29 CHRONIC BILATERAL LOW BACK PAIN WITH RIGHT-SIDED SCIATICA: ICD-10-CM

## 2023-04-06 RX ORDER — TRAMADOL HYDROCHLORIDE 50 MG/1
TABLET ORAL
Qty: 90 TABLET | Refills: 3 | Status: SHIPPED | OUTPATIENT
Start: 2023-04-06

## 2023-04-24 RX ORDER — LEVOTHYROXINE SODIUM 112 UG/1
TABLET ORAL
Qty: 90 TABLET | Refills: 0 | Status: SHIPPED | OUTPATIENT
Start: 2023-04-24

## 2023-08-14 RX ORDER — ESCITALOPRAM OXALATE 20 MG/1
TABLET ORAL
Qty: 90 TABLET | Refills: 1 | Status: SHIPPED | OUTPATIENT
Start: 2023-08-14

## 2023-08-14 RX ORDER — FUROSEMIDE 20 MG/1
TABLET ORAL
Qty: 90 TABLET | Refills: 1 | Status: SHIPPED | OUTPATIENT
Start: 2023-08-14

## 2023-08-24 ENCOUNTER — TELEPHONE (OUTPATIENT)
Dept: NEUROLOGY | Facility: CLINIC | Age: 83
End: 2023-08-24
Payer: MEDICARE

## 2023-09-11 DIAGNOSIS — G40.109: ICD-10-CM

## 2023-09-11 RX ORDER — GABAPENTIN 400 MG/1
CAPSULE ORAL
Qty: 180 CAPSULE | Refills: 1 | Status: SHIPPED | OUTPATIENT
Start: 2023-09-11

## 2023-09-19 ENCOUNTER — OFFICE VISIT (OUTPATIENT)
Dept: FAMILY MEDICINE CLINIC | Facility: CLINIC | Age: 83
End: 2023-09-19
Payer: MEDICARE

## 2023-09-19 VITALS
HEART RATE: 84 BPM | OXYGEN SATURATION: 96 % | DIASTOLIC BLOOD PRESSURE: 84 MMHG | SYSTOLIC BLOOD PRESSURE: 130 MMHG | BODY MASS INDEX: 31.41 KG/M2 | HEIGHT: 64 IN | WEIGHT: 184 LBS | RESPIRATION RATE: 16 BRPM

## 2023-09-19 DIAGNOSIS — E78.2 MIXED HYPERLIPIDEMIA: Primary | ICD-10-CM

## 2023-09-19 DIAGNOSIS — F34.1 PERSISTENT DEPRESSIVE DISORDER: ICD-10-CM

## 2023-09-19 DIAGNOSIS — I10 PRIMARY HYPERTENSION: ICD-10-CM

## 2023-09-19 DIAGNOSIS — E03.9 ACQUIRED HYPOTHYROIDISM: ICD-10-CM

## 2023-09-19 PROCEDURE — 1160F RVW MEDS BY RX/DR IN RCRD: CPT | Performed by: FAMILY MEDICINE

## 2023-09-19 PROCEDURE — 99214 OFFICE O/P EST MOD 30 MIN: CPT | Performed by: FAMILY MEDICINE

## 2023-09-19 PROCEDURE — 3079F DIAST BP 80-89 MM HG: CPT | Performed by: FAMILY MEDICINE

## 2023-09-19 PROCEDURE — 3075F SYST BP GE 130 - 139MM HG: CPT | Performed by: FAMILY MEDICINE

## 2023-09-19 PROCEDURE — 1159F MED LIST DOCD IN RCRD: CPT | Performed by: FAMILY MEDICINE

## 2023-09-19 NOTE — PROGRESS NOTES
"Subjective   Avelina Carr is a 83 y.o. female.   Hyperlipidemia and Hypothyroidism    History of Present Illness  Avelina is here for 6 mo follow-up on chronic medical conditions.   She unfortunately, has developed poor eyesight and incontinence.      Avelina  also has a hx of depression and states that she is getting good relief from symptoms on current medication, Lexapro. This is a chronic problem that is responding well to treatment. She  has no intolerable side effects to medication and reports that his helps lift mood and makes daily life, relationships better. No thoughts of self harm expressed.     Paloma has a history of chronic hypertension and has been well controlled on current medications.She is tolerating medications without side effect. She reports no vision changes, headaches or lightheadedness. She is requesting refills of medications.     She has a history of chronic hyperlipidemia and needs lab work today to evaluate response to therapy. She is tolerating medications well without side effects.     Hypothyoid - This is a chronic problem that is well controlled.     Review of Systems   Constitutional: Negative.    HENT: Negative.     Eyes:  Positive for visual disturbance.   Respiratory: Negative.     Cardiovascular: Negative.    Genitourinary:  Positive for urinary incontinence.   Skin: Negative.    Neurological: Negative.    Psychiatric/Behavioral: Negative.       Objective   Vitals:    09/19/23 1436   BP: 130/84   Pulse: 84   Resp: 16   SpO2: 96%   Weight: 83.5 kg (184 lb)   Height: 162.6 cm (64\")      Body mass index is 31.58 kg/m².  BMI is >= 30 and <35. (Class 1 Obesity). The following options were offered after discussion;: none (medical contraindication)     Physical Exam  Vitals and nursing note reviewed.   Constitutional:       Appearance: She is well-developed.   Eyes:      Pupils: Pupils are equal, round, and reactive to light.   Cardiovascular:      Rate and Rhythm: Normal rate and regular " rhythm.      Heart sounds: Normal heart sounds.   Pulmonary:      Effort: Pulmonary effort is normal.      Breath sounds: Normal breath sounds.   Musculoskeletal:      Comments: Able to stand up from chair and grab walker.  Only walks with assistance.  No edema noted in legs.   Skin:     General: Skin is warm and dry.   Neurological:      General: No focal deficit present.      Mental Status: She is alert and oriented to person, place, and time.   Psychiatric:         Mood and Affect: Mood normal.         Behavior: Behavior normal.         Thought Content: Thought content normal.         Judgment: Judgment normal.         Assessment & Plan   Problem List Items Addressed This Visit          Cardiac and Vasculature    Primary hypertension    Overview     Well controlled on current medication, will refill medication today and as needed.  She is a falls risk so care has to be taken cannot let her systolic blood pressure get too low.  She is taking atenolol 25 mg twice a day and she is on Lasix to manage lower extremity edema and is taking 20 mg a day.         HLD (hyperlipidemia) - Primary    Overview     She is now taking Lipitor 40 mg a day post cerebrovascular accident on 4/11/2022.  She is tolerating this medication well and will follow up with me for labs at her next scheduled visit.            Endocrine and Metabolic    Hypothyroid    Overview     Well managed on current dose of thyroid replacement. TSH reviewed and on chart.Patient will return to the office in 6 month for regularly scheduled review of treatment.   Well refill medications as needed.            Mental Health    Depression    Overview     She is well managed on current medication, Lexapro 20 mg daily, and reports no signs or symptoms of self harm, suicidal ideation. This medication helps with daily life and relationships. Will refill as needed and advised 6 month follow up.               No orders of the defined types were placed in this  encounter.     Return in 2 weeks for labs  Return in about 6 months (around 3/19/2024) for Annual physical.

## 2023-09-30 DIAGNOSIS — I63.412 CEREBROVASCULAR ACCIDENT (CVA) DUE TO EMBOLISM OF LEFT MIDDLE CEREBRAL ARTERY: ICD-10-CM

## 2023-10-02 RX ORDER — CLOPIDOGREL BISULFATE 75 MG/1
TABLET ORAL
Qty: 90 TABLET | Refills: 1 | Status: SHIPPED | OUTPATIENT
Start: 2023-10-02

## 2023-10-11 RX ORDER — LEVOTHYROXINE SODIUM 112 UG/1
TABLET ORAL
Qty: 90 TABLET | Refills: 1 | Status: SHIPPED | OUTPATIENT
Start: 2023-10-11

## 2023-10-17 LAB
ALBUMIN SERPL-MCNC: 4.4 G/DL (ref 3.7–4.7)
ALBUMIN/GLOB SERPL: 2 {RATIO} (ref 1.2–2.2)
ALP SERPL-CCNC: 152 IU/L (ref 44–121)
ALT SERPL-CCNC: 26 IU/L (ref 0–32)
AST SERPL-CCNC: 31 IU/L (ref 0–40)
BILIRUB SERPL-MCNC: 0.3 MG/DL (ref 0–1.2)
BUN SERPL-MCNC: 12 MG/DL (ref 8–27)
BUN/CREAT SERPL: 13 (ref 12–28)
CALCIUM SERPL-MCNC: 8.9 MG/DL (ref 8.7–10.3)
CHLORIDE SERPL-SCNC: 106 MMOL/L (ref 96–106)
CHOLEST SERPL-MCNC: 144 MG/DL (ref 100–199)
CHOLEST/HDLC SERPL: 3.9 RATIO (ref 0–4.4)
CO2 SERPL-SCNC: 24 MMOL/L (ref 20–29)
CREAT SERPL-MCNC: 0.95 MG/DL (ref 0.57–1)
EGFRCR SERPLBLD CKD-EPI 2021: 59 ML/MIN/1.73
ERYTHROCYTE [DISTWIDTH] IN BLOOD BY AUTOMATED COUNT: 11.8 % (ref 11.7–15.4)
GLOBULIN SER CALC-MCNC: 2.2 G/DL (ref 1.5–4.5)
GLUCOSE SERPL-MCNC: 91 MG/DL (ref 70–99)
HCT VFR BLD AUTO: 41.2 % (ref 34–46.6)
HDLC SERPL-MCNC: 37 MG/DL
HGB BLD-MCNC: 13.3 G/DL (ref 11.1–15.9)
LDLC SERPL CALC-MCNC: 79 MG/DL (ref 0–99)
MCH RBC QN AUTO: 31.9 PG (ref 26.6–33)
MCHC RBC AUTO-ENTMCNC: 32.3 G/DL (ref 31.5–35.7)
MCV RBC AUTO: 99 FL (ref 79–97)
PLATELET # BLD AUTO: 269 X10E3/UL (ref 150–450)
POTASSIUM SERPL-SCNC: 4.4 MMOL/L (ref 3.5–5.2)
PROT SERPL-MCNC: 6.6 G/DL (ref 6–8.5)
RBC # BLD AUTO: 4.17 X10E6/UL (ref 3.77–5.28)
SODIUM SERPL-SCNC: 145 MMOL/L (ref 134–144)
TRIGL SERPL-MCNC: 163 MG/DL (ref 0–149)
TSH SERPL DL<=0.005 MIU/L-ACNC: 1.33 UIU/ML (ref 0.45–4.5)
VLDLC SERPL CALC-MCNC: 28 MG/DL (ref 5–40)
WBC # BLD AUTO: 5.9 X10E3/UL (ref 3.4–10.8)

## 2023-10-24 ENCOUNTER — TRANSCRIBE ORDERS (OUTPATIENT)
Dept: ADMINISTRATIVE | Facility: HOSPITAL | Age: 83
End: 2023-10-24
Payer: MEDICARE

## 2023-10-24 DIAGNOSIS — Z12.31 SCREENING MAMMOGRAM FOR BREAST CANCER: Primary | ICD-10-CM

## 2023-11-12 ENCOUNTER — APPOINTMENT (OUTPATIENT)
Dept: CT IMAGING | Facility: HOSPITAL | Age: 83
End: 2023-11-12
Payer: MEDICARE

## 2023-11-12 ENCOUNTER — HOSPITAL ENCOUNTER (INPATIENT)
Facility: HOSPITAL | Age: 83
LOS: 2 days | Discharge: HOME HEALTH | End: 2023-11-15
Attending: EMERGENCY MEDICINE | Admitting: INTERNAL MEDICINE
Payer: MEDICARE

## 2023-11-12 DIAGNOSIS — I63.511 ACUTE RIGHT MCA STROKE: ICD-10-CM

## 2023-11-12 DIAGNOSIS — R29.818 FOCAL NEUROLOGICAL DEFICIT PRESENT: Primary | ICD-10-CM

## 2023-11-12 DIAGNOSIS — I10 HYPERTENSION, UNSPECIFIED TYPE: ICD-10-CM

## 2023-11-12 LAB
ALBUMIN SERPL-MCNC: 3.9 G/DL (ref 3.5–5.2)
ALBUMIN/GLOB SERPL: 1.4 G/DL
ALP SERPL-CCNC: 149 U/L (ref 39–117)
ALT SERPL W P-5'-P-CCNC: 18 U/L (ref 1–33)
ANION GAP SERPL CALCULATED.3IONS-SCNC: 10.8 MMOL/L (ref 5–15)
APTT PPP: 28.4 SECONDS (ref 22.7–35.4)
AST SERPL-CCNC: 25 U/L (ref 1–32)
BASOPHILS # BLD AUTO: 0.05 10*3/MM3 (ref 0–0.2)
BASOPHILS NFR BLD AUTO: 0.8 % (ref 0–1.5)
BILIRUB SERPL-MCNC: 0.4 MG/DL (ref 0–1.2)
BILIRUB UR QL STRIP: NEGATIVE
BUN SERPL-MCNC: 14 MG/DL (ref 8–23)
BUN/CREAT SERPL: 14.6 (ref 7–25)
CALCIUM SPEC-SCNC: 8.6 MG/DL (ref 8.6–10.5)
CHLORIDE SERPL-SCNC: 104 MMOL/L (ref 98–107)
CK SERPL-CCNC: 193 U/L (ref 20–180)
CLARITY UR: CLEAR
CO2 SERPL-SCNC: 24.2 MMOL/L (ref 22–29)
COLOR UR: YELLOW
CREAT SERPL-MCNC: 0.96 MG/DL (ref 0.57–1)
DEPRECATED RDW RBC AUTO: 43.3 FL (ref 37–54)
EGFRCR SERPLBLD CKD-EPI 2021: 58.5 ML/MIN/1.73
EOSINOPHIL # BLD AUTO: 0.14 10*3/MM3 (ref 0–0.4)
EOSINOPHIL NFR BLD AUTO: 2.3 % (ref 0.3–6.2)
ERYTHROCYTE [DISTWIDTH] IN BLOOD BY AUTOMATED COUNT: 12 % (ref 12.3–15.4)
GLOBULIN UR ELPH-MCNC: 2.7 GM/DL
GLUCOSE BLDC GLUCOMTR-MCNC: 92 MG/DL (ref 70–130)
GLUCOSE SERPL-MCNC: 83 MG/DL (ref 65–99)
GLUCOSE UR STRIP-MCNC: NEGATIVE MG/DL
HCT VFR BLD AUTO: 37.4 % (ref 34–46.6)
HGB BLD-MCNC: 12.6 G/DL (ref 12–15.9)
HGB UR QL STRIP.AUTO: NEGATIVE
IMM GRANULOCYTES # BLD AUTO: 0.01 10*3/MM3 (ref 0–0.05)
IMM GRANULOCYTES NFR BLD AUTO: 0.2 % (ref 0–0.5)
INR PPP: 1 (ref 0.9–1.1)
KETONES UR QL STRIP: NEGATIVE
LEUKOCYTE ESTERASE UR QL STRIP.AUTO: NEGATIVE
LYMPHOCYTES # BLD AUTO: 1.36 10*3/MM3 (ref 0.7–3.1)
LYMPHOCYTES NFR BLD AUTO: 22.1 % (ref 19.6–45.3)
MAGNESIUM SERPL-MCNC: 2.4 MG/DL (ref 1.6–2.4)
MCH RBC QN AUTO: 32.7 PG (ref 26.6–33)
MCHC RBC AUTO-ENTMCNC: 33.7 G/DL (ref 31.5–35.7)
MCV RBC AUTO: 97.1 FL (ref 79–97)
MONOCYTES # BLD AUTO: 0.79 10*3/MM3 (ref 0.1–0.9)
MONOCYTES NFR BLD AUTO: 12.8 % (ref 5–12)
NEUTROPHILS NFR BLD AUTO: 3.81 10*3/MM3 (ref 1.7–7)
NEUTROPHILS NFR BLD AUTO: 61.8 % (ref 42.7–76)
NITRITE UR QL STRIP: NEGATIVE
NRBC BLD AUTO-RTO: 0 /100 WBC (ref 0–0.2)
NT-PROBNP SERPL-MCNC: 302 PG/ML (ref 0–1800)
PH UR STRIP.AUTO: 5.5 [PH] (ref 5–8)
PLATELET # BLD AUTO: 254 10*3/MM3 (ref 140–450)
PMV BLD AUTO: 10.4 FL (ref 6–12)
POTASSIUM SERPL-SCNC: 4.2 MMOL/L (ref 3.5–5.2)
PROT SERPL-MCNC: 6.6 G/DL (ref 6–8.5)
PROT UR QL STRIP: NEGATIVE
PROTHROMBIN TIME: 13.3 SECONDS (ref 11.7–14.2)
RBC # BLD AUTO: 3.85 10*6/MM3 (ref 3.77–5.28)
SODIUM SERPL-SCNC: 139 MMOL/L (ref 136–145)
SP GR UR STRIP: 1.01 (ref 1–1.03)
TROPONIN T SERPL HS-MCNC: 26 NG/L
TSH SERPL DL<=0.05 MIU/L-ACNC: 0.92 UIU/ML (ref 0.27–4.2)
UROBILINOGEN UR QL STRIP: NORMAL
WBC NRBC COR # BLD: 6.16 10*3/MM3 (ref 3.4–10.8)

## 2023-11-12 PROCEDURE — 93010 ELECTROCARDIOGRAM REPORT: CPT | Performed by: INTERNAL MEDICINE

## 2023-11-12 PROCEDURE — 83735 ASSAY OF MAGNESIUM: CPT | Performed by: EMERGENCY MEDICINE

## 2023-11-12 PROCEDURE — 84443 ASSAY THYROID STIM HORMONE: CPT | Performed by: EMERGENCY MEDICINE

## 2023-11-12 PROCEDURE — 70498 CT ANGIOGRAPHY NECK: CPT

## 2023-11-12 PROCEDURE — 80053 COMPREHEN METABOLIC PANEL: CPT | Performed by: EMERGENCY MEDICINE

## 2023-11-12 PROCEDURE — 25810000003 SODIUM CHLORIDE 0.9 % SOLUTION: Performed by: INTERNAL MEDICINE

## 2023-11-12 PROCEDURE — G0378 HOSPITAL OBSERVATION PER HR: HCPCS

## 2023-11-12 PROCEDURE — 85025 COMPLETE CBC W/AUTO DIFF WBC: CPT | Performed by: EMERGENCY MEDICINE

## 2023-11-12 PROCEDURE — 85730 THROMBOPLASTIN TIME PARTIAL: CPT | Performed by: EMERGENCY MEDICINE

## 2023-11-12 PROCEDURE — 85610 PROTHROMBIN TIME: CPT | Performed by: EMERGENCY MEDICINE

## 2023-11-12 PROCEDURE — 84484 ASSAY OF TROPONIN QUANT: CPT | Performed by: EMERGENCY MEDICINE

## 2023-11-12 PROCEDURE — 83880 ASSAY OF NATRIURETIC PEPTIDE: CPT | Performed by: EMERGENCY MEDICINE

## 2023-11-12 PROCEDURE — 82948 REAGENT STRIP/BLOOD GLUCOSE: CPT

## 2023-11-12 PROCEDURE — 99285 EMERGENCY DEPT VISIT HI MDM: CPT

## 2023-11-12 PROCEDURE — 25810000003 SODIUM CHLORIDE 0.9 % SOLUTION: Performed by: EMERGENCY MEDICINE

## 2023-11-12 PROCEDURE — 70496 CT ANGIOGRAPHY HEAD: CPT

## 2023-11-12 PROCEDURE — 81003 URINALYSIS AUTO W/O SCOPE: CPT | Performed by: EMERGENCY MEDICINE

## 2023-11-12 PROCEDURE — 25510000001 IOPAMIDOL PER 1 ML: Performed by: EMERGENCY MEDICINE

## 2023-11-12 PROCEDURE — 93005 ELECTROCARDIOGRAM TRACING: CPT | Performed by: EMERGENCY MEDICINE

## 2023-11-12 PROCEDURE — 82550 ASSAY OF CK (CPK): CPT | Performed by: EMERGENCY MEDICINE

## 2023-11-12 RX ORDER — AMOXICILLIN 250 MG
2 CAPSULE ORAL 2 TIMES DAILY
Status: DISCONTINUED | OUTPATIENT
Start: 2023-11-12 | End: 2023-11-15 | Stop reason: HOSPADM

## 2023-11-12 RX ORDER — MELATONIN
5000 DAILY
Status: DISCONTINUED | OUTPATIENT
Start: 2023-11-13 | End: 2023-11-15 | Stop reason: HOSPADM

## 2023-11-12 RX ORDER — ATENOLOL 25 MG/1
25 TABLET ORAL 2 TIMES DAILY
Status: DISCONTINUED | OUTPATIENT
Start: 2023-11-13 | End: 2023-11-15 | Stop reason: HOSPADM

## 2023-11-12 RX ORDER — POLYETHYLENE GLYCOL 3350 17 G/17G
17 POWDER, FOR SOLUTION ORAL DAILY PRN
Status: DISCONTINUED | OUTPATIENT
Start: 2023-11-12 | End: 2023-11-15 | Stop reason: HOSPADM

## 2023-11-12 RX ORDER — ONDANSETRON 2 MG/ML
4 INJECTION INTRAMUSCULAR; INTRAVENOUS EVERY 6 HOURS PRN
Status: DISCONTINUED | OUTPATIENT
Start: 2023-11-12 | End: 2023-11-12 | Stop reason: SDUPTHER

## 2023-11-12 RX ORDER — ASPIRIN 300 MG/1
300 SUPPOSITORY RECTAL DAILY
Status: DISCONTINUED | OUTPATIENT
Start: 2023-11-12 | End: 2023-11-15 | Stop reason: HOSPADM

## 2023-11-12 RX ORDER — ONDANSETRON 2 MG/ML
4 INJECTION INTRAMUSCULAR; INTRAVENOUS EVERY 6 HOURS PRN
Status: DISCONTINUED | OUTPATIENT
Start: 2023-11-12 | End: 2023-11-15 | Stop reason: HOSPADM

## 2023-11-12 RX ORDER — CLOPIDOGREL BISULFATE 75 MG/1
75 TABLET ORAL DAILY
Status: DISCONTINUED | OUTPATIENT
Start: 2023-11-13 | End: 2023-11-15 | Stop reason: HOSPADM

## 2023-11-12 RX ORDER — ACETAMINOPHEN 325 MG/1
650 TABLET ORAL EVERY 4 HOURS PRN
Status: DISCONTINUED | OUTPATIENT
Start: 2023-11-12 | End: 2023-11-15 | Stop reason: HOSPADM

## 2023-11-12 RX ORDER — LATANOPROST 50 UG/ML
1 SOLUTION/ DROPS OPHTHALMIC NIGHTLY
COMMUNITY

## 2023-11-12 RX ORDER — SODIUM CHLORIDE 0.9 % (FLUSH) 0.9 %
10 SYRINGE (ML) INJECTION AS NEEDED
Status: DISCONTINUED | OUTPATIENT
Start: 2023-11-12 | End: 2023-11-15 | Stop reason: HOSPADM

## 2023-11-12 RX ORDER — ACETAMINOPHEN 325 MG/1
650 TABLET ORAL EVERY 4 HOURS PRN
Status: DISCONTINUED | OUTPATIENT
Start: 2023-11-12 | End: 2023-11-12 | Stop reason: SDUPTHER

## 2023-11-12 RX ORDER — ONDANSETRON 4 MG/1
4 TABLET, FILM COATED ORAL EVERY 6 HOURS PRN
Status: DISCONTINUED | OUTPATIENT
Start: 2023-11-12 | End: 2023-11-15 | Stop reason: HOSPADM

## 2023-11-12 RX ORDER — BUDESONIDE AND FORMOTEROL FUMARATE DIHYDRATE 160; 4.5 UG/1; UG/1
2 AEROSOL RESPIRATORY (INHALATION)
Status: DISCONTINUED | OUTPATIENT
Start: 2023-11-13 | End: 2023-11-15 | Stop reason: HOSPADM

## 2023-11-12 RX ORDER — TRAMADOL HYDROCHLORIDE 50 MG/1
50 TABLET ORAL EVERY 6 HOURS PRN
Status: DISCONTINUED | OUTPATIENT
Start: 2023-11-12 | End: 2023-11-15 | Stop reason: HOSPADM

## 2023-11-12 RX ORDER — CHOLECALCIFEROL (VITAMIN D3) 125 MCG
1000 CAPSULE ORAL DAILY
Status: DISCONTINUED | OUTPATIENT
Start: 2023-11-13 | End: 2023-11-15 | Stop reason: HOSPADM

## 2023-11-12 RX ORDER — BISACODYL 5 MG/1
5 TABLET, DELAYED RELEASE ORAL DAILY PRN
Status: DISCONTINUED | OUTPATIENT
Start: 2023-11-12 | End: 2023-11-15 | Stop reason: HOSPADM

## 2023-11-12 RX ORDER — ESCITALOPRAM OXALATE 20 MG/1
20 TABLET ORAL DAILY
Status: DISCONTINUED | OUTPATIENT
Start: 2023-11-13 | End: 2023-11-15 | Stop reason: HOSPADM

## 2023-11-12 RX ORDER — SODIUM CHLORIDE 9 MG/ML
75 INJECTION, SOLUTION INTRAVENOUS CONTINUOUS
Status: DISCONTINUED | OUTPATIENT
Start: 2023-11-12 | End: 2023-11-13

## 2023-11-12 RX ORDER — ATORVASTATIN CALCIUM 80 MG/1
80 TABLET, FILM COATED ORAL NIGHTLY
Status: DISCONTINUED | OUTPATIENT
Start: 2023-11-12 | End: 2023-11-15 | Stop reason: HOSPADM

## 2023-11-12 RX ORDER — ACETAMINOPHEN 650 MG/1
650 SUPPOSITORY RECTAL EVERY 4 HOURS PRN
Status: DISCONTINUED | OUTPATIENT
Start: 2023-11-12 | End: 2023-11-15 | Stop reason: HOSPADM

## 2023-11-12 RX ORDER — ASPIRIN 325 MG
325 TABLET ORAL DAILY
Status: DISCONTINUED | OUTPATIENT
Start: 2023-11-12 | End: 2023-11-15 | Stop reason: HOSPADM

## 2023-11-12 RX ORDER — PANTOPRAZOLE SODIUM 40 MG/1
40 TABLET, DELAYED RELEASE ORAL DAILY
Status: DISCONTINUED | OUTPATIENT
Start: 2023-11-13 | End: 2023-11-15 | Stop reason: HOSPADM

## 2023-11-12 RX ORDER — BISACODYL 10 MG
10 SUPPOSITORY, RECTAL RECTAL DAILY PRN
Status: DISCONTINUED | OUTPATIENT
Start: 2023-11-12 | End: 2023-11-15 | Stop reason: HOSPADM

## 2023-11-12 RX ORDER — GABAPENTIN 400 MG/1
400 CAPSULE ORAL 3 TIMES DAILY
Status: DISCONTINUED | OUTPATIENT
Start: 2023-11-12 | End: 2023-11-15 | Stop reason: HOSPADM

## 2023-11-12 RX ORDER — BISACODYL 10 MG
10 SUPPOSITORY, RECTAL RECTAL DAILY PRN
Status: DISCONTINUED | OUTPATIENT
Start: 2023-11-12 | End: 2023-11-12 | Stop reason: SDUPTHER

## 2023-11-12 RX ORDER — UREA 10 %
3 LOTION (ML) TOPICAL NIGHTLY PRN
Status: DISCONTINUED | OUTPATIENT
Start: 2023-11-12 | End: 2023-11-15 | Stop reason: HOSPADM

## 2023-11-12 RX ORDER — FUROSEMIDE 20 MG/1
20 TABLET ORAL DAILY
Status: DISCONTINUED | OUTPATIENT
Start: 2023-11-13 | End: 2023-11-15 | Stop reason: HOSPADM

## 2023-11-12 RX ORDER — LEVETIRACETAM 500 MG/1
500 TABLET ORAL 2 TIMES DAILY
Status: DISCONTINUED | OUTPATIENT
Start: 2023-11-13 | End: 2023-11-15 | Stop reason: HOSPADM

## 2023-11-12 RX ORDER — LEVOTHYROXINE SODIUM 0.15 MG/1
150 TABLET ORAL
Status: DISCONTINUED | OUTPATIENT
Start: 2023-11-13 | End: 2023-11-15 | Stop reason: HOSPADM

## 2023-11-12 RX ADMIN — Medication 3 MG: at 22:56

## 2023-11-12 RX ADMIN — SODIUM CHLORIDE 75 ML/HR: 9 INJECTION, SOLUTION INTRAVENOUS at 18:27

## 2023-11-12 RX ADMIN — SODIUM CHLORIDE 75 ML/HR: 9 INJECTION, SOLUTION INTRAVENOUS at 22:10

## 2023-11-12 RX ADMIN — GABAPENTIN 400 MG: 400 CAPSULE ORAL at 22:53

## 2023-11-12 RX ADMIN — DOCUSATE SODIUM 50MG AND SENNOSIDES 8.6MG 2 TABLET: 8.6; 5 TABLET, FILM COATED ORAL at 22:56

## 2023-11-12 RX ADMIN — ASPIRIN 325 MG: 325 TABLET ORAL at 22:53

## 2023-11-12 RX ADMIN — IOPAMIDOL 95 ML: 755 INJECTION, SOLUTION INTRAVENOUS at 18:37

## 2023-11-12 NOTE — ED NOTES
Pt from home, new L lower facial droop, slurred speech per . LSN 2030 last night, this morning s/s begun at 1130. Hx of 3 prior cva's. Pt on plavix. Pt has hx of speech difficulty and balance issues since last stroke.   Pt did take plavix today. Normally uses a walker. . Mild neck and occiput pain per ems.

## 2023-11-12 NOTE — ED PROVIDER NOTES
EMERGENCY DEPARTMENT ENCOUNTER    Room Number:  P585/1  PCP: Goran Carr MD  Patient Care Team:  Goran Carr MD as PCP - General (Family Medicine)   Independent Historians: Patient's     HPI:  Chief Complaint: Left facial droop    A complete HPI/ROS/PMH/PSH/SH/FH are unobtainable due to: Patient with baseline speech disturbance per     Chronic or social conditions impacting patient care (Social Determinants of Health): None  (Financial Resource Strain / Food Insecurity / Transportation Needs / Physical Activity / Stress / Social Connections / Intimate Partner Violence / Housing Stability)    Context: Avelina Carr is a 83 y.o. female who presents to the ED c/o new left facial droop with last known normal last night.  Patient states she started to have some speech disturbance last night before bed.   noted patient's left facial droop and ongoing speech disturbance beyond her baseline this morning.  He says her movements had been slowed and she seems a little bit mentally slow compared to her baseline as well.  Patient denies any pain currently although at one point had some pain in the back of her neck.  No cough, no fever, no nausea, no vomiting, no shortness of breath, no diarrhea.    I asked patient specifically about cardiology follow-up after her last stroke and she says she has followed up and had a loop recorder placed but has not received any notifications that she has any issues.  She has not been diagnosed with A-fib.    Review of prior external notes (non-ED) -and- Review of prior external test results outside of this encounter: On review of most recent labs October 16, patient has normal renal function with a BUN of 13 and a creatinine 0.95.    I reviewed patient's last primary care note from Dr. Carr.  This visit was on September 19, 2023.  Patient is treated for hypertension, hyperlipidemia, hypothyroidism, depression.  Patient did not require any medication  changes.    Patient was seen by neurology Dr. Buenrostro in March of this year.  This was in follow-up of patient's last stroke.  Patient is noted to have a history of occipital meningioma resected in 1970.  Patient has a history of seizures and was on Keppra as well as gabapentin at the office visit time.  Patient also has a history of BPPV V and has been to vestibular rehab.  Patient at that time was maintained on both aspirin and Plavix.  She was noted to use a walker for ambulation and to have weakness and numbness in feet and legs.  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  Patient was hospitalized in April 2022 after having some word finding difficulty, generalized weakness, and staring episodes.  She was found to have a left pontine infarct and basilar  distribution.  Patient's Holter monitor after that hospitalization was abnormal with several episodes of brief SVT but could not rule out A-fib.  Patient at that time was noted to have residual weakness, speech difficulty, balance issues requiring a walker.  She was recommended to follow-up with cardiology to rule out the possibility of paroxysmal A-fib.        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    Malignant neoplasm of upper-outer quadrant of right female breast 03/04/2016    Cerebrovascular accident (CVA) 11/15/2017    Stroke 11/21/2017    Primary hypertension 12/07/2017    HLD (hyperlipidemia) 12/07/2017    Adverse effect of antiplatelet agent 12/08/2017    Left middle cerebral artery stroke 12/20/2017    Carcinoma of central portion of right breast in female, estrogen receptor positive 03/25/2015    Chronic bilateral low back pain with right-sided sciatica 12/09/2016    History of lumbar surgery 03/02/2018    Seizure disorder, simple  partial, without intractable epilepsy 2014    Spinal stenosis of lumbar region with neurogenic claudication 2016    Status post craniectomy 2016    History of total right knee replacement 2018    Other forms of scoliosis, lumbar region 2018    DDD (degenerative disc disease), lumbar 2018    Benign meningioma of brain 2016    Squamous cell carcinoma of leg, left 2019    History of cardioembolic cerebrovascular accident (CVA) 2015    Hypothyroid 2019    Depression 2019    COPD (chronic obstructive pulmonary disease) 2019    Obstructive sleep apnea 2019    Squamous cell carcinoma of leg, right 2020    Pain in joint, multiple sites 2021    Excessive daytime sleepiness 2021    Former smoker 2021    Gastroesophageal reflux disease 2021    Migraines 2015    Generalized weakness 2022    Acute CVA (cerebrovascular accident) 2022    Dependence on supplemental oxygen 2022    Paroxysmal atrial fibrillation 2022    Paroxysmal supraventricular tachycardia 2022    Arthritis 2023    Asthma 06/10/2022     Resolved Ambulatory Problems     Diagnosis Date Noted    No Resolved Ambulatory Problems     Past Medical History:   Diagnosis Date    Anxiety     Breast cancer     COVID     CVD (cardiovascular disease)     Hx of radiation therapy     Macular degeneration     Peripheral neuropathy     Vertigo          PAST SURGICAL HISTORY  Past Surgical History:   Procedure Laterality Date    ADRENAL GLAND SURGERY      BRAIN MENINGIOMA EXCISION      occipital    BREAST BIOPSY       SECTION      EMBOLECTOMY N/A 11/15/2017    Procedure: Embolectomy Mechanical;  Surgeon: Rachid Figueroa MD;  Location: [facility] ;  Service:     MASTECTOMY  2014    RECTAL SURGERY  2003    REPLACEMENT TOTAL KNEE Right     TOTAL SHOULDER REPLACEMENT      x2         FAMILY HISTORY  Family  History   Problem Relation Age of Onset    Hypertension Mother     Hyperlipidemia Mother     Heart disease Mother     Cancer Father     Alzheimer's disease Sister     No Known Problems Brother     No Known Problems Sister          SOCIAL HISTORY  Social History     Socioeconomic History    Marital status:      Spouse name: Vitaly    Number of children: 4    Years of education: 12   Tobacco Use    Smoking status: Former     Packs/day: 2.00     Years: 40.00     Additional pack years: 0.00     Total pack years: 80.00     Types: Cigarettes    Smokeless tobacco: Never    Tobacco comments:     non-smoker since 2003   Substance and Sexual Activity    Alcohol use: No    Drug use: No    Sexual activity: Defer         ALLERGIES  Penicillin g and Penicillins        REVIEW OF SYSTEMS  Review of Systems  Included in HPI  All systems reviewed and negative except for those discussed in HPI.      PHYSICAL EXAM    I have reviewed the triage vital signs and nursing notes.    ED Triage Vitals [11/12/23 1641]   Temp Heart Rate Resp BP SpO2   98.4 °F (36.9 °C) 68 16 153/71 96 %      Temp src Heart Rate Source Patient Position BP Location FiO2 (%)   Oral -- -- -- --       Physical Exam  GENERAL: Drowsy obese  female lying supine in bed with poor effort to follow my commands but does comply with neuro exam, no acute distress  SKIN: Warm, dry  HENT: Normocephalic, atraumatic  CV: regular rhythm, regular rate  RESPIRATORY: normal effort, lungs clear, no wheezes, no rhonchi  ABDOMEN: soft, nontender, nondistended  MUSCULOSKELETAL: no deformity, no lower extremity edema, no calf tenderness to palpation  NEURO: Drowsy but cooperative, moves all extremities, follows commands, mild left facial droop that spares the forehead, positive dysarthria but no word finding difficulty, no pronator drift, grossly intact and equal sensation throughout all 4 extremities    Interval: baseline  1a. Level of Consciousness: 0-->Alert, keenly  responsive  1b. LOC Questions: 0-->Answers both questions correctly  1c. LOC Commands: 0-->Performs both tasks correctly  2. Best Gaze: 0-->Normal  3. Visual: 0-->No visual loss  4. Facial Palsy: 1-->Minor paralysis (flattened nasolabial fold, asymmetry on smiling)  5a. Motor Arm, Left: 0-->No drift, limb holds 90 (or 45) degrees for full 10 secs  5b. Motor Arm, Right: 0-->No drift, limb holds 90 (or 45) degrees for full 10 secs  6a. Motor Leg, Left: 0-->No drift, leg holds 30 degree position for full 5 secs  6b. Motor Leg, Right: 0-->No drift, leg holds 30 degree position for full 5 secs  7. Limb Ataxia: 0-->Absent  8. Sensory: 0-->Normal, no sensory loss  9. Best Language: 0-->No aphasia, normal  10. Dysarthria: 1-->Mild-to-moderate dysarthria, patient slurs at least some words and, at worst, can be understood with some difficulty (pt has hx of 3 prior strokes affecting speech)  11. Extinction and Inattention (formerly Neglect): 0-->No abnormality    Total (NIH Stroke Scale): 2        LAB RESULTS  Recent Results (from the past 24 hour(s))   POC Glucose Once    Collection Time: 11/12/23  4:59 PM    Specimen: Blood   Result Value Ref Range    Glucose 92 70 - 130 mg/dL   Comprehensive Metabolic Panel    Collection Time: 11/12/23  5:44 PM    Specimen: Blood   Result Value Ref Range    Glucose 83 65 - 99 mg/dL    BUN 14 8 - 23 mg/dL    Creatinine 0.96 0.57 - 1.00 mg/dL    Sodium 139 136 - 145 mmol/L    Potassium 4.2 3.5 - 5.2 mmol/L    Chloride 104 98 - 107 mmol/L    CO2 24.2 22.0 - 29.0 mmol/L    Calcium 8.6 8.6 - 10.5 mg/dL    Total Protein 6.6 6.0 - 8.5 g/dL    Albumin 3.9 3.5 - 5.2 g/dL    ALT (SGPT) 18 1 - 33 U/L    AST (SGOT) 25 1 - 32 U/L    Alkaline Phosphatase 149 (H) 39 - 117 U/L    Total Bilirubin 0.4 0.0 - 1.2 mg/dL    Globulin 2.7 gm/dL    A/G Ratio 1.4 g/dL    BUN/Creatinine Ratio 14.6 7.0 - 25.0    Anion Gap 10.8 5.0 - 15.0 mmol/L    eGFR 58.5 (L) >60.0 mL/min/1.73   Protime-INR    Collection Time:  11/12/23  5:44 PM    Specimen: Blood   Result Value Ref Range    Protime 13.3 11.7 - 14.2 Seconds    INR 1.00 0.90 - 1.10   aPTT    Collection Time: 11/12/23  5:44 PM    Specimen: Blood   Result Value Ref Range    PTT 28.4 22.7 - 35.4 seconds   BNP    Collection Time: 11/12/23  5:44 PM    Specimen: Blood   Result Value Ref Range    proBNP 302.0 0.0 - 1,800.0 pg/mL   Single High Sensitivity Troponin T    Collection Time: 11/12/23  5:44 PM    Specimen: Blood   Result Value Ref Range    HS Troponin T 26 (H) <14 ng/L   CK    Collection Time: 11/12/23  5:44 PM    Specimen: Blood   Result Value Ref Range    Creatine Kinase 193 (H) 20 - 180 U/L   Magnesium    Collection Time: 11/12/23  5:44 PM    Specimen: Blood   Result Value Ref Range    Magnesium 2.4 1.6 - 2.4 mg/dL   TSH    Collection Time: 11/12/23  5:44 PM    Specimen: Blood   Result Value Ref Range    TSH 0.918 0.270 - 4.200 uIU/mL   CBC Auto Differential    Collection Time: 11/12/23  5:44 PM    Specimen: Blood   Result Value Ref Range    WBC 6.16 3.40 - 10.80 10*3/mm3    RBC 3.85 3.77 - 5.28 10*6/mm3    Hemoglobin 12.6 12.0 - 15.9 g/dL    Hematocrit 37.4 34.0 - 46.6 %    MCV 97.1 (H) 79.0 - 97.0 fL    MCH 32.7 26.6 - 33.0 pg    MCHC 33.7 31.5 - 35.7 g/dL    RDW 12.0 (L) 12.3 - 15.4 %    RDW-SD 43.3 37.0 - 54.0 fl    MPV 10.4 6.0 - 12.0 fL    Platelets 254 140 - 450 10*3/mm3    Neutrophil % 61.8 42.7 - 76.0 %    Lymphocyte % 22.1 19.6 - 45.3 %    Monocyte % 12.8 (H) 5.0 - 12.0 %    Eosinophil % 2.3 0.3 - 6.2 %    Basophil % 0.8 0.0 - 1.5 %    Immature Grans % 0.2 0.0 - 0.5 %    Neutrophils, Absolute 3.81 1.70 - 7.00 10*3/mm3    Lymphocytes, Absolute 1.36 0.70 - 3.10 10*3/mm3    Monocytes, Absolute 0.79 0.10 - 0.90 10*3/mm3    Eosinophils, Absolute 0.14 0.00 - 0.40 10*3/mm3    Basophils, Absolute 0.05 0.00 - 0.20 10*3/mm3    Immature Grans, Absolute 0.01 0.00 - 0.05 10*3/mm3    nRBC 0.0 0.0 - 0.2 /100 WBC   ECG 12 Lead Stroke Evaluation    Collection Time: 11/12/23   6:26 PM   Result Value Ref Range    QT Interval 475 ms    QTC Interval 459 ms   Urinalysis With Microscopic If Indicated (No Culture) - Urine, Clean Catch    Collection Time: 11/12/23  6:29 PM    Specimen: Urine, Clean Catch   Result Value Ref Range    Color, UA Yellow Yellow, Straw    Appearance, UA Clear Clear    pH, UA 5.5 5.0 - 8.0    Specific Gravity, UA 1.013 1.005 - 1.030    Glucose, UA Negative Negative    Ketones, UA Negative Negative    Bilirubin, UA Negative Negative    Blood, UA Negative Negative    Protein, UA Negative Negative    Leuk Esterase, UA Negative Negative    Nitrite, UA Negative Negative    Urobilinogen, UA 0.2 E.U./dL 0.2 - 1.0 E.U./dL       I ordered the above labs and independently reviewed the results.        RADIOLOGY  CTAs head and neck verbally reported by radiologist to be similar to prior examination with no acute changes nor critical stenoses.    I ordered the above noted radiological studies. Reviewed by me. See dictation for official radiology interpretation.      PROCEDURES    Procedures      MEDICATIONS GIVEN IN ER    Medications   sodium chloride 0.9 % flush 10 mL (has no administration in time range)   sodium chloride 0.9 % infusion (75 mL/hr Intravenous New Bag 11/12/23 1827)   sodium chloride 0.9 % infusion (75 mL/hr Intravenous New Bag 11/12/23 2210)   acetaminophen (TYLENOL) tablet 650 mg (has no administration in time range)     Or   acetaminophen (TYLENOL) suppository 650 mg (has no administration in time range)   aspirin tablet 325 mg (has no administration in time range)     Or   aspirin suppository 300 mg (has no administration in time range)   atorvastatin (LIPITOR) tablet 80 mg (has no administration in time range)   sennosides-docusate (PERICOLACE) 8.6-50 MG per tablet 2 tablet (has no administration in time range)     And   polyethylene glycol (MIRALAX) packet 17 g (has no administration in time range)     And   bisacodyl (DULCOLAX) EC tablet 5 mg (has no  administration in time range)     And   bisacodyl (DULCOLAX) suppository 10 mg (has no administration in time range)   ondansetron (ZOFRAN) tablet 4 mg (has no administration in time range)     Or   ondansetron (ZOFRAN) injection 4 mg (has no administration in time range)   melatonin tablet 3 mg (has no administration in time range)   atenolol (TENORMIN) tablet 25 mg (has no administration in time range)   cholecalciferol (VITAMIN D3) tablet 5,000 Units (has no administration in time range)   clopidogrel (PLAVIX) tablet 75 mg (has no administration in time range)   vitamin B-12 (CYANOCOBALAMIN) tablet 1,000 mcg (has no administration in time range)   escitalopram (LEXAPRO) tablet 20 mg (has no administration in time range)   furosemide (LASIX) tablet 20 mg (has no administration in time range)   gabapentin (NEURONTIN) capsule 400 mg (has no administration in time range)   levETIRAcetam (KEPPRA) tablet 500 mg (has no administration in time range)   levothyroxine (SYNTHROID, LEVOTHROID) tablet 150 mcg (has no administration in time range)   pantoprazole (PROTONIX) EC tablet 40 mg (has no administration in time range)   budesonide-formoterol (SYMBICORT) 160-4.5 MCG/ACT inhaler 2 puff (has no administration in time range)   traMADol (ULTRAM) tablet 50 mg (has no administration in time range)   iopamidol (ISOVUE-370) 76 % injection 95 mL (95 mL Intravenous Given 11/12/23 5117)         ORDERS PLACED DURING THIS VISIT:  Orders Placed This Encounter   Procedures    CT Angiogram Neck    CT Angiogram Head    MRI Brain Without Contrast    Comprehensive Metabolic Panel    Protime-INR    aPTT    Urinalysis With Microscopic If Indicated (No Culture) - Urine, Clean Catch    BNP    Single High Sensitivity Troponin T    CK    Magnesium    TSH    CBC Auto Differential    CBC (No Diff)    Comprehensive Metabolic Panel    Hemoglobin A1c    Lipid Panel    TSH    Diet: Cardiac Diets; Healthy Heart (2-3 Na+); Texture: Regular Texture  (IDDSI 7); Fluid Consistency: Thin (IDDSI 0)    Vital Signs    Pulse Oximetry, Continuous    Telemetry - Maintain IV Access    Telemetry - Place Orders & Notify Provider of Results When Patient Experiences Acute Chest Pain, Dysrhythmia or Respiratory Distress    Notify physician of changes in level of consciousness, worsening of stroke symptoms, acute headache or severe nausea and vomiting or any of the following vital sign parameters:    Activity As Tolerated    Nursing Dysphagia Screening    RN to Place Order SLP Consult - Eval & Treat Choosing Reason of RN Dysphagia Screen Failed    Nurse to Call MD or Nutrition Services for Diet if Patient Passes Dysphagia Screen    Intake and Output    Neuro Checks    NIHSS Assessment    Order CT Head Without Contrast for Neurological Decline    Provide Stroke Education Material    Tobacco Cessation Education    Place Sequential Compression Device    Maintain Sequential Compression Device    Vital Signs    Up with assistance    Daily Weights    Strict Intake & Output    Oral Care    Place Sequential Compression Device    Maintain Sequential Compression Device    Code Status and Medical Interventions:    Notify Stroke Coordinator    Inpatient Rehab Admission Consult    Consult to Case Management     Consult to Diabetes Educator    Inpatient Neurology Consult Stroke    OT Consult: Eval & Treat    PT Consult: Eval & Treat as tolerated    Oxygen Therapy-    SLP Consult: Eval & Treat Communication Disorder    POC Glucose Once    POC Glucose Q6H    ECG 12 Lead Stroke Evaluation    ECG 12 Lead Stroke Evaluation    Adult transthoracic echo complete    Insert Peripheral IV    Initiate Observation Status    Fall Precautions    CBC & Differential         PROGRESS, DATA ANALYSIS, CONSULTS, AND MEDICAL DECISION MAKING    All labs have been independently interpreted by me.  All radiology studies have been reviewed by me.   EKG's independently viewed and interpreted by me.   Discussion below represents my analysis of pertinent findings related to patient's condition, differential diagnosis, treatment plan and final disposition.    MDM this patient presents with symptoms concerning for CVA versus TIA. Other possibilities on the differential diagnosis list include: dissection, AMI, hypoglycemia or other metabolic derangement such as hepatic/uremic encephalopathy, medication side effect, complex migraine, or post-ictal Malik’s paralysis. However, presentation most concerning for a CVA. EKG without evidence of STEMI or ischemia, labs with no hypoglycemia, metabolic derangements, and clinical picture does not suggest other stroke mimic. CT head as well as CTA head and neck planned.  Patient is not within an acute window to be appropriate for TNK or even OR intervention.  Will require admission with likely MRI and neurology consultation.  ED Course as of 11/12/23 2248   Sun Nov 12, 2023   1833 EKG ER MD interpretation   Time: 18: 26  Rhythm and rate: Normal sinus rhythm at a rate of 56  Axis: Normal  P waves: Normal  QRS complexes: Normal except for LVH  ST segments: no elevation nor depressions  T waves: Inversions in V1 and lead III  Unchanged from prior EKG done August 11, 2022 [AR]   1940 Radiologist called to let me know that patient CTAs have no critical stenoses.  Patient has no significant changes from prior imaging of her vessels.  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 [AR]      ED Course User Index  [AR] Giulia Simmons MD         PPE: I wore and adhered to appropriate PPE per hospital protocols for specific patient presentation. (For respiratory patients with suspected Covid-19 or other infectious etiology suspected for patient's symptoms, the patient wore a mask and I wore an N95 mask throughout the entire patient encounter.) Proper hand hygiene both before and after patient encounter was performed as well.         AS OF 22:48  EST VITALS:    BP - 175/70  HR - 58  TEMP - 97.5 °F (36.4 °C) (Oral)  O2 SATS - 95%        DIAGNOSIS  Final diagnoses:   Focal neurological deficit present   Hypertension, unspecified type         DISPOSITION  ED Disposition       ED Disposition   Decision to Admit    Condition   --    Comment   Level of Care: Telemetry [5]   Diagnosis: Focal neurological deficit present [[id number]]   Admitting Physician: CELE UPTON [[id number]]   Attending Physician: CELE UPTON [[id number]]                    Note Disclaimer: At [facility], we believe that sharing information builds trust and better relationships. You are receiving this note because you recently visited [facility]. It is possible you will see health information before a provider has talked with you about it. This kind of information can be easy to misunderstand. To help you fully understand what it means for your health, we urge you to discuss this note with your provider.         Giulia Simmons MD  11/12/23 8833

## 2023-11-13 ENCOUNTER — APPOINTMENT (OUTPATIENT)
Dept: CARDIOLOGY | Facility: HOSPITAL | Age: 83
End: 2023-11-13
Payer: MEDICARE

## 2023-11-13 ENCOUNTER — APPOINTMENT (OUTPATIENT)
Dept: MRI IMAGING | Facility: HOSPITAL | Age: 83
End: 2023-11-13
Payer: MEDICARE

## 2023-11-13 LAB
ALBUMIN SERPL-MCNC: 3.5 G/DL (ref 3.5–5.2)
ALBUMIN/GLOB SERPL: 1.5 G/DL
ALP SERPL-CCNC: 142 U/L (ref 39–117)
ALT SERPL W P-5'-P-CCNC: 17 U/L (ref 1–33)
ANION GAP SERPL CALCULATED.3IONS-SCNC: 8.7 MMOL/L (ref 5–15)
AORTIC DIMENSIONLESS INDEX: 0.8 (DI)
AST SERPL-CCNC: 25 U/L (ref 1–32)
BH CV ECHO MEAS - AI P1/2T: 583.2 MSEC
BH CV ECHO MEAS - AO MAX PG: 9.4 MMHG
BH CV ECHO MEAS - AO MEAN PG: 6 MMHG
BH CV ECHO MEAS - AO ROOT DIAM: 3 CM
BH CV ECHO MEAS - AO V2 MAX: 153 CM/SEC
BH CV ECHO MEAS - AO V2 VTI: 42.1 CM
BH CV ECHO MEAS - AVA(I,D): 2.16 CM2
BH CV ECHO MEAS - EDV(CUBED): 102.9 ML
BH CV ECHO MEAS - EDV(MOD-SP2): 106 ML
BH CV ECHO MEAS - EDV(MOD-SP4): 108 ML
BH CV ECHO MEAS - EF(MOD-BP): 63.1 %
BH CV ECHO MEAS - EF(MOD-SP2): 61.3 %
BH CV ECHO MEAS - EF(MOD-SP4): 62 %
BH CV ECHO MEAS - ESV(CUBED): 40.4 ML
BH CV ECHO MEAS - ESV(MOD-SP2): 41 ML
BH CV ECHO MEAS - ESV(MOD-SP4): 41 ML
BH CV ECHO MEAS - FS: 26.8 %
BH CV ECHO MEAS - IVS/LVPW: 0.92 CM
BH CV ECHO MEAS - IVSD: 1.01 CM
BH CV ECHO MEAS - LAT PEAK E' VEL: 4.6 CM/SEC
BH CV ECHO MEAS - LV DIASTOLIC VOL/BSA (35-75): 57.3 CM2
BH CV ECHO MEAS - LV MASS(C)D: 175.6 GRAMS
BH CV ECHO MEAS - LV MAX PG: 7.5 MMHG
BH CV ECHO MEAS - LV MEAN PG: 4 MMHG
BH CV ECHO MEAS - LV SYSTOLIC VOL/BSA (12-30): 21.8 CM2
BH CV ECHO MEAS - LV V1 MAX: 137 CM/SEC
BH CV ECHO MEAS - LV V1 VTI: 34 CM
BH CV ECHO MEAS - LVIDD: 4.7 CM
BH CV ECHO MEAS - LVIDS: 3.4 CM
BH CV ECHO MEAS - LVOT AREA: 2.7 CM2
BH CV ECHO MEAS - LVOT DIAM: 1.84 CM
BH CV ECHO MEAS - LVPWD: 1.1 CM
BH CV ECHO MEAS - MED PEAK E' VEL: 5.4 CM/SEC
BH CV ECHO MEAS - MR MAX PG: 58.4 MMHG
BH CV ECHO MEAS - MR MAX VEL: 382.2 CM/SEC
BH CV ECHO MEAS - MV A DUR: 0.12 SEC
BH CV ECHO MEAS - MV A MAX VEL: 84.6 CM/SEC
BH CV ECHO MEAS - MV DEC SLOPE: 607 CM/SEC2
BH CV ECHO MEAS - MV DEC TIME: 259 SEC
BH CV ECHO MEAS - MV E MAX VEL: 91.3 CM/SEC
BH CV ECHO MEAS - MV E/A: 1.08
BH CV ECHO MEAS - MV MAX PG: 5.1 MMHG
BH CV ECHO MEAS - MV MEAN PG: 1.75 MMHG
BH CV ECHO MEAS - MV P1/2T: 54.5 MSEC
BH CV ECHO MEAS - MV V2 VTI: 36.1 CM
BH CV ECHO MEAS - MVA(P1/2T): 4 CM2
BH CV ECHO MEAS - MVA(VTI): 2.5 CM2
BH CV ECHO MEAS - PA ACC TIME: 0.14 SEC
BH CV ECHO MEAS - PA V2 MAX: 94.7 CM/SEC
BH CV ECHO MEAS - RAP SYSTOLE: 3 MMHG
BH CV ECHO MEAS - RV MAX PG: 1.5 MMHG
BH CV ECHO MEAS - RV V1 MAX: 61.3 CM/SEC
BH CV ECHO MEAS - RV V1 VTI: 18.8 CM
BH CV ECHO MEAS - RVSP: 27.5 MMHG
BH CV ECHO MEAS - SI(MOD-SP2): 34.5 ML/M2
BH CV ECHO MEAS - SI(MOD-SP4): 35.6 ML/M2
BH CV ECHO MEAS - SV(LVOT): 90.7 ML
BH CV ECHO MEAS - SV(MOD-SP2): 65 ML
BH CV ECHO MEAS - SV(MOD-SP4): 67 ML
BH CV ECHO MEAS - TAPSE (>1.6): 1.71 CM
BH CV ECHO MEAS - TR MAX PG: 24.5 MMHG
BH CV ECHO MEAS - TR MAX VEL: 247.6 CM/SEC
BH CV ECHO MEASUREMENTS AVERAGE E/E' RATIO: 18.26
BH CV XLRA - RV BASE: 3.3 CM
BH CV XLRA - TDI S': 8.3 CM/SEC
BILIRUB SERPL-MCNC: 0.3 MG/DL (ref 0–1.2)
BUN SERPL-MCNC: 12 MG/DL (ref 8–23)
BUN/CREAT SERPL: 16.4 (ref 7–25)
CALCIUM SPEC-SCNC: 8.6 MG/DL (ref 8.6–10.5)
CHLORIDE SERPL-SCNC: 105 MMOL/L (ref 98–107)
CHOLEST SERPL-MCNC: 133 MG/DL (ref 0–200)
CO2 SERPL-SCNC: 25.3 MMOL/L (ref 22–29)
CREAT SERPL-MCNC: 0.73 MG/DL (ref 0.57–1)
DEPRECATED RDW RBC AUTO: 41.5 FL (ref 37–54)
EGFRCR SERPLBLD CKD-EPI 2021: 81.2 ML/MIN/1.73
ERYTHROCYTE [DISTWIDTH] IN BLOOD BY AUTOMATED COUNT: 11.9 % (ref 12.3–15.4)
GLOBULIN UR ELPH-MCNC: 2.4 GM/DL
GLUCOSE BLDC GLUCOMTR-MCNC: 119 MG/DL (ref 70–130)
GLUCOSE BLDC GLUCOMTR-MCNC: 122 MG/DL (ref 70–130)
GLUCOSE SERPL-MCNC: 106 MG/DL (ref 65–99)
HBA1C MFR BLD: 5.5 % (ref 4.8–5.6)
HCT VFR BLD AUTO: 34.5 % (ref 34–46.6)
HDLC SERPL-MCNC: 28 MG/DL (ref 40–60)
HGB BLD-MCNC: 11.8 G/DL (ref 12–15.9)
LDLC SERPL CALC-MCNC: 78 MG/DL (ref 0–100)
LDLC/HDLC SERPL: 2.64 {RATIO}
LEFT ATRIUM VOLUME INDEX: 35.4 ML/M2
MCH RBC QN AUTO: 32.7 PG (ref 26.6–33)
MCHC RBC AUTO-ENTMCNC: 34.2 G/DL (ref 31.5–35.7)
MCV RBC AUTO: 95.6 FL (ref 79–97)
PLATELET # BLD AUTO: 221 10*3/MM3 (ref 140–450)
PMV BLD AUTO: 10.4 FL (ref 6–12)
POTASSIUM SERPL-SCNC: 3.8 MMOL/L (ref 3.5–5.2)
PROT SERPL-MCNC: 5.9 G/DL (ref 6–8.5)
QT INTERVAL: 475 MS
QTC INTERVAL: 459 MS
RBC # BLD AUTO: 3.61 10*6/MM3 (ref 3.77–5.28)
SODIUM SERPL-SCNC: 139 MMOL/L (ref 136–145)
TRIGL SERPL-MCNC: 156 MG/DL (ref 0–150)
TSH SERPL DL<=0.05 MIU/L-ACNC: 1.16 UIU/ML (ref 0.27–4.2)
VLDLC SERPL-MCNC: 27 MG/DL (ref 5–40)
WBC NRBC COR # BLD: 5.18 10*3/MM3 (ref 3.4–10.8)

## 2023-11-13 PROCEDURE — 25810000003 SODIUM CHLORIDE 0.9 % SOLUTION: Performed by: INTERNAL MEDICINE

## 2023-11-13 PROCEDURE — 80053 COMPREHEN METABOLIC PANEL: CPT | Performed by: INTERNAL MEDICINE

## 2023-11-13 PROCEDURE — 82948 REAGENT STRIP/BLOOD GLUCOSE: CPT

## 2023-11-13 PROCEDURE — 94799 UNLISTED PULMONARY SVC/PX: CPT

## 2023-11-13 PROCEDURE — 93306 TTE W/DOPPLER COMPLETE: CPT | Performed by: INTERNAL MEDICINE

## 2023-11-13 PROCEDURE — 36415 COLL VENOUS BLD VENIPUNCTURE: CPT | Performed by: INTERNAL MEDICINE

## 2023-11-13 PROCEDURE — 83036 HEMOGLOBIN GLYCOSYLATED A1C: CPT | Performed by: INTERNAL MEDICINE

## 2023-11-13 PROCEDURE — 92610 EVALUATE SWALLOWING FUNCTION: CPT | Performed by: SPEECH-LANGUAGE PATHOLOGIST

## 2023-11-13 PROCEDURE — 25510000001 PERFLUTREN (DEFINITY) 8.476 MG IN SODIUM CHLORIDE (PF) 0.9 % 10 ML INJECTION: Performed by: INTERNAL MEDICINE

## 2023-11-13 PROCEDURE — 80061 LIPID PANEL: CPT | Performed by: INTERNAL MEDICINE

## 2023-11-13 PROCEDURE — 70551 MRI BRAIN STEM W/O DYE: CPT

## 2023-11-13 PROCEDURE — 85027 COMPLETE CBC AUTOMATED: CPT | Performed by: INTERNAL MEDICINE

## 2023-11-13 PROCEDURE — 94640 AIRWAY INHALATION TREATMENT: CPT

## 2023-11-13 PROCEDURE — 84443 ASSAY THYROID STIM HORMONE: CPT | Performed by: INTERNAL MEDICINE

## 2023-11-13 PROCEDURE — 93306 TTE W/DOPPLER COMPLETE: CPT

## 2023-11-13 RX ADMIN — LEVETIRACETAM 500 MG: 500 TABLET, FILM COATED ORAL at 22:44

## 2023-11-13 RX ADMIN — Medication 10 ML: at 09:13

## 2023-11-13 RX ADMIN — GABAPENTIN 400 MG: 400 CAPSULE ORAL at 09:13

## 2023-11-13 RX ADMIN — ESCITALOPRAM 20 MG: 20 TABLET, FILM COATED ORAL at 09:13

## 2023-11-13 RX ADMIN — GABAPENTIN 400 MG: 400 CAPSULE ORAL at 22:42

## 2023-11-13 RX ADMIN — FUROSEMIDE 20 MG: 20 TABLET ORAL at 09:13

## 2023-11-13 RX ADMIN — BUDESONIDE AND FORMOTEROL FUMARATE DIHYDRATE 2 PUFF: 160; 4.5 AEROSOL RESPIRATORY (INHALATION) at 07:26

## 2023-11-13 RX ADMIN — LEVETIRACETAM 500 MG: 500 TABLET, FILM COATED ORAL at 02:51

## 2023-11-13 RX ADMIN — Medication 5000 UNITS: at 09:12

## 2023-11-13 RX ADMIN — ATENOLOL 25 MG: 25 TABLET ORAL at 22:46

## 2023-11-13 RX ADMIN — ATORVASTATIN CALCIUM 80 MG: 80 TABLET, FILM COATED ORAL at 22:42

## 2023-11-13 RX ADMIN — LEVETIRACETAM 500 MG: 500 TABLET, FILM COATED ORAL at 09:13

## 2023-11-13 RX ADMIN — ATENOLOL 25 MG: 25 TABLET ORAL at 02:51

## 2023-11-13 RX ADMIN — PANTOPRAZOLE SODIUM 40 MG: 40 TABLET, DELAYED RELEASE ORAL at 09:12

## 2023-11-13 RX ADMIN — GABAPENTIN 400 MG: 400 CAPSULE ORAL at 16:48

## 2023-11-13 RX ADMIN — PERFLUTREN 2 ML: 6.52 INJECTION, SUSPENSION INTRAVENOUS at 14:23

## 2023-11-13 RX ADMIN — ASPIRIN 325 MG: 325 TABLET ORAL at 09:13

## 2023-11-13 RX ADMIN — DOCUSATE SODIUM 50MG AND SENNOSIDES 8.6MG 2 TABLET: 8.6; 5 TABLET, FILM COATED ORAL at 22:42

## 2023-11-13 RX ADMIN — BUDESONIDE AND FORMOTEROL FUMARATE DIHYDRATE 2 PUFF: 160; 4.5 AEROSOL RESPIRATORY (INHALATION) at 20:31

## 2023-11-13 RX ADMIN — CLOPIDOGREL BISULFATE 75 MG: 75 TABLET, FILM COATED ORAL at 09:13

## 2023-11-13 RX ADMIN — Medication 1000 MCG: at 09:13

## 2023-11-13 RX ADMIN — DOCUSATE SODIUM 50MG AND SENNOSIDES 8.6MG 2 TABLET: 8.6; 5 TABLET, FILM COATED ORAL at 09:13

## 2023-11-13 RX ADMIN — Medication 3 MG: at 22:42

## 2023-11-13 RX ADMIN — SODIUM CHLORIDE 75 ML/HR: 9 INJECTION, SOLUTION INTRAVENOUS at 05:38

## 2023-11-13 RX ADMIN — ATENOLOL 25 MG: 25 TABLET ORAL at 09:13

## 2023-11-13 RX ADMIN — LEVOTHYROXINE SODIUM 150 MCG: 0.15 TABLET ORAL at 05:37

## 2023-11-13 NOTE — PROGRESS NOTES
Name: Avelina Carr ADMIT: 2023   : 1940  PCP: Goran Carr MD    MRN: [id number] LOS: 0 days   AGE/SEX: 83 y.o. female  ROOM: [facility]     Subjective   Subjective   Alert, laying in bed, not in distress.patient oriented x3, however continues to have slurred speech.  Denies any new weakness or numbness.  No fevers, chills, nausea or vomiting.    Review of Systems   As above  Objective   Objective   Vital Signs  Temp:  [97.5 °F (36.4 °C)-98.4 °F (36.9 °C)] 98.1 °F (36.7 °C)  Heart Rate:  [55-68] 59  Resp:  [16-20] 16  BP: (132-179)/(59-97) 149/69  SpO2:  [91 %-100 %] 97 %  on   ;   Device (Oxygen Therapy): room air  Body mass index is 31.5 kg/m².  Physical Exam    General: Alert, laying in bed, not in distress,, no acute distress  CV: Regular rate and rhythm, no murmurs rubs or gallops  Lungs: Clear to auscultation bilaterally, no crackles or wheezes  Abdomen: Soft, nontender, nondistended  Extremities: No significant peripheral edema , no cyanosis   Neuro: Alert, oriented x3,+ slurred speech, left facial droop  Results Review     I reviewed the patient's new clinical results.  Results from last 7 days   Lab Units 23  0523  1744   WBC 10*3/mm3 5.18 6.16   HEMOGLOBIN g/dL 11.8* 12.6   PLATELETS 10*3/mm3 221 254     Results from last 7 days   Lab Units 23  0519 23  1744   SODIUM mmol/L 139 139   POTASSIUM mmol/L 3.8 4.2   CHLORIDE mmol/L 105 104   CO2 mmol/L 25.3 24.2   BUN mg/dL 12 14   CREATININE mg/dL 0.73 0.96   GLUCOSE mg/dL 106* 83   Estimated Creatinine Clearance: 59.9 mL/min (by C-G formula based on SCr of 0.73 mg/dL).  Results from last 7 days   Lab Units 23  0523  1744   ALBUMIN g/dL 3.5 3.9   BILIRUBIN mg/dL 0.3 0.4   ALK PHOS U/L 142* 149*   AST (SGOT) U/L 25 25   ALT (SGPT) U/L 17 18     Results from last 7 days   Lab Units 23  0519 23  1744   CALCIUM mg/dL 8.6 8.6   ALBUMIN g/dL 3.5 3.9   MAGNESIUM mg/dL  --  2.4       COVID19    Date Value Ref Range Status   04/16/2022 Not Detected Not Detected - Ref. Range Final   04/12/2022 Not Detected Not Detected - Ref. Range Final     Hemoglobin A1C   Date/Time Value Ref Range Status   11/13/2023 0519 5.50 4.80 - 5.60 % Final     Glucose   Date/Time Value Ref Range Status   11/13/2023 0506 122 70 - 130 mg/dL Final   11/13/2023 0003 119 70 - 130 mg/dL Final   11/12/2023 1659 92 70 - 130 mg/dL Final           MRI Brain Without Contrast  Narrative: MRI OF THE BRAIN WITHOUT CONTRAST     CLINICAL HISTORY: Slurred speech. Left cheek drooping.     TECHNIQUE: MRI of the brain was obtained with sagittal T1, axial T1,  axial FLAIR, axial T2, axial diffusion, and axial gradient echo images.     COMPARISON: Brain MRI dated 04/12/2022.  FINDINGS:     There are foci of acute infarction involving the lateral aspect of the  right frontal lobe, right corona radiata, right subinsular region, and  right insular cortex that are within the right MCA distribution.  Multiple small predominantly discontiguous foci of acute infarction are  appreciated at these locations. As a conglomerate, these areas of acute  infarction approximately measure up to 2.3 x 5.8 cm in greatest axial  dimensions.     The patient is status post a right temporal parietal occipital  craniotomy. A focus of encephalomalacia is noted within the right  occipital lobe compatible with a chronic infarct within the right PCA  distribution. This area of encephalomalacia measures up to 3.0 x 3.1 cm  in greatest axial dimensions. This was also appreciated on the prior  study. A smaller focus of encephalomalacia within the more inferolateral  aspect of the right occipital lobe measuring up to approximately 9 mm in  diameter is also compatible with a chronic infarct within the right PCA  distribution. Again, this was present on the prior exam. Also, there is  a focus of encephalomalacia within the left temporal lobe measuring up  to 4.9 x 2.5 cm in greatest  axial dimensions that is compatible with a  chronic infarct within the left MCA distribution. There are moderate to  severe and confluent changes of chronic small vessel ischemic phenomena.  All of these findings are stable in appearance when compared to the  previous exam. There is interval evolutionary change of the previously  identified acute infarct within the left aspect of the naeem. Tiny  subcentimeter chronic infarcts are again appreciated within the  cerebellar hemispheres.     There are 4 dural based lesions along the posterior aspect the right  parietal and occipital lobes most compatible with meningiomas. Largest  of these lesions is seen along the posterior aspect of the right  occipital lobe measuring up to approximately 1.7 x 0.8 cm in greatest  axial dimensions and has similar measurements when compared to the prior  study. These lesions would be much better delineated on a  contrast-enhanced examination although they appear roughly similar when  compared to the prior study dated 04/12/2022.        Otherwise, the ventricles, sulci, and cisterns are age-appropriate. The  major intracranial flow related signal voids are unremarkable.     Impression: There are multiple foci of predominantly discontiguous areas of acute  infarction within the lateral aspect of the right frontal lobe, the  right corona radiata, the right subinsular region, the right insular  cortex within the right MCA distribution.     The patient is status post a right temporoparietal occipital craniotomy.  4 small dural based lesions along the posterior aspect of the right  parietal and occipital lobes compatible with small meningiomas appear  similar when compared to the prior examination dated 04/12/2022 although  these would be much better delineated on a contrast enhanced MRI study  which could be obtained as clinically indicated.     There are foci of encephalomalacia within the right occipital lobe  compatible with chronic  infarcts within the right PCA distribution as  well as the lateral aspect of the left temporal lobe within the left MCA  distribution. These findings were also appreciated on the prior exam. A  chronic pontine infarct has evolved since the prior exam. Tiny  subcentimeter chronic infarcts are again appreciated within the  cerebellar hemispheres.     There are moderate to severe changes of chronic small vessel ischemic  phenomena.     I am attempting to discuss these findings with Dr. Shaw Amaya on  11/13/2023 at approximately 11:16 a.m.       Scheduled Medications  aspirin, 325 mg, Oral, Daily   Or  aspirin, 300 mg, Rectal, Daily  atenolol, 25 mg, Oral, BID  atorvastatin, 80 mg, Oral, Nightly  budesonide-formoterol, 2 puff, Inhalation, BID - RT  cholecalciferol, 5,000 Units, Oral, Daily  clopidogrel, 75 mg, Oral, Daily  escitalopram, 20 mg, Oral, Daily  furosemide, 20 mg, Oral, Daily  gabapentin, 400 mg, Oral, TID  levETIRAcetam, 500 mg, Oral, BID  levothyroxine, 150 mcg, Oral, Q AM  pantoprazole, 40 mg, Oral, Daily  senna-docusate sodium, 2 tablet, Oral, BID  vitamin B-12, 1,000 mcg, Oral, Daily    Infusions  sodium chloride, 75 mL/hr, Last Rate: 75 mL/hr (11/12/23 1827)  sodium chloride, 75 mL/hr, Last Rate: 75 mL/hr (11/13/23 0957)    Diet  Diet: Cardiac Diets; Healthy Heart (2-3 Na+); Texture: Regular Texture (IDDSI 7); Fluid Consistency: Thin (IDDSI 0)    I have personally reviewed     [x]  Laboratory   [x]  Microbiology   [x]  Radiology   [x]  EKG/Telemetry  []  Cardiology/Vascular   []  Pathology    []  Records       Assessment/Plan     Active Hospital Problems    Diagnosis  POA    **Focal neurological deficit present [R29.818]  Yes    Primary hypertension [I10]  Yes    HLD (hyperlipidemia) [E78.5]  Yes    Cerebrovascular accident (CVA) [I63.9]  Yes    Seizure disorder, simple partial, without intractable epilepsy [G40.109]  Yes      Resolved Hospital Problems   No resolved problems to display.     Patient is  a 83-year-old female with a history of including but not limited to CVA, seizure, hypertension, hyperlipidemia, presents to the ED with left facial droop and speech disturbance which was noticed by spouse.    CVA:  -Patient with left facial droop and slurred speech  -TSH normal, hemoglobin A1c normal, lipid panel showed total cholesterol 143, LDL 78  -CTA head and neck.Moderate stenoses of the origins of the vertebral arteries and right  internal carotid artery. There is mild to moderate stenosis at the origin of the left common carotid artery, as well as mild narrowing of the proximal left internal carotid artery.3. Focal moderate stenosis at the junction of the left P1 and P2.  -Follow-up MRI showed there are multiple foci of predominantly discontiguous areas of acute infarction within the lateral aspect of the right frontal lobe, the ight corona radiata, the right subinsular region, the right insular cortex within the right MCA distribution.  -Patient is on atorvastatin, Plavix at home, which was continued.  Initiated on aspirin inpatient.  -Echocardiogram pending  -Neurology consult         Seizure disorder: Continue Keppra 500 g twice daily        Cystic lesion: CT scan showed cystic lesio in the r to the right sternocleidomastoid muscle appears to have increased in size.   Follow-up outpatient for further work-up if desired by patient/family.      Hypothyroidism: Continue home levothyroxine, TSH normal      Essential hypertension: BP acutely stable, continue atenolol    SCDs for DVT prophylaxis.  CODE STATUS: DNR/DNI  Discussed with patient.  Anticipate discharge       Copied text in this note has been reviewed and is accurate as of 11/13/23.         Dictated utilizing Dragon dictation        Lucero Royal MD  [facility]  11/13/23  11:31 EST

## 2023-11-13 NOTE — PLAN OF CARE
Pt. VS WNL. No c/o pain. Pt. A&O x4, forgetful and confused at times. NIH-3 d/t left facial droop, slight aphasia, and dysarthria. Pt. IVFs dc'd. Pt. Had MRI today, see results, Pt. Had echo today, see results. Pt. Up with assist x1 with walker, adequate UOP. BM today. Pt. With SCDs. Pt. Resting comfortably at present, will continue to monitor closely for the remainder of this RN's shift.      Problem: Adult Inpatient Plan of Care  Goal: Plan of Care Review  11/13/2023 1847 by Farrah Thornton, RN  Outcome: Ongoing, Progressing

## 2023-11-13 NOTE — PROGRESS NOTES
[facility] Acute Rehab    Stroke screening per stroke order set via Epic. Please note that this is a screening. If you feel that this pt is or will be appropriate for Acute Rehab, please call the Admission Office at [phone] and a full evaluation will be initiated.     Thank You    Soheila Vu RN  Acute Rehab Admission Nurse

## 2023-11-13 NOTE — H&P
HISTORY AND PHYSICAL   [facility]        Date of Admission: 2023  Patient Identification:  Name: Avelina Carr  Age: 83 y.o.  Sex: female  :  1940  MRN: [id number]                     Primary Care Physician: Goran Carr MD    Chief Complaint:  83 year old female who presented to the emergency room with a right facial droop and changes in her speech which were noted this morning; she has a past history of stroke which affected her speech but was worse per family; she is not able to give any history and says she is not sure when her symptoms started; she denies pain or weakness presently     History of Present Illness:   As above    Past Medical History:  Past Medical History:   Diagnosis Date    Anxiety     Arthritis     Breast cancer     COPD (chronic obstructive pulmonary disease)     oxygen at 2L aths    COVID     CVD (cardiovascular disease)     Depression     Hx of radiation therapy     Hypothyroid     Macular degeneration     Peripheral neuropathy     Stroke     Vertigo      Past Surgical History:  Past Surgical History:   Procedure Laterality Date    ADRENAL GLAND SURGERY      BRAIN MENINGIOMA EXCISION      occipital    BREAST BIOPSY       SECTION      EMBOLECTOMY N/A 11/15/2017    Procedure: Embolectomy Mechanical;  Surgeon: Rachid Figueroa MD;  Location: [facility] ;  Service:     MASTECTOMY  2014    RECTAL SURGERY  2003    REPLACEMENT TOTAL KNEE Right     TOTAL SHOULDER REPLACEMENT      x2      Home Meds:  Medications Prior to Admission   Medication Sig Dispense Refill Last Dose    albuterol sulfate  (90 Base) MCG/ACT inhaler        atenolol (TENORMIN) 25 MG tablet Take 1 tablet by mouth 2 (Two) Times a Day.       atorvastatin (LIPITOR) 40 MG tablet TAKE ONE TABLET BY MOUTH ONCE NIGHTLY 90 tablet 2     Cholecalciferol (VITAMIN D3) 5000 units capsule capsule Take 1 capsule by mouth Daily.       clopidogrel (PLAVIX) 75 MG tablet TAKE ONE  TABLET BY MOUTH DAILY 90 tablet 1     Cyanocobalamin (Vitamin B 12) 500 MCG tablet Take 2 tablets by mouth Daily.       escitalopram (LEXAPRO) 20 MG tablet TAKE ONE TABLET BY MOUTH DAILY 90 tablet 1     furosemide (LASIX) 20 MG tablet TAKE ONE TABLET BY MOUTH DAILY 90 tablet 1     gabapentin (NEURONTIN) 400 MG capsule TAKE ONE CAPSULE BY MOUTH THREE TIMES A  capsule 1     levETIRAcetam (KEPPRA) 500 MG tablet TAKE ONE TABLET BY MOUTH TWICE A  tablet 2     levothyroxine (SYNTHROID, LEVOTHROID) 112 MCG tablet TAKE ONE TABLET BY MOUTH DAILY (Patient taking differently: Take 150 mcg by mouth Daily.) 90 tablet 1     Melatonin ER 10 MG tablet controlled-release Take 1 tablet by mouth every night at bedtime.       Multiple Vitamins-Minerals (PRESERVISION AREDS 2 PO) Take  by mouth.       pantoprazole (PROTONIX) 40 MG EC tablet TAKE ONE TABLET BY MOUTH DAILY 90 tablet 3     Symbicort 160-4.5 MCG/ACT inhaler        traMADol (ULTRAM) 50 MG tablet TAKE ONE TABLET BY MOUTH EVERY 6 HOURS AS NEEDED FOR MODERATE PAIN 90 tablet 3     acetaminophen (TYLENOL) 325 MG tablet Take 2 tablets by mouth Every 6 (Six) Hours As Needed for Mild Pain.       Glycerin-Hypromellose- (ARTIFICIAL TEARS) 0.2-0.2-1 % solution ophthalmic solution Administer 1 drop to both eyes Every 1 (One) Hour As Needed for Dry Eyes.          Allergies:  Allergies   Allergen Reactions    Penicillin G Hives    Penicillins Rash     Immunizations:  Immunization History   Administered Date(s) Administered    COVID-19 (PFIZER) BIVALENT 12+YRS 03/16/2023    COVID-19 (PFIZER) Purple Cap Monovalent 01/20/2021, 02/10/2021, 10/25/2021    Covid-19 (Pfizer) Gray Cap Monovalent 05/12/2022    Flu Vaccine Intradermal Quad 18-64YR 08/01/2017    FluMist 2-49yrs 10/01/2016    Fluzone High Dose =>65 Years ([employer] ONLY) 10/22/2019    Fluzone Quad >6mos (Multi-dose) 01/01/2021, 10/01/2022    Influenza TIV (IM) 08/01/2017    Pneumococcal Conjugate 13-Valent (PCV13)  "2017, 2017    Pneumococcal Polysaccharide (PPSV23) 2004, 2004    Shingrix 10/22/2019    Td (TDVAX) 2017     Social History:   Social History     Social History Narrative    Lives at home with      Social History     Socioeconomic History    Marital status:      Spouse name: Vitaly    Number of children: 4    Years of education: 12   Tobacco Use    Smoking status: Former     Packs/day: 2.00     Years: 40.00     Additional pack years: 0.00     Total pack years: 80.00     Types: Cigarettes    Smokeless tobacco: Never    Tobacco comments:     non-smoker since    Substance and Sexual Activity    Alcohol use: No    Drug use: No    Sexual activity: Defer       Family History:  Family History   Problem Relation Age of Onset    Hypertension Mother     Hyperlipidemia Mother     Heart disease Mother     Cancer Father     Alzheimer's disease Sister     No Known Problems Brother     No Known Problems Sister         Review of Systems  Not obtainable from the patient    Objective:  T Max 24 hrs: Temp (24hrs), Av.4 °F (36.9 °C), Min:98.4 °F (36.9 °C), Max:98.4 °F (36.9 °C)    Vitals Ranges:   Temp:  [98.4 °F (36.9 °C)] 98.4 °F (36.9 °C)  Heart Rate:  [55-68] 58  Resp:  [16-20] 20  BP: (152-179)/(71-97) 152/82      Exam:  /82   Pulse 58   Temp 98.4 °F (36.9 °C) (Oral)   Resp 20   Ht 162.6 cm (64.02\")   Wt 83.5 kg (184 lb)   SpO2 92%   BMI 31.57 kg/m²     General Appearance:    Alert, cooperative, no distress, appears stated age; chronically ill appearing   Head:    Normocephalic, without obvious abnormality, atraumatic   Eyes:    PERRL, conjunctivae/corneas clear, EOM's intact, both eyes   Ears:    Normal external ear canals, both ears   Nose:   Nares normal, septum midline, mucosa normal, no drainage    or sinus tenderness   Throat:   Lips, mucosa, and tongue normal   Neck:   Supple, symmetrical, trachea midline, no adenopathy;     thyroid:  no " enlargement/tenderness/nodules; no carotid    bruit or JVD   Back:     Symmetric, no curvature, ROM normal, no CVA tenderness   Lungs:     Clear to auscultation bilaterally, respirations unlabored   Chest Wall:    No tenderness or deformity    Heart:    Regular rate and rhythm, S1 and S2 normal, no murmur, rub   or gallop   Abdomen:     Soft, nontender, bowel sounds active all four quadrants,     no masses, no hepatomegaly, no splenomegaly   Extremities:   Extremities normal, atraumatic, no cyanosis or edema                       .    Data Review:  Labs in chart were reviewed.  WBC   Date Value Ref Range Status   11/12/2023 6.16 3.40 - 10.80 10*3/mm3 Final     Hemoglobin   Date Value Ref Range Status   11/12/2023 12.6 12.0 - 15.9 g/dL Final     Hematocrit   Date Value Ref Range Status   11/12/2023 37.4 34.0 - 46.6 % Final     Platelets   Date Value Ref Range Status   11/12/2023 254 140 - 450 10*3/mm3 Final     Sodium   Date Value Ref Range Status   11/12/2023 139 136 - 145 mmol/L Final     Potassium   Date Value Ref Range Status   11/12/2023 4.2 3.5 - 5.2 mmol/L Final     Comment:     Slight hemolysis detected by analyzer. Result may be falsely elevated.     Chloride   Date Value Ref Range Status   11/12/2023 104 98 - 107 mmol/L Final     CO2   Date Value Ref Range Status   11/12/2023 24.2 22.0 - 29.0 mmol/L Final     BUN   Date Value Ref Range Status   11/12/2023 14 8 - 23 mg/dL Final     Creatinine   Date Value Ref Range Status   11/12/2023 0.96 0.57 - 1.00 mg/dL Final     Glucose   Date Value Ref Range Status   11/12/2023 83 65 - 99 mg/dL Final     Calcium   Date Value Ref Range Status   11/12/2023 8.6 8.6 - 10.5 mg/dL Final     Magnesium   Date Value Ref Range Status   11/12/2023 2.4 1.6 - 2.4 mg/dL Final     AST (SGOT)   Date Value Ref Range Status   11/12/2023 25 1 - 32 U/L Final     ALT (SGPT)   Date Value Ref Range Status   11/12/2023 18 1 - 33 U/L Final     Alkaline Phosphatase   Date Value Ref Range Status    11/12/2023 149 (H) 39 - 117 U/L Final       Results from last 7 days   Lab Units 11/12/23  1744   TSH uIU/mL 0.918          Imaging Results (All)       Procedure Component Value Units Date/Time    CT Angiogram Neck [[id number]] Collected: 11/12/23 1937     Updated: 11/12/23 2034    Narrative:      NONCONTRAST CRANIAL CT SCAN, CT ANGIOGRAM NECK, CT ANGIOGRAM HEAD.     HISTORY: Slurred speech. Left lower facial droop.     COMPARISON: None..     TECHNIQUE:  Radiation dose reduction techniques were utilized, including  automated exposure control and exposure modulation based on body size.   Initially, a routine noncontrast cranial CT performed from the skull  base through the vertex region. After review, thin-section contrast  enhanced CT angiogram images obtained from the aortic arch through the  calvarial vertex utilizing angiographic technique. Multi projection 3-D  MIP reformatted images were supplemented and reviewed.        FINDINGS CRANIAL CT: No acute hemorrhage or midline shift is  demonstrated.. There is diffuse atrophy. There is periventricular and  deep white matter microangiopathic change. Areas of encephalomalacia are  noted within the right occipital lobe, superior left frontal lobe, and  left temporal lobe. These were seen on prior exam from 2022. Patient  also has old infarcts within the anterior limbs of the internal capsules  bilaterally. There is also an old left basal ganglia infarct. There is  also an old left pontine infarct. 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 Multiple meningiomas are seen  within the within the right parietal and occipital lobes. These appear  grossly stable when compared to the prior exam. The patient is status  post right parietal craniotomy. This was for resection of a right  occipital  meningioma. There are areas of enhancement which are seen  adjacent to the craniotomy site, which have been previously felt to be  postoperative change.        CERVICAL CAROTID CT ANGIOGRAM:     FINDINGS: There is normal arch anatomy. There is calcification of the  aortic arch, as well as the arch vessels. There does appear to be some  narrowing of the right subclavian artery. There is plaque noted at the  right carotid bifurcation. This results in narrowing in the range of 50  to 60%. In the range of the distal cervical right internal carotid  artery is widely patent. There is moderate stenosis at the origin of the  right vertebral artery. There is mild to moderate stenosis at the origin  of the left common carotid artery. There is calcified plaque noted at  the left carotid bifurcation, which results in narrowing in the range of  30%. The distal cervical left internal carotid artery is widely patent.  Moderate stenosis at the origin of the left vertebral artery is noted.  The right vertebral artery is dominant. There are background  emphysematous changes. The cystic structure seen posterior to the right  sternocleidomastoid muscle on prior CT is again noted. This measures up  to 2.3 x 1.6 cm, which is larger than on prior exam. On prior MRI, it  did not demonstrate any significant enhancement, but the lesion remains  indeterminate, given interval growth, cystic lymph node cannot be  excluded.        NASCET criteria utilized in stenosis measurements. stenosis mild, 0-49%.           CRANIAL CTA ANGIOGRAM:     FINDINGS: The intracranial internal carotid arteries remain patent.  There is a small anterior communicating artery. The middle cerebral  arteries are patent bilaterally. Intracranial vertebral arteries are  patent. Basilar artery is also patent there is a posterior communicating  artery on the left. Posterior cerebral arteries are patent.. There is a  focal moderate stenosis at the junction of the left P1 and  P2.        Radiation dose reduction techniques were utilized, including automated  exposure control and exposure modulation based on body size.          Impression:      IMPRESSION :    1. No acute intracranial findings. Please see the body of the report for  detailed findings within the brain.  2. Moderate stenoses of the origins of the vertebral arteries and right  internal carotid artery. There is mild to moderate stenosis at the  origin of the left common carotid artery, as well as mild narrowing of  the proximal left internal carotid artery.  3. Focal moderate stenosis at the junction of the left P1 and P2.  4. Cystic lesion is seen posterior to the right sternocleidomastoid  muscle appears to have increased in size. Exact etiology remains  uncertain.        Radiation dose reduction techniques were utilized, including automated  exposure control and exposure modulation based on body size.        This report was finalized on 11/12/2023 8:31 PM by Dr. Paloma Chacon M.D on Workstation: [id number]       CT Angiogram Head [[id number]] Collected: 11/12/23 1937     Updated: 11/12/23 2034    Narrative:      NONCONTRAST CRANIAL CT SCAN, CT ANGIOGRAM NECK, CT ANGIOGRAM HEAD.     HISTORY: Slurred speech. Left lower facial droop.     COMPARISON: None..     TECHNIQUE:  Radiation dose reduction techniques were utilized, including  automated exposure control and exposure modulation based on body size.   Initially, a routine noncontrast cranial CT performed from the skull  base through the vertex region. After review, thin-section contrast  enhanced CT angiogram images obtained from the aortic arch through the  calvarial vertex utilizing angiographic technique. Multi projection 3-D  MIP reformatted images were supplemented and reviewed.        FINDINGS CRANIAL CT: No acute hemorrhage or midline shift is  demonstrated.. There is diffuse atrophy. There is periventricular and  deep white matter microangiopathic change.  Areas of encephalomalacia are  noted within the right occipital lobe, superior left frontal lobe, and  left temporal lobe. These were seen on prior exam from 2022. Patient  also has old infarcts within the anterior limbs of the internal capsules  bilaterally. There is also an old left basal ganglia infarct. There is  also an old left pontine infarct. 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 Multiple meningiomas are seen  within the within the right parietal and occipital lobes. These appear  grossly stable when compared to the prior exam. The patient is status  post right parietal craniotomy. This was for resection of a right  occipital meningioma. There are areas of enhancement which are seen  adjacent to the craniotomy site, which have been previously felt to be  postoperative change.        CERVICAL CAROTID CT ANGIOGRAM:     FINDINGS: There is normal arch anatomy. There is calcification of the  aortic arch, as well as the arch vessels. There does appear to be some  narrowing of the right subclavian artery. There is plaque noted at the  right carotid bifurcation. This results in narrowing in the range of 50  to 60%. In the range of the distal cervical right internal carotid  artery is widely patent. There is moderate stenosis at the origin of the  right vertebral artery. There is mild to moderate stenosis at the origin  of the left common carotid artery. There is calcified plaque noted at  the left carotid bifurcation, which results in narrowing in the range of  30%. The distal cervical left internal carotid artery is widely patent.  Moderate stenosis at the origin of the left vertebral artery is noted.  The right vertebral artery is dominant. There are background  emphysematous changes. The cystic structure seen posterior to the  right  sternocleidomastoid muscle on prior CT is again noted. This measures up  to 2.3 x 1.6 cm, which is larger than on prior exam. On prior MRI, it  did not demonstrate any significant enhancement, but the lesion remains  indeterminate, given interval growth, cystic lymph node cannot be  excluded.        NASCET criteria utilized in stenosis measurements. stenosis mild, 0-49%.           CRANIAL CTA ANGIOGRAM:     FINDINGS: The intracranial internal carotid arteries remain patent.  There is a small anterior communicating artery. The middle cerebral  arteries are patent bilaterally. Intracranial vertebral arteries are  patent. Basilar artery is also patent there is a posterior communicating  artery on the left. Posterior cerebral arteries are patent.. There is a  focal moderate stenosis at the junction of the left P1 and P2.        Radiation dose reduction techniques were utilized, including automated  exposure control and exposure modulation based on body size.          Impression:      IMPRESSION :    1. No acute intracranial findings. Please see the body of the report for  detailed findings within the brain.  2. Moderate stenoses of the origins of the vertebral arteries and right  internal carotid artery. There is mild to moderate stenosis at the  origin of the left common carotid artery, as well as mild narrowing of  the proximal left internal carotid artery.  3. Focal moderate stenosis at the junction of the left P1 and P2.  4. Cystic lesion is seen posterior to the right sternocleidomastoid  muscle appears to have increased in size. Exact etiology remains  uncertain.        Radiation dose reduction techniques were utilized, including automated  exposure control and exposure modulation based on body size.        This report was finalized on 11/12/2023 8:31 PM by Dr. Paloma Chacon M.D on Workstation: [id number]                 Assessment:  Active Hospital Problems    Diagnosis  POA    **Focal neurological  deficit present [R29.818]  Yes      Resolved Hospital Problems   No resolved problems to display.   Dysarthria  Hypertension  Hypothyroidism  Copd  A fib    Plan:  Will continue stroke workup  Monitor on telemetry  Trend labs  Therapies evaluation  Patient is dnr per family  Dw patient, family and ed provider    Andria Roberts MD  11/12/2023  22:16 EST

## 2023-11-13 NOTE — THERAPY EVALUATION
Acute Care - Speech Language Pathology   Swallow Initial Evaluation [facility]     Patient Name: Avelina Carr  : 1940  MRN: [id number]  Today's Date: 2023               Admit Date: 2023    Visit Dx:     ICD-10-CM ICD-9-CM   1. Focal neurological deficit present  R29.818 781.99   2. Hypertension, unspecified type  I10 401.9     Patient Active Problem List   Diagnosis    Postmastectomy lymphedema syndrome    Malignant neoplasm of upper-outer quadrant of right female breast    Cerebrovascular accident (CVA)    Stroke    Primary hypertension    HLD (hyperlipidemia)    Adverse effect of antiplatelet agent    Left middle cerebral artery stroke    Carcinoma of central portion of right breast in female, estrogen receptor positive    Chronic bilateral low back pain with right-sided sciatica    History of lumbar surgery    Seizure disorder, simple partial, without intractable epilepsy    Spinal stenosis of lumbar region with neurogenic claudication    Status post craniectomy    History of total right knee replacement    Other forms of scoliosis, lumbar region    DDD (degenerative disc disease), lumbar    Benign meningioma of brain    Squamous cell carcinoma of leg, left    History of cardioembolic cerebrovascular accident (CVA)    Hypothyroid    Depression    COPD (chronic obstructive pulmonary disease)    Obstructive sleep apnea    Squamous cell carcinoma of leg, right    Pain in joint, multiple sites    Excessive daytime sleepiness    Former smoker    Gastroesophageal reflux disease    Migraines    Generalized weakness    Acute CVA (cerebrovascular accident)    Dependence on supplemental oxygen    Paroxysmal atrial fibrillation    Paroxysmal supraventricular tachycardia    Arthritis    Asthma    Focal neurological deficit present     Past Medical History:   Diagnosis Date    Anxiety     Arthritis     Breast cancer     COPD (chronic obstructive pulmonary disease)     oxygen at 2L aths    COVID      CVD (cardiovascular disease)     Depression     Hx of radiation therapy     Hypothyroid     Macular degeneration     Peripheral neuropathy     Stroke     Vertigo      Past Surgical History:   Procedure Laterality Date    ADRENAL GLAND SURGERY      BRAIN MENINGIOMA EXCISION      occipital    BREAST BIOPSY       SECTION      EMBOLECTOMY N/A 11/15/2017    Procedure: Embolectomy Mechanical;  Surgeon: Rachid Figueroa MD;  Location: [facility] ;  Service:     MASTECTOMY  2014    RECTAL SURGERY  2003    REPLACEMENT TOTAL KNEE Right     TOTAL SHOULDER REPLACEMENT      x2       SLP Recommendation and Plan  SLP Swallowing Diagnosis: functional oral phase, functional pharyngeal phase (23 114)  SLP Diet Recommendation: soft to chew textures, chopped, thin liquids (23 114)  Recommended Precautions and Strategies: upright posture during/after eating, small bites of food and sips of liquid (23)  SLP Rec. for Method of Medication Administration: as tolerated (23)     Monitor for Signs of Aspiration: notify SLP if any concerns (23)     Swallow Criteria for Skilled Therapeutic Interventions Met: demonstrates skilled criteria (23)  Anticipated Discharge Disposition (SLP): unknown (23)  Rehab Potential/Prognosis, Swallowing: good, to achieve stated therapy goals (23)  Therapy Frequency (Swallow): PRN (23)  Predicted Duration Therapy Intervention (Days): until discharge (23)  Oral Care Recommendations: Oral Care BID/PRN (23)                                      Oral Care Recommendations: Oral Care BID/PRN (23)    Plan of Care Reviewed With: patient, spouse  Outcome Evaluation: Clinical swallow eval completed.  Pt demonstrated adequate mastication and oral clearance. Pt reported difficulty with some tougher foods, like larger bites of meat. No s/s aspiration with any consistency. REC soft  chopped meats w/ thin liquids. Upright with all po, single bites.  Pt and  c/o speech being slow and monotone, state slurring has improved.  Will f/u for speech eval.      SWALLOW EVALUATION (last 72 hours)       SLP Adult Swallow Evaluation       Row Name 11/13/23 1145                   Rehab Evaluation    Document Type evaluation  -SA        Subjective Information complains of  slow, monotone speech  -SA        Patient Observations alert;cooperative  -SA        Patient Effort good  -SA        Symptoms Noted During/After Treatment none  -SA           General Information    Patient Profile Reviewed yes  -SA        Pertinent History Of Current Problem focal neurological deficit; CVA 2017, 2022, sz d/o  -SA        Current Method of Nutrition regular textures;thin liquids  -SA        Precautions/Limitations, Vision WFL;for purposes of eval  -SA        Precautions/Limitations, Hearing WFL;for purposes of eval  -SA        Prior Level of Function-Communication WFL  -SA        Prior Level of Function-Swallowing no diet consistency restrictions  -SA        Plans/Goals Discussed with patient and family;agreed upon  -SA        Barriers to Rehab none identified  -SA        Patient's Goals for Discharge return home  -SA           Pain    Additional Documentation Pain Scale: Numbers Pre/Post-Treatment (Group)  -SA           Pain Scale: Numbers Pre/Post-Treatment    Pretreatment Pain Rating 0/10 - no pain  -SA        Posttreatment Pain Rating 0/10 - no pain  -SA           Clinical Swallow Eval    Clinical Swallow Evaluation Summary Pt demonstrated adequate mastication and oral clearance.  Pt reported difficulty with some tougher foods, like larger bites of meat.  No s/s aspiration with any consistency.  REC soft chopped meats w/ thin liquids.  Upright with all po, single bites.  -SA           SLP Evaluation Clinical Impression    SLP Swallowing Diagnosis functional oral phase;functional pharyngeal phase  -SA         Functional Impact no impact on function  -SA        Rehab Potential/Prognosis, Swallowing good, to achieve stated therapy goals  -SA        Swallow Criteria for Skilled Therapeutic Interventions Met demonstrates skilled criteria  -SA           Recommendations    Therapy Frequency (Swallow) PRN  -SA        Predicted Duration Therapy Intervention (Days) until discharge  -        SLP Diet Recommendation soft to chew textures;chopped;thin liquids  -        Recommended Precautions and Strategies upright posture during/after eating;small bites of food and sips of liquid  -SA        Oral Care Recommendations Oral Care BID/PRN  -SA        SLP Rec. for Method of Medication Administration as tolerated  -        Monitor for Signs of Aspiration notify SLP if any concerns  -        Anticipated Discharge Disposition (SLP) unknown  -SA           Swallow Goals (SLP)    Swallow LTGs Patient will demonstrate functional swallow for  -SA           (LTG) Patient will demonstrate functional swallow for    Diet Texture (Demonstrate functional swallow) soft to chew (chopped) textures  -SA        Liquid viscosity (Demonstrate functional swallow) thin liquids  -SA                  User Key  (r) = Recorded By, (t) = Taken By, (c) = Cosigned By      Initials Name Effective Dates    Charlette Joseph MS CCC-SLP 07/11/23 -                     EDUCATION  The patient has been educated in the following areas:   Dysphagia (Swallowing Impairment).        SLP GOALS       Row Name 11/13/23 1145             (LTG) Patient will demonstrate functional swallow for    Diet Texture (Demonstrate functional swallow) soft to chew (chopped) textures  -SA      Liquid viscosity (Demonstrate functional swallow) thin liquids  -SA                User Key  (r) = Recorded By, (t) = Taken By, (c) = Cosigned By      Initials Name Provider Type    Charlette Joseph MS CCC-SLP Speech and Language Pathologist                       Time Calculation:    Time  Calculation- SLP       Row Name 11/13/23 1536             Time Calculation- SLP    SLP Start Time 1145  -SA      SLP Received On 11/13/23  -                User Key  (r) = Recorded By, (t) = Taken By, (c) = Cosigned By      Initials Name Provider Type    Charlette Joseph MS CCC-SLP Speech and Language Pathologist                    Therapy Charges for Today       Code Description Service Date Service Provider Modifiers Qty    [id number] HC ST EVAL ORAL PHARYNG SWALLOW 4 11/13/2023 Charlette Henry MS CCC-SLP GN 1                 Charlette Henry MS CCC-OMAR  11/13/2023

## 2023-11-13 NOTE — ED NOTES
Nursing report ED to floor  Avelina Carr  83 y.o.  female    HPI :   Chief Complaint   Patient presents with    Speech Problem       Admitting doctor:   Andria Roberts MD    Admitting diagnosis:   The primary encounter diagnosis was Focal neurological deficit present. A diagnosis of Hypertension, unspecified type was also pertinent to this visit.  Avelina Carr is a 83 y.o. female who presents to the ED c/o new left facial droop with last known normal last night.  Patient states she started to have some speech disturbance last night before bed.   noted patient's left facial droop and ongoing speech disturbance beyond her baseline this morning.  He says her movements had been slowed and she seems a little bit mentally slow compared to her baseline as well.  Patient denies any pain currently although at one point had some pain in the back of her neck.  No cough, no fever, no nausea, no vomiting, no shortness of breath, no diarrhea.     I asked patient specifically about cardiology follow-up after her last stroke and she says she has followed up and had a loop recorder placed but has not received any notifications that she has any issues.  She has not been diagnosed with A-fib.     Code status:   Current Code Status       Date Active Code Status Order ID Comments User Context       11/12/2023 2009 CPR (Attempt to Resuscitate) [id number]  Andria Roberts MD ED        Question Answer    Code Status (Patient has no pulse and is not breathing) CPR (Attempt to Resuscitate)    Medical Interventions (Patient has pulse or is breathing) Full                    Allergies:   Penicillin g and Penicillins    Isolation:   No active isolations    Intake and Output  No intake or output data in the 24 hours ending 11/12/23 2018    Weight:       11/12/23  1703   Weight: 83.5 kg (184 lb)       Most recent vitals:   Vitals:    11/12/23 1736 11/12/23 1852 11/12/23 2002 11/12/23 2007   BP: 173/92 179/89  179/96   BP  Location:    Left arm   Patient Position:    Lying   Pulse: 55 59 62 58   Resp:   20 20   Temp:       TempSrc:       SpO2: 91% 100%  93%   Weight:       Height:           Active LDAs/IV Access:   Lines, Drains & Airways       Active LDAs       Name Placement date Placement time Site Days    Peripheral IV 11/12/23 1640 Left Antecubital 11/12/23  1640  Antecubital  less than 1                    Labs (abnormal labs have a star):   Labs Reviewed   COMPREHENSIVE METABOLIC PANEL - Abnormal; Notable for the following components:       Result Value    Alkaline Phosphatase 149 (*)     eGFR 58.5 (*)     All other components within normal limits    Narrative:     GFR Normal >60  Chronic Kidney Disease <60  Kidney Failure <15    The GFR formula is only valid for adults with stable renal function between ages 18 and 70.   SINGLE HSTROPONIN T - Abnormal; Notable for the following components:    HS Troponin T 26 (*)     All other components within normal limits    Narrative:     High Sensitive Troponin T Reference Range:  <14.0 ng/L- Negative Female for AMI  <22.0 ng/L- Negative Male for AMI  >=14 - Abnormal Female indicating possible myocardial injury.  >=22 - Abnormal Male indicating possible myocardial injury.   Clinicians would have to utilize clinical acumen, EKG, Troponin, and serial changes to determine if it is an Acute Myocardial Infarction or myocardial injury due to an underlying chronic condition.        CK - Abnormal; Notable for the following components:    Creatine Kinase 193 (*)     All other components within normal limits   CBC WITH AUTO DIFFERENTIAL - Abnormal; Notable for the following components:    MCV 97.1 (*)     RDW 12.0 (*)     Monocyte % 12.8 (*)     All other components within normal limits   PROTIME-INR - Normal   APTT - Normal   URINALYSIS W/ MICROSCOPIC IF INDICATED (NO CULTURE) - Normal    Narrative:     Urine microscopic not indicated.   BNP (IN-HOUSE) - Normal    Narrative:     This assay is used  as an aid in the diagnosis of individuals suspected of having heart failure. It can be used as an aid in the diagnosis of acute decompensated heart failure (ADHF) in patients presenting with signs and symptoms of ADHF to the emergency department (ED). In addition, NT-proBNP of <300 pg/mL indicates ADHF is not likely.    Age Range Result Interpretation  NT-proBNP Concentration (pg/mL:      <50             Positive            >450                   Gray                 300-450                    Negative             <300    50-75           Positive            >900                  Gray                300-900                  Negative            <300      >75             Positive            >1800                  Gray                300-1800                  Negative            <300   MAGNESIUM - Normal   TSH - Normal   POCT GLUCOSE FINGERSTICK - Normal   POCT GLUCOSE FINGERSTICK   POCT GLUCOSE FINGERSTICK   POCT GLUCOSE FINGERSTICK   POCT GLUCOSE FINGERSTICK   CBC AND DIFFERENTIAL    Narrative:     The following orders were created for panel order CBC & Differential.  Procedure                               Abnormality         Status                     ---------                               -----------         ------                     CBC Auto Differential[[id number]]        Abnormal            Final result                 Please view results for these tests on the individual orders.       EKG:   ECG 12 Lead Stroke Evaluation   Preliminary Result   HEART RATE= 56  bpm   RR Interval= 1071  ms   VA Interval= 152  ms   P Horizontal Axis= 246  deg   P Front Axis= -49  deg   QRSD Interval= 102  ms   QT Interval= 475  ms   QTcB= 459  ms   QRS Axis= -16  deg   T Wave Axis= -1  deg   - ABNORMAL ECG -   Sinus or ectopic atrial rhythm   Left ventricular hypertrophy   Inferior infarct, old   Anterior Q waves, possibly due to LVH   Electronically Signed By:    Date and Time of Study: 2023-11-12 18:26:04          Meds given in  ED:   Medications   sodium chloride 0.9 % flush 10 mL (has no administration in time range)   sodium chloride 0.9 % infusion (75 mL/hr Intravenous New Bag 11/12/23 1827)   sodium chloride 0.9 % infusion (has no administration in time range)   acetaminophen (TYLENOL) tablet 650 mg (has no administration in time range)     Or   acetaminophen (TYLENOL) suppository 650 mg (has no administration in time range)   aspirin tablet 325 mg (has no administration in time range)     Or   aspirin suppository 300 mg (has no administration in time range)   atorvastatin (LIPITOR) tablet 80 mg (has no administration in time range)   ondansetron (ZOFRAN) injection 4 mg (has no administration in time range)   bisacodyl (DULCOLAX) suppository 10 mg (has no administration in time range)   acetaminophen (TYLENOL) tablet 650 mg (has no administration in time range)   sennosides-docusate (PERICOLACE) 8.6-50 MG per tablet 2 tablet (has no administration in time range)     And   polyethylene glycol (MIRALAX) packet 17 g (has no administration in time range)     And   bisacodyl (DULCOLAX) EC tablet 5 mg (has no administration in time range)     And   bisacodyl (DULCOLAX) suppository 10 mg (has no administration in time range)   ondansetron (ZOFRAN) tablet 4 mg (has no administration in time range)     Or   ondansetron (ZOFRAN) injection 4 mg (has no administration in time range)   melatonin tablet 3 mg (has no administration in time range)   iopamidol (ISOVUE-370) 76 % injection 95 mL (95 mL Intravenous Given 11/12/23 1837)       Imaging results:  No radiology results for the last day    Ambulatory status:   - bedrest    Social issues:   Social History     Socioeconomic History    Marital status:      Spouse name: Vitaly    Number of children: 4    Years of education: 12   Tobacco Use    Smoking status: Former     Packs/day: 2.00     Years: 40.00     Additional pack years: 0.00     Total pack years: 80.00     Types: Cigarettes     Smokeless tobacco: Never    Tobacco comments:     non-smoker since 2003   Substance and Sexual Activity    Alcohol use: No    Drug use: No    Sexual activity: Defer       NIH Stroke Scale:  Interval: baseline    Ingris Watson RN  11/12/23 20:18 EST

## 2023-11-13 NOTE — PLAN OF CARE
Goal Outcome Evaluation:  Plan of Care Reviewed With: patient, spouse           Outcome Evaluation: Clinical swallow eval completed.  Pt demonstrated adequate mastication and oral clearance. Pt reported difficulty with some tougher foods, like larger bites of meat. No s/s aspiration with any consistency. REC soft chopped meats w/ thin liquids. Upright with all po, single bites.  Pt and  c/o speech being slow and monotone, state slurring has improved.  Will f/u for speech eval.

## 2023-11-13 NOTE — PLAN OF CARE
Pt. Alert and oriented x 4. Pt has slurred speech. NIH 2. VSS. Denies pain and nausea. No neuro changes.       Problem: Adult Inpatient Plan of Care  Goal: Plan of Care Review  11/13/2023 0518 by Sonu Pool RN  Outcome: Ongoing, Progressing  11/13/2023 0517 by Sonu Pool RN  Outcome: Ongoing, Progressing  Goal: Patient-Specific Goal (Individualized)  11/13/2023 0518 by Sonu Pool RN  Outcome: Ongoing, Progressing  11/13/2023 0517 by Sonu Pool RN  Outcome: Ongoing, Progressing  Goal: Absence of Hospital-Acquired Illness or Injury  11/13/2023 0518 by Sonu Pool RN  Outcome: Ongoing, Progressing  11/13/2023 0517 by Sonu Pool RN  Outcome: Ongoing, Progressing  Intervention: Identify and Manage Fall Risk  Recent Flowsheet Documentation  Taken 11/13/2023 0403 by Sonu Pool RN  Safety Promotion/Fall Prevention: safety round/check completed  Taken 11/13/2023 0213 by Sonu Pool RN  Safety Promotion/Fall Prevention: safety round/check completed  Taken 11/13/2023 0020 by Sonu Pool RN  Safety Promotion/Fall Prevention: safety round/check completed  Intervention: Prevent Skin Injury  Recent Flowsheet Documentation  Taken 11/13/2023 0403 by Sonu Pool RN  Body Position: position changed independently  Taken 11/13/2023 0213 by Sonu Pool RN  Body Position: position changed independently  Taken 11/13/2023 0020 by Sonu Pool RN  Body Position:   turned   right   position changed independently  Intervention: Prevent and Manage VTE (Venous Thromboembolism) Risk  Recent Flowsheet Documentation  Taken 11/12/2023 2205 by Sonu Pool RN  VTE Prevention/Management:   bilateral   sequential compression devices on  Goal: Optimal Comfort and Wellbeing  11/13/2023 0518 by Sonu Pool RN  Outcome: Ongoing, Progressing  11/13/2023 0517 by Sonu Pool RN  Outcome: Ongoing, Progressing  Intervention: Monitor Pain and Promote Comfort  Recent Flowsheet Documentation  Taken  11/12/2023 2205 by Sonu Pool RN  Pain Management Interventions:   care clustered   pillow support provided   position adjusted   quiet environment facilitated  Intervention: Provide Person-Centered Care  Recent Flowsheet Documentation  Taken 11/12/2023 2205 by Sonu Pool RN  Trust Relationship/Rapport:   care explained   choices provided   questions answered   questions encouraged   thoughts/feelings acknowledged  Goal: Readiness for Transition of Care  11/13/2023 0518 by Sonu Pool RN  Outcome: Ongoing, Progressing  11/13/2023 0517 by Sonu Pool RN  Outcome: Ongoing, Progressing  Intervention: Mutually Develop Transition Plan  Recent Flowsheet Documentation  Taken 11/12/2023 2216 by Sonu Pool RN  Transportation Anticipated: family or friend will provide  Patient/Family Anticipated Services at Transition: none  Patient/Family Anticipates Transition to: home  Taken 11/12/2023 2214 by Sonu Pool RN  Equipment Currently Used at Home: walker, rolling     Problem: Fall Injury Risk  Goal: Absence of Fall and Fall-Related Injury  Outcome: Ongoing, Progressing  Intervention: Promote Injury-Free Environment  Recent Flowsheet Documentation  Taken 11/13/2023 0403 by Sonu Pool RN  Safety Promotion/Fall Prevention: safety round/check completed  Taken 11/13/2023 0213 by Sonu Pool RN  Safety Promotion/Fall Prevention: safety round/check completed  Taken 11/13/2023 0020 by Sonu Pool RN  Safety Promotion/Fall Prevention: safety round/check completed     Problem: Hypertension Acute  Goal: Blood Pressure Within Desired Range  Outcome: Ongoing, Progressing   Goal Outcome Evaluation:

## 2023-11-14 LAB
ANION GAP SERPL CALCULATED.3IONS-SCNC: 14.1 MMOL/L (ref 5–15)
BUN SERPL-MCNC: 11 MG/DL (ref 8–23)
BUN/CREAT SERPL: 13.8 (ref 7–25)
CALCIUM SPEC-SCNC: 8.9 MG/DL (ref 8.6–10.5)
CHLORIDE SERPL-SCNC: 109 MMOL/L (ref 98–107)
CO2 SERPL-SCNC: 17.9 MMOL/L (ref 22–29)
CREAT SERPL-MCNC: 0.8 MG/DL (ref 0.57–1)
DEPRECATED RDW RBC AUTO: 41.5 FL (ref 37–54)
EGFRCR SERPLBLD CKD-EPI 2021: 72.8 ML/MIN/1.73
ERYTHROCYTE [DISTWIDTH] IN BLOOD BY AUTOMATED COUNT: 11.6 % (ref 12.3–15.4)
GLUCOSE BLDC GLUCOMTR-MCNC: 119 MG/DL (ref 70–130)
GLUCOSE SERPL-MCNC: 140 MG/DL (ref 65–99)
HCT VFR BLD AUTO: 42.3 % (ref 34–46.6)
HGB BLD-MCNC: 13.7 G/DL (ref 12–15.9)
MAGNESIUM SERPL-MCNC: 2.3 MG/DL (ref 1.6–2.4)
MCH RBC QN AUTO: 32.1 PG (ref 26.6–33)
MCHC RBC AUTO-ENTMCNC: 32.4 G/DL (ref 31.5–35.7)
MCV RBC AUTO: 99.1 FL (ref 79–97)
PHOSPHATE SERPL-MCNC: 3.7 MG/DL (ref 2.5–4.5)
PLATELET # BLD AUTO: 288 10*3/MM3 (ref 140–450)
PMV BLD AUTO: 10.6 FL (ref 6–12)
POTASSIUM SERPL-SCNC: 4.5 MMOL/L (ref 3.5–5.2)
RBC # BLD AUTO: 4.27 10*6/MM3 (ref 3.77–5.28)
SODIUM SERPL-SCNC: 141 MMOL/L (ref 136–145)
WBC NRBC COR # BLD: 5.73 10*3/MM3 (ref 3.4–10.8)

## 2023-11-14 PROCEDURE — 97166 OT EVAL MOD COMPLEX 45 MIN: CPT

## 2023-11-14 PROCEDURE — 84100 ASSAY OF PHOSPHORUS: CPT | Performed by: STUDENT IN AN ORGANIZED HEALTH CARE EDUCATION/TRAINING PROGRAM

## 2023-11-14 PROCEDURE — 94664 DEMO&/EVAL PT USE INHALER: CPT

## 2023-11-14 PROCEDURE — 97162 PT EVAL MOD COMPLEX 30 MIN: CPT

## 2023-11-14 PROCEDURE — 97535 SELF CARE MNGMENT TRAINING: CPT

## 2023-11-14 PROCEDURE — 99222 1ST HOSP IP/OBS MODERATE 55: CPT | Performed by: NURSE PRACTITIONER

## 2023-11-14 PROCEDURE — 94799 UNLISTED PULMONARY SVC/PX: CPT

## 2023-11-14 PROCEDURE — 85027 COMPLETE CBC AUTOMATED: CPT | Performed by: STUDENT IN AN ORGANIZED HEALTH CARE EDUCATION/TRAINING PROGRAM

## 2023-11-14 PROCEDURE — 82948 REAGENT STRIP/BLOOD GLUCOSE: CPT

## 2023-11-14 PROCEDURE — 97116 GAIT TRAINING THERAPY: CPT

## 2023-11-14 PROCEDURE — 80048 BASIC METABOLIC PNL TOTAL CA: CPT | Performed by: STUDENT IN AN ORGANIZED HEALTH CARE EDUCATION/TRAINING PROGRAM

## 2023-11-14 PROCEDURE — 83735 ASSAY OF MAGNESIUM: CPT | Performed by: STUDENT IN AN ORGANIZED HEALTH CARE EDUCATION/TRAINING PROGRAM

## 2023-11-14 RX ADMIN — FUROSEMIDE 20 MG: 20 TABLET ORAL at 09:03

## 2023-11-14 RX ADMIN — PANTOPRAZOLE SODIUM 40 MG: 40 TABLET, DELAYED RELEASE ORAL at 09:03

## 2023-11-14 RX ADMIN — LEVETIRACETAM 500 MG: 500 TABLET, FILM COATED ORAL at 20:52

## 2023-11-14 RX ADMIN — LEVOTHYROXINE SODIUM 150 MCG: 0.15 TABLET ORAL at 05:46

## 2023-11-14 RX ADMIN — BUDESONIDE AND FORMOTEROL FUMARATE DIHYDRATE 2 PUFF: 160; 4.5 AEROSOL RESPIRATORY (INHALATION) at 19:42

## 2023-11-14 RX ADMIN — GABAPENTIN 400 MG: 400 CAPSULE ORAL at 09:03

## 2023-11-14 RX ADMIN — Medication 1000 MCG: at 09:04

## 2023-11-14 RX ADMIN — ASPIRIN 325 MG: 325 TABLET ORAL at 09:02

## 2023-11-14 RX ADMIN — Medication 5000 UNITS: at 09:03

## 2023-11-14 RX ADMIN — BUDESONIDE AND FORMOTEROL FUMARATE DIHYDRATE 2 PUFF: 160; 4.5 AEROSOL RESPIRATORY (INHALATION) at 08:32

## 2023-11-14 RX ADMIN — CLOPIDOGREL BISULFATE 75 MG: 75 TABLET, FILM COATED ORAL at 09:03

## 2023-11-14 RX ADMIN — ATORVASTATIN CALCIUM 80 MG: 80 TABLET, FILM COATED ORAL at 20:52

## 2023-11-14 RX ADMIN — Medication 3 MG: at 20:56

## 2023-11-14 RX ADMIN — LEVETIRACETAM 500 MG: 500 TABLET, FILM COATED ORAL at 09:04

## 2023-11-14 RX ADMIN — ATENOLOL 25 MG: 25 TABLET ORAL at 09:04

## 2023-11-14 RX ADMIN — ATENOLOL 25 MG: 25 TABLET ORAL at 21:05

## 2023-11-14 RX ADMIN — TRAMADOL HYDROCHLORIDE 50 MG: 50 TABLET, COATED ORAL at 10:10

## 2023-11-14 RX ADMIN — ESCITALOPRAM 20 MG: 20 TABLET, FILM COATED ORAL at 09:04

## 2023-11-14 RX ADMIN — GABAPENTIN 400 MG: 400 CAPSULE ORAL at 15:46

## 2023-11-14 RX ADMIN — GABAPENTIN 400 MG: 400 CAPSULE ORAL at 20:52

## 2023-11-14 NOTE — PROGRESS NOTES
Neurology Consult Note    Consult Date: 11/14/2023    Referring MD: Andria Roberts, *    Reason for Consult I have been asked to see the patient in neurological consultation to render advice and opinion regarding stroke    Avelina Carr is a 83 y.o. female with hypertension, hyperlipidemia, TEOFILO compliant with CPAP, COPD, right occipital meningioma status postresection remotely, h/o breast cancer, left MCA stroke s/p mechanical thrombectomy  in 2017(JEFE negative), left pontine stroke in 2022, on DAPT, seizure disorder on Keppra, and loop recorder which has been negative for A-fib who presented with slurred speech and left facial droop.    The patient states on Saturday she and her  were hosting a party when her  noted she had slurred speech and a left facial droop.  The patient also notes that her voice is monotone. Denies unilateral weakness/numbness but reports generalized weakness. Symptoms persisted on Sunday so they came here for further evaluation.  MRI brain showed right MCA strokes involving the right frontal lobe, corona radiata, and insular cortex.  CTA head and neck showed no acute findings but did show right ICA 60% stenosis, mild to moderate left CCA origin stenosis, moderate stenosis at the origins of bilateral vertebral arteries. 2d echo showed normal EF, mild to moderate LA enlargement, no clear of stroke.     She reports compliance with aspirin 325 mg daily, Plavix 75 mg daily, Lipitor 40 mg daily.    She has had balance issues since her pontine stroke in 2022 for which she uses a walker at baseline.  Past Medical/Surgical Hx:  Past Medical History:   Diagnosis Date    Anxiety     Arthritis     Breast cancer 2014    COPD (chronic obstructive pulmonary disease)     oxygen at 2L aths    COVID     CVD (cardiovascular disease)     Depression     Hx of radiation therapy     Hypothyroid     Macular degeneration     Peripheral neuropathy     Stroke     Vertigo      Past Surgical History:    Procedure Laterality Date    ADRENAL GLAND SURGERY      BRAIN MENINGIOMA EXCISION      occipital    BREAST BIOPSY       SECTION      EMBOLECTOMY N/A 11/15/2017    Procedure: Embolectomy Mechanical;  Surgeon: Rachid Figueroa MD;  Location: [facility] ;  Service:     MASTECTOMY  2014    RECTAL SURGERY  2003    REPLACEMENT TOTAL KNEE Right     TOTAL SHOULDER REPLACEMENT      x2       Medications On Admission  Medications Prior to Admission   Medication Sig Dispense Refill Last Dose    acetaminophen (TYLENOL) 325 MG tablet Take 2 tablets by mouth Every 6 (Six) Hours As Needed for Mild Pain.   2023 at 1100    albuterol sulfate  (90 Base) MCG/ACT inhaler    2023 at 1100    atenolol (TENORMIN) 25 MG tablet Take 1 tablet by mouth 2 (Two) Times a Day.   2023 at 1100    atorvastatin (LIPITOR) 40 MG tablet TAKE ONE TABLET BY MOUTH ONCE NIGHTLY 90 tablet 2 2023 at 1100    Cholecalciferol (VITAMIN D3) 5000 units capsule capsule Take 1 capsule by mouth Daily.   2023 at 1100    clopidogrel (PLAVIX) 75 MG tablet TAKE ONE TABLET BY MOUTH DAILY 90 tablet 1 2023 at 1100    Cyanocobalamin (Vitamin B 12) 500 MCG tablet Take 2 tablets by mouth Daily.   2023 at 1100    escitalopram (LEXAPRO) 20 MG tablet TAKE ONE TABLET BY MOUTH DAILY 90 tablet 1 2023 at 1100    furosemide (LASIX) 20 MG tablet TAKE ONE TABLET BY MOUTH DAILY 90 tablet 1 2023 at 1100    gabapentin (NEURONTIN) 400 MG capsule TAKE ONE CAPSULE BY MOUTH THREE TIMES A  capsule 1 2023 at 1300    latanoprost (XALATAN) 0.005 % ophthalmic solution Administer 1 drop to the right eye Every Night.   2023 at 2200    levETIRAcetam (KEPPRA) 500 MG tablet TAKE ONE TABLET BY MOUTH TWICE A  tablet 2 2023 at 1100    levothyroxine (SYNTHROID, LEVOTHROID) 112 MCG tablet TAKE ONE TABLET BY MOUTH DAILY (Patient taking differently: Take 150 mcg by mouth Daily.) 90 tablet 1  11/12/2023 at 1100    Melatonin ER 10 MG tablet controlled-release Take 1 tablet by mouth every night at bedtime.   11/11/2023 at 2200    pantoprazole (PROTONIX) 40 MG EC tablet TAKE ONE TABLET BY MOUTH DAILY 90 tablet 3 11/12/2023 at 1100    Symbicort 160-4.5 MCG/ACT inhaler    11/12/2023 at 1100    traMADol (ULTRAM) 50 MG tablet TAKE ONE TABLET BY MOUTH EVERY 6 HOURS AS NEEDED FOR MODERATE PAIN 90 tablet 3 11/12/2023 at 1100    Glycerin-Hypromellose- (ARTIFICIAL TEARS) 0.2-0.2-1 % solution ophthalmic solution Administer 1 drop to both eyes Every 1 (One) Hour As Needed for Dry Eyes.   Unknown       Allergies:  Allergies   Allergen Reactions    Penicillin G Hives    Penicillins Rash       Social Hx:  Social History     Socioeconomic History    Marital status:      Spouse name: Vitaly    Number of children: 4    Years of education: 12   Tobacco Use    Smoking status: Former     Packs/day: 2.00     Years: 40.00     Additional pack years: 0.00     Total pack years: 80.00     Types: Cigarettes    Smokeless tobacco: Never    Tobacco comments:     non-smoker since 2003   Vaping Use    Vaping Use: Never used   Substance and Sexual Activity    Alcohol use: No    Drug use: No    Sexual activity: Defer       Family Hx:  Family History   Problem Relation Age of Onset    Hypertension Mother     Hyperlipidemia Mother     Heart disease Mother     Cancer Father     Alzheimer's disease Sister     No Known Problems Brother     No Known Problems Sister        REVIEW OF SYSTEMS:   Constitutional: [No fevers, chills, sweats or weight loss/gain]   Eye: [No change in vision, double vision] poor vision secondary to macular degeneration/glaucoma, no acute vision loss  HEENT: [No headaches, tenderness, dizziness, or tinnitus. Normal smell and taste.]   Respiratory: [No shortness of breath, coughing, wheezing]   Cardiovascular: [No Chest pain, palpitations, syncope, MARTINEZ]   Gastrointestinal: [Normal bowel function. No nausea,  "vomiting, diarrhea]   Genitourinary: [Normal bladder function]   Musculoskeletal: [No trauma, joint or neck pain, myalgias, cramping or weakness]   Skin: [No itching, burning, pain, rashes, or birthmarks]   Endocrinology: [No heat or cold intolerance]   Psychiatric: [No sleep disturbance. No anxiety or depression]   Neurologic: [See HPI, above]       Exam    /53 (BP Location: Left arm, Patient Position: Sitting)   Pulse 56   Temp 98.6 °F (37 °C) (Oral)   Resp 18   Ht 162.6 cm (64\")   Wt 85.1 kg (187 lb 9.8 oz)   SpO2 93%   BMI 32.20 kg/m²   General appearance: Well developed, well nourished, well groomed, alert and cooperative.   HEENT: Normocephalic.   Neck: Supple.   Cardiac: Regular rate and rhythm.  Peripheral Vasculature: Radial pulses are equal and symmetric.  Chest Exam: Clear to auscultation bilaterally, no wheezes, no rhonchi.  Extremities: Normal, no edema.   Skin: No rashes or birthmarks.     Higher integrative function: Oriented to time, place, person, intact recent and remote memory, attention span, concentration and language. Spontaneous speech, fund of vocabulary are normal.   Cranial nerves: Visual fields intact, extraocular movements full.  PERRL.  Normal facial sensation, left lower facial droop.  Hearing intact, symmetric palatal movement, symmetric shoulder shrug.  Tongue midline, mild dysarthria.  Motor: Normal muscle strength, bulk, and tone in upper and lower extremities. No fasciculations, rigidity, spasticity or abnormal movements.   Sensation: Normal light touch in arms and legs.  Station and gait: Deferred.  Coordination: Finger to nose test showed no dysmetria.           DATA:    Lab Results   Component Value Date    GLUCOSE 140 (H) 11/14/2023    CALCIUM 8.9 11/14/2023     11/14/2023    K 4.5 11/14/2023    CO2 17.9 (L) 11/14/2023     (H) 11/14/2023    BUN 11 11/14/2023    CREATININE 0.80 11/14/2023    EGFRIFAFRI 75 09/24/2021    EGFRIFNONA 62 09/24/2021    BCR " 13.8 11/14/2023    ANIONGAP 14.1 11/14/2023     Lab Results   Component Value Date    WBC 5.73 11/14/2023    HGB 13.7 11/14/2023    HCT 42.3 11/14/2023    MCV 99.1 (H) 11/14/2023     11/14/2023     Lab Results   Component Value Date    CHOL 133 11/13/2023    CHOL 116 04/12/2022    CHOL 139 11/16/2017     Lab Results   Component Value Date    HDL 28 (L) 11/13/2023    HDL 37 (L) 10/16/2023    HDL 34 (L) 03/23/2023     Lab Results   Component Value Date    LDL 78 11/13/2023    LDL 79 10/16/2023    LDL 88 03/23/2023     Lab Results   Component Value Date    TRIG 156 (H) 11/13/2023    TRIG 163 (H) 10/16/2023    TRIG 169 (H) 03/23/2023     Lab Results   Component Value Date    HGBA1C 5.50 11/13/2023     Lab Results   Component Value Date    INR 1.00 11/12/2023    INR 0.93 04/11/2022    INR 0.9 02/13/2018    PROTIME 13.3 11/12/2023    PROTIME 12.4 04/11/2022    PROTIME 12.6 12/07/2017     Lab review: LDL 78, TSH 1.16, hemoglobin A1c 5.5%  Imaging review: MR brain images viewed on me, shows acute scattered right MCA strokes.  Adult transthoracic echo complete    Result Date: 11/13/2023    Left ventricular systolic function is normal. Calculated left ventricular EF = 63.1%   Left ventricular diastolic function was normal.   The left atrial cavity is mild to moderately dilated.   Left atrial volume is mildly increased.   There is mild calcification of the aortic valve.   Trace to mild aortic valve regurgitation is present   Mild mitral valve regurgitation is present.     MRI Brain Without Contrast    Result Date: 11/13/2023  MRI OF THE BRAIN WITHOUT CONTRAST  CLINICAL HISTORY: Slurred speech. Left cheek drooping.  TECHNIQUE: MRI of the brain was obtained with sagittal T1, axial T1, axial FLAIR, axial T2, axial diffusion, and axial gradient echo images.  COMPARISON: Brain MRI dated 04/12/2022.  FINDINGS:  There are foci of acute infarction involving the lateral aspect of the right frontal lobe, right corona radiata,  right subinsular region, and right insular cortex that are within the right MCA distribution. Multiple small predominantly discontiguous foci of acute infarction are appreciated at these locations. As a conglomerate, these areas of acute infarction approximately measure up to 2.3 x 5.8 cm in greatest axial dimensions.  The patient is status post a right temporal parietal occipital craniotomy. A focus of encephalomalacia is noted within the right occipital lobe compatible with a chronic infarct within the right PCA distribution. This area of encephalomalacia measures up to 3.0 x 3.1 cm in greatest axial dimensions. This was also appreciated on the prior study. A smaller focus of encephalomalacia within the more inferolateral aspect of the right occipital lobe measuring up to approximately 9 mm in diameter is also compatible with a chronic infarct within the right PCA distribution. Again, this was present on the prior exam. Also, there is a focus of encephalomalacia within the left temporal lobe measuring up to 4.9 x 2.5 cm in greatest axial dimensions that is compatible with a chronic infarct within the left MCA distribution. There are moderate to severe and confluent changes of chronic small vessel ischemic phenomena. All of these findings are stable in appearance when compared to the previous exam. There is interval evolutionary change of the previously identified acute infarct within the left aspect of the naeem. Tiny subcentimeter chronic infarcts are again appreciated within the cerebellar hemispheres.  There are 4 dural based lesions along the posterior aspect the right parietal and occipital lobes most compatible with meningiomas. Largest of these lesions is seen along the posterior aspect of the right occipital lobe measuring up to approximately 1.7 x 0.8 cm in greatest axial dimensions and has similar measurements when compared to the prior study. These lesions would be much better delineated on a  contrast-enhanced examination although they appear roughly similar when compared to the prior study dated 04/12/2022.   Otherwise, the ventricles, sulci, and cisterns are age-appropriate. The major intracranial flow related signal voids are unremarkable.       There are multiple foci of predominantly discontiguous areas of acute infarction within the lateral aspect of the right frontal lobe, the right corona radiata, the right subinsular region, and the right insular cortex within the right MCA distribution.  The patient is status post a right temporoparietal occipital craniotomy. 4 small dural based lesions along the posterior aspect of the right parietal and occipital lobes compatible with small meningiomas appear similar when compared to the prior examination dated 04/12/2022 although these would be much better delineated on a contrast enhanced MRI study which could be obtained as clinically indicated.  There are foci of encephalomalacia within the right occipital lobe compatible with chronic infarcts within the right PCA distribution as well as the lateral aspect of the left temporal lobe within the left MCA distribution. These findings were also appreciated on the prior exam. A chronic pontine infarct has evolved since the prior exam. Tiny subcentimeter chronic infarcts are again appreciated within the cerebellar hemispheres.  There are moderate to severe changes of chronic small vessel ischemic phenomena.  These findings were discussed with Dr. Shaw Amaya on 11/13/2023 at approximately 11:30 a.m.  This report was finalized on 11/13/2023 12:06 PM by Dr. Juan Bishop M.D on Workstation: [id number]      CT Angiogram Neck    Result Date: 11/12/2023  NONCONTRAST CRANIAL CT SCAN, CT ANGIOGRAM NECK, CT ANGIOGRAM HEAD.  HISTORY: Slurred speech. Left lower facial droop.  COMPARISON: None..  TECHNIQUE:  Radiation dose reduction techniques were utilized, including automated exposure control and exposure modulation based  on body size. Initially, a routine noncontrast cranial CT performed from the skull base through the vertex region. After review, thin-section contrast enhanced CT angiogram images obtained from the aortic arch through the calvarial vertex utilizing angiographic technique. Multi projection 3-D MIP reformatted images were supplemented and reviewed.   FINDINGS CRANIAL CT: No acute hemorrhage or midline shift is demonstrated.. There is diffuse atrophy. There is periventricular and deep white matter microangiopathic change. Areas of encephalomalacia are noted within the right occipital lobe, superior left frontal lobe, and left temporal lobe. These were seen on prior exam from 2022. Patient also has old infarcts within the anterior limbs of the internal capsules bilaterally. There is also an old left basal ganglia infarct. There is also an old left pontine infarct. Prior study demonstrated an occlusion of the posterior inferior aspect of the superior sagittal sinus. This is a stable finding when compared to the prior exam, and is secondary to invasion of the meningioma into the sinus. Multiple meningiomas are seen within the within the right parietal and occipital lobes. These appear grossly stable when compared to the prior exam. The patient is status post right parietal craniotomy. This was for resection of a right occipital meningioma. There are areas of enhancement which are seen adjacent to the craniotomy site, which have been previously felt to be postoperative change.   CERVICAL CAROTID CT ANGIOGRAM:  FINDINGS: There is normal arch anatomy. There is calcification of the aortic arch, as well as the arch vessels. There does appear to be some narrowing of the right subclavian artery. There is plaque noted at the right carotid bifurcation. This results in narrowing in the range of 50 to 60%. In the range of the distal cervical right internal carotid artery is widely patent. There is moderate stenosis at the origin of  the right vertebral artery. There is mild to moderate stenosis at the origin of the left common carotid artery. There is calcified plaque noted at the left carotid bifurcation, which results in narrowing in the range of 30%. The distal cervical left internal carotid artery is widely patent. Moderate stenosis at the origin of the left vertebral artery is noted. The right vertebral artery is dominant. There are background emphysematous changes. The cystic structure seen posterior to the right sternocleidomastoid muscle on prior CT is again noted. This measures up to 2.3 x 1.6 cm, which is larger than on prior exam. On prior MRI, it did not demonstrate any significant enhancement, but the lesion remains indeterminate, given interval growth, cystic lymph node cannot be excluded.   NASCET criteria utilized in stenosis measurements. stenosis mild, 0-49%.    CRANIAL CTA ANGIOGRAM:  FINDINGS: The intracranial internal carotid arteries remain patent. There is a small anterior communicating artery. The middle cerebral arteries are patent bilaterally. Intracranial vertebral arteries are patent. Basilar artery is also patent there is a posterior communicating artery on the left. Posterior cerebral arteries are patent.. There is a focal moderate stenosis at the junction of the left P1 and P2.   Radiation dose reduction techniques were utilized, including automated exposure control and exposure modulation based on body size.       IMPRESSION :  1. No acute intracranial findings. Please see the body of the report for detailed findings within the brain. 2. Moderate stenoses of the origins of the vertebral arteries and right internal carotid artery. There is mild to moderate stenosis at the origin of the left common carotid artery, as well as mild narrowing of the proximal left internal carotid artery. 3. Focal moderate stenosis at the junction of the left P1 and P2. 4. Cystic lesion is seen posterior to the right  sternocleidomastoid muscle appears to have increased in size. Exact etiology remains uncertain.   Radiation dose reduction techniques were utilized, including automated exposure control and exposure modulation based on body size.   This report was finalized on 11/12/2023 8:31 PM by Dr. Paloma Chacon M.D on Workstation: [id number]      CT Angiogram Head    Result Date: 11/12/2023  NONCONTRAST CRANIAL CT SCAN, CT ANGIOGRAM NECK, CT ANGIOGRAM HEAD.  HISTORY: Slurred speech. Left lower facial droop.  COMPARISON: None..  TECHNIQUE:  Radiation dose reduction techniques were utilized, including automated exposure control and exposure modulation based on body size. Initially, a routine noncontrast cranial CT performed from the skull base through the vertex region. After review, thin-section contrast enhanced CT angiogram images obtained from the aortic arch through the calvarial vertex utilizing angiographic technique. Multi projection 3-D MIP reformatted images were supplemented and reviewed.   FINDINGS CRANIAL CT: No acute hemorrhage or midline shift is demonstrated.. There is diffuse atrophy. There is periventricular and deep white matter microangiopathic change. Areas of encephalomalacia are noted within the right occipital lobe, superior left frontal lobe, and left temporal lobe. These were seen on prior exam from 2022. Patient also has old infarcts within the anterior limbs of the internal capsules bilaterally. There is also an old left basal ganglia infarct. There is also an old left pontine infarct. Prior study demonstrated an occlusion of the posterior inferior aspect of the superior sagittal sinus. This is a stable finding when compared to the prior exam, and is secondary to invasion of the meningioma into the sinus. Multiple meningiomas are seen within the within the right parietal and occipital lobes. These appear grossly stable when compared to the prior exam. The patient is status post right parietal  craniotomy. This was for resection of a right occipital meningioma. There are areas of enhancement which are seen adjacent to the craniotomy site, which have been previously felt to be postoperative change.   CERVICAL CAROTID CT ANGIOGRAM:  FINDINGS: There is normal arch anatomy. There is calcification of the aortic arch, as well as the arch vessels. There does appear to be some narrowing of the right subclavian artery. There is plaque noted at the right carotid bifurcation. This results in narrowing in the range of 50 to 60%. In the range of the distal cervical right internal carotid artery is widely patent. There is moderate stenosis at the origin of the right vertebral artery. There is mild to moderate stenosis at the origin of the left common carotid artery. There is calcified plaque noted at the left carotid bifurcation, which results in narrowing in the range of 30%. The distal cervical left internal carotid artery is widely patent. Moderate stenosis at the origin of the left vertebral artery is noted. The right vertebral artery is dominant. There are background emphysematous changes. The cystic structure seen posterior to the right sternocleidomastoid muscle on prior CT is again noted. This measures up to 2.3 x 1.6 cm, which is larger than on prior exam. On prior MRI, it did not demonstrate any significant enhancement, but the lesion remains indeterminate, given interval growth, cystic lymph node cannot be excluded.   NASCET criteria utilized in stenosis measurements. stenosis mild, 0-49%.    CRANIAL CTA ANGIOGRAM:  FINDINGS: The intracranial internal carotid arteries remain patent. There is a small anterior communicating artery. The middle cerebral arteries are patent bilaterally. Intracranial vertebral arteries are patent. Basilar artery is also patent there is a posterior communicating artery on the left. Posterior cerebral arteries are patent.. There is a focal moderate stenosis at the junction of the  left P1 and P2.   Radiation dose reduction techniques were utilized, including automated exposure control and exposure modulation based on body size.       IMPRESSION :  1. No acute intracranial findings. Please see the body of the report for detailed findings within the brain. 2. Moderate stenoses of the origins of the vertebral arteries and right internal carotid artery. There is mild to moderate stenosis at the origin of the left common carotid artery, as well as mild narrowing of the proximal left internal carotid artery. 3. Focal moderate stenosis at the junction of the left P1 and P2. 4. Cystic lesion is seen posterior to the right sternocleidomastoid muscle appears to have increased in size. Exact etiology remains uncertain.   Radiation dose reduction techniques were utilized, including automated exposure control and exposure modulation based on body size.   This report was finalized on 11/12/2023 8:31 PM by Dr. Paloma Chacon M.D on Workstation: [id number]       Impression:  1) R MCA stroke. She has R ICA stenosis, possibly symptomatic.  Occurred on DAPT, lipitor 40 mg daily.          2) h/o left MCA and left pontine strokes  3) has a loop d/t previous strokes.     PLAN:  Will request loop interrogation and most recent device check from cardiology office  Check carotid u/s, consult vascular surgery  Continue DAPT for now.  Lipitor increased to 80 mg daily.   The patient was seen with Dr Richards. D/W her  via telephone. Will follow.

## 2023-11-14 NOTE — PROGRESS NOTES
Name: Avelina Carr ADMIT: 2023   : 1940  PCP: Goran Carr MD    MRN: [id number] LOS: 1 days   AGE/SEX: 83 y.o. female  ROOM: [facility]     Subjective   Subjective   Sitting up in the chair, eating breakfast.  Denies any new weakness or numbness, however complains of fatigue/generalized weakness in all extremities.  No headache or vision changes.  No fevers no chills.  Speech improved compared to yesterday.    Review of Systems   As above  Objective   Objective   Vital Signs  Temp:  [97.5 °F (36.4 °C)-98.6 °F (37 °C)] 97.9 °F (36.6 °C)  Heart Rate:  [56-62] 56  Resp:  [18] 18  BP: (126-173)/(53-80) 144/67  SpO2:  [93 %-95 %] 93 %  on   ;   Device (Oxygen Therapy): room air  Body mass index is 32.2 kg/m².  Physical Exam    General:, Sitting up in the chair, eating breakfast, not in distress,  CV: Regular rate and rhythm, no murmurs rubs or gallops  Lungs: Clear to auscultation bilaterally, no crackles or wheezes  Abdomen: Soft, nontender, nondistended  Extremities: No significant peripheral edema , no cyanosis   Neuro: Alert, oriented x3,+ slurred speech (slurred speech )left facial droop  Results Review     I reviewed the patient's new clinical results.  Results from last 7 days   Lab Units 23  0853 23  0523  1744   WBC 10*3/mm3 5.73 5.18 6.16   HEMOGLOBIN g/dL 13.7 11.8* 12.6   PLATELETS 10*3/mm3 288 221 254     Results from last 7 days   Lab Units 23  0853 23  0519 23  1744   SODIUM mmol/L 141 139 139   POTASSIUM mmol/L 4.5 3.8 4.2   CHLORIDE mmol/L 109* 105 104   CO2 mmol/L 17.9* 25.3 24.2   BUN mg/dL 11 12 14   CREATININE mg/dL 0.80 0.73 0.96   GLUCOSE mg/dL 140* 106* 83   Estimated Creatinine Clearance: 55.3 mL/min (by C-G formula based on SCr of 0.8 mg/dL).  Results from last 7 days   Lab Units 23  0519 23  1744   ALBUMIN g/dL 3.5 3.9   BILIRUBIN mg/dL 0.3 0.4   ALK PHOS U/L 142* 149*   AST (SGOT) U/L 25 25   ALT (SGPT) U/L 17 18      Results from last 7 days   Lab Units 11/14/23  0853 11/13/23  0519 11/12/23  1744   CALCIUM mg/dL 8.9 8.6 8.6   ALBUMIN g/dL  --  3.5 3.9   MAGNESIUM mg/dL 2.3  --  2.4   PHOSPHORUS mg/dL 3.7  --   --        COVID19   Date Value Ref Range Status   04/16/2022 Not Detected Not Detected - Ref. Range Final   04/12/2022 Not Detected Not Detected - Ref. Range Final     Hemoglobin A1C   Date/Time Value Ref Range Status   11/13/2023 0519 5.50 4.80 - 5.60 % Final     Glucose   Date/Time Value Ref Range Status   11/13/2023 0506 122 70 - 130 mg/dL Final   11/13/2023 0003 119 70 - 130 mg/dL Final   11/12/2023 1659 92 70 - 130 mg/dL Final           Adult transthoracic echo complete    Left ventricular systolic function is normal. Calculated left   ventricular EF = 63.1%    Left ventricular diastolic function was normal.    The left atrial cavity is mild to moderately dilated.    Left atrial volume is mildly increased.    There is mild calcification of the aortic valve.    Trace to mild aortic valve regurgitation is present    Mild mitral valve regurgitation is present.  MRI Brain Without Contrast  Narrative: MRI OF THE BRAIN WITHOUT CONTRAST     CLINICAL HISTORY: Slurred speech. Left cheek drooping.     TECHNIQUE: MRI of the brain was obtained with sagittal T1, axial T1,  axial FLAIR, axial T2, axial diffusion, and axial gradient echo images.     COMPARISON: Brain MRI dated 04/12/2022.     FINDINGS:     There are foci of acute infarction involving the lateral aspect of the  right frontal lobe, right corona radiata, right subinsular region, and  right insular cortex that are within the right MCA distribution.  Multiple small predominantly discontiguous foci of acute infarction are  appreciated at these locations. As a conglomerate, these areas of acute  infarction approximately measure up to 2.3 x 5.8 cm in greatest axial  dimensions.     The patient is status post a right temporal parietal occipital  craniotomy. A focus of  encephalomalacia is noted within the right  occipital lobe compatible with a chronic infarct within the right PCA  distribution. This area of encephalomalacia measures up to 3.0 x 3.1 cm  in greatest axial dimensions. This was also appreciated on the prior  study. A smaller focus of encephalomalacia within the more inferolateral  aspect of the right occipital lobe measuring up to approximately 9 mm in  diameter is also compatible with a chronic infarct within the right PCA  distribution. Again, this was present on the prior exam. Also, there is  a focus of encephalomalacia within the left temporal lobe measuring up  to 4.9 x 2.5 cm in greatest axial dimensions that is compatible with a  chronic infarct within the left MCA distribution. There are moderate to  severe and confluent changes of chronic small vessel ischemic phenomena.  All of these findings are stable in appearance when compared to the  previous exam. There is interval evolutionary change of the previously  identified acute infarct within the left aspect of the naeem. Tiny  subcentimeter chronic infarcts are again appreciated within the  cerebellar hemispheres.     There are 4 dural based lesions along the posterior aspect the right  parietal and occipital lobes most compatible with meningiomas. Largest  of these lesions is seen along the posterior aspect of the right  occipital lobe measuring up to approximately 1.7 x 0.8 cm in greatest  axial dimensions and has similar measurements when compared to the prior  study. These lesions would be much better delineated on a  contrast-enhanced examination although they appear roughly similar when  compared to the prior study dated 04/12/2022.        Otherwise, the ventricles, sulci, and cisterns are age-appropriate. The  major intracranial flow related signal voids are unremarkable.     Impression:    There are multiple foci of predominantly discontiguous areas of acute  infarction within the lateral aspect of  the right frontal lobe, the  right corona radiata, the right subinsular region, and the right insular  cortex within the right MCA distribution.     The patient is status post a right temporoparietal occipital craniotomy.  4 small dural based lesions along the posterior aspect of the right  parietal and occipital lobes compatible with small meningiomas appear  similar when compared to the prior examination dated 04/12/2022 although  these would be much better delineated on a contrast enhanced MRI study  which could be obtained as clinically indicated.     There are foci of encephalomalacia within the right occipital lobe  compatible with chronic infarcts within the right PCA distribution as  well as the lateral aspect of the left temporal lobe within the left MCA  distribution. These findings were also appreciated on the prior exam. A  chronic pontine infarct has evolved since the prior exam. Tiny  subcentimeter chronic infarcts are again appreciated within the  cerebellar hemispheres.     There are moderate to severe changes of chronic small vessel ischemic  phenomena.     These findings were discussed with Dr. Shaw Amaya on 11/13/2023 at  approximately 11:30 a.m.     This report was finalized on 11/13/2023 12:06 PM by Dr. Juan Bishop M.D on Workstation: [id number]       Scheduled Medications  aspirin, 325 mg, Oral, Daily   Or  aspirin, 300 mg, Rectal, Daily  atenolol, 25 mg, Oral, BID  atorvastatin, 80 mg, Oral, Nightly  budesonide-formoterol, 2 puff, Inhalation, BID - RT  cholecalciferol, 5,000 Units, Oral, Daily  clopidogrel, 75 mg, Oral, Daily  escitalopram, 20 mg, Oral, Daily  furosemide, 20 mg, Oral, Daily  gabapentin, 400 mg, Oral, TID  levETIRAcetam, 500 mg, Oral, BID  levothyroxine, 150 mcg, Oral, Q AM  pantoprazole, 40 mg, Oral, Daily  senna-docusate sodium, 2 tablet, Oral, BID  vitamin B-12, 1,000 mcg, Oral, Daily    Infusions     Diet  Diet: Cardiac Diets; Healthy Heart (2-3 Na+); Texture: Soft to  Chew (NDD 3); Soft to Chew: Chopped Meat; Fluid Consistency: Thin (IDDSI 0)    I have personally reviewed     [x]  Laboratory   [x]  Microbiology   [x]  Radiology   [x]  EKG/Telemetry  []  Cardiology/Vascular   []  Pathology    []  Records       Assessment/Plan     Active Hospital Problems    Diagnosis  POA    **Focal neurological deficit present [R29.818]  Yes    Acute CVA (cerebrovascular accident) [I63.9]  Yes    Primary hypertension [I10]  Yes    HLD (hyperlipidemia) [E78.5]  Yes    Cerebrovascular accident (CVA) [I63.9]  Yes    Seizure disorder, simple partial, without intractable epilepsy [G40.109]  Yes      Resolved Hospital Problems   No resolved problems to display.     Patient is a 83-year-old female with a history of including but not limited to CVA, seizure, hypertension, hyperlipidemia, presents to the ED with left facial droop and speech disturbance which was noticed by spouse.    Right MCA stroke  -Patient with history of left MCA stroke status post mechanical [id number] left pontine stroke in 2022  -Primary symptoms left facial droop and slurred speech  -TSH normal, hemoglobin A1c normal, lipid panel showed total cholesterol 143, LDL 78  -CTA head and neck.Moderate stenoses of the origins of the vertebral arteries and right  internal carotid artery. There is mild to moderate stenosis at the origin of the left common carotid artery, as well as mild narrowing of the proximal left internal carotid artery.3. Focal moderate stenosis at the junction of the left P1 and P2.  -Follow-up MRI showed there are multiple foci of predominantly discontiguous areas of acute infarction within the lateral aspect of the right frontal lobe, the ight corona radiata, the right subinsular region, the right insular cortex within the right MCA distribution.  -Continue dual antiplatelet with Plavix, aspirin, continue atorvastatin.  -Echocardiogram showed normal EF of 63.1%, normal left ventricular systolic and diastolic  function, patient had negative saline test last year per her report  -Concern for symptomatic right ICA stenosis causing stroke.  Vascular surgery consulted  -Carotid ultrasound pending per neurology  -Patient has loop recorder due to previous stroke, neurology to request loop interrogation    Seizure disorder: Continue Keppra 500 g twice daily        Cystic lesion: CT scan showed cystic lesio in the r to the right sternocleidomastoid muscle appears to have increased in size.   Follow-up outpatient for further work-up if desired by patient/family.      Hypothyroidism: Continue home levothyroxine, TSH normal      Essential hypertension: BP acutely stable, continue atenolol       COPD: Not in exacerbation, continue Symbicort,        SCDs for DVT prophylaxis.  CODE STATUS: DNR/DNI  Discussed with patient.  Anticipate discharge       Copied text in this note has been reviewed and is accurate as of 11/14/23.         Dictated utilizing Dragon dictation        Lucero Royal MD  [facility]  11/14/23  14:27 EST

## 2023-11-14 NOTE — THERAPY EVALUATION
Patient Name: Avelina Carr  : 1940    MRN: [id number]                              Today's Date: 2023       Admit Date: 2023    Visit Dx:     ICD-10-CM ICD-9-CM   1. Focal neurological deficit present  R29.818 781.99   2. Hypertension, unspecified type  I10 401.9     Patient Active Problem List   Diagnosis    Postmastectomy lymphedema syndrome    Malignant neoplasm of upper-outer quadrant of right female breast    Cerebrovascular accident (CVA)    Stroke    Primary hypertension    HLD (hyperlipidemia)    Adverse effect of antiplatelet agent    Left middle cerebral artery stroke    Carcinoma of central portion of right breast in female, estrogen receptor positive    Chronic bilateral low back pain with right-sided sciatica    History of lumbar surgery    Seizure disorder, simple partial, without intractable epilepsy    Spinal stenosis of lumbar region with neurogenic claudication    Status post craniectomy    History of total right knee replacement    Other forms of scoliosis, lumbar region    DDD (degenerative disc disease), lumbar    Benign meningioma of brain    Squamous cell carcinoma of leg, left    History of cardioembolic cerebrovascular accident (CVA)    Hypothyroid    Depression    COPD (chronic obstructive pulmonary disease)    Obstructive sleep apnea    Squamous cell carcinoma of leg, right    Pain in joint, multiple sites    Excessive daytime sleepiness    Former smoker    Gastroesophageal reflux disease    Migraines    Generalized weakness    Acute CVA (cerebrovascular accident)    Dependence on supplemental oxygen    Paroxysmal atrial fibrillation    Paroxysmal supraventricular tachycardia    Arthritis    Asthma    Focal neurological deficit present     Past Medical History:   Diagnosis Date    Anxiety     Arthritis     Breast cancer     COPD (chronic obstructive pulmonary disease)     oxygen at 2L aths    COVID     CVD (cardiovascular disease)     Depression     Hx of radiation  "therapy     Hypothyroid     Macular degeneration     Peripheral neuropathy     Stroke     Vertigo      Past Surgical History:   Procedure Laterality Date    ADRENAL GLAND SURGERY      BRAIN MENINGIOMA EXCISION      occipital    BREAST BIOPSY       SECTION      EMBOLECTOMY N/A 11/15/2017    Procedure: Embolectomy Mechanical;  Surgeon: Rachid Figueroa MD;  Location: [facility] ;  Service:     MASTECTOMY  2014    RECTAL SURGERY  2003    REPLACEMENT TOTAL KNEE Right     TOTAL SHOULDER REPLACEMENT      x2      General Information       Row Name 23 1508          OT Time and Intention    Document Type evaluation  -     Mode of Treatment occupational therapy;individual therapy  -       Row Name 23 1508          General Information    Patient Profile Reviewed yes  -SM     Prior Level of Function independent:;ADL's;all household mobility  pt reports she has been less active of the past few weeks/months, hasnt been walking outside much, pt cooks, sews, ect.  -     Existing Precautions/Restrictions fall  -       Row Name 23 1508          Occupational Profile    Environmental Supports and Barriers (Occupational Profile) Pt uses a rwx for mobility, has a shower chair, grab bars. She reports \"I have everything I need\"  -       Row Name 23 1508          Living Environment    People in Home spouse  -       Row Name 23 1508          Cognition    Orientation Status (Cognition) oriented x 4  speech is slurred and slow  -       Row Name 23 1508          Safety Issues, Functional Mobility    Impairments Affecting Function (Mobility) balance;endurance/activity tolerance;strength;coordination  -               User Key  (r) = Recorded By, (t) = Taken By, (c) = Cosigned By      Initials Name Provider Type     Yadira Mojica, OT Occupational Therapist                     Mobility/ADL's       Row Name 23 1509          Transfers    Transfers sit-stand " "transfer;toilet transfer  -[facility] Name 11/14/23 1509          Sit-Stand Transfer    Sit-Stand [place] (Transfers) contact guard  -     Assistive Device (Sit-Stand Transfers) walker, front-wheeled  -[facility] Name 11/14/23 1509          Toilet Transfer    [place] Level (Toilet Transfer) contact guard  -     Assistive Device (Toilet Transfer) walker, front-wheeled;grab bars/safety frame  -[facility] Name 11/14/23 1509          Functional Mobility    Functional Mobility- Ind. Level contact guard assist  -     Functional Mobility- Device walker, front-wheeled  -     Functional Mobility-Distance (Feet) --  25  -     Functional Mobility- Comment slow pace  -[facility] Name 11/14/23 1509          Activities of Daily Living    BADL Assessment/Intervention lower body dressing;toileting;grooming;feeding  -[facility] Name 11/14/23 1509          Lower Body Dressing Assessment/Training    [place] Level (Lower Body Dressing) doff;don;socks;moderate assist (50% patient effort)  -     Position (Lower Body Dressing) supported sitting  -[facility] Name 11/14/23 1509          Toileting Assessment/Training    [place] Level (Toileting) contact guard assist  -[facility] Name 11/14/23 1509          Grooming Assessment/Training    [place] Level (Grooming) wash face, hands;standby assist  -     Position (Grooming) sink side;supported standing  -[facility] Name 11/14/23 1509          Self-Feeding Assessment/Training    [place] Level (Feeding) set up;feeding skills  -     Position (Self-Feeding) supported sitting  -               User Key  (r) = Recorded By, (t) = Taken By, (c) = Cosigned By      Initials Name Provider Type    Yadira Schwarz OT Occupational Therapist                   Obj/Interventions       Row Name 11/14/23 1511          Sensory Assessment (Somatosensory)    Sensory Assessment intermitten \"buzzing\" RUE which pt reports ongoing at baseline \" a few " "months\"  -SM       Row Name 11/14/23 1511          Vision Assessment/Intervention    Visual Impairment/Limitations WFL  -SM       Row Name 11/14/23 1511          Range of Motion Comprehensive    General Range of Motion bilateral upper extremity ROM WFL  -SM       Row Name 11/14/23 1511          Strength Comprehensive (MMT)    Comment, General Manual Muscle Testing (MMT) Assessment L shoulder slightly weaker with MMT which pt reports is a result of hx of shoulder surgery, L  also appears slightly weaker. RUE 4+/5, LUE 4-/5  -SM       Row Name 11/14/23 1511          Motor Skills    Motor Skills coordination  -     Coordination bilateral;upper extremity;minimal impairment;finger to nose  pt reports some baseline BUE coordination deficits but feels it is currently worse, pt reports some unsteadiness eariler with lunch and with opening containers  -SM       Row Name 11/14/23 1511          Balance    Balance Assessment standing static balance;standing dynamic balance  -     Static Standing Balance standby assist  -     Dynamic Standing Balance contact guard  -     Position/Device Used, Standing Balance supported;walker, rolling  -               User Key  (r) = Recorded By, (t) = Taken By, (c) = Cosigned By      Initials Name Provider Type     Yadira Mojica OT Occupational Therapist                   Goals/Plan       Row Name 11/14/23 1520          Transfer Goal 1 (OT)    Activity/Assistive Device (Transfer Goal 1, OT) toilet  -SM     [place] Level/Cues Needed (Transfer Goal 1, OT) supervision required  -SM     Time Frame (Transfer Goal 1, OT) short term goal (STG);2 weeks  -SM     Progress/Outcome (Transfer Goal 1, OT) goal ongoing  -SM       Row Name 11/14/23 1520          Bathing Goal 1 (OT)    Activity/Device (Bathing Goal 1, OT) bathing skills, all  -SM     [place] Level/Cues Needed (Bathing Goal 1, OT) supervision required  -SM     Time Frame (Bathing Goal 1, OT) short term goal (STG);2 " weeks  -SM     Progress/Outcomes (Bathing Goal 1, OT) goal ongoing  -       Row Name 11/14/23 1520          Dressing Goal 1 (OT)    Activity/Device (Dressing Goal 1, OT) dressing skills, all  -SM     [place]/Cues Needed (Dressing Goal 1, OT) supervision required  -SM     Time Frame (Dressing Goal 1, OT) short term goal (STG);2 weeks  -SM     Progress/Outcome (Dressing Goal 1, OT) goal ongoing  -       Row Name 11/14/23 1520          Toileting Goal 1 (OT)    Activity/Device (Toileting Goal 1, OT) toileting skills, all  -SM     [place] Level/Cues Needed (Toileting Goal 1, OT) supervision required  -SM     Time Frame (Toileting Goal 1, OT) short term goal (STG);2 weeks  -SM     Progress/Outcome (Toileting Goal 1, OT) goal ongoing  -[facility] Name 11/14/23 1520          Problem Specific Goal 1 (OT)    Problem Specific Goal 1 (OT) Pt to be independent with clothening fastener management for ADLs with min-no difficulty.  -SM     Time Frame (Problem Specific Goal 1, OT) short term goal (STG);2 weeks  -SM     Progress/Outcome (Problem Specific Goal 1, OT) goal ongoing  -[facility] Name 11/14/23 1520          Therapy Assessment/Plan (OT)    Planned Therapy Interventions (OT) activity tolerance training;neuromuscular control/coordination retraining;patient/caregiver education/training;transfer/mobility retraining;ROM/therapeutic exercise;strengthening exercise;functional balance retraining;occupation/activity based interventions  -SM               User Key  (r) = Recorded By, (t) = Taken By, (c) = Cosigned By      Initials Name Provider Type    Yadira Schwarz, OT Occupational Therapist                   Clinical Impression       Row Name 11/14/23 1517          Pain Assessment    Pretreatment Pain Rating 0/10 - no pain  -SM     Posttreatment Pain Rating 0/10 - no pain  -SM       Row Name 11/14/23 1513          Plan of Care Review    Plan of Care Reviewed With patient  -SM     Outcome Evaluation Pt is a  "83 y.o female admitted to [facility] with onset of slurred speech and L facial drop that occured while she was hosting a dinner party. Pt with hx of \"4 previous strokes\" with intervention. MRI brain showed right MCA strokes involving the right frontal lobe, corona radiata, and insular cortex. Pt reports she has baseline balance, coordination, speech deficits from previous strokes but is sucessful with mobility/ADLs using rwx and denies any hx of falls. Pt is slow to complete all movements today, which was also reported to be observed by spouse in ED notes. Pt reports unsteady with all [facility] tasks today. She reports she feels off her baseline. She may benefit from continued OT to address ADLs, balance, coordination, safety. Recommend home with HH at [place].  -       Row Name 11/14/23 1513          Therapy Assessment/Plan (OT)    Rehab Potential (OT) good, to achieve stated therapy goals  -     Criteria for Skilled Therapeutic Interventions Met (OT) yes;skilled treatment is necessary  -     Therapy Frequency (OT) 3 times/wk  -       Row Name 11/14/23 1513          Therapy Plan Review/Discharge Plan (OT)    Anticipated Discharge Disposition (OT) home with home health;home with assist  -       Row Name 11/14/23 1513          Positioning and Restraints    Pre-Treatment Position sitting in chair/recliner  -     Post Treatment Position chair  -SM     In Chair reclined;call light within reach;encouraged to call for assist;exit alarm on;notified nsg  -               User Key  (r) = Recorded By, (t) = Taken By, (c) = Cosigned By      Initials Name Provider Type    Yadira Schwarz, OT Occupational Therapist                   Outcome Measures       Row Name 11/14/23 1522          How much help from another is currently needed...    Putting on and taking off regular lower body clothing? 3  -SM     Bathing (including washing, rinsing, and drying) 3  -SM     Toileting (which includes using toilet bed pan or urinal) 3  -SM  "    Putting on and taking off regular upper body clothing 3  -SM     Taking care of personal grooming (such as brushing teeth) 3  -SM     Eating meals 3  -SM     AM-PAC 6 Clicks Score (OT) 18  -       Row Name 11/14/23 0958          How much help from another person do you currently need...    Turning from your back to your side while in flat bed without using bedrails? 3  -BC     Moving from lying on back to sitting on the side of a flat bed without bedrails? 3  -BC     Moving to and from a bed to a chair (including a wheelchair)? 2  -BC     Standing up from a chair using your arms (e.g., wheelchair, bedside chair)? 2  -BC     Climbing 3-5 steps with a railing? 2  -BC     To walk in hospital room? 2  -BC     AM-PAC 6 Clicks Score (PT) 14  -BC     Highest Level of Mobility Goal 4 --> Transfer to chair/commode  -BC       Row Name 11/14/23 1522          Modified [place] Scale    Modified [place] Scale 4 - Moderately severe disability.  Unable to walk without assistance, and unable to attend to own bodily needs without assistance.  -       Row Name 11/14/23 1522          Functional Assessment    Outcome Measure Options AM-PAC 6 Clicks Daily Activity (OT);Modified [place]  -               User Key  (r) = Recorded By, (t) = Taken By, (c) = Cosigned By      Initials Name Provider Type    Yadira Schwarz OT Occupational Therapist    BC Helen Roach, RN Registered Nurse                    Occupational Therapy Education       Title: PT OT SLP Therapies (In Progress)       Topic: Occupational Therapy (In Progress)       Point: ADL training (Done)       Description:   Instruct learner(s) on proper safety adaptation and remediation techniques during self care or transfers.   Instruct in proper use of assistive devices.                  Learning Progress Summary             Patient Acceptance, E, VU by TAMMIE at 11/14/2023 1522    Comment: safe technique for ADLs, OT goals/POC                         Point: Home exercise  "program (Not Started)       Description:   Instruct learner(s) on appropriate technique for monitoring, assisting and/or progressing therapeutic exercises/activities.                  Learner Progress:  Not documented in this visit.              Point: Precautions (Not Started)       Description:   Instruct learner(s) on prescribed precautions during self-care and functional transfers.                  Learner Progress:  Not documented in this visit.              Point: Body mechanics (Not Started)       Description:   Instruct learner(s) on proper positioning and spine alignment during self-care, functional mobility activities and/or exercises.                  Learner Progress:  Not documented in this visit.                              User Key       Initials Effective Dates Name Provider Type Discipline     04/02/20 -  Yadira Mojica OT Occupational Therapist OT                  OT Recommendation and Plan  Planned Therapy Interventions (OT): activity tolerance training, neuromuscular control/coordination retraining, patient/caregiver education/training, transfer/mobility retraining, ROM/therapeutic exercise, strengthening exercise, functional balance retraining, occupation/activity based interventions  Therapy Frequency (OT): 3 times/wk  Plan of Care Review  Plan of Care Reviewed With: patient  Outcome Evaluation: Pt is a 83 y.o female admitted to [facility] with onset of slurred speech and L facial drop that occured while she was hosting a dinner party. Pt with hx of \"4 previous strokes\" with intervention. MRI brain showed right MCA strokes involving the right frontal lobe, corona radiata, and insular cortex. Pt reports she has baseline balance, coordination, speech deficits from previous strokes but is sucessful with mobility/ADLs using rwx and denies any hx of falls. Pt is slow to complete all movements today, which was also reported to be observed by spouse in ED notes. Pt reports unsteady with all [facility] tasks " today. She reports she feels off her baseline. She may benefit from continued OT to address ADLs, balance, coordination, safety. Recommend home with HH at [place].     Time Calculation:   Evaluation Complexity (OT)  Review Occupational Profile/Medical/Therapy History Complexity: expanded/moderate complexity  Assessment, Occupational Performance/Identification of Deficit Complexity: 3-5 performance deficits  Clinical Decision Making Complexity (OT): detailed assessment/moderate complexity  Overall Complexity of Evaluation (OT): moderate complexity     Time Calculation- OT       Row Name 11/14/23 1522             Time Calculation- OT    OT Start Time 1342  -SM      OT Stop Time 1402  -SM      OT Time Calculation (min) 20 min  -SM      Total Timed Code Minutes- OT 10 minute(s)  -SM      OT Received On 11/14/23  -      OT - Next Appointment 11/16/23  -      OT Goal Re-Cert Due Date 11/28/23  -         Timed Charges    28760 - OT Self Care/Mgmt Minutes 10  -SM         Untimed Charges    OT Eval/Re-eval Minutes 10  -SM         Total Minutes    Timed Charges Total Minutes 10  -SM      Untimed Charges Total Minutes 10  -SM       Total Minutes 20  -SM                User Key  (r) = Recorded By, (t) = Taken By, (c) = Cosigned By      Initials Name Provider Type     Yadira Mojica, OT Occupational Therapist                  Therapy Charges for Today       Code Description Service Date Service Provider Modifiers Qty    [id number] HC OT SELF CARE/MGMT/TRAIN EA 15 MIN 11/14/2023 Yadira Mojica OT GO 1    [id number]  OT EVAL MOD COMPLEXITY 3 11/14/2023 Yadira Mojica OT GO 1                 Yadira Mojica OT  11/14/2023

## 2023-11-14 NOTE — PLAN OF CARE
Goal Outcome Evaluation:  Plan of Care Reviewed With: patient           Outcome Evaluation: Pt is an 84 yo F admitted from home with L facial droop and speech disturbance. Pt with hx of BPPV and CVA with work-up revealing new R MCA stroke. Pt lives with her  and reports independence at BL with a rollator. Pt has 2 LUIS CARLOS with no steps inside and denies recent falls hx. Pt presents to PT with impaired strength, endurance, and balance limiting overall mobility. Pt transferred to EOB with SBA and stood with CGA. Pt ambulated 200ft with CGA and rwx. Cued for upright posture and keeping rwx closer to her. Noted impaired L foot clearance with fatigue and L toe out throughout mobility - pt able to correct both with cueing but unable to maintain. Pt reports near BL function, but ambulating significantly slower than her normal. Pt returned to room and demo'd independence with toileting tasks, left sitting [employer] for dinner. PT will continue to follow to progress mobility as tolerated. Anticipate DC home with HHPT and family assist.      Anticipated Discharge Disposition (PT): home with assist, home with home health

## 2023-11-14 NOTE — PLAN OF CARE
Problem: Hypertension Acute  Goal: Blood Pressure Within Desired Range  Outcome: Ongoing, Progressing     Problem: Functional Ability Impaired (Stroke, Ischemic/Transient Ischemic Attack)  Goal: Optimal Functional Ability  Outcome: Ongoing, Progressing   Goal Outcome Evaluation:

## 2023-11-14 NOTE — THERAPY EVALUATION
Patient Name: Avelina Carr  : 1940    MRN: [id number]                              Today's Date: 2023       Admit Date: 2023    Visit Dx:     ICD-10-CM ICD-9-CM   1. Focal neurological deficit present  R29.818 781.99   2. Hypertension, unspecified type  I10 401.9     Patient Active Problem List   Diagnosis    Postmastectomy lymphedema syndrome    Malignant neoplasm of upper-outer quadrant of right female breast    Cerebrovascular accident (CVA)    Stroke    Primary hypertension    HLD (hyperlipidemia)    Adverse effect of antiplatelet agent    Left middle cerebral artery stroke    Carcinoma of central portion of right breast in female, estrogen receptor positive    Chronic bilateral low back pain with right-sided sciatica    History of lumbar surgery    Seizure disorder, simple partial, without intractable epilepsy    Spinal stenosis of lumbar region with neurogenic claudication    Status post craniectomy    History of total right knee replacement    Other forms of scoliosis, lumbar region    DDD (degenerative disc disease), lumbar    Benign meningioma of brain    Squamous cell carcinoma of leg, left    History of cardioembolic cerebrovascular accident (CVA)    Hypothyroid    Depression    COPD (chronic obstructive pulmonary disease)    Obstructive sleep apnea    Squamous cell carcinoma of leg, right    Pain in joint, multiple sites    Excessive daytime sleepiness    Former smoker    Gastroesophageal reflux disease    Migraines    Generalized weakness    Acute CVA (cerebrovascular accident)    Dependence on supplemental oxygen    Paroxysmal atrial fibrillation    Paroxysmal supraventricular tachycardia    Arthritis    Asthma    Focal neurological deficit present     Past Medical History:   Diagnosis Date    Anxiety     Arthritis     Breast cancer     COPD (chronic obstructive pulmonary disease)     oxygen at 2L aths    COVID     CVD (cardiovascular disease)     Depression     Hx of radiation  therapy     Hypothyroid     Macular degeneration     Peripheral neuropathy     Stroke     Vertigo      Past Surgical History:   Procedure Laterality Date    ADRENAL GLAND SURGERY      BRAIN MENINGIOMA EXCISION      occipital    BREAST BIOPSY       SECTION      EMBOLECTOMY N/A 11/15/2017    Procedure: Embolectomy Mechanical;  Surgeon: Rachid Figueroa MD;  Location: [facility] ;  Service:     MASTECTOMY  2014    RECTAL SURGERY  2003    REPLACEMENT TOTAL KNEE Right     TOTAL SHOULDER REPLACEMENT      x2      General Information       [facility] Name 23 171          Physical Therapy Time and Intention    Document Type evaluation  -     Mode of Treatment individual therapy;physical therapy  -       Row Name 23 171          General Information    Patient Profile Reviewed yes  -     Prior Level of Function independent:;gait;transfer;bed mobility  -     Existing Precautions/Restrictions fall  -     Barriers to Rehab none identified  -[facility] Name 23          Living Environment    People in Home spouse  -[facility] Name 23 171          Home Main Entrance    Number of Stairs, Main Entrance two  -[facility] Name 23 171          Stairs Within Home, Primary    Number of Stairs, Within Home, Primary none  -[facility] Name 23          Cognition    Orientation Status (Cognition) oriented x 4  -[facility] Name 23 171          Safety Issues, Functional Mobility    Impairments Affecting Function (Mobility) balance;endurance/activity tolerance;strength;coordination  -               User Key  (r) = Recorded By, (t) = Taken By, (c) = Cosigned By      Initials Name Provider Type     Helen Miguel PT Physical Therapist                   Mobility       Row Name 23          Bed Mobility    Bed Mobility supine-sit  -     Supine-Sit [place] (Bed Mobility) standby assist  -     Assistive Device (Bed Mobility) bed  rails;head of bed elevated  -     Comment, (Bed Mobility) Increased time to complete  -[facility] Name 11/14/23 1712          Sit-Stand Transfer    Sit-Stand [place] (Transfers) contact guard;verbal cues  -     Assistive Device (Sit-Stand Transfers) walker, front-wheeled  -[facility] Name 11/14/23 1712          Gait/Stairs (Locomotion)    [place] Level (Gait) verbal cues;contact guard  -     Assistive Device (Gait) walker, front-wheeled  -     Distance in Feet (Gait) 200ft  -     Deviations/Abnormal Patterns (Gait) antalgic;zabrina decreased;gait speed decreased;stride length decreased  -     Bilateral Gait Deviations forward flexed posture  -     Left Sided Gait Deviations weight shift ability decreased;heel strike decreased  L toe out  -     Comment, (Gait/Stairs) Impaired L foot clearance with fatigue  -               User Key  (r) = Recorded By, (t) = Taken By, (c) = Cosigned By      Initials Name Provider Type     Helen Miguel, PT Physical Therapist                   Obj/Interventions       [facility] Name 11/14/23 1713          Range of Motion Comprehensive    General Range of Motion bilateral lower extremity ROM WFL  -BH       Row Name 11/14/23 1713          Strength Comprehensive (MMT)    General Manual Muscle Testing (MMT) Assessment lower extremity strength deficits identified  -     Comment, General Manual Muscle Testing (MMT) Assessment Generalized weakness, RLE 4/5, LLE grossly 3+/5  -[facility] Name 11/14/23 1713          Balance    Balance Assessment sitting static balance;sitting dynamic balance;standing static balance;standing dynamic balance  -     Static Sitting Balance standby assist  -     Dynamic Sitting Balance standby assist  -     Position, Sitting Balance unsupported;sitting edge of bed  -     Static Standing Balance standby assist  -     Dynamic Standing Balance contact guard  -     Position/Device Used, Standing Balance supported;walker,  front-wheeled  -     Balance Interventions sitting;standing;sit to stand;supported;static;dynamic  -       Row Name 11/14/23 1713          Sensory Assessment (Somatosensory)    Sensory Assessment (Somatosensory) LE sensation intact  -               User Key  (r) = Recorded By, (t) = Taken By, (c) = Cosigned By      Initials Name Provider Type     Helen Miguel, PT Physical Therapist                   Goals/Plan       Row Name 11/14/23 1722          Bed Mobility Goal 1 (PT)    Activity/Assistive Device (Bed Mobility Goal 1, PT) bed mobility activities, all  -     [place] Level/Cues Needed (Bed Mobility Goal 1, PT) modified independence  -     Time Frame (Bed Mobility Goal 1, PT) 10 days  -[facility] Name 11/14/23 1722          Transfer Goal 1 (PT)    Activity/Assistive Device (Transfer Goal 1, PT) transfers, all  -     [place] Level/Cues Needed (Transfer Goal 1, PT) modified independence  -     Time Frame (Transfer Goal 1, PT) 10 days  -[facility] Name 11/14/23 1722          Gait Training Goal 1 (PT)    Activity/Assistive Device (Gait Training Goal 1, PT) gait (walking locomotion)  -     [place] Level (Gait Training Goal 1, PT) modified independence  -     Distance (Gait Training Goal 1, PT) 200ft  -     Time Frame (Gait Training Goal 1, PT) 10 days  -[facility] Name 11/14/23 1722          Therapy Assessment/Plan (PT)    Planned Therapy Interventions (PT) balance training;bed mobility training;gait training;home exercise program;patient/family education;strengthening;transfer training  -               User Key  (r) = Recorded By, (t) = Taken By, (c) = Cosigned By      Initials Name Provider Type     Helen Miguel PT Physical Therapist                   Clinical Impression       Row Name 11/14/23 1714          Pain    Pretreatment Pain Rating 0/10 - no pain  -     Posttreatment Pain Rating 0/10 - no pain  -[facility] Name 11/14/23 1714          Plan of Care  Review    Plan of Care Reviewed With patient  -     Outcome Evaluation Pt is an 84 yo F admitted from home with L facial droop and speech disturbance. Pt with hx of BPPV and CVA with work-up revealing new R MCA stroke. Pt lives with her  and reports independence at BL with a rollator. Pt has 2 LUIS CARLOS with no steps inside and denies recent falls hx. Pt presents to PT with impaired strength, endurance, and balance limiting overall mobility. Pt transferred to EOB with SBA and stood with CGA. Pt ambulated 200ft with CGA and rwx. Cued for upright posture and keeping rwx closer to her. Noted impaired L foot clearance with fatigue and L toe out throughout mobility - pt able to correct both with cueing but unable to maintain. Pt reports near BL function, but ambulating significantly slower than her normal. Pt returned to room and demo'd independence with toileting tasks, left sitting UIC for dinner. PT will continue to follow to progress mobility as tolerated. Anticipate DC home with HHPT and family assist.  -       Row Name 11/14/23 1714          Therapy Assessment/Plan (PT)    Patient/Family Therapy Goals Statement (PT) Return to [facility]  -     Rehab Potential (PT) good, to achieve stated therapy goals  -     Criteria for Skilled Interventions Met (PT) yes  -     Therapy Frequency (PT) 5 times/wk  -       Row Name 11/14/23 1714          Vital Signs    O2 Delivery Pre Treatment room air  -     O2 Delivery Intra Treatment room air  -     O2 Delivery Post Treatment room air  -       Row Name 11/14/23 1714          Positioning and Restraints    Pre-Treatment Position in bed  -     Post Treatment Position chair  -     In Chair notified nsg;reclined;call light within reach;encouraged to call for assist;exit alarm on  -               User Key  (r) = Recorded By, (t) = Taken By, (c) = Cosigned By      Initials Name Provider Type     Helen Miguel, PT Physical Therapist                   Outcome  Measures       Row Name 11/14/23 1723 11/14/23 0958       How much help from another person do you currently need...    Turning from your back to your side while in flat bed without using bedrails? 4  - 3  -BC    Moving from lying on back to sitting on the side of a flat bed without bedrails? 3  - 3  -BC    Moving to and from a bed to a chair (including a wheelchair)? 3  - 2  -BC    Standing up from a chair using your arms (e.g., wheelchair, bedside chair)? 3  - 2  -BC    Climbing 3-5 steps with a railing? 3  - 2  -BC    To walk in hospital room? 3  - 2  -BC    AM-PAC 6 Clicks Score (PT) 19  - 14  -BC    Highest Level of Mobility Goal 6 --> Walk 10 steps or more  - 4 --> Transfer to chair/commode  -BC      Row Name 11/14/23 1522          Modified Lane Scale    Modified Jose Scale 4 - Moderately severe disability.  Unable to walk without assistance, and unable to attend to own bodily needs without assistance.  -       Row Name 11/14/23 1723 11/14/23 1522       Functional Assessment    Outcome Measure Options AM-PAC 6 Clicks Basic Mobility (PT)  - AM-PAC 6 Clicks Daily Activity (OT);Modified Lane  -              User Key  (r) = Recorded By, (t) = Taken By, (c) = Cosigned By      Initials Name Provider Type    Yadira Schwarz OT Occupational Therapist     Helen Miguel, PT Physical Therapist    Helen Alvarez RN Registered Nurse                                 Physical Therapy Education       Title: PT OT SLP Therapies (In Progress)       Topic: Physical Therapy (Done)       Point: Mobility training (Done)       Learning Progress Summary             Patient Acceptance, TB,E,D, VU,NR by  at 11/14/2023 1723                         Point: Home exercise program (Done)       Learning Progress Summary             Patient Acceptance, TB,E,D, VU,NR by  at 11/14/2023 1723                         Point: Body mechanics (Done)       Learning Progress Summary             Patient  Acceptance, TB,E,D, VU,NR by  at 11/14/2023 1723                         Point: Precautions (Done)       Learning Progress Summary             Patient Acceptance, TB,E,D, VU,NR by  at 11/14/2023 1723                                         User Key       Initials Effective Dates Name Provider Type Discipline     04/08/22 -  Helen Miguel, PT Physical Therapist PT                  PT Recommendation and Plan  Planned Therapy Interventions (PT): balance training, bed mobility training, gait training, home exercise program, patient/family education, strengthening, transfer training  Plan of Care Reviewed With: patient  Outcome Evaluation: Pt is an 84 yo F admitted from home with L facial droop and speech disturbance. Pt with hx of BPPV and CVA with work-up revealing new R MCA stroke. Pt lives with her  and reports independence at BL with a rollator. Pt has 2 LUIS CARLOS with no steps inside and denies recent falls hx. Pt presents to PT with impaired strength, endurance, and balance limiting overall mobility. Pt transferred to EOB with SBA and stood with CGA. Pt ambulated 200ft with CGA and rwx. Cued for upright posture and keeping rwx closer to her. Noted impaired L foot clearance with fatigue and L toe out throughout mobility - pt able to correct both with cueing but unable to maintain. Pt reports near BL function, but ambulating significantly slower than her normal. Pt returned to room and demo'd independence with toileting tasks, left sitting UIC for dinner. PT will continue to follow to progress mobility as tolerated. Anticipate DC home with HHPT and family assist.     Time Calculation:   PT Evaluation Complexity  History, PT Evaluation Complexity: 1-2 personal factors and/or comorbidities  Examination of Body Systems (PT Eval Complexity): total of 3 or more elements  Clinical Presentation (PT Evaluation Complexity): evolving  Clinical Decision Making (PT Evaluation Complexity): moderate complexity  Overall  Complexity (PT Evaluation Complexity): moderate complexity     PT Charges       Row Name 11/14/23 1724             Time Calculation    Start Time 1628  -      Stop Time 1652  -      Time Calculation (min) 24 min  -      PT Received On 11/14/23  -      PT - Next Appointment 11/15/23  -      PT Goal Re-Cert Due Date 11/24/23  -         Time Calculation- PT    Total Timed Code Minutes- PT 20 minute(s)  -         Timed Charges    25336 - Gait Training Minutes  12  -      46483 - PT Therapeutic Activity Minutes 8  -         Untimed Charges    PT Eval/Re-eval Minutes 4  -         Total Minutes    Timed Charges Total Minutes 20  -BH      Untimed Charges Total Minutes 4  -BH       Total Minutes 24  -                User Key  (r) = Recorded By, (t) = Taken By, (c) = Cosigned By      Initials Name Provider Type     Helen Miguel, PT Physical Therapist                  Therapy Charges for Today       Code Description Service Date Service Provider Modifiers Qty    05379584484 HC GAIT TRAINING EA 15 MIN 11/14/2023 Helen Miguel, PT GP 1    [id number] HC PT EVAL MOD COMPLEXITY 3 11/14/2023 eHlen Miguel, PT GP 1            PT G-Codes  Outcome Measure Options: AM-PAC 6 Clicks Basic Mobility (PT)  AM-PAC 6 Clicks Score (PT): 19  AM-PAC 6 Clicks Score (OT): 18  Modified Jose Scale: 4 - Moderately severe disability.  Unable to walk without assistance, and unable to attend to own bodily needs without assistance.  PT Discharge Summary  Anticipated Discharge Disposition (PT): home with assist, home with home health    Helen Miguel PT  11/14/2023

## 2023-11-14 NOTE — PROGRESS NOTES
Discharge Planning Assessment  [facility]     Patient Name: Avelina Carr  MRN: [id number]  Today's Date: 11/14/2023    Admit Date: 11/12/2023    Plan: Home with , follow for needs   Discharge Needs Assessment       Row Name 11/14/23 1324       Living Environment    People in Home spouse    Name(s) of People in Home Julien Haider [phone]    Current Living Arrangements home    Primary Care Provided by self    Provides Primary Care For no one    Family Caregiver if Needed spouse    Quality of Family Relationships helpful;involved;supportive    Able to Return to Prior Arrangements yes       Resource/Environmental Concerns    Resource/Environmental Concerns none       Transition Planning    Patient/Family Anticipates Transition to home with family    Transportation Anticipated family or friend will provide       Discharge Needs Assessment    Equipment Currently Used at Home walker, rolling;cpap;grab bar    Concerns to be Addressed discharge planning    Discharge Coordination/Progress Pending                   Discharge Plan       Row Name 11/14/23 1325       Plan    Plan Home with , follow for needs    Patient/Family in Agreement with Plan yes    Plan Comments CCP met with pt at bedside to discuss d/c planning. Pt resides with her  in a two level home in which she accesses only main level, uses walker, cpap, and bed rail, and is unable to recall HH agency used in the past. Pt reports [facility]. PT/OT evals pending. CCP to follow for needs. Genesis Maxwell LCSW                  Continued Care and Services - Admitted Since 11/12/2023    Coordination has not been started for this encounter.       Expected Discharge Date and Time       Expected Discharge Date Expected Discharge Time    Nov 15, 2023            Demographic Summary       Row Name 11/14/23 1322       General Information    Admission Type inpatient    Arrived From home    Required Notices  Provided Important Message from Medicare    Referral Source admission list    Reason for Consult discharge planning    Preferred Language English                   Functional Status       Row Name 11/14/23 6085       Functional Status    Usual Activity Tolerance good    Current Activity Tolerance moderate       Functional Status, IADL    Medications assistive equipment and person    Meal Preparation assistive equipment and person    Housekeeping assistive equipment and person    Laundry assistive equipment and person    Shopping assistive equipment and person       Mental Status Summary    Recent Changes in Mental Status/Cognitive Functioning no changes                   Psychosocial    No documentation.                  Abuse/Neglect    No documentation.                  Legal    No documentation.                  Substance Abuse    No documentation.                  Patient Forms    No documentation.                     Catherine Maxwell LCSW

## 2023-11-14 NOTE — PLAN OF CARE
Problem: Adult Inpatient Plan of Care  Goal: Plan of Care Review  11/14/2023 0959 by Helen Roach RN  Outcome: Ongoing, Progressing  11/14/2023 0958 by Helen Roach RN  Outcome: Ongoing, Progressing   Goal Outcome Evaluation:

## 2023-11-14 NOTE — PLAN OF CARE
"Goal Outcome Evaluation:  Plan of Care Reviewed With: patient           Outcome Evaluation: Pt is a 83 y.o female admitted to [facility] with onset of slurred speech and L facial drop that occured while she was hosting a dinner party. Pt with hx of \"4 previous strokes\" with intervention. MRI brain showed right MCA strokes involving the right frontal lobe, corona radiata, and insular cortex. Pt reports she has baseline balance, coordination, speech deficits from previous strokes but is sucessful with mobility/ADLs using rwx and denies any hx of falls. Pt is slow to complete all movements today, which was also reported to be observed by spouse in ED notes. Pt reports unsteady with all C tasks today. She reports she feels off her baseline. She may benefit from continued OT to address ADLs, balance, coordination, safety. Recommend home with HH at [place].      Anticipated Discharge Disposition (OT): home with home health, home with assist  "

## 2023-11-15 ENCOUNTER — READMISSION MANAGEMENT (OUTPATIENT)
Dept: CALL CENTER | Facility: HOSPITAL | Age: 83
End: 2023-11-15
Payer: MEDICARE

## 2023-11-15 ENCOUNTER — HOME HEALTH ADMISSION (OUTPATIENT)
Dept: HOME HEALTH SERVICES | Facility: HOME HEALTHCARE | Age: 83
End: 2023-11-15
Payer: MEDICARE

## 2023-11-15 ENCOUNTER — APPOINTMENT (OUTPATIENT)
Dept: CARDIOLOGY | Facility: HOSPITAL | Age: 83
End: 2023-11-15
Payer: MEDICARE

## 2023-11-15 ENCOUNTER — DOCUMENTATION (OUTPATIENT)
Dept: HOME HEALTH SERVICES | Facility: HOME HEALTHCARE | Age: 83
End: 2023-11-15
Payer: MEDICARE

## 2023-11-15 VITALS
OXYGEN SATURATION: 96 % | SYSTOLIC BLOOD PRESSURE: 130 MMHG | HEIGHT: 64 IN | WEIGHT: 187.83 LBS | BODY MASS INDEX: 32.07 KG/M2 | RESPIRATION RATE: 18 BRPM | TEMPERATURE: 98.6 F | HEART RATE: 61 BPM | DIASTOLIC BLOOD PRESSURE: 74 MMHG

## 2023-11-15 PROBLEM — I63.511 ACUTE RIGHT MCA STROKE: Status: ACTIVE | Noted: 2023-11-15

## 2023-11-15 LAB
ANION GAP SERPL CALCULATED.3IONS-SCNC: 8.9 MMOL/L (ref 5–15)
BH CV XLRA MEAS LEFT DIST CCA EDV: 18.8 CM/SEC
BH CV XLRA MEAS LEFT DIST CCA PSV: 88.6 CM/SEC
BH CV XLRA MEAS LEFT DIST ICA EDV: -20.5 CM/SEC
BH CV XLRA MEAS LEFT DIST ICA PSV: -84.5 CM/SEC
BH CV XLRA MEAS LEFT ICA/CCA RATIO: 0.95
BH CV XLRA MEAS LEFT MID ICA EDV: -11.6 CM/SEC
BH CV XLRA MEAS LEFT MID ICA PSV: -49.8 CM/SEC
BH CV XLRA MEAS LEFT PROX CCA EDV: -13 CM/SEC
BH CV XLRA MEAS LEFT PROX CCA PSV: -115.2 CM/SEC
BH CV XLRA MEAS LEFT PROX ECA EDV: -11 CM/SEC
BH CV XLRA MEAS LEFT PROX ECA PSV: -110.5 CM/SEC
BH CV XLRA MEAS LEFT PROX ICA EDV: 15.4 CM/SEC
BH CV XLRA MEAS LEFT PROX ICA PSV: 84.1 CM/SEC
BH CV XLRA MEAS LEFT PROX SCLA PSV: 178.3 CM/SEC
BH CV XLRA MEAS LEFT VERTEBRAL A EDV: 11.2 CM/SEC
BH CV XLRA MEAS LEFT VERTEBRAL A PSV: 44.7 CM/SEC
BH CV XLRA MEAS RIGHT DIST CCA EDV: 11.5 CM/SEC
BH CV XLRA MEAS RIGHT DIST CCA PSV: 55.4 CM/SEC
BH CV XLRA MEAS RIGHT DIST ICA EDV: 15.9 CM/SEC
BH CV XLRA MEAS RIGHT DIST ICA PSV: 77.9 CM/SEC
BH CV XLRA MEAS RIGHT ICA/CCA RATIO: 1.97
BH CV XLRA MEAS RIGHT MID ICA EDV: -14.3 CM/SEC
BH CV XLRA MEAS RIGHT MID ICA PSV: -76.8 CM/SEC
BH CV XLRA MEAS RIGHT PROX CCA EDV: 9.3 CM/SEC
BH CV XLRA MEAS RIGHT PROX CCA PSV: 73 CM/SEC
BH CV XLRA MEAS RIGHT PROX ECA PSV: -87.7 CM/SEC
BH CV XLRA MEAS RIGHT PROX ICA EDV: -21.7 CM/SEC
BH CV XLRA MEAS RIGHT PROX ICA PSV: -109.3 CM/SEC
BH CV XLRA MEAS RIGHT PROX SCLA PSV: 199.3 CM/SEC
BH CV XLRA MEAS RIGHT VERTEBRAL A EDV: 9.7 CM/SEC
BH CV XLRA MEAS RIGHT VERTEBRAL A PSV: 45.2 CM/SEC
BUN SERPL-MCNC: 11 MG/DL (ref 8–23)
BUN/CREAT SERPL: 16.4 (ref 7–25)
CALCIUM SPEC-SCNC: 8.7 MG/DL (ref 8.6–10.5)
CHLORIDE SERPL-SCNC: 107 MMOL/L (ref 98–107)
CO2 SERPL-SCNC: 24.1 MMOL/L (ref 22–29)
CREAT SERPL-MCNC: 0.67 MG/DL (ref 0.57–1)
DEPRECATED RDW RBC AUTO: 39.8 FL (ref 37–54)
EGFRCR SERPLBLD CKD-EPI 2021: 86.3 ML/MIN/1.73
ERYTHROCYTE [DISTWIDTH] IN BLOOD BY AUTOMATED COUNT: 11.7 % (ref 12.3–15.4)
GLUCOSE SERPL-MCNC: 106 MG/DL (ref 65–99)
HCT VFR BLD AUTO: 35.5 % (ref 34–46.6)
HGB BLD-MCNC: 12 G/DL (ref 12–15.9)
LEFT ARM BP: NORMAL MMHG
MCH RBC QN AUTO: 32.1 PG (ref 26.6–33)
MCHC RBC AUTO-ENTMCNC: 33.8 G/DL (ref 31.5–35.7)
MCV RBC AUTO: 94.9 FL (ref 79–97)
PLATELET # BLD AUTO: 233 10*3/MM3 (ref 140–450)
PMV BLD AUTO: 10.3 FL (ref 6–12)
POTASSIUM SERPL-SCNC: 3.9 MMOL/L (ref 3.5–5.2)
RBC # BLD AUTO: 3.74 10*6/MM3 (ref 3.77–5.28)
RIGHT ARM BP: NORMAL MMHG
SODIUM SERPL-SCNC: 140 MMOL/L (ref 136–145)
WBC NRBC COR # BLD: 5.14 10*3/MM3 (ref 3.4–10.8)

## 2023-11-15 PROCEDURE — 94664 DEMO&/EVAL PT USE INHALER: CPT

## 2023-11-15 PROCEDURE — 85027 COMPLETE CBC AUTOMATED: CPT | Performed by: STUDENT IN AN ORGANIZED HEALTH CARE EDUCATION/TRAINING PROGRAM

## 2023-11-15 PROCEDURE — 93880 EXTRACRANIAL BILAT STUDY: CPT

## 2023-11-15 PROCEDURE — 80048 BASIC METABOLIC PNL TOTAL CA: CPT | Performed by: STUDENT IN AN ORGANIZED HEALTH CARE EDUCATION/TRAINING PROGRAM

## 2023-11-15 PROCEDURE — 94799 UNLISTED PULMONARY SVC/PX: CPT

## 2023-11-15 PROCEDURE — 99232 SBSQ HOSP IP/OBS MODERATE 35: CPT | Performed by: NURSE PRACTITIONER

## 2023-11-15 RX ORDER — ATORVASTATIN CALCIUM 80 MG/1
80 TABLET, FILM COATED ORAL NIGHTLY
Qty: 30 TABLET | Refills: 0 | Status: SHIPPED | OUTPATIENT
Start: 2023-11-15 | End: 2023-12-15

## 2023-11-15 RX ORDER — ASPIRIN 325 MG
325 TABLET ORAL DAILY
Qty: 30 TABLET | Refills: 0 | Status: SHIPPED | OUTPATIENT
Start: 2023-11-16 | End: 2023-12-16

## 2023-11-15 RX ADMIN — CLOPIDOGREL BISULFATE 75 MG: 75 TABLET, FILM COATED ORAL at 09:32

## 2023-11-15 RX ADMIN — ATENOLOL 25 MG: 25 TABLET ORAL at 09:35

## 2023-11-15 RX ADMIN — LEVOTHYROXINE SODIUM 150 MCG: 0.15 TABLET ORAL at 06:12

## 2023-11-15 RX ADMIN — PANTOPRAZOLE SODIUM 40 MG: 40 TABLET, DELAYED RELEASE ORAL at 09:31

## 2023-11-15 RX ADMIN — ESCITALOPRAM 20 MG: 20 TABLET, FILM COATED ORAL at 09:32

## 2023-11-15 RX ADMIN — Medication 5000 UNITS: at 09:31

## 2023-11-15 RX ADMIN — FUROSEMIDE 20 MG: 20 TABLET ORAL at 09:35

## 2023-11-15 RX ADMIN — BUDESONIDE AND FORMOTEROL FUMARATE DIHYDRATE 2 PUFF: 160; 4.5 AEROSOL RESPIRATORY (INHALATION) at 11:08

## 2023-11-15 RX ADMIN — DOCUSATE SODIUM 50MG AND SENNOSIDES 8.6MG 2 TABLET: 8.6; 5 TABLET, FILM COATED ORAL at 09:32

## 2023-11-15 RX ADMIN — Medication 1000 MCG: at 09:32

## 2023-11-15 RX ADMIN — GABAPENTIN 400 MG: 400 CAPSULE ORAL at 09:32

## 2023-11-15 RX ADMIN — LEVETIRACETAM 500 MG: 500 TABLET, FILM COATED ORAL at 09:32

## 2023-11-15 RX ADMIN — ASPIRIN 325 MG: 325 TABLET ORAL at 09:32

## 2023-11-15 NOTE — PROGRESS NOTES
"DOS: 11/15/2023  NAME: Avelina Carr   : 1940  PCP: Goran Carr MD  Chief Complaint   Patient presents with    Speech Problem     Stroke    Subjective: Speech is improved but not back to baseline per . Still has left facial droop. C/o some posterior neck pain, no headache.     Objective:  Vital signs: /74 (BP Location: Left arm, Patient Position: Sitting)   Pulse 61   Temp 98.6 °F (37 °C) (Oral)   Resp 18   Ht 162.6 cm (64\")   Wt 85.2 kg (187 lb 13.3 oz)   SpO2 96%   BMI 32.24 kg/m²       General appearance: Well developed, well nourished, well groomed, alert and cooperative.   HEENT: Normocephalic.   Neck: Supple.   Cardiac: Regular rate and rhythm.  Chest Exam: Clear to auscultation bilaterally, no wheezes, no rhonchi.  Extremities: Normal, no edema.   Skin: No rashes or birthmarks.      Higher integrative function: Oriented to time, place, person, intact recent and remote memory, attention span, concentration and language. Spontaneous speech, fund of vocabulary are normal.   Cranial nerves: Visual fields intact, extraocular movements full.  PERRL.  Normal facial sensation, left lower facial droop.  Hearing intact, symmetric palatal movement, symmetric shoulder shrug.  Tongue midline, mild dysarthria.  Motor: Normal muscle strength, bulk, and tone in upper and lower extremities. No fasciculations, rigidity, spasticity or abnormal movements.   Sensation: Normal light touch in arms and legs.  Station and gait: Deferred.  Coordination: Finger to nose test showed no dysmetria.   The patient was reexamined, changes noted.     Scheduled Meds:aspirin, 325 mg, Oral, Daily   Or  aspirin, 300 mg, Rectal, Daily  atenolol, 25 mg, Oral, BID  atorvastatin, 80 mg, Oral, Nightly  budesonide-formoterol, 2 puff, Inhalation, BID - RT  cholecalciferol, 5,000 Units, Oral, Daily  clopidogrel, 75 mg, Oral, Daily  escitalopram, 20 mg, Oral, Daily  furosemide, 20 mg, Oral, Daily  gabapentin, 400 mg, " Oral, TID  levETIRAcetam, 500 mg, Oral, BID  levothyroxine, 150 mcg, Oral, Q AM  pantoprazole, 40 mg, Oral, Daily  senna-docusate sodium, 2 tablet, Oral, BID  vitamin B-12, 1,000 mcg, Oral, Daily      Continuous Infusions:   PRN Meds:.  acetaminophen **OR** acetaminophen    senna-docusate sodium **AND** polyethylene glycol **AND** bisacodyl **AND** bisacodyl    melatonin    ondansetron **OR** ondansetron    [COMPLETED] Insert Peripheral IV **AND** sodium chloride    traMADol      Laboratory results:  Lab Results   Component Value Date    GLUCOSE 106 (H) 11/15/2023    CALCIUM 8.7 11/15/2023     11/15/2023    K 3.9 11/15/2023    CO2 24.1 11/15/2023     11/15/2023    BUN 11 11/15/2023    CREATININE 0.67 11/15/2023    EGFRIFAFRI 75 09/24/2021    EGFRIFNONA 62 09/24/2021    BCR 16.4 11/15/2023    ANIONGAP 8.9 11/15/2023     Lab Results   Component Value Date    WBC 5.14 11/15/2023    HGB 12.0 11/15/2023    HCT 35.5 11/15/2023    MCV 94.9 11/15/2023     11/15/2023     Lab Results   Component Value Date    CHOL 133 11/13/2023    CHOL 116 04/12/2022    CHOL 139 11/16/2017     Lab Results   Component Value Date    HDL 28 (L) 11/13/2023    HDL 37 (L) 10/16/2023    HDL 34 (L) 03/23/2023     Lab Results   Component Value Date    LDL 78 11/13/2023    LDL 79 10/16/2023    LDL 88 03/23/2023     Lab Results   Component Value Date    TRIG 156 (H) 11/13/2023    TRIG 163 (H) 10/16/2023    TRIG 169 (H) 03/23/2023         Lab 11/13/23  0519   HEMOGLOBIN A1C 5.50      Review and interpretation of imaging:  Duplex Carotid Ultrasound CAR    Result Date: 11/15/2023    Right internal carotid artery demonstrates a less than 50% stenosis.   Left internal carotid artery demonstrates a less than 50% stenosis.     Adult transthoracic echo complete    Result Date: 11/13/2023    Left ventricular systolic function is normal. Calculated left ventricular EF = 63.1%   Left ventricular diastolic function was normal.   The left atrial  cavity is mild to moderately dilated.   Left atrial volume is mildly increased.   There is mild calcification of the aortic valve.   Trace to mild aortic valve regurgitation is present   Mild mitral valve regurgitation is present.     MRI Brain Without Contrast    Result Date: 11/13/2023  MRI OF THE BRAIN WITHOUT CONTRAST  CLINICAL HISTORY: Slurred speech. Left cheek drooping.  TECHNIQUE: MRI of the brain was obtained with sagittal T1, axial T1, axial FLAIR, axial T2, axial diffusion, and axial gradient echo images.  COMPARISON: Brain MRI dated 04/12/2022.  FINDINGS:  There are foci of acute infarction involving the lateral aspect of the right frontal lobe, right corona radiata, right subinsular region, and right insular cortex that are within the right MCA distribution. Multiple small predominantly discontiguous foci of acute infarction are appreciated at these locations. As a conglomerate, these areas of acute infarction approximately measure up to 2.3 x 5.8 cm in greatest axial dimensions.  The patient is status post a right temporal parietal occipital craniotomy. A focus of encephalomalacia is noted within the right occipital lobe compatible with a chronic infarct within the right PCA distribution. This area of encephalomalacia measures up to 3.0 x 3.1 cm in greatest axial dimensions. This was also appreciated on the prior study. A smaller focus of encephalomalacia within the more inferolateral aspect of the right occipital lobe measuring up to approximately 9 mm in diameter is also compatible with a chronic infarct within the right PCA distribution. Again, this was present on the prior exam. Also, there is a focus of encephalomalacia within the left temporal lobe measuring up to 4.9 x 2.5 cm in greatest axial dimensions that is compatible with a chronic infarct within the left MCA distribution. There are moderate to severe and confluent changes of chronic small vessel ischemic phenomena. All of these findings  are stable in appearance when compared to the previous exam. There is interval evolutionary change of the previously identified acute infarct within the left aspect of the naeem. Tiny subcentimeter chronic infarcts are again appreciated within the cerebellar hemispheres.  There are 4 dural based lesions along the posterior aspect the right parietal and occipital lobes most compatible with meningiomas. Largest of these lesions is seen along the posterior aspect of the right occipital lobe measuring up to approximately 1.7 x 0.8 cm in greatest axial dimensions and has similar measurements when compared to the prior study. These lesions would be much better delineated on a contrast-enhanced examination although they appear roughly similar when compared to the prior study dated 04/12/2022.   Otherwise, the ventricles, sulci, and cisterns are age-appropriate. The major intracranial flow related signal voids are unremarkable.       There are multiple foci of predominantly discontiguous areas of acute infarction within the lateral aspect of the right frontal lobe, the right corona radiata, the right subinsular region, and the right insular cortex within the right MCA distribution.  The patient is status post a right temporoparietal occipital craniotomy. 4 small dural based lesions along the posterior aspect of the right parietal and occipital lobes compatible with small meningiomas appear similar when compared to the prior examination dated 04/12/2022 although these would be much better delineated on a contrast enhanced MRI study which could be obtained as clinically indicated.  There are foci of encephalomalacia within the right occipital lobe compatible with chronic infarcts within the right PCA distribution as well as the lateral aspect of the left temporal lobe within the left MCA distribution. These findings were also appreciated on the prior exam. A chronic pontine infarct has evolved since the prior exam. Tiny  subcentimeter chronic infarcts are again appreciated within the cerebellar hemispheres.  There are moderate to severe changes of chronic small vessel ischemic phenomena.  These findings were discussed with Dr. Shaw Amaya on 11/13/2023 at approximately 11:30 a.m.  This report was finalized on 11/13/2023 12:06 PM by Dr. Juan Bishop M.D on Workstation: [id number]      CT Angiogram Neck    Result Date: 11/12/2023  NONCONTRAST CRANIAL CT SCAN, CT ANGIOGRAM NECK, CT ANGIOGRAM HEAD.  HISTORY: Slurred speech. Left lower facial droop.  COMPARISON: None..  TECHNIQUE:  Radiation dose reduction techniques were utilized, including automated exposure control and exposure modulation based on body size. Initially, a routine noncontrast cranial CT performed from the skull base through the vertex region. After review, thin-section contrast enhanced CT angiogram images obtained from the aortic arch through the calvarial vertex utilizing angiographic technique. Multi projection 3-D MIP reformatted images were supplemented and reviewed.   FINDINGS CRANIAL CT: No acute hemorrhage or midline shift is demonstrated.. There is diffuse atrophy. There is periventricular and deep white matter microangiopathic change. Areas of encephalomalacia are noted within the right occipital lobe, superior left frontal lobe, and left temporal lobe. These were seen on prior exam from 2022. Patient also has old infarcts within the anterior limbs of the internal capsules bilaterally. There is also an old left basal ganglia infarct. There is also an old left pontine infarct. Prior study demonstrated an occlusion of the posterior inferior aspect of the superior sagittal sinus. This is a stable finding when compared to the prior exam, and is secondary to invasion of the meningioma into the sinus. Multiple meningiomas are seen within the within the right parietal and occipital lobes. These appear grossly stable when compared to the prior exam. The patient is  status post right parietal craniotomy. This was for resection of a right occipital meningioma. There are areas of enhancement which are seen adjacent to the craniotomy site, which have been previously felt to be postoperative change.   CERVICAL CAROTID CT ANGIOGRAM:  FINDINGS: There is normal arch anatomy. There is calcification of the aortic arch, as well as the arch vessels. There does appear to be some narrowing of the right subclavian artery. There is plaque noted at the right carotid bifurcation. This results in narrowing in the range of 50 to 60%. In the range of the distal cervical right internal carotid artery is widely patent. There is moderate stenosis at the origin of the right vertebral artery. There is mild to moderate stenosis at the origin of the left common carotid artery. There is calcified plaque noted at the left carotid bifurcation, which results in narrowing in the range of 30%. The distal cervical left internal carotid artery is widely patent. Moderate stenosis at the origin of the left vertebral artery is noted. The right vertebral artery is dominant. There are background emphysematous changes. The cystic structure seen posterior to the right sternocleidomastoid muscle on prior CT is again noted. This measures up to 2.3 x 1.6 cm, which is larger than on prior exam. On prior MRI, it did not demonstrate any significant enhancement, but the lesion remains indeterminate, given interval growth, cystic lymph node cannot be excluded.   NASCET criteria utilized in stenosis measurements. stenosis mild, 0-49%.    CRANIAL CTA ANGIOGRAM:  FINDINGS: The intracranial internal carotid arteries remain patent. There is a small anterior communicating artery. The middle cerebral arteries are patent bilaterally. Intracranial vertebral arteries are patent. Basilar artery is also patent there is a posterior communicating artery on the left. Posterior cerebral arteries are patent.. There is a focal moderate stenosis  at the junction of the left P1 and P2.   Radiation dose reduction techniques were utilized, including automated exposure control and exposure modulation based on body size.       IMPRESSION :  1. No acute intracranial findings. Please see the body of the report for detailed findings within the brain. 2. Moderate stenoses of the origins of the vertebral arteries and right internal carotid artery. There is mild to moderate stenosis at the origin of the left common carotid artery, as well as mild narrowing of the proximal left internal carotid artery. 3. Focal moderate stenosis at the junction of the left P1 and P2. 4. Cystic lesion is seen posterior to the right sternocleidomastoid muscle appears to have increased in size. Exact etiology remains uncertain.   Radiation dose reduction techniques were utilized, including automated exposure control and exposure modulation based on body size.   This report was finalized on 11/12/2023 8:31 PM by Dr. Paloma Chacon M.D on Workstation: [id number]      CT Angiogram Head    Result Date: 11/12/2023  NONCONTRAST CRANIAL CT SCAN, CT ANGIOGRAM NECK, CT ANGIOGRAM HEAD.  HISTORY: Slurred speech. Left lower facial droop.  COMPARISON: None..  TECHNIQUE:  Radiation dose reduction techniques were utilized, including automated exposure control and exposure modulation based on body size. Initially, a routine noncontrast cranial CT performed from the skull base through the vertex region. After review, thin-section contrast enhanced CT angiogram images obtained from the aortic arch through the calvarial vertex utilizing angiographic technique. Multi projection 3-D MIP reformatted images were supplemented and reviewed.   FINDINGS CRANIAL CT: No acute hemorrhage or midline shift is demonstrated.. There is diffuse atrophy. There is periventricular and deep white matter microangiopathic change. Areas of encephalomalacia are noted within the right occipital lobe, superior left frontal lobe,  and left temporal lobe. These were seen on prior exam from 2022. Patient also has old infarcts within the anterior limbs of the internal capsules bilaterally. There is also an old left basal ganglia infarct. There is also an old left pontine infarct. Prior study demonstrated an occlusion of the posterior inferior aspect of the superior sagittal sinus. This is a stable finding when compared to the prior exam, and is secondary to invasion of the meningioma into the sinus. Multiple meningiomas are seen within the within the right parietal and occipital lobes. These appear grossly stable when compared to the prior exam. The patient is status post right parietal craniotomy. This was for resection of a right occipital meningioma. There are areas of enhancement which are seen adjacent to the craniotomy site, which have been previously felt to be postoperative change.   CERVICAL CAROTID CT ANGIOGRAM:  FINDINGS: There is normal arch anatomy. There is calcification of the aortic arch, as well as the arch vessels. There does appear to be some narrowing of the right subclavian artery. There is plaque noted at the right carotid bifurcation. This results in narrowing in the range of 50 to 60%. In the range of the distal cervical right internal carotid artery is widely patent. There is moderate stenosis at the origin of the right vertebral artery. There is mild to moderate stenosis at the origin of the left common carotid artery. There is calcified plaque noted at the left carotid bifurcation, which results in narrowing in the range of 30%. The distal cervical left internal carotid artery is widely patent. Moderate stenosis at the origin of the left vertebral artery is noted. The right vertebral artery is dominant. There are background emphysematous changes. The cystic structure seen posterior to the right sternocleidomastoid muscle on prior CT is again noted. This measures up to 2.3 x 1.6 cm, which is larger than on prior exam.  On prior MRI, it did not demonstrate any significant enhancement, but the lesion remains indeterminate, given interval growth, cystic lymph node cannot be excluded.   NASCET criteria utilized in stenosis measurements. stenosis mild, 0-49%.    CRANIAL CTA ANGIOGRAM:  FINDINGS: The intracranial internal carotid arteries remain patent. There is a small anterior communicating artery. The middle cerebral arteries are patent bilaterally. Intracranial vertebral arteries are patent. Basilar artery is also patent there is a posterior communicating artery on the left. Posterior cerebral arteries are patent.. There is a focal moderate stenosis at the junction of the left P1 and P2.   Radiation dose reduction techniques were utilized, including automated exposure control and exposure modulation based on body size.       IMPRESSION :  1. No acute intracranial findings. Please see the body of the report for detailed findings within the brain. 2. Moderate stenoses of the origins of the vertebral arteries and right internal carotid artery. There is mild to moderate stenosis at the origin of the left common carotid artery, as well as mild narrowing of the proximal left internal carotid artery. 3. Focal moderate stenosis at the junction of the left P1 and P2. 4. Cystic lesion is seen posterior to the right sternocleidomastoid muscle appears to have increased in size. Exact etiology remains uncertain.   Radiation dose reduction techniques were utilized, including automated exposure control and exposure modulation based on body size.   This report was finalized on 11/12/2023 8:31 PM by Dr. Paloma Chacon M.D on Workstation: [id number]       Impression:  83-year-old female with hypertension, hyperlipidemia, TEOFILO compliant with CPAP, COPD, right occipital meningioma status postresection remotely, history of breast cancer, left MCA stroke status post mechanical thrombectomy in 2017 (JEFE negative), left pontine stroke in 2022, on DAPT,  seizure disorder on Keppra, and loop recorder which is reportedly been negative for A-fib who presented with slurred speech and left facial droop.    MRI brain showed a right MCA stroke  CTA head/neck showed right ICA 60% stenosis, mild to moderate left CCA origin stenosis, moderate stenosis of the origins of bilateral vertebral arteries  2D echo: Normal EF, mild to moderate LA enlargement, no clear embolic source of stroke  Carotid ultrasound: Less than 50% stenosis bilaterally  Labs: TSH 1.16, hemoglobin A1c 5.5, LDL78    Diagnosis:  Right MCA stroke  Bilateral carotid disease, less than 50%  History of embolic left MCA stroke and left pontine stroke    Plan:  Continue DAPT with home medications aspirin 325 mg daily and Plavix 75 mg daily   Lipitor increased to 80 mg daily  Awaiting last cardiology note/device interrogation report, discussed with RN  D/W Dr Richards. No vascular intervention needed per Dr Hunter. OK for d/c.  She will f/u with her outpatient neurologist  Dr Buenrostro, has appt this December. Will also f/u with vascular surgery Dr Forrest in 3 mo.  D/W Dr Hunter and Dr Royal. We will sign off but please call with further questions/concerns.

## 2023-11-15 NOTE — PROGRESS NOTES
Case Management Discharge Note      Final Note: Pt discharging home with spouse to assist, spouse agreeable to HH referral per therapy recommendation, pt has used [facility] and spouse agreeable. Per Kaelyn Dow, [facility] accepts. Spouse denies additional needs and will transport pt home. Genesis Maxwell LCSW         Selected Continued Care - Admitted Since 11/12/2023       Destination    No services have been selected for the patient.                Durable Medical Equipment    No services have been selected for the patient.                Dialysis/Infusion    No services have been selected for the patient.                Home Medical Care Coordination complete.      Service Provider Selected Services Address Phone Fax Patient Preferred    [facility] [address] [address] [phone] --              Therapy    No services have been selected for the patient.                Community Resources    No services have been selected for the patient.                Community & DME    No services have been selected for the patient.                    Transportation Services  Private: Car    Final Discharge Disposition Code: 06 - home with home health care

## 2023-11-15 NOTE — OUTREACH NOTE
Prep Survey      Flowsheet Row Responses   [facility] patient discharged from? [place]   Is LACE score < 7 ? No   Eligibility [facility]   Date of Admission 11/12/23   Date of Discharge 11/15/23   Discharge Disposition Home-Health Care Sv   Discharge diagnosis Acute right MCA stroke   Does the patient have one of the following disease processes/diagnoses(primary or secondary)? Stroke   Does the patient have Home health ordered? Yes   What is the Home health agency?  [facility]   Is there a DME ordered? No   Prep survey completed? Yes            Karyna NUNEZ - Registered Nurse

## 2023-11-15 NOTE — PROGRESS NOTES
[facility] to provide Home Care services. Patient agreeable and denies other HH at this time. PCP and contact information confirmed. Please call patient's cell phone to schedule HH visits, [phone].

## 2023-11-15 NOTE — PROGRESS NOTES
[facility]     Following per stroke order set.    Per chart review, patient ambulating 200 ft with contact guard assistance.      Looks like plan is home with home health.    Will sign off at this time.    Thanks,   Giulia Garibay RN  Rehab Admission Nurse

## 2023-11-15 NOTE — DISCHARGE SUMMARY
Patient Name: Avelina Carr  : 1940  MRN: [id number]    Date of Admission: 2023  Date of Discharge:  11/15/2023  Primary Care Physician: Goran Carr MD      Chief Complaint:   Speech Problem      Discharge Diagnoses     Active Hospital Problems    Diagnosis  POA    **Acute right MCA stroke [I63.511]  Unknown    Focal neurological deficit present [R29.818]  Yes    Acute CVA (cerebrovascular accident) [I63.9]  Yes    Primary hypertension [I10]  Yes    HLD (hyperlipidemia) [E78.5]  Yes    Cerebrovascular accident (CVA) [I63.9]  Yes    Seizure disorder, simple partial, without intractable epilepsy [G40.109]  Yes      Resolved Hospital Problems   No resolved problems to display.        Hospital Course      Patient is a83-year-old female with a history of including but not limited to CVA, seizure, hypertension, hyperlipidemia, presents to the ED with left facial droop and speech disturbance which was noticed by spouse.     Right MCA stroke  Patient with history of left MCA stroke status post mechanical [id number] left pontine stroke in . Primary symptoms admission more left facial droop and slurred speech  TSH normal, hemoglobin A1c normal, lipid panel showed total cholesterol 143, LDL 78. CTA head and neck.Moderate stenoses of the origins of the vertebral arteries and right internal carotid artery. There is mild to moderate stenosis at the origin of the left common carotid artery, as well as mild narrowing of the proximal left internal carotid artery.3. Focal moderate stenosis at the junction of the left P1 and P2.Follow-up MRI showed there are multiple foci of predominantly discontiguous areas of acute infarction within the lateral aspect of the right frontal lobe, the ight corona radiata, the right subinsular region, the right insular cortex within the right MCA distribution..  Duplex of carotid arteries bilaterally ICA: Imaging indicates <50% stenosis.    Left ICA: Imaging indicates  <50% stenosis. Patient's home Plavix continued at 75 mg daily, home atorvastatin was increased to 80 mg daily, aspirin 325 mg daily.  Neurology and vascular surgery evaluated.  Per vascular surgery less likely stroke related to carotid artery stenosis.  Patient had loop recorder already placed for history of prior embolic stroke, which was reportedly negative for atrial fibrillation, however due to new stroke request was sent to [employer] representative for interrogation, and it was interrogated prior to discharge and there was no events/r irregular rhythm noted.  Patient has follow-up with cardiology within a month per spouse, discussed follow-up with cardiology as scheduled.  Follow-up with vascular surgery, Dr. Forrest in 3 months, follow-up with neurology Dr. Buenrostro in December as scheduled.  Discharged on aspirin 325 mg daily, atorvastatin 80 mg daily, and Plavix 25 mg daily.  Patient is on pantoprazole at home which should cover for GI protection in the setting of dual antiplatelets.      At the time of discharge patient was told to take all medications as prescribed, keep all follow-up appointments, and call their doctor or return to the hospital with any worsening or concerning symptoms.                    Day of Discharge     Subjective:  Sitting up in the recliner, no new events overnight.  Speech continues to improve, patient complains of generalized weakness but no new weakness or numbness in any of the extremities.    Physical Exam:  Temp:  [97.2 °F (36.2 °C)-98.8 °F (37.1 °C)] 98.6 °F (37 °C)  Heart Rate:  [55-62] 61  Resp:  [18] 18  BP: (115-159)/(61-79) 130/74  Body mass index is 32.24 kg/m².  Physical Exam    General: Alert, sitting up in recliner, not in distress,  CV: Regular rate and rhythm, no murmurs rubs or gallops  Lungs: Clear to auscultation bilaterally, no crackles or wheezes  Abdomen: Soft, nontender, nondistended  Extremities: No significant peripheral edema , no cyanosis   Neuro: Alert,  oriented x3, speech normal, no slurred speech noticed today,+ left-sided facial droop    Consultants     Consult Orders (all) (From admission, onward)       Start     Ordered    11/14/23 1349  Inpatient Vascular Surgery Consult  Once        Specialty:  Vascular Surgery  Provider:  Federico Polanco II, MD    11/14/23 1348    11/12/23 2010  Notify Stroke Coordinator  Once        Provider:  (Not yet assigned)    11/12/23 2009 11/12/23 2010  Inpatient Rehab Admission Consult  Once        Provider:  (Not yet assigned)    11/12/23 2009 11/12/23 2010  Consult to Case Management   Once        Provider:  (Not yet assigned)    11/12/23 2009 11/12/23 2010  Consult to Diabetes Educator  Once,   Status:  Canceled        Provider:  (Not yet assigned)    11/12/23 2009 11/12/23 2010  Inpatient Neurology Consult Stroke  Once        Specialty:  Neurology  Provider:  Morales Richards MD    11/12/23 2009                  Procedures     * Surgery not found *      Imaging Results (All)       Procedure Component Value Units Date/Time    MRI Brain Without Contrast [[id number]] Collected: 11/13/23 1129     Updated: 11/13/23 1209    Narrative:      MRI OF THE BRAIN WITHOUT CONTRAST     CLINICAL HISTORY: Slurred speech. Left cheek drooping.     TECHNIQUE: MRI of the brain was obtained with sagittal T1, axial T1,  axial FLAIR, axial T2, axial diffusion, and axial gradient echo images.     COMPARISON: Brain MRI dated 04/12/2022.     FINDINGS:     There are foci of acute infarction involving the lateral aspect of the  right frontal lobe, right corona radiata, right subinsular region, and  right insular cortex that are within the right MCA distribution.  Multiple small predominantly discontiguous foci of acute infarction are  appreciated at these locations. As a conglomerate, these areas of acute  infarction approximately measure up to 2.3 x 5.8 cm in greatest axial  dimensions.     The patient is status post a  right temporal parietal occipital  craniotomy. A focus of encephalomalacia is noted within the right  occipital lobe compatible with a chronic infarct within the right PCA  distribution. This area of encephalomalacia measures up to 3.0 x 3.1 cm  in greatest axial dimensions. This was also appreciated on the prior  study. A smaller focus of encephalomalacia within the more inferolateral  aspect of the right occipital lobe measuring up to approximately 9 mm in  diameter is also compatible with a chronic infarct within the right PCA  distribution. Again, this was present on the prior exam. Also, there is  a focus of encephalomalacia within the left temporal lobe measuring up  to 4.9 x 2.5 cm in greatest axial dimensions that is compatible with a  chronic infarct within the left MCA distribution. There are moderate to  severe and confluent changes of chronic small vessel ischemic phenomena.  All of these findings are stable in appearance when compared to the  previous exam. There is interval evolutionary change of the previously  identified acute infarct within the left aspect of the naeem. Tiny  subcentimeter chronic infarcts are again appreciated within the  cerebellar hemispheres.     There are 4 dural based lesions along the posterior aspect the right  parietal and occipital lobes most compatible with meningiomas. Largest  of these lesions is seen along the posterior aspect of the right  occipital lobe measuring up to approximately 1.7 x 0.8 cm in greatest  axial dimensions and has similar measurements when compared to the prior  study. These lesions would be much better delineated on a  contrast-enhanced examination although they appear roughly similar when  compared to the prior study dated 04/12/2022.        Otherwise, the ventricles, sulci, and cisterns are age-appropriate. The  major intracranial flow related signal voids are unremarkable.       Impression:         There are multiple foci of predominantly  discontiguous areas of acute  infarction within the lateral aspect of the right frontal lobe, the  right corona radiata, the right subinsular region, and the right insular  cortex within the right MCA distribution.     The patient is status post a right temporoparietal occipital craniotomy.  4 small dural based lesions along the posterior aspect of the right  parietal and occipital lobes compatible with small meningiomas appear  similar when compared to the prior examination dated 04/12/2022 although  these would be much better delineated on a contrast enhanced MRI study  which could be obtained as clinically indicated.     There are foci of encephalomalacia within the right occipital lobe  compatible with chronic infarcts within the right PCA distribution as  well as the lateral aspect of the left temporal lobe within the left MCA  distribution. These findings were also appreciated on the prior exam. A  chronic pontine infarct has evolved since the prior exam. Tiny  subcentimeter chronic infarcts are again appreciated within the  cerebellar hemispheres.     There are moderate to severe changes of chronic small vessel ischemic  phenomena.     These findings were discussed with Dr. Shaw Amaya on 11/13/2023 at  approximately 11:30 a.m.     This report was finalized on 11/13/2023 12:06 PM by Dr. Juan Bishop M.D on Workstation: [id number]       CT Angiogram Neck [[id number]] Collected: 11/12/23 1937     Updated: 11/12/23 2034    Narrative:      NONCONTRAST CRANIAL CT SCAN, CT ANGIOGRAM NECK, CT ANGIOGRAM HEAD.     HISTORY: Slurred speech. Left lower facial droop.     COMPARISON: None..     TECHNIQUE:  Radiation dose reduction techniques were utilized, including  automated exposure control and exposure modulation based on body size.   Initially, a routine noncontrast cranial CT performed from the skull  base through the vertex region. After review, thin-section contrast  enhanced CT angiogram images obtained from the  aortic arch through the  calvarial vertex utilizing angiographic technique. Multi projection 3-D  MIP reformatted images were supplemented and reviewed.        FINDINGS CRANIAL CT: No acute hemorrhage or midline shift is  demonstrated.. There is diffuse atrophy. There is periventricular and  deep white matter microangiopathic change. Areas of encephalomalacia are  noted within the right occipital lobe, superior left frontal lobe, and  left temporal lobe. These were seen on prior exam from 2022. Patient  also has old infarcts within the anterior limbs of the internal capsules  bilaterally. There is also an old left basal ganglia infarct. There is  also an old left pontine infarct. 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 Multiple meningiomas are seen  within the within the right parietal and occipital lobes. These appear  grossly stable when compared to the prior exam. The patient is status  post right parietal craniotomy. This was for resection of a right  occipital meningioma. There are areas of enhancement which are seen  adjacent to the craniotomy site, which have been previously felt to be  postoperative change.        CERVICAL CAROTID CT ANGIOGRAM:     FINDINGS: There is normal arch anatomy. There is calcification of the  aortic arch, as well as the arch vessels. There does appear to be some  narrowing of the right subclavian artery. There is plaque noted at the  right carotid bifurcation. This results in narrowing in the range of 50  to 60%. In the range of the distal cervical right internal carotid  artery is widely patent. There is moderate stenosis at the origin of the  right vertebral artery. There is mild to moderate stenosis at the origin  of the left common carotid artery. There is calcified plaque noted at  the left  carotid bifurcation, which results in narrowing in the range of  30%. The distal cervical left internal carotid artery is widely patent.  Moderate stenosis at the origin of the left vertebral artery is noted.  The right vertebral artery is dominant. There are background  emphysematous changes. The cystic structure seen posterior to the right  sternocleidomastoid muscle on prior CT is again noted. This measures up  to 2.3 x 1.6 cm, which is larger than on prior exam. On prior MRI, it  did not demonstrate any significant enhancement, but the lesion remains  indeterminate, given interval growth, cystic lymph node cannot be  excluded.        NASCET criteria utilized in stenosis measurements. stenosis mild, 0-49%.           CRANIAL CTA ANGIOGRAM:     FINDINGS: The intracranial internal carotid arteries remain patent.  There is a small anterior communicating artery. The middle cerebral  arteries are patent bilaterally. Intracranial vertebral arteries are  patent. Basilar artery is also patent there is a posterior communicating  artery on the left. Posterior cerebral arteries are patent.. There is a  focal moderate stenosis at the junction of the left P1 and P2.        Radiation dose reduction techniques were utilized, including automated  exposure control and exposure modulation based on body size.          Impression:      IMPRESSION :    1. No acute intracranial findings. Please see the body of the report for  detailed findings within the brain.  2. Moderate stenoses of the origins of the vertebral arteries and right  internal carotid artery. There is mild to moderate stenosis at the  origin of the left common carotid artery, as well as mild narrowing of  the proximal left internal carotid artery.  3. Focal moderate stenosis at the junction of the left P1 and P2.  4. Cystic lesion is seen posterior to the right sternocleidomastoid  muscle appears to have increased in size. Exact etiology remains  uncertain.         Radiation dose reduction techniques were utilized, including automated  exposure control and exposure modulation based on body size.        This report was finalized on 11/12/2023 8:31 PM by Dr. Paloma Chacon M.D on Workstation: [id number]       CT Angiogram Head [[id number]] Collected: 11/12/23 1937     Updated: 11/12/23 2034    Narrative:      NONCONTRAST CRANIAL CT SCAN, CT ANGIOGRAM NECK, CT ANGIOGRAM HEAD.     HISTORY: Slurred speech. Left lower facial droop.     COMPARISON: None..     TECHNIQUE:  Radiation dose reduction techniques were utilized, including  automated exposure control and exposure modulation based on body size.   Initially, a routine noncontrast cranial CT performed from the skull  base through the vertex region. After review, thin-section contrast  enhanced CT angiogram images obtained from the aortic arch through the  calvarial vertex utilizing angiographic technique. Multi projection 3-D  MIP reformatted images were supplemented and reviewed.        FINDINGS CRANIAL CT: No acute hemorrhage or midline shift is  demonstrated.. There is diffuse atrophy. There is periventricular and  deep white matter microangiopathic change. Areas of encephalomalacia are  noted within the right occipital lobe, superior left frontal lobe, and  left temporal lobe. These were seen on prior exam from 2022. Patient  also has old infarcts within the anterior limbs of the internal capsules  bilaterally. There is also an old left basal ganglia infarct. There is  also an old left pontine infarct. 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 Multiple meningiomas are seen  within the within the right parietal and occipital lobes. These appear  grossly stable when compared to the prior exam. The patient is status  post right parietal  craniotomy. This was for resection of a right  occipital meningioma. There are areas of enhancement which are seen  adjacent to the craniotomy site, which have been previously felt to be  postoperative change.        CERVICAL CAROTID CT ANGIOGRAM:     FINDINGS: There is normal arch anatomy. There is calcification of the  aortic arch, as well as the arch vessels. There does appear to be some  narrowing of the right subclavian artery. There is plaque noted at the  right carotid bifurcation. This results in narrowing in the range of 50  to 60%. In the range of the distal cervical right internal carotid  artery is widely patent. There is moderate stenosis at the origin of the  right vertebral artery. There is mild to moderate stenosis at the origin  of the left common carotid artery. There is calcified plaque noted at  the left carotid bifurcation, which results in narrowing in the range of  30%. The distal cervical left internal carotid artery is widely patent.  Moderate stenosis at the origin of the left vertebral artery is noted.  The right vertebral artery is dominant. There are background  emphysematous changes. The cystic structure seen posterior to the right  sternocleidomastoid muscle on prior CT is again noted. This measures up  to 2.3 x 1.6 cm, which is larger than on prior exam. On prior MRI, it  did not demonstrate any significant enhancement, but the lesion remains  indeterminate, given interval growth, cystic lymph node cannot be  excluded.        NASCET criteria utilized in stenosis measurements. stenosis mild, 0-49%.           CRANIAL CTA ANGIOGRAM:     FINDINGS: The intracranial internal carotid arteries remain patent.  There is a small anterior communicating artery. The middle cerebral  arteries are patent bilaterally. Intracranial vertebral arteries are  patent. Basilar artery is also patent there is a posterior communicating  artery on the left. Posterior cerebral arteries are patent.. There is  a  focal moderate stenosis at the junction of the left P1 and P2.        Radiation dose reduction techniques were utilized, including automated  exposure control and exposure modulation based on body size.          Impression:      IMPRESSION :    1. No acute intracranial findings. Please see the body of the report for  detailed findings within the brain.  2. Moderate stenoses of the origins of the vertebral arteries and right  internal carotid artery. There is mild to moderate stenosis at the  origin of the left common carotid artery, as well as mild narrowing of  the proximal left internal carotid artery.  3. Focal moderate stenosis at the junction of the left P1 and P2.  4. Cystic lesion is seen posterior to the right sternocleidomastoid  muscle appears to have increased in size. Exact etiology remains  uncertain.        Radiation dose reduction techniques were utilized, including automated  exposure control and exposure modulation based on body size.        This report was finalized on 11/12/2023 8:31 PM by Dr. Paloma Chacon M.D on Workstation: [id number]             Results for orders placed during the hospital encounter of 11/12/23    Duplex Carotid Ultrasound CAR    Interpretation Summary    Right internal carotid artery demonstrates a less than 50% stenosis.    Left internal carotid artery demonstrates a less than 50% stenosis.    Results for orders placed during the hospital encounter of 11/12/23    Adult transthoracic echo complete    Interpretation Summary    Left ventricular systolic function is normal. Calculated left ventricular EF = 63.1%    Left ventricular diastolic function was normal.    The left atrial cavity is mild to moderately dilated.    Left atrial volume is mildly increased.    There is mild calcification of the aortic valve.    Trace to mild aortic valve regurgitation is present    Mild mitral valve regurgitation is present.    Pertinent Labs     Results from last 7 days   Lab  Units 11/15/23  0554 11/14/23  0853 11/13/23 0519 11/12/23  1744   WBC 10*3/mm3 5.14 5.73 5.18 6.16   HEMOGLOBIN g/dL 12.0 13.7 11.8* 12.6   PLATELETS 10*3/mm3 233 288 221 254     Results from last 7 days   Lab Units 11/15/23  0554 11/14/23  0853 11/13/23 0519 11/12/23  1744   SODIUM mmol/L 140 141 139 139   POTASSIUM mmol/L 3.9 4.5 3.8 4.2   CHLORIDE mmol/L 107 109* 105 104   CO2 mmol/L 24.1 17.9* 25.3 24.2   BUN mg/dL 11 11 12 14   CREATININE mg/dL 0.67 0.80 0.73 0.96   GLUCOSE mg/dL 106* 140* 106* 83   Estimated Creatinine Clearance: 66 mL/min (by C-G formula based on SCr of 0.67 mg/dL).  Results from last 7 days   Lab Units 11/13/23 0519 11/12/23  1744   ALBUMIN g/dL 3.5 3.9   BILIRUBIN mg/dL 0.3 0.4   ALK PHOS U/L 142* 149*   AST (SGOT) U/L 25 25   ALT (SGPT) U/L 17 18     Results from last 7 days   Lab Units 11/15/23  0554 11/14/23  0853 11/13/23 0519 11/12/23  1744   CALCIUM mg/dL 8.7 8.9 8.6 8.6   ALBUMIN g/dL  --   --  3.5 3.9   MAGNESIUM mg/dL  --  2.3  --  2.4   PHOSPHORUS mg/dL  --  3.7  --   --        Results from last 7 days   Lab Units 11/12/23  1744   CK TOTAL U/L 193*   HSTROP T ng/L 26*   PROBNP pg/mL 302.0       Results from last 7 days   Lab Units 11/13/23 0519   CHOLESTEROL mg/dL 133   TRIGLYCERIDES mg/dL 156*   HDL CHOL mg/dL 28*   LDL CHOL mg/dL 78             Test Results Pending at Discharge       Discharge Details        Discharge Medications        New Medications        Instructions Start Date   aspirin 325 MG tablet   325 mg, Oral, Daily   Start Date: November 16, 2023            Changes to Medications        Instructions Start Date   atorvastatin 80 MG tablet  Commonly known as: LIPITOR  What changed:   medication strength  how much to take   80 mg, Oral, Nightly      levothyroxine 112 MCG tablet  Commonly known as: SYNTHROID, LEVOTHROID  What changed: how much to take   TAKE ONE TABLET BY MOUTH DAILY             Continue These Medications        Instructions Start Date    acetaminophen 325 MG tablet  Commonly known as: TYLENOL   650 mg, Oral, Every 6 Hours PRN      albuterol sulfate  (90 Base) MCG/ACT inhaler  Commonly known as: PROVENTIL HFA;VENTOLIN HFA;PROAIR HFA   No dose, route, or frequency recorded.      atenolol 25 MG tablet  Commonly known as: TENORMIN   25 mg, Oral, 2 Times Daily      clopidogrel 75 MG tablet  Commonly known as: PLAVIX   TAKE ONE TABLET BY MOUTH DAILY      escitalopram 20 MG tablet  Commonly known as: LEXAPRO   TAKE ONE TABLET BY MOUTH DAILY      furosemide 20 MG tablet  Commonly known as: LASIX   TAKE ONE TABLET BY MOUTH DAILY      gabapentin 400 MG capsule  Commonly known as: NEURONTIN   TAKE ONE CAPSULE BY MOUTH THREE TIMES A DAY      Glycerin-Hypromellose- 0.2-0.2-1 % solution ophthalmic solution  Commonly known as: ARTIFICIAL TEARS   1 drop, Both Eyes, Every 1 Hour PRN      latanoprost 0.005 % ophthalmic solution  Commonly known as: XALATAN   1 drop, Right Eye, Nightly      levETIRAcetam 500 MG tablet  Commonly known as: KEPPRA   TAKE ONE TABLET BY MOUTH TWICE A DAY      Melatonin ER 10 MG tablet controlled-release   1 tablet, Oral, Every Night at Bedtime      pantoprazole 40 MG EC tablet  Commonly known as: PROTONIX   TAKE ONE TABLET BY MOUTH DAILY      Symbicort 160-4.5 MCG/ACT inhaler  Generic drug: budesonide-formoterol   No dose, route, or frequency recorded.      traMADol 50 MG tablet  Commonly known as: ULTRAM   TAKE ONE TABLET BY MOUTH EVERY 6 HOURS AS NEEDED FOR MODERATE PAIN      Vitamin B 12 500 MCG tablet   1,000 mcg, Oral, Daily      vitamin D3 125 MCG (5000 UT) capsule capsule   5,000 Units, Oral, Daily               Allergies   Allergen Reactions    Penicillin G Hives    Penicillins Rash       Discharge Disposition:  Home-Health Care [facility] [place]      Discharge Diet:  Diet Order   Procedures    Diet: Cardiac Diets; Healthy Heart (2-3 Na+); Texture: Soft to Chew (NDD 3); Soft to Chew: Chopped Meat; Fluid Consistency: Thin (IDDSI 0)        Discharge Activity:       CODE STATUS:    Code Status and Medical Interventions:   Ordered at: 11/12/23 2023     Medical Intervention Limits:    NO intubation (DNI)     Code Status (Patient has no pulse and is not breathing):    No CPR (Do Not Attempt to Resuscitate)     Medical Interventions (Patient has pulse or is breathing):    Limited Support       Future Appointments   Date Time Provider Department Center   12/8/2023 10:00 AM Mike Buenrostro II, MD [facility]   12/18/2023 11:45 AM [facility]   3/19/2024 10:00 AM Shanta Christianson MD [facility]     Additional Instructions for the Follow-ups that You Need to Schedule       Ambulatory Referral to Home Health (Hospital)   As directed      Face to Face Visit Date: 11/15/2023   Follow-up provider for Plan of Care?: I treated the patient in an acute care facility and will not continue treatment after discharge.   Follow-up provider: SHANTA CHRISTIANSON [[id number]]   Reason/Clinical Findings: Right MCA stroke   Describe mobility limitations that make leaving home difficult: Generalized weakness   Nursing/Therapeutic Services Requested: Physical Therapy   PT orders: Therapeutic exercise Transfer training Strengthening   Frequency: 1 Week 1               Follow-up Information       Shanta Christianson MD .    Specialty: Family Medicine  Contact information:  [address]  [place] [id number] [phone]               Roque Forrest MD. Schedule an appointment as soon as possible for a visit in 3 month(s).    Specialty: Vascular Surgery  Contact information:  [address] [facility] [address]  [place] [address]  [phone]               Mike Buenrostro II, MD Follow up.    Specialty: Neurology  Why: as scheduled in december  Contact information:  [address] [facility] [address]  [place] [address]  [phone]                             Additional Instructions for the Follow-ups that You Need to Schedule       Ambulatory Referral to Home  Health ([facility])   As directed      Face to Face Visit Date: 11/15/2023   Follow-up provider for Plan of Care?: I treated the patient in an acute care facility and will not continue treatment after discharge.   Follow-up provider: SHANTA CHRISTIANSON [[id number]]   Reason/Clinical Findings: Right MCA stroke   Describe mobility limitations that make leaving home difficult: Generalized weakness   Nursing/Therapeutic Services Requested: Physical Therapy   PT orders: Therapeutic exercise Transfer training Strengthening   Frequency: 1 Week 1            Time Spent on Discharge:  Greater than 30 minutes      Lucero Royal MD  [facility]  11/15/23  14:01 EST

## 2023-11-15 NOTE — PROGRESS NOTES
Name: Avelina Carr ADMIT: 2023   : 1940  PCP: Goran Carr MD    MRN: [id number] LOS: 2 days   AGE/SEX: 83 y.o. female  ROOM:  [place] [address]385/1     CC: Stroke  Interval History: Patient sitting up in a chair.  No new complaints  Subjective   Subjective     Review of Systems  Objective   Objective     Vitals:   Temp:  [97.2 °F (36.2 °C)-98.8 °F (37.1 °C)] 97.2 °F (36.2 °C)  Heart Rate:  [52-62] 55  Resp:  [18] 18  BP: (115-159)/(61-79) 145/70    No intake/output data recorded.    Scheduled Meds:     aspirin, 325 mg, Oral, Daily   Or  aspirin, 300 mg, Rectal, Daily  atenolol, 25 mg, Oral, BID  atorvastatin, 80 mg, Oral, Nightly  budesonide-formoterol, 2 puff, Inhalation, BID - RT  cholecalciferol, 5,000 Units, Oral, Daily  clopidogrel, 75 mg, Oral, Daily  escitalopram, 20 mg, Oral, Daily  furosemide, 20 mg, Oral, Daily  gabapentin, 400 mg, Oral, TID  levETIRAcetam, 500 mg, Oral, BID  levothyroxine, 150 mcg, Oral, Q AM  pantoprazole, 40 mg, Oral, Daily  senna-docusate sodium, 2 tablet, Oral, BID  vitamin B-12, 1,000 mcg, Oral, Daily      IV Meds:        Physical Exam  Vascular: No changes    Data Reviewed:  CBC    Results from last 7 days   Lab Units 11/15/23  0554 23  0853 23  1744   WBC 10*3/mm3 5.14 5.73 5.18 6.16   HEMOGLOBIN g/dL 12.0 13.7 11.8* 12.6   PLATELETS 10*3/mm3 233 288 221 254     BMP   Results from last 7 days   Lab Units 11/15/23  0554 23  0853 23  0523  1744   SODIUM mmol/L 140 141 139 139   POTASSIUM mmol/L 3.9 4.5 3.8 4.2   CHLORIDE mmol/L 107 109* 105 104   CO2 mmol/L 24.1 17.9* 25.3 24.2   BUN mg/dL 11 11 12 14   CREATININE mg/dL 0.67 0.80 0.73 0.96   GLUCOSE mg/dL 106* 140* 106* 83   MAGNESIUM mg/dL  --  2.3  --  2.4   PHOSPHORUS mg/dL  --  3.7  --   --      Radiology(recent) MRI Brain Without Contrast    Result Date: 2023   There are multiple foci of predominantly discontiguous areas of acute infarction within the  lateral aspect of the right frontal lobe, the right corona radiata, the right subinsular region, and the right insular cortex within the right MCA distribution.  The patient is status post a right temporoparietal occipital craniotomy. 4 small dural based lesions along the posterior aspect of the right parietal and occipital lobes compatible with small meningiomas appear similar when compared to the prior examination dated 2022 although these would be much better delineated on a contrast enhanced MRI study which could be obtained as clinically indicated.  There are foci of encephalomalacia within the right occipital lobe compatible with chronic infarcts within the right PCA distribution as well as the lateral aspect of the left temporal lobe within the left MCA distribution. These findings were also appreciated on the prior exam. A chronic pontine infarct has evolved since the prior exam. Tiny subcentimeter chronic infarcts are again appreciated within the cerebellar hemispheres.  There are moderate to severe changes of chronic small vessel ischemic phenomena.  These findings were discussed with Dr. Shaw Amaya on 2023 at approximately 11:30 a.m.  This report was finalized on 2023 12:06 PM by Dr. Juan Bishop M.D on Workstation: [id number]       Most notable findings include: Carotid duplex pending    Active Hospital Problems:   Active Hospital Problems    Diagnosis  POA    **Focal neurological deficit present [R29.818]  Yes    Acute CVA (cerebrovascular accident) [I63.9]  Yes    Primary hypertension [I10]  Yes    HLD (hyperlipidemia) [E78.5]  Yes    Cerebrovascular accident (CVA) [I63.9]  Yes    Seizure disorder, simple partial, without intractable epilepsy [G40.109]  Yes      Resolved Hospital Problems   No resolved problems to display.      Assessment & Plan   [place], Subsequent Hospital Care  Assessment / Plan     Stroke with mild to moderate right carotid stenosis: CT angiogram possibly  consistent with a 60% stenosis on the right side.  Ultrasound done in our office about a month ago less than 50% by velocity criteria.  Heavily calcified but no soft plaque, ulcerations, or other concerning pathology.  Would like to see what the new ultrasound shows before making final recommendation.  Discussed with Dr. Shaw Amaya from stroke neurology.  Felt less likely to be carotid related.    Johnathon Hunter MD  11/15/23  09:25 EST  Office Number [phone]

## 2023-11-15 NOTE — DISCHARGE PLACEMENT REQUEST
"Shala Carr (83 y.o. Female)       Date of Birth   1940    Social Security Number       Address   [address] [place], [place] [address]    Home Phone   [phone]    MRN   [id number]       Advent   [employer]    Marital Status                               Admission Date   11/12/23    Admission Type   Emergency    Admitting Provider   Andria Roberts MD    Attending Provider   Lucero Royal MD    Department, Room/Bed   [address], P585/1       Discharge Date       Discharge Disposition   Home-Health Care [facility]    Discharge Destination                                 Attending Provider: Lucero Royal MD    Allergies: Penicillin G, Penicillins    Isolation: None   Infection: None   Code Status: No CPR    Ht: 162.6 cm (64\")   Wt: 85.2 kg (187 lb 13.3 oz)    Admission Cmt: None   Principal Problem: Acute right MCA stroke [I63.511]                   Active Insurance as of 11/12/2023       Primary Coverage       Payor Plan Insurance Group Employer/Plan Group    MEDICARE MEDICARE A & B        Payor Plan Address Payor Plan Phone Number Payor Plan Fax Number Effective Dates    [address] [phone]  4/1/2004 - None Entered    [facility] [id number]         Subscriber Name Subscriber Birth Date Member ID       SHALA CARR MIKAELA 1940 [id number]               Secondary Coverage       Payor Plan Insurance Group Employer/Plan Group    [facility] SUPP [id number]       Payor Plan Address Payor Plan Phone Number Payor Plan Fax Number Effective Dates    [address]   12/1/2016 - None Entered    [facility] [id number]         Subscriber Name Subscriber Birth Date Member ID       SHALA CARR MIKAELA 1940 [id number]                     Emergency Contacts        (Rel.) Home Phone Work Phone Mobile Phone    Vitaly Carr (Spouse) [phone] -- [phone]                "

## 2023-11-16 ENCOUNTER — HOME CARE VISIT (OUTPATIENT)
Dept: HOME HEALTH SERVICES | Facility: HOME HEALTHCARE | Age: 83
End: 2023-11-16
Payer: MEDICARE

## 2023-11-16 ENCOUNTER — TRANSITIONAL CARE MANAGEMENT TELEPHONE ENCOUNTER (OUTPATIENT)
Dept: CALL CENTER | Facility: HOSPITAL | Age: 83
End: 2023-11-16
Payer: MEDICARE

## 2023-11-16 VITALS
SYSTOLIC BLOOD PRESSURE: 124 MMHG | HEART RATE: 56 BPM | RESPIRATION RATE: 16 BRPM | OXYGEN SATURATION: 97 % | TEMPERATURE: 97 F | DIASTOLIC BLOOD PRESSURE: 66 MMHG

## 2023-11-16 PROCEDURE — G0151 HHCP-SERV OF PT,EA 15 MIN: HCPCS

## 2023-11-16 NOTE — HOME HEALTH
"SOC Note:    Home Health ordered for: disciplines PT 1w2, 2w2    Reason for Hosp/Primary Dx/Co-morbidities: acute R MCA CVA at [facility] [id number]; PMHX: multiple CVA/Left hemiparesis, HTN, Hyperlipidemia, Seizures, Asthma, Macular degeneration, R eye glaucoma, Depression/anxiety, Vit D deficiency, Chronic low back pain, LE edema, GERD, Hypothyroidism    Focus of Care: Skilled PT is needed CVA rehab for gait training with rollator to improve posture and consistency of gait pattern to clear left foot properly, HEP training for generalized strengthening and balance activities, and patient spouse education for medication knowledge, pain control measures for chronic back pain issues, psychosocial issues as takes escitalopram long term with symptoms well-controlled with meds per patient, and home safety, in order to return to independent household mobility with rollator.    Patient's goal(s): \"I want to safely walk around my house with my rollator independently without having to focus on my posture or picking my foot up.\"    Current Functional status/mobility/DME: supervised/SBA with rollator and assist as needed for ADL's due to generalized weakness and fatigue following CVA    HB status/Living Arrangements: spouse assists as needed; homebound currently due to weakness and fatigue with assist needed in out of home via 2 steps and high sloped inclined walkway from porch to car in parking lot    Skin Integrity/wound status: none/intact    Code Status: full; has living will    Fall Risk/Safety concerns: high falls risk due to CVA and previous hx of multiple CVA with balance/strength deficits    Medication issues/Concerns: has 2 major drug interactions but all long term meds so will send report to Dr LOI Carr for notification: 1)tramadol and escitalopram with serotonergic effects may be additive when tramadol and selective serotonin reuptake inhibitors (SSRIs) are coadministered. The risk of serotonin syndrome/toxicity may be " increased. and 2) pantoprazole and clopidogrel with coadministration of pantoprazole and clopidogrel may increase the risk of major adverse cardiovascular events.    Additional Problems/Concerns: none    SDOH Barriers (i.e. caregiver concerns, social isolation, transportation, food insecurity, environment, income etc.)/Need for MSW: none    Plan for next visit: Continue gait training with rollator for posture/pattern training for home safety and mobility, instruct HEP for standing leg strength/balance activities as tolerated, and patient/spouse education as needed

## 2023-11-16 NOTE — OUTREACH NOTE
Call Center TCM Note      Flowsheet Row Responses   [facility] facility patient discharged from? [place]   Does the patient have one of the following disease processes/diagnoses(primary or secondary)? Stroke   TCM attempt successful? No   Unsuccessful attempts Attempt 1            Tiny Tamayo LPN    11/16/2023, 14:57 EST

## 2023-11-16 NOTE — OUTREACH NOTE
Call Center TCM Note      Flowsheet Row Responses   [facility] facility patient discharged from? [place]   Does the patient have one of the following disease processes/diagnoses(primary or secondary)? Stroke   TCM attempt successful? No   Unsuccessful attempts Attempt 2            Tiny Tamayo LPN    11/16/2023, 16:16 EST

## 2023-11-17 ENCOUNTER — HOME CARE VISIT (OUTPATIENT)
Dept: HOME HEALTH SERVICES | Facility: HOME HEALTHCARE | Age: 83
End: 2023-11-17
Payer: MEDICARE

## 2023-11-17 ENCOUNTER — TRANSITIONAL CARE MANAGEMENT TELEPHONE ENCOUNTER (OUTPATIENT)
Dept: CALL CENTER | Facility: HOSPITAL | Age: 83
End: 2023-11-17
Payer: MEDICARE

## 2023-11-17 NOTE — OUTREACH NOTE
Call Center TCM Note      Flowsheet Row Responses   [facility] patient discharged from? [place]   Does the patient have one of the following disease processes/diagnoses(primary or secondary)? Stroke   TCM attempt successful? No   Unsuccessful attempts Attempt 3            Atul Bajwa RN    11/17/2023, 13:38 EST

## 2023-11-20 ENCOUNTER — HOME CARE VISIT (OUTPATIENT)
Dept: HOME HEALTH SERVICES | Facility: HOME HEALTHCARE | Age: 83
End: 2023-11-20
Payer: MEDICARE

## 2023-11-20 VITALS
DIASTOLIC BLOOD PRESSURE: 70 MMHG | TEMPERATURE: 96.5 F | HEART RATE: 54 BPM | SYSTOLIC BLOOD PRESSURE: 130 MMHG | OXYGEN SATURATION: 94 % | RESPIRATION RATE: 18 BRPM

## 2023-11-20 PROCEDURE — G0151 HHCP-SERV OF PT,EA 15 MIN: HCPCS

## 2023-11-20 NOTE — HOME HEALTH
"Subjective: \"I'm doing ok but I get tired by end of the day.\"    Falls reported: none    Medication changes: none    Plan for next visit: Continue gait pattern training with rollator thru home and assess outdoors and stairs if weather allows, reinstruct and upgrade HEP standing leg strength/balance actvities, and patient spouse education as needed"

## 2023-11-27 ENCOUNTER — HOME CARE VISIT (OUTPATIENT)
Dept: HOME HEALTH SERVICES | Facility: HOME HEALTHCARE | Age: 83
End: 2023-11-27
Payer: MEDICARE

## 2023-11-27 VITALS
DIASTOLIC BLOOD PRESSURE: 72 MMHG | TEMPERATURE: 96.8 F | OXYGEN SATURATION: 95 % | HEART RATE: 56 BPM | SYSTOLIC BLOOD PRESSURE: 124 MMHG | RESPIRATION RATE: 18 BRPM

## 2023-11-27 PROCEDURE — G0151 HHCP-SERV OF PT,EA 15 MIN: HCPCS

## 2023-11-27 NOTE — HOME HEALTH
"Subjective: \"I'm doing better moving around the house.\"    Falls reported: none    Medication changes: none    Plan for next visit: Continue gait pattern training with rollator, reinstruct and upgrade HEP for further strength/balance activities, and patient spouse education as needed"

## 2023-11-29 ENCOUNTER — HOME CARE VISIT (OUTPATIENT)
Dept: HOME HEALTH SERVICES | Facility: HOME HEALTHCARE | Age: 83
End: 2023-11-29
Payer: MEDICARE

## 2023-11-29 VITALS
DIASTOLIC BLOOD PRESSURE: 74 MMHG | TEMPERATURE: 97 F | RESPIRATION RATE: 18 BRPM | HEART RATE: 54 BPM | SYSTOLIC BLOOD PRESSURE: 130 MMHG | OXYGEN SATURATION: 95 %

## 2023-11-29 PROCEDURE — G0151 HHCP-SERV OF PT,EA 15 MIN: HCPCS

## 2023-12-02 DIAGNOSIS — G40.109: ICD-10-CM

## 2023-12-04 RX ORDER — LEVETIRACETAM 500 MG/1
TABLET ORAL
Qty: 180 TABLET | Refills: 2 | Status: SHIPPED | OUTPATIENT
Start: 2023-12-04

## 2023-12-04 RX ORDER — PANTOPRAZOLE SODIUM 40 MG/1
TABLET, DELAYED RELEASE ORAL
Qty: 90 TABLET | Refills: 3 | Status: SHIPPED | OUTPATIENT
Start: 2023-12-04

## 2023-12-05 ENCOUNTER — HOME CARE VISIT (OUTPATIENT)
Dept: HOME HEALTH SERVICES | Facility: HOME HEALTHCARE | Age: 83
End: 2023-12-05
Payer: MEDICARE

## 2023-12-05 VITALS
DIASTOLIC BLOOD PRESSURE: 76 MMHG | RESPIRATION RATE: 18 BRPM | HEART RATE: 54 BPM | SYSTOLIC BLOOD PRESSURE: 140 MMHG | TEMPERATURE: 97 F | OXYGEN SATURATION: 96 %

## 2023-12-05 PROCEDURE — G0151 HHCP-SERV OF PT,EA 15 MIN: HCPCS

## 2023-12-07 ENCOUNTER — TELEPHONE (OUTPATIENT)
Dept: FAMILY MEDICINE CLINIC | Facility: CLINIC | Age: 83
End: 2023-12-07
Payer: MEDICARE

## 2023-12-07 ENCOUNTER — HOME CARE VISIT (OUTPATIENT)
Dept: HOME HEALTH SERVICES | Facility: HOME HEALTHCARE | Age: 83
End: 2023-12-07
Payer: MEDICARE

## 2023-12-07 VITALS
TEMPERATURE: 96.8 F | OXYGEN SATURATION: 96 % | HEART RATE: 61 BPM | DIASTOLIC BLOOD PRESSURE: 70 MMHG | RESPIRATION RATE: 18 BRPM | SYSTOLIC BLOOD PRESSURE: 132 MMHG

## 2023-12-07 PROCEDURE — G0151 HHCP-SERV OF PT,EA 15 MIN: HCPCS

## 2023-12-07 RX ORDER — ATORVASTATIN CALCIUM 80 MG/1
TABLET, FILM COATED ORAL
Qty: 90 TABLET | Refills: 3 | Status: SHIPPED | OUTPATIENT
Start: 2023-12-07

## 2023-12-07 NOTE — TELEPHONE ENCOUNTER
Caller: Vitaly Carr    Relationship: Emergency Contact    Best call back number: [phone]     Requested Prescriptions:     atorvastatin (LIPITOR) 80 MG tablet     Requested Prescriptions      No prescriptions requested or ordered in this encounter        Pharmacy where request should be sent:    [facility] [id number] [place], [place] - [facility] [address] [facility] [address] - [phone]  - [phone]   Last office visit with prescribing clinician: 9/19/2023   Last telemedicine visit with prescribing clinician: Visit date not found   Next office visit with prescribing clinician: 3/19/2024     Additional details provided by patient: PATIENT'S  IS CALLING TO REQUEST A NEW 90 DAY PRESCRIPTION WITH REFILLS FOR THE ABOVE MEDICATION.  HE STATES PATIENT WAS TAKING 40 MG, BUT WAS CHANGED TO 80 MG WHEN IN THE HOSPITAL.    Does the patient have less than a 3 day supply:  [] Yes  [x] No    Would you like a call back once the refill request has been completed: [] Yes [] No    If the office needs to give you a call back, can they leave a voicemail: [] Yes [] No    Liv Rueda, Jasmyneed Rep   12/07/23 11:50 EST     PLEASE ADVISE.

## 2023-12-07 NOTE — CASE COMMUNICATION
This is to inform your office of home physical therapy/agency discharge effective [id number] with goals met and will continue HEP and home walking program with spouse supervising/cueing her as needed. See discharge visit note for details. Thanks for the referral!

## 2023-12-08 ENCOUNTER — OFFICE VISIT (OUTPATIENT)
Dept: NEUROLOGY | Facility: CLINIC | Age: 83
End: 2023-12-08
Payer: MEDICARE

## 2023-12-08 VITALS
DIASTOLIC BLOOD PRESSURE: 74 MMHG | HEART RATE: 57 BPM | BODY MASS INDEX: 32.24 KG/M2 | SYSTOLIC BLOOD PRESSURE: 132 MMHG | OXYGEN SATURATION: 95 % | HEIGHT: 64 IN

## 2023-12-08 DIAGNOSIS — Z86.73 RECENT CEREBROVASCULAR ACCIDENT (CVA): Primary | ICD-10-CM

## 2023-12-08 NOTE — LETTER
December 8, 2023       No Recipients    Patient: Avelina Carr   YOB: 1940   Date of Visit: 12/8/2023     Dear Goran Carr MD:       Thank you for referring Avelina Carr to me for evaluation. Below are the relevant portions of my assessment and plan of care.    If you have questions, please do not hesitate to call me. I look forward to following Avelina along with you.         Sincerely,        Mike Buenrostro II, MD        CC:   No Recipients    Mike Buenrostro II, MD  12/08/23 1124  Sign when Signing Visit  Chief Complaint   Patient presents with   • Stroke       Patient ID: Avelina Carr is a 83 y.o. female.    HPI: I had the pleasure of seeing your patient today.  As you may know she is an 83-year-old female here for the management of stroke.  She also has a history of seizures status post surgical resection meningioma.  She apparently had the abrupt onset of difficulty speaking and numbness.  She went to the hospital emergently and was admitted for further evaluation.  She did have MRI imaging showing evidence for multiple acute infarcts within the lateral aspect of the right frontal lobe, right corona radiata, the right subinsular cortex and the right insular cortex within the right MCA distribution.  Also noted where the 4 small dural based lesions consistent with meningiomas that are unchanged in size.  She did have a full neuro and cardiac workup.  Her antiplatelet therapy was optimized.  She is now on a full adult size aspirin along with Plavix.  The cholesterol medication was also optimized.  She says that she has been doing better however she has noted some slowness of speech which has continued however improving.  She denies any new symptoms since last follow-up.    The following portions of the patient's history were reviewed and updated as appropriate: allergies, current medications, past family history, past medical history, past social history, past surgical history and problem  list.    Review of Systems   Constitutional:  Positive for fatigue.   Musculoskeletal:  Positive for gait problem.   Neurological:  Positive for weakness. Negative for dizziness, tremors, seizures, syncope, facial asymmetry, speech difficulty, light-headedness, numbness and headaches.      I have reviewed the review of systems above performed by my medical assistant.      Vitals:    23 1026   BP: 132/74   Pulse: 57   SpO2: 95%       Neurologic Exam     Mental Status   Oriented to person, place, and time.   Concentration: normal.   Level of consciousness: alert  Knowledge: consistent with education (No deficits found.).     Cranial Nerves     CN II   Visual fields full to confrontation.     CN III, IV, VI   Pupils are equal, round, and reactive to light.  Extraocular motions are normal.   CN III: no CN III palsy  CN VI: no CN VI palsy    CN V   Facial sensation intact.     CN VII   Facial expression full, symmetric.     CN VIII   CN VIII normal.     CN IX, X   CN IX normal.   CN X normal.     CN XI   CN XI normal.     CN XII   CN XII normal.     Motor Exam     Strength   Right neck flexion: 5/5  Left neck flexion: 5/5  Right neck extension: 5/5  Left neck extension: 5/5  Right deltoid: 5/5  Left deltoid: 5/5  Right biceps: 5/5  Left biceps: 5/5  Right triceps: 5/5  Left triceps: 5/5  Right wrist flexion: 5/5  Left wrist flexion: 5/5  Right wrist extension: 5/5  Left wrist extension: 5/5  Right interossei: 5/5  Left interossei: 5/5  Right abdominals: 5/5  Left abdominals: 5/5  Right iliopsoas: 5/5  Left iliopsoas: 5/5  Right quadriceps: 5/5  Left quadriceps: 5/5  Right hamstrin/5  Left hamstrin/5  Right glutei: 5/5  Left glutei: 5/5  Right anterior tibial: 5/5  Left anterior tibial: 5/5  Right posterior tibial: 5/5  Left posterior tibial: 5/5  Right peroneal: 5/5  Left peroneal: 5/5  Right gastroc: 5/5  Left gastroc: 5/5    Sensory Exam   Light touch normal.   Vibration normal.     Gait, Coordination,  and Reflexes     Gait  Gait: normal    Reflexes   Right brachioradialis: 2+  Left brachioradialis: 2+  Right biceps: 2+  Left biceps: 2+  Right triceps: 2+  Left triceps: 2+  Right patellar: 2+  Left patellar: 2+  Right achilles: 2+  Left achilles: 2+  Right : 2+  Left : 2+Station is normal.       Physical Exam  Vitals reviewed.   Constitutional:       Appearance: She is well-developed.   HENT:      Head: Normocephalic and atraumatic.   Eyes:      Extraocular Movements: EOM normal.      Pupils: Pupils are equal, round, and reactive to light.   Cardiovascular:      Rate and Rhythm: Normal rate and regular rhythm.   Pulmonary:      Breath sounds: Normal breath sounds.   Musculoskeletal:         General: Normal range of motion.   Skin:     General: Skin is warm.   Neurological:      Mental Status: She is oriented to person, place, and time.      Gait: Gait is intact.      Deep Tendon Reflexes:      Reflex Scores:       Tricep reflexes are 2+ on the right side and 2+ on the left side.       Bicep reflexes are 2+ on the right side and 2+ on the left side.       Brachioradialis reflexes are 2+ on the right side and 2+ on the left side.       Patellar reflexes are 2+ on the right side and 2+ on the left side.       Achilles reflexes are 2+ on the right side and 2+ on the left side.        Procedures    Assessment/Plan: We will continue antiplatelet therapy as scheduled.  We did talk about the cognitive issues may consider neuropsych testing in the future however it is quite soon after the stroke to pursue that at this time.  Will see her back in 4 months or sooner if needed.  A total of 45 minutes was spent face-to-face with the patient today.  Of that greater than 50% of this time was spent discussing signs and symptoms of stroke, patient education, plan of care and prognosis.         Diagnoses and all orders for this visit:    1. Recent cerebrovascular accident (CVA) (Primary)           Mike Buenrostro II,  MD

## 2023-12-08 NOTE — PROGRESS NOTES
Chief Complaint   Patient presents with    Stroke       Patient ID: Avelina Carr is a 83 y.o. female.    HPI: I had the pleasure of seeing your patient today.  As you may know she is an 83-year-old female here for the management of stroke.  She also has a history of seizures status post surgical resection meningioma.  She apparently had the abrupt onset of difficulty speaking and numbness.  She went to the hospital emergently and was admitted for further evaluation.  She did have MRI imaging showing evidence for multiple acute infarcts within the lateral aspect of the right frontal lobe, right corona radiata, the right subinsular cortex and the right insular cortex within the right MCA distribution.  Also noted where the 4 small dural based lesions consistent with meningiomas that are unchanged in size.  She did have a full neuro and cardiac workup.  Her antiplatelet therapy was optimized.  She is now on a full adult size aspirin along with Plavix.  The cholesterol medication was also optimized.  She says that she has been doing better however she has noted some slowness of speech which has continued however improving.  She denies any new symptoms since last follow-up.    The following portions of the patient's history were reviewed and updated as appropriate: allergies, current medications, past family history, past medical history, past social history, past surgical history and problem list.    Review of Systems   Constitutional:  Positive for fatigue.   Musculoskeletal:  Positive for gait problem.   Neurological:  Positive for weakness. Negative for dizziness, tremors, seizures, syncope, facial asymmetry, speech difficulty, light-headedness, numbness and headaches.      I have reviewed the review of systems above performed by my medical assistant.      Vitals:    12/08/23 1026   BP: 132/74   Pulse: 57   SpO2: 95%       Neurologic Exam     Mental Status   Oriented to person, place, and time.   Concentration:  normal.   Level of consciousness: alert  Knowledge: consistent with education (No deficits found.).     Cranial Nerves     CN II   Visual fields full to confrontation.     CN III, IV, VI   Pupils are equal, round, and reactive to light.  Extraocular motions are normal.   CN III: no CN III palsy  CN VI: no CN VI palsy    CN V   Facial sensation intact.     CN VII   Facial expression full, symmetric.     CN VIII   CN VIII normal.     CN IX, X   CN IX normal.   CN X normal.     CN XI   CN XI normal.     CN XII   CN XII normal.     Motor Exam     Strength   Right neck flexion: 5/5  Left neck flexion: 5/5  Right neck extension: 5/5  Left neck extension: 5/5  Right deltoid: 5/5  Left deltoid: 5/5  Right biceps: 5/5  Left biceps: 5/5  Right triceps: 5/5  Left triceps: 5/5  Right wrist flexion: 5/5  Left wrist flexion: 5/5  Right wrist extension: 5/5  Left wrist extension: 5/5  Right interossei: 5/5  Left interossei: 5/5  Right abdominals: 5/5  Left abdominals: 5/5  Right iliopsoas: 5/5  Left iliopsoas: 5/5  Right quadriceps: 5/5  Left quadriceps: 5/5  Right hamstrin/5  Left hamstrin/5  Right glutei: 5/5  Left glutei: 5/5  Right anterior tibial: 5/5  Left anterior tibial: 5/5  Right posterior tibial: 5/5  Left posterior tibial: 5/5  Right peroneal: 5/5  Left peroneal: 5/5  Right gastroc: 5/5  Left gastroc: 5/5    Sensory Exam   Light touch normal.   Vibration normal.     Gait, Coordination, and Reflexes     Gait  Gait: normal    Reflexes   Right brachioradialis: 2+  Left brachioradialis: 2+  Right biceps: 2+  Left biceps: 2+  Right triceps: 2+  Left triceps: 2+  Right patellar: 2+  Left patellar: 2+  Right achilles: 2+  Left achilles: 2+  Right : 2+  Left : 2+Station is normal.       Physical Exam  Vitals reviewed.   Constitutional:       Appearance: She is well-developed.   HENT:      Head: Normocephalic and atraumatic.   Eyes:      Extraocular Movements: EOM normal.      Pupils: Pupils are equal, round,  and reactive to light.   Cardiovascular:      Rate and Rhythm: Normal rate and regular rhythm.   Pulmonary:      Breath sounds: Normal breath sounds.   Musculoskeletal:         General: Normal range of motion.   Skin:     General: Skin is warm.   Neurological:      Mental Status: She is oriented to person, place, and time.      Gait: Gait is intact.      Deep Tendon Reflexes:      Reflex Scores:       Tricep reflexes are 2+ on the right side and 2+ on the left side.       Bicep reflexes are 2+ on the right side and 2+ on the left side.       Brachioradialis reflexes are 2+ on the right side and 2+ on the left side.       Patellar reflexes are 2+ on the right side and 2+ on the left side.       Achilles reflexes are 2+ on the right side and 2+ on the left side.        Procedures    Assessment/Plan: We will continue antiplatelet therapy as scheduled.  We did talk about the cognitive issues may consider neuropsych testing in the future however it is quite soon after the stroke to pursue that at this time.  Will see her back in 4 months or sooner if needed.  A total of 45 minutes was spent face-to-face with the patient today.  Of that greater than 50% of this time was spent discussing signs and symptoms of stroke, patient education, plan of care and prognosis.         Diagnoses and all orders for this visit:    1. Recent cerebrovascular accident (CVA) (Primary)           Mike Buenrostro II, MD

## 2023-12-09 DIAGNOSIS — M54.41 CHRONIC BILATERAL LOW BACK PAIN WITH RIGHT-SIDED SCIATICA: ICD-10-CM

## 2023-12-09 DIAGNOSIS — G89.29 CHRONIC BILATERAL LOW BACK PAIN WITH RIGHT-SIDED SCIATICA: ICD-10-CM

## 2023-12-11 RX ORDER — TRAMADOL HYDROCHLORIDE 50 MG/1
50 TABLET ORAL EVERY 6 HOURS PRN
Qty: 90 TABLET | Refills: 0 | Status: SHIPPED | OUTPATIENT
Start: 2023-12-11

## 2023-12-18 ENCOUNTER — HOSPITAL ENCOUNTER (OUTPATIENT)
Dept: MAMMOGRAPHY | Facility: HOSPITAL | Age: 83
Discharge: HOME | End: 2023-12-18
Admitting: NURSE PRACTITIONER
Payer: MEDICARE

## 2023-12-18 DIAGNOSIS — Z12.31 SCREENING MAMMOGRAM FOR BREAST CANCER: ICD-10-CM

## 2023-12-18 PROCEDURE — 77063 BREAST TOMOSYNTHESIS BI: CPT

## 2023-12-18 PROCEDURE — 77067 SCR MAMMO BI INCL CAD: CPT

## 2024-01-17 DIAGNOSIS — G40.109: ICD-10-CM

## 2024-01-17 RX ORDER — GABAPENTIN 400 MG/1
400 CAPSULE ORAL 3 TIMES DAILY
Qty: 180 CAPSULE | Refills: 1 | Status: SHIPPED | OUTPATIENT
Start: 2024-01-17

## 2024-01-17 NOTE — TELEPHONE ENCOUNTER
Caller: Vitaly Carr    Relationship: Emergency Contact    Best call back number: [phone]*    Requested Prescriptions:   Requested Prescriptions     Pending Prescriptions Disp Refills    gabapentin (NEURONTIN) 400 MG capsule 180 capsule 1     Sig: Take 1 capsule by mouth 3 (Three) Times a Day. Indications: Neuropathic Pain        Pharmacy where request should be sent: [facility] [id number] [place] [id number] [address] & [address] [facility] [phone] [facility] [phone] FX     Last office visit with prescribing clinician: 9/19/2023   Last telemedicine visit with prescribing clinician: Visit date not found   Next office visit with prescribing clinician: 3/19/2024     Additional details provided by patient: PATIENT WILL BE OUT OF MEDICATION TOMORROW.    Does the patient have less than a 3 day supply:  [x] Yes  [] No    Would you like a call back once the refill request has been completed: [] Yes [x] No    If the office needs to give you a call back, can they leave a voicemail: [] Yes [x] No    Gillian Mckenna   01/17/24 10:15 EST

## 2024-01-31 DIAGNOSIS — I63.9 RECURRENT STROKES: Primary | ICD-10-CM

## 2024-02-08 RX ORDER — FUROSEMIDE 20 MG/1
20 TABLET ORAL DAILY
Qty: 90 TABLET | Refills: 1 | Status: SHIPPED | OUTPATIENT
Start: 2024-02-08

## 2024-02-08 RX ORDER — ESCITALOPRAM OXALATE 20 MG/1
20 TABLET ORAL DAILY
Qty: 90 TABLET | Refills: 1 | Status: SHIPPED | OUTPATIENT
Start: 2024-02-08

## 2024-02-12 RX ORDER — FUROSEMIDE 20 MG/1
20 TABLET ORAL DAILY
Qty: 90 TABLET | Refills: 1 | OUTPATIENT
Start: 2024-02-12

## 2024-02-13 RX ORDER — ESCITALOPRAM OXALATE 20 MG/1
20 TABLET ORAL DAILY
Qty: 90 TABLET | Refills: 1 | OUTPATIENT
Start: 2024-02-13

## 2024-02-24 DIAGNOSIS — G89.29 CHRONIC BILATERAL LOW BACK PAIN WITH RIGHT-SIDED SCIATICA: ICD-10-CM

## 2024-02-24 DIAGNOSIS — M54.41 CHRONIC BILATERAL LOW BACK PAIN WITH RIGHT-SIDED SCIATICA: ICD-10-CM

## 2024-02-26 PROBLEM — Z86.79 HISTORY OF SUPRAVENTRICULAR TACHYCARDIA: Status: ACTIVE | Noted: 2022-07-25

## 2024-02-26 RX ORDER — TRAMADOL HYDROCHLORIDE 50 MG/1
50 TABLET ORAL EVERY 6 HOURS PRN
Qty: 90 TABLET | Refills: 0 | Status: SHIPPED | OUTPATIENT
Start: 2024-02-26 | End: 2024-02-26 | Stop reason: SDUPTHER

## 2024-03-19 ENCOUNTER — OFFICE VISIT (OUTPATIENT)
Dept: FAMILY MEDICINE CLINIC | Facility: CLINIC | Age: 84
End: 2024-03-19
Payer: MEDICARE

## 2024-03-19 VITALS
OXYGEN SATURATION: 97 % | BODY MASS INDEX: 29.19 KG/M2 | SYSTOLIC BLOOD PRESSURE: 114 MMHG | DIASTOLIC BLOOD PRESSURE: 74 MMHG | HEART RATE: 56 BPM | RESPIRATION RATE: 16 BRPM | HEIGHT: 64 IN | WEIGHT: 171 LBS

## 2024-03-19 DIAGNOSIS — E78.00 ELEVATED CHOLESTEROL: ICD-10-CM

## 2024-03-19 DIAGNOSIS — F34.1 PERSISTENT DEPRESSIVE DISORDER: ICD-10-CM

## 2024-03-19 DIAGNOSIS — I48.0 PAROXYSMAL ATRIAL FIBRILLATION: ICD-10-CM

## 2024-03-19 DIAGNOSIS — G40.109: ICD-10-CM

## 2024-03-19 DIAGNOSIS — M79.671 PAIN IN BOTH FEET: Primary | ICD-10-CM

## 2024-03-19 DIAGNOSIS — M79.672 PAIN IN BOTH FEET: Primary | ICD-10-CM

## 2024-03-19 DIAGNOSIS — M25.561 CHRONIC PAIN OF BOTH KNEES: ICD-10-CM

## 2024-03-19 DIAGNOSIS — I10 PRIMARY HYPERTENSION: ICD-10-CM

## 2024-03-19 DIAGNOSIS — M25.562 CHRONIC PAIN OF BOTH KNEES: ICD-10-CM

## 2024-03-19 DIAGNOSIS — E03.9 ACQUIRED HYPOTHYROIDISM: ICD-10-CM

## 2024-03-19 DIAGNOSIS — G89.29 CHRONIC PAIN OF BOTH KNEES: ICD-10-CM

## 2024-03-19 DIAGNOSIS — Z00.00 MEDICARE ANNUAL WELLNESS VISIT, SUBSEQUENT: ICD-10-CM

## 2024-03-19 RX ORDER — B-COMPLEX WITH VITAMIN C
1 TABLET ORAL 2 TIMES DAILY
COMMUNITY
Start: 2024-02-28

## 2024-03-19 RX ORDER — FLUTICASONE PROPIONATE 50 MCG
1 SPRAY, SUSPENSION (ML) NASAL DAILY
COMMUNITY
Start: 2024-03-15 | End: 2025-03-15

## 2024-03-19 NOTE — PROGRESS NOTES
Medicare Subsequent Wellness Visit  Subjective   Avelina Carr is a 83 y.o. female who presents for an Medicare Wellness Visit.   Patient Care Team:  Goran Carr MD as PCP - General (Family Medicine)    Recent Hospitalizations:  none    Age-appropriate Screening Schedule:  Refer to the list below for future screening recommendations based on patient's age. The patient has been provided with a written plan.    Health Maintenance   Topic Date Due   • COVID-19 Vaccine (6 - 2023-24 season) 03/21/2024 (Originally 9/1/2023)   • RSV Vaccine - Adults (1 - 1-dose 60+ series) 03/19/2025 (Originally 4/15/1999)   • LIPID PANEL  11/13/2024   • MAMMOGRAM  12/18/2024   • DXA SCAN  01/16/2025   • ANNUAL WELLNESS VISIT  03/19/2025   • BMI FOLLOWUP  03/19/2025   • TDAP/TD VACCINES (3 - Td or Tdap) 04/27/2027   • INFLUENZA VACCINE  Completed   • Pneumococcal Vaccine 65+  Completed   • ZOSTER VACCINE  Completed     Outpatient Medications Prior to Visit   Medication Sig Dispense Refill   • acetaminophen (TYLENOL) 325 MG tablet Take 2 tablets by mouth Every 6 (Six) Hours As Needed for Mild Pain. Indications: Pain     • albuterol sulfate  (90 Base) MCG/ACT inhaler Inhale 2 puffs Every 4 (Four) Hours As Needed for Wheezing. Indications: Asthma     • atenolol (TENORMIN) 25 MG tablet Take 1 tablet by mouth 2 (Two) Times a Day. Indications: High Blood Pressure Disorder     • atorvastatin (LIPITOR) 80 MG tablet I Tablet Nightly  Indications: High Amount of Fats in the Blood 90 tablet 3   • Calcium Carbonate-Vitamin D (Oyster Shell Calcium/D) 500-5 MG-MCG tablet Take 1 tablet by mouth 2 (Two) Times a Day.     • clopidogrel (PLAVIX) 75 MG tablet TAKE ONE TABLET BY MOUTH DAILY 90 tablet 1   • Cyanocobalamin (Vitamin B 12) 500 MCG tablet Take 2 tablets by mouth Daily. Indications: Inadequate Vitamin B12     • escitalopram (LEXAPRO) 20 MG tablet TAKE 1 TABLET BY MOUTH DAILY 90 tablet 1   • fluticasone (FLONASE) 50 MCG/ACT nasal spray  1 spray into the nostril(s) as directed by provider Daily.     • furosemide (LASIX) 20 MG tablet TAKE 1 TABLET BY MOUTH DAILY 90 tablet 1   • gabapentin (NEURONTIN) 400 MG capsule Take 1 capsule by mouth 3 (Three) Times a Day. Indications: Neuropathic Pain 180 capsule 1   • Glycerin-Hypromellose- (ARTIFICIAL TEARS) 0.2-0.2-1 % solution ophthalmic solution Administer 1 drop to both eyes Every 1 (One) Hour As Needed for Dry Eyes.     • latanoprost (XALATAN) 0.005 % ophthalmic solution Administer 1 drop to the right eye Every Night. Indications: Wide-Angle Glaucoma     • levETIRAcetam (KEPPRA) 500 MG tablet TAKE ONE TABLET BY MOUTH TWICE A  tablet 2   • levothyroxine (SYNTHROID, LEVOTHROID) 112 MCG tablet TAKE ONE TABLET BY MOUTH DAILY (Patient taking differently: Take 150 mcg by mouth Daily.) 90 tablet 1   • Melatonin ER 10 MG tablet controlled-release Take 1 tablet by mouth every night at bedtime. Indications: sleep     • Multiple Vitamins-Minerals (PRESERVISION AREDS 2 PO) Take 1 capsule by mouth 2 (Two) Times a Day. Indications: macular degeneration     • NON FORMULARY Calcium 500mg  Vit D 200mg     • pantoprazole (PROTONIX) 40 MG EC tablet TAKE ONE TABLET BY MOUTH DAILY 90 tablet 3   • Probiotic Product (CBRITE PO) Take 1 capsule by mouth Daily. Indications: probiotic     • Symbicort 160-4.5 MCG/ACT inhaler Inhale 2 puffs 2 (Two) Times a Day. Indications: Asthma     • TRAMADOL HCL PO Take 50 mg by mouth.     • Cholecalciferol (VITAMIN D3) 5000 units capsule capsule Take 1 capsule by mouth Daily. Indications: Vitamin D Deficiency       No facility-administered medications prior to visit.      Patient Active Problem List   Diagnosis   • Postmastectomy lymphedema syndrome   • Malignant neoplasm of upper-outer quadrant of right female breast   • Cerebrovascular accident (CVA)   • Stroke   • Primary hypertension   • HLD (hyperlipidemia)   • Adverse effect of antiplatelet agent   • Left  middle cerebral artery stroke   • Carcinoma of central portion of right breast in female, estrogen receptor positive   • Chronic bilateral low back pain with right-sided sciatica   • History of lumbar surgery   • Seizure disorder, simple partial, without intractable epilepsy   • Spinal stenosis of lumbar region with neurogenic claudication   • Status post craniectomy   • History of total right knee replacement   • Other forms of scoliosis, lumbar region   • DDD (degenerative disc disease), lumbar   • Benign meningioma of brain   • Squamous cell carcinoma of leg, left   • History of cardioembolic cerebrovascular accident (CVA)   • Hypothyroid   • Depression   • COPD (chronic obstructive pulmonary disease)   • Obstructive sleep apnea   • Squamous cell carcinoma of leg, right   • Pain in joint, multiple sites   • Excessive daytime sleepiness   • Former smoker   • Gastroesophageal reflux disease   • Migraines   • Generalized weakness   • Acute CVA (cerebrovascular accident)   • Dependence on supplemental oxygen   • Paroxysmal atrial fibrillation   • Paroxysmal supraventricular tachycardia   • Arthritis   • Asthma   • Focal neurological deficit present   • Acute right MCA stroke   • History of supraventricular tachycardia     Depression Screen:       3/19/2024    10:15 AM   PHQ-2/PHQ-9 Depression Screening   Little Interest or Pleasure in Doing Things 0-->not at all   Feeling Down, Depressed or Hopeless 0-->not at all   PHQ-9: Brief Depression Severity Measure Score 0       Functional and Cognitive Screening:      3/19/2024    10:16 AM   Functional & Cognitive Status   Do you have difficulty preparing food and eating? Yes   Do you have difficulty bathing yourself, getting dressed or grooming yourself? Yes   Do you have difficulty using the toilet? No   Do you have difficulty moving around from place to place? No   Do you have trouble with steps or getting out of a bed or a chair? Yes   Current Diet Well Balanced Diet  "  Dental Exam Up to date   Eye Exam Up to date   Exercise (times per week) 0 times per week   Current Exercises Include No Regular Exercise   Do you need help using the phone?  No   Are you deaf or do you have serious difficulty hearing?  No   Do you need help to go to places out of walking distance? Yes   Do you need help shopping? Yes   Do you need help preparing meals?  Yes   Do you need help with housework?  Yes   Do you need help with laundry? Yes   Do you need help taking your medications? Yes   Do you need help managing money? Yes   Do you ever drive or ride in a car without wearing a seat belt? No   Have you felt unusual stress, anger or loneliness in the last month? No   Who do you live with? Spouse   If you need help, do you have trouble finding someone available to you? No   Have you been bothered in the last four weeks by sexual problems? No   Do you have difficulty concentrating, remembering or making decisions? No     Does the patient have evidence of cognitive impairment?     Compared to one year ago, the patient feels their physical health and mental health are the same.     BMI is >= 25 and <30. (Overweight) The following options were offered after discussion;: nutrition counseling/recommendations       Objective     Vitals:    03/19/24 1008   BP: 114/74   Pulse: 56   Resp: 16   SpO2: 97%   Weight: 77.6 kg (171 lb)   Height: 162.6 cm (64\")     Body mass index is 29.35 kg/m².    Follow Up:  Medicare Well visit in one year    ADVANCED DIRECTIVES:   ACP discussion was held with the patient during this visit. Patient has an advance directive in EMR which is still valid.     An After Visit Summary and PPPS with all of these plans were given to the patient.     Additional E&M Note during same encounter follows:  Patient has multiple medical problems which are significant and separately identifiable that require additional work above and beyond the Medicare Wellness Visit.      Subjective   Avelina Carr is " a 83 y.o. female who also presents for Annual Exam (Subsequent Medicare Exam), Hyperlipidemia, and Hypertension   HPI  Avelina  has a hx of depression and states that she is getting good relief from symptoms on current medication, Lexapro. This is a chronic problem that is responding well to treatment. She  has no intolerable side effects to medication and reports that his helps lift mood and makes daily life, relationships better. No thoughts of self harm expressed.     Paloma has a history of chronic hypertension and has been well controlled on current medications.She is tolerating medications without side effect. She reports no vision changes, headaches or lightheadedness. She is requesting refills of medications.     She has a history of chronic hyperlipidemia and needs lab work today to evaluate response to therapy. She is tolerating medications well without side effects.      Hypothyoid - This is a chronic problem that has been is well controlled.  Labs will be ordered today.    She has chronic atrial fibrillation and is followed by cardiology.  She is on Plavix 75 mg tablets a day which I prescribe.  She is also taking atorvastatin 80 mg a day to help with cardiac problems.    She is a chronic seizure disorder and has a long history of this problem.  She is taking Keppra 500 mg twice a day and she is also taking gabapentin 400 mg to help with pain and I suspect is contributed to the seizures.  She is been well-controlled on this medication    She is complaining of a new problem.  She has significant pain in both her feet.  The right is worse than the left.  She is asking for referral to podiatry for evaluation and treatment.  She also notes that she has chronic pain in both knees and would like some help with that discomfort as well.    Review of Systems   Constitutional:  Positive for activity change and fatigue.   Eyes: Negative.    Respiratory: Negative.     Cardiovascular: Negative.    Gastrointestinal:  Negative.    Genitourinary: Negative.    Musculoskeletal:  Positive for arthralgias, back pain and myalgias.        Bilateral foot pain, bilateral shoulder pain, bilateral knee pain   Neurological:  Positive for seizures and weakness.   Psychiatric/Behavioral: Negative.         PHYSICAL EXAM  Vitals reviewed   HEENT: PERRLA, EOMI. Oral mucosa moist,   No LAD.  CV: RRR, no murmurs, rubs, clicks or gallops  LUNGS: CTA bilaterally  EXT: No edema, FROM in bilateral upper and lower ext  NEURO: CN II - XII grossly intact  PSYCH: good mood, positive affect, alert and engaged. No thoughts of self harm  expressed.  Assessment & Plan   Problem List Items Addressed This Visit          Cardiac and Vasculature    Primary hypertension    Overview     Well controlled on current medication, will refill medication today and as needed.  She is a falls risk so care has to be taken cannot let her systolic blood pressure get too low.  She is taking atenolol 25 mg twice a day and she is on Lasix to manage lower extremity edema and is taking 20 mg a day.         Paroxysmal atrial fibrillation       Endocrine and Metabolic    Hypothyroid    Overview     Well managed on current dose of thyroid replacement. TSH ordered today.  Patient will return to the office in 6 month for regularly scheduled review of treatment.   Well refill medications as needed.         Relevant Orders    TSH       Mental Health    Depression    Overview     She is well managed on current medication, Lexapro 20 mg daily, and reports no signs or symptoms of self harm, suicidal ideation. This medication helps with daily life and relationships. Will refill as needed and advised 6 month follow up.            Neuro    Seizure disorder, simple partial, without intractable epilepsy    Overview     She is taking Keppra 500 mg twice a day and is followed by neurology.  She is also taking gabapentin to help with pain and I suspect that is contributing to seizure control as  well.          Other Visit Diagnoses       Pain in both feet    -  Primary    Relevant Orders    Ambulatory Referral to Podiatry    Chronic pain of both knees        Relevant Orders    Ambulatory Referral to Physical Therapy    Medicare annual wellness visit, subsequent        Relevant Orders    CBC (No Diff)    Comprehensive Metabolic Panel    Elevated cholesterol        Relevant Orders    Lipid Panel With / Chol / HDL Ratio        Foot pain in both feet   Dr. Hemphill for shoulders   Deformed  toes  Orders Placed This Encounter   Procedures   • CBC (No Diff)   • Comprehensive Metabolic Panel   • Lipid Panel With / Chol / HDL Ratio   • TSH   • Ambulatory Referral to Podiatry   • Ambulatory Referral to Physical Therapy        Return in about 6 months (around 9/19/2024) for Recheck.

## 2024-03-19 NOTE — PATIENT INSTRUCTIONS
Medicare Wellness  Personal Prevention Plan of Service     Date of Office Visit:    Encounter Provider:  Goran Carr MD  Place of Service:  [facility]  Patient Name: Avelina Carr  :  1940    As part of the Medicare Wellness portion of your visit today, we are providing you with this personalized preventive plan of services (PPPS). This plan is based upon recommendations of the [place] Preventive Services Task Force (USPSTF) and the Advisory Committee on Immunization Practices (ACIP).    This lists the preventive care services that should be considered, and provides dates of when you are due. Items listed as completed are up-to-date and do not require any further intervention.    Health Maintenance   Topic Date Due    BMI FOLLOWUP  08/10/2023    COVID-19 Vaccine (2023-24 season) 2024 (Originally 2023)    RSV Vaccine - Adults (1 - 1-dose 60+ series) 2025 (Originally 4/15/1999)    LIPID PANEL  2024    MAMMOGRAM  2024    DXA SCAN  2025    ANNUAL WELLNESS VISIT  2025    TDAP/TD VACCINES (3 - Td or Tdap) 2027    INFLUENZA VACCINE  Completed    Pneumococcal Vaccine 65+  Completed    ZOSTER VACCINE  Completed       Orders Placed This Encounter   Procedures    CBC (No Diff)     Order Specific Question:   Release to patient     Answer:   Routine Release [[id number]]    Comprehensive Metabolic Panel     Order Specific Question:   Release to patient     Answer:   Routine Release [[id number]]    Lipid Panel With / Chol / HDL Ratio     Order Specific Question:   Release to patient     Answer:   Routine Release [[id number]]    TSH     Order Specific Question:   Release to patient     Answer:   Routine Release [[id number]]    Ambulatory Referral to Podiatry     Referral Priority:   Routine     Referral Type:   Consultation     Referral Reason:   Specialty Services Required     Referred to Provider:   Norman Morelos DPM      Requested Specialty:   Podiatry     Number of Visits Requested:   1    Ambulatory Referral to Physical Therapy     Referral Priority:   Routine     Referral Type:   Physical Therapy     Referral Reason:   Specialty Services Required     Requested Specialty:   Physical Therapy     Number of Visits Requested:   1       No follow-ups on file.

## 2024-03-20 LAB
ALBUMIN SERPL-MCNC: 4.2 G/DL (ref 3.5–5.2)
ALBUMIN/GLOB SERPL: 1.9 G/DL
ALP SERPL-CCNC: 152 U/L (ref 39–117)
ALT SERPL-CCNC: 25 U/L (ref 1–33)
AST SERPL-CCNC: 27 U/L (ref 1–32)
BILIRUB SERPL-MCNC: 0.4 MG/DL (ref 0–1.2)
BUN SERPL-MCNC: 17 MG/DL (ref 8–23)
BUN/CREAT SERPL: 18.3 (ref 7–25)
CALCIUM SERPL-MCNC: 8.5 MG/DL (ref 8.6–10.5)
CHLORIDE SERPL-SCNC: 107 MMOL/L (ref 98–107)
CHOLEST SERPL-MCNC: 125 MG/DL (ref 0–200)
CHOLEST/HDLC SERPL: 3.68 {RATIO}
CO2 SERPL-SCNC: 24.7 MMOL/L (ref 22–29)
CREAT SERPL-MCNC: 0.93 MG/DL (ref 0.57–1)
EGFRCR SERPLBLD CKD-EPI 2021: 60.7 ML/MIN/1.73
ERYTHROCYTE [DISTWIDTH] IN BLOOD BY AUTOMATED COUNT: 11.8 % (ref 12.3–15.4)
GLOBULIN SER CALC-MCNC: 2.2 GM/DL
GLUCOSE SERPL-MCNC: 92 MG/DL (ref 65–99)
HCT VFR BLD AUTO: 39.8 % (ref 34–46.6)
HDLC SERPL-MCNC: 34 MG/DL (ref 40–60)
HGB BLD-MCNC: 13 G/DL (ref 12–15.9)
LDLC SERPL CALC-MCNC: 71 MG/DL (ref 0–100)
MCH RBC QN AUTO: 32.1 PG (ref 26.6–33)
MCHC RBC AUTO-ENTMCNC: 32.7 G/DL (ref 31.5–35.7)
MCV RBC AUTO: 98.3 FL (ref 79–97)
PLATELET # BLD AUTO: 278 10*3/MM3 (ref 140–450)
POTASSIUM SERPL-SCNC: 4.9 MMOL/L (ref 3.5–5.2)
PROT SERPL-MCNC: 6.4 G/DL (ref 6–8.5)
RBC # BLD AUTO: 4.05 10*6/MM3 (ref 3.77–5.28)
SODIUM SERPL-SCNC: 142 MMOL/L (ref 136–145)
TRIGL SERPL-MCNC: 107 MG/DL (ref 0–150)
TSH SERPL DL<=0.005 MIU/L-ACNC: 1.44 UIU/ML (ref 0.27–4.2)
VLDLC SERPL CALC-MCNC: 20 MG/DL (ref 5–40)
WBC # BLD AUTO: 5.85 10*3/MM3 (ref 3.4–10.8)

## 2024-03-28 DIAGNOSIS — I65.23 BILATERAL CAROTID ARTERY STENOSIS: Primary | ICD-10-CM

## 2024-03-28 DIAGNOSIS — I63.412 CEREBROVASCULAR ACCIDENT (CVA) DUE TO EMBOLISM OF LEFT MIDDLE CEREBRAL ARTERY: ICD-10-CM

## 2024-03-28 RX ORDER — CLOPIDOGREL BISULFATE 75 MG/1
75 TABLET ORAL DAILY
Qty: 90 TABLET | Refills: 1 | Status: SHIPPED | OUTPATIENT
Start: 2024-03-28 | End: 2024-04-01

## 2024-03-31 DIAGNOSIS — I63.412 CEREBROVASCULAR ACCIDENT (CVA) DUE TO EMBOLISM OF LEFT MIDDLE CEREBRAL ARTERY: ICD-10-CM

## 2024-04-01 RX ORDER — CLOPIDOGREL BISULFATE 75 MG/1
75 TABLET ORAL DAILY
Qty: 90 TABLET | Refills: 1 | Status: SHIPPED | OUTPATIENT
Start: 2024-04-01

## 2024-04-02 ENCOUNTER — HOSPITAL ENCOUNTER (OUTPATIENT)
Dept: PHYSICAL THERAPY | Facility: HOSPITAL | Age: 84
Discharge: HOME | End: 2024-04-02
Admitting: FAMILY MEDICINE
Payer: MEDICARE

## 2024-04-02 DIAGNOSIS — G89.29 CHRONIC PAIN OF BOTH KNEES: Primary | ICD-10-CM

## 2024-04-02 DIAGNOSIS — M25.561 CHRONIC PAIN OF BOTH KNEES: Primary | ICD-10-CM

## 2024-04-02 DIAGNOSIS — R26.2 DIFFICULTY WALKING: ICD-10-CM

## 2024-04-02 DIAGNOSIS — M25.562 CHRONIC PAIN OF BOTH KNEES: Primary | ICD-10-CM

## 2024-04-02 PROCEDURE — 97161 PT EVAL LOW COMPLEX 20 MIN: CPT

## 2024-04-02 PROCEDURE — 97110 THERAPEUTIC EXERCISES: CPT

## 2024-04-02 NOTE — THERAPY EVALUATION
Outpatient Physical Therapy Ortho Initial Evaluation  [facility]     Patient Name: Avelina Carr  : 1940  MRN: [id number]  Today's Date: 2024      Visit Date: 2024    Patient Active Problem List   Diagnosis    Postmastectomy lymphedema syndrome    Malignant neoplasm of upper-outer quadrant of right female breast    Cerebrovascular accident (CVA)    Stroke    Primary hypertension    HLD (hyperlipidemia)    Adverse effect of antiplatelet agent    Left middle cerebral artery stroke    Carcinoma of central portion of right breast in female, estrogen receptor positive    Chronic bilateral low back pain with right-sided sciatica    History of lumbar surgery    Seizure disorder, simple partial, without intractable epilepsy    Spinal stenosis of lumbar region with neurogenic claudication    Status post craniectomy    History of total right knee replacement    Other forms of scoliosis, lumbar region    DDD (degenerative disc disease), lumbar    Benign meningioma of brain    Squamous cell carcinoma of leg, left    History of cardioembolic cerebrovascular accident (CVA)    Hypothyroid    Depression    COPD (chronic obstructive pulmonary disease)    Obstructive sleep apnea    Squamous cell carcinoma of leg, right    Pain in joint, multiple sites    Excessive daytime sleepiness    Former smoker    Gastroesophageal reflux disease    Migraines    Generalized weakness    Acute CVA (cerebrovascular accident)    Dependence on supplemental oxygen    Paroxysmal atrial fibrillation    Paroxysmal supraventricular tachycardia    Arthritis    Asthma    Focal neurological deficit present    Acute right MCA stroke    History of supraventricular tachycardia        Past Medical History:   Diagnosis Date    Anxiety     Arthritis     Breast cancer     COPD (chronic obstructive pulmonary disease)     oxygen at 2L aths    COVID     CVD (cardiovascular disease)     Depression     Hx of radiation therapy     Hypothyroid      "Macular degeneration     Peripheral neuropathy     Stroke     Vertigo         Past Surgical History:   Procedure Laterality Date    ADRENAL GLAND SURGERY      BRAIN MENINGIOMA EXCISION      occipital    BREAST BIOPSY       SECTION      EMBOLECTOMY N/A 11/15/2017    Procedure: Embolectomy Mechanical;  Surgeon: Rachid Figueroa MD;  Location: [facility] ;  Service:     MASTECTOMY  2014    RECTAL SURGERY      REPLACEMENT TOTAL KNEE Right     TOTAL SHOULDER REPLACEMENT      x2       Visit Dx:     ICD-10-CM ICD-9-CM   1. Chronic pain of both knees  M25.561 719.46    M25.562 338.29    G89.29    2. Difficulty walking  R26.2 719.7          Patient History       Row Name 24 0900             History    Chief Complaint Pain  -LB      Type of Pain Knee pain  -LB      Date Current Problem(s) Began 23  -LB      Brief Description of Current Complaint Pt reports B TKR 20 years ago. She states for last year knee pain has been increasing for last year and L knee goes \"out of joint\" if she does not rotate her toes inward. She denies falling. She states the out of joint only happens if she is standing and it happens multiple times a day. She does not wake up at night due to pain. She has numbness B feet. She has 1-2 LUIS CARLOS with a handrail. Otherwise no steps at home. She has hx of CVA in 2023. She states this did not affect her LLE. She states her issue is more instability rather than pain.  -LB      Patient/Caregiver Goals Return to prior level of function;Know what to do to help the symptoms;Improve strength;Improve mobility  -LB      Hand Dominance right-handed  -LB      Occupation/sports/leisure activities pt states she is busy around her home  -LB      Patient seeing anyone else for problem(s)? yes  -LB      How has patient tried to help current problem? injections a long time ago, turning toes in to walk  -LB      What clinical tests have you had for this problem? X-ray  -LB      " History of Previous Related Injuries B TKR  -LB         Pain     Pain Location Knee  -LB      Pain at Present 0  -LB      Pain at Best 0  -LB      Pain at Worst 6  pt states less pain, more instability and feeling off balance  -LB      Pain Frequency Intermittent  -LB      Pain Description Other (Comment)  unstable  -LB      Pain Comments It is more feeling like I am going to fall rather than pain.  -LB      Tolerance Time- Standing inc LBP; knee pain stable unless it is out of joint  -LB      Tolerance Time- Sitting no issue  -LB      Tolerance Time- Walking knee tends to go out of joint  -LB      Tolerance Time- Lying no issue  -LB      Is your sleep disturbed? No  -LB      Difficulties at work? Pt does not work.  -LB      Difficulties with ADL's? Able to perfform.  -LB      Difficulties with recreational activities? Pt denies recreational activities.  -LB         Fall Risk Assessment    Any falls in the past year: No  -LB      Does patient have a fear of falling Yes (comment)  -LB         Services    Prior Rehab/Home Health Experiences Yes  -LB      When was the prior experience with Rehab/Home Health following CVA in November  -LB      Where was the prior experience with Rehab/Home Health HH  -LB      Are you currently receiving Home Health services No  -LB      Do you plan to receive Home Health services in the near future No  -LB         Daily Activities    Primary Language English  -LB      How does patient learn best? Listening;Reading;Demonstration  -LB      Barriers to learning None  -LB      Pt Participated in POC and Goals Yes  -LB         Safety    Are you being hurt, hit, or frightened by anyone at home or in your life? No  -LB      Are you being neglected by a caregiver No  -LB      Have you had any of the following issues with N/A  -LB                User Key  (r) = Recorded By, (t) = Taken By, (c) = Cosigned By      Initials Name Provider Type    Elvia Gottlieb PT Physical Therapist               "       PT Ortho       Row Name 04/02/24 0900       Subjective    Subjective Comments I need someone to help me with my knee \"going out of joint.\"  -LB       Subjective Pain    Able to rate subjective pain? yes  -LB    Pre-Treatment Pain Level 2  -LB       Posture/Observations    Posture/Observations Comments L patella seated laterally B knees, R genu valgus > L  -LB       Myotomal Screen- Lower Quarter Clearing    Hip flexion (L2) Right:;3+ (Fair +);Left:;4- (Good -)  -LB    Knee extension (L3) Left:;4 (Good);Right:;4+ (Good +)  -LB    Ankle DF (L4) Left:;4- (Good -);Right:;4 (Good)  -LB    Ankle PF (S1) Bilateral:;3+ (Fair +)  -LB    Knee flexion (S2) Bilateral:;4+ (Good +)  -LB       Knee Palpation    Patella Tendon Left:;Tender  -LB    Medial Joint Line Bilateral:;Tender  -LB    Lateral Joint Line Bilateral:;Tender  -LB       Patellar Accessory Motions    Inferior glide WNL  -LB    Medial glide WNL  -LB    Lateral glide WNL  -LB       Knee Special Tests    Anterior drawer (ACL lesion) Negative  -LB    Posterior drawer (PCL lesion) Negative  -LB    Valgus stress (MCL lesion) Negative  -LB    Varus stress (LCL lesion) Negative  -LB       Right Lower Ext    Rt Knee Extension/Flexion AROM lacking 8 deg extension to 120 deg flexion  -LB       Left Lower Ext    Lt Knee Extension/Flexion AROM lacking 5 deg extension to 120 deg flexion  -LB       Sensation    Additional Comments B feet tingling/numbness  -LB       Lower Extremity Flexibility    Hamstrings Bilateral:;Mildly limited  -LB    Hip Flexors Bilateral:;Mildly limited  -LB    Hip External Rotators Bilateral:;Mildly limited  -LB    Hip Internal Rotators Bilateral:;WNL  -LB       Gait/Stairs (Locomotion)    [place] Level (Gait) modified independence  -LB    Assistive Device (Gait) walker, 4-wheeled  -LB    Distance in Feet (Gait) 50  -LB    Left Sided Gait Deviations --  LLE inversion with gait  -LB              User Key  (r) = Recorded By, (t) = Taken By, (c) " = Cosigned By      Initials Name Provider Type    Elvia Gottlieb PT Physical Therapist                                Therapy Education  Education Details: issued MEDBRIDGE: [id number], discussed need for strengthening, hip strength, quad strength to improve strength and safety  Given: HEP, Symptoms/condition management, Mobility training  Program: New  How Provided: Verbal, Demonstration, Written  Provided to: Patient  Level of Understanding: Teach back education performed, Verbalized, Demonstrated      PT OP Goals       Row Name 04/02/24 1000          PT Short Term Goals    STG Date to Achieve 04/16/24  -LB     STG 1 Pt will demonstrate understanding and compliance with initial HEP.  -LB     STG 1 Progress New  -LB     STG 2 Pt will deny falls due to L knee instability.  -LB     STG 2 Progress New  -LB        Long Term Goals    LTG Date to Achieve 05/02/24  -LB     LTG 1 Pt will report reduced L knee buckling from multiple x/day to < 3x/week or better.  -LB     LTG 1 Progress New  -LB     LTG 2 Pt will demonstrate improved BLE strength to 4+/5 or better B knee flexion/extension, hip flexion strength.  -LB     LTG 2 Progress New  -LB     LTG 3 Pt will demonstrate full extension B knees to allow improve stability.  -LB        Time Calculation    PT Goal Re-Cert Due Date 07/01/24  -LB               User Key  (r) = Recorded By, (t) = Taken By, (c) = Cosigned By      Initials Name Provider Type    Elvia Gottlieb PT Physical Therapist                     PT Assessment/Plan       Row Name 04/02/24 1051          PT Assessment    Functional Limitations Limitation in home management;Limitations in community activities;Performance in leisure activities;Performance in self-care ADL;Impaired gait;Decreased safety during functional activities  -LB     Impairments Impaired flexibility;Impaired lymphatic circulation;Impaired postural alignment;Joint mobility;Muscle strength;Pain;Poor body mechanics;Posture;Range of  "motion;Sensation  -LB     Assessment Comments Pt is 83 y.o. female referred to outpatient physical therapy for evaluation and treatment of  evolving  bilateral knee pain/instability L > R.  Patient presents with dec BLE strength, impaired gait, dec B knee TKE. L knee did not \"go out of joint\" during evaluation but pt reports this is her greatest concern and that it happens multiple times a day typically. PMHx consistent with hx of CVA 2023, B knee TKR > 15 years ago. Personal factors affecting her care include altered gait due to fear of instability, dec strength, dec recall. Pt demonstrates signs and symptoms  consistent with referring diagnosis.  Pt scored 41% disability on the KOS ADL. Pt is limited in their ability to participate in walking, community activities, stair negotiation. She will benefit from continued skilled PT services to address functional deficits. Thank you for this referral.  -LB     Rehab Potential Good  -LB     Patient/caregiver participated in establishment of treatment plan and goals Yes  -LB     Patient would benefit from skilled therapy intervention Yes  -LB        PT Plan    PT Frequency 1x/week;2x/week  -LB     Predicted Duration of Therapy Intervention (PT) 8-10 visits  -LB     Planned CPT's? PT EVAL LOW COMPLEXITY: 00538;PT RE-EVAL: 69825;PT THER PROC EA 15 MIN: 84183;PT THER ACT EA 15 MIN: 48674;PT MANUAL THERAPY EA 15 MIN: 94493;PT NEUROMUSC RE-EDUCATION EA 15 MIN: 40369;PT GAIT TRAINING EA 15 MIN: 84740;PT SELF CARE/HOME MGMT/TRAIN EA 15: 66982  -LB     PT Plan Comments focus on quad strengthening, B hip strengthening, gait training, progress to standing strengthening as able, be aware of pts \"joint going out of place\" L knee, slowly progress HEP once pt performs exercise well  -LB               User Key  (r) = Recorded By, (t) = Taken By, (c) = Cosigned By      Initials Name Provider Type    Elvia Gottlieb, PT Physical Therapist                       OP Exercises       Row Name " "04/02/24 0900             Subjective    Subjective Comments I need someone to help me with my knee \"going out of joint.\"  -LB         Subjective Pain    Able to rate subjective pain? yes  -LB      Pre-Treatment Pain Level 2  -LB         Total Minutes    68901 - PT Therapeutic Exercise Minutes 15  -LB         Exercise 1    Exercise Name 1 seated HS stretch  -LB      Reps 1 3  -LB      Time 1 20  -LB         Exercise 2    Exercise Name 2 QS  -LB      Sets 2 2  -LB      Reps 2 10  -LB      Time 2 5  -LB         Exercise 3    Exercise Name 3 GS  -LB      Sets 3 2  -LB      Reps 3 10  -LB      Time 3 5  -LB         Exercise 4    Exercise Name 4 HR  -LB      Sets 4 2  -LB      Reps 4 10  -LB                User Key  (r) = Recorded By, (t) = Taken By, (c) = Cosigned By      Initials Name Provider Type    Elvia Gottlieb PT Physical Therapist                                  Outcome Measure Options: Knee Outcome Score- ADL  Knee Outcome Survey Activities of Daily Living Scale  Symptoms: Pain: I have the symptom, but it does not affect my activity  Symptoms: Stiffness: I have the symptom, but it does not affect my activity  Symptoms: Swelling: I have the symptom, but it does not affect my activity  Symptoms: Giving way, buckling, or shifting of the knee: The symptom affects my activity severely  Symptoms: Weakness: The symptom affects my activity severely  Symptoms: Limping: The symptom affects my activity severely  Functional Limitations with ADL's: Walk: Activity is fairly difficult  Functional Limitations with ADL's: Go up stairs: Activity is very difficult  Functional Limitations with ADL's: Go down stairs: Activity is very difficult  Functional Limitations with ADL's: Stand: Activity is fairly difficult  Functional Limitations with ADL's: Kneel on front of your knee: I am unable to  Functional Limitations with ADL's: Squat: Activity is very difficult  Functional Limitations with ADL's: Sit with your knee bent: " Activity is minimally difficult  Functional Limitations with ADL's: Rise from a chair: Activity is somewhat difficult  Knee Outcome Summary ADL's Score: 41.43 %      Time Calculation:     Start Time: 0915  Stop Time: 1000  Time Calculation (min): 45 min  Total Timed Code Minutes- PT: 15 minute(s)  Timed Charges  52294 - PT Therapeutic Exercise Minutes: 15  Total Minutes  Timed Charges Total Minutes: 15   Total Minutes: 15     Therapy Charges for Today       Code Description Service Date Service Provider Modifiers Qty    17792833136 HC PT THER PROC EA 15 MIN 4/2/2024 Elvia Lassiter, PT GP 1    65864938691 HC PT EVAL LOW COMPLEXITY 2 4/2/2024 Elvia Lassiter, PT GP 1            PT G-Codes  Outcome Measure Options: Knee Outcome Score- ADL         Elvia Lassiter PT  4/2/2024

## 2024-04-04 RX ORDER — LEVOTHYROXINE SODIUM 112 UG/1
112 TABLET ORAL DAILY
Qty: 90 TABLET | Refills: 1 | Status: SHIPPED | OUTPATIENT
Start: 2024-04-04

## 2024-04-10 ENCOUNTER — OFFICE VISIT (OUTPATIENT)
Dept: NEUROLOGY | Facility: CLINIC | Age: 84
End: 2024-04-10
Payer: MEDICARE

## 2024-04-10 VITALS
WEIGHT: 176 LBS | BODY MASS INDEX: 30.05 KG/M2 | OXYGEN SATURATION: 94 % | DIASTOLIC BLOOD PRESSURE: 60 MMHG | HEART RATE: 62 BPM | HEIGHT: 64 IN | SYSTOLIC BLOOD PRESSURE: 114 MMHG

## 2024-04-10 DIAGNOSIS — Z86.73 HISTORY OF STROKE: Primary | ICD-10-CM

## 2024-04-10 DIAGNOSIS — R41.89 COGNITIVE IMPAIRMENT: ICD-10-CM

## 2024-04-10 PROCEDURE — 3074F SYST BP LT 130 MM HG: CPT | Performed by: PSYCHIATRY & NEUROLOGY

## 2024-04-10 PROCEDURE — 3078F DIAST BP <80 MM HG: CPT | Performed by: PSYCHIATRY & NEUROLOGY

## 2024-04-10 PROCEDURE — 99213 OFFICE O/P EST LOW 20 MIN: CPT | Performed by: PSYCHIATRY & NEUROLOGY

## 2024-04-10 PROCEDURE — 1160F RVW MEDS BY RX/DR IN RCRD: CPT | Performed by: PSYCHIATRY & NEUROLOGY

## 2024-04-10 PROCEDURE — 1159F MED LIST DOCD IN RCRD: CPT | Performed by: PSYCHIATRY & NEUROLOGY

## 2024-04-10 NOTE — PROGRESS NOTES
Chief Complaint   Patient presents with    Recent cerebrovascular accident (CVA)       Patient ID: Avelina Carr is a 83 y.o. female.    HPI: I had the pleasure of seeing your patient again today.  As you may know she is an 83-year-old female who here for the management of cognitive change.  She does have a history of stroke.  She is still having cognitive changes.  She says that she finds it difficult to recall names.  She has not had significant improvement since her last visit with us.  No new strokelike symptoms.  She does have some trouble with words.  She finds it difficult to express herself verbally.  These issues have gotten worse since things initially started for her.    The following portions of the patient's history were reviewed and updated as appropriate: allergies, current medications, past family history, past medical history, past social history, past surgical history and problem list.    Review of Systems   Musculoskeletal:  Positive for gait problem.   Neurological:  Positive for numbness (right arm). Negative for dizziness, tremors, seizures, syncope, facial asymmetry, speech difficulty, weakness, light-headedness and headaches.   Psychiatric/Behavioral:  Negative for agitation, behavioral problems, confusion, decreased concentration, dysphoric mood, hallucinations, self-injury, sleep disturbance and suicidal ideas. The patient is not nervous/anxious and is not hyperactive.         I have reviewed the review of systems above performed by my medical assistant.      Vitals:    04/10/24 1456   BP: 114/60   Pulse: 62   SpO2: 94%       Neurologic Exam     Mental Status   Oriented to person, place, and time.   Concentration: normal.   Level of consciousness: alert  Knowledge: consistent with education (No deficits found.).     Cranial Nerves     CN II   Visual fields full to confrontation.     CN III, IV, VI   Pupils are equal, round, and reactive to light.  Extraocular motions are normal.   CN III: no  CN III palsy  CN VI: no CN VI palsy    CN V   Facial sensation intact.     CN VII   Facial expression full, symmetric.     CN VIII   CN VIII normal.     CN IX, X   CN IX normal.   CN X normal.     CN XI   CN XI normal.     CN XII   CN XII normal.     Motor Exam     Strength   Right neck flexion: 5/5  Left neck flexion: 5/5  Right neck extension: 5/5  Left neck extension: 5/5  Right deltoid: 5/5  Left deltoid: 5/5  Right biceps: 5/5  Left biceps: 5/5  Right triceps: 5/5  Left triceps: 5/5  Right wrist flexion: 5/5  Left wrist flexion: 5/5  Right wrist extension: 5/5  Left wrist extension: 5/5  Right interossei: 5/5  Left interossei: 5/5  Right abdominals: 5/5  Left abdominals: 5/5  Right iliopsoas: 5/5  Left iliopsoas: 5/5  Right quadriceps: 5/5  Left quadriceps: 5/5  Right hamstrin/5  Left hamstrin/5  Right glutei: 5/5  Left glutei: 5/5  Right anterior tibial: 5/5  Left anterior tibial: 5/5  Right posterior tibial: 5/5  Left posterior tibial: 5/5  Right peroneal: 5/5  Left peroneal: 5/5  Right gastroc: 5/5  Left gastroc: 5/5    Sensory Exam   Light touch normal.   Vibration normal.     Gait, Coordination, and Reflexes     Gait  Gait: normal    Reflexes   Right brachioradialis: 2+  Left brachioradialis: 2+  Right biceps: 2+  Left biceps: 2+  Right triceps: 2+  Left triceps: 2+  Right patellar: 2+  Left patellar: 2+  Right achilles: 2+  Left achilles: 2+  Right : 2+  Left : 2+Station is normal.       Physical Exam  Vitals reviewed.   Constitutional:       Appearance: She is well-developed.   HENT:      Head: Normocephalic and atraumatic.   Eyes:      Extraocular Movements: EOM normal.      Pupils: Pupils are equal, round, and reactive to light.   Cardiovascular:      Rate and Rhythm: Normal rate and regular rhythm.   Pulmonary:      Breath sounds: Normal breath sounds.   Musculoskeletal:         General: Normal range of motion.   Skin:     General: Skin is warm.   Neurological:      Mental Status: She  is oriented to person, place, and time.      Gait: Gait is intact.      Deep Tendon Reflexes:      Reflex Scores:       Tricep reflexes are 2+ on the right side and 2+ on the left side.       Bicep reflexes are 2+ on the right side and 2+ on the left side.       Brachioradialis reflexes are 2+ on the right side and 2+ on the left side.       Patellar reflexes are 2+ on the right side and 2+ on the left side.       Achilles reflexes are 2+ on the right side and 2+ on the left side.        Procedures    Assessment/Plan: We are going to schedule her for neuropsych testing.  She has agreed to that today.  She will continue with her Plavix as scheduled.  Will see her back after testing is been completed.  A total of 25 minutes was spent face-to-face with the patient today.  Of that greater than 50% of this time was spent discussing signs and symptoms of stroke, cognitive impairment, patient education, plan of care and prognosis.         Diagnoses and all orders for this visit:    1. History of stroke (Primary)    2. Cognitive impairment  -     Ambulatory Referral to Neuropsychology           Mike Buenrostro II, MD

## 2024-05-10 DIAGNOSIS — G40.109: ICD-10-CM

## 2024-05-10 RX ORDER — GABAPENTIN 400 MG/1
CAPSULE ORAL
Qty: 180 CAPSULE | Refills: 1 | Status: SHIPPED | OUTPATIENT
Start: 2024-05-10

## 2024-05-11 DIAGNOSIS — G89.29 CHRONIC BILATERAL LOW BACK PAIN WITH RIGHT-SIDED SCIATICA: ICD-10-CM

## 2024-05-11 DIAGNOSIS — M54.41 CHRONIC BILATERAL LOW BACK PAIN WITH RIGHT-SIDED SCIATICA: ICD-10-CM

## 2024-05-13 RX ORDER — TRAMADOL HYDROCHLORIDE 50 MG/1
50 TABLET ORAL EVERY 6 HOURS PRN
Qty: 90 TABLET | Refills: 0 | Status: SHIPPED | OUTPATIENT
Start: 2024-05-13

## 2024-06-19 ENCOUNTER — OFFICE VISIT (OUTPATIENT)
Dept: FAMILY MEDICINE CLINIC | Facility: CLINIC | Age: 84
End: 2024-06-19
Payer: MEDICARE

## 2024-06-19 VITALS
RESPIRATION RATE: 20 BRPM | TEMPERATURE: 98.1 F | OXYGEN SATURATION: 93 % | SYSTOLIC BLOOD PRESSURE: 110 MMHG | HEIGHT: 64 IN | BODY MASS INDEX: 29.01 KG/M2 | DIASTOLIC BLOOD PRESSURE: 60 MMHG | HEART RATE: 66 BPM | WEIGHT: 169.9 LBS

## 2024-06-19 DIAGNOSIS — J02.9 SORE THROAT: ICD-10-CM

## 2024-06-19 DIAGNOSIS — R05.1 ACUTE COUGH: Primary | ICD-10-CM

## 2024-06-19 PROBLEM — M20.12 HALLUX VALGUS (ACQUIRED), LEFT FOOT: Status: ACTIVE | Noted: 2024-06-19

## 2024-06-19 PROBLEM — M20.11 HALLUX VALGUS (ACQUIRED), RIGHT FOOT: Status: ACTIVE | Noted: 2024-06-19

## 2024-06-19 PROBLEM — M12.811 ROTATOR CUFF ARTHROPATHY OF RIGHT SHOULDER: Status: ACTIVE | Noted: 2024-03-26

## 2024-06-19 LAB
EXPIRATION DATE: NORMAL
EXPIRATION DATE: NORMAL
FLUAV AG UPPER RESP QL IA.RAPID: NOT DETECTED
FLUBV AG UPPER RESP QL IA.RAPID: NOT DETECTED
INTERNAL CONTROL: NORMAL
INTERNAL CONTROL: NORMAL
Lab: NORMAL
Lab: NORMAL
S PYO AG THROAT QL: NEGATIVE
SARS-COV-2 AG UPPER RESP QL IA.RAPID: NOT DETECTED

## 2024-06-19 PROCEDURE — 1125F AMNT PAIN NOTED PAIN PRSNT: CPT | Performed by: FAMILY MEDICINE

## 2024-06-19 PROCEDURE — 87880 STREP A ASSAY W/OPTIC: CPT | Performed by: FAMILY MEDICINE

## 2024-06-19 PROCEDURE — 1160F RVW MEDS BY RX/DR IN RCRD: CPT | Performed by: FAMILY MEDICINE

## 2024-06-19 PROCEDURE — 99214 OFFICE O/P EST MOD 30 MIN: CPT | Performed by: FAMILY MEDICINE

## 2024-06-19 PROCEDURE — 3078F DIAST BP <80 MM HG: CPT | Performed by: FAMILY MEDICINE

## 2024-06-19 PROCEDURE — 3074F SYST BP LT 130 MM HG: CPT | Performed by: FAMILY MEDICINE

## 2024-06-19 PROCEDURE — 87428 SARSCOV & INF VIR A&B AG IA: CPT | Performed by: FAMILY MEDICINE

## 2024-06-19 PROCEDURE — 1159F MED LIST DOCD IN RCRD: CPT | Performed by: FAMILY MEDICINE

## 2024-06-19 RX ORDER — PREDNISONE 20 MG/1
40 TABLET ORAL DAILY
Qty: 10 TABLET | Refills: 0 | Status: SHIPPED | OUTPATIENT
Start: 2024-06-19 | End: 2024-06-24

## 2024-06-19 RX ORDER — BENZONATATE 100 MG/1
100 CAPSULE ORAL 3 TIMES DAILY PRN
Qty: 15 CAPSULE | Refills: 0 | Status: SHIPPED | OUTPATIENT
Start: 2024-06-19

## 2024-06-19 NOTE — PROGRESS NOTES
"Chief Complaint  Cough (X 5 days. OTC Mucinex has helped little.), Fever, and Sore Throat    Subjective    History of Present Illness    Avelina Carr presents to [facility] for Cough (X 5 days. OTC Mucinex has helped little.), Fever, and Sore Throat.  History of Present Illness     Woke up with malaise and URI symptoms Saturday morning and symptoms have progressed since that time. Has had some low grade fevers (100) in addition to sore throat, cough and congestion. Taking Mucinex, which doesn't work for more than a couple hours. Has helped with sleep at least.  has similar symptoms though no other known sick contacts. Cough is productive. UTD on flu and COVID vaccines. Has baseline asthma for which she uses albuterol and Symbicort.    Objective     Vital Signs:   /60   Pulse 66   Temp 98.1 °F (36.7 °C)   Resp 20   Ht 162.6 cm (64.02\")   Wt 77.1 kg (169 lb 14.4 oz)   SpO2 93%   BMI 29.15 kg/m²   Physical Exam  Vitals and nursing note reviewed.   Constitutional:       General: She is not in acute distress.     Appearance: Normal appearance. She is not ill-appearing.   HENT:      Right Ear: Hearing, tympanic membrane, ear canal and external ear normal.      Left Ear: Hearing, tympanic membrane, ear canal and external ear normal.      Nose: Congestion and rhinorrhea present. No mucosal edema.      Mouth/Throat:      Mouth: Mucous membranes are moist.      Pharynx: Oropharynx is clear.   Cardiovascular:      Rate and Rhythm: Normal rate and regular rhythm.      Pulses: Normal pulses.      Heart sounds: Normal heart sounds. No murmur heard.  Pulmonary:      Effort: Pulmonary effort is normal. No respiratory distress.      Breath sounds: Examination of the right-middle field reveals wheezing. Examination of the left-middle field reveals wheezing. Examination of the right-lower field reveals wheezing. Examination of the left-lower field reveals wheezing. Wheezing present. " No rales.   Neurological:      Mental Status: She is alert and oriented to person, place, and time. Mental status is at baseline.   Psychiatric:         Mood and Affect: Mood normal.         Behavior: Behavior normal.                 Assessment and Plan   Diagnoses and all orders for this visit:    1. Acute cough (Primary)  -     POCT SARS-CoV-2 + Flu Antigen ESTEBAN  -     predniSONE (DELTASONE) 20 MG tablet; Take 2 tablets by mouth Daily for 5 days.  Dispense: 10 tablet; Refill: 0  -     benzonatate (Tessalon Perles) 100 MG capsule; Take 1 capsule by mouth 3 (Three) Times a Day As Needed for Cough.  Dispense: 15 capsule; Refill: 0    2. Sore throat  -     POCT rapid strep A    Point-of-care testing is negative for flu, COVID, and strep. Will treat for possible asthma exacerbation with a prednisone burst as well as some benzonatate for cough. She will keep me updated with her progress. Anticipate resolution with time.    Recommended follow up as below. Encouraged communication via [employer] in the meantime.     Patient was given instructions and counseling regarding her condition or for health maintenance advice. Please see specific information pulled into the AVS (placed there by myself) if appropriate.    Return if symptoms worsen or fail to improve.    CARMITA Gomez MD

## 2024-07-27 DIAGNOSIS — G89.29 CHRONIC BILATERAL LOW BACK PAIN WITH RIGHT-SIDED SCIATICA: ICD-10-CM

## 2024-07-27 DIAGNOSIS — M54.41 CHRONIC BILATERAL LOW BACK PAIN WITH RIGHT-SIDED SCIATICA: ICD-10-CM

## 2024-07-29 RX ORDER — TRAMADOL HYDROCHLORIDE 50 MG/1
50 TABLET ORAL EVERY 6 HOURS PRN
Qty: 90 TABLET | Refills: 0 | Status: SHIPPED | OUTPATIENT
Start: 2024-07-29

## 2024-07-31 NOTE — THERAPY TREATMENT NOTE
Outpatient Physical Therapy Lymphedema Treatment Note  [facility]     Patient Name: Paloma Carr  : 1940  MRN: [id number]  Today's Date: 10/18/2017        Visit Date: 10/18/2017    Visit Dx:    ICD-10-CM ICD-9-CM   1. 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 Malignant neoplasm of upper-outer quadrant of right breast in female, estrogen receptor positive C50.411 174.4    Z17.0 V86.0   3. Abnormal posture R29.3 781.92   4. Chronic right shoulder pain M25.511 719.41    G89.29 338.29   5. H/O total shoulder replacement, right Z96.611 V43.61   6. Soft tissue swelling of chest wall R22.2 786.6   7. Post-mastectomy lymphedema syndrome I97.2 457.0       Patient Active Problem List   Diagnosis   • Soft tissue swelling of chest wall   • Abnormal ultrasound of breast   • Postmastectomy lymphedema syndrome   • Malignant neoplasm of upper-outer quadrant of right female breast                                PT Assessment/Plan       10/18/17 1145       PT Assessment    Assessment Comments Patient still requires verbal and tactile cueing for proper performance of exercises. She is not compliant with home program. Very limited progress at this time. Recert due next week. Will determine need for continued formal therapy at that time. Schedule bioimpedance.   -SC     PT Plan    PT Plan Comments schedule f/u bioimpedance, recert due next week.   -SC       User Key  (r) = Recorded By, (t) = Taken By, (c) = Cosigned By    Initials Name Provider Type    SC Yadira Geller, PT Physical Therapist                     Exercises       10/18/17 1145          Subjective Comments    Subjective Comments I feel like my shoulder is hurting a little worse. My right hand has started hurting since I started doing the exercises. I am having a trigger finger in my middle finger now. That is new. I would love to say the therapy is helping but my shoulder isn't loving it. After you work on me my chest feels so much better, but it  is temporary.   -SC      Subjective Pain    Able to rate subjective pain? yes  -SC      Pre-Treatment Pain Level 3  -SC      Post-Treatment Pain Level 2  -SC      Exercise 1    Exercise Name 1 see paper flowsheet in chart for greater detail, started on UBE  -SC        User Key  (r) = Recorded By, (t) = Taken By, (c) = Cosigned By    Initials Name Provider Type    SC Yadira INDIRA Geller, PT Physical Therapist                       Manual Rx (last 36 hours)      Manual Treatments       10/18/17 1145          Manual Rx 1    Manual Rx 1 Location right shoulder, chest wall  -SC      Manual Rx 1 Type PROM all planes, scar massage with vit E oil, STM, deep tissue massage, myofascial release right chest wall, pec, axilla, lateral flank, gentle MLD in region  -SC      Manual Rx 1 Duration 15 minutes  -SC        User Key  (r) = Recorded By, (t) = Taken By, (c) = Cosigned By    Initials Name Provider Type    SC Yadira Geller, PT Physical Therapist                PT OP Goals       10/18/17 1145       PT Short Term Goals    STG Date to Achieve 11/07/17  -SC     STG 1 1.Patient independent and compliant with initial home exercise program focused on diaphragmatic breathing, range of motion, flexibility to decrease edema and improve lymphatic flow for decreased edema and decreased risk of infection.  -SC     STG 1 Progress Progressing  -SC     STG 2 2.Patient demonstrate proper awareness of What is Lymphedema and 18 Steps of Prevention for improved prevention, management, care of symptoms and ease of transition to self-care of condition.  -SC     STG 2 Progress Progressing  -SC     STG 3 3.Patient independent and compliant with self-massage techniques with spouse/family member as needed for improved lymphatic drainage, decreased edema/symptoms, desensitization, decreased axillary cording, decreased risk of infection and improved transition to self-care of condition.  -SC     STG 3 Progress Progressing  -SC     STG 4 4.Patient  independent and compliant with advanced Home Exercise Program and self-care techniques for self-management of condition.  -SC     STG 4 Progress Ongoing  -SC     STG 5 5..Patient demonstrate proper awareness of Care and Air Travel safety with compression/exercise as indicated for improved safety with travel and self-care of condition.  -SC     STG 5 Progress Met  -SC     Long Term Goals    LTG Date to Achieve 12/26/17  -SC     LTG 1 1.Patient score </=18.8/100 on DASH for improved function and transition to self-management of condition.  -SC     LTG 1 Progress Ongoing  -SC     LTG 2 2.Patient independent and compliant self MLD techniques for improved mobility, skin care and lymphatic flow.  -SC     LTG 2 Progress Progressing  -SC     LTG 3 3. Pt. to have a decrease in UE circumfrential measurements of at least 2 cm.  -SC     LTG 3 Progress Ongoing  -SC     Time Calculation    PT Goal Re-Cert Due Date 12/26/17   10/26  -SC       User Key  (r) = Recorded By, (t) = Taken By, (c) = Cosigned By    Initials Name Provider Type    SC Yadira Geller PT Physical Therapist                Therapy Education       10/18/17 1145          Therapy Education    Education Details progression of therapy focused on RTC stabilization  -SC      Given HEP;Symptoms/condition management  -SC      Program Progressed  -SC      How Provided Verbal;Demonstration  -SC      Provided to Patient  -SC      Level of Understanding Teach back education performed;Verbalized;Demonstrated  -SC        User Key  (r) = Recorded By, (t) = Taken By, (c) = Cosigned By    Initials Name Provider Type    ROB Geller PT Physical Therapist                Time Calculation:   Start Time: 1145  Stop Time: 1230  Time Calculation (min): 45 min     Therapy Charges for Today     Code Description Service Date Service Provider Modifiers Qty    [id number]  PT THER PROC EA 15 MIN 10/18/2017 Yadira Geller PT GP 2    [id number] HC PT MANUAL THERAPY EA 15  MIN 10/18/2017 Yadira Geller, PT GP 1                    Yadira Coleman, PT  10/18/2017        No

## 2024-08-03 DIAGNOSIS — I63.412 CEREBROVASCULAR ACCIDENT (CVA) DUE TO EMBOLISM OF LEFT MIDDLE CEREBRAL ARTERY: Primary | ICD-10-CM

## 2024-08-05 RX ORDER — ESCITALOPRAM OXALATE 20 MG/1
20 TABLET ORAL DAILY
Qty: 90 TABLET | Refills: 1 | Status: SHIPPED | OUTPATIENT
Start: 2024-08-05 | End: 2024-08-08

## 2024-08-05 RX ORDER — FUROSEMIDE 20 MG/1
20 TABLET ORAL DAILY
Qty: 30 TABLET | Refills: 0 | Status: SHIPPED | OUTPATIENT
Start: 2024-08-05

## 2024-08-08 DIAGNOSIS — I63.412 CEREBROVASCULAR ACCIDENT (CVA) DUE TO EMBOLISM OF LEFT MIDDLE CEREBRAL ARTERY: ICD-10-CM

## 2024-08-08 RX ORDER — FUROSEMIDE 20 MG/1
20 TABLET ORAL DAILY
Qty: 90 TABLET | OUTPATIENT
Start: 2024-08-08

## 2024-08-08 RX ORDER — ESCITALOPRAM OXALATE 20 MG/1
20 TABLET ORAL DAILY
Qty: 90 TABLET | Refills: 1 | Status: SHIPPED | OUTPATIENT
Start: 2024-08-08

## 2024-08-27 DIAGNOSIS — G40.109: ICD-10-CM

## 2024-08-28 DIAGNOSIS — I63.412 CEREBROVASCULAR ACCIDENT (CVA) DUE TO EMBOLISM OF LEFT MIDDLE CEREBRAL ARTERY: ICD-10-CM

## 2024-08-28 RX ORDER — FUROSEMIDE 20 MG
20 TABLET ORAL DAILY
Qty: 30 TABLET | Refills: 0 | Status: SHIPPED | OUTPATIENT
Start: 2024-08-28

## 2024-08-28 RX ORDER — GABAPENTIN 400 MG/1
CAPSULE ORAL
Qty: 180 CAPSULE | Refills: 1 | Status: SHIPPED | OUTPATIENT
Start: 2024-08-28

## 2024-08-28 RX ORDER — LEVETIRACETAM 500 MG/1
500 TABLET ORAL 2 TIMES DAILY
Qty: 180 TABLET | Refills: 2 | Status: SHIPPED | OUTPATIENT
Start: 2024-08-28

## 2024-08-31 DIAGNOSIS — I63.412 CEREBROVASCULAR ACCIDENT (CVA) DUE TO EMBOLISM OF LEFT MIDDLE CEREBRAL ARTERY: ICD-10-CM

## 2024-09-03 RX ORDER — FUROSEMIDE 20 MG
20 TABLET ORAL DAILY
Qty: 30 TABLET | Refills: 0 | OUTPATIENT
Start: 2024-09-03

## 2024-09-16 ENCOUNTER — TELEPHONE (OUTPATIENT)
Dept: FAMILY MEDICINE CLINIC | Facility: CLINIC | Age: 84
End: 2024-09-16
Payer: MEDICARE

## 2024-09-16 DIAGNOSIS — G40.109: ICD-10-CM

## 2024-09-17 ENCOUNTER — OFFICE VISIT (OUTPATIENT)
Dept: WOUND CARE | Facility: HOSPITAL | Age: 84
End: 2024-09-17
Payer: MEDICARE

## 2024-09-26 DIAGNOSIS — I63.412 CEREBROVASCULAR ACCIDENT (CVA) DUE TO EMBOLISM OF LEFT MIDDLE CEREBRAL ARTERY: ICD-10-CM

## 2024-09-26 RX ORDER — LEVOTHYROXINE SODIUM 112 UG/1
112 TABLET ORAL DAILY
Qty: 90 TABLET | Refills: 1 | Status: SHIPPED | OUTPATIENT
Start: 2024-09-26 | End: 2024-09-27

## 2024-09-26 RX ORDER — FUROSEMIDE 20 MG
20 TABLET ORAL DAILY
Qty: 30 TABLET | Refills: 0 | Status: SHIPPED | OUTPATIENT
Start: 2024-09-26

## 2024-09-27 DIAGNOSIS — I63.412 CEREBROVASCULAR ACCIDENT (CVA) DUE TO EMBOLISM OF LEFT MIDDLE CEREBRAL ARTERY: ICD-10-CM

## 2024-09-27 RX ORDER — LEVOTHYROXINE SODIUM 112 UG/1
112 TABLET ORAL DAILY
Qty: 90 TABLET | Refills: 1 | Status: SHIPPED | OUTPATIENT
Start: 2024-09-27

## 2024-09-27 RX ORDER — CLOPIDOGREL BISULFATE 75 MG/1
75 TABLET ORAL DAILY
Qty: 90 TABLET | Refills: 1 | Status: SHIPPED | OUTPATIENT
Start: 2024-09-27

## 2024-10-08 ENCOUNTER — OFFICE VISIT (OUTPATIENT)
Dept: WOUND CARE | Facility: HOSPITAL | Age: 84
End: 2024-10-08
Payer: MEDICARE

## 2024-10-08 PROCEDURE — 97602 WOUND(S) CARE NON-SELECTIVE: CPT

## 2024-10-24 DIAGNOSIS — I63.412 CEREBROVASCULAR ACCIDENT (CVA) DUE TO EMBOLISM OF LEFT MIDDLE CEREBRAL ARTERY: ICD-10-CM

## 2024-10-24 RX ORDER — FUROSEMIDE 20 MG
20 TABLET ORAL DAILY
Qty: 30 TABLET | Refills: 0 | Status: SHIPPED | OUTPATIENT
Start: 2024-10-24

## 2024-10-29 ENCOUNTER — OFFICE VISIT (OUTPATIENT)
Dept: WOUND CARE | Facility: HOSPITAL | Age: 84
End: 2024-10-29
Payer: MEDICARE

## 2024-10-29 PROCEDURE — G0463 HOSPITAL OUTPT CLINIC VISIT: HCPCS

## 2024-11-11 ENCOUNTER — TRANSCRIBE ORDERS (OUTPATIENT)
Dept: ADMINISTRATIVE | Facility: HOSPITAL | Age: 84
End: 2024-11-11
Payer: MEDICARE

## 2024-11-11 DIAGNOSIS — Z12.31 BREAST CANCER SCREENING BY MAMMOGRAM: Primary | ICD-10-CM

## 2024-11-11 RX ORDER — PANTOPRAZOLE SODIUM 40 MG/1
40 TABLET, DELAYED RELEASE ORAL DAILY
Qty: 90 TABLET | Refills: 3 | OUTPATIENT
Start: 2024-11-11

## 2024-11-23 DIAGNOSIS — I63.412 CEREBROVASCULAR ACCIDENT (CVA) DUE TO EMBOLISM OF LEFT MIDDLE CEREBRAL ARTERY: ICD-10-CM

## 2024-11-25 RX ORDER — FUROSEMIDE 20 MG/1
20 TABLET ORAL DAILY
Qty: 30 TABLET | Refills: 0 | OUTPATIENT
Start: 2024-11-25

## 2024-12-02 RX ORDER — ATORVASTATIN CALCIUM 80 MG/1
TABLET, FILM COATED ORAL
Qty: 90 TABLET | Refills: 3 | OUTPATIENT
Start: 2024-12-02

## 2024-12-10 NOTE — TELEPHONE ENCOUNTER
Dr. Carr I have reviewed pt's chart and im not sure where she may have gotten she idea.  Labs have been fine.    Please confirm   none

## 2025-01-02 ENCOUNTER — ANESTHESIA EVENT (OUTPATIENT)
Dept: PAIN MEDICINE | Facility: HOSPITAL | Age: 85
End: 2025-01-02
Payer: MEDICARE

## 2025-01-02 ENCOUNTER — HOSPITAL ENCOUNTER (OUTPATIENT)
Dept: PAIN MEDICINE | Facility: HOSPITAL | Age: 85
Discharge: HOME | End: 2025-01-02
Admitting: ANESTHESIOLOGY
Payer: MEDICARE

## 2025-01-02 ENCOUNTER — ANESTHESIA (OUTPATIENT)
Dept: PAIN MEDICINE | Facility: HOSPITAL | Age: 85
End: 2025-01-02
Payer: MEDICARE

## 2025-01-02 DIAGNOSIS — M99.53 INTERVERTEBRAL DISC STENOSIS OF NEURAL CANAL OF LUMBAR REGION: Primary | ICD-10-CM

## 2025-01-02 PROCEDURE — G0463 HOSPITAL OUTPT CLINIC VISIT: HCPCS

## 2025-01-02 RX ORDER — ASPIRIN 325 MG
325 TABLET ORAL DAILY
COMMUNITY

## 2025-01-02 RX ORDER — LEVOTHYROXINE SODIUM 150 UG/1
150 TABLET ORAL DAILY
COMMUNITY

## 2025-01-02 NOTE — H&P
[facility]    History and Physical    Patient Name: Avelina Carr  :  1940  MRN:  [id number]  Date of Admission: 2025    Subjective     Patient is a 84 y.o. female presents with chief complaint of chronic, moderate, severe low back, buttock, and leg: bilateral pain, L>R most days.  Onset of symptoms was gradual starting  many  years ago.  Symptoms are associated/aggravated by activity, sitting, standing, or walking for more than several minutes. Symptoms improve with lying down.  Tried lesi years ago & they were not helpful at that time.  Most recent imaging is from  and shows spondylosis, narrowing most siginificant @ L3/4 but also @ L4/5 & L5/S1. Recommend LESI @ L3/4. D/w pt & spouse need to be off plavix x 7 days prior to procedure.        The following portions of the patients history were reviewed and updated as appropriate: current medications, allergies, past medical history, past surgical history, past family history, past social history, and problem list                Objective     Past Medical History:   Past Medical History:   Diagnosis Date   • Anxiety    • Arthritis    • Breast cancer    • COPD (chronic obstructive pulmonary disease)     oxygen at 2L aths   • COVID    • CVD (cardiovascular disease)    • Depression    • GERD (gastroesophageal reflux disease)    • Hx of radiation therapy    • Hyperlipidemia    • Hypothyroid    • Low back pain    • Macular degeneration    • Osteoarthritis    • Peripheral neuropathy    • Sleep apnea    • Stroke    • Vertigo      Past Surgical History:   Past Surgical History:   Procedure Laterality Date   • ADRENAL GLAND SURGERY     • BRAIN MENINGIOMA EXCISION      occipital   • BREAST BIOPSY     •  SECTION     • EMBOLECTOMY N/A 11/15/2017    Procedure: Embolectomy Mechanical;  Surgeon: Rachid Figueroa MD;  Location: [facility] OR ;  Service:    • MASTECTOMY     • RECTAL SURGERY     • REPLACEMENT TOTAL KNEE Right    •  TOTAL SHOULDER REPLACEMENT      x2     Family History:   Family History   Problem Relation Age of Onset   • Hypertension Mother    • Hyperlipidemia Mother    • Heart disease Mother    • Cancer Father    • Alzheimer's disease Sister    • No Known Problems Sister    • No Known Problems Brother    • Breast cancer Neg Hx      Social History:   Social History     Socioeconomic History   • Marital status:      Spouse name: Vitaly   • Number of children: 4   • Years of education: 12   Tobacco Use   • Smoking status: Former     Current packs/day: 0.00     Average packs/day: 2.0 packs/day for 58.0 years (116.0 ttl pk-yrs)     Types: Cigarettes     Start date:      Quit date:      Years since quittin.0   • Smokeless tobacco: Never   • Tobacco comments:     non-smoker since    Vaping Use   • Vaping status: Never Used   Substance and Sexual Activity   • Alcohol use: No   • Drug use: No   • Sexual activity: Defer       Vital Signs Range for the last 24 hours  Temperature:     Temp Source:     BP:     Pulse:     Respirations:     SPO2:     O2 Amount (l/min):     O2 Devices     Weight:           --------------------------------------------------------------------------------    Current Outpatient Medications   Medication Sig Dispense Refill   • acetaminophen (TYLENOL) 325 MG tablet Take 2 tablets by mouth Every 6 (Six) Hours As Needed for Mild Pain. Indications: Pain     • albuterol sulfate  (90 Base) MCG/ACT inhaler Inhale 2 puffs Every 4 (Four) Hours As Needed for Wheezing. Indications: Asthma     • aspirin 325 MG tablet Take 1 tablet by mouth Daily.     • atenolol (TENORMIN) 25 MG tablet Take 1 tablet by mouth 2 (Two) Times a Day. Indications: High Blood Pressure     • atorvastatin (LIPITOR) 80 MG tablet I Tablet Nightly  Indications: High Amount of Fats in the Blood 90 tablet 3   • clopidogrel (PLAVIX) 75 MG tablet TAKE 1 TABLET BY MOUTH DAILY 90 tablet 1   • Cyanocobalamin (Vitamin B 12) 500 MCG  tablet Take 2 tablets by mouth Daily. Indications: Inadequate Vitamin B12     • escitalopram (LEXAPRO) 20 MG tablet TAKE 1 TABLET BY MOUTH DAILY 90 tablet 1   • furosemide (LASIX) 20 MG tablet TAKE 1 TABLET BY MOUTH DAILY 30 tablet 0   • gabapentin (NEURONTIN) 400 MG capsule TAKE 1 CAPSULE BY MOUTH 3 TIMES A DAY; INDICATIONS: NEUROPATHIC PAIN 180 capsule 1   • Glycerin-Hypromellose- (ARTIFICIAL TEARS) 0.2-0.2-1 % solution ophthalmic solution Administer 1 drop to both eyes Every 1 (One) Hour As Needed for Dry Eyes.     • latanoprost (XALATAN) 0.005 % ophthalmic solution Administer 1 drop to the right eye Every Night. Indications: Wide-Angle Glaucoma     • levETIRAcetam (KEPPRA) 500 MG tablet TAKE 1 TABLET BY MOUTH TWICE A  tablet 2   • levothyroxine (SYNTHROID, LEVOTHROID) 150 MCG tablet Take 1 tablet by mouth Daily.     • Melatonin ER 10 MG tablet controlled-release Take 1 tablet by mouth every night at bedtime. Indications: sleep     • Multiple Vitamins-Minerals (PRESERVISION AREDS 2 PO) Take 1 capsule by mouth 2 (Two) Times a Day. Indications: macular degeneration     • NON FORMULARY Calcium 500mg  Vit D 200mg     • pantoprazole (PROTONIX) 40 MG EC tablet TAKE ONE TABLET BY MOUTH DAILY 90 tablet 3   • Probiotic Product ([place] PO) Take 1 capsule by mouth Daily. Indications: probiotic     • Symbicort 160-4.5 MCG/ACT inhaler Inhale 2 puffs 2 (Two) Times a Day. Indications: Asthma     • traMADol (ULTRAM) 50 MG tablet TAKE 1 TABLET BY MOUTH EVERY 6 HOURS AS NEEDED FOR MODERATE PAIN 90 tablet 0   • Calcium Carbonate-Vitamin D (Oyster Shell Calcium/D) 500-5 MG-MCG tablet Take 1 tablet by mouth 2 (Two) Times a Day.       No current facility-administered medications for this encounter.       --------------------------------------------------------------------------------  Assessment & Plan      Anesthesia Evaluation     Patient summary reviewed and Nursing notes reviewed   no history of  anesthetic complications:                Airway   Mallampati: II  Dental      Pulmonary    (+) COPD, asthma,sleep apnea  Cardiovascular     PT is on anticoagulation therapy    (+) hypertension, dysrhythmias Tachycardia, Atrial Fib, hyperlipidemia      Neuro/Psych  (+) seizures, CVA residual symptoms, headaches, dizziness/light headedness, numbness, psychiatric history  GI/Hepatic/Renal/Endo    (+) GERD, thyroid problem hypothyroidism    Musculoskeletal     (+) back pain, chronic pain, radiculopathy Right lower extremity and Left lower extremity  Abdominal    Substance History - negative use     OB/GYN negative ob/gyn ROS         Other   arthritis,   history of cancer           Diagnosis and Plan    Treatment Plan  ASA 3      Procedures: Lumbar Epidural Steroid Injection(LESI), With fluoroscopy,      Anesthetic plan and risks discussed with patient.      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

## 2025-01-02 NOTE — ANESTHESIA PROCEDURE NOTES
Consult  Consult performed by: Andreina Foy MD  Consult ordered by: Andreina Foy MD  Reason for consult: low back & bilateral leg pain  Assessment/Recommendations: Consult only today.  No procedure done.  Recommend L3/4 LESI.

## 2025-01-09 ENCOUNTER — HOSPITAL ENCOUNTER (OUTPATIENT)
Dept: MAMMOGRAPHY | Facility: HOSPITAL | Age: 85
Discharge: HOME | End: 2025-01-09
Admitting: NURSE PRACTITIONER
Payer: MEDICARE

## 2025-01-09 DIAGNOSIS — Z12.31 BREAST CANCER SCREENING BY MAMMOGRAM: ICD-10-CM

## 2025-01-09 PROCEDURE — 77063 BREAST TOMOSYNTHESIS BI: CPT

## 2025-01-09 PROCEDURE — 77067 SCR MAMMO BI INCL CAD: CPT

## 2025-01-14 ENCOUNTER — ANESTHESIA EVENT (OUTPATIENT)
Dept: PAIN MEDICINE | Facility: HOSPITAL | Age: 85
End: 2025-01-14
Payer: MEDICARE

## 2025-01-14 ENCOUNTER — HOSPITAL ENCOUNTER (OUTPATIENT)
Dept: PAIN MEDICINE | Facility: HOSPITAL | Age: 85
Discharge: HOME | End: 2025-01-14
Payer: MEDICARE

## 2025-01-14 ENCOUNTER — HOSPITAL ENCOUNTER (OUTPATIENT)
Dept: GENERAL RADIOLOGY | Facility: HOSPITAL | Age: 85
Discharge: HOME | End: 2025-01-14
Payer: MEDICARE

## 2025-01-14 ENCOUNTER — ANESTHESIA (OUTPATIENT)
Dept: PAIN MEDICINE | Facility: HOSPITAL | Age: 85
End: 2025-01-14
Payer: MEDICARE

## 2025-01-14 VITALS
SYSTOLIC BLOOD PRESSURE: 147 MMHG | RESPIRATION RATE: 14 BRPM | TEMPERATURE: 97.1 F | HEART RATE: 55 BPM | OXYGEN SATURATION: 95 % | DIASTOLIC BLOOD PRESSURE: 74 MMHG

## 2025-01-14 DIAGNOSIS — M99.53 INTERVERTEBRAL DISC STENOSIS OF NEURAL CANAL OF LUMBAR REGION: ICD-10-CM

## 2025-01-14 DIAGNOSIS — R52 PAIN: ICD-10-CM

## 2025-01-14 PROCEDURE — 25510000001 IOPAMIDOL 41 % SOLUTION: Performed by: ANESTHESIOLOGY

## 2025-01-14 PROCEDURE — 25010000002 METHYLPREDNISOLONE PER 80 MG: Performed by: ANESTHESIOLOGY

## 2025-01-14 PROCEDURE — 77003 FLUOROGUIDE FOR SPINE INJECT: CPT

## 2025-01-14 RX ORDER — LIDOCAINE HYDROCHLORIDE 10 MG/ML
1 INJECTION, SOLUTION INFILTRATION; PERINEURAL ONCE
Status: DISCONTINUED | OUTPATIENT
Start: 2025-01-14 | End: 2025-01-15 | Stop reason: HOSPADM

## 2025-01-14 RX ORDER — METHYLPREDNISOLONE ACETATE 80 MG/ML
80 INJECTION, SUSPENSION INTRA-ARTICULAR; INTRALESIONAL; INTRAMUSCULAR; SOFT TISSUE ONCE
Status: COMPLETED | OUTPATIENT
Start: 2025-01-14 | End: 2025-01-14

## 2025-01-14 RX ORDER — IOPAMIDOL 408 MG/ML
10 INJECTION, SOLUTION INTRATHECAL
Status: COMPLETED | OUTPATIENT
Start: 2025-01-14 | End: 2025-01-14

## 2025-01-14 RX ORDER — FENTANYL CITRATE 50 UG/ML
50 INJECTION, SOLUTION INTRAMUSCULAR; INTRAVENOUS ONCE
Status: DISCONTINUED | OUTPATIENT
Start: 2025-01-14 | End: 2025-01-15 | Stop reason: HOSPADM

## 2025-01-14 RX ORDER — MIDAZOLAM HYDROCHLORIDE 1 MG/ML
1 INJECTION, SOLUTION INTRAMUSCULAR; INTRAVENOUS ONCE AS NEEDED
Status: DISCONTINUED | OUTPATIENT
Start: 2025-01-14 | End: 2025-01-15 | Stop reason: HOSPADM

## 2025-01-14 RX ADMIN — METHYLPREDNISOLONE ACETATE 80 MG: 80 INJECTION, SUSPENSION INTRA-ARTICULAR; INTRALESIONAL; INTRAMUSCULAR; SOFT TISSUE at 12:42

## 2025-01-14 RX ADMIN — IOPAMIDOL 10 ML: 408 INJECTION, SOLUTION INTRATHECAL at 12:42

## 2025-01-14 NOTE — H&P
HISTORY:    Seen in consult by Dr. Foy earlier this month.  Lumbar back pain radiating bilaterally into the buttock and legs.  Aggravated by sitting and standing.  Conservative therapy includes gabapentin Tylenol  Current Outpatient Medications on File Prior to Encounter   Medication Sig Dispense Refill    acetaminophen (TYLENOL) 325 MG tablet Take 2 tablets by mouth Every 6 (Six) Hours As Needed for Mild Pain. Indications: Pain      albuterol sulfate  (90 Base) MCG/ACT inhaler Inhale 2 puffs Every 4 (Four) Hours As Needed for Wheezing. Indications: Asthma      aspirin 325 MG tablet Take 1 tablet by mouth Daily.      atenolol (TENORMIN) 25 MG tablet Take 1 tablet by mouth 2 (Two) Times a Day. Indications: High Blood Pressure      atorvastatin (LIPITOR) 80 MG tablet I Tablet Nightly  Indications: High Amount of Fats in the Blood 90 tablet 3    Calcium Carbonate-Vitamin D (Oyster Shell Calcium/D) 500-5 MG-MCG tablet Take 1 tablet by mouth 2 (Two) Times a Day.      clopidogrel (PLAVIX) 75 MG tablet TAKE 1 TABLET BY MOUTH DAILY 90 tablet 1    Cyanocobalamin (Vitamin B 12) 500 MCG tablet Take 2 tablets by mouth Daily. Indications: Inadequate Vitamin B12      escitalopram (LEXAPRO) 20 MG tablet TAKE 1 TABLET BY MOUTH DAILY 90 tablet 1    furosemide (LASIX) 20 MG tablet TAKE 1 TABLET BY MOUTH DAILY 30 tablet 0    gabapentin (NEURONTIN) 400 MG capsule TAKE 1 CAPSULE BY MOUTH 3 TIMES A DAY; INDICATIONS: NEUROPATHIC PAIN 180 capsule 1    Glycerin-Hypromellose- (ARTIFICIAL TEARS) 0.2-0.2-1 % solution ophthalmic solution Administer 1 drop to both eyes Every 1 (One) Hour As Needed for Dry Eyes.      latanoprost (XALATAN) 0.005 % ophthalmic solution Administer 1 drop to the right eye Every Night. Indications: Wide-Angle Glaucoma      levETIRAcetam (KEPPRA) 500 MG tablet TAKE 1 TABLET BY MOUTH TWICE A  tablet 2    levothyroxine (SYNTHROID, LEVOTHROID) 150 MCG tablet Take 1 tablet by mouth Daily.       Melatonin ER 10 MG tablet controlled-release Take 1 tablet by mouth every night at bedtime. Indications: sleep      Multiple Vitamins-Minerals (PRESERVISION AREDS 2 PO) Take 1 capsule by mouth 2 (Two) Times a Day. Indications: macular degeneration      NON FORMULARY Calcium 500mg  Vit D 200mg      pantoprazole (PROTONIX) 40 MG EC tablet TAKE ONE TABLET BY MOUTH DAILY 90 tablet 3    Probiotic Product ([employer] PO) Take 1 capsule by mouth Daily. Indications: probiotic      Symbicort 160-4.5 MCG/ACT inhaler Inhale 2 puffs 2 (Two) Times a Day. Indications: Asthma      traMADol (ULTRAM) 50 MG tablet TAKE 1 TABLET BY MOUTH EVERY 6 HOURS AS NEEDED FOR MODERATE PAIN 90 tablet 0     No current facility-administered medications on file prior to encounter.       Past Medical History:   Diagnosis Date    Anxiety     Arthritis     Breast cancer 2014    COPD (chronic obstructive pulmonary disease)     oxygen at 2L aths    COVID     CVD (cardiovascular disease)     Depression     GERD (gastroesophageal reflux disease)     Hx of radiation therapy     Hyperlipidemia     Hypothyroid     Low back pain     Macular degeneration     Osteoarthritis     Peripheral neuropathy     Sleep apnea     Stroke     Vertigo        No hematologic infectious or constitutional symptoms  Positive TEOFILO screen/DX:  2 or more mitigating factors  Recovery in non-supine position, no use of neuromuscular blockade        Exam:  There were no vitals taken for this visit.  Airway Mallampatti 2  Alert and oriented      Diagnosis:  Post-Op Diagnosis Codes:     * Lumbar radiculopathy [M54.16]    Plan:  Lumbar 3 epidural steroid injection under fluoroscopic guidance    I have encouraged them to continue:  1.  Physical therapy exercises at home as prescribed by physical therapy or from the pain clinic handout.  Continuation of these exercises every day, or multiple times per week, even when the patient has good pain relief, was stressed to the patient as a  preventative measure to decrease the frequency and severity of future pain episodes.  2.  Continue pain medicines as already prescribed.  If patient not currently taking any, it is recommended to begin Acetaminophen 1000 mg po q 8 hours.  If other medicines containing Acetaminophen are currently prescribed, maintain daily dose at 3000mg.    3.  If they can tolerate NSAIDS, it is recommended to take Ibuprofen 600 mg po q 6 hours for 7 days during pain exacerbations.   Alternatively, they may substitute an NSAID of their choice (e.g. Aleve)  4.  Heat and ice to the affected area as tolerated for pain control.   5.  Low impact exercise such as walking or water exercise was recommended to maintain overall health and aid in weight control.   6.  Follow up as needed for subsequent injections.

## 2025-01-14 NOTE — ANESTHESIA PROCEDURE NOTES
PAIN Epidural block      Patient reassessed immediately prior to procedure    Patient location during procedure: pain clinic  Indication:procedure for pain  Performed By  Anesthesiologist: Dale Roberts MD  Preanesthetic Checklist  Completed: patient identified and risks and benefits discussed  Additional Notes  Diagnosis:     Post-Op Diagnosis Codes:     * Lumbar radiculopathy [M54.16]    Sedation:  none    Sedation time:    A lumbar epidural steroid injection with fluoroscopic guidance and an Isovue dye epidurogram was performed.  Under fluoroscopic guidance, the epidural space was identified and accessed, confirmed by loss of resistance to saline followed by injection of Isovue dye, which shows a good epidurogram.  The above medications were injected uneventfully.    Prep:  Pt Position:prone  Sterile Tech:cap, gloves, mask and sterile barrier  Prep:chlorhexidine gluconate and isopropyl alcohol  Monitoring:blood pressure monitoring, continuous pulse oximetry and EKG  Procedure:Sedation: no     Approach:right paramedian  Guidance: fluoroscopy  Location:lumbar  Level:3-4  Needle Type:Tuohy  Needle Gauge:20  Aspiration:negative  Medications:  Preservative Free Saline:1mL  Isovue:1mL  Comments:Depomedrol 80 mg  Post Assessment:  Pt Tolerance:patient tolerated the procedure well with no apparent complications  Complications:no

## 2025-01-14 NOTE — DISCHARGE INSTRUCTIONS
EPIDURAL STEROID INJECTION          An epidural steroid injection is a shot of steroid medicine and numbing medicine that is given into the space between the spinal cord and the bones of the back (epidural space).  The injection helps relieve pain by an irritated or swollen nerve root.    TELL YOUR HEALTH CARE PROVIDER ABOUT:  Any allergies you have  All medicines you are taking including any over the counter medicines  Any blood disorders you have  Any surgeries you have had  Any medical conditions you have  Whether you are pregnant or may be pregnant    WHAT ARE THE RISK?  Generally, this is a safe procedure. However,problems may occur, including  Headache  Bleeding  Infection  Allergic Reaction  Nerve Damage    WHAT CAN I EXPECT AFTER THE PROCEDURE?    INJECTION SITE  Remove the Band-Aid/s after 24 hours  Check your injection site every day for signs of infection.  Check for:             Redness             Bleeding (small amt is normal)             Warmth             Pus or bad odor  Some numbness may be experienced for several hours following the procedure.  Avoid using heat on the injection site for 24 hours. You may use ice intermittently if needed by placing a         towel between your skin and the ice bag and using the ice for 20 minutes 2-3 times a day.  Do not take baths, swim or use a hot tub for 24 hours.    ACTIVITY  No strenuous activity for 24 hours then return to normal activity as tolerated.  If your leg is numb, no driving until full sensation and strength has returned.    GENERAL INSTRUCTIONS:  The injection site may feel numb, use ice with caution if numbness is present and no heat for 24 hours or until numbness is gone.   If you have numbness or weakness in your arm or leg, use those areas with caution until normal sensation returns.  It is not uncommon to notice an increase in discomfort or a change in the location of discomfort for 3-4 days after the procedure.  If discomfort is noticed  at the injection site, ice may be            applied to that area for 20 min 2-3 times a day.  Take the pain medicine your physician has prescribed or over the counter pain relievers as long as you do not have any contraindications.  If you are a diabetic, monitor your blood sugar closely.  The steroids used in your procedure may increase your blood sugar level up to 36 hours after the injection.  If your blood sugar is greater than 250, call the physician that helps you monitor your blood sugar.  Keep all follow-up visits as scheduled by your health care provider. This is important.    CONTACT OUR OFFICE IF:  You have any of these signs of infection            -Redness, swelling, or warmth around your injection site.            -Fluid or blood coming from your injection site (small amt of blood is normal)            -Pus or a bad odor from your injection site            -A fever  You develop a severe headache or a stiff neck  You lose control of your bladder or bowel movements      PAIN MANAGEMENT CENTER HOURS   Monday-Friday 7:30 am. - 4:00 pm.  For any problem related to your procedure we can be reached at [phone].  If you experience an emergency with your procedure, call [phone] or go to the emergency room.      What is [employer]?  [employer] is a fitness and wellness program offered at no additional cost to seniors 65+ on eligible Medicare plans that helps you get active, get fit, and connect with others.  The program is designed for all levels and abilities and provides access to online and in-person classes, over 15,000 fitness locations, and health & wellness discounts.  Before starting any exercise program, consult with your primary care provider.  For additional information and to check eligibility:  tools.[facility]

## 2025-04-17 ENCOUNTER — ANESTHESIA EVENT (OUTPATIENT)
Dept: PAIN MEDICINE | Facility: HOSPITAL | Age: 85
End: 2025-04-17
Payer: MEDICARE

## 2025-04-17 ENCOUNTER — HOSPITAL ENCOUNTER (OUTPATIENT)
Dept: GENERAL RADIOLOGY | Facility: HOSPITAL | Age: 85
Discharge: HOME | End: 2025-04-17
Payer: MEDICARE

## 2025-04-17 ENCOUNTER — HOSPITAL ENCOUNTER (OUTPATIENT)
Dept: PAIN MEDICINE | Facility: HOSPITAL | Age: 85
Discharge: HOME | End: 2025-04-17
Payer: MEDICARE

## 2025-04-17 ENCOUNTER — ANESTHESIA (OUTPATIENT)
Dept: PAIN MEDICINE | Facility: HOSPITAL | Age: 85
End: 2025-04-17
Payer: MEDICARE

## 2025-04-17 VITALS
HEART RATE: 59 BPM | RESPIRATION RATE: 14 BRPM | WEIGHT: 159 LBS | OXYGEN SATURATION: 94 % | TEMPERATURE: 97.5 F | BODY MASS INDEX: 27.28 KG/M2 | DIASTOLIC BLOOD PRESSURE: 91 MMHG | SYSTOLIC BLOOD PRESSURE: 167 MMHG

## 2025-04-17 DIAGNOSIS — M48.062 SPINAL STENOSIS OF LUMBAR REGION WITH NEUROGENIC CLAUDICATION: Primary | ICD-10-CM

## 2025-04-17 DIAGNOSIS — R52 PAIN: ICD-10-CM

## 2025-04-17 DIAGNOSIS — M99.53 INTERVERTEBRAL DISC STENOSIS OF NEURAL CANAL OF LUMBAR REGION: ICD-10-CM

## 2025-04-17 PROCEDURE — 25510000001 IOPAMIDOL 41 % SOLUTION: Performed by: ANESTHESIOLOGY

## 2025-04-17 PROCEDURE — 77003 FLUOROGUIDE FOR SPINE INJECT: CPT

## 2025-04-17 PROCEDURE — 25010000002 METHYLPREDNISOLONE PER 80 MG: Performed by: ANESTHESIOLOGY

## 2025-04-17 RX ORDER — LIDOCAINE HYDROCHLORIDE 10 MG/ML
1 INJECTION, SOLUTION INFILTRATION; PERINEURAL ONCE
Status: DISCONTINUED | OUTPATIENT
Start: 2025-04-17 | End: 2025-04-18 | Stop reason: HOSPADM

## 2025-04-17 RX ORDER — MIDAZOLAM HYDROCHLORIDE 1 MG/ML
1 INJECTION, SOLUTION INTRAMUSCULAR; INTRAVENOUS ONCE AS NEEDED
Status: DISCONTINUED | OUTPATIENT
Start: 2025-04-17 | End: 2025-04-18 | Stop reason: HOSPADM

## 2025-04-17 RX ORDER — METHYLPREDNISOLONE ACETATE 80 MG/ML
80 INJECTION, SUSPENSION INTRA-ARTICULAR; INTRALESIONAL; INTRAMUSCULAR; SOFT TISSUE ONCE
Status: COMPLETED | OUTPATIENT
Start: 2025-04-17 | End: 2025-04-17

## 2025-04-17 RX ORDER — FENTANYL CITRATE 50 UG/ML
50 INJECTION, SOLUTION INTRAMUSCULAR; INTRAVENOUS ONCE
Status: DISCONTINUED | OUTPATIENT
Start: 2025-04-17 | End: 2025-04-18 | Stop reason: HOSPADM

## 2025-04-17 RX ORDER — IOPAMIDOL 408 MG/ML
10 INJECTION, SOLUTION INTRATHECAL
Status: COMPLETED | OUTPATIENT
Start: 2025-04-17 | End: 2025-04-17

## 2025-04-17 RX ADMIN — IOPAMIDOL 10 ML: 408 INJECTION, SOLUTION INTRATHECAL at 13:00

## 2025-04-17 RX ADMIN — METHYLPREDNISOLONE ACETATE 80 MG: 80 INJECTION, SUSPENSION INTRA-ARTICULAR; INTRALESIONAL; INTRAMUSCULAR; SOFT TISSUE at 13:01

## 2025-04-17 NOTE — DISCHARGE INSTRUCTIONS
EPIDURAL STEROID INJECTION          An epidural steroid injection is a shot of steroid medicine and numbing medicine that is given into the space between the spinal cord and the bones of the back (epidural space).  The injection helps relieve pain by an irritated or swollen nerve root.    TELL YOUR HEALTH CARE PROVIDER ABOUT:  Any allergies you have  All medicines you are taking including any over the counter medicines  Any blood disorders you have  Any surgeries you have had  Any medical conditions you have  Whether you are pregnant or may be pregnant    WHAT ARE THE RISK?  Generally, this is a safe procedure. However,problems may occur, including  Headache  Bleeding  Infection  Allergic Reaction  Nerve Damage    WHAT CAN I EXPECT AFTER THE PROCEDURE?    INJECTION SITE  Remove the Band-Aid/s after 24 hours  Check your injection site every day for signs of infection.  Check for:             Redness             Bleeding (small amt is normal)             Warmth             Pus or bad odor  Some numbness may be experienced for several hours following the procedure.  Avoid using heat on the injection site for 24 hours. You may use ice intermittently if needed by placing a         towel between your skin and the ice bag and using the ice for 20 minutes 2-3 times a day.  Do not take baths, swim or use a hot tub for 24 hours.    ACTIVITY  No strenuous activity for 24 hours then return to normal activity as tolerated.  If your leg is numb, no driving until full sensation and strength has returned.    GENERAL INSTRUCTIONS:  The injection site may feel numb, use ice with caution if numbness is present and no heat for 24 hours or until numbness is gone.   If you have numbness or weakness in your arm or leg, use those areas with caution until normal sensation returns.  It is not uncommon to notice an increase in discomfort or a change in the location of discomfort for 3-4 days after the procedure.  If discomfort is noticed  at the injection site, ice may be            applied to that area for 20 min 2-3 times a day.  Take the pain medicine your physician has prescribed or over the counter pain relievers as long as you do not have any contraindications.  If you are a diabetic, monitor your blood sugar closely.  The steroids used in your procedure may increase your blood sugar level up to 36 hours after the injection.  If your blood sugar is greater than 250, call the physician that helps you monitor your blood sugar.  Keep all follow-up visits as scheduled by your health care provider. This is important.    CONTACT OUR OFFICE IF:  You have any of these signs of infection            -Redness, swelling, or warmth around your injection site.            -Fluid or blood coming from your injection site (small amt of blood is normal)            -Pus or a bad odor from your injection site            -A fever  You develop a severe headache or a stiff neck  You lose control of your bladder or bowel movements      PAIN MANAGEMENT [facility]ENTER HOURS   Monday-Friday 7:30 am. - 4:00 pm.  For any problem related to your procedure we can be reached at [phone].  If you experience an emergency with your procedure, call [phone] or go to the emergency room.    Back Exercises  These exercises help to make your trunk and back strong. They also help to keep the lower back flexible. Doing these exercises can help to prevent or lessen pain in your lower back.  If you have back pain, try to do these exercises 2-3 times each day or as told by your doctor.  As you get better, do the exercises once each day. Repeat the exercises more often as told by your doctor.  To stop back pain from coming back, do the exercises once each day, or as told by your doctor.  Do exercises exactly as told by your doctor. Stop right away if you feel sudden pain or your pain gets worse.  Exercises  Single knee to chest  Do these steps 3-5 times in a row for each leg:  Lie on your  back on a firm bed or the floor with your legs stretched out.  Bring one knee to your chest.  Grab your knee or thigh with both hands and hold it in place.  Pull on your knee until you feel a gentle stretch in your lower back or butt.  Keep doing the stretch for 10-30 seconds.  Slowly let go of your leg and straighten it.  Pelvic tilt  Do these steps 5-10 times in a row:  Lie on your back on a firm bed or the floor with your legs stretched out.  Bend your knees so they point up to the ceiling. Your feet should be flat on the floor.  Tighten your lower belly (abdomen) muscles to press your lower back against the floor. This will make your tailbone point up to the ceiling instead of pointing down to your feet or the floor.  Stay in this position for 5-10 seconds while you gently tighten your muscles and breathe evenly.  Cat-cow  Do these steps until your lower back bends more easily:  Get on your hands and knees on a firm bed or the floor. Keep your hands under your shoulders, and keep your knees under your hips. You may put padding under your knees.  Let your head hang down toward your chest. Tighten (contract) the muscles in your belly. Point your tailbone toward the floor so your lower back becomes rounded like the back of a cat.  Stay in this position for 5 seconds.  Slowly lift your head. Let the muscles of your belly relax. Point your tailbone up toward the ceiling so your back forms a sagging arch like the back of a cow.  Stay in this position for 5 seconds.     Press-ups  Do these steps 5-10 times in a row:  Lie on your belly (face-down) on a firm bed or the floor.  Place your hands near your head, about shoulder-width apart.  While you keep your back relaxed and keep your hips on the floor, slowly straighten your arms to raise the top half of your body and lift your shoulders. Do not use your back muscles. You may change where you place your hands to make yourself more comfortable.  Stay in this position for  5 seconds. Keep your back relaxed.  Slowly return to lying flat on the floor.     Bridges  Do these steps 10 times in a row:  Lie on your back on a firm bed or the floor.  Bend your knees so they point up to the ceiling. Your feet should be flat on the floor. Your arms should be flat at your sides, next to your body.  Tighten your butt muscles and lift your butt off the floor until your waist is almost as high as your knees. If you do not feel the muscles working in your butt and the back of your thighs, slide your feet 1-2 inches (2.5-5 cm) farther away from your butt.  Stay in this position for 3-5 seconds.  Slowly lower your butt to the floor, and let your butt muscles relax.  If this exercise is too easy, try doing it with your arms crossed over your chest.  Belly crunches  Do these steps 5-10 times in a row:  Lie on your back on a firm bed or the floor with your legs stretched out.  Bend your knees so they point up to the ceiling. Your feet should be flat on the floor.  Cross your arms over your chest.  Tip your chin a little bit toward your chest, but do not bend your neck.  Tighten your belly muscles and slowly raise your chest just enough to lift your shoulder blades a tiny bit off the floor. Avoid raising your body higher than that because it can put too much stress on your lower back.  Slowly lower your chest and your head to the floor.  Back lifts  Do these steps 5-10 times in a row:  Lie on your belly (face-down) with your arms at your sides, and rest your forehead on the floor.  Tighten the muscles in your legs and your butt.  Slowly lift your chest off the floor while you keep your hips on the floor. Keep the back of your head in line with the curve in your back. Look at the floor while you do this.  Stay in this position for 3-5 seconds.  Slowly lower your chest and your face to the floor.  Contact a doctor if:  Your back pain gets a lot worse when you do an exercise.  Your back pain does not get  better within 2 hours after you exercise.  If you have any of these problems, stop doing the exercises. Do not do them again unless your doctor says it is okay.  Get help right away if:  You have sudden, very bad back pain. If this happens, stop doing the exercises. Do not do them again unless your doctor says it is okay.  This information is not intended to replace advice given to you by your health care provider. Make sure you discuss any questions you have with your health care provider.  Document Revised: 03/02/2022 Document Reviewed: 03/02/2022  [employer] Patient Education © 2024 [employer]    What is [employer]?  [employer] is a fitness and wellness program offered at no additional cost to seniors 65+ on eligible Medicare plans that helps you get active, get fit, and connect with others.  The program is designed for all levels and abilities and provides access to online and in-person classes, over 15,000 fitness locations, and health & wellness discounts.  Before starting any exercise program, consult with your primary care provider.  For additional information and to check eligibility:  [employer]

## 2025-04-17 NOTE — H&P
[facility]    History and Physical    Patient Name: Avelina Carr  :  1940  MRN:  [id number]  Date of Admission: 2025    Subjective     Patient is a 85 y.o. female presents with chief complaint of chronic, moderate, severe low back, buttock, and leg: bilateral pain, L>R most days.  Onset of symptoms was gradual starting  many  years ago.  Symptoms are associated/aggravated by activity, sitting, standing, or walking for more than several minutes. Symptoms improve with lying down.  Tried lesi years ago & they were not helpful at that time.  Most recent imaging is from  and shows spondylosis, narrowing most siginificant @ L3/4 but also @ L4/5 & L5/S1. previous LESI @ L3/4 w/ no relief at all.  Conservative therapy tried in interim.  Will proceed at a different level today.  D/w pt/family member that given h/o multiple lesi w/o benefit that if today's lesi doesn't prove helpful would refer back to [facility] for further interventional options or PCP for additional medication management.  Off plavix x 7 days.      The following portions of the patients history were reviewed and updated as appropriate: current medications, allergies, past medical history, past surgical history, past family history, past social history, and problem list          Objective     Past Medical History:   Past Medical History:   Diagnosis Date   • Anxiety    • Arthritis    • Breast cancer    • COPD (chronic obstructive pulmonary disease)     oxygen at 2L aths   • COVID    • CVD (cardiovascular disease)    • Depression    • GERD (gastroesophageal reflux disease)    • Hx of radiation therapy    • Hyperlipidemia    • Hypothyroid    • Low back pain    • Macular degeneration    • Osteoarthritis    • Peripheral neuropathy    • Sleep apnea    • Stroke    • Vertigo      Past Surgical History:   Past Surgical History:   Procedure Laterality Date   • ADRENAL GLAND SURGERY     • BRAIN MENINGIOMA EXCISION      occipital   • BREAST  BIOPSY     •  SECTION     • EMBOLECTOMY N/A 11/15/2017    Procedure: Embolectomy Mechanical;  Surgeon: Rachid Figueroa MD;  Location: [facility] ;  Service:    • MASTECTOMY     • RECTAL SURGERY     • REPLACEMENT TOTAL KNEE Right    • TOTAL SHOULDER REPLACEMENT      x2     Family History:   Family History   Problem Relation Age of Onset   • Hypertension Mother    • Hyperlipidemia Mother    • Heart disease Mother    • Cancer Father    • Alzheimer's disease Sister    • No Known Problems Sister    • No Known Problems Brother    • Breast cancer Neg Hx      Social History:   Social History     Socioeconomic History   • Marital status:      Spouse name: Vitaly   • Number of children: 4   • Years of education: 12   Tobacco Use   • Smoking status: Former     Current packs/day: 0.00     Average packs/day: 2.0 packs/day for 58.0 years (116.0 ttl pk-yrs)     Types: Cigarettes     Start date:      Quit date:      Years since quittin.2   • Smokeless tobacco: Never   • Tobacco comments:     non-smoker since    Vaping Use   • Vaping status: Never Used   Substance and Sexual Activity   • Alcohol use: No   • Drug use: No   • Sexual activity: Defer       Vital Signs Range for the last 24 hours  Temperature:     Temp Source:     BP:     Pulse:     Respirations:     SPO2:     O2 Amount (l/min):     O2 Devices     Weight: Weight:  [72.1 kg (159 lb)] 72.1 kg (159 lb)     Flowsheet Rows      Flowsheet Row First Filed Value   Admission Height --   Admission Weight 72.1 kg (159 lb) Documented at 2025 1241            --------------------------------------------------------------------------------    Current Outpatient Medications   Medication Sig Dispense Refill   • acetaminophen (TYLENOL) 325 MG tablet Take 2 tablets by mouth Every 6 (Six) Hours As Needed for Mild Pain. Indications: Pain     • albuterol sulfate  (90 Base) MCG/ACT inhaler Inhale 2 puffs Every 4 (Four) Hours As  Needed for Wheezing. Indications: Asthma     • aspirin 325 MG tablet Take 1 tablet by mouth Daily.     • atenolol (TENORMIN) 25 MG tablet Take 1 tablet by mouth 2 (Two) Times a Day. Indications: High Blood Pressure     • atorvastatin (LIPITOR) 80 MG tablet I Tablet Nightly  Indications: High Amount of Fats in the Blood 90 tablet 3   • Calcium Carbonate-Vitamin D (Oyster Shell Calcium/D) 500-5 MG-MCG tablet Take 1 tablet by mouth 2 (Two) Times a Day.     • clopidogrel (PLAVIX) 75 MG tablet TAKE 1 TABLET BY MOUTH DAILY 90 tablet 1   • Cyanocobalamin (Vitamin B 12) 500 MCG tablet Take 2 tablets by mouth Daily. Indications: Inadequate Vitamin B12     • escitalopram (LEXAPRO) 20 MG tablet TAKE 1 TABLET BY MOUTH DAILY 90 tablet 1   • furosemide (LASIX) 20 MG tablet TAKE 1 TABLET BY MOUTH DAILY 30 tablet 0   • gabapentin (NEURONTIN) 400 MG capsule TAKE 1 CAPSULE BY MOUTH 3 TIMES A DAY; INDICATIONS: NEUROPATHIC PAIN 180 capsule 1   • Glycerin-Hypromellose- (ARTIFICIAL TEARS) 0.2-0.2-1 % solution ophthalmic solution Administer 1 drop to both eyes Every 1 (One) Hour As Needed for Dry Eyes.     • latanoprost (XALATAN) 0.005 % ophthalmic solution Administer 1 drop to the right eye Every Night. Indications: Wide-Angle Glaucoma     • levETIRAcetam (KEPPRA) 500 MG tablet TAKE 1 TABLET BY MOUTH TWICE A  tablet 2   • levothyroxine (SYNTHROID, LEVOTHROID) 150 MCG tablet Take 1 tablet by mouth Daily.     • Melatonin ER 10 MG tablet controlled-release Take 1 tablet by mouth every night at bedtime. Indications: sleep     • Multiple Vitamins-Minerals (PRESERVISION AREDS 2 PO) Take 1 capsule by mouth 2 (Two) Times a Day. Indications: macular degeneration     • NON FORMULARY Calcium 500mg  Vit D 200mg     • pantoprazole (PROTONIX) 40 MG EC tablet TAKE ONE TABLET BY MOUTH DAILY 90 tablet 3   • Probiotic Product ([employer] PO) Take 1 capsule by mouth Daily. Indications: probiotic     • Symbicort 160-4.5 MCG/ACT  inhaler Inhale 2 puffs 2 (Two) Times a Day. Indications: Asthma     • traMADol (ULTRAM) 50 MG tablet TAKE 1 TABLET BY MOUTH EVERY 6 HOURS AS NEEDED FOR MODERATE PAIN 90 tablet 0     Current Facility-Administered Medications   Medication Dose Route Frequency Provider Last Rate Last Admin   • fentaNYL citrate (PF) (SUBLIMAZE) injection 50 mcg  50 mcg Intravenous Once Andreina Foy MD       • iopamidol (ISOVUE-M 200) injection 41%  10 mL Epidural Once in imaging Andreina Foy MD       • lidocaine (XYLOCAINE) 1 % injection 1 mL  1 mL Intradermal Once Andreina Foy MD       • methylPREDNISolone acetate (DEPO-medrol) injection 80 mg  80 mg Epidural Once Andreina Foy MD       • midazolam (VERSED) injection 1 mg  1 mg Intravenous Once PRN Andriena Foy MD           --------------------------------------------------------------------------------  Assessment & Plan      Anesthesia Evaluation     Patient summary reviewed and Nursing notes reviewed                Airway   Mallampati: II  Dental      Pulmonary    (+) COPD, asthma,sleep apnea  Cardiovascular     (+) hypertension, dysrhythmias, hyperlipidemia      Neuro/Psych  (+) seizures, CVA, headaches, dizziness/light headedness, numbness, psychiatric history Depression and Anxiety  GI/Hepatic/Renal/Endo    (+) GERD, thyroid problem hypothyroidism    Musculoskeletal     (+) back pain, chronic pain  Abdominal    Substance History - negative use     OB/GYN negative ob/gyn ROS         Other   arthritis,   history of cancer             Diagnosis and Plan    Treatment Plan  ASA 3   Patient has had previous injection/procedure with 0% improvement.   Procedures: Lumbar Epidural Steroid Injection(LESI), With fluoroscopy,      Anesthetic plan and risks discussed with patient.        Diagnosis     * Lumbar radiculopathy [M54.16]

## 2025-04-17 NOTE — ANESTHESIA PROCEDURE NOTES
PAIN Epidural block    Pre-sedation assessment completed: 4/17/2025 12:52 PM    Patient reassessed immediately prior to procedure    Patient location during procedure: pain clinic  Start Time: 4/17/2025 12:52 PM  Stop Time: 4/17/2025 1:04 PM  Indication:procedure for pain  Performed By  Anesthesiologist: Andreina Foy MD  Preanesthetic Checklist  Completed: patient identified, IV checked, site marked, risks and benefits discussed, surgical consent, monitors and equipment checked, pre-op evaluation and timeout performed  Additional Notes  Fluoro used.    Prep:  Pt Position:prone  Sterile Tech:cap, gloves, mask and sterile barrier  Prep:chlorhexidine gluconate and isopropyl alcohol  Monitoring:blood pressure monitoring, continuous pulse oximetry and EKG  Procedure:Sedation: no     Approach:left paramedian  Guidance: fluoroscopy  Location:lumbar  Level:4-5  Needle Type:Tuohy  Needle Gauge:20  Aspiration:negative  Medications:  Preservative Free Saline:3mL  Isovue:1mL  Comments:Dye spread c/w epidurogram  Dx: lumbar radiculopathyDepomedrol:80  Post Assessment:  Dressing:occlusive dressing applied  Pt Tolerance:patient tolerated the procedure well with no apparent complications  Complications:no

## 2025-04-18 DIAGNOSIS — I65.23 BILATERAL CAROTID ARTERY STENOSIS: Primary | ICD-10-CM

## 2025-04-29 ENCOUNTER — OFFICE VISIT (OUTPATIENT)
Dept: NEUROLOGY | Facility: CLINIC | Age: 85
End: 2025-04-29
Payer: MEDICARE

## 2025-04-29 VITALS
OXYGEN SATURATION: 94 % | BODY MASS INDEX: 28 KG/M2 | HEIGHT: 64 IN | HEART RATE: 55 BPM | SYSTOLIC BLOOD PRESSURE: 124 MMHG | DIASTOLIC BLOOD PRESSURE: 80 MMHG | WEIGHT: 164 LBS

## 2025-04-29 DIAGNOSIS — R41.89 COGNITIVE IMPAIRMENT: Primary | ICD-10-CM

## 2025-04-29 DIAGNOSIS — Z86.73 HISTORY OF STROKE: ICD-10-CM

## 2025-04-29 PROCEDURE — 1160F RVW MEDS BY RX/DR IN RCRD: CPT | Performed by: PSYCHIATRY & NEUROLOGY

## 2025-04-29 PROCEDURE — 99214 OFFICE O/P EST MOD 30 MIN: CPT | Performed by: PSYCHIATRY & NEUROLOGY

## 2025-04-29 PROCEDURE — 1159F MED LIST DOCD IN RCRD: CPT | Performed by: PSYCHIATRY & NEUROLOGY

## 2025-04-29 NOTE — PROGRESS NOTES
Chief Complaint   Patient presents with    History of stroke    Cognitive Impairment       Patient ID: Avelina Carr is a 85 y.o. female.    HPI:  I had the pleasure of seeing your patient again today. As you may know she is an 85-year-old female who here for the management of cognitive impairment and history of stroke. She does have a history of stroke. She is still having cognitive changes. She says that she finds it difficult to recall names. She has not had significant improvement since her last visit with us. No new strokelike symptoms. She does have some trouble with words. She finds it difficult to express herself verbally. These issues have gotten worse since things initially started for her.  No new strokelike symptoms.  No new focal weakness or numbness of her arms or legs.  No double vision or loss of vision.    The following portions of the patient's history were reviewed and updated as appropriate: allergies, current medications, past family history, past medical history, past social history, past surgical history and problem list.    Review of Systems   Musculoskeletal:  Positive for gait problem.   Neurological:  Positive for dizziness, light-headedness, numbness (left arm) and headaches. Negative for tremors, seizures, syncope, facial asymmetry, speech difficulty and weakness.   Psychiatric/Behavioral:  Positive for confusion and decreased concentration. Negative for agitation, behavioral problems, dysphoric mood, hallucinations, self-injury, sleep disturbance and suicidal ideas. The patient is not nervous/anxious and is not hyperactive.       I have reviewed the review of systems above performed by my medical assistant.      Vitals:    04/29/25 1354   BP: 124/80   Pulse: 55   SpO2: 94%       Neurological Exam  Mental Status  Awake, alert and oriented to person, place and time. Recent and remote memory are intact. Speech is normal. Language is fluent with no aphasia. Attention and concentration are  normal. Fund of knowledge is appropriate for level of education.    Cranial Nerves  CN I: Sense of smell is normal.  CN II: Visual acuity is normal.  CN III, IV, VI: Extraocular movements intact bilaterally. Pupils equal round and reactive to light bilaterally.  CN V: Facial sensation is normal.  CN VII: Full and symmetric facial movement.  CN XI: Shoulder shrug strength is normal.  CN XII: Tongue midline without atrophy or fasciculations.    Motor  Normal muscle bulk throughout. No fasciculations present. Normal muscle tone. No abnormal involuntary movements. No pronator drift.                                             Right                     Left  Rhomboids                            5                          5  Infraspinatus                          5                          5  Supraspinatus                       5                          5  Deltoid                                   5                          5   Biceps                                   5                          5  Brachioradialis                      5                          5   Triceps                                  5                          5   Pronator                                5                          5   Supinator                              5                           5   Wrist flexor                            5                          5   Wrist extensor                       5                          5   Finger flexor                          5                          5   Finger extensor                     5                          5   Interossei                              5                          5   Abductor pollicis brevis         5                          5   Flexor pollicis brevis             5                          5   Opponens pollicis                 5                          5  Extensor digitorum               5                          5  Abductor digiti minimi           5                           5   Abdominal                            5                          5  Glutei                                    5                          5  Hip abductor                         5                          5  Hip adductor                         5                          5   Iliopsoas                               5                          5   Quadriceps                           5                          5   Hamstring                             5                          5   Gastrocnemius                     5                           5   Anterior tibialis                      5                          5   Posterior tibialis                    5                          5   Peroneal                               5                          5  Ankle dorsiflexor                   5                          5  Ankle plantar flexor              5                           5  Extensor hallucis longus      5                           5    Sensory  Sensation is intact to light touch, pinprick, vibration and proprioception in all four extremities.    Reflexes  Deep tendon reflexes are 2+ and symmetric in all four extremities.    Right pathological reflexes: Maribell's absent.  Left pathological reflexes: Maribell's absent.    Coordination    Finger-to-nose, rapid alternating movements and heel-to-shin normal bilaterally without dysmetria.    Gait  Normal casual, toe, heel and tandem gait.       Physical Exam  Vitals reviewed.   Constitutional:       Appearance: She is well-developed.   HENT:      Head: Normocephalic and atraumatic.   Eyes:      Extraocular Movements: Extraocular movements intact.      Pupils: Pupils are equal, round, and reactive to light.   Cardiovascular:      Rate and Rhythm: Normal rate and regular rhythm.   Pulmonary:      Breath sounds: Normal breath sounds.   Musculoskeletal:         General: Normal range of motion.   Skin:     General: Skin is warm.   Neurological:      Coordination:  Coordination is intact.      Deep Tendon Reflexes: Reflexes are normal and symmetric.   Psychiatric:         Speech: Speech normal.         Procedures    Assessment/Plan: We do plan to start her on donepezil titration to 10 mg daily.  I would like to order speech therapy, specifically cognitive therapy for her.  Will see her back in about 4 months or sooner if needed. A total of 30 minutes was spent face-to-face with the patient today.  Of that greater than 50% of this time was spent discussing signs and symptoms of cognitive impairment, patient education, plan of care and prognosis.           Diagnoses and all orders for this visit:    1. Cognitive impairment (Primary)  -     Ambulatory Referral to Speech Therapy for Evaluation & Treatment  -     donepezil (ARICEPT) 5 MG tablet; Take 1 tablet by mouth Daily for 21 days.  Dispense: 21 tablet; Refill: 0  -     donepezil (Aricept) 10 MG tablet; Take 1 tablet by mouth Daily for 90 days.  Dispense: 90 tablet; Refill: 4    2. History of stroke           Mike Buenrostro II, MD

## 2025-04-30 RX ORDER — DONEPEZIL HYDROCHLORIDE 10 MG/1
10 TABLET, FILM COATED ORAL DAILY
Qty: 90 TABLET | Refills: 4 | Status: SHIPPED | OUTPATIENT
Start: 2025-04-30 | End: 2025-07-29

## 2025-04-30 RX ORDER — DONEPEZIL HYDROCHLORIDE 5 MG/1
5 TABLET, FILM COATED ORAL DAILY
Qty: 21 TABLET | Refills: 0 | Status: SHIPPED | OUTPATIENT
Start: 2025-04-30 | End: 2025-05-21

## 2025-05-16 ENCOUNTER — HOSPITAL ENCOUNTER (OUTPATIENT)
Dept: SPEECH THERAPY | Facility: HOSPITAL | Age: 85
Setting detail: THERAPIES SERIES
Discharge: HOME | End: 2025-05-16
Payer: MEDICARE

## 2025-05-16 DIAGNOSIS — R41.841 COGNITIVE COMMUNICATION DEFICIT: Primary | ICD-10-CM

## 2025-05-16 PROCEDURE — 92523 SPEECH SOUND LANG COMPREHEN: CPT | Performed by: SPEECH-LANGUAGE PATHOLOGIST

## 2025-05-16 NOTE — THERAPY EVALUATION
Outpatient Speech Language Pathology   Adult Speech Language Cognitive Initial Evaluation  [facility]     Patient Name: Avelina Carr  : 1940  MRN: [id number]  Today's Date: 2025        Visit Date: 2025   Patient Active Problem List   Diagnosis    Postmastectomy lymphedema syndrome    Malignant neoplasm of upper-outer quadrant of right female breast    Cerebrovascular accident (CVA)    Stroke    Primary hypertension    HLD (hyperlipidemia)    Adverse effect of antiplatelet agent    Left middle cerebral artery stroke    Carcinoma of central portion of right breast in female, estrogen receptor positive    Chronic bilateral low back pain with right-sided sciatica    History of lumbar surgery    Seizure disorder, simple partial, without intractable epilepsy    Spinal stenosis of lumbar region with neurogenic claudication    Status post craniectomy    History of total right knee replacement    Other forms of scoliosis, lumbar region    DDD (degenerative disc disease), lumbar    Benign meningioma of brain    Squamous cell carcinoma of leg, left    History of cardioembolic cerebrovascular accident (CVA)    Hypothyroid    Depression    COPD (chronic obstructive pulmonary disease)    Obstructive sleep apnea    Squamous cell carcinoma of leg, right    Pain in joint, multiple sites    Excessive daytime sleepiness    Former smoker    Gastroesophageal reflux disease    Migraines    Generalized weakness    Acute CVA (cerebrovascular accident)    Dependence on supplemental oxygen    Paroxysmal atrial fibrillation    Paroxysmal supraventricular tachycardia    Arthritis    Asthma    Focal neurological deficit present    Acute right MCA stroke    History of supraventricular tachycardia    Rotator cuff arthropathy of right shoulder    Hallux valgus (acquired), right foot    Hallux valgus (acquired), left foot        Past Medical History:   Diagnosis Date    Anxiety     Arthritis     Breast cancer     COPD  (chronic obstructive pulmonary disease)     oxygen at 2L aths    COVID     CVD (cardiovascular disease)     Depression     GERD (gastroesophageal reflux disease)     Hx of radiation therapy     Hyperlipidemia     Hypothyroid     Low back pain     Macular degeneration     Osteoarthritis     Peripheral neuropathy     Sleep apnea     Stroke     Vertigo         Past Surgical History:   Procedure Laterality Date    ADRENAL GLAND SURGERY      BRAIN MENINGIOMA EXCISION      occipital    BREAST BIOPSY       SECTION      EMBOLECTOMY N/A 11/15/2017    Procedure: Embolectomy Mechanical;  Surgeon: Rachid Figueroa MD;  [facility] ;  Service:     MASTECTOMY  2014    RECTAL SURGERY  2003    REPLACEMENT TOTAL KNEE Right     SKIN BIOPSY  2025    TOTAL SHOULDER REPLACEMENT      x2         Visit Dx:    ICD-10-CM ICD-9-CM   1. Cognitive communication deficit  R41.841 799.52            OP SLP Assessment/Plan - 25 1452          SLP Assessment    Functional Problems Speech Language- Adult/Cognition  -KA    Impact on Function: Adult Speech Language/Cognition Trouble learning or remembering new information;Difficulty sequencing thoughts to express complex messages  -KA    Clinical Impression: Speech Language-Adult/Congnition Mild:  -KA    Please refer to paper survey for additional self-reported information Yes  -KA    Please refer to items scanned into chart for additional diagnostic informaiton and handouts as provided by clinician Yes  -KA    Prognosis Good (comment)  -KA    Patient/caregiver participated in establishment of treatment plan and goals Yes  -KA    Patient would benefit from skilled therapy intervention Yes  -KA       SLP Plan    Frequency x1 a week  -KA    Duration 2-3 sessions  -KA    Planned CPT's? SLP INDIVIDUAL SPEECH THERAPY: 17435;SLP DEV COG SKILLS INITIAL (15 MIN) : 22600;SLP DEV COG SKILLS ADD (15 MIN) : 15132  -KA    Expected Duration of Therapy Session (SLP Eval) 45  -KA               User Key  (r) = Recorded By, (t) = Taken By, (c) = Cosigned By      Initials Name Provider Type    James Membreno, SLP Speech and Language Pathologist                     SLP SLC Evaluation - 05/16/25 1400          Communication Assessment/Intervention    Document Type evaluation  -KA    Subjective Information no complaints  -KA    Patient Observations alert;cooperative  -KA    Patient Effort good  -KA    Symptoms Noted During/After Treatment none  -KA       General Information    Patient Profile Reviewed yes  -KA    Pertinent History Of Current Problem Patient referred for cognitive impairment. Patient herself does not have any specific complaints. SLP reviewed office visit note she had with Dr Buenrostro stating pt has difficulty with recalling names, memory and some word finding. Patient did state yes she has difficulty with these.  -KA    Precautions/Limitations, Vision vision impairment, bilaterally;other (see comments)   macular degeneration -KA    Precautions/Limitations, Hearing WFL;for purposes of eval  -KA    Prior Level of Function-Communication other (see comments)   cognitive decline past several years -KA    Plans/Goals Discussed with patient and family;agreed upon  -KA    Barriers to Rehab none identified  -KA    Patient's Goals for Discharge patient did not state  -KA    Family Goals for Discharge family did not state  -KA    Standardized Assessment Used CLQT  -KA       Standardized Tests    Cognitive/Memory Tests CLQT: Cognitive Linguistic Quick Test  -       CLQT (The Cognitive Linguistic Quick Test)    Language Domain Score 29  -KA    Language Severity Rating 4: WNL  -KA    Clock Drawing Total Score 13  -KA    Clock Drawing Severity Rating WNL  -KA    CLQT Comments Portions of CLQT were administered, however unable to administer all of the portions due to patient visual impairment from macular degeneration. Pt was able to see the symbols in symbol cancellation and scored  10/12,  personal facts 8/8, confrontation naming 10/10, story retelling 8/10, generative naming 3/9. Unable to complete symbol trials, design memory, mazes and design generation due to patients  macular degeneration. Of the tasks pt completed, pt scored below the criterion cut off score in generative naming indicating impairment in thought organization. Strengths include auditory memory of short paragraph (pt recalled 13 facts out of 18). Informal assessment of delayed recall of three unrelated words=2/3. Recommend short term therapy for education on strategies.  -KA       SLP Evaluation Clinical Impressions    SLP Diagnosis mild;cognitive-linguistic disorder  -KA    Rehab Potential/Prognosis good  -KA    SLC Criteria for Skilled Therapy Interventions Met yes  -KA    Functional Impact difficulty communicating in an emergency;difficulty in expressing complex messages  -KA       Recommendations    Therapy Frequency (SLP SLC) 1 day per week  -KA    Predicted Duration Therapy Intervention (Days) until discharge  -KA    Anticipated Discharge Disposition (SLP) home with assist  -KA              User Key  (r) = Recorded By, (t) = Taken By, (c) = Cosigned By      Initials Name Provider Type    James Membreno SLP Speech and Language Pathologist                                    OP SLP Education       Row Name 05/16/25 1453       Education    Barriers to Learning No barriers identified  -KA    Education Provided Described results of evaluation;Patient expressed understanding of evaluation;Family/caregivers expressed understanding of evaluation  -KA    Assessed Learning needs;Learning motivation  -IKER    Learning Motivation Strong  -IKER    Learning Method Explanation  -KA    Teaching Response Verbalized understanding  -IKER              User Key  (r) = Recorded By, (t) = Taken By, (c) = Cosigned By      Initials Name Effective Dates    James Membreno SLP 01/05/24 -                    SLP OP Goals       Row Name 05/16/25 1400           Goal Type Needed    Goal Type Needed Verbal Expression;Memory  -KA        Verbal Expression Goals    Verbal Expression LTG's Patient will be able to verbally express basic wants and needs in home environment  -KA     Patient will be able to verbally express basic wants and needs in home environment 90%:;without cues  -KA     Status: Patient will be able to verbally express basic wants and needs in home environment New  -KA     Verbal Expression STG's Patient will improve verbal expressive language skills by utilizing self-cueing strategies when encountering difficulty with expressive language  -KA     Patient will improve verbal expressive language skills by utilizing self-cueing strategies when encountering difficulty with expressive language 90%:;without cues  -KA     Status: Patient will improve verbal expressive language skills by utilizing self-cueing strategies when encountering difficulty with expressive language New  -KA        Memory Goals    Memory LTG's Patient and family will implement compensatory strategies to maximize patient’s Memory function so patient can continue to participate in daily activities  -KA     Patient and family will implement compensatory strategies to maximize patient’s Memory function so patient can continue to participate in daily activities 90%:;with cues  -KA     Status: Patient and family will implement compensatory strategies to maximize patient’s Memory function so patient can continue to participate in daily activities New  -KA     Memory STG's Patient’s memory skills will be enhanced as reported by patient by using external memory aides  -KA     Patient’s memory skills will be enhanced as reported by patient by using external memory aides 90%:;with cues  -KA     Status: Patient’s memory skills will be enhanced as reported by patient by using external memory aides New  -KA               User Key  (r) = Recorded By, (t) = Taken By, (c) = Cosigned By      Initials Name  Provider Type    James Membreno SLP Speech and Language Pathologist                           Time Calculation:   SLP Start Time: 1245  SLP Stop Time: 1330  SLP Time Calculation (min): 45 min  Untimed Charges  SLP Eval/Re-eval : ST Eval Speech and Production w/ Language - 00389  01953-PG Eval Speech and Production w/ Language Minutes: 45  Total Minutes  Untimed Charges Total Minutes: 45   Total Minutes: 45    Therapy Charges for Today       Code Description Service Date Service Provider Modifiers Qty    11363358927 HC ST EVAL SPEECH AND PROD W LANG  3 5/16/2025 James Herrera SLP GN 1                     OMAR Finnegan  5/16/2025

## 2025-05-18 DIAGNOSIS — R41.89 COGNITIVE IMPAIRMENT: ICD-10-CM

## 2025-05-20 RX ORDER — DONEPEZIL HYDROCHLORIDE 5 MG/1
5 TABLET, FILM COATED ORAL DAILY
Qty: 21 TABLET | Refills: 0 | OUTPATIENT
Start: 2025-05-20

## 2025-05-29 ENCOUNTER — HOSPITAL ENCOUNTER (OUTPATIENT)
Dept: SPEECH THERAPY | Facility: HOSPITAL | Age: 85
Setting detail: THERAPIES SERIES
Discharge: HOME | End: 2025-05-29
Payer: MEDICARE

## 2025-05-29 DIAGNOSIS — R41.841 COGNITIVE COMMUNICATION DEFICIT: Primary | ICD-10-CM

## 2025-05-29 PROCEDURE — 97130 THER IVNTJ EA ADDL 15 MIN: CPT | Performed by: SPEECH-LANGUAGE PATHOLOGIST

## 2025-05-29 PROCEDURE — 97129 THER IVNTJ 1ST 15 MIN: CPT | Performed by: SPEECH-LANGUAGE PATHOLOGIST

## 2025-05-29 NOTE — THERAPY TREATMENT NOTE
Outpatient Speech Language Pathology   Adult Speech Language Cognitive Treatment Note  [facility]     Patient Name: Avelina Carr  : 1940  MRN: [id number]  Today's Date: 2025         Visit Date: 2025   Patient Active Problem List   Diagnosis    Postmastectomy lymphedema syndrome    Malignant neoplasm of upper-outer quadrant of right female breast    Cerebrovascular accident (CVA)    Stroke    Primary hypertension    HLD (hyperlipidemia)    Adverse effect of antiplatelet agent    Left middle cerebral artery stroke    Carcinoma of central portion of right breast in female, estrogen receptor positive    Chronic bilateral low back pain with right-sided sciatica    History of lumbar surgery    Seizure disorder, simple partial, without intractable epilepsy    Spinal stenosis of lumbar region with neurogenic claudication    Status post craniectomy    History of total right knee replacement    Other forms of scoliosis, lumbar region    DDD (degenerative disc disease), lumbar    Benign meningioma of brain    Squamous cell carcinoma of leg, left    History of cardioembolic cerebrovascular accident (CVA)    Hypothyroid    Depression    COPD (chronic obstructive pulmonary disease)    Obstructive sleep apnea    Squamous cell carcinoma of leg, right    Pain in joint, multiple sites    Excessive daytime sleepiness    Former smoker    Gastroesophageal reflux disease    Migraines    Generalized weakness    Acute CVA (cerebrovascular accident)    Dependence on supplemental oxygen    Paroxysmal atrial fibrillation    Paroxysmal supraventricular tachycardia    Arthritis    Asthma    Focal neurological deficit present    Acute right MCA stroke    History of supraventricular tachycardia    Rotator cuff arthropathy of right shoulder    Hallux valgus (acquired), right foot    Hallux valgus (acquired), left foot          Visit Dx:    ICD-10-CM ICD-9-CM   1. Cognitive communication deficit  R41.841 799.52  "                             SLP OP Goals       Row Name 05/29/25 1100          Subjective Comments    Subjective Comments Patient is pleasant and cooperative  -KA        Subjective Pain    Able to rate subjective pain? yes  -KA     Pre-Treatment Pain Level 0  -KA     Post-Treatment Pain Level 0  -KA        Memory Goals    Patient’s memory skills will be enhanced as reported by patient by using external memory aides 90%:;with cues  -KA     Status: Patient’s memory skills will be enhanced as reported by patient by using external memory aides New  -KA     Comments: Patient’s memory skills will be enhanced as reported by patient by using external memory aides Lengthy education completed today with pt and  on external memory aids, how they can be applied. Education also completed on \"cognitive therapy\" as pt and  voiced their MD advocated for this during last appointment. SLP also obtained additional background information today from pt, with assistance from  who provided detail as well. SLP also had discussion with pt and  on overall goals.  reports pt had Neuropsychological testing completed last year and results were pt performing cognitively age appropriate. Education completed on cognitive therapy, including memory, attention andreasoning. Discussed pt scored well on auditory memory portion last week with recall of detail from paragraph. Pt does not have any specific goals or concerns she specifically wants to target in therapy. Discussed external memory aids today including use of calendars, planners, dry eraser boards and how they can be applied. Patient explained her  is managing her appointments and she no longer writes down information on calendars and she reports she is content with this. She also brought the visual limitation with difficulty writing and seeing due to macular degeneration. She also brought up physical limitation with difficulty  getting to the " "calendar. Discussed options of where a calendar can be placed within a home, however pt reports she is content with her  to continue to manage the calendar and appointments. Patient stated she has become frustrated when completing these tasks in the past with increased difficulty and it is \"easier\" to allow  to manage. Overall education completed on memory strategies, cognitive stimulaing tasks with examples provided. Pt is limited on alot of tasks discussed today due to macular degeneration.  -IKER               User Key  (r) = Recorded By, (t) = Taken By, (c) = Cosigned By      Initials Name Provider Type    James Membreno SLP Speech and Language Pathologist                           Time Calculation:   SLP Start Time: 1100  SLP Stop Time: 1145  SLP Time Calculation (min): 45 min  Timed Charges  09251-WS Dev of Cogn Skills Initial Minutes: 15  71562-TD Dev of Cogn Skills Add Minutes: 30  Total Minutes  Timed Charges Total Minutes: 45   Total Minutes: 45    Therapy Charges for Today       Code Description Service Date Service Provider Modifiers Qty    32036088098 HC ST DEV OF COGN SKILLS INITIAL 15 MIN 5/29/2025 James Herrera SLP  1    11803609817 HC ST DEV OF COGN SKILLS EACH ADDT'L 15 MIN 5/29/2025 James Herrera SLP  2                     OMAR Finnegan  5/29/2025  "

## 2025-05-29 NOTE — THERAPY TREATMENT NOTE
Outpatient Speech Language Pathology   Adult Speech Language Cognitive Treatment Note  [facility]     Patient Name: Avelina Carr  : 1940  MRN: [id number]  Today's Date: 2025         Visit Date: 2025   Patient Active Problem List   Diagnosis    Postmastectomy lymphedema syndrome    Malignant neoplasm of upper-outer quadrant of right female breast    Cerebrovascular accident (CVA)    Stroke    Primary hypertension    HLD (hyperlipidemia)    Adverse effect of antiplatelet agent    Left middle cerebral artery stroke    Carcinoma of central portion of right breast in female, estrogen receptor positive    Chronic bilateral low back pain with right-sided sciatica    History of lumbar surgery    Seizure disorder, simple partial, without intractable epilepsy    Spinal stenosis of lumbar region with neurogenic claudication    Status post craniectomy    History of total right knee replacement    Other forms of scoliosis, lumbar region    DDD (degenerative disc disease), lumbar    Benign meningioma of brain    Squamous cell carcinoma of leg, left    History of cardioembolic cerebrovascular accident (CVA)    Hypothyroid    Depression    COPD (chronic obstructive pulmonary disease)    Obstructive sleep apnea    Squamous cell carcinoma of leg, right    Pain in joint, multiple sites    Excessive daytime sleepiness    Former smoker    Gastroesophageal reflux disease    Migraines    Generalized weakness    Acute CVA (cerebrovascular accident)    Dependence on supplemental oxygen    Paroxysmal atrial fibrillation    Paroxysmal supraventricular tachycardia    Arthritis    Asthma    Focal neurological deficit present    Acute right MCA stroke    History of supraventricular tachycardia    Rotator cuff arthropathy of right shoulder    Hallux valgus (acquired), right foot    Hallux valgus (acquired), left foot          Visit Dx:    ICD-10-CM ICD-9-CM   1. Cognitive communication deficit  R41.841 799.52  "                             SLP OP Goals       Row Name 05/29/25 1100          Subjective Comments    Subjective Comments Patient is pleasant and cooperative  -KA        Subjective Pain    Able to rate subjective pain? yes  -KA     Pre-Treatment Pain Level 0  -KA     Post-Treatment Pain Level 0  -KA        Memory Goals    Patient’s memory skills will be enhanced as reported by patient by using external memory aides 90%:;with cues  -KA     Status: Patient’s memory skills will be enhanced as reported by patient by using external memory aides New  -KA     Comments: Patient’s memory skills will be enhanced as reported by patient by using external memory aides Lengthy education completed today with pt and  on external memory aids, how they can be applied. Education also completed on \"cognitive therapy\" as pt and  voiced their MD advocated for this during last appointment. SLP also obtained additional background information today from pt, with assistance from  who provided detail as well. SLP also had discussion with pt and  on overall goals.  reports pt had Neuropsychological testing completed last year and results were pt performing cognitively age appropriate. Education completed on cognitive therapy, including memory, attention andreasoning. Discussed pt scored well on auditory memory portion last week with recall of detail from paragraph. Pt does not have any specific goals or concerns she specially wants to target in theray. Discussed external memory aids today including use of calendars, planners, dry eraser boards. Patient explained her  is managing her appointments and she no longer writes down information on calendars and she reports she is content with this. She also brought the visual limitation with difficulty writing and seeing due to macular degenration. She also brought up physical limitation with difficulty  getting to the calendar. Discussed options of where a " "calendar can be placed within a home, however pt reports she is content with her  to continue to manage the calendar and appointments. Patient stated she has become frustrated when completing these tasks in the past with increased difficulty and it is \"easier\" to allow. Overall education completed on memory strategies, cognitive stimulaing tasks with examples provided. Pt is limited on alot of tasks discussed today due to macular degeneration.  -IKER               User Key  (r) = Recorded By, (t) = Taken By, (c) = Cosigned By      Initials Name Provider Type    James Membreno SLP Speech and Language Pathologist                           Time Calculation:   SLP Start Time: 1015  SLP Stop Time: 1108  SLP Time Calculation (min): 53 min  Timed Charges  04396-TG Dev of Cogn Skills Initial Minutes: 15  07789-MV Dev of Cogn Skills Add Minutes: 38  Total Minutes  Timed Charges Total Minutes: 53   Total Minutes: 53    Therapy Charges for Today       Code Description Service Date Service Provider Modifiers Qty    83336091105 HC ST DEV OF COGN SKILLS INITIAL 15 MIN 5/29/2025 James Herrera SLP  1    45277155854 HC ST DEV OF COGN SKILLS EACH ADDT'L 15 MIN 5/29/2025 James Herrera SLP  3                     OMAR Finnegan  5/29/2025  "

## 2025-06-04 ENCOUNTER — APPOINTMENT (OUTPATIENT)
Dept: GENERAL RADIOLOGY | Facility: HOSPITAL | Age: 85
DRG: 177 | End: 2025-06-04
Payer: MEDICARE

## 2025-06-04 ENCOUNTER — HOSPITAL ENCOUNTER (INPATIENT)
Facility: HOSPITAL | Age: 85
LOS: 9 days | Discharge: NURSING, REHAB OR LONG-TERM CARE | DRG: 177 | End: 2025-06-14
Attending: EMERGENCY MEDICINE | Admitting: INTERNAL MEDICINE
Payer: MEDICARE

## 2025-06-04 ENCOUNTER — APPOINTMENT (OUTPATIENT)
Dept: CT IMAGING | Facility: HOSPITAL | Age: 85
DRG: 177 | End: 2025-06-04
Payer: MEDICARE

## 2025-06-04 DIAGNOSIS — R51.9 ACUTE NONINTRACTABLE HEADACHE, UNSPECIFIED HEADACHE TYPE: Primary | ICD-10-CM

## 2025-06-04 DIAGNOSIS — R53.1 GENERALIZED WEAKNESS: ICD-10-CM

## 2025-06-04 DIAGNOSIS — R41.89 COGNITIVE IMPAIRMENT: ICD-10-CM

## 2025-06-04 DIAGNOSIS — E87.6 HYPOKALEMIA: ICD-10-CM

## 2025-06-04 DIAGNOSIS — R09.02 HYPOXIA: ICD-10-CM

## 2025-06-04 LAB
ALBUMIN SERPL-MCNC: 4.1 G/DL (ref 3.5–5.2)
ALBUMIN/GLOB SERPL: 1.3 G/DL
ALP SERPL-CCNC: 108 U/L (ref 39–117)
ALT SERPL W P-5'-P-CCNC: 21 U/L (ref 1–33)
ANION GAP SERPL CALCULATED.3IONS-SCNC: 14 MMOL/L (ref 5–15)
AST SERPL-CCNC: 34 U/L (ref 1–32)
B PARAPERT DNA SPEC QL NAA+PROBE: NOT DETECTED
B PERT DNA SPEC QL NAA+PROBE: NOT DETECTED
BASOPHILS # BLD AUTO: 0.05 10*3/MM3 (ref 0–0.2)
BASOPHILS NFR BLD AUTO: 0.7 % (ref 0–1.5)
BILIRUB SERPL-MCNC: 0.7 MG/DL (ref 0–1.2)
BILIRUB UR QL STRIP: NEGATIVE
BUN SERPL-MCNC: 11 MG/DL (ref 8–23)
BUN/CREAT SERPL: 11 (ref 7–25)
C PNEUM DNA NPH QL NAA+NON-PROBE: NOT DETECTED
CALCIUM SPEC-SCNC: 9.1 MG/DL (ref 8.6–10.5)
CHLORIDE SERPL-SCNC: 99 MMOL/L (ref 98–107)
CLARITY UR: CLEAR
CO2 SERPL-SCNC: 26 MMOL/L (ref 22–29)
COLOR UR: YELLOW
CREAT SERPL-MCNC: 1 MG/DL (ref 0.57–1)
DEPRECATED RDW RBC AUTO: 43.8 FL (ref 37–54)
EGFRCR SERPLBLD CKD-EPI 2021: 55 ML/MIN/1.73
EOSINOPHIL # BLD AUTO: 0.06 10*3/MM3 (ref 0–0.4)
EOSINOPHIL NFR BLD AUTO: 0.9 % (ref 0.3–6.2)
ERYTHROCYTE [DISTWIDTH] IN BLOOD BY AUTOMATED COUNT: 12.5 % (ref 12.3–15.4)
FLUAV SUBTYP SPEC NAA+PROBE: NOT DETECTED
FLUBV RNA ISLT QL NAA+PROBE: NOT DETECTED
GEN 5 1HR TROPONIN T REFLEX: 56 NG/L
GLOBULIN UR ELPH-MCNC: 3.1 GM/DL
GLUCOSE SERPL-MCNC: 106 MG/DL (ref 65–99)
GLUCOSE UR STRIP-MCNC: NEGATIVE MG/DL
HADV DNA SPEC NAA+PROBE: NOT DETECTED
HCOV 229E RNA SPEC QL NAA+PROBE: NOT DETECTED
HCOV HKU1 RNA SPEC QL NAA+PROBE: NOT DETECTED
HCOV NL63 RNA SPEC QL NAA+PROBE: NOT DETECTED
HCOV OC43 RNA SPEC QL NAA+PROBE: NOT DETECTED
HCT VFR BLD AUTO: 36 % (ref 34–46.6)
HGB BLD-MCNC: 12.1 G/DL (ref 12–15.9)
HGB UR QL STRIP.AUTO: NEGATIVE
HMPV RNA NPH QL NAA+NON-PROBE: NOT DETECTED
HPIV1 RNA ISLT QL NAA+PROBE: NOT DETECTED
HPIV2 RNA SPEC QL NAA+PROBE: NOT DETECTED
HPIV3 RNA NPH QL NAA+PROBE: NOT DETECTED
HPIV4 P GENE NPH QL NAA+PROBE: NOT DETECTED
IMM GRANULOCYTES # BLD AUTO: 0.03 10*3/MM3 (ref 0–0.05)
IMM GRANULOCYTES NFR BLD AUTO: 0.4 % (ref 0–0.5)
KETONES UR QL STRIP: NEGATIVE
LEUKOCYTE ESTERASE UR QL STRIP.AUTO: NEGATIVE
LYMPHOCYTES # BLD AUTO: 0.73 10*3/MM3 (ref 0.7–3.1)
LYMPHOCYTES NFR BLD AUTO: 10.4 % (ref 19.6–45.3)
M PNEUMO IGG SER IA-ACNC: NOT DETECTED
MAGNESIUM SERPL-MCNC: 2.2 MG/DL (ref 1.6–2.4)
MCH RBC QN AUTO: 32.8 PG (ref 26.6–33)
MCHC RBC AUTO-ENTMCNC: 33.6 G/DL (ref 31.5–35.7)
MCV RBC AUTO: 97.6 FL (ref 79–97)
MONOCYTES # BLD AUTO: 0.64 10*3/MM3 (ref 0.1–0.9)
MONOCYTES NFR BLD AUTO: 9.1 % (ref 5–12)
NEUTROPHILS NFR BLD AUTO: 5.53 10*3/MM3 (ref 1.7–7)
NEUTROPHILS NFR BLD AUTO: 78.5 % (ref 42.7–76)
NITRITE UR QL STRIP: NEGATIVE
NRBC BLD AUTO-RTO: 0 /100 WBC (ref 0–0.2)
PH UR STRIP.AUTO: 6.5 [PH] (ref 5–8)
PLATELET # BLD AUTO: 259 10*3/MM3 (ref 140–450)
PMV BLD AUTO: 10.6 FL (ref 6–12)
POTASSIUM SERPL-SCNC: 3.1 MMOL/L (ref 3.5–5.2)
PROT SERPL-MCNC: 7.2 G/DL (ref 6–8.5)
PROT UR QL STRIP: ABNORMAL
RBC # BLD AUTO: 3.69 10*6/MM3 (ref 3.77–5.28)
RHINOVIRUS RNA SPEC NAA+PROBE: NOT DETECTED
RSV RNA NPH QL NAA+NON-PROBE: NOT DETECTED
SARS-COV-2 RNA NPH QL NAA+NON-PROBE: NOT DETECTED
SODIUM SERPL-SCNC: 139 MMOL/L (ref 136–145)
SP GR UR STRIP: 1.02 (ref 1–1.03)
T4 FREE SERPL-MCNC: 1.49 NG/DL (ref 0.92–1.68)
TROPONIN T % DELTA: -11
TROPONIN T NUMERIC DELTA: -7 NG/L
TROPONIN T SERPL HS-MCNC: 63 NG/L
TSH SERPL DL<=0.05 MIU/L-ACNC: 5.09 UIU/ML (ref 0.27–4.2)
UROBILINOGEN UR QL STRIP: ABNORMAL
WBC NRBC COR # BLD AUTO: 7.04 10*3/MM3 (ref 3.4–10.8)

## 2025-06-04 PROCEDURE — G0378 HOSPITAL OBSERVATION PER HR: HCPCS

## 2025-06-04 PROCEDURE — 84439 ASSAY OF FREE THYROXINE: CPT | Performed by: EMERGENCY MEDICINE

## 2025-06-04 PROCEDURE — 83735 ASSAY OF MAGNESIUM: CPT | Performed by: EMERGENCY MEDICINE

## 2025-06-04 PROCEDURE — 25010000002 FENTANYL CITRATE (PF) 50 MCG/ML SOLUTION: Performed by: EMERGENCY MEDICINE

## 2025-06-04 PROCEDURE — 85025 COMPLETE CBC W/AUTO DIFF WBC: CPT | Performed by: EMERGENCY MEDICINE

## 2025-06-04 PROCEDURE — 74176 CT ABD & PELVIS W/O CONTRAST: CPT

## 2025-06-04 PROCEDURE — 71045 X-RAY EXAM CHEST 1 VIEW: CPT

## 2025-06-04 PROCEDURE — 71275 CT ANGIOGRAPHY CHEST: CPT

## 2025-06-04 PROCEDURE — 99285 EMERGENCY DEPT VISIT HI MDM: CPT

## 2025-06-04 PROCEDURE — 93005 ELECTROCARDIOGRAM TRACING: CPT | Performed by: EMERGENCY MEDICINE

## 2025-06-04 PROCEDURE — 80053 COMPREHEN METABOLIC PANEL: CPT | Performed by: EMERGENCY MEDICINE

## 2025-06-04 PROCEDURE — 25510000001 IOPAMIDOL PER 1 ML: Performed by: INTERNAL MEDICINE

## 2025-06-04 PROCEDURE — 93010 ELECTROCARDIOGRAM REPORT: CPT | Performed by: INTERNAL MEDICINE

## 2025-06-04 PROCEDURE — 70450 CT HEAD/BRAIN W/O DYE: CPT

## 2025-06-04 PROCEDURE — 81003 URINALYSIS AUTO W/O SCOPE: CPT | Performed by: EMERGENCY MEDICINE

## 2025-06-04 PROCEDURE — 36415 COLL VENOUS BLD VENIPUNCTURE: CPT

## 2025-06-04 PROCEDURE — 84443 ASSAY THYROID STIM HORMONE: CPT | Performed by: EMERGENCY MEDICINE

## 2025-06-04 PROCEDURE — 0202U NFCT DS 22 TRGT SARS-COV-2: CPT | Performed by: EMERGENCY MEDICINE

## 2025-06-04 PROCEDURE — 84484 ASSAY OF TROPONIN QUANT: CPT | Performed by: EMERGENCY MEDICINE

## 2025-06-04 RX ORDER — POLYETHYLENE GLYCOL 3350 17 G/17G
17 POWDER, FOR SOLUTION ORAL DAILY PRN
Status: DISCONTINUED | OUTPATIENT
Start: 2025-06-04 | End: 2025-06-14 | Stop reason: HOSPADM

## 2025-06-04 RX ORDER — ACETAMINOPHEN 160 MG/5ML
650 SOLUTION ORAL EVERY 4 HOURS PRN
Status: DISCONTINUED | OUTPATIENT
Start: 2025-06-04 | End: 2025-06-14 | Stop reason: HOSPADM

## 2025-06-04 RX ORDER — ESCITALOPRAM OXALATE 10 MG/1
20 TABLET ORAL DAILY
Status: DISCONTINUED | OUTPATIENT
Start: 2025-06-05 | End: 2025-06-14 | Stop reason: HOSPADM

## 2025-06-04 RX ORDER — FUROSEMIDE 20 MG/1
20 TABLET ORAL DAILY
Status: DISCONTINUED | OUTPATIENT
Start: 2025-06-05 | End: 2025-06-14 | Stop reason: HOSPADM

## 2025-06-04 RX ORDER — PANTOPRAZOLE SODIUM 40 MG/1
40 TABLET, DELAYED RELEASE ORAL
Status: DISCONTINUED | OUTPATIENT
Start: 2025-06-05 | End: 2025-06-07

## 2025-06-04 RX ORDER — ONDANSETRON 2 MG/ML
4 INJECTION INTRAMUSCULAR; INTRAVENOUS EVERY 6 HOURS PRN
Status: DISCONTINUED | OUTPATIENT
Start: 2025-06-04 | End: 2025-06-05

## 2025-06-04 RX ORDER — IOPAMIDOL 755 MG/ML
100 INJECTION, SOLUTION INTRAVASCULAR
Status: COMPLETED | OUTPATIENT
Start: 2025-06-04 | End: 2025-06-04

## 2025-06-04 RX ORDER — LEVETIRACETAM 500 MG/1
500 TABLET ORAL 2 TIMES DAILY
Status: DISCONTINUED | OUTPATIENT
Start: 2025-06-05 | End: 2025-06-05

## 2025-06-04 RX ORDER — ACETAMINOPHEN 650 MG/1
650 SUPPOSITORY RECTAL EVERY 4 HOURS PRN
Status: DISCONTINUED | OUTPATIENT
Start: 2025-06-04 | End: 2025-06-14 | Stop reason: HOSPADM

## 2025-06-04 RX ORDER — DONEPEZIL HYDROCHLORIDE 10 MG/1
10 TABLET, FILM COATED ORAL DAILY
Status: DISCONTINUED | OUTPATIENT
Start: 2025-06-05 | End: 2025-06-14 | Stop reason: HOSPADM

## 2025-06-04 RX ORDER — LEVOTHYROXINE SODIUM 75 UG/1
150 TABLET ORAL
Status: DISCONTINUED | OUTPATIENT
Start: 2025-06-05 | End: 2025-06-14 | Stop reason: HOSPADM

## 2025-06-04 RX ORDER — AMOXICILLIN 250 MG
2 CAPSULE ORAL 2 TIMES DAILY PRN
Status: DISCONTINUED | OUTPATIENT
Start: 2025-06-04 | End: 2025-06-14 | Stop reason: HOSPADM

## 2025-06-04 RX ORDER — CLOPIDOGREL BISULFATE 75 MG/1
75 TABLET ORAL DAILY
Status: DISCONTINUED | OUTPATIENT
Start: 2025-06-05 | End: 2025-06-12

## 2025-06-04 RX ORDER — BISACODYL 5 MG/1
5 TABLET, DELAYED RELEASE ORAL DAILY PRN
Status: DISCONTINUED | OUTPATIENT
Start: 2025-06-04 | End: 2025-06-14 | Stop reason: HOSPADM

## 2025-06-04 RX ORDER — ALBUTEROL SULFATE 0.83 MG/ML
2.5 SOLUTION RESPIRATORY (INHALATION) EVERY 6 HOURS PRN
Status: DISCONTINUED | OUTPATIENT
Start: 2025-06-04 | End: 2025-06-04 | Stop reason: SDUPTHER

## 2025-06-04 RX ORDER — FENTANYL CITRATE 50 UG/ML
50 INJECTION, SOLUTION INTRAMUSCULAR; INTRAVENOUS ONCE
Refills: 0 | Status: COMPLETED | OUTPATIENT
Start: 2025-06-04 | End: 2025-06-04

## 2025-06-04 RX ORDER — BUDESONIDE AND FORMOTEROL FUMARATE DIHYDRATE 160; 4.5 UG/1; UG/1
2 AEROSOL RESPIRATORY (INHALATION) 2 TIMES DAILY
Status: DISCONTINUED | OUTPATIENT
Start: 2025-06-05 | End: 2025-06-14 | Stop reason: HOSPADM

## 2025-06-04 RX ORDER — BISACODYL 10 MG
10 SUPPOSITORY, RECTAL RECTAL DAILY PRN
Status: DISCONTINUED | OUTPATIENT
Start: 2025-06-04 | End: 2025-06-14 | Stop reason: HOSPADM

## 2025-06-04 RX ORDER — POTASSIUM CHLORIDE 1500 MG/1
40 TABLET, EXTENDED RELEASE ORAL ONCE
Status: COMPLETED | OUTPATIENT
Start: 2025-06-04 | End: 2025-06-04

## 2025-06-04 RX ORDER — LATANOPROST 50 UG/ML
1 SOLUTION/ DROPS OPHTHALMIC NIGHTLY
Status: DISCONTINUED | OUTPATIENT
Start: 2025-06-05 | End: 2025-06-14 | Stop reason: HOSPADM

## 2025-06-04 RX ORDER — ATORVASTATIN CALCIUM 80 MG/1
80 TABLET, FILM COATED ORAL DAILY
Status: DISCONTINUED | OUTPATIENT
Start: 2025-06-05 | End: 2025-06-14 | Stop reason: HOSPADM

## 2025-06-04 RX ORDER — ASPIRIN 325 MG
325 TABLET ORAL DAILY
Status: DISCONTINUED | OUTPATIENT
Start: 2025-06-05 | End: 2025-06-12

## 2025-06-04 RX ORDER — CLONIDINE HYDROCHLORIDE 0.1 MG/1
0.1 TABLET ORAL ONCE
Status: COMPLETED | OUTPATIENT
Start: 2025-06-04 | End: 2025-06-04

## 2025-06-04 RX ORDER — ATENOLOL 25 MG/1
25 TABLET ORAL 2 TIMES DAILY
Status: DISCONTINUED | OUTPATIENT
Start: 2025-06-05 | End: 2025-06-14 | Stop reason: HOSPADM

## 2025-06-04 RX ORDER — MULTIVITAMIN WITH IRON
1000 TABLET ORAL DAILY
Status: DISCONTINUED | OUTPATIENT
Start: 2025-06-05 | End: 2025-06-14 | Stop reason: HOSPADM

## 2025-06-04 RX ORDER — ONDANSETRON 4 MG/1
4 TABLET, ORALLY DISINTEGRATING ORAL EVERY 6 HOURS PRN
Status: DISCONTINUED | OUTPATIENT
Start: 2025-06-04 | End: 2025-06-05

## 2025-06-04 RX ORDER — ACETAMINOPHEN 325 MG/1
650 TABLET ORAL EVERY 4 HOURS PRN
Status: DISCONTINUED | OUTPATIENT
Start: 2025-06-04 | End: 2025-06-14 | Stop reason: HOSPADM

## 2025-06-04 RX ORDER — ALBUTEROL SULFATE 0.83 MG/ML
2.5 SOLUTION RESPIRATORY (INHALATION) EVERY 6 HOURS PRN
Status: DISCONTINUED | OUTPATIENT
Start: 2025-06-04 | End: 2025-06-14 | Stop reason: HOSPADM

## 2025-06-04 RX ORDER — GABAPENTIN 400 MG/1
400 CAPSULE ORAL EVERY 12 HOURS SCHEDULED
Status: DISCONTINUED | OUTPATIENT
Start: 2025-06-05 | End: 2025-06-14 | Stop reason: HOSPADM

## 2025-06-04 RX ADMIN — POTASSIUM CHLORIDE 40 MEQ: 1500 TABLET, EXTENDED RELEASE ORAL at 18:07

## 2025-06-04 RX ADMIN — IOPAMIDOL 75 ML: 755 INJECTION, SOLUTION INTRAVENOUS at 19:43

## 2025-06-04 RX ADMIN — CLONIDINE HYDROCHLORIDE 0.1 MG: 0.1 TABLET ORAL at 18:08

## 2025-06-04 RX ADMIN — FENTANYL CITRATE 50 MCG: 50 INJECTION, SOLUTION INTRAMUSCULAR; INTRAVENOUS at 18:08

## 2025-06-04 NOTE — ED TRIAGE NOTES
Pt from home via ems, called for headache intermittent for 3 weeks, constipation for the last 2-3 days, also reports a fall 3 weeks ago, fell backwards onto her bottom, initially had no c/o from the fall but is now reporting neck pain;  reports increasing weakness

## 2025-06-04 NOTE — ED NOTES
Nursing report ED to floor  Avelina Carr  85 y.o.  female    HPI :  HPI  Stated Reason for Visit: weakness  History Obtained From: EMS    Chief Complaint  Chief Complaint   Patient presents with    Headache    Weakness - Generalized       Admitting doctor:   Andria Roberts MD    Admitting diagnosis:   The primary encounter diagnosis was Acute nonintractable headache, unspecified headache type. Diagnoses of Hypoxia, Generalized weakness, and Hypokalemia were also pertinent to this visit.    Code status:   Current Code Status       Date Active Code Status Order ID Comments User Context       Prior            Allergies:   Penicillin g and Penicillins    Isolation:   No active isolations    Intake and Output  No intake or output data in the 24 hours ending 06/04/25 1925    Weight:   There were no vitals filed for this visit.    Most recent vitals:   Vitals:    06/04/25 1643 06/04/25 1647 06/04/25 1700 06/04/25 1808   BP: (!) 228/104 (!) 228/104 (!) 233/105 (!) 208/104   BP Location: Left arm Left arm Left arm Left arm   Patient Position: Lying Lying Lying Lying   Pulse: 53 52 50 51   Resp: 14 14 14 14   Temp:       SpO2: (!) 89% 92% 95% 93%       Active LDAs/IV Access:   Lines, Drains & Airways       Active LDAs       Name Placement date Placement time Site Days    Peripheral IV 06/04/25 1651 20 G Left Antecubital 06/04/25  1651  Antecubital  less than 1    External Urinary Catheter 06/04/25  1752  --  less than 1                    Labs (abnormal labs have a star):   Labs Reviewed   COMPREHENSIVE METABOLIC PANEL - Abnormal; Notable for the following components:       Result Value    Glucose 106 (*)     Potassium 3.1 (*)     AST (SGOT) 34 (*)     eGFR 55.0 (*)     All other components within normal limits    Narrative:     GFR Categories in Chronic Kidney Disease (CKD)              GFR Category          GFR (mL/min/1.73)    Interpretation  G1                    90 or greater        Normal or high (1)  G2                     60-89                Mild decrease (1)  G3a                   45-59                Mild to moderate decrease  G3b                   30-44                Moderate to severe decrease  G4                    15-29                Severe decrease  G5                    14 or less           Kidney failure    (1)In the absence of evidence of kidney disease, neither GFR category G1 or G2 fulfill the criteria for CKD.    eGFR calculation 2021 CKD-EPI creatinine equation, which does not include race as a factor   URINALYSIS W/ MICROSCOPIC IF INDICATED (NO CULTURE) - Abnormal; Notable for the following components:    Protein, UA Trace (*)     All other components within normal limits    Narrative:     Urine microscopic not indicated.   TROPONIN - Abnormal; Notable for the following components:    HS Troponin T 63 (*)     All other components within normal limits    Narrative:     High Sensitive Troponin T Reference Range:  <14.0 ng/L- Negative Female for AMI  <22.0 ng/L- Negative Male for AMI  >=14 - Abnormal Female indicating possible myocardial injury.  >=22 - Abnormal Male indicating possible myocardial injury.   Clinicians would have to utilize clinical acumen, EKG, Troponin, and serial changes to determine if it is an Acute Myocardial Infarction or myocardial injury due to an underlying chronic condition.        CBC WITH AUTO DIFFERENTIAL - Abnormal; Notable for the following components:    RBC 3.69 (*)     MCV 97.6 (*)     Neutrophil % 78.5 (*)     Lymphocyte % 10.4 (*)     All other components within normal limits   TSH RFX ON ABNORMAL TO FREE T4 - Abnormal; Notable for the following components:    TSH 5.090 (*)     All other components within normal limits   HIGH SENSITIVITIY TROPONIN T 1HR - Abnormal; Notable for the following components:    HS Troponin T 56 (*)     All other components within normal limits    Narrative:     High Sensitive Troponin T Reference Range:  <14.0 ng/L- Negative Female for  AMI  <22.0 ng/L- Negative Male for AMI  >=14 - Abnormal Female indicating possible myocardial injury.  >=22 - Abnormal Male indicating possible myocardial injury.   Clinicians would have to utilize clinical acumen, EKG, Troponin, and serial changes to determine if it is an Acute Myocardial Infarction or myocardial injury due to an underlying chronic condition.        RESPIRATORY PANEL PCR W/ COVID-19 (SARS-COV-2), NP SWAB IN UTM/VTP, 2 HR TAT - Normal    Narrative:     In the setting of a positive respiratory panel with a viral infection PLUS a negative procalcitonin without other underlying concern for bacterial infection, consider observing off antibiotics or discontinuation of antibiotics and continue supportive care. If the respiratory panel is positive for atypical bacterial infection (Bordetella pertussis, Chlamydophila pneumoniae, or Mycoplasma pneumoniae), consider antibiotic de-escalation to target atypical bacterial infection.   T4, FREE - Normal   MAGNESIUM - Normal   CBC AND DIFFERENTIAL    Narrative:     The following orders were created for panel order CBC & Differential.  Procedure                               Abnormality         Status                     ---------                               -----------         ------                     CBC Auto Differential[[id number]]        Abnormal            Final result                 Please view results for these tests on the individual orders.       EKG:   ECG 12 Lead Other; Generalized weakness   Preliminary Result   HEART RATE=51  bpm   RR Sxmdsppd=6573  ms   AZ Rfihbysg=836  ms   P Horizontal Axis=25  deg   P Front Axis=14  deg   QRSD Interval=94  ms   QT Qzojroec=223  ms   DZtT=448  ms   QRS Axis=-15  deg   T Wave Axis=-6  deg   - ABNORMAL ECG -   Sinus rhythm   LVH with secondary repolarization abnormality   Prolonged QT interval   When compared with ECG of 12-Nov-2023 18:26:04,   Significant change in rhythm   Significant repolarization change    Date and Time of Study:2025 17:03:20          Meds given in ED:   Medications   Potassium Replacement - Follow Nurse / BPA Driven Protocol (has no administration in time range)   cloNIDine (CATAPRES) tablet 0.1 mg (0.1 mg Oral Given 25)   fentaNYL citrate (PF) (SUBLIMAZE) injection 50 mcg (50 mcg Intravenous Given 25)   potassium chloride (KLOR-CON M20) CR tablet 40 mEq (40 mEq Oral Given 25)       Imaging results:  CT Head Without Contrast  Result Date: 2025   Chronic changes as noted above, no acute intracranial abnormality.        This report was finalized on 2025 6:21 PM by Dr. Dale Headley M.D on Workstation: [id number]        Ambulatory status:   -     Social issues:   Social History     Socioeconomic History    Marital status:      Spouse name: Vitaly    Number of children: 4    Years of education: 12   Tobacco Use    Smoking status: Former     Current packs/day: 0.00     Average packs/day: 2.0 packs/day for 58.0 years (116.0 ttl pk-yrs)     Types: Cigarettes     Start date:      Quit date:      Years since quittin.4    Smokeless tobacco: Never    Tobacco comments:     non-smoker since    Vaping Use    Vaping status: Never Used   Substance and Sexual Activity    Alcohol use: No    Drug use: No    Sexual activity: Defer       Peripheral Neurovascular  Peripheral Neurovascular (Adult)  Peripheral Neurovascular WDL: WDL    Neuro Cognitive  Neuro Cognitive (Adult)  Cognitive/Neuro/Behavioral WDL: (S) .WDL except, speech  Speech: (S) other (see comments) (slow)  [place] Coma Scale  Best Eye Response: 4-->(E4) spontaneous  Best Motor Response: 6-->(M6) obeys commands  Best Verbal Response: 5-->(V5) oriented  [place] Coma Scale Score: 15    Learning  Learning Assessment  Learning Readiness and Ability: physical limitation noted    Respiratory  Respiratory WDL  Respiratory WDL: WDL    Abdominal Pain       Pain Assessments  Pain (Adult)  (0-10)  Pain Rating: Rest: 6  (0-10) Pain Rating: Activity: 6  Pain Location: head  Pain Side/Orientation: generalized  Pain Management Interventions: pain medication given  Response to Pain Interventions: intervention not effective per patient    NIH Stroke Scale       Zoila Bailey RN  06/04/25 19:25 EDT

## 2025-06-04 NOTE — ED PROVIDER NOTES
EMERGENCY DEPARTMENT ENCOUNTER    Room Number:  N425/1  PCP: Artem Shepherd MD  Historian: Patient, spouse at bedside, EMS    I initially evaluated the patient at 1641    HPI:  Chief Complaint: Headaches, constipation, weakness  A complete HPI/ROS/PMH/PSH/SH/FH are unobtainable due to: Nothing  Context: Avelina Carr is a 85 y.o. female with a medical history of stroke, hypothyroidism, COPD, hyperlipidemia, sleep apnea who presents to the ED from home by EMS c/o acute headaches, constipation, and generalized weakness.  Patient fell about 3 weeks ago and landed on her buttocks.  She did not hit her head.  She initially had pain in her lower back but pain then radiated up into her neck and head.  She has had intermittent generalized headaches since then.  She is on Plavix.  She also complains of constipation.  Last bowel movement was several days ago.  She has had intermittent abdominal cramping.  Denies fever, nausea, or vomiting.  She has had increased fatigue and increased generalized weakness since yesterday.  She has had increased shortness of breath for the past several days.  Denies chest pain, cough, dizziness, or dysuria.   reports that the patient normally ambulates with a walker and was able to walk this morning.            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    Malignant neoplasm of upper-outer quadrant of right female breast 03/04/2016    Cerebrovascular accident (CVA) 11/15/2017    Stroke 11/21/2017    Primary hypertension 12/07/2017    HLD (hyperlipidemia) 12/07/2017    Adverse effect of antiplatelet agent 12/08/2017    Left middle cerebral artery stroke 12/20/2017    Carcinoma of central portion of right breast in female, estrogen receptor positive 03/25/2015    Chronic bilateral low back pain with right-sided sciatica 12/09/2016    History of lumbar surgery 03/02/2018    Seizure disorder, simple partial,  without intractable epilepsy 01/14/2014    Spinal stenosis of lumbar region with neurogenic claudication 12/09/2016    Status post craniectomy 12/09/2016    History of total right knee replacement 03/18/2018    Other forms of scoliosis, lumbar region 11/07/2018    DDD (degenerative disc disease), lumbar 11/07/2018    Benign meningioma of brain 12/09/2016    Squamous cell carcinoma of leg, left 06/21/2019    History of cardioembolic cerebrovascular accident (CVA) 12/09/2015    Hypothyroid 07/03/2019    Depression 07/03/2019    COPD (chronic obstructive pulmonary disease) 07/03/2019    Obstructive sleep apnea 11/05/2019    Squamous cell carcinoma of leg, right 07/08/2020    Pain in joint, multiple sites 03/03/2021    Excessive daytime sleepiness 07/21/2021    Former smoker 07/21/2021    Gastroesophageal reflux disease 07/21/2021    Migraines 12/09/2015    Generalized weakness 04/12/2022    Acute CVA (cerebrovascular accident) 04/12/2022    Dependence on supplemental oxygen 04/12/2022    Paroxysmal atrial fibrillation 07/25/2022    Paroxysmal supraventricular tachycardia 07/25/2022    Arthritis 03/19/2023    Asthma 06/10/2022    Focal neurological deficit present 11/12/2023    Acute right MCA stroke 11/15/2023    History of supraventricular tachycardia 07/25/2022    Rotator cuff arthropathy of right shoulder 03/26/2024    Hallux valgus (acquired), right foot 06/19/2024    Hallux valgus (acquired), left foot 06/19/2024     Resolved Ambulatory Problems     Diagnosis Date Noted    No Resolved Ambulatory Problems     Past Medical History:   Diagnosis Date    Anxiety     Breast cancer 2014    COVID     CVD (cardiovascular disease)     GERD (gastroesophageal reflux disease)     Hx of radiation therapy     Hyperlipidemia     Low back pain     Macular degeneration     Osteoarthritis     Peripheral neuropathy     Sleep apnea     Vertigo          PAST SURGICAL HISTORY  Past Surgical History:   Procedure Laterality Date     ADRENAL GLAND SURGERY      BRAIN MENINGIOMA EXCISION      occipital    BREAST BIOPSY       SECTION      EMBOLECTOMY N/A 11/15/2017    Procedure: Embolectomy Mechanical;  Surgeon: Rachid Figueroa MD;  [facility] ;  Service:     MASTECTOMY      RECTAL SURGERY  2003    REPLACEMENT TOTAL KNEE Right     SKIN BIOPSY  2025    TOTAL SHOULDER REPLACEMENT      x2         FAMILY HISTORY  Family History   Problem Relation Age of Onset    Hypertension Mother     Hyperlipidemia Mother     Heart disease Mother     Cancer Father     Alzheimer's disease Sister     No Known Problems Sister     No Known Problems Brother     Breast cancer Neg Hx          SOCIAL HISTORY  Social History     Socioeconomic History    Marital status:      Spouse name: Vitaly    Number of children: 4    Years of education: 12   Tobacco Use    Smoking status: Former     Current packs/day: 0.00     Average packs/day: 2.0 packs/day for 58.0 years (116.0 ttl pk-yrs)     Types: Cigarettes     Start date:      Quit date:      Years since quittin.4    Smokeless tobacco: Never    Tobacco comments:     non-smoker since    Vaping Use    Vaping status: Never Used   Substance and Sexual Activity    Alcohol use: No    Drug use: No    Sexual activity: Defer         ALLERGIES  Penicillin g and Penicillins    REVIEW OF SYSTEMS  Review of Systems  Included in HPI  All systems reviewed and negative except for those discussed in HPI.      PHYSICAL EXAM  ED Triage Vitals [25 1626]   Temp Heart Rate Resp BP SpO2   97.5 °F (36.4 °C) 55 16 (!) 198/91 96 %      Temp src Heart Rate Source Patient Position BP Location FiO2 (%)   -- -- -- -- --       Physical Exam      GENERAL: Awake, alert, and oriented x 3.  Well-developed, well-nourished and nontoxic-appearing elderly female.  Resting comfortably in no acute distress  HENT: NCAT, nares patent, no sinus tenderness, no temporal artery tenderness  EYES: PERRL,  EOMI  CV: regular rhythm, normal rate  RESPIRATORY: normal effort, clear to auscultation bilaterally  ABDOMEN: soft, nontender, no CVA tenderness  MUSCULOSKELETAL: Extremities are nontender with full range of motion.  C/T/L-spine are nontender  NEURO: Speech is nondysarthric.  No aphasia.  Tongue protrudes midline.  No facial droop.  Normal strength and light touch sensation in all extremities.  Follows commands.  PSYCH:  calm, cooperative  SKIN: warm, dry    Vital signs and nursing notes reviewed.          LAB RESULTS  Recent Results (from the past 24 hours)   Basic Metabolic Panel    Collection Time: 06/05/25  3:47 AM    Specimen: Blood   Result Value Ref Range    Glucose 68 65 - 99 mg/dL    BUN 11.0 8.0 - 23.0 mg/dL    Creatinine 0.72 0.57 - 1.00 mg/dL    Sodium 138 136 - 145 mmol/L    Potassium 3.4 (L) 3.5 - 5.2 mmol/L    Chloride 103 98 - 107 mmol/L    CO2 22.0 22.0 - 29.0 mmol/L    Calcium 8.6 8.6 - 10.5 mg/dL    BUN/Creatinine Ratio 15.3 7.0 - 25.0    Anion Gap 13.0 5.0 - 15.0 mmol/L    eGFR 81.5 >60.0 mL/min/1.73   CBC (No Diff)    Collection Time: 06/05/25  3:47 AM    Specimen: Blood   Result Value Ref Range    WBC 6.93 3.40 - 10.80 10*3/mm3    RBC 3.37 (L) 3.77 - 5.28 10*6/mm3    Hemoglobin 11.3 (L) 12.0 - 15.9 g/dL    Hematocrit 33.2 (L) 34.0 - 46.6 %    MCV 98.5 (H) 79.0 - 97.0 fL    MCH 33.5 (H) 26.6 - 33.0 pg    MCHC 34.0 31.5 - 35.7 g/dL    RDW 12.1 (L) 12.3 - 15.4 %    RDW-SD 43.8 37.0 - 54.0 fl    MPV 11.0 6.0 - 12.0 fL    Platelets 220 140 - 450 10*3/mm3   Potassium    Collection Time: 06/05/25  3:37 PM    Specimen: Blood   Result Value Ref Range    Potassium 3.4 (L) 3.5 - 5.2 mmol/L       Ordered the above labs and reviewed the results.        RADIOLOGY  CT Angiogram Chest  Result Date: 6/4/2025  CTA CHEST WITH IV CONTRAST  HISTORY: Shortness of breath, hypoxia, evaluate for PE; R51.9-Headache, unspecified; R09.02-Hypoxemia; R53.1-Weakness; E87.6-Hypokalemia  COMPARISON: None  TECHNIQUE: CT  angiography was performed of the chest with axial images as well as coronal and sagittal reformatted MIP images provided following administration of IV contrast. 3-D surface rendered reformats were obtained of the pulmonary arteries and aorta. Radiation dose reduction techniques were utilized, including automated exposure control, and exposure modulation based on body size.  FINDINGS:  There is a tiny right pleural effusion, and there is mild bilateral posterior basilar dependent atelectasis. There are patchy areas of alveolar infiltrate with somewhat coarse interstitial markings in the left upper lobe. Similar though more subtle changes are seen in the right middle lobe and right lower lobe.  Thoracic aorta is normal in caliber.  There are coronary atherosclerotic vascular calcifications.  There is no suspicious mediastinal adenopathy or other mass.  Images of the upper abdomen show no acute abnormality.  There are chronic healed posttraumatic as well as degenerative bony changes, but there is no acute bony abnormality.  Bolus timing is excellent, and there is no evidence of pulmonary embolism.       Pulmonary arteries are well-opacified, and there is no evidence of pulmonary embolism.  There are mild somewhat patchy alveolar pulmonary infiltrates with somewhat coarse interstitial markings, most prominent in the left upper lobe though seen to a lesser degree in the right middle lobe and lower lobe. FINDINGS are nonspecific.  Imaging features can be seen with COVID-19 pneumonia, though are nonspecific and can occur with a variety of infectious and noninfectious processes.    This report was finalized on 6/4/2025 8:05 PM by Dr. Dale Headley M.D on Workstation: [id number]        Ordered the above noted radiological studies. Reviewed by me in PACS.            PROCEDURES  Procedures        OUTPATIENT MEDICATION MANAGEMENT:  Current Facility-Administered Medications Ordered in Epic   Medication Dose Route Frequency  Provider Last Rate Last Admin    acetaminophen (TYLENOL) tablet 650 mg  650 mg Oral Q4H PRN Andria Roberts MD   650 mg at 06/05/25 1027    Or    acetaminophen (TYLENOL) 160 MG/5ML oral solution 650 mg  650 mg Oral Q4H PRN Andria Roberts MD        Or    acetaminophen (TYLENOL) suppository 650 mg  650 mg Rectal Q4H PRN Andria Roberts MD        albuterol (PROVENTIL) nebulizer solution 0.083% 2.5 mg/3mL  2.5 mg Nebulization Q6H PRN Andria Roberts MD   2.5 mg at 06/05/25 1521    aspirin tablet 325 mg  325 mg Oral Daily Andria Roberts MD   325 mg at 06/05/25 0824    atenolol (TENORMIN) tablet 25 mg  25 mg Oral BID Andria Roberts MD   25 mg at 06/05/25 0846    atorvastatin (LIPITOR) tablet 80 mg  80 mg Oral Daily Andria Roberts MD   80 mg at 06/05/25 0825    sennosides-docusate (PERICOLACE) 8.6-50 MG per tablet 2 tablet  2 tablet Oral BID PRN Andria Roberts MD        And    polyethylene glycol (MIRALAX) packet 17 g  17 g Oral Daily PRN Andria Roberts MD   17 g at 06/05/25 1231    And    bisacodyl (DULCOLAX) EC tablet 5 mg  5 mg Oral Daily PRN Andria Roberts MD        And    bisacodyl (DULCOLAX) suppository 10 mg  10 mg Rectal Daily PRN Andria Roberts MD   10 mg at 06/05/25 1659    budesonide-formoterol (SYMBICORT) 160-4.5 MCG/ACT inhaler 2 puff  2 puff Inhalation BID Andria Roberts MD   2 puff at 06/05/25 0801    calcium 500 mg vitamin D 5 mcg (200 UT) per tablet 1 tablet  1 tablet Oral BID Andria Roberts MD        Calcium Replacement - Follow Nurse / BPA Driven Protocol   Not Applicable PRN Andria Roberts MD        cefTRIAXone (ROCEPHIN) 2,000 mg in sodium chloride 0.9 % 100 mL MBP  2,000 mg Intravenous Q24H Andria Roberts  mL/hr at 06/05/25 0124 2,000 mg at 06/05/25 0124    clopidogrel (PLAVIX) tablet 75 mg  75 mg Oral Daily Andria Roberts MD   75 mg at 06/05/25 0825    donepezil (ARICEPT)  tablet 10 mg  10 mg Oral Daily Andria Roberts MD   10 mg at 06/05/25 0824    enoxaparin sodium (LOVENOX) syringe 40 mg  40 mg Subcutaneous Q24H Connor Maynard APRN   40 mg at 06/05/25 1559    escitalopram (LEXAPRO) tablet 20 mg  20 mg Oral Daily Andria Roberts MD   20 mg at 06/05/25 0825    furosemide (LASIX) tablet 20 mg  20 mg Oral Daily Andria Roberts MD   20 mg at 06/05/25 0825    gabapentin (NEURONTIN) capsule 400 mg  400 mg Oral Q12H Andria Roberts MD   400 mg at 06/05/25 0917    hydrALAZINE (APRESOLINE) injection 10 mg  10 mg Intravenous Q6H PRN Nahid García MD   10 mg at 06/05/25 1411    latanoprost (XALATAN) 0.005 % ophthalmic solution 1 drop  1 drop Right Eye Nightly Andria Roberts MD   1 drop at 06/05/25 0124    levETIRAcetam (KEPPRA) injection 500 mg  500 mg Intravenous Q12H Nahid García MD        levothyroxine (SYNTHROID, LEVOTHROID) tablet 150 mcg  150 mcg Oral Q AM Andria Roberts MD   150 mcg at 06/05/25 0824    lisinopril (PRINIVIL,ZESTRIL) tablet 10 mg  10 mg Oral Q24H Connor Maynard APRN   10 mg at 06/05/25 1543    Magnesium Standard Dose Replacement - Follow Nurse / BPA Driven Protocol   Not Applicable PRN Andria Roberts MD        melatonin tablet 2.5 mg  2.5 mg Oral Nightly PRN Andria Roberts MD        NIFEdipine XL (PROCARDIA XL) 24 hr tablet 60 mg  60 mg Oral Q24H Nahid García MD   60 mg at 06/05/25 0824    pantoprazole (PROTONIX) EC tablet 40 mg  40 mg Oral Q AM Andria Roberts MD        Pharmacy to Dose enoxaparin (LOVENOX)   Not Applicable Continuous PRN Connor Maynard APRN        Phosphorus Replacement - Follow Nurse / BPA Driven Protocol   Not Applicable PRN Andria Roberts MD        Potassium Replacement - Follow Nurse / BPA Driven Protocol   Not Applicable PRN Norman Agrawal MD        Potassium Replacement - Follow Nurse / BPA Driven Protocol   Not Applicable PRN Andria Roberts MD         vitamin B-12 (CYANOCOBALAMIN) tablet 1,000 mcg  1,000 mcg Oral Daily Andria Roberts MD   1,000 mcg at 06/05/25 0825     No current [facility]-ordered outpatient medications on file.           MEDICATIONS GIVEN IN ER  Medications   Potassium Replacement - Follow Nurse / BPA Driven Protocol (has no administration in time range)   Potassium Replacement - Follow Nurse / BPA Driven Protocol (has no administration in time range)   Magnesium Standard Dose Replacement - Follow Nurse / BPA Driven Protocol (has no administration in time range)   Phosphorus Replacement - Follow Nurse / BPA Driven Protocol (has no administration in time range)   Calcium Replacement - Follow Nurse / BPA Driven Protocol (has no administration in time range)   acetaminophen (TYLENOL) tablet 650 mg (650 mg Oral Given 6/5/25 1027)     Or   acetaminophen (TYLENOL) 160 MG/5ML oral solution 650 mg ( Oral Not Given:  See Alt 6/5/25 1027)     Or   acetaminophen (TYLENOL) suppository 650 mg ( Rectal Not Given:  See Alt 6/5/25 1027)   melatonin tablet 2.5 mg (has no administration in time range)   sennosides-docusate (PERICOLACE) 8.6-50 MG per tablet 2 tablet (has no administration in time range)     And   polyethylene glycol (MIRALAX) packet 17 g (17 g Oral Given 6/5/25 1231)     And   bisacodyl (DULCOLAX) EC tablet 5 mg (has no administration in time range)     And   bisacodyl (DULCOLAX) suppository 10 mg (10 mg Rectal Given 6/5/25 1659)   albuterol (PROVENTIL) nebulizer solution 0.083% 2.5 mg/3mL (2.5 mg Nebulization Given 6/5/25 1521)   aspirin tablet 325 mg (325 mg Oral Given 6/5/25 0824)   atenolol (TENORMIN) tablet 25 mg (25 mg Oral Given 6/5/25 0846)   atorvastatin (LIPITOR) tablet 80 mg (80 mg Oral Given 6/5/25 0825)   calcium 500 mg vitamin D 5 mcg (200 UT) per tablet 1 tablet (1 tablet Oral Not Given 6/5/25 0831)   clopidogrel (PLAVIX) tablet 75 mg (75 mg Oral Given 6/5/25 0825)   vitamin B-12 (CYANOCOBALAMIN) tablet 1,000 mcg (1,000 mcg  Oral Given 6/5/25 0825)   donepezil (ARICEPT) tablet 10 mg (10 mg Oral Given 6/5/25 0824)   escitalopram (LEXAPRO) tablet 20 mg (20 mg Oral Given 6/5/25 0825)   furosemide (LASIX) tablet 20 mg (20 mg Oral Given 6/5/25 0825)   gabapentin (NEURONTIN) capsule 400 mg (400 mg Oral Given 6/5/25 0917)   latanoprost (XALATAN) 0.005 % ophthalmic solution 1 drop (1 drop Right Eye Given 6/5/25 0124)   levothyroxine (SYNTHROID, LEVOTHROID) tablet 150 mcg (150 mcg Oral Given 6/5/25 0824)   pantoprazole (PROTONIX) EC tablet 40 mg (40 mg Oral Not Given 6/5/25 0510)   budesonide-formoterol (SYMBICORT) 160-4.5 MCG/ACT inhaler 2 puff (2 puffs Inhalation Given 6/5/25 0801)   cefTRIAXone (ROCEPHIN) 2,000 mg in sodium chloride 0.9 % 100 mL MBP (2,000 mg Intravenous New Bag 6/5/25 0124)   potassium chloride 10 mEq in 100 mL IVPB (10 mEq Intravenous Not Given 6/5/25 1155)   NIFEdipine XL (PROCARDIA XL) 24 hr tablet 60 mg (60 mg Oral Given 6/5/25 0824)   levETIRAcetam (KEPPRA) injection 500 mg (500 mg Intravenous Not Given 6/5/25 0918)   hydrALAZINE (APRESOLINE) injection 10 mg (10 mg Intravenous Given 6/5/25 1411)   lisinopril (PRINIVIL,ZESTRIL) tablet 10 mg (10 mg Oral Given 6/5/25 1543)   Pharmacy to Dose enoxaparin (LOVENOX) (has no administration in time range)   enoxaparin sodium (LOVENOX) syringe 40 mg (40 mg Subcutaneous Given 6/5/25 1559)   cloNIDine (CATAPRES) tablet 0.1 mg (0.1 mg Oral Given 6/4/25 1808)   fentaNYL citrate (PF) (SUBLIMAZE) injection 50 mcg (50 mcg Intravenous Given 6/4/25 1808)   potassium chloride (KLOR-CON M20) CR tablet 40 mEq (40 mEq Oral Given 6/4/25 1807)   iopamidol (ISOVUE-370) 76 % injection 100 mL (75 mL Intravenous Given by Other 6/4/25 1943)   hydrALAZINE (APRESOLINE) injection 10 mg (10 mg Intravenous Given 6/5/25 1559)                   MEDICAL DECISION MAKING, PROGRESS, and CONSULTS    All labs have been independently reviewed by me.  All radiology studies have been reviewed by me and I have also  reviewed the radiology report.   EKG's independently viewed and interpreted by me.  Discussion below represents my analysis of pertinent findings related to patient's condition, differential diagnosis, treatment plan and final disposition.      Additional sources:    - Discussed/ obtained information from independent historians: EMS, spouse at bedside    - External (non-ED) record review: Patient was last admitted here in November 2023 for an acute strokelike symptoms.  Brain MRI showed an acute infarction in the lateral aspect of the right frontal lobe within the right MCA distribution.  She saw her speech therapist last month for follow-up for cognitive communication deficit.    -Prescription drug monitoring program review:     N/A    - Chronic or social conditions impacting patient care (Social Determinants of Health): None          Orders placed during this visit:  Orders Placed This Encounter   Procedures    Respiratory Panel PCR w/COVID-19(SARS-CoV-2) ADDIS/ADRIANA/TAY/PAD/COR/LAURA In-House, NP Swab in UTM/VTM, 2 HR TAT - Swab, Nasopharynx    XR Chest AP    CT Head Without Contrast    CT Abdomen Pelvis Without Contrast    CT Angiogram Chest    Comprehensive Metabolic Panel    Urinalysis With Microscopic If Indicated (No Culture) - Urine, Clean Catch    High Sensitivity Troponin T    CBC Auto Differential    TSH Rfx On Abnormal To Free T4    T4, Free    High Sensitivity Troponin T 1Hr    Magnesium    Basic Metabolic Panel    CBC (No Diff)    Potassium    Basic Metabolic Panel    CBC (No Diff)    Diet: Regular/House; No Straw, No Mixed Consistencies, Feeding Assistance - Nursing; Texture: Soft to Chew (NDD 3); Soft to Chew: Chopped Meat; Fluid Consistency: Thin (IDDSI 0)    NPO Diet NPO Type: Sips with Meds    Vital Signs    Up with assistance    Daily Weights    Strict Intake & Output    Oral Care    Place Sequential Compression Device    Maintain Sequential Compression Device    Code Status and Medical Interventions:  No CPR (Do Not Attempt to Resuscitate); Limited Support; No intubation (DNI)    LHA (on-call MD unless specified) Details    Inpatient Case Management  Consult    Inpatient Cardiology Consult    OT Consult: Eval & Treat ADL Performance Below Baseline    PT Consult: Eval & Treat Functional Mobility Below Baseline    Patient May Use Home CPAP / BIPAP For Sleep or As Needed    SLP Consult: Eval & Treat RN Dysphagia Screen Failed    Stress Test With Myocardial Perfusion One Day    ECG 12 Lead Other; Generalized weakness    Telemetry Scan    Telemetry Scan    Telemetry Scan    Telemetry Scan    Telemetry Scan    Telemetry Scan    Adult Transthoracic Echo Complete W/ Cont if Necessary Per Protocol    Initiate Observation Status    Inpatient Admission    CBC & Differential         Additional orders considered but not ordered:          Differential diagnosis includes, but is not limited to:  Differential diagnosis for headache includes but is not limited to:  - subarachnoid hemorrhage  - intracranial mass  - stroke  - RCVS  - meningitis  - glaucoma  - giant cell arteritis  - CO poisoning  - cerebral venous sinus thrombosis  - migraine        Independent interpretation of labs, radiology studies, and discussions with consultants:  ED Course as of 06/05/25 1906 Wed Jun 04, 2025   1640 BP(!): 198/91 [WH]   1640 Temp: 97.5 °F (36.4 °C) [WH]   1640 Heart Rate: 55 [WH]   1640 Resp: 16 [WH]   1640 SpO2: 96 % [WH]   1641 Device (Oxygen Therapy): room air [WH]   1652 Oxygen saturation was 88% on room air.  Patient is not on home O2.  She was placed on 2 L of oxygen. [WH]   1711 BP(!): 233/105 [WH]   1721 WBC: 7.04 [WH]   1722 Hemoglobin: 12.1 [WH]   1722 Chest x-ray personally interpreted by me.  My personal interpretation is: Heart size is normal.  There is a vague opacity in the left lung base.  No large pleural effusion.  Per the radiologist, there is suggestion of minimal infiltrate in the left costophrenic  sulcus. [WH]   1722 EKG personally interpreted by me at 1707.  My personal interpretation is:          EKG time: 1703  Rhythm/Rate: Sinus bradycardia, rate 51  P waves and TN: Normal  QRS, axis: LAD, LVH  ST and T waves: Nonspecific ST/T wave changes in the lateral and inferior leads  QT interval: Prolonged    Interpreted Contemporaneously by me, independently viewed  EKG is changed compared to prior EKG done on 11/12/2023.  QT interval was normal then.  There are no acute ischemic changes   [WH]   1724 Leukocytes, UA: Negative [WH]   1724 Nitrite, UA: Negative [WH]   1752 HS Troponin T(!!): 63 [WH]   1752 TSH Baseline(!): 5.090 [WH]   1752 Glucose(!): 106 [WH]   1752 BUN: 11.0 [WH]   1752 Creatinine: 1.00 [WH]   1752 Potassium(!): 3.1  Will order magnesium level and potassium replacement protocol [WH]   1753 Patient is still hypertensive.  She was given a dose of clonidine.  She will also be given a dose of IV fentanyl for her headache.  CTs of the head and abdomen/pelvis are pending [WH]   1807 Respiratory panel is negative [WH]   1830 Per the radiologist, head CT and CT abdomen/pelvis are negative acute [WH]   1849 Case discussed with Dr. Roberts, hospitalist, she agrees to admit the patient to a monitored bed.  Pertinent history, exam findings, test results, ED course, and diagnoses were discussed with her.  She agrees with plans to obtain a CT of the chest.  She will follow-up on these results. [WH]      ED Course User Index  [WH] Norman Agrawal MD         COMPLEXITY OF CARE  The patient requires admission.      DIAGNOSIS  Final diagnoses:   Acute nonintractable headache, unspecified headache type   Hypoxia   Generalized weakness   Hypokalemia         DISPOSITION  ADMISSION    Discussed treatment plan and reason for admission with pt/family and admitting physician.  Pt/family voiced understanding of the plan for admission for further testing/treatment as needed.               Latest Documented Vital  Signs:  AS OF 19:06 EDT VITALS:    BP - 147/66  HR - 61  TEMP - 98.1 °F (36.7 °C)  O2 SATS - 96%            --    Please note that portions of this were completed with a voice recognition program.       Note Disclaimer: At [facility], we believe that sharing information builds trust and better relationships. You are receiving this note because you are receiving care at [facility] or recently visited. It is possible you will see health information before a provider has talked with you about it. This kind of information can be easy to misunderstand. To help you fully understand what it means for your health, we urge you to discuss this note with your provider.             Norman Agrawal MD  06/05/25 7913

## 2025-06-04 NOTE — ED NOTES
Patient c/o fall from her couch 3 weeks ago where she slid to the floor and did not hit her head. Patient states that since that time she has woken up everyday with headaches. Patient states today her spouse called the ambulance because she was experiencing progressive weakness over the course of the day. Patients speech is slow, but not slurred.

## 2025-06-05 ENCOUNTER — HOSPITAL ENCOUNTER (OUTPATIENT)
Dept: SPEECH THERAPY | Facility: HOSPITAL | Age: 85
Setting detail: THERAPIES SERIES
Discharge: HOME | End: 2025-06-05
Payer: MEDICARE

## 2025-06-05 PROBLEM — R07.89 CHEST DISCOMFORT: Status: ACTIVE | Noted: 2025-06-05

## 2025-06-05 PROBLEM — R79.89 ELEVATED TROPONIN: Status: ACTIVE | Noted: 2025-06-05

## 2025-06-05 LAB
ANION GAP SERPL CALCULATED.3IONS-SCNC: 13 MMOL/L (ref 5–15)
BUN SERPL-MCNC: 11 MG/DL (ref 8–23)
BUN/CREAT SERPL: 15.3 (ref 7–25)
CALCIUM SPEC-SCNC: 8.6 MG/DL (ref 8.6–10.5)
CHLORIDE SERPL-SCNC: 103 MMOL/L (ref 98–107)
CO2 SERPL-SCNC: 22 MMOL/L (ref 22–29)
CREAT SERPL-MCNC: 0.72 MG/DL (ref 0.57–1)
DEPRECATED RDW RBC AUTO: 43.8 FL (ref 37–54)
EGFRCR SERPLBLD CKD-EPI 2021: 81.5 ML/MIN/1.73
ERYTHROCYTE [DISTWIDTH] IN BLOOD BY AUTOMATED COUNT: 12.1 % (ref 12.3–15.4)
GLUCOSE SERPL-MCNC: 68 MG/DL (ref 65–99)
HCT VFR BLD AUTO: 33.2 % (ref 34–46.6)
HGB BLD-MCNC: 11.3 G/DL (ref 12–15.9)
MCH RBC QN AUTO: 33.5 PG (ref 26.6–33)
MCHC RBC AUTO-ENTMCNC: 34 G/DL (ref 31.5–35.7)
MCV RBC AUTO: 98.5 FL (ref 79–97)
PLATELET # BLD AUTO: 220 10*3/MM3 (ref 140–450)
PMV BLD AUTO: 11 FL (ref 6–12)
POTASSIUM SERPL-SCNC: 3.4 MMOL/L (ref 3.5–5.2)
POTASSIUM SERPL-SCNC: 3.4 MMOL/L (ref 3.5–5.2)
QT INTERVAL: 587 MS
QTC INTERVAL: 541 MS
RBC # BLD AUTO: 3.37 10*6/MM3 (ref 3.77–5.28)
SODIUM SERPL-SCNC: 138 MMOL/L (ref 136–145)
WBC NRBC COR # BLD AUTO: 6.93 10*3/MM3 (ref 3.4–10.8)

## 2025-06-05 PROCEDURE — 25010000002 ENOXAPARIN PER 10 MG: Performed by: NURSE PRACTITIONER

## 2025-06-05 PROCEDURE — 25010000002 POTASSIUM CHLORIDE 10 MEQ/100ML SOLUTION: Performed by: STUDENT IN AN ORGANIZED HEALTH CARE EDUCATION/TRAINING PROGRAM

## 2025-06-05 PROCEDURE — 25010000002 HYDRALAZINE PER 20 MG: Performed by: STUDENT IN AN ORGANIZED HEALTH CARE EDUCATION/TRAINING PROGRAM

## 2025-06-05 PROCEDURE — 94799 UNLISTED PULMONARY SVC/PX: CPT

## 2025-06-05 PROCEDURE — 97530 THERAPEUTIC ACTIVITIES: CPT

## 2025-06-05 PROCEDURE — 84132 ASSAY OF SERUM POTASSIUM: CPT | Performed by: STUDENT IN AN ORGANIZED HEALTH CARE EDUCATION/TRAINING PROGRAM

## 2025-06-05 PROCEDURE — 97161 PT EVAL LOW COMPLEX 20 MIN: CPT

## 2025-06-05 PROCEDURE — 97165 OT EVAL LOW COMPLEX 30 MIN: CPT

## 2025-06-05 PROCEDURE — 25010000002 LEVETRIRACETAM PER 10 MG: Performed by: STUDENT IN AN ORGANIZED HEALTH CARE EDUCATION/TRAINING PROGRAM

## 2025-06-05 PROCEDURE — 99222 1ST HOSP IP/OBS MODERATE 55: CPT | Performed by: INTERNAL MEDICINE

## 2025-06-05 PROCEDURE — 80048 BASIC METABOLIC PNL TOTAL CA: CPT | Performed by: INTERNAL MEDICINE

## 2025-06-05 PROCEDURE — 94761 N-INVAS EAR/PLS OXIMETRY MLT: CPT

## 2025-06-05 PROCEDURE — 85027 COMPLETE CBC AUTOMATED: CPT | Performed by: INTERNAL MEDICINE

## 2025-06-05 PROCEDURE — 92610 EVALUATE SWALLOWING FUNCTION: CPT

## 2025-06-05 PROCEDURE — 94640 AIRWAY INHALATION TREATMENT: CPT

## 2025-06-05 PROCEDURE — 25010000002 CEFTRIAXONE PER 250 MG: Performed by: INTERNAL MEDICINE

## 2025-06-05 PROCEDURE — 25010000002 HYDRALAZINE PER 20 MG: Performed by: INTERNAL MEDICINE

## 2025-06-05 RX ORDER — NIFEDIPINE 60 MG/1
60 TABLET, EXTENDED RELEASE ORAL
Status: DISCONTINUED | OUTPATIENT
Start: 2025-06-05 | End: 2025-06-09

## 2025-06-05 RX ORDER — LISINOPRIL 10 MG/1
10 TABLET ORAL
Status: DISCONTINUED | OUTPATIENT
Start: 2025-06-05 | End: 2025-06-06

## 2025-06-05 RX ORDER — HYDRALAZINE HYDROCHLORIDE 20 MG/ML
10 INJECTION INTRAMUSCULAR; INTRAVENOUS EVERY 6 HOURS PRN
Status: DISCONTINUED | OUTPATIENT
Start: 2025-06-05 | End: 2025-06-14 | Stop reason: HOSPADM

## 2025-06-05 RX ORDER — HYDRALAZINE HYDROCHLORIDE 20 MG/ML
10 INJECTION INTRAMUSCULAR; INTRAVENOUS ONCE
Status: COMPLETED | OUTPATIENT
Start: 2025-06-05 | End: 2025-06-05

## 2025-06-05 RX ORDER — ENOXAPARIN SODIUM 100 MG/ML
40 INJECTION SUBCUTANEOUS EVERY 24 HOURS
Status: DISCONTINUED | OUTPATIENT
Start: 2025-06-05 | End: 2025-06-08 | Stop reason: DRUGHIGH

## 2025-06-05 RX ORDER — POTASSIUM CHLORIDE 7.45 MG/ML
10 INJECTION INTRAVENOUS
Status: DISPENSED | OUTPATIENT
Start: 2025-06-05 | End: 2025-06-05

## 2025-06-05 RX ORDER — POTASSIUM CHLORIDE 1500 MG/1
40 TABLET, EXTENDED RELEASE ORAL EVERY 4 HOURS
Status: COMPLETED | OUTPATIENT
Start: 2025-06-05 | End: 2025-06-06

## 2025-06-05 RX ORDER — LEVETIRACETAM 500 MG/5ML
500 INJECTION, SOLUTION, CONCENTRATE INTRAVENOUS EVERY 12 HOURS SCHEDULED
Status: DISCONTINUED | OUTPATIENT
Start: 2025-06-05 | End: 2025-06-09

## 2025-06-05 RX ADMIN — LISINOPRIL 10 MG: 10 TABLET ORAL at 15:43

## 2025-06-05 RX ADMIN — GABAPENTIN 400 MG: 400 CAPSULE ORAL at 09:17

## 2025-06-05 RX ADMIN — POTASSIUM CHLORIDE 40 MEQ: 1500 TABLET, EXTENDED RELEASE ORAL at 21:20

## 2025-06-05 RX ADMIN — POLYETHYLENE GLYCOL 3350 17 G: 17 POWDER, FOR SOLUTION ORAL at 12:31

## 2025-06-05 RX ADMIN — HYDRALAZINE HYDROCHLORIDE 10 MG: 20 INJECTION, SOLUTION INTRAMUSCULAR; INTRAVENOUS at 15:59

## 2025-06-05 RX ADMIN — ESCITALOPRAM 20 MG: 10 TABLET, FILM COATED ORAL at 08:25

## 2025-06-05 RX ADMIN — ACETAMINOPHEN 650 MG: 325 TABLET ORAL at 10:27

## 2025-06-05 RX ADMIN — POTASSIUM CHLORIDE 10 MEQ: 7.46 INJECTION, SOLUTION INTRAVENOUS at 10:16

## 2025-06-05 RX ADMIN — CALCIUM CARBONATE-VITAMIN D TAB 500 MG-200 UNIT 1 TABLET: 500-200 TAB at 21:19

## 2025-06-05 RX ADMIN — ATORVASTATIN CALCIUM 80 MG: 80 TABLET, FILM COATED ORAL at 08:25

## 2025-06-05 RX ADMIN — LEVOTHYROXINE SODIUM 150 MCG: 0.07 TABLET ORAL at 08:24

## 2025-06-05 RX ADMIN — ATENOLOL 25 MG: 25 TABLET ORAL at 08:46

## 2025-06-05 RX ADMIN — DONEPEZIL HYDROCHLORIDE 10 MG: 10 TABLET, FILM COATED ORAL at 08:24

## 2025-06-05 RX ADMIN — Medication 1000 MCG: at 08:25

## 2025-06-05 RX ADMIN — ALBUTEROL SULFATE 2.5 MG: 2.5 SOLUTION RESPIRATORY (INHALATION) at 08:10

## 2025-06-05 RX ADMIN — LEVETIRACETAM 500 MG: 100 INJECTION, SOLUTION INTRAVENOUS at 21:18

## 2025-06-05 RX ADMIN — LATANOPROST 1 DROP: 50 SOLUTION/ DROPS OPHTHALMIC at 21:19

## 2025-06-05 RX ADMIN — POTASSIUM CHLORIDE 10 MEQ: 7.46 INJECTION, SOLUTION INTRAVENOUS at 06:35

## 2025-06-05 RX ADMIN — BUDESONIDE AND FORMOTEROL FUMARATE DIHYDRATE 2 PUFF: 160; 4.5 AEROSOL RESPIRATORY (INHALATION) at 08:01

## 2025-06-05 RX ADMIN — CEFTRIAXONE 2000 MG: 2 INJECTION, POWDER, FOR SOLUTION INTRAMUSCULAR; INTRAVENOUS at 23:41

## 2025-06-05 RX ADMIN — ENOXAPARIN SODIUM 40 MG: 100 INJECTION SUBCUTANEOUS at 15:59

## 2025-06-05 RX ADMIN — FUROSEMIDE 20 MG: 20 TABLET ORAL at 08:25

## 2025-06-05 RX ADMIN — LEVETIRACETAM 500 MG: 500 TABLET, FILM COATED ORAL at 01:24

## 2025-06-05 RX ADMIN — NIFEDIPINE 60 MG: 60 TABLET, EXTENDED RELEASE ORAL at 08:24

## 2025-06-05 RX ADMIN — LEVETIRACETAM 500 MG: 500 TABLET, FILM COATED ORAL at 09:18

## 2025-06-05 RX ADMIN — BUDESONIDE AND FORMOTEROL FUMARATE DIHYDRATE 2 PUFF: 160; 4.5 AEROSOL RESPIRATORY (INHALATION) at 21:09

## 2025-06-05 RX ADMIN — ALBUTEROL SULFATE 2.5 MG: 2.5 SOLUTION RESPIRATORY (INHALATION) at 15:21

## 2025-06-05 RX ADMIN — LATANOPROST 1 DROP: 50 SOLUTION/ DROPS OPHTHALMIC at 01:24

## 2025-06-05 RX ADMIN — GABAPENTIN 400 MG: 400 CAPSULE ORAL at 21:19

## 2025-06-05 RX ADMIN — ASPIRIN 325 MG ORAL TABLET 325 MG: 325 PILL ORAL at 08:24

## 2025-06-05 RX ADMIN — POTASSIUM CHLORIDE 10 MEQ: 7.46 INJECTION, SOLUTION INTRAVENOUS at 08:51

## 2025-06-05 RX ADMIN — CEFTRIAXONE 2000 MG: 2 INJECTION, POWDER, FOR SOLUTION INTRAMUSCULAR; INTRAVENOUS at 01:24

## 2025-06-05 RX ADMIN — CLOPIDOGREL BISULFATE 75 MG: 75 TABLET, FILM COATED ORAL at 08:25

## 2025-06-05 RX ADMIN — HYDRALAZINE HYDROCHLORIDE 10 MG: 20 INJECTION INTRAMUSCULAR; INTRAVENOUS at 14:11

## 2025-06-05 RX ADMIN — BISACODYL 10 MG: 10 SUPPOSITORY RECTAL at 16:59

## 2025-06-05 NOTE — PLAN OF CARE
Goal Outcome Evaluation:  Plan of Care Reviewed With: patient           Outcome Evaluation: Patient is an 85 y.o female who presented to [facility] with a headache for several weeks. Patient also had a fall 3 weeks ago. Patient AO though intermittent confusion and increased processing time noted. Patient lives at home with her spouse with 1 LUIS CARLOS. Patient uses a rwx at baseline. Today patient required Kumar and increased time to sit up to EOB. Patient stood from EOB 2x with CGA. Patient with difficulty gaining balance on first attempt requiring return to sitting before standing and ambulating to the chair. Patient ambualted 3ft with rwx and CGA-Kumar. Slow shuffled steps from EOB to chair. Patient reclined in chair at end of session. Patient demonstrates deficits in strength, balance, and activity endurance limiting overall functional mobility. Patient would continue to benefit from skilled PT intervention to address deficits in functional mobility and maximize safety and independence. Anticipate needs for SNF at d/c. Acute PT will continue to monitor.    Anticipated Discharge Disposition (PT): skilled nursing facility

## 2025-06-05 NOTE — PROGRESS NOTES
Name: Avelina Carr ADMIT: 2025   : 1940  PCP: Artem Shepherd MD    MRN: [id number] LOS: 0 days   AGE/SEX: 85 y.o. female  ROOM: [facility]     Subjective   Subjective   Patient appears comfortable and in no apparent distress.  Tolerating intake.   at bedside inquiring about previous interventional list name who performed thrombectomy years ago.    Reports chest discomfort aggravated by deep breathing.  Describes expectorant yellow mucus.  No change in previous shortness of breath.       Objective   Objective   Vital Signs  Temp:  [97.3 °F (36.3 °C)-98.1 °F (36.7 °C)] 98.1 °F (36.7 °C)  Heart Rate:  [47-67] 61  Resp:  [14-20] 18  BP: (158-240)/() 234/98  SpO2:  [92 %-96 %] 96 %  on  Flow (L/min) (Oxygen Therapy):  [2] 2;   Device (Oxygen Therapy): room air  Body mass index is 26.41 kg/m².    Physical Exam  Constitutional:       General: She is not in acute distress.     Appearance: She is not toxic-appearing.      Comments: Generally weak   Cardiovascular:      Rate and Rhythm: Bradycardia present.      Heart sounds: Normal heart sounds.   Pulmonary:      Effort: Pulmonary effort is normal.      Breath sounds: Rhonchi (scattered) present.   Abdominal:      General: Bowel sounds are normal.      Palpations: Abdomen is soft.   Musculoskeletal:      Right lower leg: Edema present.      Left lower leg: Edema present.   Skin:     General: Skin is warm and dry.   Neurological:      General: No focal deficit present.      Mental Status: She is alert. Mental status is at baseline.       Results Review     I reviewed the patient's new clinical results.  Results from last 7 days   Lab Units 25  0347 25  1651   WBC 10*3/mm3 6.93 7.04   HEMOGLOBIN g/dL 11.3* 12.1   PLATELETS 10*3/mm3 220 259     Results from last 7 days   Lab Units 25  1537 25  0347 25  1651   SODIUM mmol/L  --  138 139   POTASSIUM mmol/L 3.4* 3.4* 3.1*   CHLORIDE mmol/L  --  103 99   CO2 mmol/L  --  22.0  "26.0   BUN mg/dL  --  11.0 11.0   CREATININE mg/dL  --  0.72 1.00   GLUCOSE mg/dL  --  68 106*   EGFR mL/min/1.73  --  81.5 55.0*     Results from last 7 days   Lab Units 06/04/25  1651   ALBUMIN g/dL 4.1   BILIRUBIN mg/dL 0.7   ALK PHOS U/L 108   AST (SGOT) U/L 34*   ALT (SGPT) U/L 21     Results from last 7 days   Lab Units 06/05/25  0347 06/04/25  1651   CALCIUM mg/dL 8.6 9.1   ALBUMIN g/dL  --  4.1   MAGNESIUM mg/dL  --  2.2       No results found for: \"HGBA1C\", \"POCGLU\"    CT Angiogram Chest  Result Date: 6/4/2025   Pulmonary arteries are well-opacified, and there is no evidence of pulmonary embolism.  There are mild somewhat patchy alveolar pulmonary infiltrates with somewhat coarse interstitial markings, most prominent in the left upper lobe though seen to a lesser degree in the right middle lobe and lower lobe. FINDINGS are nonspecific.  Imaging features can be seen with COVID-19 pneumonia, though are nonspecific and can occur with a variety of infectious and noninfectious processes.    This report was finalized on 6/4/2025 8:05 PM by Dr. Dale Headley M.D on Workstation: [id number]      CT Head Without Contrast  Result Date: 6/4/2025   Chronic changes as noted above, no acute intracranial abnormality.        This report was finalized on 6/4/2025 6:21 PM by Dr. Dale Headley M.D on Workstation: [id number]        I have personally reviewed all medications:  Scheduled Medications  aspirin, 325 mg, Oral, Daily  atenolol, 25 mg, Oral, BID  atorvastatin, 80 mg, Oral, Daily  budesonide-formoterol, 2 puff, Inhalation, BID  calcium 500 mg vitamin D 5 mcg (200 UT), 1 tablet, Oral, BID  cefTRIAXone, 2,000 mg, Intravenous, Q24H  clopidogrel, 75 mg, Oral, Daily  donepezil, 10 mg, Oral, Daily  enoxaparin sodium, 40 mg, Subcutaneous, Q24H  escitalopram, 20 mg, Oral, Daily  furosemide, 20 mg, Oral, Daily  gabapentin, 400 mg, Oral, Q12H  latanoprost, 1 drop, Right Eye, Nightly  levETIRAcetam, 500 mg, Intravenous, " Q12H  levothyroxine, 150 mcg, Oral, Q AM  lisinopril, 10 mg, Oral, Q24H  NIFEdipine XL, 60 mg, Oral, Q24H  pantoprazole, 40 mg, Oral, Q AM  vitamin B-12, 1,000 mcg, Oral, Daily    Infusions  Pharmacy to Dose enoxaparin (LOVENOX),     Diet  Diet: Regular/House; No Straw, No Mixed Consistencies, Feeding Assistance - Nursing; Texture: Soft to Chew (NDD 3); Soft to Chew: Chopped Meat; Fluid Consistency: Thin (IDDSI 0)  NPO Diet NPO Type: Strict NPO    I have personally reviewed:  [x]  Laboratory   [x]  Microbiology   [x]  Radiology   [x]  EKG/Telemetry  [x]  Cardiology/Vascular   []  Pathology    []  Records       Assessment/Plan     Active Hospital Problems    Diagnosis  POA    **Hypoxia [R09.02]  Yes    Chest discomfort [R07.89]  Yes    Elevated troponin [R79.89]  Yes    Paroxysmal atrial fibrillation [I48.0]  Yes    Former smoker [Z87.891]  Not Applicable    Hypothyroid [E03.9]  Yes    HLD (hyperlipidemia) [E78.5]  Yes    Primary hypertension [I10]  Yes    History of cardioembolic cerebrovascular accident (CVA) [Z86.73]  Not Applicable      Resolved Hospital Problems   No resolved problems to display.       85 y.o. female admitted with Hypoxia.      Hypoxia    Former smoker  - Oxygen saturation 88% on room air improved with supplemental oxygen; wean oxygen as tolerated  - Suspect due to presumed pneumonia  - RVP negative  - Continue empiric IV ceftriaxone; BC x 2 pending results blood cultures      Chest discomfort     Elevated troponin    Paroxysmal Atrial fibrillation  -? Due to presumed PNA given scattered infiltrates on CTA vs cardiac etiology / poorly controlled HTN with associated elevated troponin vs both vs other  -CTA negative PE   -EKG without ST changes  -cardiology consulted & plan stress test  -statin & ASA continued  -s/p loop recorder; followed by Dr. Marrero      Hypokalemia  -replete ordered; repeat labs in a.m.      Primary hypertension  -poorly controlled here despite home med--atenolol  -added CCB &  ACEi here  -cardiology following as well      HLD (hyperlipidemia)    History of cardioembolic cerebrovascular accident (CVA)  -Plavix, statin, ASA continued      Hypothyroid  -elevated TSH >5.0 yet normal free T4 1.49      Pharmacy to dose Lovenox for DVT prophylaxis.  No CPR  Discussed with patient, family, and nursing staff.  Anticipate discharge home with family timing yet to be determined.  Expected Discharge Date: 6/9/2025; Expected Discharge Time:       CELY Villarreal  [facility]  06/05/25  16:59 EDT

## 2025-06-05 NOTE — H&P
HISTORY AND PHYSICAL   [facility]        Date of Admission: 2025  Patient Identification:  Name: Avelina Carr  Age: 85 y.o.  Sex: female  :  1940  MRN: [id number]                     Primary Care Physician: Artem Shepherd MD    Chief Complaint:  85 year old female presented to the emergency room with weakness; she had a fall at home; she has had a headache; she uses a walker at baseline; she has had some abdominal pain and shortness of breath; no fever or chills; no chest pain; no fever or chills    History of Present Illness:   As above    Past Medical History:  Past Medical History:   Diagnosis Date    Anxiety     Arthritis     Breast cancer     COPD (chronic obstructive pulmonary disease)     oxygen at 2L aths    COVID     CVD (cardiovascular disease)     Depression     GERD (gastroesophageal reflux disease)     Hx of radiation therapy     Hyperlipidemia     Hypothyroid     Low back pain     Macular degeneration     Osteoarthritis     Peripheral neuropathy     Sleep apnea     Stroke     Vertigo      Past Surgical History:  Past Surgical History:   Procedure Laterality Date    ADRENAL GLAND SURGERY      BRAIN MENINGIOMA EXCISION      occipital    BREAST BIOPSY       SECTION      EMBOLECTOMY N/A 11/15/2017    Procedure: Embolectomy Mechanical;  Surgeon: Rachid Figueroa MD;  Location: [facility] ;  Service:     MASTECTOMY      RECTAL SURGERY  2003    REPLACEMENT TOTAL KNEE Right     SKIN BIOPSY  2025    TOTAL SHOULDER REPLACEMENT      x2      Home Meds:  Medications Prior to Admission   Medication Sig Dispense Refill Last Dose/Taking    acetaminophen (TYLENOL) 325 MG tablet Take 2 tablets by mouth Every 6 (Six) Hours As Needed for Mild Pain. Indications: Pain   Taking As Needed    albuterol sulfate  (90 Base) MCG/ACT inhaler Inhale 2 puffs Every 4 (Four) Hours As Needed for Wheezing. Indications: Asthma   Taking As Needed    aspirin 325 MG tablet  Take 1 tablet by mouth Daily.   6/4/2025    atenolol (TENORMIN) 25 MG tablet Take 1 tablet by mouth 2 (Two) Times a Day. Indications: High Blood Pressure   Taking    atorvastatin (LIPITOR) 80 MG tablet I Tablet Nightly  Indications: High Amount of Fats in the Blood 90 tablet 3 Taking    Calcium Carbonate-Vitamin D (Oyster Shell Calcium/D) 500-5 MG-MCG tablet Take 1 tablet by mouth 2 (Two) Times a Day.   6/4/2025    clopidogrel (PLAVIX) 75 MG tablet TAKE 1 TABLET BY MOUTH DAILY 90 tablet 1 6/4/2025    Cyanocobalamin (Vitamin B 12) 500 MCG tablet Take 2 tablets by mouth Daily. Indications: Inadequate Vitamin B12   6/4/2025    donepezil (Aricept) 10 MG tablet Take 1 tablet by mouth Daily for 90 days. 90 tablet 4 6/4/2025    escitalopram (LEXAPRO) 20 MG tablet TAKE 1 TABLET BY MOUTH DAILY 90 tablet 1 6/4/2025    furosemide (LASIX) 20 MG tablet TAKE 1 TABLET BY MOUTH DAILY 30 tablet 0 6/4/2025    gabapentin (NEURONTIN) 400 MG capsule TAKE 1 CAPSULE BY MOUTH 3 TIMES A DAY; INDICATIONS: NEUROPATHIC PAIN 180 capsule 1 6/4/2025    latanoprost (XALATAN) 0.005 % ophthalmic solution Administer 1 drop to the right eye Every Night. Indications: Wide-Angle Glaucoma   6/3/2025    levETIRAcetam (KEPPRA) 500 MG tablet TAKE 1 TABLET BY MOUTH TWICE A  tablet 2 6/4/2025    levothyroxine (SYNTHROID, LEVOTHROID) 150 MCG tablet Take 1 tablet by mouth Daily.   6/4/2025    Melatonin ER 10 MG tablet controlled-release Take 1 tablet by mouth every night at bedtime. Indications: sleep   6/3/2025    Multiple Vitamins-Minerals (PRESERVISION AREDS 2 PO) Take 1 capsule by mouth 2 (Two) Times a Day. Indications: macular degeneration   6/4/2025    pantoprazole (PROTONIX) 40 MG EC tablet TAKE ONE TABLET BY MOUTH DAILY 90 tablet 3 6/4/2025    Symbicort 160-4.5 MCG/ACT inhaler Inhale 2 puffs 2 (Two) Times a Day. Indications: Asthma   6/4/2025    traMADol (ULTRAM) 50 MG tablet TAKE 1 TABLET BY MOUTH EVERY 6 HOURS AS NEEDED FOR MODERATE PAIN 90  tablet 0 2025    donepezil (ARICEPT) 5 MG tablet Take 1 tablet by mouth Daily for 21 days. 21 tablet 0     Glycerin-Hypromellose- (ARTIFICIAL TEARS) 0.2-0.2-1 % solution ophthalmic solution Administer 1 drop to both eyes Every 1 (One) Hour As Needed for Dry Eyes.       NON FORMULARY Calcium 500mg  Vit D 200mg       Probiotic Product (AlienVault PO) Take 1 capsule by mouth Daily. Indications: probiotic          Allergies:  Allergies   Allergen Reactions    Penicillin G Hives    Penicillins Rash     Immunizations:  Immunization History   Administered Date(s) Administered    COVID-19 (PFIZER) 12YRS+ (COMIRNATY) 2023, 2024    COVID-19 (PFIZER) BIVALENT 12+YRS 10/01/2022, 2023    COVID-19 (PFIZER) Purple Cap Monovalent 2021, 02/10/2021, 2021, 10/25/2021    Covid-19 (Pfizer) Gray Cap Monovalent 2022    Flu Vaccine Intradermal Quad 18-64YR 2017    FluMist 2-49yrs 10/01/2016    Fluzone High-Dose 65+YRS 10/22/2019    Fluzone Quad >6mos (Multi-dose) 2021, 10/01/2022    INFLUENZA NASAL UF 10/02/2023    Influenza TIV (IM) 2017    Pneumococcal Conjugate 13-Valent (PCV13) 2017, 2017    Pneumococcal Polysaccharide (PPSV23) 2004, 2004    Shingrix 10/22/2019    Td (TDVAX) 2017     Social History:   Social History     Social History Narrative    Lives at home with      Social History     Socioeconomic History    Marital status:      Spouse name: Vitaly    Number of children: 4    Years of education: 12   Tobacco Use    Smoking status: Former     Current packs/day: 0.00     Average packs/day: 2.0 packs/day for 58.0 years (116.0 ttl pk-yrs)     Types: Cigarettes     Start date:      Quit date: 2014     Years since quittin.4    Smokeless tobacco: Never    Tobacco comments:     non-smoker since    Vaping Use    Vaping status: Never Used   Substance and Sexual Activity    Alcohol use: No    Drug use: No     "Sexual activity: Defer       Family History:  Family History   Problem Relation Age of Onset    Hypertension Mother     Hyperlipidemia Mother     Heart disease Mother     Cancer Father     Alzheimer's disease Sister     No Known Problems Sister     No Known Problems Brother     Breast cancer Neg Hx         Review of Systems  See history of present illness and past medical history.  Patient denies headache, dizziness, syncope, falls, trauma, change in vision, change in hearing, change in taste, changes in weight, changes in appetite, focal weakness, numbness, or paresthesia.  Patient denies chest pain, palpitations,  cough, sinus pressure, rhinorrhea, epistaxis, hemoptysis, nausea, vomiting,hematemesis, diarrhea, constipation or hematochezia.  Denies cold or heat intolerance, polydipsia, polyuria, polyphagia. Denies hematuria, pyuria, dysuria, hesitancy, frequency or urgency. Denies consumption of raw and under cooked meats foods or change in water source.  Denies fever, chills, sweats, night sweats.  Denies missing any routine medications.      Objective:  T Max 24 hrs: Temp (24hrs), Av.5 °F (36.4 °C), Min:97.5 °F (36.4 °C), Max:97.5 °F (36.4 °C)    Vitals Ranges:   Temp:  [97.5 °F (36.4 °C)] 97.5 °F (36.4 °C)  Heart Rate:  [49-58] 58  Resp:  [14-16] 14  BP: (183-233)/() 183/69      Exam:  BP (!) 183/69 (BP Location: Left arm, Patient Position: Lying)   Pulse 58   Temp 97.5 °F (36.4 °C) (Oral)   Resp 14   Ht 162.6 cm (64\")   Wt 69.5 kg (153 lb 3.5 oz)   SpO2 96%   BMI 26.30 kg/m²     General Appearance:    Alert, cooperative, no distress, appears stated age   Head:    Normocephalic, without obvious abnormality, atraumatic   Eyes:    PERRL, conjunctivae/corneas clear, EOM's intact, both eyes   Ears:    Normal external ear canals, both ears   Nose:   Nares normal, septum midline, mucosa normal, no drainage    or sinus tenderness   Throat:   Lips, mucosa, and tongue normal   Neck:   Supple, " symmetrical, trachea midline, no adenopathy;     thyroid:  no enlargement/tenderness/nodules; no carotid    bruit or JVD   Back:     Symmetric, no curvature, ROM normal, no CVA tenderness   Lungs:     Decreased breath sounds bilaterally, respirations unlabored   Chest Wall:    No tenderness or deformity    Heart:    Regular rate and rhythm, S1 and S2 normal, no murmur, rub   or gallop   Abdomen:     Soft, nontender, bowel sounds active all four quadrants,     no masses, no hepatomegaly, no splenomegaly   Extremities:   Extremities normal, atraumatic, no cyanosis or edema                       .    Data Review:  Labs in chart were reviewed.  WBC   Date Value Ref Range Status   06/04/2025 7.04 3.40 - 10.80 10*3/mm3 Final     Hemoglobin   Date Value Ref Range Status   06/04/2025 12.1 12.0 - 15.9 g/dL Final     Hematocrit   Date Value Ref Range Status   06/04/2025 36.0 34.0 - 46.6 % Final     Platelets   Date Value Ref Range Status   06/04/2025 259 140 - 450 10*3/mm3 Final     Sodium   Date Value Ref Range Status   06/04/2025 139 136 - 145 mmol/L Final     Potassium   Date Value Ref Range Status   06/04/2025 3.1 (L) 3.5 - 5.2 mmol/L Final     Chloride   Date Value Ref Range Status   06/04/2025 99 98 - 107 mmol/L Final     CO2   Date Value Ref Range Status   06/04/2025 26.0 22.0 - 29.0 mmol/L Final     BUN   Date Value Ref Range Status   06/04/2025 11.0 8.0 - 23.0 mg/dL Final     Creatinine   Date Value Ref Range Status   06/04/2025 1.00 0.57 - 1.00 mg/dL Final     Glucose   Date Value Ref Range Status   06/04/2025 106 (H) 65 - 99 mg/dL Final     Calcium   Date Value Ref Range Status   06/04/2025 9.1 8.6 - 10.5 mg/dL Final     Magnesium   Date Value Ref Range Status   06/04/2025 2.2 1.6 - 2.4 mg/dL Final     AST (SGOT)   Date Value Ref Range Status   06/04/2025 34 (H) 1 - 32 U/L Final     ALT (SGPT)   Date Value Ref Range Status   06/04/2025 21 1 - 33 U/L Final     Alkaline Phosphatase   Date Value Ref Range Status  "  06/04/2025 108 39 - 117 U/L Final     No results found for: \"APTT\", \"INR\"    Results from last 7 days   Lab Units 06/04/25  1651   TSH uIU/mL 5.090*   FREE T4 ng/dL 1.49          Imaging Results (All)       Procedure Component Value Units Date/Time    CT Angiogram Chest [[id number]] Collected: 06/04/25 2000     Updated: 06/04/25 2008    Narrative:      CTA CHEST WITH IV CONTRAST     HISTORY: Shortness of breath, hypoxia, evaluate for PE; R51.9-Headache,  unspecified; R09.02-Hypoxemia; R53.1-Weakness; E87.6-Hypokalemia     COMPARISON: None     TECHNIQUE: CT angiography was performed of the chest with axial images  as well as coronal and sagittal reformatted MIP images provided  following administration of IV contrast. 3-D surface rendered reformats  were obtained of the pulmonary arteries and aorta. Radiation dose  reduction techniques were utilized, including automated exposure  control, and exposure modulation based on body size.     FINDINGS:     There is a tiny right pleural effusion, and there is mild bilateral  posterior basilar dependent atelectasis. There are patchy areas of  alveolar infiltrate with somewhat coarse interstitial markings in the  left upper lobe. Similar though more subtle changes are seen in the  right middle lobe and right lower lobe.     Thoracic aorta is normal in caliber.  There are coronary atherosclerotic  vascular calcifications.  There is no suspicious mediastinal adenopathy  or other mass.     Images of the upper abdomen show no acute abnormality.  There are  chronic healed posttraumatic as well as degenerative bony changes, but  there is no acute bony abnormality.     Bolus timing is excellent, and there is no evidence of pulmonary  embolism.       Impression:         Pulmonary arteries are well-opacified, and there is no evidence of  pulmonary embolism.     There are mild somewhat patchy alveolar pulmonary infiltrates with  somewhat coarse interstitial markings, most prominent in " the left upper  lobe though seen to a lesser degree in the right middle lobe and lower  lobe. FINDINGS are nonspecific.     Imaging features can be seen with COVID-19 pneumonia, though are  nonspecific and can occur with a variety of infectious and noninfectious  processes.           This report was finalized on 6/4/2025 8:05 PM by Dr. Dale Headley M.D  on Workstation: [id number]       CT Abdomen Pelvis Without Contrast [[id number]] Collected: 06/04/25 1816     Updated: 06/04/25 1825    Narrative:      CT ABDOMEN PELVIS WO CONTRAST-     Radiation dose reduction techniques were utilized, including automated  exposure control and exposure modulation based on body size.     Clinical: Abdominal pain with constipation     FINDINGS:  1. Prior right mastectomy, trace right pleural effusion. There is  cardiac enlargement.     2. The liver, pancreas, spleen and left adrenal gland are satisfactory  in appearance. Clips within the right upper quadrant, the right adrenal  gland not identified, suspect previous right adrenalectomy. Bilateral  renal cortical nodules, having attenuation consistent with renal cysts.  The largest on the left measures 14 mm. No renal calculus or obstructive  uropathy. There is atherosclerotic calcification of a normal diameter  aorta. The bladder is normal.     Bladder is satisfactory in appearance, no gallstones or wall thickening  or pericholecystic fluid. No biliary duct dilatation. The stomach is  collapsed and cannot be optimally evaluated. Suspect duodenal  diverticula. Uterus and ovaries are normal for age. There is  diverticulosis of the colon, no diverticulitis. The small bowel is  satisfactory in appearance. No free intraperitoneal gas or fluid. No  adenopathy seen.     CONCLUSION: No acute intra-abdominal abnormality. There is  diverticulosis of the colon without diverticulitis. Small bilateral  renal cysts. Postoperative change consistent with right adrenalectomy.              This  report was finalized on 6/4/2025 6:22 PM by Dr. Bryan Roy M.D  on Workstation: [id number]       CT Head Without Contrast [[id number]] Collected: 06/04/25 1817     Updated: 06/04/25 1824    Narrative:      HEAD CT WITHOUT CONTRAST     REASON:  Frequent headaches      COMPARISON STUDIES: Head CT 11/12/2023.     TECHNIQUE:  Axial images were acquired from the skull base to vertex  without contrast, including multiplanar reformats, per standard  departmental protocol.    Radiation dose reduction techniques were  utilized, including automated exposure control, and exposure modulation  based on body size.     FINDINGS:     There is no CT evidence of acute intracranial hemorrhage, mass, or  infarct. There is volume loss, but there is no evidence of hydrocephalus  or extra-axial fluid collection.     Redemonstrated advanced chronic white matter changes as well as right  parieto-occipital cortical encephalomalacia, no evidence of acute  intracranial abnormality.     Skull base, calvarium, and extracranial soft tissues show chronic  changes, without evidence of acute abnormality.       Impression:         Chronic changes as noted above, no acute intracranial abnormality.                        This report was finalized on 6/4/2025 6:21 PM by Dr. Dale Headley M.D  on Workstation: [id number]       XR Chest AP [[id number]] Collected: 06/04/25 1710     Updated: 06/04/25 1714    Narrative:      XR CHEST AP-     Clinical: COVID evaluation, cough, fever     COMPARISON examination 4/11/2022     FINDINGS: The cardiomediastinal silhouette is stable. There is  atherosclerotic calcification of the aorta. The right lung is clear.  There is a suggestion of perhaps minimal infiltrate along the left  costophrenic sulcus. No consolidation is seen within either lung. No  edema or effusion. The remainder is unremarkable.     This report was finalized on 6/4/2025 5:11 PM by Dr. Bryan Roy M.D  on Workstation: [id number]                  Assessment:  Active Hospital Problems    Diagnosis  POA    **Hypoxia [R09.02]  Yes      Resolved Hospital Problems   No resolved problems to display.   A fib  Copd  Sleep apnea  Hypothyroidism  Hypertension  Hyperlipidemia  Hyperglycemia  Hypokalemia  dementia    Plan:  Ct with possible pneumonia  Antibiotics  Monitor on telemetry  Trend labs  Pt/ot to see  Replace potassium  Dw patient and ed provider as well as family  Patient is dnr and spouse is nok  Andria Fab Roberts MD  6/4/2025  22:59 EDT

## 2025-06-05 NOTE — DISCHARGE PLACEMENT REQUEST
"Shala Carr (85 y.o. Female)       Date of Birth   1940    Social Security Number       Address   [address] [address]    Home Phone   [phone]    MRN   [id number]       [employer]    Marital Status                               Admission Date   6/4/2025    Admission Type   Emergency    Admitting Provider   Andria Roberts MD    Attending Provider   Nahid García MD    Department, Room/Bed   [address], [address]/1       Discharge Date       Discharge Disposition       Discharge Destination                                 Attending Provider: Nahid García MD    Allergies: Penicillin G, Penicillins    Isolation: None   Infection: None   Code Status: No CPR    Ht: 162.6 cm (64\")   Wt: 69.8 kg (153 lb 14.1 oz)    Admission Cmt: None   Principal Problem: Hypoxia [R09.02]                   Active Insurance as of 6/4/2025       Primary Coverage       Payor Plan Insurance Group Employer/Plan Group    MEDICARE MEDICARE A & B        Payor Plan Address Payor Plan Phone Number Payor Plan Fax Number Effective Dates    [address] [phone]  4/1/2004 - None Entered    [facility] [id number]         Subscriber Name Subscriber Birth Date Member ID       SHALA CARR 1940 [id number]               Secondary Coverage       Payor Plan Insurance Group Employer/Plan Group    [facility] SUPP [id number]       Payor Plan Address Payor Plan Phone Number Payor Plan Fax Number Effective Dates    [address]   12/1/2016 - None Entered    [facility] [id number]         Subscriber Name Subscriber Birth Date Member ID       SHALA CARR 1940 [id number]                     Emergency Contacts        (Rel.) Home Phone Work Phone Mobile Phone    Vitaly Carr (Spouse) -- -- [phone]                "

## 2025-06-05 NOTE — PLAN OF CARE
Goal Outcome Evaluation:  Plan of Care Reviewed With: patient           Outcome Evaluation: Patient seen for clinical swallow assessment. Pt with lengthy dysphagia hx. Most recent VFSS did reveal a functional swallow. Pt also c/o esophageal obstruction in conjunction with difficulty with swallow initiation. SLP recs thins, no straws, and soft/chopped, meds as federica. Meds whole or crushed in puree. Will follow for diet tolerance and need for intrumental swallow assessment.

## 2025-06-05 NOTE — THERAPY EVALUATION
[facility]    Please Schedule the following with: Dr. Nedra Saucedo    Date:  01/22/18     Patient: Thu Nixon     YOB: 1938    Patient Home Phone: [phone] (home)    Diagnosis: Cervical stenosis LT     RT     BELÉN     Midline    Levels: C7-T1 intralaminar cervical epidural, 1    Cervical SKY    L-MBB  SI Joint    C-FACET  L-FACET    Interlaminar SKY     HIP     C-MBB  Transforaminal SKY   Neurotomy    Attending Physician: Kesha Coles    Injection Schedule for: At: [facility]    First Insurance:                               Pre-cert #:  Second Insurance:                 Pre-cert #:    GRFSYOEM:2/44/04: Belén L4-5 TX SKY  11/1/16: Belén L3-4 TX SKY  12/13/16: Belén L2-3 TX SKY       Blood Thinner:                 Diabetic           Antibiotic:               Glaucoma:     Current Open Wounds, Lacerations or Sores     Allergies:    Allergies   Allergen Reactions    Erythromycin Hives    Penicillins Hives    Sulfa Antibiotics Hives    Tetanus Toxoids Hives    Tetracyclines & Related Patient Name: Avelina Carr  : 1940    MRN: [id number]                              Today's Date: 2025       Admit Date: 2025    Visit Dx:     ICD-10-CM ICD-9-CM   1. Acute nonintractable headache, unspecified headache type  R51.9 784.0   2. Hypoxia  R09.02 799.02   3. Generalized weakness  R53.1 780.79   4. Hypokalemia  E87.6 276.8     Patient Active Problem List   Diagnosis    Postmastectomy lymphedema syndrome    Malignant neoplasm of upper-outer quadrant of right female breast    Cerebrovascular accident (CVA)    Stroke    Primary hypertension    HLD (hyperlipidemia)    Adverse effect of antiplatelet agent    Left middle cerebral artery stroke    Carcinoma of central portion of right breast in female, estrogen receptor positive    Chronic bilateral low back pain with right-sided sciatica    History of lumbar surgery    Seizure disorder, simple partial, without intractable epilepsy    Spinal stenosis of lumbar region with neurogenic claudication    Status post craniectomy    History of total right knee replacement    Other forms of scoliosis, lumbar region    DDD (degenerative disc disease), lumbar    Benign meningioma of brain    Squamous cell carcinoma of leg, left    History of cardioembolic cerebrovascular accident (CVA)    Hypothyroid    Depression    COPD (chronic obstructive pulmonary disease)    Obstructive sleep apnea    Squamous cell carcinoma of leg, right    Pain in joint, multiple sites    Excessive daytime sleepiness    Former smoker    Gastroesophageal reflux disease    Migraines    Generalized weakness    Acute CVA (cerebrovascular accident)    Dependence on supplemental oxygen    Paroxysmal atrial fibrillation    Paroxysmal supraventricular tachycardia    Arthritis    Asthma    Focal neurological deficit present    Acute right MCA stroke    History of supraventricular tachycardia    Rotator cuff arthropathy of right shoulder    Hallux valgus (acquired), right foot    Hallux valgus  (acquired), left foot    Hypoxia     Past Medical History:   Diagnosis Date    Anxiety     Arthritis     Breast cancer     COPD (chronic obstructive pulmonary disease)     oxygen at 2L aths    COVID     CVD (cardiovascular disease)     Depression     GERD (gastroesophageal reflux disease)     Hx of radiation therapy     Hyperlipidemia     Hypothyroid     Low back pain     Macular degeneration     Osteoarthritis     Peripheral neuropathy     Sleep apnea     Stroke     Vertigo      Past Surgical History:   Procedure Laterality Date    ADRENAL GLAND SURGERY      BRAIN MENINGIOMA EXCISION      occipital    BREAST BIOPSY       SECTION      EMBOLECTOMY N/A 11/15/2017    Procedure: Embolectomy Mechanical;  Surgeon: Rachid Figueroa MD;  Location: [facility] ;  Service:     MASTECTOMY      RECTAL SURGERY  2003    REPLACEMENT TOTAL KNEE Right     SKIN BIOPSY  2025    TOTAL SHOULDER REPLACEMENT      x2      General Information       [facility] Name 25 1144          Physical Therapy Time and Intention    Document Type evaluation  -     Mode of Treatment physical therapy  -[facility] Name 25 1144          General Information    Patient Profile Reviewed yes  -SM     Prior Level of Function independent:  -     Existing Precautions/Restrictions fall  -       Row Name 25 1144          Living Environment    Current Living Arrangements condominium  -     People in Home spouse  -[facility] Name 25 1144          Home Main Entrance    Number of Stairs, Main Entrance one  -       Row Name 25 1144          Cognition    Orientation Status (Cognition) oriented to;person;place  intermittent confusion noted  -       Row Name 25 1144          Safety Issues/Impairments Affecting Functional Mobility    Impairments Affecting Function (Mobility) balance;endurance/activity tolerance;strength;pain;cognition  -     Cognitive Impairments, Mobility Safety/Performance  attention;insight into deficits/self-awareness;problem-solving/reasoning;safety precaution awareness;safety precaution follow-through;sequencing abilities;judgment  -               User Key  (r) = Recorded By, (t) = Taken By, (c) = Cosigned By      Initials Name Provider Type     Nedra Sigala PT Physical Therapist                   Mobility       [facility] Name 06/05/25 1147          Bed Mobility    Bed Mobility supine-sit  -     Supine-Sit [place] (Bed Mobility) minimum assist (75% patient effort);verbal cues  -     Assistive Device (Bed Mobility) head of bed elevated;bed rails  -[facility] Name 06/05/25 1147          Sit-Stand Transfer    Sit-Stand [place] (Transfers) verbal cues;contact guard  -     Assistive Device (Sit-Stand Transfers) walker, front-wheeled  -     Comment, (Sit-Stand Transfer) 2x. LOB on initial attempt, returned to sitting  -SM       Row Name 06/05/25 1147          Gait/Stairs (Locomotion)    [place] Level (Gait) minimum assist (75% patient effort);verbal cues  -     Assistive Device (Gait) walker, front-wheeled  -     Distance in Feet (Gait) 3  -SM     Deviations/Abnormal Patterns (Gait) zabrina decreased;gait speed decreased;festinating/shuffling  -     Bilateral Gait Deviations forward flexed posture  -     Comment, (Gait/Stairs) Slow shuffled steps from EOB to chair.  -               User Key  (r) = Recorded By, (t) = Taken By, (c) = Cosigned By      Initials Name Provider Type     Nedra Sigala PT Physical Therapist                   Obj/Interventions       Row Name 06/05/25 1149          Range of Motion Comprehensive    General Range of Motion bilateral lower extremity ROM WFL  -[facility] Name 06/05/25 1149          Strength Comprehensive (MMT)    General Manual Muscle Testing (MMT) Assessment lower extremity strength deficits identified  -     Comment, General Manual Muscle Testing (MMT) Assessment generalized B LE weakness  -        Row Name 06/05/25 1149          Balance    Balance Assessment sitting static balance;sitting dynamic balance;standing static balance;standing dynamic balance  -SM     Static Sitting Balance contact guard  -SM     Dynamic Sitting Balance contact guard  -SM     Position, Sitting Balance sitting edge of bed  -SM     Static Standing Balance contact guard  -SM     Dynamic Standing Balance not tested  -SM     Position/Device Used, Standing Balance supported;walker, front-wheeled  -SM     Balance Interventions standing;sitting;sit to stand;supported;static;dynamic  -SM               User Key  (r) = Recorded By, (t) = Taken By, (c) = Cosigned By      Initials Name Provider Type     Nedra Sigala, KAREN Physical Therapist                   Goals/Plan       Row Name 06/05/25 1156          Bed Mobility Goal 1 (PT)    Activity/Assistive Device (Bed Mobility Goal 1, PT) bed mobility activities, all  -SM     [place] Level/Cues Needed (Bed Mobility Goal 1, PT) independent  -SM     Time Frame (Bed Mobility Goal 1, PT) 2 weeks  -SM       Row Name 06/05/25 1156          Transfer Goal 1 (PT)    Activity/Assistive Device (Transfer Goal 1, PT) sit-to-stand/stand-to-sit;bed-to-chair/chair-to-bed  -SM     [place] Level/Cues Needed (Transfer Goal 1, PT) modified independence  -SM       Row Name 06/05/25 1156          Gait Training Goal 1 (PT)    Activity/Assistive Device (Gait Training Goal 1, PT) gait (walking locomotion);walker, rolling  -SM     [place] Level (Gait Training Goal 1, PT) supervision required  -SM     Distance (Gait Training Goal 1, PT) 100ft  -SM     Time Frame (Gait Training Goal 1, PT) 2 weeks  -SM               User Key  (r) = Recorded By, (t) = Taken By, (c) = Cosigned By      Initials Name Provider Type     Nedra Sigala, PT Physical Therapist                   Clinical Impression       Row Name 06/05/25 1150          Pain    Pain Side/Orientation generalized  -SM     Pain Management  Interventions exercise or physical activity utilized  -     Response to Pain Interventions activity participation with tolerable pain  -SM     Pre/Posttreatment Pain Comment Patient did not rate  -SM       Row Name 06/05/25 1150          Plan of Care Review    Plan of Care Reviewed With patient  -SM     Outcome Evaluation Patient is an 85 y.o female who presented to [facility] with a headache for several weeks. Patient also had a fall 3 weeks ago. Patient AO though intermittent confusion and increased processing time noted. Patient lives at home with her spouse with 1 LUIS CARLOS. Patient uses a rwx at baseline. Today patient required Kumar and increased time to sit up to EOB. Patient stood from EOB 2x with CGA. Patient with difficulty gaining balance on first attempt requiring return to sitting before standing and ambulating to the chair. Patient ambualted 3ft with rwx and CGA-Kumar. Slow shuffled steps from EOB to chair. Patient reclined in chair at end of session. Patient demonstrates deficits in strength, balance, and activity endurance limiting overall functional mobility. Patient would continue to benefit from skilled PT intervention to address deficits in functional mobility and maximize safety and independence. Anticipate needs for SNF at d/c. Acute PT will continue to monitor.  -       Row Name 06/05/25 1150          Therapy Assessment/Plan (PT)    Rehab Potential (PT) good  -SM     Criteria for Skilled Interventions Met (PT) yes  -SM     Therapy Frequency (PT) 5 times/wk  -       Row Name 06/05/25 1150          Vital Signs    Pre Patient Position Supine  -SM     Intra Patient Position Standing  -SM     Post Patient Position Sitting  -SM       Row Name 06/05/25 1150          Positioning and Restraints    Pre-Treatment Position in bed  -SM     Post Treatment Position chair  -SM     In Chair notified nsg;reclined;call light within reach;encouraged to call for assist;exit alarm on  -SM               User Key  (r) =  Recorded By, (t) = Taken By, (c) = Cosigned By      Initials Name Provider Type     Nedra Sigala PT Physical Therapist                   Outcome Measures       Row Name 06/05/25 1156          How much help from another person do you currently need...    Turning from your back to your side while in flat bed without using bedrails? 3  -SM     Moving from lying on back to sitting on the side of a flat bed without bedrails? 3  -SM     Moving to and from a bed to a chair (including a wheelchair)? 3  -SM     Standing up from a chair using your arms (e.g., wheelchair, bedside chair)? 3  -SM     Climbing 3-5 steps with a railing? 2  -SM     To walk in hospital room? 2  -SM     AM-PAC 6 Clicks Score (PT) 16  -SM     Highest Level of Mobility Goal Stand (1 or More Minutes)-5  -SM       Row Name 06/05/25 1156          Functional Assessment    Outcome Measure Options AM-PAC 6 Clicks Basic Mobility (PT)  -               User Key  (r) = Recorded By, (t) = Taken By, (c) = Cosigned By      Initials Name Provider Type     Nedra Sigala PT Physical Therapist                                 Physical Therapy Education       Title: PT OT SLP Therapies (Done)       Topic: Physical Therapy (Done)       Point: Mobility training (Done)       Learning Progress Summary            Patient Acceptance, E, VU,NR by  at 6/5/2025 1156                      Point: Home exercise program (Done)       Learning Progress Summary            Patient Acceptance, E, VU,NR by  at 6/5/2025 1156                      Point: Body mechanics (Done)       Learning Progress Summary            Patient Acceptance, E, VU,NR by  at 6/5/2025 1156                      Point: Precautions (Done)       Learning Progress Summary            Patient Acceptance, E, VU,NR by  at 6/5/2025 1156                                      User Key       Initials Effective Dates Name Provider Type [facility] 05/02/22 -  Nedra Sigala PT Physical Therapist  PT                  PT Recommendation and Plan     Outcome Evaluation: Patient is an 85 y.o female who presented to [facility] with a headache for several weeks. Patient also had a fall 3 weeks ago. Patient AO though intermittent confusion and increased processing time noted. Patient lives at home with her spouse with 1 LUIS CARLOS. Patient uses a rwx at baseline. Today patient required Kumar and increased time to sit up to EOB. Patient stood from EOB 2x with CGA. Patient with difficulty gaining balance on first attempt requiring return to sitting before standing and ambulating to the chair. Patient ambualted 3ft with rwx and CGA-Kumar. Slow shuffled steps from EOB to chair. Patient reclined in chair at end of session. Patient demonstrates deficits in strength, balance, and activity endurance limiting overall functional mobility. Patient would continue to benefit from skilled PT intervention to address deficits in functional mobility and maximize safety and independence. Anticipate needs for SNF at d/c. Acute PT will continue to monitor.     Time Calculation:         PT Charges       Row Name 06/05/25 1157             Time Calculation    Start Time 0854  -SM      Stop Time 0912  -      Time Calculation (min) 18 min  -SM      PT Received On 06/05/25  -      PT - Next Appointment 06/06/25  -      PT Goal Re-Cert Due Date 06/19/25  -         Time Calculation- PT    Total Timed Code Minutes- PT 10 minute(s)  -SM         Timed Charges    97254 - PT Therapeutic Activity Minutes 10  -SM         Total Minutes    Timed Charges Total Minutes 10  -SM       Total Minutes 10  -SM                User Key  (r) = Recorded By, (t) = Taken By, (c) = Cosigned By      Initials Name Provider Type     Nedra Sigala PT Physical Therapist                  Therapy Charges for Today       Code Description Service Date Service Provider Modifiers Qty    [id number]  PT THERAPEUTIC ACT EA 15 MIN 6/5/2025 Nedra Sigala, PT GP 1     [id number] HC PT EVAL LOW COMPLEXITY 3 6/5/2025 Nedra Sigala, PT GP 1            PT G-Codes  Outcome Measure Options: AM-PAC 6 Clicks Basic Mobility (PT)  AM-PAC 6 Clicks Score (PT): 16  PT Discharge Summary  Anticipated Discharge Disposition (PT): skilled nursing facility    Nedra Sigala PT  6/5/2025

## 2025-06-05 NOTE — THERAPY EVALUATION
Patient Name: Avelina Carr  : 1940    MRN: [id number]                              Today's Date: 2025       Admit Date: 2025    Visit Dx:     ICD-10-CM ICD-9-CM   1. Acute nonintractable headache, unspecified headache type  R51.9 784.0   2. Hypoxia  R09.02 799.02   3. Generalized weakness  R53.1 780.79   4. Hypokalemia  E87.6 276.8     Patient Active Problem List   Diagnosis    Postmastectomy lymphedema syndrome    Malignant neoplasm of upper-outer quadrant of right female breast    Cerebrovascular accident (CVA)    Stroke    Primary hypertension    HLD (hyperlipidemia)    Adverse effect of antiplatelet agent    Left middle cerebral artery stroke    Carcinoma of central portion of right breast in female, estrogen receptor positive    Chronic bilateral low back pain with right-sided sciatica    History of lumbar surgery    Seizure disorder, simple partial, without intractable epilepsy    Spinal stenosis of lumbar region with neurogenic claudication    Status post craniectomy    History of total right knee replacement    Other forms of scoliosis, lumbar region    DDD (degenerative disc disease), lumbar    Benign meningioma of brain    Squamous cell carcinoma of leg, left    History of cardioembolic cerebrovascular accident (CVA)    Hypothyroid    Depression    COPD (chronic obstructive pulmonary disease)    Obstructive sleep apnea    Squamous cell carcinoma of leg, right    Pain in joint, multiple sites    Excessive daytime sleepiness    Former smoker    Gastroesophageal reflux disease    Migraines    Generalized weakness    Acute CVA (cerebrovascular accident)    Dependence on supplemental oxygen    Paroxysmal atrial fibrillation    Paroxysmal supraventricular tachycardia    Arthritis    Asthma    Focal neurological deficit present    Acute right MCA stroke    History of supraventricular tachycardia    Rotator cuff arthropathy of right shoulder    Hallux valgus (acquired), right foot    Hallux valgus  (acquired), left foot    Hypoxia     Past Medical History:   Diagnosis Date    Anxiety     Arthritis     Breast cancer     COPD (chronic obstructive pulmonary disease)     oxygen at 2L aths    COVID     CVD (cardiovascular disease)     Depression     GERD (gastroesophageal reflux disease)     Hx of radiation therapy     Hyperlipidemia     Hypothyroid     Low back pain     Macular degeneration     Osteoarthritis     Peripheral neuropathy     Sleep apnea     Stroke     Vertigo      Past Surgical History:   Procedure Laterality Date    ADRENAL GLAND SURGERY      BRAIN MENINGIOMA EXCISION      occipital    BREAST BIOPSY       SECTION      EMBOLECTOMY N/A 11/15/2017    Procedure: Embolectomy Mechanical;  Surgeon: Rachid Figueroa MD;  Location: [facility] OR ;  Service:     MASTECTOMY      RECTAL SURGERY  2003    REPLACEMENT TOTAL KNEE Right     SKIN BIOPSY  2025    TOTAL SHOULDER REPLACEMENT      x2      General Information       Row Name 25 1154          OT Time and Intention    Subjective Information complains of;pain  -AJ     Document Type evaluation  -AJ     Mode of Treatment co-treatment;occupational therapy  -AJ     Patient Effort good  -AJ     Symptoms Noted During/After Treatment fatigue  -AJ       Row Name 25 1154          General Information    Patient Profile Reviewed yes  -AJ     Prior Level of Function independent:;min assist:;all household mobility;ADL's  -AJ     Existing Precautions/Restrictions fall  -AJ     Barriers to Rehab cognitive status  -AJ       Row Name 25 1154          Living Environment    Current Living Arrangements home  -AJ     People in Home spouse  -AJ       Row Name 25 1154          Home Main Entrance    Number of Stairs, Main Entrance one  -AJ       Row Name 25 1154          Cognition    Orientation Status (Cognition) oriented to;person;place  -AJ       Row Name 25 1154          Safety Issues/Impairments Affecting  Functional Mobility    Impairments Affecting Function (Mobility) balance;endurance/activity tolerance;strength;pain;cognition  -     Comment, Safety Issues/Impairments (Mobility) PT/OT cotreatment medically appropriate and necessary due to patient acuity level, to maximize therapeutic benefit due to impaired act tolerance, and for safety of patient and staff. Treatment focused on progression of care and goals established in POC.  -               User Key  (r) = Recorded By, (t) = Taken By, (c) = Cosigned By      Initials Name Provider Type    Marielle Coker OT Occupational Therapist                     Mobility/ADL's       Row Name 06/05/25 1225          Bed Mobility    Assistive Device (Bed Mobility) head of bed elevated;bed rails  -       Row Name 06/05/25 1225          Sit-Stand Transfer    Sit-Stand [place] (Transfers) verbal cues;contact guard  -     Assistive Device (Sit-Stand Transfers) walker, front-wheeled  -     Comment, (Sit-Stand Transfer) pt participated in x2 sit to stands from EOB with increased time  -       Row Name 06/05/25 1225          Functional Mobility    Functional Mobility-Distance (Feet) 6  -     Patient was able to Ambulate yes  -               User Key  (r) = Recorded By, (t) = Taken By, (c) = Cosigned By      Initials Name Provider Type    Marielle Coker OT Occupational Therapist                   Obj/Interventions       Row Name 06/05/25 1230          Vision Assessment/Intervention    Visual Impairment/Limitations other (see comments)  macular degeneration. Pt reports that she can see shapes  -       Row Name 06/05/25 1230          Range of Motion Comprehensive    General Range of Motion bilateral upper extremity ROM WFL  -       Row Name 06/05/25 1230          Strength Comprehensive (MMT)    Comment, General Manual Muscle Testing (MMT) Assessment generalized weakness  -       Row Name 06/05/25 1230          Balance    Balance Assessment sitting static  balance;sitting dynamic balance;sit to stand dynamic balance;standing static balance  -AJ     Static Sitting Balance contact guard  -AJ     Dynamic Sitting Balance contact guard  -AJ     Position, Sitting Balance sitting edge of bed  -AJ     Sit to Stand Dynamic Balance contact guard  -AJ     Static Standing Balance contact guard  -AJ               User Key  (r) = Recorded By, (t) = Taken By, (c) = Cosigned By      Initials Name Provider Type    Marielle Coker OT Occupational Therapist                   Goals/Plan       Row Name 06/05/25 1241          Bed Mobility Goal 1 (OT)    Activity/Assistive Device (Bed Mobility Goal 1, OT) supine to sit  -AJ     [place] Level/Cues Needed (Bed Mobility Goal 1, OT) contact guard required  -AJ     Time Frame (Bed Mobility Goal 1, OT) short term goal (STG);2 weeks  -AJ     Progress/Outcomes (Bed Mobility Goal 1, OT) new goal  -       Row Name 06/05/25 1241          Transfer Goal 1 (OT)    Activity/Assistive Device (Transfer Goal 1, OT) sit-to-stand/stand-to-sit;bed-to-chair/chair-to-bed  -AJ     [place] Level/Cues Needed (Transfer Goal 1, OT) contact guard required  -AJ     Time Frame (Transfer Goal 1, OT) short term goal (STG);2 weeks  -AJ     Progress/Outcome (Transfer Goal 1, OT) new goal  -       Row Name 06/05/25 1241          Dressing Goal 1 (OT)    Activity/Device (Dressing Goal 1, OT) lower body dressing  -AJ     [place]/Cues Needed (Dressing Goal 1, OT) contact guard required  -AJ     Time Frame (Dressing Goal 1, OT) short term goal (STG);2 weeks  -AJ     Progress/Outcome (Dressing Goal 1, OT) new goal  -       Row Name 06/05/25 1241          Toileting Goal 1 (OT)    Activity/Device (Toileting Goal 1, OT) toileting skills, all  -AJ     [place] Level/Cues Needed (Toileting Goal 1, OT) contact guard required  -AJ     Time Frame (Toileting Goal 1, OT) short term goal (STG);2 weeks  -       Row Name 06/05/25 1241          Grooming Goal 1 (OT)     Activity/Device (Grooming Goal 1, OT) grooming skills, all  -     [place] (Grooming Goal 1, OT) contact guard required  -     Time Frame (Grooming Goal 1, OT) short term goal (STG);2 weeks  -       Row Name 06/05/25 1241          Therapy Assessment/Plan (OT)    Planned Therapy Interventions (OT) activity tolerance training;adaptive equipment training;BADL retraining;cognitive/visual perception retraining;edema control/reduction;functional balance retraining;IADL retraining;manual therapy/joint mobilization;neuromuscular control/coordination retraining;occupation/activity based interventions;passive ROM/stretching;patient/caregiver education/training;ROM/therapeutic exercise;strengthening exercise;transfer/mobility retraining  -               User Key  (r) = Recorded By, (t) = Taken By, (c) = Cosigned By      Initials Name Provider Type    Marielle Coker, AZAM Occupational Therapist                   Clinical Impression       Row Name 06/05/25 1235          Pain Assessment    Pretreatment Pain Rating 8/10  -AJ     Posttreatment Pain Rating 8/10  -     Pain Location back  -     Pain Side/Orientation generalized  -       Row Name 06/05/25 1235          Plan of Care Review    Plan of Care Reviewed With patient  -     Outcome Evaluation Pt is an 85 year old female who presented to [facility] with weakness and continued headache after a fall ~3 weeks ago. Pt has a PMH of arthritis dementia, COPD, macular degeneration, and CVA. Pt reports to reside at home with spouse where she requires assist with some self care and spouse and a caregiver assist. Pt reports to use a RW at home. Pt presents with impaired balance, self care, cognition, functional endurance as well as generalized weakness and would benefit from continued skilled tx. Pt was overall min a with bed mobility and functional t/f this date but required increased time and cuing for sequencing. Pt would benefit from [facility]O at d/c  -       Row Name  06/05/25 1235          Therapy Assessment/Plan (OT)    Rehab Potential (OT) good  -     Criteria for Skilled Therapeutic Interventions Met (OT) yes;skilled treatment is necessary  -     Therapy Frequency (OT) 3 times/wk  -       Row Name 06/05/25 1235          Therapy Plan Review/Discharge Plan (OT)    Anticipated Discharge Disposition (OT) sub acute care setting  -       Row Name 06/05/25 1235          Positioning and Restraints    Pre-Treatment Position in bed  -     Post Treatment Position chair  -AJ     In Chair notified nsg;call light within reach;encouraged to call for assist;exit alarm on  -               User Key  (r) = Recorded By, (t) = Taken By, (c) = Cosigned By      Initials Name Provider Type    Marielle Coker, AZAM Occupational Therapist                   Outcome Measures       Row Name 06/05/25 1243          How much help from another is currently needed...    Putting on and taking off regular lower body clothing? 3  -AJ     Bathing (including washing, rinsing, and drying) 3  -AJ     Toileting (which includes using toilet bed pan or urinal) 3  -AJ     Putting on and taking off regular upper body clothing 3  -AJ     Taking care of personal grooming (such as brushing teeth) 3  -AJ     Eating meals 4  -AJ     AM-PAC 6 Clicks Score (OT) 19  -       Row Name 06/05/25 1156          How much help from another person do you currently need...    Turning from your back to your side while in flat bed without using bedrails? 3  -SM     Moving from lying on back to sitting on the side of a flat bed without bedrails? 3  -SM     Moving to and from a bed to a chair (including a wheelchair)? 3  -SM     Standing up from a chair using your arms (e.g., wheelchair, bedside chair)? 3  -SM     Climbing 3-5 steps with a railing? 2  -SM     To walk in hospital room? 2  -SM     AM-PAC 6 Clicks Score (PT) 16  -SM     Highest Level of Mobility Goal Stand (1 or More Minutes)-5  -       Row Name 06/05/25 0424  06/05/25 1156       Functional Assessment    Outcome Measure Options -PAC 6 Clicks Daily Activity (OT)  - AM-PAC 6 Clicks Basic Mobility (PT)  -              User Key  (r) = Recorded By, (t) = Taken By, (c) = Cosigned By      Initials Name Provider Type     Nedra Sigala, PT Physical Therapist    Marielle Coker, OT Occupational Therapist                    Occupational Therapy Education       Title: PT OT SLP Therapies (Done)       Topic: Occupational Therapy (Done)       Point: ADL training (Done)       Learning Progress Summary            Patient Acceptance, E,TB, VU by  at 6/5/2025 1247    Comment: role of OT                      Point: Home exercise program (Done)       Learning Progress Summary            Patient Acceptance, E,TB, VU by SAW at 6/5/2025 1247    Comment: role of OT                      Point: Precautions (Done)       Learning Progress Summary            Patient Acceptance, E,TB, VU by  at 6/5/2025 1247    Comment: role of OT                      Point: Body mechanics (Done)       Learning Progress Summary            Patient Acceptance, E,TB, VU by  at 6/5/2025 1247    Comment: role of OT                                      User Key       Initials Effective Dates Name Provider Type Discipline     05/18/25 -  Marielle Jo, OT Occupational Therapist OT                  OT Recommendation and Plan  Planned Therapy Interventions (OT): activity tolerance training, adaptive equipment training, BADL retraining, cognitive/visual perception retraining, edema control/reduction, functional balance retraining, IADL retraining, manual therapy/joint mobilization, neuromuscular control/coordination retraining, occupation/activity based interventions, passive ROM/stretching, patient/caregiver education/training, ROM/therapeutic exercise, strengthening exercise, transfer/mobility retraining  Therapy Frequency (OT): 3 times/wk  Plan of Care Review  Plan of Care Reviewed With:  patient  Outcome Evaluation: Pt is an 85 year old female who presented to [facility] with weakness and continued headache after a fall ~3 weeks ago. Pt has a PMH of arthritis dementia, COPD, macular degeneration, and CVA. Pt reports to reside at home with spouse where she requires assist with some self care and spouse and a caregiver assist. Pt reports to use a RW at home. Pt presents with impaired balance, self care, cognition, functional endurance as well as generalized weakness and would benefit from continued skilled tx. Pt was overall min a with bed mobility and functional t/f this date but required increased time and cuing for sequencing. Pt would benefit from CA[facility]O at d/c     Time Calculation:   Evaluation Complexity (OT)  Review Occupational Profile/Medical/Therapy History Complexity: brief/low complexity  Assessment, Occupational Performance/Identification of Deficit Complexity: 1-3 performance deficits  Clinical Decision Making Complexity (OT): problem focused assessment/low complexity  Overall Complexity of Evaluation (OT): low complexity     Time Calculation- OT       Row Name 06/05/25 1248             Time Calculation- OT    OT Start Time 0855  -      OT Stop Time 0911  -      OT Time Calculation (min) 16 min  -      OT Received On 06/05/25  -      OT - Next Appointment 06/09/25  -      OT Goal Re-Cert Due Date 06/19/25  -                User Key  (r) = Recorded By, (t) = Taken By, (c) = Cosigned By      Initials Name Provider Type    Marielle Coker OT Occupational Therapist                  Therapy Charges for Today       Code Description Service Date Service Provider Modifiers Qty    [id number]  OT EVAL LOW COMPLEXITY 3 6/5/2025 Marielle Jo OT GO 1                 Marielle Jo OT  6/5/2025

## 2025-06-05 NOTE — PLAN OF CARE
Goal Outcome Evaluation:  Plan of Care Reviewed With: patient        Progress: no change  Outcome Evaluation: Pt admitted for possible PNA, HTN, HA. BP improving, HA improving. Pt wore home CPAP with no O2 sustaining sats above 90%. Speech consulted for difficulty swallowing, made NPO. PT consulted for increased weakness. Started on Rocephin. Potassium replaced in ED. Bed alarm in place.

## 2025-06-05 NOTE — CONSULTS
"Date of Consultation: 25    Referral Provider: Andria Roberts, *     Reason for Consultation: Chest pain, elevated troponin.    Encounter Provider: Sudarshan Boyd MD    Group of Service: [facility]     Patient Name: Avelina Carr    :1940    Chief complaint: Weakness and fall at home.    History of Present Illness:      This is a very pleasant 85-year-old female who normally follows with Dr. Marrero at [facility] cardiology.  She has a history of a remote meningioma and craniotomy.  She also had a CVA in  which was treated with tPA and a subsequent thrombectomy, as well as subsequent strokes.  She has history of non-critical carotid artery disease bilaterally.  She also has a history of SVT, and underwent placement of a loop recorder in 2022.  She has not had any documented atrial fibrillation thus far.  She also evidently has had a history of uncontrolled hypertension intermediately.  She last had an acute right MCA CVA in 2023.  She is on dual antiplatelet therapy with aspirin and Plavix at baseline.    The patient presented to the emergency department after gradually getting weaker at home.  She has also had a headache intermittently for the last several weeks, as well as constipation, and a recent fall secondary to weakness.  She has had several episodes of pressure in her chest which typically occurs in a \"wavelike pattern\", and does not have any relation to exertion.  She has had significantly elevated blood pressure since arrival at times, with the highest recorded blood pressures of 233/105, and 240/94 when I saw her earlier today.  She is currently at 147/66.  Her troponin was elevated at 63 and then 56.  Her EKG did not show any ischemic changes.    Past Medical History:   Diagnosis Date    Anxiety     Arthritis     Breast cancer     COPD (chronic obstructive pulmonary disease)     oxygen at 2L aths    COVID     CVD (cardiovascular disease)     " Depression     GERD (gastroesophageal reflux disease)     Hx of radiation therapy     Hyperlipidemia     Hypothyroid     Low back pain     Macular degeneration     Osteoarthritis     Peripheral neuropathy     Sleep apnea     Stroke     Vertigo          Past Surgical History:   Procedure Laterality Date    ADRENAL GLAND SURGERY      BRAIN MENINGIOMA EXCISION      occipital    BREAST BIOPSY       SECTION      EMBOLECTOMY N/A 11/15/2017    Procedure: Embolectomy Mechanical;  Surgeon: Rachid Figueroa MD;  Location: [facility] ;  Service:     MASTECTOMY      RECTAL SURGERY  2003    REPLACEMENT TOTAL KNEE Right     SKIN BIOPSY  2025    TOTAL SHOULDER REPLACEMENT      x2         Allergies   Allergen Reactions    Penicillin G Hives    Penicillins Rash         No current facility-administered medications on file prior to encounter.     Current Outpatient Medications on File Prior to Encounter   Medication Sig Dispense Refill    acetaminophen (TYLENOL) 325 MG tablet Take 2 tablets by mouth Every 6 (Six) Hours As Needed for Mild Pain. Indications: Pain      albuterol sulfate  (90 Base) MCG/ACT inhaler Inhale 2 puffs Every 4 (Four) Hours As Needed for Wheezing. Indications: Asthma      aspirin 325 MG tablet Take 1 tablet by mouth Daily.      atenolol (TENORMIN) 25 MG tablet Take 1 tablet by mouth 2 (Two) Times a Day. Indications: High Blood Pressure      atorvastatin (LIPITOR) 80 MG tablet I Tablet Nightly  Indications: High Amount of Fats in the Blood 90 tablet 3    Calcium Carbonate-Vitamin D (Oyster Shell Calcium/D) 500-5 MG-MCG tablet Take 1 tablet by mouth 2 (Two) Times a Day.      clopidogrel (PLAVIX) 75 MG tablet TAKE 1 TABLET BY MOUTH DAILY 90 tablet 1    Cyanocobalamin (Vitamin B 12) 500 MCG tablet Take 2 tablets by mouth Daily. Indications: Inadequate Vitamin B12      donepezil (Aricept) 10 MG tablet Take 1 tablet by mouth Daily for 90 days. 90 tablet 4    escitalopram (LEXAPRO)  20 MG tablet TAKE 1 TABLET BY MOUTH DAILY 90 tablet 1    furosemide (LASIX) 20 MG tablet TAKE 1 TABLET BY MOUTH DAILY 30 tablet 0    gabapentin (NEURONTIN) 400 MG capsule TAKE 1 CAPSULE BY MOUTH 3 TIMES A DAY; INDICATIONS: NEUROPATHIC PAIN 180 capsule 1    latanoprost (XALATAN) 0.005 % ophthalmic solution Administer 1 drop to the right eye Every Night. Indications: Wide-Angle Glaucoma      levETIRAcetam (KEPPRA) 500 MG tablet TAKE 1 TABLET BY MOUTH TWICE A  tablet 2    levothyroxine (SYNTHROID, LEVOTHROID) 150 MCG tablet Take 1 tablet by mouth Daily.      Melatonin ER 10 MG tablet controlled-release Take 1 tablet by mouth every night at bedtime. Indications: sleep      Multiple Vitamins-Minerals (PRESERVISION AREDS 2 PO) Take 1 capsule by mouth 2 (Two) Times a Day. Indications: macular degeneration      pantoprazole (PROTONIX) 40 MG EC tablet TAKE ONE TABLET BY MOUTH DAILY 90 tablet 3    Symbicort 160-4.5 MCG/ACT inhaler Inhale 2 puffs 2 (Two) Times a Day. Indications: Asthma      traMADol (ULTRAM) 50 MG tablet TAKE 1 TABLET BY MOUTH EVERY 6 HOURS AS NEEDED FOR MODERATE PAIN 90 tablet 0    donepezil (ARICEPT) 5 MG tablet Take 1 tablet by mouth Daily for 21 days. 21 tablet 0    Glycerin-Hypromellose- (ARTIFICIAL TEARS) 0.2-0.2-1 % solution ophthalmic solution Administer 1 drop to both eyes Every 1 (One) Hour As Needed for Dry Eyes.      NON FORMULARY Calcium 500mg  Vit D 200mg      Probiotic Product (MobileWebsites PO) Take 1 capsule by mouth Daily. Indications: probiotic           Social History     Socioeconomic History    Marital status:      Spouse name: Vitaly    Number of children: 4    Years of education: 12   Tobacco Use    Smoking status: Former     Current packs/day: 0.00     Average packs/day: 2.0 packs/day for 58.0 years (116.0 ttl pk-yrs)     Types: Cigarettes     Start date:      Quit date:      Years since quittin.4    Smokeless tobacco: Never    Tobacco  "comments:     non-smoker since 2003   Vaping Use    Vaping status: Never Used   Substance and Sexual Activity    Alcohol use: No    Drug use: No    Sexual activity: Defer         Family History   Problem Relation Age of Onset    Hypertension Mother     Hyperlipidemia Mother     Heart disease Mother     Cancer Father     Alzheimer's disease Sister     No Known Problems Sister     No Known Problems Brother     Breast cancer Neg Hx        REVIEW OF SYSTEMS:   Pertinent positives are noted in HPI above.  Otherwise, all other systems were reviewed, and are negative.     Objective:     Vitals:    06/05/25 1521 06/05/25 1526 06/05/25 1550 06/05/25 1705   BP:   (!) 234/98 147/66   BP Location:   Left arm Left arm   Patient Position:    Lying   Pulse: 65 61     Resp: 18 18     Temp:       TempSrc:       SpO2: 96%      Weight:       Height:         Body mass index is 26.41 kg/m².  Flowsheet Rows      Flowsheet Row First Filed Value   Admission Height 162.6 cm (64\") Documented at 06/04/2025 2156   Admission Weight 69.5 kg (153 lb 3.5 oz) Documented at 06/04/2025 2150             General:    No acute distress, alert and oriented x4, pleasant, chronically ill-appearing.                   Head:    Normocephalic, atraumatic.   Eyes:          Conjunctivae and sclerae normal, no icterus.   Throat:   No oral lesions, no thrush, oral mucosa moist.    Neck:   Supple, trachea midline.   Lungs:     Scattered rhonchi bilaterally.    Heart:    Regular rhythm and normal rate. II/VI SM LLSB.   Abdomen:     Soft, non-tender, non-distended, positive bowel sounds.    Extremities:   1+ chronic appearing edema of the lower extremities with venous stasis changes.   Pulses:   Pulses palpable and equal bilaterally.    Skin:   No bleeding or rash.   Neuro:   Non-focal.  Moves all extremities well.    Psychiatric:   Normal mood and affect.           Lab Review:                Results from last 7 days   Lab Units 06/05/25  1537 06/05/25  0347   SODIUM " mmol/L  --  138   POTASSIUM mmol/L 3.4* 3.4*   CHLORIDE mmol/L  --  103   CO2 mmol/L  --  22.0   BUN mg/dL  --  11.0   CREATININE mg/dL  --  0.72   GLUCOSE mg/dL  --  68   CALCIUM mg/dL  --  8.6     Results from last 7 days   Lab Units 06/04/25  1824 06/04/25  1651   HSTROP T ng/L 56* 63*     Results from last 7 days   Lab Units 06/05/25  0347   WBC 10*3/mm3 6.93   HEMOGLOBIN g/dL 11.3*   HEMATOCRIT % 33.2*   PLATELETS 10*3/mm3 220             Results from last 7 days   Lab Units 06/04/25  1651   MAGNESIUM mg/dL 2.2           EKG (reviewed by me personally): Normal sinus rhythm, LVH with repolarization abnormalities, prolonged QT interval, nonspecific T wave changes.      Assessment:   1.  Acute hypoxic respiratory failure  2.  Patchy bilateral infiltrates by CTA of the chest, presumed pneumonia  3.  COPD without acute exacerbation  4.  Former tobacco use  5.  Hypertensive urgency  6.  Recent progressive weakness, likely multifactorial  7.  Headache, likely secondary to #5  8.  Intermittent chest pressure  9.  Nonspecific high-sensitivity troponin elevation  10.  History of multiple recurrent strokes (last with a right MCA CVA in November 2023).  On DAPT.  11.  History of SVT with loop recorder placement in August 2022  12.  Multifactorial chronic anemia  13.  History of remote meningioma, status post craniotomy and resection  14.  Bilateral carotid artery disease  15.  Peripheral neuropathy  16.  Chronic lower extremity edema secondary to venous stasis    Plan:       The chest discomfort and troponin elevation may be related to her significant blood pressure elevation, although this is not entirely clear.  I cannot totally classify this is a type II NSTEMI at this point.  However, she has had intermittent pressure in her chest before arrival, and it does warrant further work-up.  She is also being treated for pneumonia with Rocephin.    She is already on DAPT therapy for recurrent strokes as noted above.  She is  also on Lipitor 80 mg/day.  I cannot increase her atenolol from 25 mg twice daily as she already has heart rates in the upper 50s to lower 60s.  Lisinopril 10 mg/day and Procardia XL 60 mg/day were also added in the hospital.  She has been given several doses of IV hydralazine since admission.  I will see how her blood pressure runs with these changes, although I would have a low threshold for adding an agent such as spironolactone in the future.  I do not want to drop her blood pressure too quickly, and I will hold on adding that tonight.    As long as her blood pressure is reasonably well-controlled tomorrow, I would recommend proceeding with a Lexiscan Myoview stress test.  I also will order an echocardiogram to evaluate for any potential structural heart disease.  Cardiology will continue to follow closely.    Discussed with the patient and her  at bedside today.    Thank you very much for this consult.    Lm Boyd MD

## 2025-06-05 NOTE — PROGRESS NOTES
"[facility] Clinical Pharmacy Services: Enoxaparin Consult    Avelina MIKAELA Carr has a pharmacy consult to dose prophylactic enoxaparin per Connor TA's request.     Indication: VTE Prophylaxis  Home Anticoagulation: none     Relevant clinical data and objective history reviewed:  85 y.o. female 162.6 cm (64\") 69.8 kg (153 lb 14.1 oz)   Body mass index is 26.41 kg/m².   Results from last 7 days   Lab Units 06/05/25  0347   PLATELETS 10*3/mm3 220     Estimated Creatinine Clearance: 53.7 mL/min (by C-G formula based on SCr of 0.72 mg/dL).    Assessment/Plan    Will start patient on 40mg subcutaneous every 24 hours, adjusted for renal function. Consult order will be discontinued but pharmacy will continue to follow.     Samantha Daniels, PharmD  Clinical Pharmacist    "

## 2025-06-05 NOTE — PLAN OF CARE
Goal Outcome Evaluation:  Plan of Care Reviewed With: patient           Outcome Evaluation: Pt is an 85 year old female who presented to [facility] with weakness and continued headache after a fall ~3 weeks ago. Pt has a PMH of arthritis dementia, COPD, macular degeneration, and CVA. Pt reports to reside at home with spouse where she requires assist with some self care and spouse and a caregiver assist. Pt reports to use a RW at home. Pt presents with impaired balance, self care, cognition, functional endurance as well as generalized weakness and would benefit from continued skilled tx. Pt was overall min a with bed mobility and functional t/f this date but required increased time and cuing for sequencing. Pt would benefit from CASTILLO at d/c    Anticipated Discharge Disposition (OT): sub acute care setting

## 2025-06-05 NOTE — THERAPY EVALUATION
Acute Care - Speech Language Pathology   Swallow Initial Evaluation [facility]     Patient Name: Avelina Carr  : 1940  MRN: [id number]  Today's Date: 2025               Admit Date: 2025    Visit Dx:     ICD-10-CM ICD-9-CM   1. Acute nonintractable headache, unspecified headache type  R51.9 784.0   2. Hypoxia  R09.02 799.02   3. Generalized weakness  R53.1 780.79   4. Hypokalemia  E87.6 276.8     Patient Active Problem List   Diagnosis    Postmastectomy lymphedema syndrome    Malignant neoplasm of upper-outer quadrant of right female breast    Cerebrovascular accident (CVA)    Stroke    Primary hypertension    HLD (hyperlipidemia)    Adverse effect of antiplatelet agent    Left middle cerebral artery stroke    Carcinoma of central portion of right breast in female, estrogen receptor positive    Chronic bilateral low back pain with right-sided sciatica    History of lumbar surgery    Seizure disorder, simple partial, without intractable epilepsy    Spinal stenosis of lumbar region with neurogenic claudication    Status post craniectomy    History of total right knee replacement    Other forms of scoliosis, lumbar region    DDD (degenerative disc disease), lumbar    Benign meningioma of brain    Squamous cell carcinoma of leg, left    History of cardioembolic cerebrovascular accident (CVA)    Hypothyroid    Depression    COPD (chronic obstructive pulmonary disease)    Obstructive sleep apnea    Squamous cell carcinoma of leg, right    Pain in joint, multiple sites    Excessive daytime sleepiness    Former smoker    Gastroesophageal reflux disease    Migraines    Generalized weakness    Acute CVA (cerebrovascular accident)    Dependence on supplemental oxygen    Paroxysmal atrial fibrillation    Paroxysmal supraventricular tachycardia    Arthritis    Asthma    Focal neurological deficit present    Acute right MCA stroke    History of supraventricular tachycardia    Rotator cuff arthropathy of right  shoulder    Hallux valgus (acquired), right foot    Hallux valgus (acquired), left foot    Hypoxia     Past Medical History:   Diagnosis Date    Anxiety     Arthritis     Breast cancer     COPD (chronic obstructive pulmonary disease)     oxygen at 2L aths    COVID     CVD (cardiovascular disease)     Depression     GERD (gastroesophageal reflux disease)     Hx of radiation therapy     Hyperlipidemia     Hypothyroid     Low back pain     Macular degeneration     Osteoarthritis     Peripheral neuropathy     Sleep apnea     Stroke     Vertigo      Past Surgical History:   Procedure Laterality Date    ADRENAL GLAND SURGERY      BRAIN MENINGIOMA EXCISION      occipital    BREAST BIOPSY       SECTION      EMBOLECTOMY N/A 11/15/2017    Procedure: Embolectomy Mechanical;  Surgeon: Rachid Figueroa MD;  Location: [facility] OR ;  Service:     MASTECTOMY      RECTAL SURGERY  2003    REPLACEMENT TOTAL KNEE Right     SKIN BIOPSY  2025    TOTAL SHOULDER REPLACEMENT      x2       SLP Recommendation and Plan  SLP Swallowing Diagnosis: mild, oral dysphagia, suspected pharyngeal dysphagia, suspected esophageal dysphagia (25)  SLP Diet Recommendation: soft to chew textures, chopped, thin liquids (25)  Recommended Precautions and Strategies: no straw, upright posture during/after eating (25)  SLP Rec. for Method of Medication Administration: meds whole, meds crushed, with puree, as tolerated (25)     Monitor for Signs of Aspiration: yes, notify SLP if any concerns (25)              Therapy Frequency (Swallow): PRN (25)  Predicted Duration Therapy Intervention (Days): until discharge (25)  Oral Care Recommendations: Oral Care BID/PRN (25)                                               SWALLOW EVALUATION (Last 72 Hours)       SLP Adult Swallow Evaluation       Row Name 25                   Rehab Evaluation     Document Type evaluation  -SH (r) KS (t) SH (c)        Subjective Information complains of;pain  -SH (r) KS (t) SH (c)        Patient Observations alert;cooperative  -SH (r) KS (t) SH (c)        Patient Effort good  -SH (r) KS (t) SH (c)           General Information    Patient Profile Reviewed yes  -SH (r) KS (t) SH (c)        Pertinent History Of Current Problem H/o of multiple CVAs, admitted for nonintracable headache and hypoxia. Fall, from home. CT-suggestive of pna  -SH        Current Method of Nutrition NPO  -SH (r) KS (t) SH (c)        Precautions/Limitations, Vision WFL;for purposes of eval  -SH (r) KS (t) SH (c)        Precautions/Limitations, Hearing WFL;for purposes of eval  -SH (r) KS (t) SH (c)        Prior Level of Function-Communication cognitive-linguistic impairment;other (see comments)  5/16/25 OP cog eval  -SH        Prior Level of Function-Swallowing no diet consistency restrictions;other (see comments)  VFSS 5/23/22 functional swallow. VFSS 4/15/22 reg/nectar  -SH        Plans/Goals Discussed with patient  -SH (r) KS (t) SH (c)        Patient's Goals for Discharge return home  -SH (r) KS (t) SH (c)           Pain    Pretreatment Pain Rating 9/10  -SH (r) KS (t) SH (c)        Posttreatment Pain Rating 9/10  -SH (r) KS (t) SH (c)        Pain Location back;buttock;shoulder;head  -SH (r) KS (t) SH (c)        Pain Side/Orientation generalized  -SH (r) KS (t) SH (c)           Oral Motor Structure and Function    Dentition Assessment missing teeth;other (see comments)  missing four molars  -SH (r) KS (t) SH (c)        Secretion Management WNL/WFL  -SH (r) KS (t) SH (c)           Oral Musculature and Cranial Nerve Assessment    Oral Motor General Assessment oral labial or buccal impairment;velar impairment  -SH (r) KS (t) SH (c)        Oral Labial or Buccal Impairment, Detail, Cranial Nerve VII (Facial): left labial droop  -SH (r) KS (t) SH (c)        Velar Impairment, Detail, Cranial Nerves X, XI  (Vagus and Accessory) other (see comments)  R soft palate deviation  - (r) KS (t)  (c)           General Eating/Swallowing Observations    Respiratory Support Currently in Use room air  - (r) KS (t)  (c)        Eating/Swallowing Skills self-fed;fed by SLP  - (r) KS (t) SH (c)        Positioning During Eating upright in chair  - (r) KS (t) SH (c)        Utensils Used spoon;cup;straw  - (r) KS (t) SH (c)        Consistencies Trialed regular textures;soft to chew textures;mixed consistency;pureed;ice chips;thin liquids  - (r) KS (t) SH (c)           Clinical Swallow Eval    Clinical Swallow Evaluation Summary Beside swallow eval completed. Oral motor exam showed L facial droop and R soft palate deviation. Pt presented with delayed swallow initiation vs oral holding/difficulty with transit. Throat clear after thins via spoon. Pt had delayed cough w/ thins via straw x1 and delayed throat clear x1. No s/s of aspiration with thins via cup. Voice change present after regular liquid wash w/ thin via straw. Strong cough with puree 1/5, do not suspect related to swallow function. Pt c/o obstruction with pill and solids at chest level, suspected esophageal dysfunction.  - (r) KS (t) SH (c)           SLP Evaluation Clinical Impression    SLP Swallowing Diagnosis mild;oral dysphagia;suspected pharyngeal dysphagia;suspected esophageal dysphagia  - (r) KS (t) SH (c)           Recommendations    Therapy Frequency (Swallow) PRN  - (r) KS (t) SH (c)        Predicted Duration Therapy Intervention (Days) until discharge  - (r) KS (t) SH (c)        SLP Diet Recommendation soft to chew textures;chopped;thin liquids  - (r) KS (t) SH (c)        Recommended Precautions and Strategies no straw;upright posture during/after eating  - (r) KS (t) SH (c)        Oral Care Recommendations Oral Care BID/PRN  - (r) KS (t) SH (c)        SLP Rec. for Method of Medication Administration meds whole;meds crushed;with puree;as  tolerated  -SH (r) KS (t) SH (c)        Monitor for Signs of Aspiration yes;notify SLP if any concerns  -SH (r) KS (t) SH (c)           Swallow Goals (SLP)    Swallow STGs diet tolerance goal selection (SLP)  -SH (r) KS (t) SH (c)        Diet Tolerance Goal Selection (SLP) Patient will tolerate trials of  -SH (r) KS (t) SH (c)           (STG) Patient will tolerate trials of    Consistencies Trialed (Tolerate trials) soft to chew (chopped) textures;thin liquids  -SH (r) KS (t) SH (c)        Desired Outcome (Tolerate trials) without signs/symptoms of aspiration  -SH (r) KS (t) SH (c)        [place] (Tolerate trials) independently (over 90% accuracy)  -SH (r) KS (t) SH (c)        Time Frame (Tolerate trials) by discharge  -SH (r) KS (t) SH (c)                  User Key  (r) = Recorded By, (t) = Taken By, (c) = Cosigned By      Initials Name Effective Dates    Yadira Villa SLP 01/05/24 -     Solo Garcia, Speech Therapy Student 05/13/25 -                     EDUCATION  The patient has been educated in the following areas:   Dysphagia (Swallowing Impairment).        SLP GOALS       Row Name 06/05/25 0900             (STG) Patient will tolerate trials of    Consistencies Trialed (Tolerate trials) soft to chew (chopped) textures;thin liquids  -SH (r) KS (t) SH (c)      Desired Outcome (Tolerate trials) without signs/symptoms of aspiration  -SH (r) KS (t) SH (c)      [place] (Tolerate trials) independently (over 90% accuracy)  -SH (r) KS (t) SH (c)      Time Frame (Tolerate trials) by discharge  -SH (r) KS (t) SH (c)                User Key  (r) = Recorded By, (t) = Taken By, (c) = Cosigned By      Initials Name Provider Type    Yadira Villa SLP Speech and Language Pathologist    Solo Garcia, Speech Therapy Student SLP Student                         Time Calculation:    Time Calculation- SLP       Row Name 06/05/25 1013             Time Calculation- SLP    SLP Start Time 0900 (P)   -KS       SLP Received On 06/05/25 (P)   -KS                User Key  (r) = Recorded By, (t) = Taken By, (c) = Cosigned By      Initials Name Provider Type    Solo Garcia Speech Therapy Student SLP Student                    Therapy Charges for Today       Code Description Service Date Service Provider Modifiers Qty    [id number] HC ST EVAL ORAL PHARYNG SWALLOW 4 6/5/2025 Solo Junior Speech Therapy Student GN 1                 Solo Junior Speech Therapy Student  6/5/2025

## 2025-06-05 NOTE — CASE MANAGEMENT/SOCIAL WORK
Discharge Planning Assessment  [facility]     Patient Name: Avelina Carr  MRN: [id number]  Today's Date: 6/5/2025    Admit Date: 6/4/2025    Plan: SNF vs home, will need WC transport   Discharge Needs Assessment       Row Name 06/05/25 1149       Living Environment    People in Home spouse    Name(s) of People in Home Vitaly Carr  [phone]    Current Living Arrangements condominium    Potentially Unsafe Housing Conditions none    In the past 12 months has the electric, gas, oil, or water company threatened to shut off services in your home? No    Primary Care Provided by self    Provides Primary Care For no one    Family Caregiver if Needed spouse    Family Caregiver Names Vitaly Carr    Quality of Family Relationships helpful;involved    Able to Return to Prior Arrangements no  SNF       Resource/Environmental Concerns    Resource/Environmental Concerns none    Transportation Concerns none       Transportation Needs    In the past 12 months, has lack of transportation kept you from medical appointments or from getting medications? no    In the past 12 months, has lack of transportation kept you from meetings, work, or from getting things needed for daily living? No       Food Insecurity    Within the past 12 months, you worried that your food would run out before you got the money to buy more. Never true    Within the past 12 months, the food you bought just didn't last and you didn't have money to get more. Never true       Transition Planning    Patient/Family Anticipates Transition to other (see comments)  SNF    Patient/Family Anticipated Services at Transition     Transportation Anticipated health plan transportation       Discharge Needs Assessment    Readmission Within the Last 30 Days no previous admission in last 30 days    Current Outpatient/Agency/Support Group skilled nursing facility    Equipment Currently Used at Home walker, rolling    Concerns to be Addressed discharge planning     Do you want help finding or keeping work or a job? I do not need or want help    Do you want help with school or training? For example, starting or completing job training or getting a high school diploma, GED or equivalent No    Anticipated Changes Related to Illness none    Equipment Needed After Discharge none    Outpatient/Agency/Support Group Needs skilled nursing facility                   Discharge Plan       Row Name 06/05/25 9147       Plan    Plan SNF vs home, will need WC transport    Patient/Family in Agreement with Plan yes    Plan Comments Met with pt at bedside. Permission obtained to speak to pt in front of spouse. Introduced self and explained role of . Face sheet verified, PCP is Bryant Shepherd. Pt denies any difficulty paying for medications, and she obtains her medications from [facility]. Pt lives with her spouse, Vitaly, who stated that although he could assist with her care needs, he feels that she need some short term rehab to become stronger. She has had caretenders in the past. She has been to [facility] and to [facility], and he is requesting a referral to [facility]. Referral placed in epic, informed Renee S[facility] of referral. Upon discharge, pt will need wc transport, if family unable to transport. Explained that CCP would follow to assess for discharge needs.                    Expected Discharge Date and Time       Expected Discharge Date Expected Discharge Time    Jun 9, 2025            Demographic Summary    No documentation.                  Functional Status    No documentation.                  Psychosocial    No documentation.                  Abuse/Neglect    No documentation.                  Legal    No documentation.                  Substance Abuse    No documentation.                  Patient Forms    No documentation.                     Charlette Wilburn RN

## 2025-06-06 ENCOUNTER — APPOINTMENT (OUTPATIENT)
Dept: NUCLEAR MEDICINE | Facility: HOSPITAL | Age: 85
DRG: 177 | End: 2025-06-06
Payer: MEDICARE

## 2025-06-06 ENCOUNTER — APPOINTMENT (OUTPATIENT)
Dept: CARDIOLOGY | Facility: HOSPITAL | Age: 85
DRG: 177 | End: 2025-06-06
Payer: MEDICARE

## 2025-06-06 LAB
ANION GAP SERPL CALCULATED.3IONS-SCNC: 14.1 MMOL/L (ref 5–15)
AORTIC DIMENSIONLESS INDEX: 0.79 (DI)
AV MEAN PRESS GRAD SYS DOP V1V2: 7.1 MMHG
AV VMAX SYS DOP: 176.8 CM/SEC
BH CV ECHO MEAS - AO MAX PG: 12.5 MMHG
BH CV ECHO MEAS - AO ROOT DIAM: 2.9 CM
BH CV ECHO MEAS - AO V2 VTI: 41.6 CM
BH CV ECHO MEAS - AVA(I,D): 2.32 CM2
BH CV ECHO MEAS - EDV(CUBED): 50.4 ML
BH CV ECHO MEAS - EDV(MOD-SP2): 58 ML
BH CV ECHO MEAS - EDV(MOD-SP4): 58 ML
BH CV ECHO MEAS - EF(MOD-SP2): 62.1 %
BH CV ECHO MEAS - EF(MOD-SP4): 65.5 %
BH CV ECHO MEAS - ESV(CUBED): 17.7 ML
BH CV ECHO MEAS - ESV(MOD-SP2): 22 ML
BH CV ECHO MEAS - ESV(MOD-SP4): 20 ML
BH CV ECHO MEAS - FS: 29.5 %
BH CV ECHO MEAS - IVS/LVPW: 1.01 CM
BH CV ECHO MEAS - IVSD: 1.05 CM
BH CV ECHO MEAS - LAT PEAK E' VEL: 6.6 CM/SEC
BH CV ECHO MEAS - LV DIASTOLIC VOL/BSA (35-75): 33 CM2
BH CV ECHO MEAS - LV MASS(C)D: 119.2 GRAMS
BH CV ECHO MEAS - LV MAX PG: 7.8 MMHG
BH CV ECHO MEAS - LV MEAN PG: 4.4 MMHG
BH CV ECHO MEAS - LV SYSTOLIC VOL/BSA (12-30): 11.4 CM2
BH CV ECHO MEAS - LV V1 MAX: 139.3 CM/SEC
BH CV ECHO MEAS - LV V1 VTI: 33 CM
BH CV ECHO MEAS - LVIDD: 3.7 CM
BH CV ECHO MEAS - LVIDS: 2.6 CM
BH CV ECHO MEAS - LVOT AREA: 2.9 CM2
BH CV ECHO MEAS - LVOT DIAM: 1.93 CM
BH CV ECHO MEAS - LVPWD: 1.04 CM
BH CV ECHO MEAS - MED PEAK E' VEL: 5.1 CM/SEC
BH CV ECHO MEAS - MR MAX PG: 45.9 MMHG
BH CV ECHO MEAS - MR MAX VEL: 338.7 CM/SEC
BH CV ECHO MEAS - MV A DUR: 0.13 SEC
BH CV ECHO MEAS - MV A MAX VEL: 78.1 CM/SEC
BH CV ECHO MEAS - MV DEC SLOPE: 287 CM/SEC2
BH CV ECHO MEAS - MV DEC TIME: 0.26 SEC
BH CV ECHO MEAS - MV E MAX VEL: 99.9 CM/SEC
BH CV ECHO MEAS - MV E/A: 1.28
BH CV ECHO MEAS - MV MAX PG: 3.8 MMHG
BH CV ECHO MEAS - MV MEAN PG: 1.51 MMHG
BH CV ECHO MEAS - MV P1/2T: 99.9 MSEC
BH CV ECHO MEAS - MV V2 VTI: 32.4 CM
BH CV ECHO MEAS - MVA(P1/2T): 2.2 CM2
BH CV ECHO MEAS - MVA(VTI): 3 CM2
BH CV ECHO MEAS - PA ACC TIME: 0.05 SEC
BH CV ECHO MEAS - PA V2 MAX: 111.6 CM/SEC
BH CV ECHO MEAS - PULM A REVS DUR: 0.13 SEC
BH CV ECHO MEAS - PULM A REVS VEL: 29 CM/SEC
BH CV ECHO MEAS - PULM DIAS VEL: 27.3 CM/SEC
BH CV ECHO MEAS - PULM S/D: 1.03
BH CV ECHO MEAS - PULM SYS VEL: 28.1 CM/SEC
BH CV ECHO MEAS - RAP SYSTOLE: 3 MMHG
BH CV ECHO MEAS - RV MAX PG: 6.4 MMHG
BH CV ECHO MEAS - RV V1 MAX: 126.4 CM/SEC
BH CV ECHO MEAS - RV V1 VTI: 31.5 CM
BH CV ECHO MEAS - SV(LVOT): 96.5 ML
BH CV ECHO MEAS - SV(MOD-SP2): 36 ML
BH CV ECHO MEAS - SV(MOD-SP4): 38 ML
BH CV ECHO MEAS - SVI(LVOT): 54.8 ML/M2
BH CV ECHO MEAS - SVI(MOD-SP2): 20.5 ML/M2
BH CV ECHO MEAS - SVI(MOD-SP4): 21.6 ML/M2
BH CV ECHO MEAS - TAPSE (>1.6): 1.92 CM
BH CV ECHO MEASUREMENTS AVERAGE E/E' RATIO: 17.08
BH CV REST NUCLEAR ISOTOPE DOSE: 10.1 MCI
BH CV STRESS COMMENTS STAGE 1: NORMAL
BH CV STRESS DOSE REGADENOSON STAGE 1: 0.4
BH CV STRESS DURATION MIN STAGE 1: 0
BH CV STRESS DURATION SEC STAGE 1: 10
BH CV STRESS NUCLEAR ISOTOPE DOSE: 30.3 MCI
BH CV STRESS PROTOCOL 1: NORMAL
BH CV STRESS RECOVERY BP: NORMAL MMHG
BH CV STRESS RECOVERY HR: 75 BPM
BH CV STRESS STAGE 1: 1
BH CV XLRA - TDI S': 17.2 CM/SEC
BUN SERPL-MCNC: 13 MG/DL (ref 8–23)
BUN/CREAT SERPL: 10.9 (ref 7–25)
CALCIUM SPEC-SCNC: 9.6 MG/DL (ref 8.6–10.5)
CHLORIDE SERPL-SCNC: 106 MMOL/L (ref 98–107)
CO2 SERPL-SCNC: 21.9 MMOL/L (ref 22–29)
CREAT SERPL-MCNC: 1.19 MG/DL (ref 0.57–1)
DEPRECATED RDW RBC AUTO: 45.9 FL (ref 37–54)
EGFRCR SERPLBLD CKD-EPI 2021: 44.6 ML/MIN/1.73
ERYTHROCYTE [DISTWIDTH] IN BLOOD BY AUTOMATED COUNT: 12.3 % (ref 12.3–15.4)
GLUCOSE SERPL-MCNC: 85 MG/DL (ref 65–99)
HCT VFR BLD AUTO: 43.5 % (ref 34–46.6)
HGB BLD-MCNC: 13.8 G/DL (ref 12–15.9)
LEFT ATRIUM VOLUME INDEX: 46.8 ML/M2
LV EF BIPLANE MOD: 62.6 %
MAXIMAL PREDICTED HEART RATE: 134 BPM
MCH RBC QN AUTO: 32.8 PG (ref 26.6–33)
MCHC RBC AUTO-ENTMCNC: 31.7 G/DL (ref 31.5–35.7)
MCV RBC AUTO: 103.3 FL (ref 79–97)
PERCENT MAX PREDICTED HR: 55.22 %
PLATELET # BLD AUTO: 300 10*3/MM3 (ref 140–450)
PMV BLD AUTO: 10.6 FL (ref 6–12)
POTASSIUM SERPL-SCNC: 3.7 MMOL/L (ref 3.5–5.2)
POTASSIUM SERPL-SCNC: 3.7 MMOL/L (ref 3.5–5.2)
RBC # BLD AUTO: 4.21 10*6/MM3 (ref 3.77–5.28)
SINUS: 2.48 CM
SODIUM SERPL-SCNC: 142 MMOL/L (ref 136–145)
SPECT HRT GATED+EF W RNC IV: 81 %
STRESS BASELINE BP: NORMAL MMHG
STRESS BASELINE HR: 56 BPM
STRESS PERCENT HR: 65 %
STRESS POST PEAK BP: NORMAL MMHG
STRESS POST PEAK HR: 74 BPM
STRESS TARGET HR: 114 BPM
WBC NRBC COR # BLD AUTO: 10.16 10*3/MM3 (ref 3.4–10.8)

## 2025-06-06 PROCEDURE — 93010 ELECTROCARDIOGRAM REPORT: CPT | Performed by: STUDENT IN AN ORGANIZED HEALTH CARE EDUCATION/TRAINING PROGRAM

## 2025-06-06 PROCEDURE — 93018 CV STRESS TEST I&R ONLY: CPT | Performed by: INTERNAL MEDICINE

## 2025-06-06 PROCEDURE — 85027 COMPLETE CBC AUTOMATED: CPT | Performed by: NURSE PRACTITIONER

## 2025-06-06 PROCEDURE — A9502 TC99M TETROFOSMIN: HCPCS | Performed by: STUDENT IN AN ORGANIZED HEALTH CARE EDUCATION/TRAINING PROGRAM

## 2025-06-06 PROCEDURE — 94761 N-INVAS EAR/PLS OXIMETRY MLT: CPT

## 2025-06-06 PROCEDURE — 78452 HT MUSCLE IMAGE SPECT MULT: CPT

## 2025-06-06 PROCEDURE — 34310000005 TECHNETIUM TETROFOSMIN KIT: Performed by: STUDENT IN AN ORGANIZED HEALTH CARE EDUCATION/TRAINING PROGRAM

## 2025-06-06 PROCEDURE — 93016 CV STRESS TEST SUPVJ ONLY: CPT | Performed by: INTERNAL MEDICINE

## 2025-06-06 PROCEDURE — 84132 ASSAY OF SERUM POTASSIUM: CPT | Performed by: STUDENT IN AN ORGANIZED HEALTH CARE EDUCATION/TRAINING PROGRAM

## 2025-06-06 PROCEDURE — 25010000002 ONDANSETRON PER 1 MG: Performed by: INTERNAL MEDICINE

## 2025-06-06 PROCEDURE — 93306 TTE W/DOPPLER COMPLETE: CPT | Performed by: INTERNAL MEDICINE

## 2025-06-06 PROCEDURE — 93017 CV STRESS TEST TRACING ONLY: CPT

## 2025-06-06 PROCEDURE — 80048 BASIC METABOLIC PNL TOTAL CA: CPT | Performed by: NURSE PRACTITIONER

## 2025-06-06 PROCEDURE — 93306 TTE W/DOPPLER COMPLETE: CPT

## 2025-06-06 PROCEDURE — 25010000002 REGADENOSON 0.4 MG/5ML SOLUTION: Performed by: STUDENT IN AN ORGANIZED HEALTH CARE EDUCATION/TRAINING PROGRAM

## 2025-06-06 PROCEDURE — 25010000002 ENOXAPARIN PER 10 MG: Performed by: NURSE PRACTITIONER

## 2025-06-06 PROCEDURE — 99232 SBSQ HOSP IP/OBS MODERATE 35: CPT | Performed by: INTERNAL MEDICINE

## 2025-06-06 PROCEDURE — 25010000002 HYDRALAZINE PER 20 MG: Performed by: STUDENT IN AN ORGANIZED HEALTH CARE EDUCATION/TRAINING PROGRAM

## 2025-06-06 PROCEDURE — 94799 UNLISTED PULMONARY SVC/PX: CPT

## 2025-06-06 PROCEDURE — 25010000002 LEVETRIRACETAM PER 10 MG: Performed by: STUDENT IN AN ORGANIZED HEALTH CARE EDUCATION/TRAINING PROGRAM

## 2025-06-06 PROCEDURE — 78452 HT MUSCLE IMAGE SPECT MULT: CPT | Performed by: INTERNAL MEDICINE

## 2025-06-06 PROCEDURE — 93005 ELECTROCARDIOGRAM TRACING: CPT | Performed by: NURSE PRACTITIONER

## 2025-06-06 RX ORDER — LISINOPRIL 10 MG/1
10 TABLET ORAL EVERY 12 HOURS SCHEDULED
Status: DISCONTINUED | OUTPATIENT
Start: 2025-06-06 | End: 2025-06-08

## 2025-06-06 RX ORDER — REGADENOSON 0.08 MG/ML
0.4 INJECTION, SOLUTION INTRAVENOUS
Status: COMPLETED | OUTPATIENT
Start: 2025-06-06 | End: 2025-06-06

## 2025-06-06 RX ORDER — ONDANSETRON 2 MG/ML
4 INJECTION INTRAMUSCULAR; INTRAVENOUS ONCE
Status: COMPLETED | OUTPATIENT
Start: 2025-06-06 | End: 2025-06-06

## 2025-06-06 RX ORDER — SCOPOLAMINE 1 MG/3D
1 PATCH, EXTENDED RELEASE TRANSDERMAL
Status: DISCONTINUED | OUTPATIENT
Start: 2025-06-06 | End: 2025-06-06

## 2025-06-06 RX ADMIN — ESCITALOPRAM 20 MG: 10 TABLET, FILM COATED ORAL at 12:08

## 2025-06-06 RX ADMIN — ALBUTEROL SULFATE 2.5 MG: 2.5 SOLUTION RESPIRATORY (INHALATION) at 19:49

## 2025-06-06 RX ADMIN — HYDRALAZINE HYDROCHLORIDE 10 MG: 20 INJECTION INTRAMUSCULAR; INTRAVENOUS at 09:28

## 2025-06-06 RX ADMIN — LEVETIRACETAM 500 MG: 100 INJECTION, SOLUTION INTRAVENOUS at 12:07

## 2025-06-06 RX ADMIN — TETROFOSMIN 1 DOSE: 1.38 INJECTION, POWDER, LYOPHILIZED, FOR SOLUTION INTRAVENOUS at 07:35

## 2025-06-06 RX ADMIN — CALCIUM CARBONATE-VITAMIN D TAB 500 MG-200 UNIT 1 TABLET: 500-200 TAB at 12:37

## 2025-06-06 RX ADMIN — ATENOLOL 25 MG: 25 TABLET ORAL at 12:08

## 2025-06-06 RX ADMIN — ONDANSETRON 4 MG: 2 INJECTION, SOLUTION INTRAMUSCULAR; INTRAVENOUS at 09:29

## 2025-06-06 RX ADMIN — CLOPIDOGREL BISULFATE 75 MG: 75 TABLET, FILM COATED ORAL at 12:37

## 2025-06-06 RX ADMIN — ENOXAPARIN SODIUM 40 MG: 100 INJECTION SUBCUTANEOUS at 15:45

## 2025-06-06 RX ADMIN — ATORVASTATIN CALCIUM 80 MG: 80 TABLET, FILM COATED ORAL at 12:37

## 2025-06-06 RX ADMIN — GABAPENTIN 400 MG: 400 CAPSULE ORAL at 12:07

## 2025-06-06 RX ADMIN — DONEPEZIL HYDROCHLORIDE 10 MG: 10 TABLET, FILM COATED ORAL at 12:08

## 2025-06-06 RX ADMIN — LEVETIRACETAM 500 MG: 100 INJECTION, SOLUTION INTRAVENOUS at 20:56

## 2025-06-06 RX ADMIN — TETROFOSMIN 1 DOSE: 1.38 INJECTION, POWDER, LYOPHILIZED, FOR SOLUTION INTRAVENOUS at 11:32

## 2025-06-06 RX ADMIN — LATANOPROST 1 DROP: 50 SOLUTION/ DROPS OPHTHALMIC at 20:54

## 2025-06-06 RX ADMIN — GABAPENTIN 400 MG: 400 CAPSULE ORAL at 20:56

## 2025-06-06 RX ADMIN — ASPIRIN 325 MG ORAL TABLET 325 MG: 325 PILL ORAL at 12:37

## 2025-06-06 RX ADMIN — REGADENOSON 0.4 MG: 0.08 INJECTION, SOLUTION INTRAVENOUS at 11:32

## 2025-06-06 RX ADMIN — FUROSEMIDE 20 MG: 20 TABLET ORAL at 12:37

## 2025-06-06 RX ADMIN — Medication 1000 MCG: at 12:37

## 2025-06-06 RX ADMIN — Medication 2.5 MG: at 20:56

## 2025-06-06 RX ADMIN — NIFEDIPINE 60 MG: 60 TABLET, EXTENDED RELEASE ORAL at 12:08

## 2025-06-06 RX ADMIN — BUDESONIDE AND FORMOTEROL FUMARATE DIHYDRATE 2 PUFF: 160; 4.5 AEROSOL RESPIRATORY (INHALATION) at 19:57

## 2025-06-06 RX ADMIN — LISINOPRIL 10 MG: 10 TABLET ORAL at 12:08

## 2025-06-06 RX ADMIN — POTASSIUM CHLORIDE 40 MEQ: 1500 TABLET, EXTENDED RELEASE ORAL at 01:51

## 2025-06-06 RX ADMIN — SCOPOLAMINE 1 PATCH: 1.5 PATCH, EXTENDED RELEASE TRANSDERMAL at 12:26

## 2025-06-06 RX ADMIN — CALCIUM CARBONATE-VITAMIN D TAB 500 MG-200 UNIT 1 TABLET: 500-200 TAB at 20:56

## 2025-06-06 NOTE — PROGRESS NOTES
LOS: 1 day   Patient Care Team:  Artem Shepherd MD as PCP - General (Internal Medicine)    Chief Complaint: Follow-up chest pain, hypertensive urgency, troponin elevation.    Interval History: She was not feeling well this morning when I saw her.  She was feeling weak and stated she needed to have a bowel movement.  She did have some chest pressure overnight.  Nuclear stress test was normal.    Vital Signs:  Temp:  [97.5 °F (36.4 °C)-98.2 °F (36.8 °C)] 98.1 °F (36.7 °C)  Heart Rate:  [59-70] 70  Resp:  [18] 18  BP: (123-208)/(45-88) 153/45  No intake or output data in the 24 hours ending 06/06/25 1623    Physical Exam:   General Appearance:    No acute distress, alert and oriented x4, chronically ill-appearing.   Lungs:     Scattered rhonchi bilaterally.    Heart:    Regular rhythm and normal rate. II/VI SM LLSB.    Abdomen:     Soft, nontender, nondistended.    Extremities:   1+ chronic appearing edema of the lower extremities with venous stasis changes.      Results Review:    Results from last 7 days   Lab Units 06/06/25  1224   SODIUM mmol/L 142   POTASSIUM mmol/L 3.7  3.7   CHLORIDE mmol/L 106   CO2 mmol/L 21.9*   BUN mg/dL 13.0   CREATININE mg/dL 1.19*   GLUCOSE mg/dL 85   CALCIUM mg/dL 9.6     Results from last 7 days   Lab Units 06/04/25  1824 06/04/25  1651   HSTROP T ng/L 56* 63*     Results from last 7 days   Lab Units 06/06/25  1238   WBC 10*3/mm3 10.16   HEMOGLOBIN g/dL 13.8   HEMATOCRIT % 43.5   PLATELETS 10*3/mm3 300             Results from last 7 days   Lab Units 06/04/25  1651   MAGNESIUM mg/dL 2.2           I reviewed the patient's new clinical results.        Assessment:  1.  Acute hypoxic respiratory failure  2.  Patchy bilateral infiltrates by CTA of the chest, presumed pneumonia  3.  COPD without acute exacerbation  4.  Former tobacco use  5.  Hypertensive urgency  6.  Recent progressive weakness, likely multifactorial  7.  Headache, likely secondary to #5  8.  Intermittent chest pressure,  negative Myoview stress test on 6/6/2025  9.  Nonspecific high-sensitivity troponin elevation  10.  History of multiple recurrent strokes (last with a right MCA CVA in November 2023).  On DAPT.  11.  History of SVT with loop recorder placement in August 2022  12.  Multifactorial chronic anemia  13.  History of remote meningioma, status post craniotomy and resection  14.  Bilateral carotid artery disease  15.  Peripheral neuropathy  16.  Chronic lower extremity edema secondary to venous stasis    Plan:  - Nuclear stress test was completely normal this morning.  Suspect the chest discomfort may be related to her hypertension, which was significantly uncontrolled on admission.    -She still has intermittent blood pressure spikes.  Continue atenolol 25 mg twice daily.  She has a heart rate in the upper 50s to lower 60s most of the time, and the atenolol cannot be increased.  Procardia 60 mg added.  I will increase lisinopril to 10 mg twice daily.    -Echocardiogram with normal ejection fraction, grade 2 diastolic dysfunction, and moderate left atrial enlargement.    -Continue DAPT with aspirin Plavix, and Lipitor for history of recurrent strokes.    Sudarshan Boyd MD  06/06/25  16:23 EDT

## 2025-06-06 NOTE — PROGRESS NOTES
Name: Avelina Carr ADMIT: 2025   : 1940  PCP: Artem Shepherd MD    MRN: [id number] LOS: 1 days   AGE/SEX: 85 y.o. female  ROOM: [facility]     Subjective   Subjective   Patient appears generally weak, relatively comfortable, no apparent distress.  Requesting something to eat today following recent stress test.  RN and family at bedside.    Some nausea today.    Also 'emotional' & patient would prefer to go home per daughter.       Objective   Objective   Vital Signs  Temp:  [97.5 °F (36.4 °C)-98.2 °F (36.8 °C)] 98.1 °F (36.7 °C)  Heart Rate:  [59-70] 70  Resp:  [18] 18  BP: (123-234)/(45-98) 153/45  SpO2:  [94 %-96 %] 96 %  on   ;   Device (Oxygen Therapy): room air  Body mass index is 26.78 kg/m².    Physical Exam  Constitutional:       General: She is not in acute distress.     Appearance: She is not toxic-appearing.      Comments: Generally weak & elderly   Cardiovascular:      Rate and Rhythm: Normal rate and regular rhythm.   Pulmonary:      Effort: Pulmonary effort is normal.      Comments: Diminished on expiration  Abdominal:      General: Bowel sounds are normal.      Palpations: Abdomen is soft.   Musculoskeletal:      Right lower leg: Edema present.      Left lower leg: Edema present.   Skin:     General: Skin is warm and dry.   Neurological:      General: No focal deficit present.      Mental Status: She is alert. Mental status is at baseline.       Results Review     I reviewed the patient's new clinical results.  Results from last 7 days   Lab Units 25  1238 25  0347 25  1651   WBC 10*3/mm3 10.16 6.93 7.04   HEMOGLOBIN g/dL 13.8 11.3* 12.1   PLATELETS 10*3/mm3 300 220 259     Results from last 7 days   Lab Units 25  1224 25  1537 25  0347 25  1651   SODIUM mmol/L 142  --  138 139   POTASSIUM mmol/L 3.7  3.7 3.4* 3.4* 3.1*   CHLORIDE mmol/L 106  --  103 99   CO2 mmol/L 21.9*  --  22.0 26.0   BUN mg/dL 13.0  --  11.0 11.0   CREATININE mg/dL 1.19*   "--  0.72 1.00   GLUCOSE mg/dL 85  --  68 106*   EGFR mL/min/1.73 44.6*  --  81.5 55.0*     Results from last 7 days   Lab Units 06/04/25  1651   ALBUMIN g/dL 4.1   BILIRUBIN mg/dL 0.7   ALK PHOS U/L 108   AST (SGOT) U/L 34*   ALT (SGPT) U/L 21     Results from last 7 days   Lab Units 06/06/25  1224 06/05/25  0347 06/04/25  1651   CALCIUM mg/dL 9.6 8.6 9.1   ALBUMIN g/dL  --   --  4.1   MAGNESIUM mg/dL  --   --  2.2       No results found for: \"HGBA1C\", \"POCGLU\"    CT Angiogram Chest  Result Date: 6/4/2025   Pulmonary arteries are well-opacified, and there is no evidence of pulmonary embolism.  There are mild somewhat patchy alveolar pulmonary infiltrates with somewhat coarse interstitial markings, most prominent in the left upper lobe though seen to a lesser degree in the right middle lobe and lower lobe. FINDINGS are nonspecific.  Imaging features can be seen with COVID-19 pneumonia, though are nonspecific and can occur with a variety of infectious and noninfectious processes.    This report was finalized on 6/4/2025 8:05 PM by Dr. Dale Headley M.D on Workstation: [id number]      CT Head Without Contrast  Result Date: 6/4/2025   Chronic changes as noted above, no acute intracranial abnormality.        This report was finalized on 6/4/2025 6:21 PM by Dr. Dale Headley M.D on Workstation: [id number]        I have personally reviewed all medications:  Scheduled Medications  aspirin, 325 mg, Oral, Daily  atenolol, 25 mg, Oral, BID  atorvastatin, 80 mg, Oral, Daily  budesonide-formoterol, 2 puff, Inhalation, BID  calcium 500 mg vitamin D 5 mcg (200 UT), 1 tablet, Oral, BID  cefTRIAXone, 2,000 mg, Intravenous, Q24H  clopidogrel, 75 mg, Oral, Daily  donepezil, 10 mg, Oral, Daily  enoxaparin sodium, 40 mg, Subcutaneous, Q24H  escitalopram, 20 mg, Oral, Daily  furosemide, 20 mg, Oral, Daily  gabapentin, 400 mg, Oral, Q12H  latanoprost, 1 drop, Right Eye, Nightly  levETIRAcetam, 500 mg, Intravenous, " Q12H  levothyroxine, 150 mcg, Oral, Q AM  lisinopril, 10 mg, Oral, Q24H  NIFEdipine XL, 60 mg, Oral, Q24H  pantoprazole, 40 mg, Oral, Q AM  vitamin B-12, 1,000 mcg, Oral, Daily    Infusions   Diet  Diet: Cardiac; Healthy Heart (2-3 Na+); Fluid Consistency: Thin (IDDSI 0)    I have personally reviewed:  [x]  Laboratory   [x]  Microbiology   [x]  Radiology   [x]  EKG/Telemetry  [x]  Cardiology/Vascular   []  Pathology    []  Records       Assessment/Plan     Active Hospital Problems    Diagnosis  POA    **Hypoxia [R09.02]  Yes    Chest discomfort [R07.89]  Yes    Elevated troponin [R79.89]  Yes    Paroxysmal atrial fibrillation [I48.0]  Yes    Former smoker [Z87.891]  Not Applicable    Hypothyroid [E03.9]  Yes    HLD (hyperlipidemia) [E78.5]  Yes    Primary hypertension [I10]  Yes    History of cardioembolic cerebrovascular accident (CVA) [Z86.73]  Not Applicable      Resolved Hospital Problems   No resolved problems to display.       85 y.o. female admitted with Hypoxia.      Hypoxia    Former smoker  -Oxygen saturation 88% on room air improved with supplemental oxygen; tolerating room air today at 96%  -Pneumonia suspected given infiltrates on CTA chest yet PE negative  -RVP negative  -Continue empiric IV ceftriaxone       Chest discomfort     Elevated troponin    Paroxysmal Atrial fibrillation  -? Due to presumed PNA given scattered infiltrates on CTA vs cardiac etiology / poorly controlled HTN with associated elevated troponin vs both vs other  -CTA negative PE   -EKG without ST changes  -cardiology consulted & states stress test negative  -TTE pending  -statin & ASA continued  -s/p loop recorder; followed by Dr. Marrero       Hypokalemia  -resolved following replete; labs in a.m.       Primary hypertension  -poorly controlled here despite home med--atenolol continued  -added CCB & ACEi here with improved BP thereafter  -cardiology following as well       HLD (hyperlipidemia)    History of cardioembolic  cerebrovascular accident (CVA)  -Plavix, statin, ASA continued       Hypothyroid  -elevated TSH >5.0 yet normal free T4 1.49    Pharmacy to dose Lovenox for DVT prophylaxis.  Limited code - No CPR.  Discussed with patient and family & RN  Anticipate discharge to SNU facility in 1-2 days.  Expected Discharge Date: 6/9/2025; Expected Discharge Time:       CELY Villarreal  [facility]  06/06/25  15:43 EDT

## 2025-06-06 NOTE — SIGNIFICANT NOTE
06/06/25 1419   OTHER   Discipline physical therapist   Rehab Time/Intention   Session Not Performed other (see comments)  (Patient RAMOS at cardio this PM. PT will f/u.)   Recommendation   PT - Next Appointment 06/07/25

## 2025-06-06 NOTE — PLAN OF CARE
Goal Outcome Evaluation:  Plan of Care Reviewed With: patient        Progress: improving  Outcome Evaluation: Pt NPO since midnight for stress test. Continues on Rocephin. Pt wore home CPAP while sleeping and RA while awake. ECHO ordered. BP improved. Atenelol held due to DBP. Bed alarm in place for safety.

## 2025-06-06 NOTE — CASE MANAGEMENT/SOCIAL WORK
Continued Stay Note  [facility]     Patient Name: Avelina Carr  MRN: [id number]  Today's Date: 6/6/2025    Admit Date: 6/4/2025    Plan: DC to [facility], will need transportation   Discharge Plan       Row Name 06/06/25 1715       Plan    Plan DC to [facility], will need transportation    Plan Comments Pt may be dc to [facility] on Sunday, June 8th, at any time per Renee S[facility], as they will have an available bed. Please call report to [phone]. Please fax dc summary to [phone]. Family stated that they would not be able to transport, as she is requiring assistance with transfers. Prepared packet at nurses station.                   Discharge Codes    No documentation.                 Expected Discharge Date and Time       Expected Discharge Date Expected Discharge Time    Jun 9, 2025               Charlette Wilburn RN

## 2025-06-07 ENCOUNTER — APPOINTMENT (OUTPATIENT)
Dept: CT IMAGING | Facility: HOSPITAL | Age: 85
DRG: 177 | End: 2025-06-07
Payer: MEDICARE

## 2025-06-07 ENCOUNTER — APPOINTMENT (OUTPATIENT)
Dept: GENERAL RADIOLOGY | Facility: HOSPITAL | Age: 85
DRG: 177 | End: 2025-06-07
Payer: MEDICARE

## 2025-06-07 ENCOUNTER — APPOINTMENT (OUTPATIENT)
Dept: NEUROLOGY | Facility: HOSPITAL | Age: 85
DRG: 177 | End: 2025-06-07
Payer: MEDICARE

## 2025-06-07 LAB
ANION GAP SERPL CALCULATED.3IONS-SCNC: 10.1 MMOL/L (ref 5–15)
ARTERIAL PATENCY WRIST A: POSITIVE
ATMOSPHERIC PRESS: 745.7 MMHG
BACTERIA UR QL AUTO: ABNORMAL /HPF
BASE EXCESS BLDA CALC-SCNC: 2 MMOL/L (ref 0–2)
BDY SITE: ABNORMAL
BILIRUB UR QL STRIP: NEGATIVE
BUN SERPL-MCNC: 20 MG/DL (ref 8–23)
BUN/CREAT SERPL: 13.3 (ref 7–25)
CALCIUM SPEC-SCNC: 9 MG/DL (ref 8.6–10.5)
CHLORIDE SERPL-SCNC: 106 MMOL/L (ref 98–107)
CLARITY UR: ABNORMAL
CO2 SERPL-SCNC: 22.9 MMOL/L (ref 22–29)
COLOR UR: ABNORMAL
CREAT SERPL-MCNC: 1.5 MG/DL (ref 0.57–1)
DEPRECATED RDW RBC AUTO: 44.9 FL (ref 37–54)
EGFRCR SERPLBLD CKD-EPI 2021: 33.8 ML/MIN/1.73
ERYTHROCYTE [DISTWIDTH] IN BLOOD BY AUTOMATED COUNT: 12.6 % (ref 12.3–15.4)
FOLATE SERPL-MCNC: 12.6 NG/ML (ref 4.78–24.2)
GAS FLOW AIRWAY: 1 LPM
GLUCOSE BLDC GLUCOMTR-MCNC: 93 MG/DL (ref 70–130)
GLUCOSE SERPL-MCNC: 90 MG/DL (ref 65–99)
GLUCOSE UR STRIP-MCNC: NEGATIVE MG/DL
HCO3 BLDA-SCNC: 26 MMOL/L (ref 22–28)
HCT VFR BLD AUTO: 33.3 % (ref 34–46.6)
HEMODILUTION: NO
HGB BLD-MCNC: 10.9 G/DL (ref 12–15.9)
HGB UR QL STRIP.AUTO: NEGATIVE
HYALINE CASTS UR QL AUTO: ABNORMAL /LPF
KETONES UR QL STRIP: ABNORMAL
LEUKOCYTE ESTERASE UR QL STRIP.AUTO: NEGATIVE
MCH RBC QN AUTO: 32.2 PG (ref 26.6–33)
MCHC RBC AUTO-ENTMCNC: 32.7 G/DL (ref 31.5–35.7)
MCV RBC AUTO: 98.5 FL (ref 79–97)
MODALITY: ABNORMAL
MRSA DNA SPEC QL NAA+PROBE: NORMAL
NITRITE UR QL STRIP: NEGATIVE
PCO2 BLDA: 37.6 MM HG (ref 35–45)
PH BLDA: 7.45 PH UNITS (ref 7.35–7.45)
PH UR STRIP.AUTO: 5.5 [PH] (ref 5–8)
PLATELET # BLD AUTO: 299 10*3/MM3 (ref 140–450)
PMV BLD AUTO: 10.6 FL (ref 6–12)
PO2 BLDA: 68.8 MM HG (ref 80–100)
POTASSIUM SERPL-SCNC: 3.6 MMOL/L (ref 3.5–5.2)
POTASSIUM SERPL-SCNC: 3.7 MMOL/L (ref 3.5–5.2)
PROT UR QL STRIP: ABNORMAL
QT INTERVAL: 526 MS
QTC INTERVAL: 510 MS
RBC # BLD AUTO: 3.38 10*6/MM3 (ref 3.77–5.28)
RBC # UR STRIP: ABNORMAL /HPF
REF LAB TEST METHOD: ABNORMAL
SAO2 % BLDCOA: 94.3 % (ref 92–98.5)
SET MECH RESP RATE: 20
SODIUM SERPL-SCNC: 139 MMOL/L (ref 136–145)
SODIUM UR-SCNC: <20 MMOL/L
SP GR UR STRIP: 1.03 (ref 1–1.03)
SQUAMOUS #/AREA URNS HPF: ABNORMAL /HPF
TSH SERPL DL<=0.05 MIU/L-ACNC: 4.16 UIU/ML (ref 0.27–4.2)
URATE SERPL-MCNC: 7.5 MG/DL (ref 2.4–5.7)
UROBILINOGEN UR QL STRIP: ABNORMAL
VIT B12 BLD-MCNC: >2000 PG/ML (ref 211–946)
WBC # UR STRIP: ABNORMAL /HPF
WBC NRBC COR # BLD AUTO: 6.91 10*3/MM3 (ref 3.4–10.8)

## 2025-06-07 PROCEDURE — 25010000002 CEFTRIAXONE PER 250 MG: Performed by: INTERNAL MEDICINE

## 2025-06-07 PROCEDURE — 25010000002 POTASSIUM CHLORIDE 10 MEQ/100ML SOLUTION: Performed by: INTERNAL MEDICINE

## 2025-06-07 PROCEDURE — 25810000003 SODIUM CHLORIDE 0.9 % SOLUTION: Performed by: INTERNAL MEDICINE

## 2025-06-07 PROCEDURE — 71045 X-RAY EXAM CHEST 1 VIEW: CPT

## 2025-06-07 PROCEDURE — 82746 ASSAY OF FOLIC ACID SERUM: CPT | Performed by: PSYCHIATRY & NEUROLOGY

## 2025-06-07 PROCEDURE — 94761 N-INVAS EAR/PLS OXIMETRY MLT: CPT

## 2025-06-07 PROCEDURE — 25010000002 PIPERACILLIN SOD-TAZOBACTAM PER 1 G: Performed by: INTERNAL MEDICINE

## 2025-06-07 PROCEDURE — 99232 SBSQ HOSP IP/OBS MODERATE 35: CPT

## 2025-06-07 PROCEDURE — 94799 UNLISTED PULMONARY SVC/PX: CPT

## 2025-06-07 PROCEDURE — 36600 WITHDRAWAL OF ARTERIAL BLOOD: CPT | Performed by: INTERNAL MEDICINE

## 2025-06-07 PROCEDURE — 84300 ASSAY OF URINE SODIUM: CPT | Performed by: INTERNAL MEDICINE

## 2025-06-07 PROCEDURE — 87641 MR-STAPH DNA AMP PROBE: CPT | Performed by: INTERNAL MEDICINE

## 2025-06-07 PROCEDURE — 99221 1ST HOSP IP/OBS SF/LOW 40: CPT | Performed by: PSYCHIATRY & NEUROLOGY

## 2025-06-07 PROCEDURE — 82607 VITAMIN B-12: CPT | Performed by: PSYCHIATRY & NEUROLOGY

## 2025-06-07 PROCEDURE — 25010000002 LEVETRIRACETAM PER 10 MG: Performed by: STUDENT IN AN ORGANIZED HEALTH CARE EDUCATION/TRAINING PROGRAM

## 2025-06-07 PROCEDURE — 85027 COMPLETE CBC AUTOMATED: CPT | Performed by: INTERNAL MEDICINE

## 2025-06-07 PROCEDURE — 84132 ASSAY OF SERUM POTASSIUM: CPT | Performed by: INTERNAL MEDICINE

## 2025-06-07 PROCEDURE — 82948 REAGENT STRIP/BLOOD GLUCOSE: CPT

## 2025-06-07 PROCEDURE — 82803 BLOOD GASES ANY COMBINATION: CPT | Performed by: INTERNAL MEDICINE

## 2025-06-07 PROCEDURE — 70450 CT HEAD/BRAIN W/O DYE: CPT

## 2025-06-07 PROCEDURE — 84443 ASSAY THYROID STIM HORMONE: CPT | Performed by: INTERNAL MEDICINE

## 2025-06-07 PROCEDURE — 84550 ASSAY OF BLOOD/URIC ACID: CPT | Performed by: INTERNAL MEDICINE

## 2025-06-07 PROCEDURE — 95816 EEG AWAKE AND DROWSY: CPT | Performed by: PSYCHIATRY & NEUROLOGY

## 2025-06-07 PROCEDURE — 95816 EEG AWAKE AND DROWSY: CPT

## 2025-06-07 PROCEDURE — 25010000002 ENOXAPARIN PER 10 MG: Performed by: NURSE PRACTITIONER

## 2025-06-07 PROCEDURE — 80048 BASIC METABOLIC PNL TOTAL CA: CPT | Performed by: NURSE PRACTITIONER

## 2025-06-07 PROCEDURE — 81001 URINALYSIS AUTO W/SCOPE: CPT | Performed by: INTERNAL MEDICINE

## 2025-06-07 RX ORDER — SODIUM CHLORIDE 9 MG/ML
100 INJECTION, SOLUTION INTRAVENOUS CONTINUOUS
Status: DISCONTINUED | OUTPATIENT
Start: 2025-06-07 | End: 2025-06-08

## 2025-06-07 RX ORDER — POTASSIUM CHLORIDE 7.45 MG/ML
10 INJECTION INTRAVENOUS
Status: COMPLETED | OUTPATIENT
Start: 2025-06-07 | End: 2025-06-07

## 2025-06-07 RX ORDER — PANTOPRAZOLE SODIUM 40 MG/10ML
40 INJECTION, POWDER, LYOPHILIZED, FOR SOLUTION INTRAVENOUS
Status: DISCONTINUED | OUTPATIENT
Start: 2025-06-08 | End: 2025-06-09

## 2025-06-07 RX ORDER — POTASSIUM CHLORIDE 29.8 MG/ML
20 INJECTION INTRAVENOUS ONCE
Status: DISCONTINUED | OUTPATIENT
Start: 2025-06-07 | End: 2025-06-07

## 2025-06-07 RX ADMIN — BUDESONIDE AND FORMOTEROL FUMARATE DIHYDRATE 2 PUFF: 160; 4.5 AEROSOL RESPIRATORY (INHALATION) at 23:49

## 2025-06-07 RX ADMIN — Medication 1000 MCG: at 10:32

## 2025-06-07 RX ADMIN — CALCIUM CARBONATE-VITAMIN D TAB 500 MG-200 UNIT 1 TABLET: 500-200 TAB at 10:32

## 2025-06-07 RX ADMIN — BUDESONIDE AND FORMOTEROL FUMARATE DIHYDRATE 2 PUFF: 160; 4.5 AEROSOL RESPIRATORY (INHALATION) at 07:55

## 2025-06-07 RX ADMIN — LEVETIRACETAM 500 MG: 100 INJECTION, SOLUTION INTRAVENOUS at 20:46

## 2025-06-07 RX ADMIN — ENOXAPARIN SODIUM 40 MG: 100 INJECTION SUBCUTANEOUS at 18:02

## 2025-06-07 RX ADMIN — POTASSIUM CHLORIDE 10 MEQ: 7.46 INJECTION, SOLUTION INTRAVENOUS at 10:35

## 2025-06-07 RX ADMIN — CLOPIDOGREL BISULFATE 75 MG: 75 TABLET, FILM COATED ORAL at 10:33

## 2025-06-07 RX ADMIN — LISINOPRIL 10 MG: 10 TABLET ORAL at 10:33

## 2025-06-07 RX ADMIN — ASPIRIN 325 MG ORAL TABLET 325 MG: 325 PILL ORAL at 10:33

## 2025-06-07 RX ADMIN — ESCITALOPRAM 20 MG: 10 TABLET, FILM COATED ORAL at 10:32

## 2025-06-07 RX ADMIN — ATENOLOL 25 MG: 25 TABLET ORAL at 10:35

## 2025-06-07 RX ADMIN — GABAPENTIN 400 MG: 400 CAPSULE ORAL at 10:33

## 2025-06-07 RX ADMIN — CEFTRIAXONE 2000 MG: 2 INJECTION, POWDER, FOR SOLUTION INTRAMUSCULAR; INTRAVENOUS at 01:16

## 2025-06-07 RX ADMIN — SODIUM CHLORIDE 100 ML/HR: 9 INJECTION, SOLUTION INTRAVENOUS at 14:51

## 2025-06-07 RX ADMIN — ATORVASTATIN CALCIUM 80 MG: 80 TABLET, FILM COATED ORAL at 10:33

## 2025-06-07 RX ADMIN — CALCIUM CARBONATE-VITAMIN D TAB 500 MG-200 UNIT 1 TABLET: 500-200 TAB at 20:47

## 2025-06-07 RX ADMIN — LATANOPROST 1 DROP: 50 SOLUTION/ DROPS OPHTHALMIC at 23:38

## 2025-06-07 RX ADMIN — FUROSEMIDE 20 MG: 20 TABLET ORAL at 10:33

## 2025-06-07 RX ADMIN — LEVETIRACETAM 500 MG: 100 INJECTION, SOLUTION INTRAVENOUS at 10:33

## 2025-06-07 RX ADMIN — PIPERACILLIN AND TAZOBACTAM 3.38 G: 3; .375 INJECTION, POWDER, FOR SOLUTION INTRAVENOUS at 17:31

## 2025-06-07 RX ADMIN — DONEPEZIL HYDROCHLORIDE 10 MG: 10 TABLET, FILM COATED ORAL at 10:33

## 2025-06-07 RX ADMIN — NIFEDIPINE 60 MG: 60 TABLET, EXTENDED RELEASE ORAL at 10:33

## 2025-06-07 RX ADMIN — ATENOLOL 25 MG: 25 TABLET ORAL at 20:47

## 2025-06-07 RX ADMIN — POTASSIUM CHLORIDE 10 MEQ: 7.46 INJECTION, SOLUTION INTRAVENOUS at 12:18

## 2025-06-07 NOTE — PLAN OF CARE
Problem: Adult Inpatient Plan of Care  Goal: Patient-Specific Goal (Individualized)  Outcome: Progressing     Problem: Adult Inpatient Plan of Care  Goal: Optimal Comfort and Wellbeing  Outcome: Progressing  Intervention: Provide Person-Centered Care  Recent Flowsheet Documentation  Taken 6/6/2025 2100 by Bekah Giordano RN  Trust Relationship/Rapport:   care explained   thoughts/feelings acknowledged   reassurance provided   choices provided     Problem: Adult Inpatient Plan of Care  Goal: Readiness for Transition of Care  Outcome: Progressing     Problem: Fall Injury Risk  Goal: Absence of Fall and Fall-Related Injury  Intervention: Promote Injury-Free Environment  Recent Flowsheet Documentation  Taken 6/7/2025 0433 by Bekah Giordano RN  Safety Promotion/Fall Prevention: safety round/check completed  Taken 6/7/2025 0203 by Bekah Giordano RN  Safety Promotion/Fall Prevention: safety round/check completed  Taken 6/7/2025 0010 by Bekah Giordano RN  Safety Promotion/Fall Prevention: safety round/check completed  Taken 6/6/2025 2210 by Bekah Giordano RN  Safety Promotion/Fall Prevention:   safety round/check completed   room organization consistent   assistive device/personal items within reach   clutter free environment maintained   fall prevention program maintained   lighting adjusted  Taken 6/6/2025 2001 by Bekah Giordano RN  Safety Promotion/Fall Prevention:   assistive device/personal items within reach   clutter free environment maintained   fall prevention program maintained   lighting adjusted   nonskid shoes/slippers when out of bed   room organization consistent   safety round/check completed   Goal Outcome Evaluation:  Plan of Care Reviewed With: patient           Outcome Evaluation: patient appeared to be resting well through out the night. Patient wore home CPAP while sleeping. Held atenelol ad lisinopril due to order parameters not being met. (/53 with HR of 55).

## 2025-06-07 NOTE — PROGRESS NOTES
[facility] Clinical Pharmacy Services: Piperacillin-Tazobactam Consult    Pt Name: Avelina Carr   : 1940    Indication: Pneumonia    Relevant clinical data and objective history reviewed:    Past Medical History:   Diagnosis Date    Anxiety     Arthritis     Breast cancer     COPD (chronic obstructive pulmonary disease)     oxygen at 2L aths    COVID     CVD (cardiovascular disease)     Depression     GERD (gastroesophageal reflux disease)     Hx of radiation therapy     Hyperlipidemia     Hypothyroid     Low back pain     Macular degeneration     Osteoarthritis     Peripheral neuropathy     Sleep apnea     Stroke     Vertigo      Creatinine   Date Value Ref Range Status   2025 1.50 (H) 0.57 - 1.00 mg/dL Final   2025 1.19 (H) 0.57 - 1.00 mg/dL Final   2025 0.72 0.57 - 1.00 mg/dL Final   11/15/2017 0.80 0.60 - 1.30 mg/dL Final     Comment:     Serial Number: [id number]:  [id number]   2016 1.00 0.60 - 1.30 mg/dL Final     Comment:     Serial Number: [id number]    : [id number]     BUN   Date Value Ref Range Status   2025 20.0 8.0 - 23.0 mg/dL Final   2020 17 7 - 20 mg/dL Final     Estimated Creatinine Clearance: 26 mL/min (A) (by C-G formula based on SCr of 1.5 mg/dL (H)).    Lab Results   Component Value Date    WBC 6.91 2025     Temp Readings from Last 3 Encounters:   25 98.6 °F (37 °C) (Oral)   25 97.5 °F (36.4 °C)   25 97.1 °F (36.2 °C)      Assessment/Plan  Estimated CrCl >20 mL/min at this time; BMI 26.74 kg/m2  Will start piperacillin-tazobactam 3.375 g IV every 8 hours     Pharmacy will continue to follow daily while on piperacillin-tazobactam and adjust as needed. Thank you for this consult.    Janet Fernández [facility]  Clinical Pharmacist

## 2025-06-07 NOTE — PROGRESS NOTES
LOS: 2 days   Patient Care Team:  Artem Shepherd MD as PCP - General (Internal Medicine)    Chief Complaint: Follow-up chest pain, hypertensive urgency, elevated troponin    Interval History: She is lethargic, will arouse to voice.  at bedside.     Vital Signs:  Temp:  [97.9 °F (36.6 °C)-98.6 °F (37 °C)] 98.6 °F (37 °C)  Heart Rate:  [52-59] 59  Resp:  [18] 18  BP: (112-126)/(37-53) 121/37    Intake/Output Summary (Last 24 hours) at 6/7/2025 1601  Last data filed at 6/6/2025 2100  Gross per 24 hour   Intake 220 ml   Output --   Net 220 ml        Physical Exam  Vitals reviewed.   Constitutional:       General: She is not in acute distress.  HENT:      Head: Normocephalic.      Nose: Nose normal.   Eyes:      Extraocular Movements: Extraocular movements intact.      Pupils: Pupils are equal, round, and reactive to light.   Cardiovascular:      Rate and Rhythm: Normal rate and regular rhythm.      Pulses: Normal pulses.      Heart sounds: Heart sounds not distant. Murmur heard.      No friction rub. No gallop. No S3 or S4 sounds.   Pulmonary:      Effort: Pulmonary effort is normal.      Breath sounds: Normal breath sounds.   Abdominal:      General: Abdomen is flat. Bowel sounds are normal.      Palpations: Abdomen is soft.      Tenderness: There is no abdominal tenderness.   Skin:     General: Skin is warm and dry.   Neurological:      Mental Status: She is alert.         Results Review:    Results from last 7 days   Lab Units 06/07/25  0536   SODIUM mmol/L 139   POTASSIUM mmol/L 3.6   CHLORIDE mmol/L 106   CO2 mmol/L 22.9   BUN mg/dL 20.0   CREATININE mg/dL 1.50*   GLUCOSE mg/dL 90   CALCIUM mg/dL 9.0     Results from last 7 days   Lab Units 06/04/25  1824 06/04/25  1651   HSTROP T ng/L 56* 63*     Results from last 7 days   Lab Units 06/07/25  0536   WBC 10*3/mm3 6.91   HEMOGLOBIN g/dL 10.9*   HEMATOCRIT % 33.3*   PLATELETS 10*3/mm3 299             Results from last 7 days   Lab Units 06/04/25  1651    MAGNESIUM mg/dL 2.2           I reviewed the patient's new clinical results.        Assessment & Plan:  Acute hypoxic respiratory failure  Patchy bilateral infiltrates by CT of the chest  Presumed pneumonia  COPD without acute exacerbation  Former tobacco use  Hypertensive urgency  BP much improved. Continue atenolol, nifedipine XL   Recent progressive weakness  Likely multifactorial  Headache  Likely secondary to #5  Intermittent chest pressure  Negative Myoview stress test on 6/6/2025  Nonspecific high-sensitivity troponin elevation  History of multiple recurrent strokes  Last with a right MCA CVA in November 2023  On DAPT  History of SVT with loop recorder placement in August 2022  Multifactorial chronic anemia  History of remote meningioma  S/p craniotomy and resection  Bilateral carotid artery disease  Peripheral neuropathy  Chronic lower extremity edema secondary to venous stasis  Altered mental status  Neurology to evaluate     CELY Spaulding  06/07/25  16:01 EDT

## 2025-06-07 NOTE — PLAN OF CARE
Goal Outcome Evaluation:  Plan of Care Reviewed With: patient   Came on shift and patient was difficult to arouse. Patients vitals stable and pupils are reactive. Patient eventually responded by whispering and is very confused. Changed diet back to speech modified diet. Replacing potasium. Patient on 4L NC. MD notified. Spouse at bedside.

## 2025-06-07 NOTE — PROGRESS NOTES
" LOS: 2 days     Name: Avelina Carr  Age: 85 y.o.  Sex: female  :  1940  MRN: [id number]         Primary Care Physician: Artem Shepherd MD    Subjective   Subjective  Patient's  as well as nurse report that she is much more sedated today.  Will follow a couple of basic commands but does not open her eyes or engage in conversation.  Has eaten very little today.  Has required as much as 4.5 L of oxygen in the past 24 hours.     Objective   Vital Signs  Temp:  [97.9 °F (36.6 °C)-98.1 °F (36.7 °C)] 97.9 °F (36.6 °C)  Heart Rate:  [52-59] 56  Resp:  [18] 18  BP: (112-153)/(45-53) 124/51  Body mass index is 26.74 kg/m².    Objective:  General Appearance:  Comfortable and in no acute distress.    Vital signs: (most recent): Blood pressure 124/51, pulse 56, temperature 97.9 °F (36.6 °C), temperature source Oral, resp. rate 18, height 162.6 cm (64\"), weight 70.7 kg (155 lb 12.8 oz), SpO2 94%, not currently breastfeeding.    Lungs:  Normal effort and normal respiratory rate.  She is not in respiratory distress.  There are decreased breath sounds.    Heart: Normal rate.  Regular rhythm.    Abdomen: Abdomen is soft.  Bowel sounds are normal.   There is no abdominal tenderness.     Extremities: There is no dependent edema or local swelling.    Neurological: (She looks very sedated.  She does not open her eyes.  I cannot get her to follow any basic commands or engage in conversation with me.).    Skin:  Warm and dry.                Results Review:       I reviewed the patient's new clinical results.    Results from last 7 days   Lab Units 25  0536 25  1238 25  0347 25  1651   WBC 10*3/mm3 6.91 10.16 6.93 7.04   HEMOGLOBIN g/dL 10.9* 13.8 11.3* 12.1   PLATELETS 10*3/mm3 299 300 220 259     Results from last 7 days   Lab Units 25  0536 25  1224 25  1537 25  0347 25  1651   SODIUM mmol/L 139 142  --  138 139   POTASSIUM mmol/L 3.6 3.7  3.7 3.4* 3.4* 3.1* "   CHLORIDE mmol/L 106 106  --  103 99   CO2 mmol/L 22.9 21.9*  --  22.0 26.0   BUN mg/dL 20.0 13.0  --  11.0 11.0   CREATININE mg/dL 1.50* 1.19*  --  0.72 1.00   CALCIUM mg/dL 9.0 9.6  --  8.6 9.1   GLUCOSE mg/dL 90 85  --  68 106*                 Scheduled Meds:   aspirin, 325 mg, Oral, Daily  atenolol, 25 mg, Oral, BID  atorvastatin, 80 mg, Oral, Daily  budesonide-formoterol, 2 puff, Inhalation, BID  calcium 500 mg vitamin D 5 mcg (200 UT), 1 tablet, Oral, BID  cefTRIAXone, 2,000 mg, Intravenous, Q24H  clopidogrel, 75 mg, Oral, Daily  [Held by provider] donepezil, 10 mg, Oral, Daily  enoxaparin sodium, 40 mg, Subcutaneous, Q24H  escitalopram, 20 mg, Oral, Daily  [Held by provider] furosemide, 20 mg, Oral, Daily  [Held by provider] gabapentin, 400 mg, Oral, Q12H  latanoprost, 1 drop, Right Eye, Nightly  levETIRAcetam, 500 mg, Intravenous, Q12H  levothyroxine, 150 mcg, Oral, Q AM  [Held by provider] lisinopril, 10 mg, Oral, Q12H  NIFEdipine XL, 60 mg, Oral, Q24H  pantoprazole, 40 mg, Oral, Q AM  vitamin B-12, 1,000 mcg, Oral, Daily      PRN Meds:     acetaminophen **OR** acetaminophen **OR** acetaminophen    albuterol    senna-docusate sodium **AND** polyethylene glycol **AND** bisacodyl **AND** bisacodyl    Calcium Replacement - Follow Nurse / BPA Driven Protocol    hydrALAZINE    Magnesium Standard Dose Replacement - Follow Nurse / BPA Driven Protocol    Phosphorus Replacement - Follow Nurse / BPA Driven Protocol    Potassium Replacement - Follow Nurse / BPA Driven Protocol    Potassium Replacement - Follow Nurse / BPA Driven Protocol  Continuous Infusions:  sodium chloride, 100 mL/hr        Assessment & Plan   Active Hospital Problems    Diagnosis  POA    **Hypoxia [R09.02]  Yes    Chest discomfort [R07.89]  Yes    Elevated troponin [R79.89]  Yes    Paroxysmal atrial fibrillation [I48.0]  Yes    Former smoker [Z87.891]  Not Applicable    Hypothyroid [E03.9]  Yes    HLD (hyperlipidemia) [E78.5]  Yes    Primary  hypertension [I10]  Yes    History of cardioembolic cerebrovascular accident (CVA) [Z86.73]  Not Applicable      Resolved Hospital Problems   No resolved problems to display.       Assessment & Plan    AMS  - Both her  and nurse report that she is significantly different today with regards to excessive sedation/somnolence.  - Etiology unclear.    -I am going to place all sedating/centrally acting medications on hold.    -She has a new oxygen requirement I will repeat a chest x-ray and check an ABG.  Has been weaned from 4.5 L down to 1 L.  - She also has a bump of her creatinine.  Hold nephrotoxic medications, initiate IV fluids and normal saline, check bladder scan and urine studies.  - With her history of prior strokes as well as seizures where she is on Keppra I will consult neurology  - Check TSH    Hypoxia  Former smoker  -Oxygen saturation 88% on room air improved with supplemental oxygen; tolerating room air today at 96%  -Pneumonia suspected given infiltrates on CTA chest yet PE negative  -RVP negative  -Continue empiric IV ceftriaxone       Chest discomfort     Elevated troponin    Paroxysmal Atrial fibrillation  -CTA negative PE   - Stress test and echocardiogram looked okay  -statin & ASA continued  -s/p loop recorder; followed by Dr. Marrero       Hypokalemia  -resolved following replete; labs in a.m.       Primary hypertension  - Reasonably well-controlled today.  I placed lisinopril on hold given bump of creatinine       HLD (hyperlipidemia)    History of cardioembolic cerebrovascular accident (CVA)  -Plavix, statin, ASA continued       Hypothyroid  - Recheck TSH     Pharmacy to dose Lovenox for DVT prophylaxis.  Limited code - No CPR.  Discussed with patient and family & RN      Expected Discharge Date: 6/9/2025; Expected Discharge Time:      Tito Bhandari MD  [facility]  06/07/25  13:28 EDT

## 2025-06-07 NOTE — CONSULTS
Neurology Consult Note    Referring Provider: Dr. Bhandari  Reason for Consultation: Somnolence      History of present illness:      85-year-old woman with past medical history of atrial fibrillation, hyperlipidemia, hypothyroidism, former smoker, sleep apnea, macular degeneration, meningioma, remote history of seizure disorder, stroke.  Followed by neurology for cognitive impairment and history of stroke.  Admitted in 2022 with concern for possible seizures and was started on Keppra in addition to gabapentin.  Admitted in 2023 with right MCA strokes, found to have right ICA stenosis was recommended to be on full dose aspirin and Plavix.  It appears that the diagnosis of atrial fibrillation is new since that admission.  Normally ambulates with a walker.  At baseline she is on gabapentin 400 mg 3 times daily and Keppra 500 mg twice daily.     Admitted 6/5 with headaches, fatigue, generalized weakness.  She apparently fell about 3 weeks ago from her couch where she slid to the floor and did not hit her head.  Since that time she has had daily headaches.  Workup in the ED revealed a left lung base opacity and suggestion of an infiltrate.  A chest CT revealed findings consistent with pneumonia.  Head CT showed no acute intracranial abnormality, shows an old right PCA infarct, scattered chronic advanced white matter changes, multiple calcified dural based lesions consistent with her known history of 4 meningiomas.  She is being treated with antibiotics.  She has become more somnolent over the past day.  She also has a significant oxygen requirement which is new.  She has been afebrile although she currently feels warm to me.  ABG reveals pO2 68.8, normal pCO2.  Her creatinine is up to 1.5 today from her baseline of below 1.  No report of witnessed seizure or seizure-like activity.      Past Medical History:   Diagnosis Date    Anxiety     Arthritis     Breast cancer 2014    COPD (chronic obstructive pulmonary disease)      oxygen at 2L aths    COVID     CVD (cardiovascular disease)     Depression     GERD (gastroesophageal reflux disease)     Hx of radiation therapy     Hyperlipidemia     Hypothyroid     Low back pain     Macular degeneration     Osteoarthritis     Peripheral neuropathy     Sleep apnea     Stroke     Vertigo        Allergies   Allergen Reactions    Penicillin G Hives    Penicillins Rash     No current facility-administered medications on file prior to encounter.     Current Outpatient Medications on File Prior to Encounter   Medication Sig    acetaminophen (TYLENOL) 325 MG tablet Take 2 tablets by mouth Every 6 (Six) Hours As Needed for Mild Pain. Indications: Pain    albuterol sulfate  (90 Base) MCG/ACT inhaler Inhale 2 puffs Every 4 (Four) Hours As Needed for Wheezing. Indications: Asthma    aspirin 325 MG tablet Take 1 tablet by mouth Daily.    atenolol (TENORMIN) 25 MG tablet Take 1 tablet by mouth 2 (Two) Times a Day. Indications: High Blood Pressure    atorvastatin (LIPITOR) 80 MG tablet I Tablet Nightly  Indications: High Amount of Fats in the Blood    Calcium Carbonate-Vitamin D (Oyster Shell Calcium/D) 500-5 MG-MCG tablet Take 1 tablet by mouth 2 (Two) Times a Day.    clopidogrel (PLAVIX) 75 MG tablet TAKE 1 TABLET BY MOUTH DAILY    Cyanocobalamin (Vitamin B 12) 500 MCG tablet Take 2 tablets by mouth Daily. Indications: Inadequate Vitamin B12    donepezil (Aricept) 10 MG tablet Take 1 tablet by mouth Daily for 90 days.    escitalopram (LEXAPRO) 20 MG tablet TAKE 1 TABLET BY MOUTH DAILY    furosemide (LASIX) 20 MG tablet TAKE 1 TABLET BY MOUTH DAILY    gabapentin (NEURONTIN) 400 MG capsule TAKE 1 CAPSULE BY MOUTH 3 TIMES A DAY; INDICATIONS: NEUROPATHIC PAIN    latanoprost (XALATAN) 0.005 % ophthalmic solution Administer 1 drop to the right eye Every Night. Indications: Wide-Angle Glaucoma    levETIRAcetam (KEPPRA) 500 MG tablet TAKE 1 TABLET BY MOUTH TWICE A DAY    levothyroxine (SYNTHROID,  LEVOTHROID) 150 MCG tablet Take 1 tablet by mouth Daily.    Melatonin ER 10 MG tablet controlled-release Take 1 tablet by mouth every night at bedtime. Indications: sleep    Multiple Vitamins-Minerals (PRESERVISION AREDS 2 PO) Take 1 capsule by mouth 2 (Two) Times a Day. Indications: macular degeneration    pantoprazole (PROTONIX) 40 MG EC tablet TAKE ONE TABLET BY MOUTH DAILY    Symbicort 160-4.5 MCG/ACT inhaler Inhale 2 puffs 2 (Two) Times a Day. Indications: Asthma    traMADol (ULTRAM) 50 MG tablet TAKE 1 TABLET BY MOUTH EVERY 6 HOURS AS NEEDED FOR MODERATE PAIN    donepezil (ARICEPT) 5 MG tablet Take 1 tablet by mouth Daily for 21 days.    Glycerin-Hypromellose- (ARTIFICIAL TEARS) 0.2-0.2-1 % solution ophthalmic solution Administer 1 drop to both eyes Every 1 (One) Hour As Needed for Dry Eyes.    NON FORMULARY Calcium 500mg  Vit D 200mg    Probiotic Product ([employer]and[employer] PO) Take 1 capsule by mouth Daily. Indications: probiotic       Social History     Socioeconomic History    Marital status:      Spouse name: Vitaly    Number of children: 4    Years of education: 12   Tobacco Use    Smoking status: Former     Current packs/day: 0.00     Average packs/day: 2.0 packs/day for 58.0 years (116.0 ttl pk-yrs)     Types: Cigarettes     Start date:      Quit date:      Years since quittin.4    Smokeless tobacco: Never    Tobacco comments:     non-smoker since    Vaping Use    Vaping status: Never Used   Substance and Sexual Activity    Alcohol use: No    Drug use: No    Sexual activity: Defer     Family History   Problem Relation Age of Onset    Hypertension Mother     Hyperlipidemia Mother     Heart disease Mother     Cancer Father     Alzheimer's disease Sister     No Known Problems Sister     No Known Problems Brother     Breast cancer Neg Hx        Review of Systems  Unable to obtain due to patient factors    Vital Signs   Temp:  [97.9 °F (36.6 °C)-98.1 °F (36.7 °C)] 97.9 °F  (36.6 °C)  Heart Rate:  [52-59] 56  Resp:  [18] 18  BP: (112-124)/(46-53) 124/51    General Exam:  Head:  Normocephalic, atraumatic  HEENT:  Neck supple, Kernig and Brudzinski sign negative  CVS:  Regular rate and rhythm.  No murmurs  Carotid Examination:  No bruits  Lungs:  Clear to auscultation  Abdomen:  Nontender, nondistended  Extremities:  No signs of peripheral edema  Skin:  No rashes    Neurologic Exam:    Mental Status: Somnolent, alerts slightly to a noxious stimulus, able to tell me her name and follows some simple commands  Cranial nerves: Pupils equal and reactive, VOR's present, , face symmetric, tongue midline  Motor: Moving all 4 extremities purposefully, withdraws to noxious stimulus all 4  Sensory: Withdraws all 4  Reflexes: Diminished throughout, plantar reflexes flexor bilaterally  Cerebellar: Unable to assess  Gait: Unable to assess      Results Review:  Lab Results (last 24 hours)       Procedure Component Value Units Date/Time    TSH [[id number]]  (Normal) Collected: 06/07/25 0536    Specimen: Blood Updated: 06/07/25 1440     TSH 4.160 uIU/mL     Blood Gas, Arterial - [[id number]]  (Abnormal) Collected: 06/07/25 1417    Specimen: Arterial Blood Updated: 06/07/25 1420     Site Left Radial     Toño's Test Positive     pH, Arterial 7.447 pH units      pCO2, Arterial 37.6 mm Hg      pO2, Arterial 68.8 mm Hg      HCO3, Arterial 26.0 mmol/L      Base Excess, Arterial 2.0 mmol/L      Comment: Serial Number: [id number]:  [id number]        O2 Saturation, Arterial 94.3 %      Barometric Pressure for Blood Gas 745.7000 mmHg      Modality Cannula     Flow Rate 1.0000 lpm      Set [facility] Resp Rate 20     Hemodilution No    POC Glucose Once [[id number]]  (Normal) Collected: 06/07/25 0702    Specimen: Blood Updated: 06/07/25 0704     Glucose 93 mg/dL     Basic Metabolic Panel [[id number]]  (Abnormal) Collected: 06/07/25 0536    Specimen: Blood Updated: 06/07/25 0643     Glucose 90 mg/dL      BUN 20.0 mg/dL       Creatinine 1.50 mg/dL      Sodium 139 mmol/L      Potassium 3.6 mmol/L      Chloride 106 mmol/L      CO2 22.9 mmol/L      Calcium 9.0 mg/dL      BUN/Creatinine Ratio 13.3     Anion Gap 10.1 mmol/L      eGFR 33.8 mL/min/1.73     Narrative:      GFR Categories in Chronic Kidney Disease (CKD)              GFR Category          GFR (mL/min/1.73)    Interpretation  G1                    90 or greater        Normal or high (1)  G2                    60-89                Mild decrease (1)  G3a                   45-59                Mild to moderate decrease  G3b                   30-44                Moderate to severe decrease  G4                    15-29                Severe decrease  G5                    14 or less           Kidney failure    (1)In the absence of evidence of kidney disease, neither GFR category G1 or G2 fulfill the criteria for CKD.    eGFR calculation 2021 CKD-EPI creatinine equation, which does not include race as a factor    CBC (No Diff) [[id number]]  (Abnormal) Collected: 06/07/25 0536    Specimen: Blood Updated: 06/07/25 0615     WBC 6.91 10*3/mm3      RBC 3.38 10*6/mm3      Hemoglobin 10.9 g/dL      Hematocrit 33.3 %      MCV 98.5 fL      MCH 32.2 pg      MCHC 32.7 g/dL      RDW 12.6 %      RDW-SD 44.9 fl      MPV 10.6 fL      Platelets 299 10*3/mm3             .  Imaging Results (Last 24 Hours)       Procedure Component Value Units Date/Time    XR Chest 1 View - In process [[id number]] Resulted: 06/07/25 1400     Updated: 06/07/25 1411    This result has not been signed. Information might be incomplete.              Head CT reviewed, no acute process, shows an old right PCA infarct, scattered chronic advanced white matter changes, multiple calcified dural based lesions consistent with her known history of 4 meningiomas.    Impression  85-year-old woman with past medical history as noted above including seizure disorder, meningioma, strokes, atrial fibrillation not on anticoagulation, on aspirin  325 mg and Plavix.  Now admitted with headache, fatigue, generalized weakness, chest CT with evidence for pneumonia.  Also with DANY.  Has had increasing somnolence and encephalopathy over the past 24 hours with a increased oxygen requirement and hypoxia.  Overall concern is for encephalopathy due to the above.  I also wonder if she may have aspirated.  I suspect that gabapentin and Keppra may also be contributing to her somnolence.  Meningoencephalitis would be less likely based on her presentation and exam, not completely excluded.      Plan  Will check B12, folate, ammonia  Head CT now  EEG  Continue to treat supportively, would hold the gabapentin due to somnolence, continue Keppra 500 mg twice daily  Recommend reevaluating her stroke prophylaxis medications, [id number] score is 6, anticoagulation should be considered  Will follow along with you      I discussed the patient's findings and my recommendations with patient, family, and nursing staff    Medical Decision Making for this neurology consultation consists of the following:  Review of previous chart, including H/P, provider and nursing notes as applicable.  Review of medications and vital signs.  Review of previous labs, neuroimaging, and additional relevant diagnostics.   Interpretation of laboratory, imaging, and other diagnostic results  Discussion with other providers and family   Total face-to-face/floor time: 30 minutes.    Rubi Walker MD  06/07/25  14:40 EDT

## 2025-06-08 LAB
ANION GAP SERPL CALCULATED.3IONS-SCNC: 10.1 MMOL/L (ref 5–15)
BUN SERPL-MCNC: 16 MG/DL (ref 8–23)
BUN/CREAT SERPL: 16.3 (ref 7–25)
CALCIUM SPEC-SCNC: 8.4 MG/DL (ref 8.6–10.5)
CHLORIDE SERPL-SCNC: 109 MMOL/L (ref 98–107)
CO2 SERPL-SCNC: 23.9 MMOL/L (ref 22–29)
CREAT SERPL-MCNC: 0.98 MG/DL (ref 0.57–1)
DEPRECATED RDW RBC AUTO: 48.9 FL (ref 37–54)
EGFRCR SERPLBLD CKD-EPI 2021: 56.3 ML/MIN/1.73
ERYTHROCYTE [DISTWIDTH] IN BLOOD BY AUTOMATED COUNT: 13 % (ref 12.3–15.4)
GLUCOSE SERPL-MCNC: 110 MG/DL (ref 65–99)
HCT VFR BLD AUTO: 37.3 % (ref 34–46.6)
HGB BLD-MCNC: 11.9 G/DL (ref 12–15.9)
MCH RBC QN AUTO: 32.4 PG (ref 26.6–33)
MCHC RBC AUTO-ENTMCNC: 31.9 G/DL (ref 31.5–35.7)
MCV RBC AUTO: 101.6 FL (ref 79–97)
PLATELET # BLD AUTO: 243 10*3/MM3 (ref 140–450)
PMV BLD AUTO: 10.6 FL (ref 6–12)
POTASSIUM SERPL-SCNC: 3.4 MMOL/L (ref 3.5–5.2)
RBC # BLD AUTO: 3.67 10*6/MM3 (ref 3.77–5.28)
SODIUM SERPL-SCNC: 143 MMOL/L (ref 136–145)
WBC NRBC COR # BLD AUTO: 7.31 10*3/MM3 (ref 3.4–10.8)

## 2025-06-08 PROCEDURE — 85027 COMPLETE CBC AUTOMATED: CPT | Performed by: INTERNAL MEDICINE

## 2025-06-08 PROCEDURE — 25010000002 PIPERACILLIN SOD-TAZOBACTAM PER 1 G: Performed by: INTERNAL MEDICINE

## 2025-06-08 PROCEDURE — 25010000002 ENOXAPARIN PER 10 MG: Performed by: NURSE PRACTITIONER

## 2025-06-08 PROCEDURE — 94799 UNLISTED PULMONARY SVC/PX: CPT

## 2025-06-08 PROCEDURE — 80048 BASIC METABOLIC PNL TOTAL CA: CPT | Performed by: INTERNAL MEDICINE

## 2025-06-08 PROCEDURE — 94664 DEMO&/EVAL PT USE INHALER: CPT

## 2025-06-08 PROCEDURE — 99232 SBSQ HOSP IP/OBS MODERATE 35: CPT | Performed by: PSYCHIATRY & NEUROLOGY

## 2025-06-08 PROCEDURE — 94761 N-INVAS EAR/PLS OXIMETRY MLT: CPT

## 2025-06-08 PROCEDURE — 36410 VNPNXR 3YR/> PHY/QHP DX/THER: CPT

## 2025-06-08 PROCEDURE — 25810000003 SODIUM CHLORIDE 0.9 % SOLUTION: Performed by: INTERNAL MEDICINE

## 2025-06-08 PROCEDURE — 25010000002 LEVETRIRACETAM PER 10 MG: Performed by: STUDENT IN AN ORGANIZED HEALTH CARE EDUCATION/TRAINING PROGRAM

## 2025-06-08 PROCEDURE — C1751 CATH, INF, PER/CENT/MIDLINE: HCPCS

## 2025-06-08 PROCEDURE — 99232 SBSQ HOSP IP/OBS MODERATE 35: CPT

## 2025-06-08 RX ORDER — SODIUM CHLORIDE 9 MG/ML
75 INJECTION, SOLUTION INTRAVENOUS CONTINUOUS
Status: ACTIVE | OUTPATIENT
Start: 2025-06-08 | End: 2025-06-09

## 2025-06-08 RX ORDER — ENOXAPARIN SODIUM 100 MG/ML
30 INJECTION SUBCUTANEOUS EVERY 24 HOURS
Status: DISCONTINUED | OUTPATIENT
Start: 2025-06-08 | End: 2025-06-12

## 2025-06-08 RX ORDER — SODIUM CHLORIDE 0.9 % (FLUSH) 0.9 %
10 SYRINGE (ML) INJECTION AS NEEDED
Status: DISCONTINUED | OUTPATIENT
Start: 2025-06-08 | End: 2025-06-14 | Stop reason: HOSPADM

## 2025-06-08 RX ORDER — POTASSIUM CHLORIDE 1.5 G/1.58G
40 POWDER, FOR SOLUTION ORAL EVERY 4 HOURS
Status: COMPLETED | OUTPATIENT
Start: 2025-06-08 | End: 2025-06-09

## 2025-06-08 RX ORDER — SODIUM CHLORIDE 0.9 % (FLUSH) 0.9 %
10 SYRINGE (ML) INJECTION EVERY 12 HOURS SCHEDULED
Status: DISCONTINUED | OUTPATIENT
Start: 2025-06-08 | End: 2025-06-14 | Stop reason: HOSPADM

## 2025-06-08 RX ORDER — SODIUM CHLORIDE 9 MG/ML
40 INJECTION, SOLUTION INTRAVENOUS AS NEEDED
Status: DISCONTINUED | OUTPATIENT
Start: 2025-06-08 | End: 2025-06-14 | Stop reason: HOSPADM

## 2025-06-08 RX ORDER — LISINOPRIL 10 MG/1
10 TABLET ORAL EVERY 12 HOURS SCHEDULED
Status: DISCONTINUED | OUTPATIENT
Start: 2025-06-08 | End: 2025-06-10

## 2025-06-08 RX ADMIN — PANTOPRAZOLE SODIUM 40 MG: 40 INJECTION, POWDER, FOR SOLUTION INTRAVENOUS at 05:28

## 2025-06-08 RX ADMIN — SODIUM CHLORIDE 75 ML/HR: 9 INJECTION, SOLUTION INTRAVENOUS at 14:34

## 2025-06-08 RX ADMIN — BUDESONIDE AND FORMOTEROL FUMARATE DIHYDRATE 2 PUFF: 160; 4.5 AEROSOL RESPIRATORY (INHALATION) at 07:40

## 2025-06-08 RX ADMIN — ASPIRIN 325 MG ORAL TABLET 325 MG: 325 PILL ORAL at 08:48

## 2025-06-08 RX ADMIN — LEVOTHYROXINE SODIUM 150 MCG: 0.07 TABLET ORAL at 05:28

## 2025-06-08 RX ADMIN — CALCIUM CARBONATE-VITAMIN D TAB 500 MG-200 UNIT 1 TABLET: 500-200 TAB at 21:31

## 2025-06-08 RX ADMIN — BUDESONIDE AND FORMOTEROL FUMARATE DIHYDRATE 2 PUFF: 160; 4.5 AEROSOL RESPIRATORY (INHALATION) at 22:04

## 2025-06-08 RX ADMIN — CLOPIDOGREL BISULFATE 75 MG: 75 TABLET, FILM COATED ORAL at 08:48

## 2025-06-08 RX ADMIN — Medication 10 ML: at 21:31

## 2025-06-08 RX ADMIN — Medication 1000 MCG: at 08:48

## 2025-06-08 RX ADMIN — SODIUM CHLORIDE 3.38 G: 9 INJECTION, SOLUTION INTRAVENOUS at 18:10

## 2025-06-08 RX ADMIN — ENOXAPARIN SODIUM 40 MG: 100 INJECTION SUBCUTANEOUS at 15:47

## 2025-06-08 RX ADMIN — LEVETIRACETAM 500 MG: 100 INJECTION, SOLUTION INTRAVENOUS at 21:30

## 2025-06-08 RX ADMIN — ESCITALOPRAM 20 MG: 10 TABLET, FILM COATED ORAL at 08:48

## 2025-06-08 RX ADMIN — CALCIUM CARBONATE-VITAMIN D TAB 500 MG-200 UNIT 1 TABLET: 500-200 TAB at 08:48

## 2025-06-08 RX ADMIN — ACETAMINOPHEN 650 MG: 325 TABLET ORAL at 21:30

## 2025-06-08 RX ADMIN — POTASSIUM CHLORIDE 40 MEQ: 1.5 POWDER, FOR SOLUTION ORAL at 21:28

## 2025-06-08 RX ADMIN — SODIUM CHLORIDE 3.38 G: 9 INJECTION, SOLUTION INTRAVENOUS at 01:03

## 2025-06-08 RX ADMIN — SODIUM CHLORIDE 3.38 G: 9 INJECTION, SOLUTION INTRAVENOUS at 08:48

## 2025-06-08 RX ADMIN — ATORVASTATIN CALCIUM 80 MG: 80 TABLET, FILM COATED ORAL at 08:48

## 2025-06-08 RX ADMIN — LATANOPROST 1 DROP: 50 SOLUTION/ DROPS OPHTHALMIC at 21:30

## 2025-06-08 RX ADMIN — ATENOLOL 25 MG: 25 TABLET ORAL at 21:29

## 2025-06-08 RX ADMIN — LEVETIRACETAM 500 MG: 100 INJECTION, SOLUTION INTRAVENOUS at 08:48

## 2025-06-08 RX ADMIN — ATENOLOL 25 MG: 25 TABLET ORAL at 08:48

## 2025-06-08 NOTE — PROGRESS NOTES
"Nutrition Services    Patient Name: Avelina Carr  YOB: 1940  MRN: [id number]  Admission date: 6/4/2025    Assessment Date:  06/08/25    NUTRITION EVALUATION      Reason for Encounter MST score 2+   Diagnosis/Problem Admission Diagnosis:  Hypokalemia [E87.6]  Hypoxia [R09.02]  Generalized weakness [R53.1]  Acute nonintractable headache, unspecified headache type [R51.9]    Problem List:    Hypoxia    Primary hypertension    HLD (hyperlipidemia)    History of cardioembolic cerebrovascular accident (CVA)    Hypothyroid    Former smoker    Paroxysmal atrial fibrillation    Chest discomfort    Elevated troponin     Narrative 84 yo female adm with hypoxia, chest discomfort, hx above       PO Diet Diet: Regular/House; No Straw, No Mixed Consistencies, Feeding Assistance - Nursing; Texture: Soft to Chew (NDD 3); Soft to Chew: Chopped Meat; Fluid Consistency: Thin (IDDSI 0)   Allergies NKFA   Supplements n/a   PO Intake % 25% meals       Chewing/Swallowing Difficulty dysphagia       Medications reviewed Vitamin D, Lasix, Protonix, B12, synthroid   Labs  reviewed        Physical Findings nonverbal, responds/arouses to voice     Edema 1+ (trace)    GI Function constipation, last bowel movement: 6/6   Skin Status skin intact    Lines/Drains none   I/O reviewed        Height  Weight  BMI  Weight Trend     Height: 162.6 cm (64\")  Weight: 69.4 kg (153 lb 1.6 oz) (06/08/25 0504)  Body mass index is 26.28 kg/m².  Loss    Weight change: weight loss of 23 lbs (13%) over 2 month(s)    Significant?  Yes         NFPE Pending, due to:  next RD visit       Nutrition Problem (PES) Problem: Inadequate Oral Intake  Etiology: Medical Diagnosis - Hypoxia, CVA   Signs/Symptoms: Report of Minimal PO Intake and Unintended Weight Change       Intervention/Plan Adding Magic Cups at breakfast, lunch and dinner (Provides 870 kcals, 27 g protein if consumed) and  Boost Original BID (Provides 480 kcals, 20 g protein if consumed) to help " increase nutrient intake    Will follow progress and assist as needed.    RD to follow up per protocol.     Results from last 7 days   Lab Units 06/07/25  1448 06/07/25  0536 06/06/25  1224 06/05/25  1537 06/05/25  0347 06/04/25  1651   SODIUM mmol/L  --  139 142  --  138 139   POTASSIUM mmol/L 3.7 3.6 3.7  3.7   < > 3.4* 3.1*   CHLORIDE mmol/L  --  106 106  --  103 99   CO2 mmol/L  --  22.9 21.9*  --  22.0 26.0   BUN mg/dL  --  20.0 13.0  --  11.0 11.0   CREATININE mg/dL  --  1.50* 1.19*  --  0.72 1.00   CALCIUM mg/dL  --  9.0 9.6  --  8.6 9.1   BILIRUBIN mg/dL  --   --   --   --   --  0.7   ALK PHOS U/L  --   --   --   --   --  108   ALT (SGPT) U/L  --   --   --   --   --  21   AST (SGOT) U/L  --   --   --   --   --  34*   GLUCOSE mg/dL  --  90 85  --  68 106*    < > = values in this interval not displayed.     Results from last 7 days   Lab Units 06/08/25  0917 06/05/25  0347 06/04/25  1651   MAGNESIUM mg/dL  --   --  2.2   HEMOGLOBIN g/dL 11.9*   < > 12.1   HEMATOCRIT % 37.3   < > 36.0    < > = values in this interval not displayed.     Lab Results   Component Value Date    HGBA1C 5.50 11/13/2023     Wt Readings from Last 10 Encounters:   06/08/25 69.4 kg (153 lb 1.6 oz)   04/29/25 74.4 kg (164 lb)   04/17/25 72.1 kg (159 lb)   06/19/24 77.1 kg (169 lb 14.4 oz)   04/10/24 79.8 kg (176 lb)   03/19/24 77.6 kg (171 lb)   11/15/23 85.2 kg (187 lb 13.3 oz)   09/19/23 83.5 kg (184 lb)   03/22/23 85.6 kg (188 lb 12.8 oz)   03/16/23 85.3 kg (188 lb)       Electronically signed by:  Fab Senior RD  06/08/25 16:31 EDT

## 2025-06-08 NOTE — PROGRESS NOTES
LOS: 3 days   Patient Care Team:  Artem Shepherd MD as PCP - General (Internal Medicine)    Chief Complaint: Follow-up chest pain, hypertensive urgency, elevated troponin    Interval History: She is resting in bed on my exam. She is more alert today but not back to her baseline.     Vital Signs:  Temp:  [97.5 °F (36.4 °C)-98.6 °F (37 °C)] 98.6 °F (37 °C)  Heart Rate:  [50-59] 55  Resp:  [16-18] 18  BP: (121-170)/(37-64) 170/64    Intake/Output Summary (Last 24 hours) at 6/8/2025 1252  Last data filed at 6/8/2025 0933  Gross per 24 hour   Intake 120 ml   Output --   Net 120 ml        Physical Exam  Vitals reviewed.   Constitutional:       General: She is not in acute distress.  HENT:      Head: Normocephalic.      Nose: Nose normal.   Eyes:      Extraocular Movements: Extraocular movements intact.      Pupils: Pupils are equal, round, and reactive to light.   Cardiovascular:      Rate and Rhythm: Normal rate and regular rhythm.      Pulses: Normal pulses.      Heart sounds: Heart sounds not distant. Murmur heard.      No friction rub. No gallop. No S3 or S4 sounds.   Pulmonary:      Effort: Pulmonary effort is normal.      Breath sounds: Normal breath sounds.   Abdominal:      General: Abdomen is flat. Bowel sounds are normal.      Palpations: Abdomen is soft.      Tenderness: There is no abdominal tenderness.   Skin:     General: Skin is warm and dry.   Neurological:      Mental Status: She is alert.         Results Review:    Results from last 7 days   Lab Units 06/07/25  1448 06/07/25  0536   SODIUM mmol/L  --  139   POTASSIUM mmol/L 3.7 3.6   CHLORIDE mmol/L  --  106   CO2 mmol/L  --  22.9   BUN mg/dL  --  20.0   CREATININE mg/dL  --  1.50*   GLUCOSE mg/dL  --  90   CALCIUM mg/dL  --  9.0     Results from last 7 days   Lab Units 06/04/25  1824 06/04/25  1651   HSTROP T ng/L 56* 63*     Results from last 7 days   Lab Units 06/08/25  0917   WBC 10*3/mm3 7.31   HEMOGLOBIN g/dL 11.9*   HEMATOCRIT % 37.3    PLATELETS 10*3/mm3 243             Results from last 7 days   Lab Units 06/04/25  1651   MAGNESIUM mg/dL 2.2           I reviewed the patient's new clinical results.        Assessment & Plan:  Acute hypoxic respiratory failure  Patchy bilateral infiltrates by CT of the chest  Presumed pneumonia  COPD without acute exacerbation  Former tobacco use  Hypertensive urgency  BP much improved. Continue atenolol. Unable to swallow nifedipine XL today. Hydralazine prn available.   Recent progressive weakness  Likely multifactorial  Headache  Likely secondary to #5  Intermittent chest pressure  Negative Myoview stress test on 6/6/2025  Nonspecific high-sensitivity troponin elevation  History of multiple recurrent strokes  Last with a right MCA CVA in November 2023  On DAPT  History of SVT with loop recorder placement in August 2022  Multifactorial chronic anemia  History of remote meningioma  S/p craniotomy and resection  Bilateral carotid artery disease  Peripheral neuropathy  Chronic lower extremity edema secondary to venous stasis  Altered mental status  Neurology to evaluate     CELY Spaulding  06/08/25  12:52 EDT

## 2025-06-08 NOTE — PLAN OF CARE
Problem: Adult Inpatient Plan of Care  Goal: Patient-Specific Goal (Individualized)  Outcome: Progressing     Problem: Adult Inpatient Plan of Care  Goal: Optimal Comfort and Wellbeing  Outcome: Progressing     Problem: Adult Inpatient Plan of Care  Goal: Readiness for Transition of Care  Outcome: Progressing     Problem: Comorbidity Management  Goal: Blood Pressure in Desired Range  Outcome: Progressing  Intervention: Maintain Blood Pressure Management  Recent Flowsheet Documentation  Taken 6/7/2025 2047 by Bekah Giordano RN  Medication Review/Management: medications reviewed   Goal Outcome Evaluation:  Plan of Care Reviewed With: patient           Outcome Evaluation: patient more awake and alert this shift. Patient was able to hold conversation and opened her eyes more. Patient wore her home CPAP for half the night and 3-4L NC the rest of the shift. She took her medications easily crushed in apple sauce.

## 2025-06-08 NOTE — SIGNIFICANT NOTE
"   06/08/25 1707   Midline Catheter - Single Lumen 06/08/25 Left Basilic   Placement date: If unknown, DO NOT use \"Add Comment\" note/Placement time: If unknown, DO NOT use \"Add Comment\" note: 06/08/25 1706   Hand Hygiene Completed: Yes  Site Prep: Chlorhexidine isopropyl alcohol  All 5 Sterile Barriers Used (Gloves, Gown, Ca...   Site Assessment Clean;Dry;Intact   Line Status Blood return noted;Capped;Flushed;Saline locked   Length roney (cm) 11 cm   Extremity Circumference (cm) 26 cm   Dressing Type Border Dressing;Securing device;Antimicrobial dressing/disc   Dressing Status Clean;Dry;Intact   Dressing Intervention New dressing   Liquid Adhesive Applied   Dressing Change Due 06/15/25   Indication/Daily Review of Necessity poor venous access     3 needles, 2 guidewires, and 1 scalpel accounted for and disposed of.  "

## 2025-06-08 NOTE — PROGRESS NOTES
[facility] Clinical Pharmacy Services: Dose Adjustment    Enoxaparin has been appropriately dose adjusted based on our System P&T approved policy. Pharmacy will continue to monitor patient peripherally while in-house.     Janet Fernández [facility]ly [facility]  Clinical Pharmacist

## 2025-06-08 NOTE — PROGRESS NOTES
Neurology Progress Note        Subjective     Subjective:    Mildly improved today, alerts to my voice, slow to respond, oriented to the hospital, following commands    Head CT reviewed, shows areas of encephalomalacia and periventricular and deep white matter small vessel disease, no acute process    EEG without epileptiform activity, shows moderate diffuse slowing consistent with moderate diffuse encephalopathy.    Medications:  Current Facility-Administered Medications   Medication Dose Route Frequency Provider Last Rate Last Admin    acetaminophen (TYLENOL) tablet 650 mg  650 mg Oral Q4H PRN Andria Roberts MD   650 mg at 06/05/25 1027    Or    acetaminophen (TYLENOL) 160 MG/5ML oral solution 650 mg  650 mg Oral Q4H PRN Andria Roberts MD        Or    acetaminophen (TYLENOL) suppository 650 mg  650 mg Rectal Q4H PRN Andria Roberts MD        albuterol (PROVENTIL) nebulizer solution 0.083% 2.5 mg/3mL  2.5 mg Nebulization Q6H PRN Andria Roberts MD   2.5 mg at 06/06/25 1949    aspirin tablet 325 mg  325 mg Oral Daily Andria Roberts MD   325 mg at 06/08/25 0848    atenolol (TENORMIN) tablet 25 mg  25 mg Oral BID Andria Roberts MD   25 mg at 06/08/25 0848    atorvastatin (LIPITOR) tablet 80 mg  80 mg Oral Daily Andria Roberts MD   80 mg at 06/08/25 0848    sennosides-docusate (PERICOLACE) 8.6-50 MG per tablet 2 tablet  2 tablet Oral BID PRN Andria Roberts MD        And    polyethylene glycol (MIRALAX) packet 17 g  17 g Oral Daily PRN Andria Roberts MD   17 g at 06/05/25 1231    And    bisacodyl (DULCOLAX) EC tablet 5 mg  5 mg Oral Daily PRN Andria Roberts MD        And    bisacodyl (DULCOLAX) suppository 10 mg  10 mg Rectal Daily PRN Andria Roberts MD   10 mg at 06/05/25 1659    budesonide-formoterol (SYMBICORT) 160-4.5 MCG/ACT inhaler 2 puff  2 puff Inhalation BID Andria Roberts MD   2 puff at 06/08/25 0740    calcium 500  mg vitamin D 5 mcg (200 UT) per tablet 1 tablet  1 tablet Oral BID Andria Roberts MD   1 tablet at 06/08/25 0848    Calcium Replacement - Follow Nurse / BPA Driven Protocol   Not Applicable PRN Andria Roberts MD        clopidogrel (PLAVIX) tablet 75 mg  75 mg Oral Daily Andria Roberts MD   75 mg at 06/08/25 0848    donepezil (ARICEPT) tablet 10 mg  10 mg Oral Daily Tito Bhandari MD   10 mg at 06/07/25 1033    enoxaparin sodium (LOVENOX) syringe 40 mg  40 mg Subcutaneous Q24H Connor Maynard APRN   40 mg at 06/07/25 1802    escitalopram (LEXAPRO) tablet 20 mg  20 mg Oral Daily Andria Roberts MD   20 mg at 06/08/25 0848    [Held by provider] furosemide (LASIX) tablet 20 mg  20 mg Oral Daily Andria Roberts MD   20 mg at 06/07/25 1033    [Held by provider] gabapentin (NEURONTIN) capsule 400 mg  400 mg Oral Q12H Andria Roberts MD   400 mg at 06/07/25 1033    hydrALAZINE (APRESOLINE) injection 10 mg  10 mg Intravenous Q6H PRN Nahid García MD   10 mg at 06/06/25 0928    latanoprost (XALATAN) 0.005 % ophthalmic solution 1 drop  1 drop Right Eye Nightly Andria Roberts MD   1 drop at 06/07/25 2338    levETIRAcetam (KEPPRA) injection 500 mg  500 mg Intravenous Q12H Nahid García MD   500 mg at 06/08/25 0848    levothyroxine (SYNTHROID, LEVOTHROID) tablet 150 mcg  150 mcg Oral Q AM Andria Roberts MD   150 mcg at 06/08/25 0528    [Held by provider] lisinopril (PRINIVIL,ZESTRIL) tablet 10 mg  10 mg Oral Q12H Shi Bueno APRN        Magnesium Standard Dose Replacement - Follow Nurse / BPA Driven Protocol   Not Applicable PRN Andria Roberts MD        [Held by provider] NIFEdipine XL (PROCARDIA XL) 24 hr tablet 60 mg  60 mg Oral Q24H Nahid García MD   60 mg at 06/07/25 1033    pantoprazole (PROTONIX) injection 40 mg  40 mg Intravenous Q AM Tito Bhandari MD   40 mg at 06/08/25 0514    Phosphorus Replacement - Follow Nurse / BPA  Driven Protocol   Not Applicable PRN Andria Roberts MD        piperacillin-tazobactam (ZOSYN) 3.375 g IVPB in 100 mL NS MBP (CD)  3.375 g Intravenous Q8H Tito Bhandari MD   3.375 g at 06/08/25 0848    Potassium Replacement - Follow Nurse / BPA Driven Protocol   Not Applicable PRN Norman Agrawal MD        Potassium Replacement - Follow Nurse / BPA Driven Protocol   Not Applicable PRN Andria Roberts MD        silver nitrate 75-25 % applicator 1 Application  1 Application Topical Once Tito Bhandari MD        sodium chloride 0.9 % infusion  75 mL/hr Intravenous Continuous Tito Bhandari MD 75 mL/hr at 06/08/25 1434 75 mL/hr at 06/08/25 1434    vitamin B-12 (CYANOCOBALAMIN) tablet 1,000 mcg  1,000 mcg Oral Daily Andria Roberts MD   1,000 mcg at 06/08/25 0848           Objective      Vital Signs  Temp:  [97.5 °F (36.4 °C)-98.8 °F (37.1 °C)] 98.8 °F (37.1 °C)  Heart Rate:  [50-55] 54  Resp:  [16-18] 18  BP: (138-170)/(53-64) 156/53    Physical Exam:    Mental Status: Somnolent, opens eyes to voice, slow to respond, oriented to self and hospital, basic language functions intact  Cranial nerves: Pupils equal and reactive, face symmetric, tongue midline  Motor: Equal finger  bilaterally, moves the right upper extremity less due to known right shoulder pain  Sensory: intact to LT     Results Review:    I reviewed the patient's new clinical results.    Results from last 7 days   Lab Units 06/08/25  0917 06/07/25  0536 06/06/25  1238   WBC 10*3/mm3 7.31 6.91 10.16   HEMOGLOBIN g/dL 11.9* 10.9* 13.8   HEMATOCRIT % 37.3 33.3* 43.5   PLATELETS 10*3/mm3 243 299 300        Results from last 7 days   Lab Units 06/07/25  1448 06/07/25  0536 06/06/25  1224 06/05/25  1537 06/05/25  0347 06/04/25  1651   SODIUM mmol/L  --  139 142  --  138 139   POTASSIUM mmol/L 3.7 3.6 3.7  3.7   < > 3.4* 3.1*   CHLORIDE mmol/L  --  106 106  --  103 99   CO2 mmol/L  --  22.9 21.9*   "--  22.0 26.0   BUN mg/dL  --  20.0 13.0  --  11.0 11.0   CREATININE mg/dL  --  1.50* 1.19*  --  0.72 1.00   CALCIUM mg/dL  --  9.0 9.6  --  8.6 9.1   BILIRUBIN mg/dL  --   --   --   --   --  0.7   ALK PHOS U/L  --   --   --   --   --  108   ALT (SGPT) U/L  --   --   --   --   --  21   AST (SGOT) U/L  --   --   --   --   --  34*   GLUCOSE mg/dL  --  90 85  --  68 106*    < > = values in this interval not displayed.        Lab Results   Component Value Date    PHOS 3.7 11/14/2023    MG 2.2 06/04/2025    PROTIME 13.3 11/12/2023    INR 1.00 11/12/2023     No components found for: \"POCGLUC\"  No components found for: \"A1C\"  Lab Results   Component Value Date    HDL 34 (L) 03/19/2024    LDL 71 03/19/2024     No components found for: \"B12\"  Lab Results   Component Value Date    TSH 4.160 06/07/2025       Assessment/Plan     Hospital Problem List      Hypoxia    Primary hypertension    HLD (hyperlipidemia)    History of cardioembolic cerebrovascular accident (CVA)    Hypothyroid    Former smoker    Paroxysmal atrial fibrillation    Chest discomfort    Elevated troponin    Impression:  Seizure disorder on Keppra  History of stroke and atrial fibrillation, not on anticoagulation, on aspirin 325 mg and Plavix  Meningiomas  Cognitive decline  Admitted with pneumonia and multifactorial encephalopathy, likely due to toxic/metabolic/infectious causes-sedating effect of gabapentin, DANY, pneumonia    Plan:  Brain MRI without contrast  [id number] score 6, consider anticoagulation  Carotid ultrasound  Continue Keppra, hold other sedating medications      Rubi Walker MD  06/08/25  14:38 EDT     "

## 2025-06-08 NOTE — PROGRESS NOTES
" LOS: 3 days     Name: Avelina Carr  Age: 85 y.o.  Sex: female  :  1940  MRN: [id number]         Primary Care Physician: Artem Shepherd MD    Subjective   Subjective  Today she is more awake, alert, oriented and talkative.  Still not eating much.    Objective   Vital Signs  Temp:  [97.5 °F (36.4 °C)-98.6 °F (37 °C)] 98.6 °F (37 °C)  Heart Rate:  [50-59] 55  Resp:  [16-18] 18  BP: (121-170)/(37-64) 170/64  Body mass index is 26.28 kg/m².    Objective:  General Appearance:  Comfortable and in no acute distress (Elderly, weak and deconditioned).    Vital signs: (most recent): Blood pressure 170/64, pulse 55, temperature 98.6 °F (37 °C), temperature source Oral, resp. rate 18, height 162.6 cm (64\"), weight 69.4 kg (153 lb 1.6 oz), SpO2 97%, not currently breastfeeding.    Lungs:  Normal effort and normal respiratory rate.  She is not in respiratory distress.  There are decreased breath sounds.    Heart: Normal rate.  Regular rhythm.    Abdomen: Abdomen is soft.  Bowel sounds are normal.   There is no abdominal tenderness.     Extremities: There is no dependent edema or local swelling.    Neurological: Patient is alert and oriented to person, place and time.    Skin:  Warm and dry.                Results Review:       I reviewed the patient's new clinical results.    Results from last 7 days   Lab Units 25  0917 25  0536 25  1238 25  0347 25  1651   WBC 10*3/mm3 7.31 6.91 10.16 6.93 7.04   HEMOGLOBIN g/dL 11.9* 10.9* 13.8 11.3* 12.1   PLATELETS 10*3/mm3 243 299 300 220 259     Results from last 7 days   Lab Units 25  1448 25  0536 25  1224 25  1537 25  0347 25  1651   SODIUM mmol/L  --  139 142  --  138 139   POTASSIUM mmol/L 3.7 3.6 3.7  3.7 3.4* 3.4* 3.1*   CHLORIDE mmol/L  --  106 106  --  103 99   CO2 mmol/L  --  22.9 21.9*  --  22.0 26.0   BUN mg/dL  --  20.0 13.0  --  11.0 11.0   CREATININE mg/dL  --  1.50* 1.19*  --  0.72 1.00   CALCIUM " mg/dL  --  9.0 9.6  --  8.6 9.1   GLUCOSE mg/dL  --  90 85  --  68 106*                 Scheduled Meds:   aspirin, 325 mg, Oral, Daily  atenolol, 25 mg, Oral, BID  atorvastatin, 80 mg, Oral, Daily  budesonide-formoterol, 2 puff, Inhalation, BID  calcium 500 mg vitamin D 5 mcg (200 UT), 1 tablet, Oral, BID  clopidogrel, 75 mg, Oral, Daily  donepezil, 10 mg, Oral, Daily  enoxaparin sodium, 40 mg, Subcutaneous, Q24H  escitalopram, 20 mg, Oral, Daily  [Held by provider] furosemide, 20 mg, Oral, Daily  [Held by provider] gabapentin, 400 mg, Oral, Q12H  latanoprost, 1 drop, Right Eye, Nightly  levETIRAcetam, 500 mg, Intravenous, Q12H  levothyroxine, 150 mcg, Oral, Q AM  [Held by provider] lisinopril, 10 mg, Oral, Q12H  [Held by provider] NIFEdipine XL, 60 mg, Oral, Q24H  pantoprazole, 40 mg, Intravenous, Q AM  piperacillin-tazobactam, 3.375 g, Intravenous, Q8H  vitamin B-12, 1,000 mcg, Oral, Daily      PRN Meds:     acetaminophen **OR** acetaminophen **OR** acetaminophen    albuterol    senna-docusate sodium **AND** polyethylene glycol **AND** bisacodyl **AND** bisacodyl    Calcium Replacement - Follow Nurse / BPA Driven Protocol    hydrALAZINE    Magnesium Standard Dose Replacement - Follow Nurse / BPA Driven Protocol    Phosphorus Replacement - Follow Nurse / BPA Driven Protocol    Potassium Replacement - Follow Nurse / BPA Driven Protocol    Potassium Replacement - Follow Nurse / BPA Driven Protocol  Continuous Infusions:  sodium chloride, 100 mL/hr, Last Rate: 100 mL/hr (06/07/25 1451)        Assessment & Plan   Active Hospital Problems    Diagnosis  POA    **Hypoxia [R09.02]  Yes    Chest discomfort [R07.89]  Yes    Elevated troponin [R79.89]  Yes    Paroxysmal atrial fibrillation [I48.0]  Yes    Former smoker [Z87.891]  Not Applicable    Hypothyroid [E03.9]  Yes    HLD (hyperlipidemia) [E78.5]  Yes    Primary hypertension [I10]  Yes    History of cardioembolic cerebrovascular accident (CVA) [Z86.73]  Not Applicable       Resolved Hospital Problems   No resolved problems to display.       Assessment & Plan    AMS  -This is significantly improved today.  -Appreciate evaluation from neurology.  EEG and head CT unremarkable  -Gabapentin on hold  - Metabolic workup unremarkable     Hypoxia  Former smoker  -Pneumonia suspected given infiltrates on CTA chest yet PE negative  -RVP negative  - Changed her to Zosyn given worsening infiltrates on repeat chest x-ray.  -On room air with no respiratory distress today       Chest discomfort     Elevated troponin    Paroxysmal Atrial fibrillation  -CTA negative PE   - Stress test and echocardiogram looked okay  -statin & ASA continued  -s/p loop recorder; followed by Dr. Marrero  -Cardiology following       Hypokalemia  -resolved following replete; labs in a.m.       Primary hypertension  - Reasonably well-controlled today.  I placed lisinopril on hold given bump of creatinine  -Blood pressure elevated today.  Will await recheck and if remains elevated will start her on Norvasc.  -Continue atenolol     Elevated creatinine  - Lab was unable to obtain BMP  - Continue gentle IV fluids until oral intake improves.  - Lisinopril and Lasix on hold    Very poor IV access  - Will place a midline for antibiotic administration as well as labs      HLD (hyperlipidemia)    History of cardioembolic cerebrovascular accident (CVA)  -Plavix, statin, ASA continued     Seizure disorder  - Remains on Keppra as per neurology recommendations    History of CVA with chronic dysphagia  - Speech therapy evaluated upon presentation.  Patient still having problems with swallowing.  Will ask them to reevaluate, particularly given concern for aspiration over the weekend.      Hypothyroid  - Recheck TSH within normal limits     Pharmacy to dose Lovenox for DVT prophylaxis.  Limited code - No CPR.  Discussed with patient and family & RN        Expected Discharge Date: 6/9/2025; Expected Discharge Time:      Tito Bryant  MD Elisabet  [facility]  06/08/25  13:13 EDT

## 2025-06-09 ENCOUNTER — APPOINTMENT (OUTPATIENT)
Dept: GENERAL RADIOLOGY | Facility: HOSPITAL | Age: 85
DRG: 177 | End: 2025-06-09
Payer: MEDICARE

## 2025-06-09 ENCOUNTER — APPOINTMENT (OUTPATIENT)
Dept: CARDIOLOGY | Facility: HOSPITAL | Age: 85
DRG: 177 | End: 2025-06-09
Payer: MEDICARE

## 2025-06-09 ENCOUNTER — APPOINTMENT (OUTPATIENT)
Dept: MRI IMAGING | Facility: HOSPITAL | Age: 85
DRG: 177 | End: 2025-06-09
Payer: MEDICARE

## 2025-06-09 PROBLEM — R41.82 ALTERED MENTAL STATE: Status: ACTIVE | Noted: 2025-06-09

## 2025-06-09 PROBLEM — E43 SEVERE PROTEIN-CALORIE MALNUTRITION: Status: ACTIVE | Noted: 2025-06-09

## 2025-06-09 LAB
ANION GAP SERPL CALCULATED.3IONS-SCNC: 9.1 MMOL/L (ref 5–15)
BH CV XLRA MEAS LEFT DIST CCA EDV: 10.6 CM/SEC
BH CV XLRA MEAS LEFT DIST CCA PSV: 60.5 CM/SEC
BH CV XLRA MEAS LEFT DIST ICA EDV: -20.8 CM/SEC
BH CV XLRA MEAS LEFT DIST ICA PSV: -73.7 CM/SEC
BH CV XLRA MEAS LEFT ICA/CCA RATIO: 1.5
BH CV XLRA MEAS LEFT MID ICA EDV: -15.6 CM/SEC
BH CV XLRA MEAS LEFT MID ICA PSV: -91 CM/SEC
BH CV XLRA MEAS LEFT PROX CCA EDV: -16.4 CM/SEC
BH CV XLRA MEAS LEFT PROX CCA PSV: -102.5 CM/SEC
BH CV XLRA MEAS LEFT PROX ECA PSV: -112.6 CM/SEC
BH CV XLRA MEAS LEFT PROX ICA EDV: 17.5 CM/SEC
BH CV XLRA MEAS LEFT PROX ICA PSV: 73.2 CM/SEC
BH CV XLRA MEAS LEFT PROX SCLA PSV: 134.1 CM/SEC
BH CV XLRA MEAS LEFT VERTEBRAL A PSV: -29.1 CM/SEC
BH CV XLRA MEAS RIGHT DIST CCA EDV: 9.3 CM/SEC
BH CV XLRA MEAS RIGHT DIST CCA PSV: 56.5 CM/SEC
BH CV XLRA MEAS RIGHT DIST ICA EDV: -10.5 CM/SEC
BH CV XLRA MEAS RIGHT DIST ICA PSV: -70.8 CM/SEC
BH CV XLRA MEAS RIGHT ICA/CCA RATIO: 3.04
BH CV XLRA MEAS RIGHT MID ICA EDV: -14.7 CM/SEC
BH CV XLRA MEAS RIGHT MID ICA PSV: -69.4 CM/SEC
BH CV XLRA MEAS RIGHT PROX CCA EDV: 7.4 CM/SEC
BH CV XLRA MEAS RIGHT PROX CCA PSV: 64.4 CM/SEC
BH CV XLRA MEAS RIGHT PROX ECA PSV: -113.6 CM/SEC
BH CV XLRA MEAS RIGHT PROX ICA EDV: -30.2 CM/SEC
BH CV XLRA MEAS RIGHT PROX ICA PSV: -171.5 CM/SEC
BH CV XLRA MEAS RIGHT PROX SCLA PSV: -181.1 CM/SEC
BH CV XLRA MEAS RIGHT VERTEBRAL A EDV: -5.3 CM/SEC
BH CV XLRA MEAS RIGHT VERTEBRAL A PSV: -39.1 CM/SEC
BUN SERPL-MCNC: 9 MG/DL (ref 8–23)
BUN/CREAT SERPL: 15.5 (ref 7–25)
CALCIUM SPEC-SCNC: 6.2 MG/DL (ref 8.6–10.5)
CHLORIDE SERPL-SCNC: 118 MMOL/L (ref 98–107)
CO2 SERPL-SCNC: 17.9 MMOL/L (ref 22–29)
CREAT SERPL-MCNC: 0.58 MG/DL (ref 0.57–1)
DEPRECATED RDW RBC AUTO: 47.5 FL (ref 37–54)
EGFRCR SERPLBLD CKD-EPI 2021: 88.3 ML/MIN/1.73
ERYTHROCYTE [DISTWIDTH] IN BLOOD BY AUTOMATED COUNT: 12.7 % (ref 12.3–15.4)
GLUCOSE SERPL-MCNC: 81 MG/DL (ref 65–99)
HCT VFR BLD AUTO: 32.7 % (ref 34–46.6)
HGB BLD-MCNC: 10.6 G/DL (ref 12–15.9)
MAGNESIUM SERPL-MCNC: 1.8 MG/DL (ref 1.6–2.4)
MCH RBC QN AUTO: 32.4 PG (ref 26.6–33)
MCHC RBC AUTO-ENTMCNC: 32.4 G/DL (ref 31.5–35.7)
MCV RBC AUTO: 100 FL (ref 79–97)
PLATELET # BLD AUTO: 233 10*3/MM3 (ref 140–450)
PMV BLD AUTO: 10.3 FL (ref 6–12)
POTASSIUM SERPL-SCNC: 2.9 MMOL/L (ref 3.5–5.2)
POTASSIUM SERPL-SCNC: 2.9 MMOL/L (ref 3.5–5.2)
RBC # BLD AUTO: 3.27 10*6/MM3 (ref 3.77–5.28)
SODIUM SERPL-SCNC: 145 MMOL/L (ref 136–145)
URATE SERPL-MCNC: 2.4 MG/DL (ref 2.4–5.7)
WBC NRBC COR # BLD AUTO: 8.33 10*3/MM3 (ref 3.4–10.8)

## 2025-06-09 PROCEDURE — 83735 ASSAY OF MAGNESIUM: CPT | Performed by: INTERNAL MEDICINE

## 2025-06-09 PROCEDURE — 84550 ASSAY OF BLOOD/URIC ACID: CPT | Performed by: INTERNAL MEDICINE

## 2025-06-09 PROCEDURE — 80048 BASIC METABOLIC PNL TOTAL CA: CPT | Performed by: INTERNAL MEDICINE

## 2025-06-09 PROCEDURE — 84132 ASSAY OF SERUM POTASSIUM: CPT | Performed by: INTERNAL MEDICINE

## 2025-06-09 PROCEDURE — 25810000003 SODIUM CHLORIDE 0.9 % SOLUTION: Performed by: INTERNAL MEDICINE

## 2025-06-09 PROCEDURE — 99233 SBSQ HOSP IP/OBS HIGH 50: CPT | Performed by: INTERNAL MEDICINE

## 2025-06-09 PROCEDURE — 92526 ORAL FUNCTION THERAPY: CPT | Performed by: SPEECH-LANGUAGE PATHOLOGIST

## 2025-06-09 PROCEDURE — 25010000002 CALCIUM GLUCONATE 2-0.675 GM/100ML-% SOLUTION: Performed by: INTERNAL MEDICINE

## 2025-06-09 PROCEDURE — 70551 MRI BRAIN STEM W/O DYE: CPT

## 2025-06-09 PROCEDURE — 25010000002 CALCIUM GLUCONATE-NACL 1-0.675 GM/50ML-% SOLUTION: Performed by: INTERNAL MEDICINE

## 2025-06-09 PROCEDURE — 76014 MR SFTY IMPLT&/FB ASMT STF 1: CPT

## 2025-06-09 PROCEDURE — 25010000002 POTASSIUM CHLORIDE 10 MEQ/100ML SOLUTION: Performed by: INTERNAL MEDICINE

## 2025-06-09 PROCEDURE — 94799 UNLISTED PULMONARY SVC/PX: CPT

## 2025-06-09 PROCEDURE — 94761 N-INVAS EAR/PLS OXIMETRY MLT: CPT

## 2025-06-09 PROCEDURE — 94760 N-INVAS EAR/PLS OXIMETRY 1: CPT

## 2025-06-09 PROCEDURE — 25010000002 PIPERACILLIN SOD-TAZOBACTAM PER 1 G: Performed by: INTERNAL MEDICINE

## 2025-06-09 PROCEDURE — 99232 SBSQ HOSP IP/OBS MODERATE 35: CPT | Performed by: PHYSICIAN ASSISTANT

## 2025-06-09 PROCEDURE — 97530 THERAPEUTIC ACTIVITIES: CPT

## 2025-06-09 PROCEDURE — 73130 X-RAY EXAM OF HAND: CPT

## 2025-06-09 PROCEDURE — 85027 COMPLETE CBC AUTOMATED: CPT | Performed by: INTERNAL MEDICINE

## 2025-06-09 PROCEDURE — 93880 EXTRACRANIAL BILAT STUDY: CPT

## 2025-06-09 PROCEDURE — 93880 EXTRACRANIAL BILAT STUDY: CPT | Performed by: SURGERY

## 2025-06-09 RX ORDER — LEVETIRACETAM 500 MG/1
500 TABLET ORAL EVERY 12 HOURS SCHEDULED
Status: DISCONTINUED | OUTPATIENT
Start: 2025-06-09 | End: 2025-06-14 | Stop reason: HOSPADM

## 2025-06-09 RX ORDER — CALCIUM GLUCONATE 20 MG/ML
2000 INJECTION, SOLUTION INTRAVENOUS
Status: COMPLETED | OUTPATIENT
Start: 2025-06-09 | End: 2025-06-09

## 2025-06-09 RX ORDER — PANTOPRAZOLE SODIUM 40 MG/1
40 TABLET, DELAYED RELEASE ORAL
Status: DISCONTINUED | OUTPATIENT
Start: 2025-06-09 | End: 2025-06-14 | Stop reason: HOSPADM

## 2025-06-09 RX ORDER — CALCIUM GLUCONATE 20 MG/ML
1000 INJECTION, SOLUTION INTRAVENOUS
Status: COMPLETED | OUTPATIENT
Start: 2025-06-09 | End: 2025-06-09

## 2025-06-09 RX ORDER — POTASSIUM CHLORIDE 1.5 G/1.58G
40 POWDER, FOR SOLUTION ORAL EVERY 4 HOURS
Status: DISCONTINUED | OUTPATIENT
Start: 2025-06-09 | End: 2025-06-09

## 2025-06-09 RX ORDER — AMLODIPINE BESYLATE 5 MG/1
5 TABLET ORAL
Status: DISCONTINUED | OUTPATIENT
Start: 2025-06-09 | End: 2025-06-14

## 2025-06-09 RX ORDER — POTASSIUM CHLORIDE 7.45 MG/ML
10 INJECTION INTRAVENOUS
Status: COMPLETED | OUTPATIENT
Start: 2025-06-09 | End: 2025-06-09

## 2025-06-09 RX ADMIN — LEVETIRACETAM 500 MG: 500 TABLET, FILM COATED ORAL at 23:12

## 2025-06-09 RX ADMIN — POTASSIUM CHLORIDE 10 MEQ: 7.46 INJECTION, SOLUTION INTRAVENOUS at 11:50

## 2025-06-09 RX ADMIN — CALCIUM CARBONATE-VITAMIN D TAB 500 MG-200 UNIT 1 TABLET: 500-200 TAB at 09:46

## 2025-06-09 RX ADMIN — Medication 10 ML: at 23:13

## 2025-06-09 RX ADMIN — SODIUM CHLORIDE 3.38 G: 9 INJECTION, SOLUTION INTRAVENOUS at 17:47

## 2025-06-09 RX ADMIN — POTASSIUM CHLORIDE 40 MEQ: 1.5 POWDER, FOR SOLUTION ORAL at 01:04

## 2025-06-09 RX ADMIN — PANTOPRAZOLE SODIUM 40 MG: 40 TABLET, DELAYED RELEASE ORAL at 09:46

## 2025-06-09 RX ADMIN — CALCIUM GLUCONATE 2000 MG: 20 INJECTION, SOLUTION INTRAVENOUS at 12:59

## 2025-06-09 RX ADMIN — SODIUM CHLORIDE 3.38 G: 9 INJECTION, SOLUTION INTRAVENOUS at 01:04

## 2025-06-09 RX ADMIN — POTASSIUM CHLORIDE 10 MEQ: 7.46 INJECTION, SOLUTION INTRAVENOUS at 16:09

## 2025-06-09 RX ADMIN — LISINOPRIL 10 MG: 10 TABLET ORAL at 09:46

## 2025-06-09 RX ADMIN — CLOPIDOGREL BISULFATE 75 MG: 75 TABLET, FILM COATED ORAL at 09:45

## 2025-06-09 RX ADMIN — ATENOLOL 25 MG: 25 TABLET ORAL at 09:45

## 2025-06-09 RX ADMIN — POTASSIUM CHLORIDE 10 MEQ: 7.46 INJECTION, SOLUTION INTRAVENOUS at 14:11

## 2025-06-09 RX ADMIN — ESCITALOPRAM 20 MG: 10 TABLET, FILM COATED ORAL at 09:45

## 2025-06-09 RX ADMIN — POTASSIUM CHLORIDE 10 MEQ: 7.46 INJECTION, SOLUTION INTRAVENOUS at 17:47

## 2025-06-09 RX ADMIN — PANTOPRAZOLE SODIUM 40 MG: 40 INJECTION, POWDER, FOR SOLUTION INTRAVENOUS at 06:31

## 2025-06-09 RX ADMIN — Medication 1000 MCG: at 09:45

## 2025-06-09 RX ADMIN — SODIUM CHLORIDE 75 ML/HR: 9 INJECTION, SOLUTION INTRAVENOUS at 06:35

## 2025-06-09 RX ADMIN — SODIUM CHLORIDE 3.38 G: 9 INJECTION, SOLUTION INTRAVENOUS at 09:46

## 2025-06-09 RX ADMIN — LISINOPRIL 10 MG: 10 TABLET ORAL at 23:12

## 2025-06-09 RX ADMIN — LEVETIRACETAM 500 MG: 500 TABLET, FILM COATED ORAL at 09:41

## 2025-06-09 RX ADMIN — CALCIUM GLUCONATE 1000 MG: 20 INJECTION, SOLUTION INTRAVENOUS at 11:40

## 2025-06-09 RX ADMIN — LATANOPROST 1 DROP: 50 SOLUTION/ DROPS OPHTHALMIC at 23:13

## 2025-06-09 RX ADMIN — ATENOLOL 25 MG: 25 TABLET ORAL at 23:12

## 2025-06-09 RX ADMIN — CALCIUM GLUCONATE 2000 MG: 20 INJECTION, SOLUTION INTRAVENOUS at 18:55

## 2025-06-09 RX ADMIN — LEVOTHYROXINE SODIUM 150 MCG: 0.07 TABLET ORAL at 06:31

## 2025-06-09 RX ADMIN — POTASSIUM CHLORIDE 10 MEQ: 7.46 INJECTION, SOLUTION INTRAVENOUS at 18:55

## 2025-06-09 RX ADMIN — AMLODIPINE BESYLATE 5 MG: 5 TABLET ORAL at 09:46

## 2025-06-09 RX ADMIN — ASPIRIN 325 MG ORAL TABLET 325 MG: 325 PILL ORAL at 09:46

## 2025-06-09 RX ADMIN — POTASSIUM CHLORIDE 10 MEQ: 7.46 INJECTION, SOLUTION INTRAVENOUS at 12:59

## 2025-06-09 RX ADMIN — DONEPEZIL HYDROCHLORIDE 10 MG: 10 TABLET, FILM COATED ORAL at 09:45

## 2025-06-09 RX ADMIN — ATORVASTATIN CALCIUM 80 MG: 80 TABLET, FILM COATED ORAL at 09:46

## 2025-06-09 RX ADMIN — BUDESONIDE AND FORMOTEROL FUMARATE DIHYDRATE 2 PUFF: 160; 4.5 AEROSOL RESPIRATORY (INHALATION) at 19:46

## 2025-06-09 RX ADMIN — Medication 10 ML: at 09:46

## 2025-06-09 RX ADMIN — CALCIUM GLUCONATE 2000 MG: 20 INJECTION, SOLUTION INTRAVENOUS at 16:09

## 2025-06-09 RX ADMIN — CALCIUM CARBONATE-VITAMIN D TAB 500 MG-200 UNIT 1 TABLET: 500-200 TAB at 23:12

## 2025-06-09 NOTE — PLAN OF CARE
Goal Outcome Evaluation:              Outcome Evaluation: BP elevated. SLP came and re eval pt. electrolytes replacing. IVF d'c. on zosyn. family at the bedside. c/o right hand pain. theres swelling in her right hand. xray and uric acid ordered. MRI pending.

## 2025-06-09 NOTE — PLAN OF CARE
Goal Outcome Evaluation:  Plan of Care Reviewed With: patient           Outcome Evaluation: Upon entering room, pt. supine in bed, awake but groggy, and agreeable to work with P.T. this date.  This AM, pt. requires Mod. assist x 2 for bed mobility and Mod. assist x 2 for sit <-> stand transfers (bilateral feet/knees blocked for safety).  Pt. unable to advance her BLE's due to overall fatigue/weakness and inability to maintain a safe standing position without her Bilateral knees buckling.  BLE (AA/PROM) ther. ex. program x 10 reps completed for general strengthening.  Verbal/tactile cues given throughout for posture correction.  Pt. requires multiple verbal cues to keep eyes open and to follow commands.  Will continue to progress functional mobility as tolerated.

## 2025-06-09 NOTE — PROGRESS NOTES
LOS: 4 days   Patient Care Team:  Artem Shepherd MD as PCP - General (Internal Medicine)    Chief Complaint: Follow-up chest pain, hypertensive urgency, troponin elevation.    Interval History: She continues to have issues with altered mental status.  She is in sinus rhythm.  Blood pressure still elevated at times.  She denied any chest pain.    Vital Signs:  Temp:  [98 °F (36.7 °C)-98.8 °F (37.1 °C)] 98 °F (36.7 °C)  Heart Rate:  [54-78] 78  Resp:  [16-18] 18  BP: (153-168)/(53-73) 168/73    Intake/Output Summary (Last 24 hours) at 6/9/2025 0818  Last data filed at 6/9/2025 0310  Gross per 24 hour   Intake 280 ml   Output 1800 ml   Net -1520 ml       Physical Exam:   General Appearance:    No acute distress, she still seemed disoriented this morning, although could answer some questions appropriately.     Lungs:     Decreased breath sounds bilaterally.    Heart:    Regular rhythm and normal rate. II/VI SM LLSB.    Abdomen:     Soft, nontender, nondistended.    Extremities:   Trace edema of the lower extremities with chronic venous stasis changes.     Results Review:    Results from last 7 days   Lab Units 06/09/25  0646 06/08/25  1901   SODIUM mmol/L  --  143   POTASSIUM mmol/L 2.9* 3.4*   CHLORIDE mmol/L  --  109*   CO2 mmol/L  --  23.9   BUN mg/dL  --  16.0   CREATININE mg/dL  --  0.98   GLUCOSE mg/dL  --  110*   CALCIUM mg/dL  --  8.4*     Results from last 7 days   Lab Units 06/04/25  1824 06/04/25  1651   HSTROP T ng/L 56* 63*     Results from last 7 days   Lab Units 06/08/25  0917   WBC 10*3/mm3 7.31   HEMOGLOBIN g/dL 11.9*   HEMATOCRIT % 37.3   PLATELETS 10*3/mm3 243             Results from last 7 days   Lab Units 06/04/25  1651   MAGNESIUM mg/dL 2.2           I reviewed the patient's new clinical results.        Assessment:  1.  Acute hypoxic respiratory failure  2.  Patchy bilateral infiltrates by CTA of the chest, presumed pneumonia  3.  COPD without acute exacerbation  4.  Former tobacco use  5.   "Hypertensive urgency  6.  Recent progressive weakness, likely multifactorial  7.  Headache, likely secondary to #5  8.  Intermittent chest pressure, negative Myoview stress test on 6/6/2025  9.  Nonspecific high-sensitivity troponin elevation  10.  History of multiple recurrent strokes (last with a right MCA CVA in November 2023).  On DAPT.  11.  History of SVT with loop recorder placement in August 2022  12.  Multifactorial chronic anemia  13.  History of remote meningioma, status post craniotomy and resection  14.  Bilateral carotid artery disease  15.  Peripheral neuropathy  16.  Chronic lower extremity edema secondary to venous stasis  17.  Altered mental status    Plan:  - She is continued to have issues with altered mental status.  She was fairly appropriate this morning, although still seem to be disoriented at times.  Neurology is currently working this up.  She does have an MRI of the brain pending as she has had multiple strokes in the past.  A carotid Doppler ultrasound is also pending.  Possibly secondary to multifactorial encephalopathy.    -Atrial fibrillation has been mentioned in several notes.  However, upon review of the chart, she had a remote 14-day monitor in 2022 that had brief runs of SVT which stated that \"atrial fibrillation could not be excluded\".  After that, she had a loop recorder placed in August 2022.  Per Dr. Marrero' last note in December of 2024, she has not had atrial fibrillation noted on her loop recorder.  I do not see any atrial fibrillation during this admission.  Therefore, I would not recommend systemic anticoagulation without evidence of atrial fibrillation.      -I will have the loop recorder interrogated to ensure there have been no episodes of atrial fibrillation between February 2025 (when it was last checked), and now.    - Blood pressure is still elevated, but is slightly better in general.  She developed some acute kidney injury over the weekend, but this is better.  " It appears she is back on lisinopril 10 mg twice daily.  Watch renal function closely.  Lasix is being held.  Continue atenolol 25 mg twice daily.  Start amlodipine 5 mg/day for better control.    -Continue DAPT with aspirin and Plavix, and Lipitor for history of recurrent strokes.    -Echocardiogram with normal ejection fraction, grade 2 diastolic dysfunction, and moderate left atrial enlargement.  Nuclear stress test was normal.     Sudarshan Boyd MD  06/09/25  08:18 EDT        none

## 2025-06-09 NOTE — PROGRESS NOTES
" LOS: 4 days     Name: Avelina Carr  Age: 85 y.o.  Sex: female  :  1940  MRN: [id number]         Primary Care Physician: Artem Shepherd MD    Subjective   Subjective      Objective   Vital Signs  Temp:  [97.9 °F (36.6 °C)-98.8 °F (37.1 °C)] 97.9 °F (36.6 °C)  Heart Rate:  [54-78] 60  Resp:  [16-18] 18  BP: (153-186)/(53-96) 186/96  Body mass index is 26.38 kg/m².    Objective:  Vital signs: (most recent): Blood pressure (!) 186/96, pulse 60, temperature 97.9 °F (36.6 °C), resp. rate 18, height 162.6 cm (64\"), weight 69.7 kg (153 lb 10.6 oz), SpO2 95%, not currently breastfeeding.                Results Review:       I reviewed the patient's new clinical results.    Results from last 7 days   Lab Units 25  1013 25  0917 25  0536 25  1238 25  0347 25  1651   WBC 10*3/mm3 8.33 7.31 6.91 10.16 6.93 7.04   HEMOGLOBIN g/dL 10.6* 11.9* 10.9* 13.8 11.3* 12.1   PLATELETS 10*3/mm3 233 243 299 300 220 259     Results from last 7 days   Lab Units 25  0646 25  1901 25  1448 25  0536 25  1224 25  1537 25  0347 25  1651   SODIUM mmol/L 145 143  --  139 142  --  138 139   POTASSIUM mmol/L 2.9*  2.9* 3.4* 3.7 3.6 3.7  3.7 3.4* 3.4* 3.1*   CHLORIDE mmol/L 118* 109*  --  106 106  --  103 99   CO2 mmol/L 17.9* 23.9  --  22.9 21.9*  --  22.0 26.0   BUN mg/dL 9.0 16.0  --  20.0 13.0  --  11.0 11.0   CREATININE mg/dL 0.58 0.98  --  1.50* 1.19*  --  0.72 1.00   CALCIUM mg/dL 6.2* 8.4*  --  9.0 9.6  --  8.6 9.1   GLUCOSE mg/dL 81 110*  --  90 85  --  68 106*                 Scheduled Meds:   amLODIPine, 5 mg, Oral, Q24H  aspirin, 325 mg, Oral, Daily  atenolol, 25 mg, Oral, BID  atorvastatin, 80 mg, Oral, Daily  budesonide-formoterol, 2 puff, Inhalation, BID  calcium 500 mg vitamin D 5 mcg (200 UT), 1 tablet, Oral, BID  calcium gluconate, 1,000 mg, Intravenous, Q1H   Followed by  calcium gluconate, 2,000 mg, Intravenous, Q2H  clopidogrel, 75 mg, " Oral, Daily  donepezil, 10 mg, Oral, Daily  [Held by provider] enoxaparin sodium, 30 mg, Subcutaneous, Q24H  escitalopram, 20 mg, Oral, Daily  [Held by provider] furosemide, 20 mg, Oral, Daily  [Held by provider] gabapentin, 400 mg, Oral, Q12H  latanoprost, 1 drop, Right Eye, Nightly  levETIRAcetam, 500 mg, Oral, Q12H  levothyroxine, 150 mcg, Oral, Q AM  lisinopril, 10 mg, Oral, Q12H  pantoprazole, 40 mg, Oral, Q AM  piperacillin-tazobactam, 3.375 g, Intravenous, Q8H  potassium chloride, 40 mEq, Oral, Q4H  sodium chloride, 10 mL, Intravenous, Q12H  vitamin B-12, 1,000 mcg, Oral, Daily      PRN Meds:     acetaminophen **OR** acetaminophen **OR** acetaminophen    albuterol    senna-docusate sodium **AND** polyethylene glycol **AND** bisacodyl **AND** bisacodyl    Calcium Replacement - Follow Nurse / BPA Driven Protocol    hydrALAZINE    Magnesium Standard Dose Replacement - Follow Nurse / BPA Driven Protocol    Phosphorus Replacement - Follow Nurse / BPA Driven Protocol    Potassium Replacement - Follow Nurse / BPA Driven Protocol    Potassium Replacement - Follow Nurse / BPA Driven Protocol    sodium chloride    sodium chloride  Continuous Infusions:  sodium chloride, 75 mL/hr, Last Rate: 75 mL/hr (06/09/25 0635)        Assessment & Plan   Active Hospital Problems    Diagnosis  POA    **Hypoxia [R09.02]  Yes    Severe protein-calorie malnutrition [E43]  Yes    Altered mental state [R41.82]  Unknown    Chest discomfort [R07.89]  Yes    Elevated troponin [R79.89]  Yes    Former smoker [Z87.891]  Not Applicable    Hypothyroid [E03.9]  Yes    HLD (hyperlipidemia) [E78.5]  Yes    Primary hypertension [I10]  Yes    History of CVA (cerebrovascular accident) [Z86.73]  Not Applicable      Resolved Hospital Problems   No resolved problems to display.       Assessment & Plan    AMS  -This is quite a bit better since Saturday.  -Appreciate evaluation from neurology.  EEG and head CT unremarkable  -Brain MRI has been ordered.   Carotid Doppler noted show 50-69% stenosis on the right and less than 50% on the left  -Gabapentin on hold  - Metabolic workup unremarkable     Hypoxia  Former smoker  -Pneumonia suspected given infiltrates on CTA chest yet PE negative  -RVP negative  - Changed her to Zosyn given worsening infiltrates on repeat chest x-ray.  -On room air with no respiratory distress today       Chest discomfort     Elevated troponin  -CTA negative PE   - Stress test and echocardiogram looked okay  -statin & ASA continued  -s/p loop recorder; followed by Dr. Marrero  -Cardiology following.  Discussed with Dr. Boyd  -Patient has no documented history of atrial fibrillation.  Loop recorder is to be interrogated       Hypokalemia  - Replaced today.  Check magnesium       Primary hypertension  - Blood pressure has risen.  Back on lisinopril and Norvasc has been added.     Elevated creatinine  - Resolved  - Continue gentle IV fluids until oral intake improves.  - Lasix on hold     Very poor IV access  - Now with midline for antibiotic administration as well as labs       HLD (hyperlipidemia)    History of cardioembolic cerebrovascular accident (CVA)  -Plavix, statin, ASA continued     Seizure disorder  - Remains on Keppra as per neurology recommendations     History of CVA with chronic dysphagia  - Speech therapy evaluated upon presentation.  Patient still having problems with swallowing.  Will ask them to reevaluate, particularly given concern for aspiration over the weekend.       Hypothyroid  - Recheck TSH within normal limits     Right hand pain and swelling  - Check uric acid and x-ray    Nutrition  - Not eating much.  Will ask dietitian to perform calorie count.  Magic cups and boost has been added    Pharmacy to dose Lovenox for DVT prophylaxis.  Limited code - No CPR.  Discussed with patient and family & RN      Expected Discharge Date: 6/9/2025; Expected Discharge Time:      Tito Bhandari MD  [facility]  06/09/25  11:04 EDT

## 2025-06-09 NOTE — PLAN OF CARE
Goal Outcome Evaluation:  Plan of Care Reviewed With: patient        Progress: no change    Outcome Evaluation: Pt on 1L NC and wore CPAP while sleeping. Oriented to self only. Continues to c/o right wrist pain. PRN Tylenol given, elevated. IVF and zosyn continue. Potassium replaced. Bed alarm in place.

## 2025-06-09 NOTE — THERAPY TREATMENT NOTE
Patient Name: Avelina Carr  : 1940    MRN: [id number]                              Today's Date: 2025       Admit Date: 2025    Visit Dx:     ICD-10-CM ICD-9-CM   1. Acute nonintractable headache, unspecified headache type  R51.9 784.0   2. Hypoxia  R09.02 799.02   3. Generalized weakness  R53.1 780.79   4. Hypokalemia  E87.6 276.8     Patient Active Problem List   Diagnosis    Postmastectomy lymphedema syndrome    Malignant neoplasm of upper-outer quadrant of right female breast    Cerebrovascular accident (CVA)    Stroke    Primary hypertension    HLD (hyperlipidemia)    Adverse effect of antiplatelet agent    Left middle cerebral artery stroke    Carcinoma of central portion of right breast in female, estrogen receptor positive    Chronic bilateral low back pain with right-sided sciatica    History of lumbar surgery    Seizure disorder, simple partial, without intractable epilepsy    Spinal stenosis of lumbar region with neurogenic claudication    Status post craniectomy    History of total right knee replacement    Other forms of scoliosis, lumbar region    DDD (degenerative disc disease), lumbar    Benign meningioma of brain    Squamous cell carcinoma of leg, left    History of CVA (cerebrovascular accident)    Hypothyroid    Depression    COPD (chronic obstructive pulmonary disease)    Obstructive sleep apnea    Squamous cell carcinoma of leg, right    Pain in joint, multiple sites    Excessive daytime sleepiness    Former smoker    Gastroesophageal reflux disease    Migraines    Generalized weakness    Acute CVA (cerebrovascular accident)    Dependence on supplemental oxygen    Paroxysmal supraventricular tachycardia    Arthritis    Asthma    Focal neurological deficit present    Acute right MCA stroke    History of supraventricular tachycardia    Rotator cuff arthropathy of right shoulder    Hallux valgus (acquired), right foot    Hallux valgus (acquired), left foot    Hypoxia    Chest  discomfort    Elevated troponin    Severe protein-calorie malnutrition    Altered mental state     Past Medical History:   Diagnosis Date    Anxiety     Arthritis     Breast cancer     COPD (chronic obstructive pulmonary disease)     oxygen at 2L aths    COVID     CVD (cardiovascular disease)     Depression     GERD (gastroesophageal reflux disease)     Hx of radiation therapy     Hyperlipidemia     Hypothyroid     Low back pain     Macular degeneration     Osteoarthritis     Peripheral neuropathy     Sleep apnea     Stroke     Vertigo      Past Surgical History:   Procedure Laterality Date    ADRENAL GLAND SURGERY      BRAIN MENINGIOMA EXCISION      occipital    BREAST BIOPSY       SECTION      EMBOLECTOMY N/A 11/15/2017    Procedure: Embolectomy Mechanical;  Surgeon: Rachid Figueroa MD;  Location: [facility] ;  Service:     MASTECTOMY  2014    RECTAL SURGERY  2003    REPLACEMENT TOTAL KNEE Right     SKIN BIOPSY  2025    TOTAL SHOULDER REPLACEMENT      x2      General Information       Row Name 25 1141          Physical Therapy Time and Intention    Document Type therapy note (daily note)  -MS     Mode of Treatment physical therapy;individual therapy  -MS       Row Name 25 1141          General Information    Patient Profile Reviewed yes  -MS     Existing Precautions/Restrictions fall   Exit alarm  -MS     Barriers to Rehab cognitive status  Groggy;  At times slow to answer questions and follow commands.  -MS       Row Name 25 1141          Cognition    Orientation Status (Cognition) oriented to;person  -MS       Row Name 25 1141          Safety Issues/Impairments Affecting Functional Mobility    Comment, Safety Issues/Impairments (Mobility) Gait belt used for safety.  -MS               User Key  (r) = Recorded By, (t) = Taken By, (c) = Cosigned By      Initials Name Provider Type    Roque Cheek, PT Physical Therapist                   Mobility        Row Name 06/09/25 1142          Bed Mobility    Bed Mobility supine-sit;sit-supine  -MS     Supine-Sit Tye (Bed Mobility) moderate assist (50% patient effort);2 person assist  -MS     Sit-Supine Tye (Bed Mobility) moderate assist (50% patient effort);2 person assist  -MS     Assistive Device (Bed Mobility) repositioning sheet  -MS       Row Name 06/09/25 1142          Sit-Stand Transfer    Sit-Stand Tye (Transfers) moderate assist (50% patient effort);2 person assist  -MS     Assistive Device (Sit-Stand Transfers) --  HHA x 1 (LUE) and use of gait belt  -MS     Comment, (Sit-Stand Transfer) Pt. with c/o Right wrist/hand pain (swelling noted) and was unable to use HHA on her RUE.   Bilateral feet/knees blocked for safety.  Unable to advance her BLE's due to weakness/fatigue and difficulty in maintain an upright stance.  -MS               User Key  (r) = Recorded By, (t) = Taken By, (c) = Cosigned By      Initials Name Provider Type    Roque Cheek, PT Physical Therapist                   Obj/Interventions       Row Name 06/09/25 1144          Motor Skills    Therapeutic Exercise --  BLE (AA/PROM) ther. ex. program x 10 reps completed (LAQ's, Hip Flexion)  -MS               User Key  (r) = Recorded By, (t) = Taken By, (c) = Cosigned By      Initials Name Provider Type    Roque Cheek, PT Physical Therapist                   Goals/Plan    No documentation.                  Clinical Impression       Row Name 06/09/25 1144          Pain    Pain Location wrist;hand  -MS     Pain Side/Orientation right  -MS     Pain Management Interventions nursing notified;positioning techniques utilized  -MS     Pre/Posttreatment Pain Comment Pt reports pain in her Right wrist/hand but does not give a specific pain rating.   -MS       Row Name 06/09/25 1144          Positioning and Restraints    Pre-Treatment Position in bed  -MS     Post Treatment Position bed  -MS     In Bed notified  nsg;supine;call light within reach;encouraged to call for assist;exit alarm on;RUE elevated;with family/caregiver;with SLP;R heel elevated;L heel elevated;RLE elevated;LLE elevated  All lines intact.  -MS               User Key  (r) = Recorded By, (t) = Taken By, (c) = Cosigned By      Initials Name Provider Type    MS AvilesNaylorRoque, PT Physical Therapist                   Outcome Measures       Row Name 06/09/25 1145          How much help from another person do you currently need...    Turning from your back to your side while in flat bed without using bedrails? 2  -MS     Moving from lying on back to sitting on the side of a flat bed without bedrails? 2  -MS     Moving to and from a bed to a chair (including a wheelchair)? 2  -MS     Standing up from a chair using your arms (e.g., wheelchair, bedside chair)? 2  -MS     Climbing 3-5 steps with a railing? 1  -MS     To walk in hospital room? 1  -MS     AM-PAC 6 Clicks Score (PT) 10  -MS     Highest Level of Mobility Goal Move to Chair/Commode-4  -MS       Row Name 06/09/25 1145          Functional Assessment    Outcome Measure Options AM-PAC 6 Clicks Basic Mobility (PT)  -MS               User Key  (r) = Recorded By, (t) = Taken By, (c) = Cosigned By      Initials Name Provider Type    Roque Cheek, PT Physical Therapist                                 Physical Therapy Education       Title: PT OT SLP Therapies (In Progress)       Topic: Physical Therapy (In Progress)       Point: Mobility training (In Progress)       Learning Progress Summary            Patient Acceptance, E,D, NR by MS at 6/9/2025 1145    Acceptance, E, VU,NR by SM at 6/5/2025 1156                      Point: Home exercise program (In Progress)       Learning Progress Summary            Patient Acceptance, E,D, NR by MS at 6/9/2025 1145    Acceptance, E, VU,NR by SM at 6/5/2025 1156                      Point: Body mechanics (In Progress)       Learning Progress Summary             Patient Acceptance, E,D, NR by MS at 6/9/2025 1145    Acceptance, E, VU,NR by  at 6/5/2025 1156                      Point: Precautions (In Progress)       Learning Progress Summary            Patient Acceptance, E,D, NR by MS at 6/9/2025 1145    Acceptance, E, VU,NR by  at 6/5/2025 1156                                      User Key       Initials Effective Dates Name Provider Type Discipline    MS 06/16/21 -  Roque Naylor PT Physical Therapist PT     05/02/22 -  Nedra Sigala PT Physical Therapist PT                  PT Recommendation and Plan     Outcome Evaluation: Upon entering room, pt. supine in bed, awake but groggy, and agreeable to work with P.T. this date.  This AM, pt. requires Mod. assist x 2 for bed mobility and Mod. assist x 2 for sit <-> stand transfers (bilateral feet/knees blocked for safety).  Pt. unable to advance her BLE's due to overall fatigue/weakness and inability to maintain a safe standing position without her Bilateral knees buckling.  BLE (AA/PROM) ther. ex. program x 10 reps completed for general strengthening.  Verbal/tactile cues given throughout for posture correction.  Pt. requires multiple verbal cues to keep eyes open and to follow commands.  Will continue to progress functional mobility as tolerated.     Time Calculation:         PT Charges       Row Name 06/09/25 1148             Time Calculation    Start Time 1015  -MS      Stop Time 1040  -MS      Time Calculation (min) 25 min  -MS      PT Received On 06/09/25  -MS      PT - Next Appointment 06/10/25  -MS         Time Calculation- PT    Total Timed Code Minutes- PT 24 minute(s)  -MS                User Key  (r) = Recorded By, (t) = Taken By, (c) = Cosigned By      Initials Name Provider Type    MS NaylorRoque, PT Physical Therapist                  Therapy Charges for Today       Code Description Service Date Service Provider Modifiers Qty    [id number]  PT THERAPEUTIC ACT EA 15 MIN 6/9/2025 [place]  Roque LEONARD, PT GP 2    [id number]  PT THER SUPP EA 15 MIN 6/9/2025 Roque Naylor, PT GP 2            PT G-Codes  Outcome Measure Options: AM-PAC 6 Clicks Basic Mobility (PT)  AM-PAC 6 Clicks Score (PT): 10  AM-PAC 6 Clicks Score (OT): 19       Roque Naylor, PT  6/9/2025

## 2025-06-09 NOTE — PROGRESS NOTES
"DOS: 2025  NAME: Avelina Carr   : 1940  PCP: Artem Shepherd MD  Chief Complaint   Patient presents with    Headache    Weakness - Generalized       Chief complaint: somnolence  Subjective:  at bedside.  He says that she is about the same since admission.  She is sleepy and inattentive.  She has not been hospitalized since her last stroke in     Objective:  Vital signs: /73 (BP Location: Right leg, Patient Position: Lying)   Pulse 78   Temp 98 °F (36.7 °C) (Oral)   Resp 18   Ht 162.6 cm (64\")   Wt 69.7 kg (153 lb 10.6 oz)   SpO2 96%   BMI 26.38 kg/m²      Gen: NAD, vitals reviewed  MS: Oriented to self, situation, not day of week, recent/remote memory impaired, impaired attention/concentration, no aphasia, no neglect.  CN: visual acuity grossly normal, PERRL, EOMI, no facial droop, no dysarthria  Motor: Pain limited right upper extremity, otherwise brisk symmetric movements, normal tone  Sensory: intact to light touch all 4 ext.  Coordination: intact on left    ROS:  No weakness, numbness  No fevers, chills    Scheduled Medications:amLODIPine, 5 mg, Oral, Q24H  aspirin, 325 mg, Oral, Daily  atenolol, 25 mg, Oral, BID  atorvastatin, 80 mg, Oral, Daily  budesonide-formoterol, 2 puff, Inhalation, BID  calcium 500 mg vitamin D 5 mcg (200 UT), 1 tablet, Oral, BID  clopidogrel, 75 mg, Oral, Daily  donepezil, 10 mg, Oral, Daily  [Held by provider] enoxaparin sodium, 30 mg, Subcutaneous, Q24H  escitalopram, 20 mg, Oral, Daily  [Held by provider] furosemide, 20 mg, Oral, Daily  [Held by provider] gabapentin, 400 mg, Oral, Q12H  latanoprost, 1 drop, Right Eye, Nightly  levETIRAcetam, 500 mg, Oral, Q12H  levothyroxine, 150 mcg, Oral, Q AM  lisinopril, 10 mg, Oral, Q12H  pantoprazole, 40 mg, Oral, Q AM  piperacillin-tazobactam, 3.375 g, Intravenous, Q8H  potassium chloride, 40 mEq, Oral, Q4H  sodium chloride, 10 mL, Intravenous, Q12H  vitamin B-12, 1,000 mcg, Oral, Daily      Infusions: " sodium chloride, 75 mL/hr, Last Rate: 75 mL/hr (06/09/25 0635)      PRN Medications:    acetaminophen **OR** acetaminophen **OR** acetaminophen    albuterol    senna-docusate sodium **AND** polyethylene glycol **AND** bisacodyl **AND** bisacodyl    Calcium Replacement - Follow Nurse / BPA Driven Protocol    hydrALAZINE    Magnesium Standard Dose Replacement - Follow Nurse / BPA Driven Protocol    Phosphorus Replacement - Follow Nurse / BPA Driven Protocol    Potassium Replacement - Follow Nurse / BPA Driven Protocol    Potassium Replacement - Follow Nurse / BPA Driven Protocol    sodium chloride    sodium chloride    Laboratory results:  Lab Results   Component Value Date    GLUCOSE 81 06/09/2025    CALCIUM 6.2 (L) 06/09/2025     06/09/2025    K 2.9 (L) 06/09/2025    K 2.9 (L) 06/09/2025    CO2 17.9 (L) 06/09/2025     (H) 06/09/2025    BUN 9.0 06/09/2025    CREATININE 0.58 06/09/2025    EGFRIFAFRI 75 09/24/2021    EGFRIFNONA 62 09/24/2021    BCR 15.5 06/09/2025    ANIONGAP 9.1 06/09/2025     Lab Results   Component Value Date    WBC 7.31 06/08/2025    HGB 11.9 (L) 06/08/2025    HCT 37.3 06/08/2025    .6 (H) 06/08/2025     06/08/2025     Lab Results   Component Value Date    LDL 71 03/19/2024    LDL 78 11/13/2023    LDL 79 10/16/2023            Review of labs:     Review and interpretation of imaging:     Duplex Carotid Ultrasound CAR  Result Date: 6/9/2025    Right internal carotid artery demonstrates a 50-69% stenosis.   Antegrade right vertebral flow.   Left internal carotid artery demonstrates a less than 50% stenosis.   Antegrade left vertebral flow.     EEG  Result Date: 6/7/2025  Table formatting from the original result was not included. EEG Report          # Indication:AMS History: 85-year-old female with history of stroke and distant history of seizure presenting with altered mental status.  Study includes time locked video Medical diagnoses: Hypothyroidism, anxiety and  depression, COPD, peripheral neuropathy, history of breast cancer, history of stroke, hyperlipidemia, sleep apnea Current Facility-Administered Medications Medication Dose Route Frequency Provider Last Rate Last Admin  acetaminophen (TYLENOL) tablet 650 mg  650 mg Oral Q4H PRN Andria Roberts MD   650 mg at 06/05/25 1027  Or  acetaminophen (TYLENOL) 160 MG/5ML oral solution 650 mg  650 mg Oral Q4H PRN Andria Roberts MD      Or  acetaminophen (TYLENOL) suppository 650 mg  650 mg Rectal Q4H PRN Andria Roberts MD      albuterol (PROVENTIL) nebulizer solution 0.083% 2.5 mg/3mL  2.5 mg Nebulization Q6H PRN Andria Roberts MD   2.5 mg at 06/06/25 1949  aspirin tablet 325 mg  325 mg Oral Daily Andria Roberts MD   325 mg at 06/07/25 1033  atenolol (TENORMIN) tablet 25 mg  25 mg Oral BID Andria Roberts MD   25 mg at 06/07/25 2047  atorvastatin (LIPITOR) tablet 80 mg  80 mg Oral Daily Andria Roberts MD   80 mg at 06/07/25 1033  sennosides-docusate (PERICOLACE) 8.6-50 MG per tablet 2 tablet  2 tablet Oral BID PRN Andria Roberts MD      And  polyethylene glycol (MIRALAX) packet 17 g  17 g Oral Daily PRN Andria Roberts MD   17 g at 06/05/25 1231  And  bisacodyl (DULCOLAX) EC tablet 5 mg  5 mg Oral Daily PRN Andria Roberts MD      And  bisacodyl (DULCOLAX) suppository 10 mg  10 mg Rectal Daily PRN Andria Roberts MD   10 mg at 06/05/25 1659  budesonide-formoterol (SYMBICORT) 160-4.5 MCG/ACT inhaler 2 puff  2 puff Inhalation BID Andria Roberts MD   2 puff at 06/07/25 0755  calcium 500 mg vitamin D 5 mcg (200 UT) per tablet 1 tablet  1 tablet Oral BID Andria Roberts MD   1 tablet at 06/07/25 2047  Calcium Replacement - Follow Nurse / BPA Driven Protocol   Not Applicable PRN Andria Roberts MD      clopidogrel (PLAVIX) tablet 75 mg  75 mg Oral Daily Andria Roberts MD   75 mg at 06/07/25 1033  [Held by provider]  donepezil (ARICEPT) tablet 10 mg  10 mg Oral Daily Andria Roberts MD   10 mg at 06/07/25 1033  enoxaparin sodium (LOVENOX) syringe 40 mg  40 mg Subcutaneous Q24H Connor Maynard APRN   40 mg at 06/07/25 1802  escitalopram (LEXAPRO) tablet 20 mg  20 mg Oral Daily Andria Roberts MD   20 mg at 06/07/25 1032  [Held by provider] furosemide (LASIX) tablet 20 mg  20 mg Oral Daily Andria Roberts MD   20 mg at 06/07/25 1033  [Held by provider] gabapentin (NEURONTIN) capsule 400 mg  400 mg Oral Q12H Andria Roberts MD   400 mg at 06/07/25 1033  hydrALAZINE (APRESOLINE) injection 10 mg  10 mg Intravenous Q6H PRN Nahid García MD   10 mg at 06/06/25 0928  latanoprost (XALATAN) 0.005 % ophthalmic solution 1 drop  1 drop Right Eye Nightly Andria Roberts MD   1 drop at 06/06/25 2054  levETIRAcetam (KEPPRA) injection 500 mg  500 mg Intravenous Q12H Nahid García MD   500 mg at 06/07/25 2046  levothyroxine (SYNTHROID, LEVOTHROID) tablet 150 mcg  150 mcg Oral Q AM Andria Roberts MD   150 mcg at 06/05/25 0824  [Held by provider] lisinopril (PRINIVIL,ZESTRIL) tablet 10 mg  10 mg Oral Q12H Sudarshan Boyd MD   10 mg at 06/07/25 1033  Magnesium Standard Dose Replacement - Follow Nurse / BPA Driven Protocol   Not Applicable PRN Andria Roberts MD      NIFEdipine XL (PROCARDIA XL) 24 hr tablet 60 mg  60 mg Oral Q24H Nahid García MD   60 mg at 06/07/25 1033  [START ON 6/8/2025] pantoprazole (PROTONIX) injection 40 mg  40 mg Intravenous Q AM Tito Bhandari MD      Phosphorus Replacement - Follow Nurse / BPA Driven Protocol   Not Applicable PRN Andria Roberts MD      [START ON 6/8/2025] piperacillin-tazobactam (ZOSYN) 3.375 g IVPB in 100 mL NS MBP (CD)  3.375 g Intravenous Q8H Tito Bhandari MD      Potassium Replacement - Follow Nurse / BPA Driven Protocol   Not Applicable Norman Bartholomew MD      Potassium Replacement - Follow Nurse / BPA  Driven Protocol   Not Applicable PRN Andria Roberts MD      sodium chloride 0.9 % infusion  100 mL/hr Intravenous Continuous Tito Bhandari  mL/hr at 06/07/25 1451 100 mL/hr at 06/07/25 1451  vitamin B-12 (CYANOCOBALAMIN) tablet 1,000 mcg  1,000 mcg Oral Daily Andria Roberts MD   1,000 mcg at 06/07/25 1032 Time of study: 26 minutes 4 seconds Technical summary: The 10-20 system was used for electrode placement  Background: The background rhythm was composed of primarily 6 Hz diffuse slow theta activity.  Sleep: The patient became more drowsy as noted by diffusely slower activities.  No vertex sharp transients or sleep spindles were seen  Hyperventilation: Not obtained  Photic stimulation: Photic stimulation was performed at various flash frequencies showing no significant change in the background activity  EKG: Sinus bradycardia in the 50s  Video: No unusual involuntary movements were seen on the video clips reviewed Impression: Abnormal EEG being moderately diffusely slow consistent with a moderate diffuse encephalopathy versus bihemispheric structural lesions.  No epileptiform activities were seen. Dictated utilizing Dragon dictation.      CT Head Without Contrast  Result Date: 6/7/2025  CT OF THE HEAD WITHOUT CONTRAST  HISTORY: Somnolence  COMPARISON: June 4, 2025  TECHNIQUE: Axial CT imaging was obtained through the brain. No IV contrast was administered.  FINDINGS: No acute intracranial hemorrhage is seen. There is atrophy. There is periventricular and deep white matter microangiopathic change. There are changes of prior right-sided craniotomy. Areas of encephalomalacia are noted within the right parieto-occipital region. There is further encephalomalacia which is noted within the left middle cranial fossa. There is no midline shift or mass effect. Paranasal sinuses and mastoid air cells are clear.      No acute intracranial findings.  Radiation dose reduction techniques were  utilized, including automated exposure control and exposure modulation based on body size.   This report was finalized on 6/7/2025 7:50 PM by Dr. Paloma Chacon M.D on Workstation: [id number]      XR Chest 1 View  Result Date: 6/7/2025  XR CHEST 1 VW-   INDICATION: Pneumonia  COMPARISON: Chest radiograph June 4, 2025  TECHNIQUE: 1 view chest  FINDINGS:  Vascular congestion. Ill-defined basilar opacities. Low volumes. Stable mediastinum. Bilateral shoulder arthroplasties. Surgical clips over the right chest. Left chest wall implantable loop recorder.       1. Increased ill-defined basilar lung opacities. 2. Suspect airways disease  This report was finalized on 6/7/2025 3:11 PM by Dr. Everardo Garcia M.D on Workstation: [id number]      Stress Test With Myocardial Perfusion One Day  Result Date: 6/6/2025    Myocardial perfusion imaging indicates a normal myocardial perfusion study with no evidence of ischemia. Impressions are consistent with a low risk study.   Left ventricular ejection fraction is hyperdynamic (Calculated EF > 70%).   There is no prior study available for comparison.     CT Angiogram Chest  Result Date: 6/4/2025  CTA CHEST WITH IV CONTRAST  HISTORY: Shortness of breath, hypoxia, evaluate for PE; R51.9-Headache, unspecified; R09.02-Hypoxemia; R53.1-Weakness; E87.6-Hypokalemia  COMPARISON: None  TECHNIQUE: CT angiography was performed of the chest with axial images as well as coronal and sagittal reformatted MIP images provided following administration of IV contrast. 3-D surface rendered reformats were obtained of the pulmonary arteries and aorta. Radiation dose reduction techniques were utilized, including automated exposure control, and exposure modulation based on body size.  FINDINGS:  There is a tiny right pleural effusion, and there is mild bilateral posterior basilar dependent atelectasis. There are patchy areas of alveolar infiltrate with somewhat coarse interstitial markings in the  left upper lobe. Similar though more subtle changes are seen in the right middle lobe and right lower lobe.  Thoracic aorta is normal in caliber.  There are coronary atherosclerotic vascular calcifications.  There is no suspicious mediastinal adenopathy or other mass.  Images of the upper abdomen show no acute abnormality.  There are chronic healed posttraumatic as well as degenerative bony changes, but there is no acute bony abnormality.  Bolus timing is excellent, and there is no evidence of pulmonary embolism.       Pulmonary arteries are well-opacified, and there is no evidence of pulmonary embolism.  There are mild somewhat patchy alveolar pulmonary infiltrates with somewhat coarse interstitial markings, most prominent in the left upper lobe though seen to a lesser degree in the right middle lobe and lower lobe. FINDINGS are nonspecific.  Imaging features can be seen with COVID-19 pneumonia, though are nonspecific and can occur with a variety of infectious and noninfectious processes.    This report was finalized on 6/4/2025 8:05 PM by Dr. Dale Headley M.D on Workstation: [id number]      CT Abdomen Pelvis Without Contrast  Result Date: 6/4/2025  CT ABDOMEN PELVIS WO CONTRAST-  Radiation dose reduction techniques were utilized, including automated exposure control and exposure modulation based on body size.  Clinical: Abdominal pain with constipation  FINDINGS: 1. Prior right mastectomy, trace right pleural effusion. There is cardiac enlargement.  2. The liver, pancreas, spleen and left adrenal gland are satisfactory in appearance. Clips within the right upper quadrant, the right adrenal gland not identified, suspect previous right adrenalectomy. Bilateral renal cortical nodules, having attenuation consistent with renal cysts. The largest on the left measures 14 mm. No renal calculus or obstructive uropathy. There is atherosclerotic calcification of a normal diameter aorta. The bladder is normal.  Bladder  is satisfactory in appearance, no gallstones or wall thickening or pericholecystic fluid. No biliary duct dilatation. The stomach is collapsed and cannot be optimally evaluated. Suspect duodenal diverticula. Uterus and ovaries are normal for age. There is diverticulosis of the colon, no diverticulitis. The small bowel is satisfactory in appearance. No free intraperitoneal gas or fluid. No adenopathy seen.  CONCLUSION: No acute intra-abdominal abnormality. There is diverticulosis of the colon without diverticulitis. Small bilateral renal cysts. Postoperative change consistent with right adrenalectomy.     This report was finalized on 6/4/2025 6:22 PM by Dr. Bryan Roy M.D on Workstation: [id number]      CT Head Without Contrast  Result Date: 6/4/2025  HEAD CT WITHOUT CONTRAST  REASON:  Frequent headaches  COMPARISON STUDIES: Head CT 11/12/2023.  TECHNIQUE:  Axial images were acquired from the skull base to vertex without contrast, including multiplanar reformats, per standard departmental protocol.    Radiation dose reduction techniques were utilized, including automated exposure control, and exposure modulation based on body size.  FINDINGS:  There is no CT evidence of acute intracranial hemorrhage, mass, or infarct. There is volume loss, but there is no evidence of hydrocephalus or extra-axial fluid collection.  Redemonstrated advanced chronic white matter changes as well as right parieto-occipital cortical encephalomalacia, no evidence of acute intracranial abnormality.  Skull base, calvarium, and extracranial soft tissues show chronic changes, without evidence of acute abnormality.       Chronic changes as noted above, no acute intracranial abnormality.        This report was finalized on 6/4/2025 6:21 PM by Dr. Dale Headley M.D on Workstation: [id number]      XR Chest AP  Result Date: 6/4/2025  XR CHEST AP-  Clinical: COVID evaluation, cough, fever  COMPARISON examination 4/11/2022  FINDINGS: The  cardiomediastinal silhouette is stable. There is atherosclerotic calcification of the aorta. The right lung is clear. There is a suggestion of perhaps minimal infiltrate along the left costophrenic sulcus. No consolidation is seen within either lung. No edema or effusion. The remainder is unremarkable.  This report was finalized on 6/4/2025 5:11 PM by Dr. Bryan Roy M.D on Workstation: [id number]          Workup to date:    Diagnoses:  Encephalopathy  History of stroke  A-fib not on anticoagulation    Impression: 85-year-old female with past medical history of A-fib, hyperlipidemia, hypothyroidism, former smoker, TEOFILO, macular degeneration, meningioma, seizure disorder on Keppra and gabapentin, previous right MCA secondary to stroke right ICA stenosis for which she should be on DAPT.  She is followed by neurology for cognitive impairment and her history of stroke.  She was admitted on 6 5 with headaches and fatigue generalized weakness after fall 3 weeks ago.  Other things include pneumonia, DANY.  Neurology was consulted for somnolence.  EEG shows no ictal DC or seizure.  TME favored.  MRI brain with and without ordered.  She complains of exquisite right upper extremity pain defer workup to primary team.    Neuro will be following along with you            Thank you for this consultation.  Discussed above plan with neuro attending, Dr. Richards who agrees with above plan.  Neurology team is available for concerns or questions.

## 2025-06-09 NOTE — PROGRESS NOTES
Nutrition Services    Patient Name:  Avelina Carr  YOB: 1940  MRN: [id number]  Admit Date:  6/4/2025    Follow up visit with patient to complete NFPE.    Patient meets ASPEN/AND criteria for nutrition diagnosis of severe malnutrition of chronic illness based on:     Severe weight loss of 13% in past 2 months  Severe deficit in nutrient intake  Moderate loss of muscle mass  Mild loss of subcutaneous fat  Moderate accumulation of fluid  Measurably reduced functional status    Malnutrition Severity Assessment      Patient meets criteria for : Severe Malnutrition  Malnutrition Type (Last 8 Hours)       Malnutrition Severity Assessment       Row Name 06/09/25 0951       Malnutrition Severity Assessment    Malnutrition Type Acute Disease or Injury - Related Malnutrition      Row Name 06/09/25 0951       Insufficient Energy Intake     Insufficient Energy Intake Findings Severe    Insufficient Energy Intake  <75% of est. energy requirement for > or equal to 3 months      Row Name 06/09/25 0951       Unintentional Weight Loss     Unintentional Weight Loss Findings Severe    Unintentional Weight Loss  Weight loss greater than 7.5% in three months  weight loss of 13% in past 2 months      Row Name 06/09/25 0951       Muscle Loss    Loss of Muscle Mass Findings Moderate    Gnosticist Region Moderate - slight depression    Clavicle Bone Region Moderate - some protrusion in females, visible in males    Acromion Bone Region Moderate - acromion may slightly protrude    Dorsal Hand Region --  mild    Patellar Region Moderate - patella more prominent, less muscle definition around patella      Row Name 06/09/25 0951       Fat Loss    Subcutaneous Fat Loss Findings Mild    Orbital Region  Moderate -  somewhat hollowness, slightly dark circles    Upper Arm Region None      Row Name 06/09/25 0951       Fluid Accumulation (Edema)    Fluid Acumulation Findings Moderate    Fluid Accumulation  Moderate equals 2+ pitting edema       Row Name 06/09/25 0951       Declining Functional Status    Declining Functional Status Findings Measurably Reduced      Row Name 06/09/25 0951       Criteria Met (Must meet criteria for severity in at least 2 of these categories: M Wasting, Fat Loss, Fluid, Secondary Signs, Wt. Status, Intake)    Patient meets criteria for  Severe Malnutrition                         Electronically signed by:  Fab Senior RD  06/09/25 09:53 EDT

## 2025-06-09 NOTE — PLAN OF CARE
Goal Outcome Evaluation:              Outcome Evaluation: Swallow re-evaluation completed and recommend downgrade to puree and NTL with 1:1 assistance/supervision and small bites and sips. Suspect mentation and cognitive status impacting safety of swallow. Reviewed swallow precautions with . Meds whole or crushed with puree. Okay for ice chip and water protocol.               SLP Swallowing Diagnosis: oral dysphagia, suspected pharyngeal dysphagia (06/09/25 1200)

## 2025-06-09 NOTE — THERAPY RE-EVALUATION
Acute Care - Speech Language Pathology   Swallow Re-Evaluation [facility]     Patient Name: Avelina Carr  : 1940  MRN: [id number]  Today's Date: 2025               Admit Date: 2025    Visit Dx:     ICD-10-CM ICD-9-CM   1. Acute nonintractable headache, unspecified headache type  R51.9 784.0   2. Hypoxia  R09.02 799.02   3. Generalized weakness  R53.1 780.79   4. Hypokalemia  E87.6 276.8     Patient Active Problem List   Diagnosis    Postmastectomy lymphedema syndrome    Malignant neoplasm of upper-outer quadrant of right female breast    Cerebrovascular accident (CVA)    Stroke    Primary hypertension    HLD (hyperlipidemia)    Adverse effect of antiplatelet agent    Left middle cerebral artery stroke    Carcinoma of central portion of right breast in female, estrogen receptor positive    Chronic bilateral low back pain with right-sided sciatica    History of lumbar surgery    Seizure disorder, simple partial, without intractable epilepsy    Spinal stenosis of lumbar region with neurogenic claudication    Status post craniectomy    History of total right knee replacement    Other forms of scoliosis, lumbar region    DDD (degenerative disc disease), lumbar    Benign meningioma of brain    Squamous cell carcinoma of leg, left    History of CVA (cerebrovascular accident)    Hypothyroid    Depression    COPD (chronic obstructive pulmonary disease)    Obstructive sleep apnea    Squamous cell carcinoma of leg, right    Pain in joint, multiple sites    Excessive daytime sleepiness    Former smoker    Gastroesophageal reflux disease    Migraines    Generalized weakness    Acute CVA (cerebrovascular accident)    Dependence on supplemental oxygen    Paroxysmal supraventricular tachycardia    Arthritis    Asthma    Focal neurological deficit present    Acute right MCA stroke    History of supraventricular tachycardia    Rotator cuff arthropathy of right shoulder    Hallux valgus (acquired), right foot     Hallux valgus (acquired), left foot    Hypoxia    Chest discomfort    Elevated troponin    Severe protein-calorie malnutrition    Altered mental state     Past Medical History:   Diagnosis Date    Anxiety     Arthritis     Breast cancer     COPD (chronic obstructive pulmonary disease)     oxygen at 2L aths    COVID     CVD (cardiovascular disease)     Depression     GERD (gastroesophageal reflux disease)     Hx of radiation therapy     Hyperlipidemia     Hypothyroid     Low back pain     Macular degeneration     Osteoarthritis     Peripheral neuropathy     Sleep apnea     Stroke     Vertigo      Past Surgical History:   Procedure Laterality Date    ADRENAL GLAND SURGERY      BRAIN MENINGIOMA EXCISION      occipital    BREAST BIOPSY       SECTION      EMBOLECTOMY N/A 11/15/2017    Procedure: Embolectomy Mechanical;  Surgeon: Rachid Figueroa MD;  Location: [facility] OR ;  Service:     MASTECTOMY      RECTAL SURGERY      REPLACEMENT TOTAL KNEE Right     SKIN BIOPSY  2025    TOTAL SHOULDER REPLACEMENT      x2       SLP Recommendation and Plan  SLP Swallowing Diagnosis: oral dysphagia, suspected pharyngeal dysphagia (25)  SLP Diet Recommendation: puree, nectar thick liquids, ice chips between meals after oral care, with supervision, water between meals after oral care, with supervision (25)  Recommended Precautions and Strategies: no straw, upright posture during/after eating, general aspiration precautions, 1:1 supervision, small bites of food and sips of liquid (25)  SLP Rec. for Method of Medication Administration: meds whole, meds crushed, with puree (25)     Monitor for Signs of Aspiration: yes, notify SLP if any concerns (25)     Swallow Criteria for Skilled Therapeutic Interventions Met: demonstrates skilled criteria (25)     Rehab Potential/Prognosis, Swallowing: adequate, monitor progress closely (25  1200)  Therapy Frequency (Swallow): PRN (06/09/25 1200)  Predicted Duration Therapy Intervention (Days): until discharge (06/09/25 1200)  Oral Care Recommendations: Oral Care BID/PRN (06/09/25 1200)                                        Outcome Evaluation: Swallow re-evaluation completed and recommend downgrade to puree and NTL with 1:1 assistance/supervision and small bites and sips. Suspect mentation and cognitive status impacting safety of swallow. Reviewed swallow precautions with . Meds whole or crushed with puree. Okay for ice chip and water protocol.      SWALLOW EVALUATION (Last 72 Hours)       SLP Adult Swallow Evaluation       Row Name 06/09/25 1200                   Rehab Evaluation    Document Type re-evaluation  -KA        Subjective Information no complaints  -KA        Patient Observations cooperative  -KA        Patient Effort good  -KA           General Information    Patient Profile Reviewed yes  -KA        Pertinent History Of Current Problem New orders received due to concern with aspiration over the weekend. Per RN pt consumes very little PO and  voices the same.  -KA        Current Method of Nutrition soft to chew textures;thin liquids  -KA        Precautions/Limitations, Vision vision impairment, bilaterally  -KA        Precautions/Limitations, Hearing WFL;for purposes of eval  -KA        Prior Level of Function-Communication cognitive-linguistic impairment;other (see comments)  -KA        Prior Level of Function-Swallowing no diet consistency restrictions;other (see comments)  -KA        Plans/Goals Discussed with patient and family;agreed upon  -KA        Barriers to Rehab medically complex  -KA        Patient's Goals for Discharge patient did not state  -KA        Family Goals for Discharge family did not state  -KA           Pain    Pretreatment Pain Rating 0/10 - no pain  -KA        Posttreatment Pain Rating 0/10 - no pain  -KA           Clinical Swallow Eval    Clinical  "Swallow Evaluation Summary Swallow re-evaluation completed. Patient awake, eyes closed during entire session and pt unable state why other than \"easier to keep eyes closed.\" Patient slow to respond at times.  reported pt consuming very little PO and concerned with safety of chewing. He reports when patient does consume PO patient chews for a long time. SLP suspects patient cognition and mentation impacting safety of swallow. Patient agreeable to ice chips and thins via spoon without overt s/s of pen/asp. Patient with overt cough on two trial of thins via cup. No overt s/s of pen/asp with NTL via spoon and cup and few trials of puree trials. Recommend downgrade to NTL and puree with 1:1 supervision and assistance and will continue to monitor and need for VFSS. SLP reviewed safe swallow precautions with .  -KA           SLP Evaluation Clinical Impression    SLP Swallowing Diagnosis oral dysphagia;suspected pharyngeal dysphagia  -KA        Functional Impact risk of aspiration/pneumonia  -KA        Rehab Potential/Prognosis, Swallowing adequate, monitor progress closely  -KA        Swallow Criteria for Skilled Therapeutic Interventions Met demonstrates skilled criteria  -KA           Recommendations    Therapy Frequency (Swallow) PRN  -KA        Predicted Duration Therapy Intervention (Days) until discharge  -KA        SLP Diet Recommendation puree;nectar thick liquids;ice chips between meals after oral care, with supervision;water between meals after oral care, with supervision  -KA        Recommended Precautions and Strategies no straw;upright posture during/after eating;general aspiration precautions;1:1 supervision;small bites of food and sips of liquid  -KA        Oral Care Recommendations Oral Care BID/PRN  -KA        SLP Rec. for Method of Medication Administration meds whole;meds crushed;with puree  -KA        Monitor for Signs of Aspiration yes;notify SLP if any concerns  -KA                  User " Key  (r) = Recorded By, (t) = Taken By, (c) = Cosigned By      Initials Name Effective Dates    James Membreno SLP 01/05/24 -                     EDUCATION  The patient has been educated in the following areas:   Dysphagia (Swallowing Impairment).                Time Calculation:    Time Calculation- SLP       Row Name 06/09/25 1301             Time Calculation- SLP    SLP Start Time 1030  -KA      SLP Received On 06/09/25  -KA         Untimed Charges    76091-DH Treatment Swallow Minutes 53  -KA         Total Minutes    Untimed Charges Total Minutes 53  -KA       Total Minutes 53  -KA                User Key  (r) = Recorded By, (t) = Taken By, (c) = Cosigned By      Initials Name Provider Type    James Membreno SLP Speech and Language Pathologist                    Therapy Charges for Today       Code Description Service Date Service Provider Modifiers Qty    [id number] HC ST TREATMENT SWALLOW 4 6/9/2025 James Herrera SLP GN 1                 OMAR Finnegan  6/9/2025

## 2025-06-10 LAB
ANION GAP SERPL CALCULATED.3IONS-SCNC: 11.1 MMOL/L (ref 5–15)
BUN SERPL-MCNC: 8 MG/DL (ref 8–23)
BUN/CREAT SERPL: 9.5 (ref 7–25)
CALCIUM SPEC-SCNC: 10.5 MG/DL (ref 8.6–10.5)
CHLORIDE SERPL-SCNC: 103 MMOL/L (ref 98–107)
CO2 SERPL-SCNC: 22.9 MMOL/L (ref 22–29)
CREAT SERPL-MCNC: 0.84 MG/DL (ref 0.57–1)
DEPRECATED RDW RBC AUTO: 45.1 FL (ref 37–54)
EGFRCR SERPLBLD CKD-EPI 2021: 67.8 ML/MIN/1.73
ERYTHROCYTE [DISTWIDTH] IN BLOOD BY AUTOMATED COUNT: 12.6 % (ref 12.3–15.4)
GLUCOSE SERPL-MCNC: 93 MG/DL (ref 65–99)
HCT VFR BLD AUTO: 32.2 % (ref 34–46.6)
HGB BLD-MCNC: 10.6 G/DL (ref 12–15.9)
MCH RBC QN AUTO: 32.5 PG (ref 26.6–33)
MCHC RBC AUTO-ENTMCNC: 32.9 G/DL (ref 31.5–35.7)
MCV RBC AUTO: 98.8 FL (ref 79–97)
PLATELET # BLD AUTO: 246 10*3/MM3 (ref 140–450)
PMV BLD AUTO: 10.6 FL (ref 6–12)
POTASSIUM SERPL-SCNC: 4.1 MMOL/L (ref 3.5–5.2)
POTASSIUM SERPL-SCNC: 4.1 MMOL/L (ref 3.5–5.2)
RBC # BLD AUTO: 3.26 10*6/MM3 (ref 3.77–5.28)
SODIUM SERPL-SCNC: 137 MMOL/L (ref 136–145)
WBC NRBC COR # BLD AUTO: 7.83 10*3/MM3 (ref 3.4–10.8)

## 2025-06-10 PROCEDURE — 97535 SELF CARE MNGMENT TRAINING: CPT

## 2025-06-10 PROCEDURE — 99232 SBSQ HOSP IP/OBS MODERATE 35: CPT | Performed by: INTERNAL MEDICINE

## 2025-06-10 PROCEDURE — 94799 UNLISTED PULMONARY SVC/PX: CPT

## 2025-06-10 PROCEDURE — 85027 COMPLETE CBC AUTOMATED: CPT | Performed by: INTERNAL MEDICINE

## 2025-06-10 PROCEDURE — 94760 N-INVAS EAR/PLS OXIMETRY 1: CPT

## 2025-06-10 PROCEDURE — 94761 N-INVAS EAR/PLS OXIMETRY MLT: CPT

## 2025-06-10 PROCEDURE — 97530 THERAPEUTIC ACTIVITIES: CPT

## 2025-06-10 PROCEDURE — 25010000002 PIPERACILLIN SOD-TAZOBACTAM PER 1 G: Performed by: INTERNAL MEDICINE

## 2025-06-10 PROCEDURE — 99233 SBSQ HOSP IP/OBS HIGH 50: CPT | Performed by: PHYSICIAN ASSISTANT

## 2025-06-10 RX ORDER — LISINOPRIL 20 MG/1
20 TABLET ORAL EVERY 12 HOURS SCHEDULED
Status: DISCONTINUED | OUTPATIENT
Start: 2025-06-10 | End: 2025-06-14 | Stop reason: HOSPADM

## 2025-06-10 RX ADMIN — DONEPEZIL HYDROCHLORIDE 10 MG: 10 TABLET, FILM COATED ORAL at 09:39

## 2025-06-10 RX ADMIN — ESCITALOPRAM 20 MG: 10 TABLET, FILM COATED ORAL at 09:40

## 2025-06-10 RX ADMIN — AMLODIPINE BESYLATE 5 MG: 5 TABLET ORAL at 09:39

## 2025-06-10 RX ADMIN — Medication 1000 MCG: at 09:39

## 2025-06-10 RX ADMIN — ATENOLOL 25 MG: 25 TABLET ORAL at 09:40

## 2025-06-10 RX ADMIN — LISINOPRIL 20 MG: 20 TABLET ORAL at 21:39

## 2025-06-10 RX ADMIN — PANTOPRAZOLE SODIUM 40 MG: 40 TABLET, DELAYED RELEASE ORAL at 06:19

## 2025-06-10 RX ADMIN — LEVOTHYROXINE SODIUM 150 MCG: 0.07 TABLET ORAL at 06:19

## 2025-06-10 RX ADMIN — ATENOLOL 25 MG: 25 TABLET ORAL at 21:39

## 2025-06-10 RX ADMIN — LEVETIRACETAM 500 MG: 500 TABLET, FILM COATED ORAL at 09:40

## 2025-06-10 RX ADMIN — Medication 10 ML: at 09:43

## 2025-06-10 RX ADMIN — LATANOPROST 1 DROP: 50 SOLUTION/ DROPS OPHTHALMIC at 21:39

## 2025-06-10 RX ADMIN — SODIUM CHLORIDE 3.38 G: 9 INJECTION, SOLUTION INTRAVENOUS at 00:30

## 2025-06-10 RX ADMIN — ATORVASTATIN CALCIUM 80 MG: 80 TABLET, FILM COATED ORAL at 09:39

## 2025-06-10 RX ADMIN — Medication 10 ML: at 21:39

## 2025-06-10 RX ADMIN — LISINOPRIL 20 MG: 20 TABLET ORAL at 09:39

## 2025-06-10 RX ADMIN — SODIUM CHLORIDE 3.38 G: 9 INJECTION, SOLUTION INTRAVENOUS at 09:40

## 2025-06-10 RX ADMIN — ASPIRIN 325 MG ORAL TABLET 325 MG: 325 PILL ORAL at 09:40

## 2025-06-10 RX ADMIN — SODIUM CHLORIDE 3.38 G: 9 INJECTION, SOLUTION INTRAVENOUS at 17:45

## 2025-06-10 RX ADMIN — BUDESONIDE AND FORMOTEROL FUMARATE DIHYDRATE 2 PUFF: 160; 4.5 AEROSOL RESPIRATORY (INHALATION) at 19:41

## 2025-06-10 RX ADMIN — CALCIUM CARBONATE-VITAMIN D TAB 500 MG-200 UNIT 1 TABLET: 500-200 TAB at 21:39

## 2025-06-10 RX ADMIN — CALCIUM CARBONATE-VITAMIN D TAB 500 MG-200 UNIT 1 TABLET: 500-200 TAB at 09:39

## 2025-06-10 RX ADMIN — CLOPIDOGREL BISULFATE 75 MG: 75 TABLET, FILM COATED ORAL at 09:40

## 2025-06-10 RX ADMIN — LEVETIRACETAM 500 MG: 500 TABLET, FILM COATED ORAL at 21:39

## 2025-06-10 NOTE — PLAN OF CARE
Goal Outcome Evaluation:  Plan of Care Reviewed With: patient           Outcome Evaluation: Upon entering room, pt. supine in bed, awake, and agreeable to work with P.T. this date despite c/o fatigue.  This PM, pt. able to ambulate 1 foot (FW/BW) and take 4 small, shuffled sidesteps at bedside, Min. assist x 2, with use of Rwx.  Pt. requires Mod. assist x 2 for bed mobility and Min. assist x2 for sit <-> stand transfers.  BLE ther. ex. program x 5 reps completed for general strengthening. Verbal/tactile cues given during ambulation for posture correction and Rwx guidance.  Overall improved tolerance to functional activity this date compared to last P.T. session with initiation of forward and sidestepping activities.  Will continue to progress functional mobility as tolerated.

## 2025-06-10 NOTE — PROGRESS NOTES
"DOS: 2025  NAME: Avelina Carr   : 1940  PCP: Artem Shepherd MD  Chief Complaint   Patient presents with    Headache    Weakness - Generalized       Chief complaint: somnolence  Subjective:  at bedside.  He says that she is about the same since admission.  She is sleepy and inattentive.  She has not been hospitalized since her last stroke in     Objective:  Vital signs: /73 (BP Location: Right leg, Patient Position: Lying)   Pulse 78   Temp 98 °F (36.7 °C) (Oral)   Resp 18   Ht 162.6 cm (64\")   Wt 69.7 kg (153 lb 10.6 oz)   SpO2 96%   BMI 26.38 kg/m²      Gen: NAD, vitals reviewed  MS: Oriented to self, situation, not day of week, recent/remote memory impaired, impaired attention/concentration, no aphasia, no neglect.  CN: visual acuity grossly normal, PERRL, EOMI, no facial droop, no dysarthria  Motor: Pain limited right upper extremity, otherwise brisk symmetric movements, normal tone  Sensory: intact to light touch all 4 ext.  Coordination: intact on left    ROS:  No weakness, numbness  No fevers, chills    Scheduled Medications:amLODIPine, 5 mg, Oral, Q24H  aspirin, 325 mg, Oral, Daily  atenolol, 25 mg, Oral, BID  atorvastatin, 80 mg, Oral, Daily  budesonide-formoterol, 2 puff, Inhalation, BID  calcium 500 mg vitamin D 5 mcg (200 UT), 1 tablet, Oral, BID  clopidogrel, 75 mg, Oral, Daily  donepezil, 10 mg, Oral, Daily  [Held by provider] enoxaparin sodium, 30 mg, Subcutaneous, Q24H  escitalopram, 20 mg, Oral, Daily  [Held by provider] furosemide, 20 mg, Oral, Daily  [Held by provider] gabapentin, 400 mg, Oral, Q12H  latanoprost, 1 drop, Right Eye, Nightly  levETIRAcetam, 500 mg, Oral, Q12H  levothyroxine, 150 mcg, Oral, Q AM  lisinopril, 10 mg, Oral, Q12H  pantoprazole, 40 mg, Oral, Q AM  piperacillin-tazobactam, 3.375 g, Intravenous, Q8H  potassium chloride, 40 mEq, Oral, Q4H  sodium chloride, 10 mL, Intravenous, Q12H  vitamin B-12, 1,000 mcg, Oral, Daily      Infusions: " sodium chloride, 75 mL/hr, Last Rate: 75 mL/hr (06/09/25 0635)      PRN Medications:    acetaminophen **OR** acetaminophen **OR** acetaminophen    albuterol    senna-docusate sodium **AND** polyethylene glycol **AND** bisacodyl **AND** bisacodyl    Calcium Replacement - Follow Nurse / BPA Driven Protocol    hydrALAZINE    Magnesium Standard Dose Replacement - Follow Nurse / BPA Driven Protocol    Phosphorus Replacement - Follow Nurse / BPA Driven Protocol    Potassium Replacement - Follow Nurse / BPA Driven Protocol    Potassium Replacement - Follow Nurse / BPA Driven Protocol    sodium chloride    sodium chloride    Laboratory results:  Lab Results   Component Value Date    GLUCOSE 81 06/09/2025    CALCIUM 6.2 (L) 06/09/2025     06/09/2025    K 2.9 (L) 06/09/2025    K 2.9 (L) 06/09/2025    CO2 17.9 (L) 06/09/2025     (H) 06/09/2025    BUN 9.0 06/09/2025    CREATININE 0.58 06/09/2025    EGFRIFAFRI 75 09/24/2021    EGFRIFNONA 62 09/24/2021    BCR 15.5 06/09/2025    ANIONGAP 9.1 06/09/2025     Lab Results   Component Value Date    WBC 7.31 06/08/2025    HGB 11.9 (L) 06/08/2025    HCT 37.3 06/08/2025    .6 (H) 06/08/2025     06/08/2025     Lab Results   Component Value Date    LDL 71 03/19/2024    LDL 78 11/13/2023    LDL 79 10/16/2023            Review of labs:     Review and interpretation of imaging:     Duplex Carotid Ultrasound CAR  Result Date: 6/9/2025    Right internal carotid artery demonstrates a 50-69% stenosis.   Antegrade right vertebral flow.   Left internal carotid artery demonstrates a less than 50% stenosis.   Antegrade left vertebral flow.     EEG  Result Date: 6/7/2025  Table formatting from the original result was not included. EEG Report          # Indication:AMS History: 85-year-old female with history of stroke and distant history of seizure presenting with altered mental status.  Study includes time locked video Medical diagnoses: Hypothyroidism, anxiety and  depression, COPD, peripheral neuropathy, history of breast cancer, history of stroke, hyperlipidemia, sleep apnea Current Facility-Administered Medications Medication Dose Route Frequency Provider Last Rate Last Admin  acetaminophen (TYLENOL) tablet 650 mg  650 mg Oral Q4H PRN Andria Roberts MD   650 mg at 06/05/25 1027  Or  acetaminophen (TYLENOL) 160 MG/5ML oral solution 650 mg  650 mg Oral Q4H PRN Andria Roberts MD      Or  acetaminophen (TYLENOL) suppository 650 mg  650 mg Rectal Q4H PRN Andria Roberts MD      albuterol (PROVENTIL) nebulizer solution 0.083% 2.5 mg/3mL  2.5 mg Nebulization Q6H PRN Andria Roberts MD   2.5 mg at 06/06/25 1949  aspirin tablet 325 mg  325 mg Oral Daily Andria Roberts MD   325 mg at 06/07/25 1033  atenolol (TENORMIN) tablet 25 mg  25 mg Oral BID Andria Roberts MD   25 mg at 06/07/25 2047  atorvastatin (LIPITOR) tablet 80 mg  80 mg Oral Daily Andria Roberts MD   80 mg at 06/07/25 1033  sennosides-docusate (PERICOLACE) 8.6-50 MG per tablet 2 tablet  2 tablet Oral BID PRN Andria Roberts MD      And  polyethylene glycol (MIRALAX) packet 17 g  17 g Oral Daily PRN Andria Roberts MD   17 g at 06/05/25 1231  And  bisacodyl (DULCOLAX) EC tablet 5 mg  5 mg Oral Daily PRN Andria Roberts MD      And  bisacodyl (DULCOLAX) suppository 10 mg  10 mg Rectal Daily PRN Andria Roberts MD   10 mg at 06/05/25 1659  budesonide-formoterol (SYMBICORT) 160-4.5 MCG/ACT inhaler 2 puff  2 puff Inhalation BID Andria Roberts MD   2 puff at 06/07/25 0755  calcium 500 mg vitamin D 5 mcg (200 UT) per tablet 1 tablet  1 tablet Oral BID Andria Roberts MD   1 tablet at 06/07/25 2047  Calcium Replacement - Follow Nurse / BPA Driven Protocol   Not Applicable PRN Andria Roberts MD      clopidogrel (PLAVIX) tablet 75 mg  75 mg Oral Daily Andria Roberts MD   75 mg at 06/07/25 1033  [Held by provider]  donepezil (ARICEPT) tablet 10 mg  10 mg Oral Daily Andria Roberts MD   10 mg at 06/07/25 1033  enoxaparin sodium (LOVENOX) syringe 40 mg  40 mg Subcutaneous Q24H Connor Maynard APRN   40 mg at 06/07/25 1802  escitalopram (LEXAPRO) tablet 20 mg  20 mg Oral Daily Andria Roberts MD   20 mg at 06/07/25 1032  [Held by provider] furosemide (LASIX) tablet 20 mg  20 mg Oral Daily Andria Roberts MD   20 mg at 06/07/25 1033  [Held by provider] gabapentin (NEURONTIN) capsule 400 mg  400 mg Oral Q12H Andria Roberts MD   400 mg at 06/07/25 1033  hydrALAZINE (APRESOLINE) injection 10 mg  10 mg Intravenous Q6H PRN Nahid García MD   10 mg at 06/06/25 0928  latanoprost (XALATAN) 0.005 % ophthalmic solution 1 drop  1 drop Right Eye Nightly Andria Roberts MD   1 drop at 06/06/25 2054  levETIRAcetam (KEPPRA) injection 500 mg  500 mg Intravenous Q12H Nahid García MD   500 mg at 06/07/25 2046  levothyroxine (SYNTHROID, LEVOTHROID) tablet 150 mcg  150 mcg Oral Q AM Andria Roberts MD   150 mcg at 06/05/25 0824  [Held by provider] lisinopril (PRINIVIL,ZESTRIL) tablet 10 mg  10 mg Oral Q12H Sudarshan Boyd MD   10 mg at 06/07/25 1033  Magnesium Standard Dose Replacement - Follow Nurse / BPA Driven Protocol   Not Applicable PRN Andria Roberts MD      NIFEdipine XL (PROCARDIA XL) 24 hr tablet 60 mg  60 mg Oral Q24H Nahid García MD   60 mg at 06/07/25 1033  [START ON 6/8/2025] pantoprazole (PROTONIX) injection 40 mg  40 mg Intravenous Q AM Tito Bhandari MD      Phosphorus Replacement - Follow Nurse / BPA Driven Protocol   Not Applicable PRN Andria Roberts MD      [START ON 6/8/2025] piperacillin-tazobactam (ZOSYN) 3.375 g IVPB in 100 mL NS MBP (CD)  3.375 g Intravenous Q8H Tito Bhandari MD      Potassium Replacement - Follow Nurse / BPA Driven Protocol   Not Applicable Norman Bartholomew MD      Potassium Replacement - Follow Nurse / BPA  Driven Protocol   Not Applicable PRN Andria Roberts MD      sodium chloride 0.9 % infusion  100 mL/hr Intravenous Continuous Tito Bhandari  mL/hr at 06/07/25 1451 100 mL/hr at 06/07/25 1451  vitamin B-12 (CYANOCOBALAMIN) tablet 1,000 mcg  1,000 mcg Oral Daily Andria Roberts MD   1,000 mcg at 06/07/25 1032 Time of study: 26 minutes 4 seconds Technical summary: The 10-20 system was used for electrode placement  Background: The background rhythm was composed of primarily 6 Hz diffuse slow theta activity.  Sleep: The patient became more drowsy as noted by diffusely slower activities.  No vertex sharp transients or sleep spindles were seen  Hyperventilation: Not obtained  Photic stimulation: Photic stimulation was performed at various flash frequencies showing no significant change in the background activity  EKG: Sinus bradycardia in the 50s  Video: No unusual involuntary movements were seen on the video clips reviewed Impression: Abnormal EEG being moderately diffusely slow consistent with a moderate diffuse encephalopathy versus bihemispheric structural lesions.  No epileptiform activities were seen. Dictated utilizing Dragon dictation.      CT Head Without Contrast  Result Date: 6/7/2025  CT OF THE HEAD WITHOUT CONTRAST  HISTORY: Somnolence  COMPARISON: June 4, 2025  TECHNIQUE: Axial CT imaging was obtained through the brain. No IV contrast was administered.  FINDINGS: No acute intracranial hemorrhage is seen. There is atrophy. There is periventricular and deep white matter microangiopathic change. There are changes of prior right-sided craniotomy. Areas of encephalomalacia are noted within the right parieto-occipital region. There is further encephalomalacia which is noted within the left middle cranial fossa. There is no midline shift or mass effect. Paranasal sinuses and mastoid air cells are clear.      No acute intracranial findings.  Radiation dose reduction techniques were  utilized, including automated exposure control and exposure modulation based on body size.   This report was finalized on 6/7/2025 7:50 PM by Dr. Paloma Chacon M.D on Workstation: [id number]      XR Chest 1 View  Result Date: 6/7/2025  XR CHEST 1 VW-   INDICATION: Pneumonia  COMPARISON: Chest radiograph June 4, 2025  TECHNIQUE: 1 view chest  FINDINGS:  Vascular congestion. Ill-defined basilar opacities. Low volumes. Stable mediastinum. Bilateral shoulder arthroplasties. Surgical clips over the right chest. Left chest wall implantable loop recorder.       1. Increased ill-defined basilar lung opacities. 2. Suspect airways disease  This report was finalized on 6/7/2025 3:11 PM by Dr. Everardo Garcia M.D on Workstation: [id number]      Stress Test With Myocardial Perfusion One Day  Result Date: 6/6/2025    Myocardial perfusion imaging indicates a normal myocardial perfusion study with no evidence of ischemia. Impressions are consistent with a low risk study.   Left ventricular ejection fraction is hyperdynamic (Calculated EF > 70%).   There is no prior study available for comparison.     CT Angiogram Chest  Result Date: 6/4/2025  CTA CHEST WITH IV CONTRAST  HISTORY: Shortness of breath, hypoxia, evaluate for PE; R51.9-Headache, unspecified; R09.02-Hypoxemia; R53.1-Weakness; E87.6-Hypokalemia  COMPARISON: None  TECHNIQUE: CT angiography was performed of the chest with axial images as well as coronal and sagittal reformatted MIP images provided following administration of IV contrast. 3-D surface rendered reformats were obtained of the pulmonary arteries and aorta. Radiation dose reduction techniques were utilized, including automated exposure control, and exposure modulation based on body size.  FINDINGS:  There is a tiny right pleural effusion, and there is mild bilateral posterior basilar dependent atelectasis. There are patchy areas of alveolar infiltrate with somewhat coarse interstitial markings in the  left upper lobe. Similar though more subtle changes are seen in the right middle lobe and right lower lobe.  Thoracic aorta is normal in caliber.  There are coronary atherosclerotic vascular calcifications.  There is no suspicious mediastinal adenopathy or other mass.  Images of the upper abdomen show no acute abnormality.  There are chronic healed posttraumatic as well as degenerative bony changes, but there is no acute bony abnormality.  Bolus timing is excellent, and there is no evidence of pulmonary embolism.       Pulmonary arteries are well-opacified, and there is no evidence of pulmonary embolism.  There are mild somewhat patchy alveolar pulmonary infiltrates with somewhat coarse interstitial markings, most prominent in the left upper lobe though seen to a lesser degree in the right middle lobe and lower lobe. FINDINGS are nonspecific.  Imaging features can be seen with COVID-19 pneumonia, though are nonspecific and can occur with a variety of infectious and noninfectious processes.    This report was finalized on 6/4/2025 8:05 PM by Dr. Dale Headley M.D on Workstation: [id number]      CT Abdomen Pelvis Without Contrast  Result Date: 6/4/2025  CT ABDOMEN PELVIS WO CONTRAST-  Radiation dose reduction techniques were utilized, including automated exposure control and exposure modulation based on body size.  Clinical: Abdominal pain with constipation  FINDINGS: 1. Prior right mastectomy, trace right pleural effusion. There is cardiac enlargement.  2. The liver, pancreas, spleen and left adrenal gland are satisfactory in appearance. Clips within the right upper quadrant, the right adrenal gland not identified, suspect previous right adrenalectomy. Bilateral renal cortical nodules, having attenuation consistent with renal cysts. The largest on the left measures 14 mm. No renal calculus or obstructive uropathy. There is atherosclerotic calcification of a normal diameter aorta. The bladder is normal.  Bladder  is satisfactory in appearance, no gallstones or wall thickening or pericholecystic fluid. No biliary duct dilatation. The stomach is collapsed and cannot be optimally evaluated. Suspect duodenal diverticula. Uterus and ovaries are normal for age. There is diverticulosis of the colon, no diverticulitis. The small bowel is satisfactory in appearance. No free intraperitoneal gas or fluid. No adenopathy seen.  CONCLUSION: No acute intra-abdominal abnormality. There is diverticulosis of the colon without diverticulitis. Small bilateral renal cysts. Postoperative change consistent with right adrenalectomy.     This report was finalized on 6/4/2025 6:22 PM by Dr. Bryan Roy M.D on Workstation: [id number]      CT Head Without Contrast  Result Date: 6/4/2025  HEAD CT WITHOUT CONTRAST  REASON:  Frequent headaches  COMPARISON STUDIES: Head CT 11/12/2023.  TECHNIQUE:  Axial images were acquired from the skull base to vertex without contrast, including multiplanar reformats, per standard departmental protocol.    Radiation dose reduction techniques were utilized, including automated exposure control, and exposure modulation based on body size.  FINDINGS:  There is no CT evidence of acute intracranial hemorrhage, mass, or infarct. There is volume loss, but there is no evidence of hydrocephalus or extra-axial fluid collection.  Redemonstrated advanced chronic white matter changes as well as right parieto-occipital cortical encephalomalacia, no evidence of acute intracranial abnormality.  Skull base, calvarium, and extracranial soft tissues show chronic changes, without evidence of acute abnormality.       Chronic changes as noted above, no acute intracranial abnormality.        This report was finalized on 6/4/2025 6:21 PM by Dr. Dale Headley M.D on Workstation: [id number]      XR Chest AP  Result Date: 6/4/2025  XR CHEST AP-  Clinical: COVID evaluation, cough, fever  COMPARISON examination 4/11/2022  FINDINGS: The  cardiomediastinal silhouette is stable. There is atherosclerotic calcification of the aorta. The right lung is clear. There is a suggestion of perhaps minimal infiltrate along the left costophrenic sulcus. No consolidation is seen within either lung. No edema or effusion. The remainder is unremarkable.  This report was finalized on 6/4/2025 5:11 PM by Dr. Bryan Roy M.D on Workstation: [id number]          Workup to date:    Diagnoses:  Cryptogenic recurrent strokes  Encephalopathy  Seizure disorder    Impression: 85-year-old female with past medical, ? afib hyperlipidemia, hypothyroidism, former smoker, TEOFILO, macular degeneration, meningioma, seizure disorder on Keppra and gabapentin, previous right MCA secondary to stroke right ICA stenosis for which she should be on DAPT.  Apparently she was on DAPT when she had her right MCA stroke possibly related to right ICA stenosis in 2023.  She was to follow-up with Dr. Forrest in clinic and also she is followed by neurology for cognitive impairment and her history of stroke.  She follows with  with cardiology and has a loop monitor.  At least no afib per his note 12/24    She was admitted on 6/5 with headaches, fatigue, generalized weakness after fall 3 weeks ago.  Other things include pneumonia, DANY.  Neurology was consulted for somnolence.  EEG shows no ictal DC or seizure.      MRI brain showed embolic appearing strokes suspicious for cardioembolic etiology likely contributing to her encephalopathy.  Loop to be interrogated by cardiology to help determine best long term stroke prevention.  Family reporting some improved alertness and appetite today    Loop monitor negative for any A-fib.  JEFE to be arranged on Thursday.  Appreciate cardiology    Neuro will be following along with you      I spent at least 50 minutes interviewing, examining, and counseling patient.  I independently reviewed documentation, laboratory and diagnostic findings, external  documentation where applicable, and formulated treatment plan which was discussed with the patient.      Thank you for this consultation.  Discussed above plan with neuro attending, Dr. Richards who agrees with above plan.  Neurology team is available for concerns or questions.

## 2025-06-10 NOTE — PLAN OF CARE
Goal Outcome Evaluation:            Pt in forweakness, MRI showed multiple small strokes, JEFE on 6/12

## 2025-06-10 NOTE — PLAN OF CARE
Goal Outcome Evaluation:              Outcome Evaluation: VSS. MRI BRAIN WAS COMPLETED. HAS RESTED WELL IN BETWEEN CARE. AROUSES EASILY AND IS ALERT AND FOLLOWS SIMPLE COMMANDS.

## 2025-06-10 NOTE — CASE MANAGEMENT/SOCIAL WORK
Continued Stay Note  [facility]     Patient Name: Avelina Carr  MRN: [id number]  Today's Date: 6/9/2025    Admit Date: 6/4/2025    Plan: Plan to DC to [facility], pending clinical clinical status   Discharge Plan       Row Name 06/09/25 2043       Plan    Plan Plan to DC to [facility], pending clinical clinical status    Plan Comments CCP noted change in clinical status over the weekend. Renee S/trilogy informed. CCP to follow for dc readiness.                   Discharge Codes    No documentation.                 Expected Discharge Date and Time       Expected Discharge Date Expected Discharge Time    Jun 11, 2025               Charlette Wilburn RN

## 2025-06-10 NOTE — CASE MANAGEMENT/SOCIAL WORK
Continued Stay Note  [facility]     Patient Name: Avelina Carr  MRN: [id number]  Today's Date: 6/10/2025    Admit Date: 6/4/2025    Plan: Pallaitive care vs SNF   Discharge Plan       Row Name 06/10/25 0931       Plan    Plan Pallaitive care vs SNF    Plan Comments CCP met with spouse at bedside. Mr. Carr has requested a Pallaitive consult to discuss GOC, as he realizes decline in status. Informed attending MD. Consult has been placed.                   Discharge Codes    No documentation.                 Expected Discharge Date and Time       Expected Discharge Date Expected Discharge Time    Jun 11, 2025               Charlette Wilburn RN

## 2025-06-10 NOTE — PROGRESS NOTES
" LOS: 5 days     Name: Avelina Carr  Age: 85 y.o.  Sex: female  :  1940  MRN: [id number]         Primary Care Physician: Artem Shepherd MD    Subjective   Subjective  Her  is at the bedside this morning and states that her cognition is improved and that she ate pretty well for breakfast.  He has requested a palliative care consult.  Discussed with him the findings on brain MRI including multiple acute strokes.  She reports improvement of her right hand pain.    Objective   Vital Signs  Temp:  [97.9 °F (36.6 °C)-99 °F (37.2 °C)] 98.2 °F (36.8 °C)  Heart Rate:  [54-64] 55  Resp:  [16-20] 16  BP: (155-177)/() 177/67  Body mass index is 26.06 kg/m².    Objective:  General Appearance:  Comfortable and in no acute distress (Elderly, frail, weak and deconditioned appearing).    Vital signs: (most recent): Blood pressure 177/67, pulse 55, temperature 98.2 °F (36.8 °C), temperature source Oral, resp. rate 16, height 162.6 cm (64\"), weight 68.9 kg (151 lb 12.8 oz), SpO2 96%, not currently breastfeeding.    Lungs:  Normal effort and normal respiratory rate.  She is not in respiratory distress.  There are decreased breath sounds.    Heart: Normal rate.  Regular rhythm.    Abdomen: Abdomen is soft.  Bowel sounds are normal.   There is no abdominal tenderness.     Extremities: There is no dependent edema or local swelling.    Neurological: Patient is alert and oriented to person, place and time.    Skin:  Warm and dry.                Results Review:       I reviewed the patient's new clinical results.    Results from last 7 days   Lab Units 06/10/25  0742 25  1013 25  0917 25  0536 25  1238 25  0347 25  1651   WBC 10*3/mm3 7.83 8.33 7.31 6.91 10.16 6.93 7.04   HEMOGLOBIN g/dL 10.6* 10.6* 11.9* 10.9* 13.8 11.3* 12.1   PLATELETS 10*3/mm3 246 233 243 299 300 220 259     Results from last 7 days   Lab Units 25  2332 25  0646 25  1901 25  1448 " 06/07/25  0536 06/06/25  1224 06/05/25  1537 06/05/25  0347 06/04/25  1651   SODIUM mmol/L 137 145 143  --  139 142  --  138 139   POTASSIUM mmol/L 4.1  4.1 2.9*  2.9* 3.4* 3.7 3.6 3.7  3.7 3.4* 3.4* 3.1*   CHLORIDE mmol/L 103 118* 109*  --  106 106  --  103 99   CO2 mmol/L 22.9 17.9* 23.9  --  22.9 21.9*  --  22.0 26.0   BUN mg/dL 8.0 9.0 16.0  --  20.0 13.0  --  11.0 11.0   CREATININE mg/dL 0.84 0.58 0.98  --  1.50* 1.19*  --  0.72 1.00   CALCIUM mg/dL 10.5 6.2* 8.4*  --  9.0 9.6  --  8.6 9.1   GLUCOSE mg/dL 93 81 110*  --  90 85  --  68 106*                 Scheduled Meds:   amLODIPine, 5 mg, Oral, Q24H  aspirin, 325 mg, Oral, Daily  atenolol, 25 mg, Oral, BID  atorvastatin, 80 mg, Oral, Daily  budesonide-formoterol, 2 puff, Inhalation, BID  calcium 500 mg vitamin D 5 mcg (200 UT), 1 tablet, Oral, BID  clopidogrel, 75 mg, Oral, Daily  donepezil, 10 mg, Oral, Daily  [Held by provider] enoxaparin sodium, 30 mg, Subcutaneous, Q24H  escitalopram, 20 mg, Oral, Daily  [Held by provider] furosemide, 20 mg, Oral, Daily  [Held by provider] gabapentin, 400 mg, Oral, Q12H  latanoprost, 1 drop, Right Eye, Nightly  levETIRAcetam, 500 mg, Oral, Q12H  levothyroxine, 150 mcg, Oral, Q AM  lisinopril, 20 mg, Oral, Q12H  pantoprazole, 40 mg, Oral, Q AM  piperacillin-tazobactam, 3.375 g, Intravenous, Q8H  sodium chloride, 10 mL, Intravenous, Q12H  vitamin B-12, 1,000 mcg, Oral, Daily      PRN Meds:     acetaminophen **OR** acetaminophen **OR** acetaminophen    albuterol    senna-docusate sodium **AND** polyethylene glycol **AND** bisacodyl **AND** bisacodyl    Calcium Replacement - Follow Nurse / BPA Driven Protocol    hydrALAZINE    Magnesium Standard Dose Replacement - Follow Nurse / BPA Driven Protocol    Phosphorus Replacement - Follow Nurse / BPA Driven Protocol    Potassium Replacement - Follow Nurse / BPA Driven Protocol    Potassium Replacement - Follow Nurse / BPA Driven Protocol    sodium chloride    sodium  chloride  Continuous Infusions:       Assessment & Plan   Active Hospital Problems    Diagnosis  POA    **Hypoxia [R09.02]  Yes    Severe protein-calorie malnutrition [E43]  Yes    Altered mental state [R41.82]  Unknown    Chest discomfort [R07.89]  Yes    Elevated troponin [R79.89]  Yes    Acute CVA (cerebrovascular accident) [I63.9]  Yes    Former smoker [Z87.891]  Not Applicable    Hypothyroid [E03.9]  Yes    HLD (hyperlipidemia) [E78.5]  Yes    Primary hypertension [I10]  Yes    History of CVA (cerebrovascular accident) [Z86.73]  Not Applicable      Resolved Hospital Problems   No resolved problems to display.       Assessment & Plan    AMS  Multiple acute strokes  -This is quite a bit better since Saturday.  -Appreciate evaluation from neurology.  EEG and head CT unremarkable  -Brain MRI has revealed multiple acute infarcts.  Carotid Doppler noted show 50-69% stenosis on the right and less than 50% on the left  -Gabapentin on hold  - Metabolic workup unremarkable  -Will await additional input from neurology  -Currently on aspirin, statin, Plavix     Hypoxia  Former smoker  Suspected aspiration pneumonia  - Continue Zosyn to complete a 7-day course  -On room air with no respiratory distress today  -Speech therapy has modified her diet       Chest discomfort     Elevated troponin  -CTA negative PE   - Stress test and echocardiogram looked okay  - On statin, aspirin, Plavix  -s/p loop recorder; followed by Dr. Marrero  -Cardiology following.  Discussed with Dr. Boyd  -Patient has no documented history of atrial fibrillation.  Loop recorder is to be interrogated       Hypokalemia  - Improved.  Replace as per protocol       Primary hypertension  - Blood pressure has risen and will see how this does with stopping IV fluids  -Continue Norvasc.  Increase lisinopril dose     Elevated creatinine  - Resolved  - Lasix on hold  - She ate much better for breakfast and I will stop her IV fluids and observe     Very poor  IV access  - Now with midline for antibiotic administration as well as labs       HLD (hyperlipidemia)    History of cardioembolic cerebrovascular accident (CVA)  -Plavix, statin, ASA continued     Seizure disorder  - Remains on Keppra as per neurology recommendations     History of CVA with chronic dysphagia  - Repeat brain MRI revealed multiple acute infarcts  -Diet has been modified by speech therapy       Hypothyroid  - Recheck TSH within normal limits     Right hand pain and swelling  - Uric acid unremarkable  -X-ray suggested as above CPPD arthropathy.  -Will treat conservatively for now as she reports improvement of pain and swelling looks better today.     Nutrition  - Dietitian following for calorie count.  Giving Magic cups and boost  - reports that she ate much better for breakfast this morning       Her  has requested a palliative care consult which I think is very reasonable.    SCDs for DVT prophylaxis  Limited code - No CPR.  Discussed with patient and        Expected Discharge Date: 6/11/2025; Expected Discharge Time:      Tito Bhandari MD  [facility]  06/10/25  10:42 EDT

## 2025-06-10 NOTE — THERAPY TREATMENT NOTE
Patient Name: Avelina Carr  : 1940    MRN: [id number]                              Today's Date: 6/10/2025       Admit Date: 2025    Visit Dx:     ICD-10-CM ICD-9-CM   1. Acute nonintractable headache, unspecified headache type  R51.9 784.0   2. Hypoxia  R09.02 799.02   3. Generalized weakness  R53.1 780.79   4. Hypokalemia  E87.6 276.8     Patient Active Problem List   Diagnosis    Postmastectomy lymphedema syndrome    Malignant neoplasm of upper-outer quadrant of right female breast    Cerebrovascular accident (CVA)    Stroke    Primary hypertension    HLD (hyperlipidemia)    Adverse effect of antiplatelet agent    Left middle cerebral artery stroke    Carcinoma of central portion of right breast in female, estrogen receptor positive    Chronic bilateral low back pain with right-sided sciatica    History of lumbar surgery    Seizure disorder, simple partial, without intractable epilepsy    Spinal stenosis of lumbar region with neurogenic claudication    Status post craniectomy    History of total right knee replacement    Other forms of scoliosis, lumbar region    DDD (degenerative disc disease), lumbar    Benign meningioma of brain    Squamous cell carcinoma of leg, left    History of CVA (cerebrovascular accident)    Hypothyroid    Depression    COPD (chronic obstructive pulmonary disease)    Obstructive sleep apnea    Squamous cell carcinoma of leg, right    Pain in joint, multiple sites    Excessive daytime sleepiness    Former smoker    Gastroesophageal reflux disease    Migraines    Generalized weakness    Acute CVA (cerebrovascular accident)    Dependence on supplemental oxygen    Paroxysmal supraventricular tachycardia    Arthritis    Asthma    Focal neurological deficit present    Acute right MCA stroke    History of supraventricular tachycardia    Rotator cuff arthropathy of right shoulder    Hallux valgus (acquired), right foot    Hallux valgus (acquired), left foot    Hypoxia    Chest  discomfort    Elevated troponin    Severe protein-calorie malnutrition    Altered mental state     Past Medical History:   Diagnosis Date    Anxiety     Arthritis     Breast cancer     COPD (chronic obstructive pulmonary disease)     oxygen at 2L aths    COVID     CVD (cardiovascular disease)     Depression     GERD (gastroesophageal reflux disease)     Hx of radiation therapy     Hyperlipidemia     Hypothyroid     Low back pain     Macular degeneration     Osteoarthritis     Peripheral neuropathy     Sleep apnea     Stroke     Vertigo      Past Surgical History:   Procedure Laterality Date    ADRENAL GLAND SURGERY      BRAIN MENINGIOMA EXCISION      occipital    BREAST BIOPSY       SECTION      EMBOLECTOMY N/A 11/15/2017    Procedure: Embolectomy Mechanical;  Surgeon: Rachid Figueroa MD;  Location: [facility] ;  Service:     MASTECTOMY      RECTAL SURGERY  2003    REPLACEMENT TOTAL KNEE Right     SKIN BIOPSY  2025    TOTAL SHOULDER REPLACEMENT      x2      General Information       Row Name 06/10/25 1601          OT Time and Intention    Subjective Information no complaints  -HE     Document Type therapy note (daily note)  -HE     Mode of Treatment co-treatment;physical therapy;occupational therapy  -HE     Patient Effort good  -HE       Row Name 06/10/25 1601          General Information    Patient Profile Reviewed yes  -HE     Existing Precautions/Restrictions fall  -HE       Row Name 06/10/25 1601          Cognition    Orientation Status (Cognition) oriented to;person  -HE       Row Name 06/10/25 1601          Safety Issues/Impairments Affecting Functional Mobility    Impairments Affecting Function (Mobility) balance;cognition;endurance/activity tolerance;strength;grasp  -HE     Cognitive Impairments, Mobility Safety/Performance judgment;safety precaution awareness;safety precaution follow-through  -               User Key  (r) = Recorded By, (t) = Taken By, (c) = Cosigned By       Initials Name Provider Type    HE Charissa Maravilla OT Occupational Therapist                     Mobility/ADL's       Row Name 06/10/25 1602          Bed Mobility    Bed Mobility supine-sit;sit-supine  -HE     Supine-Sit [place] (Bed Mobility) moderate assist (50% patient effort);2 person assist  -HE     Sit-Supine [place] (Bed Mobility) moderate assist (50% patient effort);2 person assist  -HE     Assistive Device (Bed Mobility) bed rails;head of bed elevated  -       Row Name 06/10/25 1602          Transfers    Transfers sit-stand transfer;stand-sit transfer  -       Row Name 06/10/25 1602          Sit-Stand Transfer    Sit-Stand [place] (Transfers) minimum assist (75% patient effort);2 person assist  -HE     Assistive Device (Sit-Stand Transfers) walker, front-wheeled  -       Row Name 06/10/25 1602          Stand-Sit Transfer    Stand-Sit [place] (Transfers) minimum assist (75% patient effort);2 person assist;verbal cues  -     Assistive Device (Stand-Sit Transfers) walker, front-wheeled  -       Row Name 06/10/25 1602          Functional Mobility    Functional Mobility- Ind. Level minimum assist (75% patient effort);2 person assist required;verbal cues required  -     Functional Mobility- Device walker, front-wheeled  -HE     Functional Mobility- Comment able to take few slow side steps to HOB, 2 steps fwd and back, tolerates light  of RW with RUE  -HE     Patient was able to Ambulate yes  -       Row Name 06/10/25 1602          Activities of Daily Living    BADL Assessment/Intervention grooming  -       Row Name 06/10/25 1602          Grooming Assessment/Training    [place] Level (Grooming) hair care, combing/brushing;wash face, hands;moderate assist (50% patient effort)  -HE     Position (Grooming) edge of bed sitting  -HE     Comment, (Grooming) assist to brush hair while pt completes face washing with increased time seated EOB, does utilize RUE for task  -HE                User Key  (r) = Recorded By, (t) = Taken By, (c) = Cosigned By      Initials Name Provider Type    Charissa Mullen OT Occupational Therapist                   Obj/Interventions       [facility] Name 06/10/25 1605          Range of Motion Comprehensive    Comment, General Range of Motion improved movement of R hand this date  -HE               User Key  (r) = Recorded By, (t) = Taken By, (c) = Cosigned By      Initials Name Provider Type    Charissa Mullen OT Occupational Therapist                   Goals/Plan       Row Name 06/10/25 1608          Bed Mobility Goal 1 (OT)    Progress/Outcomes (Bed Mobility Goal 1, OT) goal ongoing  -HE       Row Name 06/10/25 1608          Transfer Goal 1 (OT)    Progress/Outcome (Transfer Goal 1, OT) goal ongoing  -HE       Row Name 06/10/25 1608          Dressing Goal 1 (OT)    Progress/Outcome (Dressing Goal 1, OT) goal ongoing  -HE       Row Name 06/10/25 1608          Toileting Goal 1 (OT)    Progress/Outcome (Toileting Goal 1, OT) goal ongoing  -HE       Row Name 06/10/25 1608          Grooming Goal 1 (OT)    Progress/Outcome (Grooming Goal 1, OT) goal ongoing  -               User Key  (r) = Recorded By, (t) = Taken By, (c) = Cosigned By      Initials Name Provider Type    Charissa Mullen OT Occupational Therapist                   Clinical Impression       Row Name 06/10/25 1605          Pain Assessment    Pretreatment Pain Rating 0/10 - no pain  -HE     Posttreatment Pain Rating 0/10 - no pain  -HE       Row Name 06/10/25 1605          Plan of Care Review    Plan of Care Reviewed With patient  -HE     Outcome Evaluation Pt received semi-supine, agreeable to skilled tx. Pt requires Mod A x2 persons with increased time and verbal cues to come to sit. Tolerates sitting for washing face and assist for brushing hair, does utilize RUE for self care tasks this date w/o c/o pain. Pt able to stand and take few side steps, 1-2 steps fwd and back with Min A of two  persons, light grasp on RW with RUE. Pt returned to supine at EOC. IPOT to continue to follow with recommendation for CASTILLO at discharge.  -HE       Row Name 06/10/25 1605          Therapy Assessment/Plan (OT)    Rehab Potential (OT) good  -HE     Criteria for Skilled Therapeutic Interventions Met (OT) yes  -HE     Therapy Frequency (OT) 3 times/wk  -HE       Row Name 06/10/25 1605          Therapy Plan Review/Discharge Plan (OT)    Anticipated Discharge Disposition (OT) sub acute care setting  -       Row Name 06/10/25 1605          Positioning and Restraints    Pre-Treatment Position in bed  -HE     Post Treatment Position bed  -HE     In Bed fowlers;call light within reach;encouraged to call for assist;exit alarm on  -HE               User Key  (r) = Recorded By, (t) = Taken By, (c) = Cosigned By      Initials Name Provider Type    Charissa Mullen, OT Occupational Therapist                   Outcome Measures       Row Name 06/10/25 1537 06/10/25 0800       How much help from another person do you currently need...    Turning from your back to your side while in flat bed without using bedrails? 2  -MS 3  -JJ    Moving from lying on back to sitting on the side of a flat bed without bedrails? 2  -MS 3  -JJ    Moving to and from a bed to a chair (including a wheelchair)? 2  -MS 2  -JJ    Standing up from a chair using your arms (e.g., wheelchair, bedside chair)? 2  -MS 2  -JJ    Climbing 3-5 steps with a railing? 2  -MS 1  -JJ    To walk in hospital room? 2  -MS 2  -JJ    AM-PAC 6 Clicks Score (PT) 12  -MS 13  -JJ    Highest Level of Mobility Goal Move to Chair/Commode-4  -MS Move to Chair/Commode-4  -JJ      Row Name 06/10/25 1537          Functional Assessment    Outcome Measure Options AM-PAC 6 Clicks Basic Mobility (PT)  -MS               User Key  (r) = Recorded By, (t) = Taken By, (c) = Cosigned By      Initials Name Provider Type    Roque Cheek, PT Physical Therapist    Venkatesh Wahl RN  Registered Nurse                    Occupational Therapy Education       Title: PT OT SLP Therapies (Done)       Topic: Occupational Therapy (Done)       Point: ADL training (Done)       Learning Progress Summary            Patient Acceptance, E, VU,NR by HE at 6/10/2025 1609    Comment: Pt educated on goals of session, OT POC.                      Point: Home exercise program (Done)       Learning Progress Summary            Patient Acceptance, E, VU,NR by HE at 6/10/2025 1609    Comment: Pt educated on goals of session, OT POC.                      Point: Precautions (Done)       Learning Progress Summary            Patient Acceptance, E, VU,NR by HE at 6/10/2025 1609    Comment: Pt educated on goals of session, OT POC.                      Point: Body mechanics (Done)       Learning Progress Summary            Patient Acceptance, E, VU,NR by HE at 6/10/2025 1609    Comment: Pt educated on goals of session, OT POC.                                      User Key       Initials Effective Dates Name Provider Type Discipline     02/18/25 -  Charissa Maravilla, AZAM Occupational Therapist OT                  OT Recommendation and Plan  Therapy Frequency (OT): 3 times/wk  Plan of Care Review  Plan of Care Reviewed With: patient  Outcome Evaluation: Pt received semi-supine, agreeable to skilled tx. Pt requires Mod A x2 persons with increased time and verbal cues to come to sit. Tolerates sitting for washing face and assist for brushing hair, does utilize RUE for self care tasks this date w/o c/o pain. Pt able to stand and take few side steps, 1-2 steps fwd and back with Min A of two persons, light grasp on RW with RUE. Pt returned to supine at EOC. IPOT to continue to follow with recommendation for CASTILLO at discharge.     Time Calculation:         Time Calculation- OT       Row Name 06/10/25 1609             Time Calculation- OT    OT Start Time 1416  -HE      OT Stop Time 1439  -HE      OT Time Calculation (min) 23 min  -HE       OT Received On 06/10/25  -HE      OT - Next Appointment 06/12/25  -HE         Timed Charges    85704 - OT Therapeutic Activity Minutes 8  -HE      93057 - OT Self Care/Mgmt Minutes 15  -HE         Total Minutes    Timed Charges Total Minutes 23  -HE       Total Minutes 23  -HE                User Key  (r) = Recorded By, (t) = Taken By, (c) = Cosigned By      Initials Name Provider Type     Charissa Maravilla OT Occupational Therapist                  Therapy Charges for Today       Code Description Service Date Service Provider Modifiers Qty    13131814719  OT THERAPEUTIC ACT EA 15 MIN 6/10/2025 Charissa Maravilla OT GO 1    26581919018 HC OT SELF CARE/MGMT/TRAIN EA 15 MIN 6/10/2025 Charissa Maravilla OT GO 1                 Charissa Maravilla OT  6/10/2025

## 2025-06-10 NOTE — TREATMENT PLAN
Spoke with patient's spouse Vitaly by phone in regards to palliative consult. Meeting scheduled for 10am tomorrow 6/11/25.

## 2025-06-10 NOTE — THERAPY TREATMENT NOTE
Patient Name: Avelina Carr  : 1940    MRN: [id number]                              Today's Date: 6/10/2025       Admit Date: 2025    Visit Dx:     ICD-10-CM ICD-9-CM   1. Acute nonintractable headache, unspecified headache type  R51.9 784.0   2. Hypoxia  R09.02 799.02   3. Generalized weakness  R53.1 780.79   4. Hypokalemia  E87.6 276.8     Patient Active Problem List   Diagnosis    Postmastectomy lymphedema syndrome    Malignant neoplasm of upper-outer quadrant of right female breast    Cerebrovascular accident (CVA)    Stroke    Primary hypertension    HLD (hyperlipidemia)    Adverse effect of antiplatelet agent    Left middle cerebral artery stroke    Carcinoma of central portion of right breast in female, estrogen receptor positive    Chronic bilateral low back pain with right-sided sciatica    History of lumbar surgery    Seizure disorder, simple partial, without intractable epilepsy    Spinal stenosis of lumbar region with neurogenic claudication    Status post craniectomy    History of total right knee replacement    Other forms of scoliosis, lumbar region    DDD (degenerative disc disease), lumbar    Benign meningioma of brain    Squamous cell carcinoma of leg, left    History of CVA (cerebrovascular accident)    Hypothyroid    Depression    COPD (chronic obstructive pulmonary disease)    Obstructive sleep apnea    Squamous cell carcinoma of leg, right    Pain in joint, multiple sites    Excessive daytime sleepiness    Former smoker    Gastroesophageal reflux disease    Migraines    Generalized weakness    Acute CVA (cerebrovascular accident)    Dependence on supplemental oxygen    Paroxysmal supraventricular tachycardia    Arthritis    Asthma    Focal neurological deficit present    Acute right MCA stroke    History of supraventricular tachycardia    Rotator cuff arthropathy of right shoulder    Hallux valgus (acquired), right foot    Hallux valgus (acquired), left foot    Hypoxia    Chest  discomfort    Elevated troponin    Severe protein-calorie malnutrition    Altered mental state     Past Medical History:   Diagnosis Date    Anxiety     Arthritis     Breast cancer     COPD (chronic obstructive pulmonary disease)     oxygen at 2L aths    COVID     CVD (cardiovascular disease)     Depression     GERD (gastroesophageal reflux disease)     Hx of radiation therapy     Hyperlipidemia     Hypothyroid     Low back pain     Macular degeneration     Osteoarthritis     Peripheral neuropathy     Sleep apnea     Stroke     Vertigo      Past Surgical History:   Procedure Laterality Date    ADRENAL GLAND SURGERY      BRAIN MENINGIOMA EXCISION      occipital    BREAST BIOPSY       SECTION      EMBOLECTOMY N/A 11/15/2017    Procedure: Embolectomy Mechanical;  Surgeon: Rachid Figueroa MD;  Location: [facility] ;  Service:     MASTECTOMY  2014    RECTAL SURGERY  2003    REPLACEMENT TOTAL KNEE Right     SKIN BIOPSY  2025    TOTAL SHOULDER REPLACEMENT      x2      General Information       Row Name 06/10/25 1534          Physical Therapy Time and Intention    Document Type therapy note (daily note)  -MS     Mode of Treatment physical therapy;occupational therapy;co-treatment  Activity limitation due to fatigue/weakness; Working on functional balance and strengthening while performing ADL's  -MS       Row Name 06/10/25 1534          General Information    Patient Profile Reviewed yes  -MS     Existing Precautions/Restrictions fall  Exit alarm  -MS     Barriers to Rehab none identified   Pt. more alert today and following all commands.  -MS       Row Name 06/10/25 1534          Cognition    Orientation Status (Cognition) oriented to;person;place  -MS       Row Name 06/10/25 1534          Safety Issues/Impairments Affecting Functional Mobility    Comment, Safety Issues/Impairments (Mobility) Gait belt used for safety.  -MS               User Key  (r) = Recorded By, (t) = Taken By, (c) =  Cosigned By      Initials Name Provider Type    MS DaydayRoque, PT Physical Therapist                   Mobility       Row Name 06/10/25 1534          Bed Mobility    Supine-Sit Starr (Bed Mobility) moderate assist (50% patient effort);2 person assist  -MS     Sit-Supine Starr (Bed Mobility) moderate assist (50% patient effort);2 person assist  -MS     Assistive Device (Bed Mobility) repositioning sheet  -MS       Row Name 06/10/25 1534          Sit-Stand Transfer    Sit-Stand Starr (Transfers) minimum assist (75% patient effort);2 person assist  -MS     Assistive Device (Sit-Stand Transfers) walker, front-wheeled  -MS       Row Name 06/10/25 1534          Gait/Stairs (Locomotion)    Starr Level (Gait) minimum assist (75% patient effort);2 person assist  -MS     Assistive Device (Gait) walker, front-wheeled  -MS     Distance in Feet (Gait) 1  FW/BW;  Pt. also able to take 4 small, shuffled sidesteps at bedside  -MS     Deviations/Abnormal Patterns (Gait) zabrina decreased;stride length decreased  -MS     Bilateral Gait Deviations forward flexed posture  -MS     Comment, (Gait/Stairs) Verbal/tactile cues given for posture correction and Rwx guidance.  -MS               User Key  (r) = Recorded By, (t) = Taken By, (c) = Cosigned By      Initials Name Provider Type    MS BrambilaerRoque, PT Physical Therapist                   Obj/Interventions       Row Name 06/10/25 1535          Motor Skills    Therapeutic Exercise --  BLE ther. ex. program x 5 resp completed (Hip Flexion, LAQ's)  -MS               User Key  (r) = Recorded By, (t) = Taken By, (c) = Cosigned By      Initials Name Provider Type    MS AvilesNaylorRoque, PT Physical Therapist                   Goals/Plan    No documentation.                  Clinical Impression       Row Name 06/10/25 1536          Pain    Pretreatment Pain Rating 0/10 - no pain  -MS     Posttreatment Pain Rating 0/10 - no pain  -MS      Pre/Posttreatment Pain Comment No Right wrist or shoulder pain this date.  -MS       Row Name 06/10/25 1536          Positioning and Restraints    Pre-Treatment Position in bed  -MS     Post Treatment Position bed  -MS     In Bed notified nsg;supine;call light within reach;encouraged to call for assist;exit alarm on  All lines intact.  -MS               User Key  (r) = Recorded By, (t) = Taken By, (c) = Cosigned By      Initials Name Provider Type    Roque Cheek, PT Physical Therapist                   Outcome Measures       Row Name 06/10/25 1537 06/10/25 0800       How much help from another person do you currently need...    Turning from your back to your side while in flat bed without using bedrails? 2  -MS 3  -JJ    Moving from lying on back to sitting on the side of a flat bed without bedrails? 2  -MS 3  -JJ    Moving to and from a bed to a chair (including a wheelchair)? 2  -MS 2  -JJ    Standing up from a chair using your arms (e.g., wheelchair, bedside chair)? 2  -MS 2  -JJ    Climbing 3-5 steps with a railing? 2  -MS 1  -JJ    To walk in hospital room? 2  -MS 2  -JJ    AM-PAC 6 Clicks Score (PT) 12  -MS 13  -JJ    Highest Level of Mobility Goal Move to Chair/Commode-4  -MS Move to Chair/Commode-4  -JJ      Row Name 06/10/25 1537          Functional Assessment    Outcome Measure Options AM-PAC 6 Clicks Basic Mobility (PT)  -MS               User Key  (r) = Recorded By, (t) = Taken By, (c) = Cosigned By      Initials Name Provider Type    Roque Cheek, PT Physical Therapist    Venkatesh Wahl RN Registered Nurse                                 Physical Therapy Education       Title: PT OT SLP Therapies (Done)       Topic: Physical Therapy (Done)       Point: Mobility training (Done)       Learning Progress Summary            Patient Acceptance, E,D, VU,NR by MS at 6/10/2025 1537    Acceptance, E,D, NR by MS at 6/9/2025 1145    Acceptance, E, VU,NR by  at 6/5/2025 1156                       Point: Home exercise program (Done)       Learning Progress Summary            Patient Acceptance, E,D, VU,NR by MS at 6/10/2025 1537    Acceptance, E,D, NR by MS at 6/9/2025 1145    Acceptance, E, VU,NR by  at 6/5/2025 1156                      Point: Body mechanics (Done)       Learning Progress Summary            Patient Acceptance, E,D, VU,NR by MS at 6/10/2025 1537    Acceptance, E,D, NR by MS at 6/9/2025 1145    Acceptance, E, VU,NR by  at 6/5/2025 1156                      Point: Precautions (Done)       Learning Progress Summary            Patient Acceptance, E,D, VU,NR by MS at 6/10/2025 1537    Acceptance, E,D, NR by MS at 6/9/2025 1145    Acceptance, E, VU,NR by  at 6/5/2025 1156                                      User Key       Initials Effective Dates Name Provider Type Discipline    MS 06/16/21 -  Roque Naylor PT Physical Therapist PT     05/02/22 -  Nedra Sigala, KAREN Physical Therapist PT                  PT Recommendation and Plan     Outcome Evaluation: Upon entering room, pt. supine in bed, awake, and agreeable to work with P.T. this date despite c/o fatigue.  This PM, pt. able to ambulate 1 foot (FW/BW) and take 4 small, shuffled sidesteps at bedside, Min. assist x 2, with use of Rwx.  Pt. requires Mod. assist x 2 for bed mobility and Min. assist x2 for sit <-> stand transfers.  BLE ther. ex. program x 5 reps completed for general strengthening. Verbal/tactile cues given during ambulation for posture correction and Rwx guidance.  Overall improved tolerance to functional activity this date compared to last P.T. session with initiation of forward and sidestepping activities.  Will continue to progress functional mobility as tolerated.     Time Calculation:         PT Charges       Row Name 06/10/25 1542             Time Calculation    Start Time 1418  -MS      Stop Time 1439  -MS      Time Calculation (min) 21 min  -MS      PT Received On 06/10/25  -MS      PT - Next Appointment  06/11/25  -MS         Time Calculation- PT    Total Timed Code Minutes- PT 20 minute(s)  -MS                User Key  (r) = Recorded By, (t) = Taken By, (c) = Cosigned By      Initials Name Provider Type    Roque Cheek, PT Physical Therapist                  Therapy Charges for Today       Code Description Service Date Service Provider Modifiers Qty    [id number]  PT THERAPEUTIC ACT EA 15 MIN 6/9/2025 Roque Naylor, PT GP 2    [id number]  PT THER SUPP EA 15 MIN 6/9/2025 Roque Naylor, PT GP 2    [id number]  PT THERAPEUTIC ACT EA 15 MIN 6/10/2025 Roque Naylor, PT GP 1            PT G-Codes  Outcome Measure Options: AM-PAC 6 Clicks Basic Mobility (PT)  AM-PAC 6 Clicks Score (PT): 12  AM-PAC 6 Clicks Score (OT): 19       Roque Naylor PT  6/10/2025

## 2025-06-10 NOTE — CONSULTS
Nutrition Services    Patient Name:  Avelina Carr  YOB: 1940  MRN: [id number]  Admit Date:  6/4/2025  Assessment Date:  06/10/25     CALORIE COUNT PROGRESS NOTE    Comments: Calorie Count beginning this morning - will follow with results    Calorie Count Data          Calories Protein   Day 1   (6/11)      Day 2   (6/12)     Day 3   (6/13)     Average Intake       Current Nutrition Orders            Food Allergies NKFA   Current PO Diet Diet: Regular/House; Feeding Assistance - Nursing, No Straw; Texture: Pureed (NDD 1); Fluid Consistency: Nectar Thick   Supplement Magic Cup TID and Boost VHC BID   Current EN/PN  Route: n/a  Order: --     Estimated Requirements         Weight for Calculation  68.9 kg     Calories  1003-9252 (22 kcal/kg, 25 kcal/kg)    Protein  70-85 (1.0 - 1.2 gm/kg)    Fluid  8908-1387 (1 mL/kcal)     RD to follow up per protocol.    Electronically signed by:  Fab Senior RD  06/10/25 08:03 EDT

## 2025-06-10 NOTE — PLAN OF CARE
Goal Outcome Evaluation:  Plan of Care Reviewed With: patient           Outcome Evaluation: Pt received semi-supine, agreeable to skilled tx. Pt requires Mod A x2 persons with increased time and verbal cues to come to sit. Tolerates sitting for washing face and assist for brushing hair, does utilize RUE for self care tasks this date w/o c/o pain. Pt able to stand and take few side steps, 1-2 steps fwd and back with Min A of two persons, light grasp on RW with RUE. Pt returned to supine at EOC. IPOT to continue to follow with recommendation for [facility]O at discharge.    Anticipated Discharge Disposition (OT): sub acute care setting

## 2025-06-11 LAB
ANION GAP SERPL CALCULATED.3IONS-SCNC: 8.8 MMOL/L (ref 5–15)
BUN SERPL-MCNC: 15 MG/DL (ref 8–23)
BUN/CREAT SERPL: 13.8 (ref 7–25)
CALCIUM SPEC-SCNC: 8.8 MG/DL (ref 8.6–10.5)
CHLORIDE SERPL-SCNC: 111 MMOL/L (ref 98–107)
CO2 SERPL-SCNC: 23.2 MMOL/L (ref 22–29)
CREAT SERPL-MCNC: 1.09 MG/DL (ref 0.57–1)
DEPRECATED RDW RBC AUTO: 46.7 FL (ref 37–54)
EGFRCR SERPLBLD CKD-EPI 2021: 49.6 ML/MIN/1.73
ERYTHROCYTE [DISTWIDTH] IN BLOOD BY AUTOMATED COUNT: 12.8 % (ref 12.3–15.4)
GLUCOSE BLDC GLUCOMTR-MCNC: 110 MG/DL (ref 70–130)
GLUCOSE SERPL-MCNC: 94 MG/DL (ref 65–99)
HCT VFR BLD AUTO: 32.1 % (ref 34–46.6)
HGB BLD-MCNC: 10.7 G/DL (ref 12–15.9)
MCH RBC QN AUTO: 33.2 PG (ref 26.6–33)
MCHC RBC AUTO-ENTMCNC: 33.3 G/DL (ref 31.5–35.7)
MCV RBC AUTO: 99.7 FL (ref 79–97)
PLATELET # BLD AUTO: 259 10*3/MM3 (ref 140–450)
PMV BLD AUTO: 10.6 FL (ref 6–12)
POTASSIUM SERPL-SCNC: 3.7 MMOL/L (ref 3.5–5.2)
RBC # BLD AUTO: 3.22 10*6/MM3 (ref 3.77–5.28)
SODIUM SERPL-SCNC: 143 MMOL/L (ref 136–145)
WBC NRBC COR # BLD AUTO: 6.62 10*3/MM3 (ref 3.4–10.8)

## 2025-06-11 PROCEDURE — 82948 REAGENT STRIP/BLOOD GLUCOSE: CPT

## 2025-06-11 PROCEDURE — 94799 UNLISTED PULMONARY SVC/PX: CPT

## 2025-06-11 PROCEDURE — 99232 SBSQ HOSP IP/OBS MODERATE 35: CPT | Performed by: INTERNAL MEDICINE

## 2025-06-11 PROCEDURE — 80048 BASIC METABOLIC PNL TOTAL CA: CPT | Performed by: INTERNAL MEDICINE

## 2025-06-11 PROCEDURE — 85027 COMPLETE CBC AUTOMATED: CPT | Performed by: INTERNAL MEDICINE

## 2025-06-11 PROCEDURE — 25010000002 PIPERACILLIN SOD-TAZOBACTAM PER 1 G: Performed by: INTERNAL MEDICINE

## 2025-06-11 PROCEDURE — 99232 SBSQ HOSP IP/OBS MODERATE 35: CPT | Performed by: PHYSICIAN ASSISTANT

## 2025-06-11 RX ADMIN — ATENOLOL 25 MG: 25 TABLET ORAL at 11:13

## 2025-06-11 RX ADMIN — LEVETIRACETAM 500 MG: 500 TABLET, FILM COATED ORAL at 08:38

## 2025-06-11 RX ADMIN — ASPIRIN 325 MG ORAL TABLET 325 MG: 325 PILL ORAL at 08:38

## 2025-06-11 RX ADMIN — ESCITALOPRAM 20 MG: 10 TABLET, FILM COATED ORAL at 08:38

## 2025-06-11 RX ADMIN — SODIUM CHLORIDE 3.38 G: 9 INJECTION, SOLUTION INTRAVENOUS at 17:14

## 2025-06-11 RX ADMIN — CALCIUM CARBONATE-VITAMIN D TAB 500 MG-200 UNIT 1 TABLET: 500-200 TAB at 08:38

## 2025-06-11 RX ADMIN — Medication 1000 MCG: at 08:38

## 2025-06-11 RX ADMIN — Medication 10 ML: at 08:40

## 2025-06-11 RX ADMIN — DONEPEZIL HYDROCHLORIDE 10 MG: 10 TABLET, FILM COATED ORAL at 08:38

## 2025-06-11 RX ADMIN — LEVETIRACETAM 500 MG: 500 TABLET, FILM COATED ORAL at 20:06

## 2025-06-11 RX ADMIN — LISINOPRIL 20 MG: 20 TABLET ORAL at 08:38

## 2025-06-11 RX ADMIN — ATORVASTATIN CALCIUM 80 MG: 80 TABLET, FILM COATED ORAL at 08:38

## 2025-06-11 RX ADMIN — SODIUM CHLORIDE 3.38 G: 9 INJECTION, SOLUTION INTRAVENOUS at 08:39

## 2025-06-11 RX ADMIN — CALCIUM CARBONATE-VITAMIN D TAB 500 MG-200 UNIT 1 TABLET: 500-200 TAB at 20:06

## 2025-06-11 RX ADMIN — LEVOTHYROXINE SODIUM 150 MCG: 0.07 TABLET ORAL at 06:08

## 2025-06-11 RX ADMIN — SODIUM CHLORIDE 3.38 G: 9 INJECTION, SOLUTION INTRAVENOUS at 01:43

## 2025-06-11 RX ADMIN — PANTOPRAZOLE SODIUM 40 MG: 40 TABLET, DELAYED RELEASE ORAL at 06:08

## 2025-06-11 RX ADMIN — Medication 10 ML: at 20:06

## 2025-06-11 RX ADMIN — BUDESONIDE AND FORMOTEROL FUMARATE DIHYDRATE 2 PUFF: 160; 4.5 AEROSOL RESPIRATORY (INHALATION) at 09:09

## 2025-06-11 RX ADMIN — AMLODIPINE BESYLATE 5 MG: 5 TABLET ORAL at 08:38

## 2025-06-11 RX ADMIN — LATANOPROST 1 DROP: 50 SOLUTION/ DROPS OPHTHALMIC at 20:06

## 2025-06-11 RX ADMIN — CLOPIDOGREL BISULFATE 75 MG: 75 TABLET, FILM COATED ORAL at 08:38

## 2025-06-11 RX ADMIN — ATENOLOL 25 MG: 25 TABLET ORAL at 20:06

## 2025-06-11 RX ADMIN — LISINOPRIL 20 MG: 20 TABLET ORAL at 20:06

## 2025-06-11 RX ADMIN — BUDESONIDE AND FORMOTEROL FUMARATE DIHYDRATE 2 PUFF: 160; 4.5 AEROSOL RESPIRATORY (INHALATION) at 19:59

## 2025-06-11 NOTE — PROGRESS NOTES
"DOS: 2025  NAME: Avelina Carr   : 1940  PCP: Artem Shepherd MD  Chief Complaint   Patient presents with    Headache    Weakness - Generalized       Chief complaint: somnolence  Subjective: Family at bedside.  She slept well overnight.  Patient more alert and attentive this morning.  No new symptoms to report    Objective:  Vital signs: /73 (BP Location: Right leg, Patient Position: Lying)   Pulse 78   Temp 98 °F (36.7 °C) (Oral)   Resp 18   Ht 162.6 cm (64\")   Wt 69.7 kg (153 lb 10.6 oz)   SpO2 96%   BMI 26.38 kg/m²      Gen: NAD, vitals reviewed  MS: Oriented to self, situation, not day of week, recent/remote memory impaired, good attention/concentration, no aphasia, repeats I only know that Russel is the one to help today with no paraphasic errors, no neglect.  CN: visual acuity grossly normal, PERRL, EOMI, no facial droop, no dysarthria  Motor: Pain limited right upper extremity, otherwise brisk symmetric movements, normal tone  Sensory: intact to light touch all 4 ext.  Coordination: intact on left    ROS:  No weakness, numbness  No fevers, chills    Scheduled Medications:amLODIPine, 5 mg, Oral, Q24H  aspirin, 325 mg, Oral, Daily  atenolol, 25 mg, Oral, BID  atorvastatin, 80 mg, Oral, Daily  budesonide-formoterol, 2 puff, Inhalation, BID  calcium 500 mg vitamin D 5 mcg (200 UT), 1 tablet, Oral, BID  clopidogrel, 75 mg, Oral, Daily  donepezil, 10 mg, Oral, Daily  [Held by provider] enoxaparin sodium, 30 mg, Subcutaneous, Q24H  escitalopram, 20 mg, Oral, Daily  [Held by provider] furosemide, 20 mg, Oral, Daily  [Held by provider] gabapentin, 400 mg, Oral, Q12H  latanoprost, 1 drop, Right Eye, Nightly  levETIRAcetam, 500 mg, Oral, Q12H  levothyroxine, 150 mcg, Oral, Q AM  lisinopril, 10 mg, Oral, Q12H  pantoprazole, 40 mg, Oral, Q AM  piperacillin-tazobactam, 3.375 g, Intravenous, Q8H  potassium chloride, 40 mEq, Oral, Q4H  sodium chloride, 10 mL, Intravenous, Q12H  vitamin B-12, 1,000 mcg, " Oral, Daily      Infusions: sodium chloride, 75 mL/hr, Last Rate: 75 mL/hr (06/09/25 0635)      PRN Medications:    acetaminophen **OR** acetaminophen **OR** acetaminophen    albuterol    senna-docusate sodium **AND** polyethylene glycol **AND** bisacodyl **AND** bisacodyl    Calcium Replacement - Follow Nurse / BPA Driven Protocol    hydrALAZINE    Magnesium Standard Dose Replacement - Follow Nurse / BPA Driven Protocol    Phosphorus Replacement - Follow Nurse / BPA Driven Protocol    Potassium Replacement - Follow Nurse / BPA Driven Protocol    Potassium Replacement - Follow Nurse / BPA Driven Protocol    sodium chloride    sodium chloride    Laboratory results:  Lab Results   Component Value Date    GLUCOSE 81 06/09/2025    CALCIUM 6.2 (L) 06/09/2025     06/09/2025    K 2.9 (L) 06/09/2025    K 2.9 (L) 06/09/2025    CO2 17.9 (L) 06/09/2025     (H) 06/09/2025    BUN 9.0 06/09/2025    CREATININE 0.58 06/09/2025    EGFRIFAFRI 75 09/24/2021    EGFRIFNONA 62 09/24/2021    BCR 15.5 06/09/2025    ANIONGAP 9.1 06/09/2025     Lab Results   Component Value Date    WBC 7.31 06/08/2025    HGB 11.9 (L) 06/08/2025    HCT 37.3 06/08/2025    .6 (H) 06/08/2025     06/08/2025     Lab Results   Component Value Date    LDL 71 03/19/2024    LDL 78 11/13/2023    LDL 79 10/16/2023            Review of labs: Respiratory panel and MRSA panel negative blood cultures not sent    Review and interpretation of imaging:     Duplex Carotid Ultrasound CAR  Result Date: 6/9/2025    Right internal carotid artery demonstrates a 50-69% stenosis.   Antegrade right vertebral flow.   Left internal carotid artery demonstrates a less than 50% stenosis.   Antegrade left vertebral flow.     EEG  Result Date: 6/7/2025  Table formatting from the original result was not included. EEG Report          # Indication:AMS History: 85-year-old female with history of stroke and distant history of seizure presenting with altered mental  status.  Study includes time locked video Medical diagnoses: Hypothyroidism, anxiety and depression, COPD, peripheral neuropathy, history of breast cancer, history of stroke, hyperlipidemia, sleep apnea Current Facility-Administered Medications Medication Dose Route Frequency Provider Last Rate Last Admin  acetaminophen (TYLENOL) tablet 650 mg  650 mg Oral Q4H PRN Andria Roberts MD   650 mg at 06/05/25 1027  Or  acetaminophen (TYLENOL) 160 MG/5ML oral solution 650 mg  650 mg Oral Q4H PRN Andria Roberts MD      Or  acetaminophen (TYLENOL) suppository 650 mg  650 mg Rectal Q4H PRN Andria Roberts MD      albuterol (PROVENTIL) nebulizer solution 0.083% 2.5 mg/3mL  2.5 mg Nebulization Q6H PRN Andria Roberts MD   2.5 mg at 06/06/25 1949  aspirin tablet 325 mg  325 mg Oral Daily Andria Roberts MD   325 mg at 06/07/25 1033  atenolol (TENORMIN) tablet 25 mg  25 mg Oral BID Andria Roberts MD   25 mg at 06/07/25 2047  atorvastatin (LIPITOR) tablet 80 mg  80 mg Oral Daily Andria Roberts MD   80 mg at 06/07/25 1033  sennosides-docusate (PERICOLACE) 8.6-50 MG per tablet 2 tablet  2 tablet Oral BID PRN Andria Roberts MD      And  polyethylene glycol (MIRALAX) packet 17 g  17 g Oral Daily PRN Andria Roberts MD   17 g at 06/05/25 1231  And  bisacodyl (DULCOLAX) EC tablet 5 mg  5 mg Oral Daily PRN Andria Roberts MD      And  bisacodyl (DULCOLAX) suppository 10 mg  10 mg Rectal Daily PRN Andria Roberts MD   10 mg at 06/05/25 1659  budesonide-formoterol (SYMBICORT) 160-4.5 MCG/ACT inhaler 2 puff  2 puff Inhalation BID Andria Roberts MD   2 puff at 06/07/25 0755  calcium 500 mg vitamin D 5 mcg (200 UT) per tablet 1 tablet  1 tablet Oral BID Andria Roberts MD   1 tablet at 06/07/25 2047  Calcium Replacement - Follow Nurse / BPA Driven Protocol   Not Applicable PRN Andria Roberts MD      clopidogrel (PLAVIX) tablet 75 mg  75 mg  Oral Daily Andria Roberts MD   75 mg at 06/07/25 1033  [Held by provider] donepezil (ARICEPT) tablet 10 mg  10 mg Oral Daily Andria Roberts MD   10 mg at 06/07/25 1033  enoxaparin sodium (LOVENOX) syringe 40 mg  40 mg Subcutaneous Q24H Connor Maynard APRN   40 mg at 06/07/25 1802  escitalopram (LEXAPRO) tablet 20 mg  20 mg Oral Daily Andria Roberts MD   20 mg at 06/07/25 1032  [Held by provider] furosemide (LASIX) tablet 20 mg  20 mg Oral Daily Andria Roberts MD   20 mg at 06/07/25 1033  [Held by provider] gabapentin (NEURONTIN) capsule 400 mg  400 mg Oral Q12H Andria Roberts MD   400 mg at 06/07/25 1033  hydrALAZINE (APRESOLINE) injection 10 mg  10 mg Intravenous Q6H PRN Nahid García MD   10 mg at 06/06/25 0928  latanoprost (XALATAN) 0.005 % ophthalmic solution 1 drop  1 drop Right Eye Nightly Andria Roberts MD   1 drop at 06/06/25 2054  levETIRAcetam (KEPPRA) injection 500 mg  500 mg Intravenous Q12H Nahid García MD   500 mg at 06/07/25 2046  levothyroxine (SYNTHROID, LEVOTHROID) tablet 150 mcg  150 mcg Oral Q AM Andria Roberts MD   150 mcg at 06/05/25 0824  [Held by provider] lisinopril (PRINIVIL,ZESTRIL) tablet 10 mg  10 mg Oral Q12H Sudarshan Boyd MD   10 mg at 06/07/25 1033  Magnesium Standard Dose Replacement - Follow Nurse / BPA Driven Protocol   Not Applicable PRN Andria Roberts MD      NIFEdipine XL (PROCARDIA XL) 24 hr tablet 60 mg  60 mg Oral Q24H Nahid García MD   60 mg at 06/07/25 1033  [START ON 6/8/2025] pantoprazole (PROTONIX) injection 40 mg  40 mg Intravenous Q AM Tito Bhandari MD      Phosphorus Replacement - Follow Nurse / BPA Driven Protocol   Not Applicable PRN Andria Roberts MD      [START ON 6/8/2025] piperacillin-tazobactam (ZOSYN) 3.375 g IVPB in 100 mL NS MBP (CD)  3.375 g Intravenous Q8H Tito Bhandari MD      Potassium Replacement - Follow Nurse / BPA Driven Protocol   Not  Applicable Norman Bartholomew MD      Potassium Replacement - Follow Nurse / BPA Driven Protocol   Not Applicable Andria Palma MD      sodium chloride 0.9 % infusion  100 mL/hr Intravenous Continuous Tito Bhandari  mL/hr at 06/07/25 1451 100 mL/hr at 06/07/25 1451  vitamin B-12 (CYANOCOBALAMIN) tablet 1,000 mcg  1,000 mcg Oral Daily Andria Roberts MD   1,000 mcg at 06/07/25 1032 Time of study: 26 minutes 4 seconds Technical summary: The 10-20 system was used for electrode placement  Background: The background rhythm was composed of primarily 6 Hz diffuse slow theta activity.  Sleep: The patient became more drowsy as noted by diffusely slower activities.  No vertex sharp transients or sleep spindles were seen  Hyperventilation: Not obtained  Photic stimulation: Photic stimulation was performed at various flash frequencies showing no significant change in the background activity  EKG: Sinus bradycardia in the 50s  Video: No unusual involuntary movements were seen on the video clips reviewed Impression: Abnormal EEG being moderately diffusely slow consistent with a moderate diffuse encephalopathy versus bihemispheric structural lesions.  No epileptiform activities were seen. Dictated utilizing Dragon dictation.      CT Head Without Contrast  Result Date: 6/7/2025  CT OF THE HEAD WITHOUT CONTRAST  HISTORY: Somnolence  COMPARISON: June 4, 2025  TECHNIQUE: Axial CT imaging was obtained through the brain. No IV contrast was administered.  FINDINGS: No acute intracranial hemorrhage is seen. There is atrophy. There is periventricular and deep white matter microangiopathic change. There are changes of prior right-sided craniotomy. Areas of encephalomalacia are noted within the right parieto-occipital region. There is further encephalomalacia which is noted within the left middle cranial fossa. There is no midline shift or mass effect. Paranasal sinuses and mastoid air cells are  clear.      No acute intracranial findings.  Radiation dose reduction techniques were utilized, including automated exposure control and exposure modulation based on body size.   This report was finalized on 6/7/2025 7:50 PM by Dr. Paloma Chacon M.D on Workstation: [id number]      XR Chest 1 View  Result Date: 6/7/2025  XR CHEST 1 VW-   INDICATION: Pneumonia  COMPARISON: Chest radiograph June 4, 2025  TECHNIQUE: 1 view chest  FINDINGS:  Vascular congestion. Ill-defined basilar opacities. Low volumes. Stable mediastinum. Bilateral shoulder arthroplasties. Surgical clips over the right chest. Left chest wall implantable loop recorder.       1. Increased ill-defined basilar lung opacities. 2. Suspect airways disease  This report was finalized on 6/7/2025 3:11 PM by Dr. Everardo Garcia M.D on Workstation: [id number]      Stress Test With Myocardial Perfusion One Day  Result Date: 6/6/2025    Myocardial perfusion imaging indicates a normal myocardial perfusion study with no evidence of ischemia. Impressions are consistent with a low risk study.   Left ventricular ejection fraction is hyperdynamic (Calculated EF > 70%).   There is no prior study available for comparison.     CT Angiogram Chest  Result Date: 6/4/2025  CTA CHEST WITH IV CONTRAST  HISTORY: Shortness of breath, hypoxia, evaluate for PE; R51.9-Headache, unspecified; R09.02-Hypoxemia; R53.1-Weakness; E87.6-Hypokalemia  COMPARISON: None  TECHNIQUE: CT angiography was performed of the chest with axial images as well as coronal and sagittal reformatted MIP images provided following administration of IV contrast. 3-D surface rendered reformats were obtained of the pulmonary arteries and aorta. Radiation dose reduction techniques were utilized, including automated exposure control, and exposure modulation based on body size.  FINDINGS:  There is a tiny right pleural effusion, and there is mild bilateral posterior basilar dependent atelectasis. There are  patchy areas of alveolar infiltrate with somewhat coarse interstitial markings in the left upper lobe. Similar though more subtle changes are seen in the right middle lobe and right lower lobe.  Thoracic aorta is normal in caliber.  There are coronary atherosclerotic vascular calcifications.  There is no suspicious mediastinal adenopathy or other mass.  Images of the upper abdomen show no acute abnormality.  There are chronic healed posttraumatic as well as degenerative bony changes, but there is no acute bony abnormality.  Bolus timing is excellent, and there is no evidence of pulmonary embolism.       Pulmonary arteries are well-opacified, and there is no evidence of pulmonary embolism.  There are mild somewhat patchy alveolar pulmonary infiltrates with somewhat coarse interstitial markings, most prominent in the left upper lobe though seen to a lesser degree in the right middle lobe and lower lobe. FINDINGS are nonspecific.  Imaging features can be seen with COVID-19 pneumonia, though are nonspecific and can occur with a variety of infectious and noninfectious processes.    This report was finalized on 6/4/2025 8:05 PM by Dr. Dale Headley M.D on Workstation: [id number]      CT Abdomen Pelvis Without Contrast  Result Date: 6/4/2025  CT ABDOMEN PELVIS WO CONTRAST-  Radiation dose reduction techniques were utilized, including automated exposure control and exposure modulation based on body size.  Clinical: Abdominal pain with constipation  FINDINGS: 1. Prior right mastectomy, trace right pleural effusion. There is cardiac enlargement.  2. The liver, pancreas, spleen and left adrenal gland are satisfactory in appearance. Clips within the right upper quadrant, the right adrenal gland not identified, suspect previous right adrenalectomy. Bilateral renal cortical nodules, having attenuation consistent with renal cysts. The largest on the left measures 14 mm. No renal calculus or obstructive uropathy. There is  atherosclerotic calcification of a normal diameter aorta. The bladder is normal.  Bladder is satisfactory in appearance, no gallstones or wall thickening or pericholecystic fluid. No biliary duct dilatation. The stomach is collapsed and cannot be optimally evaluated. Suspect duodenal diverticula. Uterus and ovaries are normal for age. There is diverticulosis of the colon, no diverticulitis. The small bowel is satisfactory in appearance. No free intraperitoneal gas or fluid. No adenopathy seen.  CONCLUSION: No acute intra-abdominal abnormality. There is diverticulosis of the colon without diverticulitis. Small bilateral renal cysts. Postoperative change consistent with right adrenalectomy.     This report was finalized on 6/4/2025 6:22 PM by Dr. Bryan Roy M.D on Workstation: [id number]      CT Head Without Contrast  Result Date: 6/4/2025  HEAD CT WITHOUT CONTRAST  REASON:  Frequent headaches  COMPARISON STUDIES: Head CT 11/12/2023.  TECHNIQUE:  Axial images were acquired from the skull base to vertex without contrast, including multiplanar reformats, per standard departmental protocol.    Radiation dose reduction techniques were utilized, including automated exposure control, and exposure modulation based on body size.  FINDINGS:  There is no CT evidence of acute intracranial hemorrhage, mass, or infarct. There is volume loss, but there is no evidence of hydrocephalus or extra-axial fluid collection.  Redemonstrated advanced chronic white matter changes as well as right parieto-occipital cortical encephalomalacia, no evidence of acute intracranial abnormality.  Skull base, calvarium, and extracranial soft tissues show chronic changes, without evidence of acute abnormality.       Chronic changes as noted above, no acute intracranial abnormality.        This report was finalized on 6/4/2025 6:21 PM by Dr. Dale Headley M.D on Workstation: [id number]      XR Chest AP  Result Date: 6/4/2025  XR CHEST AP-   Clinical: COVID evaluation, cough, fever  COMPARISON examination 4/11/2022  FINDINGS: The cardiomediastinal silhouette is stable. There is atherosclerotic calcification of the aorta. The right lung is clear. There is a suggestion of perhaps minimal infiltrate along the left costophrenic sulcus. No consolidation is seen within either lung. No edema or effusion. The remainder is unremarkable.  This report was finalized on 6/4/2025 5:11 PM by Dr. Bryan Roy M.D on Workstation: [id number]          Workup to date:    Diagnoses:  Cryptogenic recurrent strokes  Encephalopathy  Seizure disorder    Impression: 85-year-old female with past medical, ? afib hyperlipidemia, hypothyroidism, former smoker, TEOFILO, macular degeneration, meningioma, seizure disorder on Keppra and gabapentin, previous right MCA secondary to stroke right ICA stenosis for which she should be on DAPT.  Apparently she was on DAPT when she had her right MCA stroke possibly related to right ICA stenosis in 2023.  She was to follow-up with Dr. Forrest in clinic and also she is followed by neurology for cognitive impairment and her history of stroke.  She follows with  with cardiology and has a loop monitor.  At least no afib per his note 12/24    She was admitted on 6/5 with headaches, fatigue, generalized weakness after fall 3 weeks ago.  Other things include pneumonia, DANY.  Neurology was consulted for somnolence.  EEG shows no ictal DC or seizure.      MRI brain showed embolic appearing strokes suspicious for cardioembolic etiology likely contributing to her encephalopathy.  Loop to be interrogated by cardiology to help determine best long term stroke prevention.  Family reporting continued improved alertness and appetite     Continue delirium precautions.  Loop monitor negative for any A-fib.  JEFE to be arranged on Thursday.  Appreciate cardiology    Neuro will be following along with you      Thank you for this consultation.  Discussed above  plan with neuro attending, Dr. Richards who agrees with above plan.  Neurology team is available for concerns or questions.

## 2025-06-11 NOTE — CASE MANAGEMENT/SOCIAL WORK
Continued Stay Note  [facility]     Patient Name: Avelina Carr  MRN: [id number]  Today's Date: 6/11/2025    Admit Date: 6/4/2025    Plan: SNF, will need transport   Discharge Plan       Row Name 06/11/25 1510       Plan    Plan SNF, will need transport    Plan Comments CCP met with pt and spouse at bedside. They have met with Palliative care, and would like to go to SNF, after JEFE, and current hospitalization. Goal is to return home after stay in SNF. Family is still hopeful for a bed at [facility], CCP will continue to follow for discharge readiness.                   Discharge Codes    No documentation.                 Expected Discharge Date and Time       Expected Discharge Date Expected Discharge Time    Jun 13, 2025               Charlette Wilburn RN

## 2025-06-11 NOTE — CONSULTS
.      [facility]   Inpatient Palliative Care Consultation  Patient Name: Avelina Carr   Patient : 1940   Date: 2025   Consulting Provider: Dr Bhandari  Reason for Consultation: assistance with clarification of goals of care  Inpatient Palliative Care Team Consult  Consult performed by: Deidra Sofia APRN  Consult ordered by: Tito Bhandari MD        Chief Complaint: weakness, acute multiple small infarcts, dysphagia    Information obtained from: patient and spouse/SO    Summary of Palliative Illness and Palliative Assessment:  85-year-old woman with past medical history of atrial fibrillation s/p loop recorder, hyperlipidemia, hypothyroidism, former smoker, sleep apnea, macular degeneration, meningioma, remote history of seizure disorder, and 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 She presented to [facility] due to weakness, fall and headache.  Work up in ER, revealed hypoxic, possible pneumonia. She was started on IV Ceftriaxone. Consults were initially placed for cardiology (chest pain/ elevated troponin) then neurology (excessive sedation and somnolence). Cardiology obtained ECHO and stress test with no concerning findings. She had her loop recorder interrogated with no atrial fibrillation noted. She had further imaging of brain that revealed multiple embolic appearing bilateral strokes. Cardiology is considering JEFE to determine best management for strokes. She has been evaluated by ST, PT/OT as well. Current diet recommendations puree with nectar thick liquids. The palliative care team was consulted for support with goals of care conversation due new acute findings, along with other co-morbidities.      Symptom Assessment:     Pain Assessment:   Intensity: 0      Dyspnea Assessment:   Current Level of Dyspnea: 0      Constipation Assessment:   Current Level of Constipation: 0 No constipation      [place] Symptom Assessment Scale  (ESAS): ESAS completed by Family caregiver  Pain: 0 No pain  Tiredness: 4  Drowsiness: 0 No drowsiness  Nausea: 0 No nausea  Appetite: 5  Shortness of breath: 0 No shortness of breath  Depression: unable to assess  Anxiety: 0 No anxiety  Best Wellbein  Other(Constipation): 0 No constipation    Discussion of Patient/Family Treatment Preferences and Goals of Care: There was a voluntary discussion of advance planning and goals of care discussion. The following were present for the visit: spouse (Vitaly) and daughter (Wilda)    Summary of Discussion: The patient is unable to participate in goals of care conversation as she not aware of situation. In addition, patient spouse request to have conversation in private setting. Of note, advance directive on file that designates spouse then daughter, Wilda as healthcare surrogate. I started conversation with just spouse and Wilda joined at the end of the discussion and we reviewed everything discussed again.The patient spouse provided me with detail history of patient prior to admission. She has had progressive decline over the last several months with difficulty caring for herself (bathing, putting on clothes) due to functional weakness. About 6-8 months he started cutting up her food to make it easier for her. In the last 6 weeks she has had more urinary urgency, frequency  and incontinence with rapid decline. In fact, he had consider hiring private caregivers three times a week and met with agency the day prior to admission to hospital.  The patient was utilizing walker. She had not been formally diagnosed with dementia, however her PCP had started her Aricept. He reports patient has had several strokes, with minimal residual due to intense rehab after them. We reviewed patient acute hospital stay and has a fairly good understanding of her conditions. He understands overall prognosis is guarded/poor. I provided him with both verbal and written information of palliative  care, [facility] and Hospice/comfort care. At this time, his goals is curative and recovery.  He is wanting to proceed with JEFE and understands risk/benefits of procedure (he reached out to his brother who is a cardiologist). He is wanting this performed with hopes of determine causing of strokes and guide medical management for patient. He is cautiously optimistic and preparing himself he may have transition to long term care facility if no improvement in functional mobility. I didn't address CODE status as this had been addressed. He as very appreciative of support. He will reach out to [facility] or Hospice based on how patient does once she goes to rehab setting.I will sign off and see PRN.         Review of Systems: Review of Systems - Negative except generalized weakness, dysphagia, right wrist pain per spouse       Past Medical and Surgical History:    Past Medical History:   Diagnosis Date   • Anxiety    • Arthritis    • Breast cancer    • COPD (chronic obstructive pulmonary disease)     oxygen at 2L aths   • COVID    • CVD (cardiovascular disease)    • Depression    • GERD (gastroesophageal reflux disease)    • Hx of radiation therapy    • Hyperlipidemia    • Hypothyroid    • Low back pain    • Macular degeneration    • Osteoarthritis    • Peripheral neuropathy    • Sleep apnea    • Stroke    • Vertigo       Past Surgical History:   Procedure Laterality Date   • ADRENAL GLAND SURGERY     • BRAIN MENINGIOMA EXCISION      occipital   • BREAST BIOPSY     •  SECTION     • EMBOLECTOMY N/A 11/15/2017    Procedure: Embolectomy Mechanical;  Surgeon: Rachid Figueroa MD;  Location: [facility] ;  Service:    • MASTECTOMY     • RECTAL SURGERY     • REPLACEMENT TOTAL KNEE Right    • SKIN BIOPSY  2025   • TOTAL SHOULDER REPLACEMENT      x2          Palliative Care Psychosocial and Spiritual Screening    Living situation resided at home prior to admission, going to rehab at discharge,  "requires assistance with ADLs    No spiritual concerns identified      Physical Assessment:   /55 (BP Location: Left leg, Patient Position: Lying)   Pulse 52   Temp 98.2 °F (36.8 °C) (Oral)   Resp 16   Ht 162.6 cm (64\")   Wt 69.6 kg (153 lb 7 oz)   SpO2 92%   BMI 26.34 kg/m²    Palliative Performance Scale: Performance 40% based on the following measures: Ambulation: Mainly in bed, Activity and Evidence of Disease: Unable to do any work, extensive evidence of disease, Self-Care: Mainly assistance required,  Intake: Normal or reduced, LOC: Full, drowsy or confusion  Physical Exam  Constitutional:       Comments: Elderly    Cardiovascular:      Rate and Rhythm: Normal rate.   Pulmonary:      Effort: Pulmonary effort is normal.   Neurological:      Mental Status: She is alert. She is disoriented.   Psychiatric:         Attention and Perception: Attention and perception normal.         Mood and Affect: Mood and affect normal.         Speech: Speech normal.         Behavior: Behavior is cooperative.         Cognition and Memory: Memory is impaired.          Medications:    Current Facility-Administered Medications:   •  acetaminophen (TYLENOL) tablet 650 mg, 650 mg, Oral, Q4H PRN, 650 mg at 06/08/25 2130 **OR** acetaminophen (TYLENOL) 160 MG/5ML oral solution 650 mg, 650 mg, Oral, Q4H PRN **OR** acetaminophen (TYLENOL) suppository 650 mg, 650 mg, Rectal, Q4H PRN, Andria Roberts MD  •  albuterol (PROVENTIL) nebulizer solution 0.083% 2.5 mg/3mL, 2.5 mg, Nebulization, Q6H PRN, Andria Roberts MD, 2.5 mg at 06/06/25 1949  •  amLODIPine (NORVASC) tablet 5 mg, 5 mg, Oral, Q24H, Sudarshan Boyd MD, 5 mg at 06/11/25 0838  •  aspirin tablet 325 mg, 325 mg, Oral, Daily, Andria Roberts MD, 325 mg at 06/11/25 0838  •  atenolol (TENORMIN) tablet 25 mg, 25 mg, Oral, BID, Andria Roberts MD, 25 mg at 06/10/25 2139  •  atorvastatin (LIPITOR) tablet 80 mg, 80 mg, Oral, Daily, Stingl, Andria " MD Fab, 80 mg at 06/11/25 0838  •  sennosides-docusate (PERICOLACE) 8.6-50 MG per tablet 2 tablet, 2 tablet, Oral, BID PRN **AND** polyethylene glycol (MIRALAX) packet 17 g, 17 g, Oral, Daily PRN, 17 g at 06/05/25 1231 **AND** bisacodyl (DULCOLAX) EC tablet 5 mg, 5 mg, Oral, Daily PRN **AND** bisacodyl (DULCOLAX) suppository 10 mg, 10 mg, Rectal, Daily PRN, Andria Roberts MD, 10 mg at 06/05/25 1659  •  budesonide-formoterol (SYMBICORT) 160-4.5 MCG/ACT inhaler 2 puff, 2 puff, Inhalation, BID, Andria Roberts MD, 2 puff at 06/11/25 0909  •  calcium 500 mg vitamin D 5 mcg (200 UT) per tablet 1 tablet, 1 tablet, Oral, BID, Andria Roberts MD, 1 tablet at 06/11/25 0838  •  Calcium Replacement - Follow Nurse / BPA Driven Protocol, , Not Applicable, PRN, Andria Roberts MD  •  clopidogrel (PLAVIX) tablet 75 mg, 75 mg, Oral, Daily, Andria Roberts MD, 75 mg at 06/11/25 0838  •  donepezil (ARICEPT) tablet 10 mg, 10 mg, Oral, Daily, Tito Bhandari MD, 10 mg at 06/11/25 0838  •  [Held by provider] enoxaparin sodium (LOVENOX) syringe 30 mg, 30 mg, Subcutaneous, Q24H, Tito Bhadnari MD  •  escitalopram (LEXAPRO) tablet 20 mg, 20 mg, Oral, Daily, Andria Roberts MD, 20 mg at 06/11/25 0838  •  [Held by provider] furosemide (LASIX) tablet 20 mg, 20 mg, Oral, Daily, Andria Roberts MD, 20 mg at 06/07/25 1033  •  [Held by provider] gabapentin (NEURONTIN) capsule 400 mg, 400 mg, Oral, Q12H, Andria Roberts MD, 400 mg at 06/07/25 1033  •  hydrALAZINE (APRESOLINE) injection 10 mg, 10 mg, Intravenous, Q6H PRN, Nahid García MD, 10 mg at 06/06/25 0928  •  latanoprost (XALATAN) 0.005 % ophthalmic solution 1 drop, 1 drop, Right Eye, Nightly, Andria Roberts MD, 1 drop at 06/10/25 2139  •  levETIRAcetam (KEPPRA) tablet 500 mg, 500 mg, Oral, Q12H, Tito Bhandari MD, 500 mg at 06/11/25 0838  •  levothyroxine (SYNTHROID, LEVOTHROID) tablet  150 mcg, 150 mcg, Oral, Q AM, Andria Roberts MD, 150 mcg at 06/11/25 0608  •  lisinopril (PRINIVIL,ZESTRIL) tablet 20 mg, 20 mg, Oral, Q12H, Tito Bhandari MD, 20 mg at 06/11/25 0838  •  Magnesium Standard Dose Replacement - Follow Nurse / BPA Driven Protocol, , Not Applicable, PRN, Andria Roberts MD  •  pantoprazole (PROTONIX) EC tablet 40 mg, 40 mg, Oral, Q AM, Tito Bhandari MD, 40 mg at 06/11/25 0608  •  Phosphorus Replacement - Follow Nurse / BPA Driven Protocol, , Not Applicable, PRN, Andria Roberts MD  •  piperacillin-tazobactam (ZOSYN) 3.375 g IVPB in 100 mL NS MBP (CD), 3.375 g, Intravenous, Q8H, Tito Bhandari MD, 3.375 g at 06/11/25 0839  •  Potassium Replacement - Follow Nurse / BPA Driven Protocol, , Not Applicable, PRN, Norman Agrawal MD  •  Potassium Replacement - Follow Nurse / BPA Driven Protocol, , Not Applicable, PRN, Adnria Roberts MD  •  sodium chloride 0.9 % flush 10 mL, 10 mL, Intravenous, Q12H, Tito Bhandari MD, 10 mL at 06/11/25 0840  •  sodium chloride 0.9 % flush 10 mL, 10 mL, Intravenous, PRN, Tito Bhandari MD  •  sodium chloride 0.9 % infusion 40 mL, 40 mL, Intravenous, PRN, Tito Bhandari MD  •  vitamin B-12 (CYANOCOBALAMIN) tablet 1,000 mcg, 1,000 mcg, Oral, Daily, Andria Roberts MD, 1,000 mcg at 06/11/25 0838     Allergies:   Allergies   Allergen Reactions   • Penicillin G Hives   • Penicillins Rash          Data (labs/images reviewed):   WBC   Date Value Ref Range Status   06/10/2025 7.83 3.40 - 10.80 10*3/mm3 Final     RBC   Date Value Ref Range Status   06/10/2025 3.26 (L) 3.77 - 5.28 10*6/mm3 Final     Hemoglobin   Date Value Ref Range Status   06/10/2025 10.6 (L) 12.0 - 15.9 g/dL Final     Hematocrit   Date Value Ref Range Status   06/10/2025 32.2 (L) 34.0 - 46.6 % Final     MCV   Date Value Ref Range Status   06/10/2025 98.8 (H) 79.0 - 97.0 fL Final     MCH    Date Value Ref Range Status   06/10/2025 32.5 26.6 - 33.0 pg Final     MCHC   Date Value Ref Range Status   06/10/2025 32.9 31.5 - 35.7 g/dL Final     RDW   Date Value Ref Range Status   06/10/2025 12.6 12.3 - 15.4 % Final     RDW-SD   Date Value Ref Range Status   06/10/2025 45.1 37.0 - 54.0 fl Final     MPV   Date Value Ref Range Status   06/10/2025 10.6 6.0 - 12.0 fL Final     Platelets   Date Value Ref Range Status   06/10/2025 246 140 - 450 10*3/mm3 Final        Lab Results   Component Value Date    GLUCOSE 93 06/09/2025    BUN 8.0 06/09/2025    CREATININE 0.84 06/09/2025     06/09/2025    K 4.1 06/09/2025    K 4.1 06/09/2025     06/09/2025    CALCIUM 10.5 06/09/2025    PROTEINTOT 7.2 06/04/2025    ALBUMIN 4.1 06/04/2025    ALT 21 06/04/2025    AST 34 (H) 06/04/2025    ALKPHOS 108 06/04/2025    BILITOT 0.7 06/04/2025    GLOB 3.1 06/04/2025    AGRATIO 1.3 06/04/2025    BCR 9.5 06/09/2025    ANIONGAP 11.1 06/09/2025    EGFR 67.8 06/09/2025        MRI Brain Without Contrast  Narrative: BRAIN MRI WITHOUT CONTRAST     HISTORY: Mental status change, history of strokes and meningiomas;  R51.9-Headache, unspecified; R09.02-Hypoxemia; R53.1-Weakness;  E87.6-Hypokalemia     COMPARISON: November 13, 2020..     FINDINGS:  Multiplanar images of the head were obtained without  gadolinium. Multiple small foci of restricted diffusion are noted within  both cerebral hemispheres. There are also questionable areas of  restricted diffusion within the cerebellar hemispheres bilaterally,  although this may be artifactual. Images are degraded by motion  artifact. There is encephalomalacia which is noted within the right  occipital lobe, with additional encephalomalacia noted within the left  middle cranial fossa.. There is atrophy. There is periventricular and  deep white matter microangiopathic change. There is no midline shift or  mass effect. The intracranial flow voids appear intact. There are old  bilateral  cerebellar infarcts. There is an old left pontine infarct. No  definite abnormal susceptibility artifact is seen. Patient is status  post right-sided craniotomy.     Impression: 1. Multiple small acute infarcts are noted within both cerebral  hemispheres. This would be more suggestive of an embolic process. There  is also questionable involvement in the cerebellar hemispheres  bilaterally.     FINDINGS were called to the patient's nurse at 2:27 a.m.        This report was finalized on 6/10/2025 2:27 AM by Dr. Paloma Chacon M.D on Workstation: [id number]              Palliative Care Assessment and Recommendations: Avelina Carr is a 85 y.o. female with a primary palliative care diagnosis of acute strokes, dysphagia, generalized weakness. Palliative care was consulted for support with goals of care.     Prognosis and Palliative Performance Scale:  Performance 40% based on the following measures: Ambulation: Mainly in bed, Activity and Evidence of Disease: Unable to do any work, extensive evidence of disease, Self-Care: Mainly assistance required,  Intake: Normal or reduced, LOC: Full, drowsy or confusion  Disease State: Controlled with current treatments  Symptom Management:   Pain: right wrist, tylenol prn   Shortness of Breath: none reported  Constipation: none reported, senna prn   Other Palliative Symptoms: none  Goals of Care Treatment Preferences   Palliative Care Decision Making Capacity: unreliable  Healthcare Surrogate: Yes- name(s)- spouse- Vitaly then daughter, Wilda  What is Most important to patient/family at this time: to determine cause of strokes and go to rehab with hopes of improving functional mobility so that she can return to home setting  Code Status DNR  Other Considerations regarding life sustaining treatments: DNI  Other Recommendations: none  Follow up: sign off/prn  Discussed plan with: MD, RN, family, and [facility]      I spent a total of 75 minutes today caring for patient including  reviewing records, speaking with patient/family and collaborating with care team.         Thank you for consulting palliative care. If you need to reach the provider on call for the palliative care team please call [phone]      CELY Black  6/11/2025 09:41 EDT

## 2025-06-11 NOTE — PROGRESS NOTES
LOS: 6 days   Patient Care Team:  Artem Shepherd MD as PCP - General (Internal Medicine)    Chief Complaint: Follow-up chest pain, hypertensive urgency, troponin elevation.    Interval History: No new complaints today.  She seems reasonably alert and interactive.  She denies any chest pain or shortness of breath.    Vital Signs:  Temp:  [98.1 °F (36.7 °C)-98.6 °F (37 °C)] 98.2 °F (36.8 °C)  Heart Rate:  [51-52] 52  Resp:  [16-18] 16  BP: (154-170)/(55-65) 155/55    Intake/Output Summary (Last 24 hours) at 6/11/2025 1242  Last data filed at 6/10/2025 2301  Gross per 24 hour   Intake 60 ml   Output 650 ml   Net -590 ml       Physical Exam:   General Appearance:    No acute distress, alert.   Lungs:     Decreased breath sounds bilaterally.    Heart:    Regular rhythm and normal rate. II/VI SM LLSB.    Abdomen:     Soft, nontender, nondistended.    Extremities:   Trace edema of the lower extremities with chronic venous stasis changes.     Results Review:    Results from last 7 days   Lab Units 06/09/25  2332   SODIUM mmol/L 137   POTASSIUM mmol/L 4.1  4.1   CHLORIDE mmol/L 103   CO2 mmol/L 22.9   BUN mg/dL 8.0   CREATININE mg/dL 0.84   GLUCOSE mg/dL 93   CALCIUM mg/dL 10.5     Results from last 7 days   Lab Units 06/04/25  1824 06/04/25  1651   HSTROP T ng/L 56* 63*     Results from last 7 days   Lab Units 06/10/25  0742   WBC 10*3/mm3 7.83   HEMOGLOBIN g/dL 10.6*   HEMATOCRIT % 32.2*   PLATELETS 10*3/mm3 246             Results from last 7 days   Lab Units 06/09/25  0646   MAGNESIUM mg/dL 1.8           I reviewed the patient's new clinical results.        Assessment:  1.  Acute hypoxic respiratory failure  2.  Patchy bilateral infiltrates by CTA of the chest, presumed pneumonia  3.  COPD without acute exacerbation  4.  Former tobacco use  5.  Hypertensive urgency  6.  Recent progressive weakness, likely multifactorial  7.  Headache, likely secondary to #5  8.  Intermittent chest pressure, negative Myoview stress  test on 6/6/2025  9.  Nonspecific high-sensitivity troponin elevation  10.  History of multiple recurrent strokes (last with a right MCA CVA in November 2023).  On DAPT.  11.  History of SVT with loop recorder placement in August 2022  12.  Multifactorial chronic anemia  13.  History of remote meningioma, status post craniotomy and resection  14.  Bilateral carotid artery disease  15.  Peripheral neuropathy  16.  Chronic lower extremity edema secondary to venous stasis  17.  Altered mental status - MRI of the brain multiple bilateral small acute strokes    Plan:  -She has had multiple embolic appearing bilateral strokes.  This was confirmed by MRI of the brain.  This would explain her altered mental status over the last several days.  I did have the loop recorder interrogated.  There has been no atrial fibrillation.  Again, from my previous note, she has not had any documented atrial fibrillation in the past either.    -Plan for JEFE tomorrow in the PACU.  Transiently scheduled for 7:45 AM.    -Blood pressure elevated but improved overall.  Continue amlodipine, lisinopril, and atenolol.     -Continue DAPT and Lipitor.     Sudarshan Boyd MD  06/11/25  12:42 EDT

## 2025-06-11 NOTE — PLAN OF CARE
Goal Outcome Evaluation:  Plan of Care Reviewed With: patient           Outcome Evaluation: VSS on RA. Pt arouses to voice & disoriented to situation but cooperative and able to voice out concern. IV Zosyn Q8H. Turned and repositioned Q2H and as needed. Pt slept through most of shift and no acute distress noted. Per Cardiology, for JEFE 6/12. Pending Palliative consult. Safety maintained. Will CTM.

## 2025-06-11 NOTE — PROGRESS NOTES
Name: Avelina Carr ADMIT: 2025   : 1940  PCP: Artem Shepherd MD    MRN: [id number] LOS: 6 days   AGE/SEX: 85 y.o. female  ROOM: [facility] ([facility]     Subjective   Subjective   Right hand pain since she has been here. No other new complaints.  at bedside think she is making some improvement. Waiting to meet with palliative care     Objective   Objective   Vital Signs  Temp:  [98.1 °F (36.7 °C)-98.6 °F (37 °C)] 98.2 °F (36.8 °C)  Heart Rate:  [51-52] 52  Resp:  [16-18] 16  BP: (154-170)/(55-65) 155/55  SpO2:  [92 %-98 %] 92 %  on   ;   Device (Oxygen Therapy): room air  Body mass index is 26.34 kg/m².    Physical Exam  Constitutional:       General: She is not in acute distress.     Appearance: She is not toxic-appearing.   HENT:      Head: Normocephalic and atraumatic.   Cardiovascular:      Rate and Rhythm: Normal rate and regular rhythm.   Pulmonary:      Effort: Pulmonary effort is normal. No respiratory distress.      Breath sounds: Normal breath sounds.   Abdominal:      General: Bowel sounds are normal.      Palpations: Abdomen is soft.      Tenderness: There is no abdominal tenderness.   Musculoskeletal:      Right hand: Tenderness present. No swelling.   Skin:     General: Skin is warm and dry.   Neurological:      Mental Status: She is alert.   Psychiatric:         Mood and Affect: Mood normal.         Behavior: Behavior normal.     Results Review  I reviewed the patient's new clinical results.  Results from last 7 days   Lab Units 06/10/25  0742 25  1013 25  0917 25  0536   WBC 10*3/mm3 7.83 8.33 7.31 6.91   HEMOGLOBIN g/dL 10.6* 10.6* 11.9* 10.9*   PLATELETS 10*3/mm3 246 233 243 299     Results from last 7 days   Lab Units 25  2332 25  0646 25  1901 25  1448 25  0536   SODIUM mmol/L 137 145 143  --  139   POTASSIUM mmol/L 4.1  4.1 2.9*  2.9* 3.4* 3.7 3.6   CHLORIDE mmol/L 103 118* 109*  --  106   CO2 mmol/L 22.9 17.9* 23.9  --  22.9   BUN mg/dL  "8.0 9.0 16.0  --  20.0   CREATININE mg/dL 0.84 0.58 0.98  --  1.50*   GLUCOSE mg/dL 93 81 110*  --  90     Lab Results   Component Value Date    ANIONGAP 11.1 06/09/2025     Estimated Creatinine Clearance: 46.1 mL/min (by C-G formula based on SCr of 0.84 mg/dL).   Lab Results   Component Value Date    EGFR 67.8 06/09/2025     Results from last 7 days   Lab Units 06/04/25  1651   ALBUMIN g/dL 4.1   BILIRUBIN mg/dL 0.7   ALK PHOS U/L 108   AST (SGOT) U/L 34*   ALT (SGPT) U/L 21     Results from last 7 days   Lab Units 06/09/25  2332 06/09/25  0646 06/08/25  1901 06/07/25  0536 06/05/25  0347 06/04/25  1651   CALCIUM mg/dL 10.5 6.2* 8.4* 9.0   < > 9.1   ALBUMIN g/dL  --   --   --   --   --  4.1   MAGNESIUM mg/dL  --  1.8  --   --   --  2.2    < > = values in this interval not displayed.       No results found for: \"HGBA1C\", \"POCGLU\"    MRI Brain Without Contrast  Result Date: 6/10/2025  1. Multiple small acute infarcts are noted within both cerebral hemispheres. This would be more suggestive of an embolic process. There is also questionable involvement in the cerebellar hemispheres bilaterally.  FINDINGS were called to the patient's nurse at 2:27 a.m.   This report was finalized on 6/10/2025 2:27 AM by Dr. Paloma Chacon M.D on Workstation: [id number]      XR Hand 3+ View Right  Result Date: 6/9/2025  1. Diffuse soft tissue swelling. 2. 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 Other inflammatory arthritides are in the differential diagnosis. 3. Osteoarthritic changes at the interphalangeal joints.    This report was finalized on 6/9/2025 4:14 PM by Pierre Kwon M.D on Workstation: [id number]        Scheduled Meds  amLODIPine, 5 mg, Oral, Q24H  aspirin, 325 mg, Oral, Daily  atenolol, 25 mg, Oral, BID  atorvastatin, 80 mg, Oral, Daily  budesonide-formoterol, 2 puff, Inhalation, BID  calcium 500 mg vitamin D 5 mcg (200 UT), 1 " tablet, Oral, BID  clopidogrel, 75 mg, Oral, Daily  donepezil, 10 mg, Oral, Daily  [Held by provider] enoxaparin sodium, 30 mg, Subcutaneous, Q24H  escitalopram, 20 mg, Oral, Daily  [Held by provider] furosemide, 20 mg, Oral, Daily  [Held by provider] gabapentin, 400 mg, Oral, Q12H  latanoprost, 1 drop, Right Eye, Nightly  levETIRAcetam, 500 mg, Oral, Q12H  levothyroxine, 150 mcg, Oral, Q AM  lisinopril, 20 mg, Oral, Q12H  pantoprazole, 40 mg, Oral, Q AM  piperacillin-tazobactam, 3.375 g, Intravenous, Q8H  sodium chloride, 10 mL, Intravenous, Q12H  vitamin B-12, 1,000 mcg, Oral, Daily    Continuous Infusions   PRN Meds    acetaminophen **OR** acetaminophen **OR** acetaminophen    albuterol    senna-docusate sodium **AND** polyethylene glycol **AND** bisacodyl **AND** bisacodyl    Calcium Replacement - Follow Nurse / BPA Driven Protocol    hydrALAZINE    Magnesium Standard Dose Replacement - Follow Nurse / BPA Driven Protocol    Phosphorus Replacement - Follow Nurse / BPA Driven Protocol    Potassium Replacement - Follow Nurse / BPA Driven Protocol    Potassium Replacement - Follow Nurse / BPA Driven Protocol    sodium chloride    sodium chloride     Diet  Diet: Regular/House; Feeding Assistance - Nursing, No Straw; Texture: Pureed (NDD 1); Fluid Consistency: Nectar Thick       Assessment/Plan     Active Hospital Problems    Diagnosis  POA    **Hypoxia [R09.02]  Yes    Severe protein-calorie malnutrition [E43]  Yes    Altered mental state [R41.82]  Unknown    Chest discomfort [R07.89]  Yes    Elevated troponin [R79.89]  Yes    Acute CVA (cerebrovascular accident) [I63.9]  Yes    Former smoker [Z87.891]  Not Applicable    Hypothyroid [E03.9]  Yes    HLD (hyperlipidemia) [E78.5]  Yes    Primary hypertension [I10]  Yes    History of CVA (cerebrovascular accident) [Z86.73]  Not Applicable      Resolved Hospital Problems   No resolved problems to display.     Patient is a 85 y.o. female     AMS  Multiple acute  strokes/cryptogenic recurrent strokes  Seizure disorder on Keppra  -Appreciate evaluation from neurology.  EEG and head CT unremarkable  -Brain MRI has revealed multiple acute infarcts.  Carotid Doppler noted show 50-69% stenosis on the right and less than 50% on the left  -Gabapentin on hold  - Metabolic workup unremarkable  -Currently on aspirin, statin, Plavix  -No A-fib on loop  - JEFE tomorrow     Hypoxia  Former smoker  Suspected aspiration pneumonia  - Continue Zosyn to complete a 7-day course  -On room air with no respiratory distress today     - Chronic dysphagia. Speech therapy has modified her diet Diet: Regular/House; Feeding Assistance - Nursing, No Straw; Texture: Pureed (NDD 1); Fluid Consistency: Nectar Thick        Chest discomfort     Elevated troponin  -CTA negative PE   - Stress test and echocardiogram looked okay  - On statin, aspirin, Plavix  -s/p loop recorder; followed by Dr. Marrero  -Cardiology following.    -Patient has no documented history of atrial fibrillation. No A-fib on loop       Hypokalemia  - Improved.  Replace as per protocol       Primary hypertension  - Blood pressure has risen and will see how this does with stopping IV fluids  -Continue Norvasc, lisinopril      Elevated creatinine  - Resolved  - Lasix on hold     Very poor IV access  - Now with midline for antibiotic administration as well as labs however no blood return. Right arm has restriction so will probably have to try some cathflo       HLD (hyperlipidemia)    History of cardioembolic cerebrovascular accident (CVA)  -Plavix, statin, ASA continued        Hypothyroid  - TSH within normal limits     Right hand pain and swelling  - Uric acid unremarkable  -X-ray suggested as above CPPD arthropathy.  -Will treat conservatively for now as she reports improvement of pain and swelling looks better today. May consider small dose of steroid see how she does     Nutrition  - Dietitian following for calorie count.  Giving Magic  cups and boost    DVT prophylaxis  SCDs. Lovenox held on 6/8/2025    Discharge  TBD  Expected Discharge Date: 6/13/2025; Expected Discharge Time:     Discussed with patient, family, and nursing staff and Deidra Sofia, palliative care CELY Degroot MD  [facility]  06/11/25 10:15 EDT

## 2025-06-11 NOTE — CONSULTS
Nutrition Services    Patient Name:  Avelina Carr  YOB: 1940  MRN: [id number]  Admit Date:  6/4/2025  Assessment Date:  06/11/25     CALORIE COUNT PROGRESS NOTE    Comments: Calorie Count Day 1 completed  Pt dislikes the pureed food, asking for real pancakes.    Calorie Count Data          Calories Protein   Day 1   (6/11)  713   (47%) 12    (7%)   Day 2   (6/12)     Day 3   (6/13)     Average Intake       Current Nutrition Orders            Food Allergies NKFA   Current PO Diet Diet: Regular/House; Feeding Assistance - Nursing, No Straw; Texture: Pureed (NDD 1); Fluid Consistency: Nectar Thick  NPO Diet NPO Type: Strict NPO   Supplement Magic Cup TID and Boost VHC BID   Current EN/PN  Route: n/a  Order: --     Estimated Requirements         Weight for Calculation  68.9 kg     Calories  5437-6086 (22 kcal/kg, 25 kcal/kg)    Protein  70-85 (1.0 - 1.2 gm/kg)    Fluid  9373-7876 (1 mL/kcal)     RD to follow up per protocol.    Electronically signed by:  Zoey Gee RD  06/11/25 15:17 EDT

## 2025-06-12 ENCOUNTER — APPOINTMENT (OUTPATIENT)
Dept: POSTOP/PACU | Facility: HOSPITAL | Age: 85
DRG: 177 | End: 2025-06-12
Payer: MEDICARE

## 2025-06-12 ENCOUNTER — ANESTHESIA (OUTPATIENT)
Dept: POSTOP/PACU | Facility: HOSPITAL | Age: 85
End: 2025-06-12
Payer: MEDICARE

## 2025-06-12 ENCOUNTER — ANESTHESIA EVENT (OUTPATIENT)
Dept: POSTOP/PACU | Facility: HOSPITAL | Age: 85
End: 2025-06-12
Payer: MEDICARE

## 2025-06-12 VITALS — DIASTOLIC BLOOD PRESSURE: 58 MMHG | SYSTOLIC BLOOD PRESSURE: 201 MMHG | OXYGEN SATURATION: 99 % | HEART RATE: 66 BPM

## 2025-06-12 LAB
BH CV ECHO MEAS - RAP SYSTOLE: 3 MMHG
BH CV ECHO MEAS - RVSP: 16 MMHG
BH CV ECHO MEAS - TR MAX PG: 13 MMHG
BH CV ECHO SHUNT ASSESSMENT PERFORMED (HIDDEN SCRIPTING): 1
CHOLEST SERPL-MCNC: 98 MG/DL (ref 0–200)
GLUCOSE BLDC GLUCOMTR-MCNC: 86 MG/DL (ref 70–130)
GLUCOSE BLDC GLUCOMTR-MCNC: 93 MG/DL (ref 70–130)
HBA1C MFR BLD: 5 % (ref 4.8–5.6)
HDLC SERPL-MCNC: 25 MG/DL (ref 40–60)
LDLC SERPL CALC-MCNC: 55 MG/DL (ref 0–100)
LDLC/HDLC SERPL: 2.2 {RATIO}
QT INTERVAL: 493 MS
QTC INTERVAL: 477 MS
TRIGL SERPL-MCNC: 90 MG/DL (ref 0–150)
VLDLC SERPL-MCNC: 18 MG/DL (ref 5–40)

## 2025-06-12 PROCEDURE — 93312 ECHO TRANSESOPHAGEAL: CPT

## 2025-06-12 PROCEDURE — 25010000002 PIPERACILLIN SOD-TAZOBACTAM PER 1 G: Performed by: INTERNAL MEDICINE

## 2025-06-12 PROCEDURE — 93010 ELECTROCARDIOGRAM REPORT: CPT | Performed by: INTERNAL MEDICINE

## 2025-06-12 PROCEDURE — 93312 ECHO TRANSESOPHAGEAL: CPT | Performed by: INTERNAL MEDICINE

## 2025-06-12 PROCEDURE — 99233 SBSQ HOSP IP/OBS HIGH 50: CPT

## 2025-06-12 PROCEDURE — 93325 DOPPLER ECHO COLOR FLOW MAPG: CPT

## 2025-06-12 PROCEDURE — 93325 DOPPLER ECHO COLOR FLOW MAPG: CPT | Performed by: INTERNAL MEDICINE

## 2025-06-12 PROCEDURE — 94799 UNLISTED PULMONARY SVC/PX: CPT

## 2025-06-12 PROCEDURE — 25010000002 LIDOCAINE 2% SOLUTION: Performed by: NURSE ANESTHETIST, CERTIFIED REGISTERED

## 2025-06-12 PROCEDURE — 99232 SBSQ HOSP IP/OBS MODERATE 35: CPT | Performed by: INTERNAL MEDICINE

## 2025-06-12 PROCEDURE — 82948 REAGENT STRIP/BLOOD GLUCOSE: CPT

## 2025-06-12 PROCEDURE — 80061 LIPID PANEL: CPT | Performed by: PHYSICIAN ASSISTANT

## 2025-06-12 PROCEDURE — 93320 DOPPLER ECHO COMPLETE: CPT | Performed by: INTERNAL MEDICINE

## 2025-06-12 PROCEDURE — 93005 ELECTROCARDIOGRAM TRACING: CPT | Performed by: HOSPITALIST

## 2025-06-12 PROCEDURE — 25010000002 PROPOFOL 10 MG/ML EMULSION: Performed by: NURSE ANESTHETIST, CERTIFIED REGISTERED

## 2025-06-12 PROCEDURE — B245ZZ4 ULTRASONOGRAPHY OF LEFT HEART, TRANSESOPHAGEAL: ICD-10-PCS | Performed by: STUDENT IN AN ORGANIZED HEALTH CARE EDUCATION/TRAINING PROGRAM

## 2025-06-12 PROCEDURE — 83036 HEMOGLOBIN GLYCOSYLATED A1C: CPT | Performed by: PHYSICIAN ASSISTANT

## 2025-06-12 PROCEDURE — 93320 DOPPLER ECHO COMPLETE: CPT

## 2025-06-12 RX ORDER — HYDRALAZINE HYDROCHLORIDE 25 MG/1
25 TABLET, FILM COATED ORAL EVERY 8 HOURS SCHEDULED
Status: DISCONTINUED | OUTPATIENT
Start: 2025-06-12 | End: 2025-06-13

## 2025-06-12 RX ORDER — NALOXONE HCL 0.4 MG/ML
0.2 VIAL (ML) INJECTION AS NEEDED
Status: DISCONTINUED | OUTPATIENT
Start: 2025-06-12 | End: 2025-06-12 | Stop reason: HOSPADM

## 2025-06-12 RX ORDER — DIPHENHYDRAMINE HYDROCHLORIDE 50 MG/ML
12.5 INJECTION, SOLUTION INTRAMUSCULAR; INTRAVENOUS
Status: DISCONTINUED | OUTPATIENT
Start: 2025-06-12 | End: 2025-06-12 | Stop reason: HOSPADM

## 2025-06-12 RX ORDER — PROMETHAZINE HYDROCHLORIDE 25 MG/1
25 SUPPOSITORY RECTAL ONCE AS NEEDED
Status: DISCONTINUED | OUTPATIENT
Start: 2025-06-12 | End: 2025-06-12 | Stop reason: HOSPADM

## 2025-06-12 RX ORDER — LIDOCAINE HYDROCHLORIDE 20 MG/ML
INJECTION, SOLUTION INFILTRATION; PERINEURAL AS NEEDED
Status: DISCONTINUED | OUTPATIENT
Start: 2025-06-12 | End: 2025-06-12 | Stop reason: SURG

## 2025-06-12 RX ORDER — DROPERIDOL 2.5 MG/ML
0.62 INJECTION, SOLUTION INTRAMUSCULAR; INTRAVENOUS
Status: DISCONTINUED | OUTPATIENT
Start: 2025-06-12 | End: 2025-06-12 | Stop reason: HOSPADM

## 2025-06-12 RX ORDER — SODIUM CHLORIDE 9 MG/ML
INJECTION, SOLUTION INTRAVENOUS CONTINUOUS PRN
Status: DISCONTINUED | OUTPATIENT
Start: 2025-06-12 | End: 2025-06-12 | Stop reason: SURG

## 2025-06-12 RX ORDER — PROPOFOL 10 MG/ML
VIAL (ML) INTRAVENOUS AS NEEDED
Status: DISCONTINUED | OUTPATIENT
Start: 2025-06-12 | End: 2025-06-12 | Stop reason: SURG

## 2025-06-12 RX ORDER — FLUMAZENIL 0.1 MG/ML
0.2 INJECTION INTRAVENOUS AS NEEDED
Status: DISCONTINUED | OUTPATIENT
Start: 2025-06-12 | End: 2025-06-12 | Stop reason: HOSPADM

## 2025-06-12 RX ORDER — PROMETHAZINE HYDROCHLORIDE 25 MG/1
25 TABLET ORAL ONCE AS NEEDED
Status: DISCONTINUED | OUTPATIENT
Start: 2025-06-12 | End: 2025-06-12 | Stop reason: HOSPADM

## 2025-06-12 RX ORDER — ONDANSETRON 2 MG/ML
4 INJECTION INTRAMUSCULAR; INTRAVENOUS ONCE AS NEEDED
Status: DISCONTINUED | OUTPATIENT
Start: 2025-06-12 | End: 2025-06-12 | Stop reason: HOSPADM

## 2025-06-12 RX ADMIN — ATORVASTATIN CALCIUM 80 MG: 80 TABLET, FILM COATED ORAL at 10:34

## 2025-06-12 RX ADMIN — SODIUM CHLORIDE 3.38 G: 9 INJECTION, SOLUTION INTRAVENOUS at 10:34

## 2025-06-12 RX ADMIN — SODIUM CHLORIDE: 9 INJECTION, SOLUTION INTRAVENOUS at 08:16

## 2025-06-12 RX ADMIN — PROPOFOL 15 MG: 10 INJECTION, EMULSION INTRAVENOUS at 08:27

## 2025-06-12 RX ADMIN — LIDOCAINE HYDROCHLORIDE 100 MG: 20 INJECTION, SOLUTION INFILTRATION; PERINEURAL at 08:18

## 2025-06-12 RX ADMIN — Medication 1000 MCG: at 10:34

## 2025-06-12 RX ADMIN — Medication 10 ML: at 22:43

## 2025-06-12 RX ADMIN — CLOPIDOGREL BISULFATE 75 MG: 75 TABLET, FILM COATED ORAL at 10:35

## 2025-06-12 RX ADMIN — PROPOFOL 50 MCG/KG/MIN: 10 INJECTION, EMULSION INTRAVENOUS at 08:18

## 2025-06-12 RX ADMIN — Medication 10 ML: at 11:35

## 2025-06-12 RX ADMIN — CALCIUM CARBONATE-VITAMIN D TAB 500 MG-200 UNIT 1 TABLET: 500-200 TAB at 22:40

## 2025-06-12 RX ADMIN — LEVETIRACETAM 500 MG: 500 TABLET, FILM COATED ORAL at 22:40

## 2025-06-12 RX ADMIN — LEVETIRACETAM 500 MG: 500 TABLET, FILM COATED ORAL at 10:34

## 2025-06-12 RX ADMIN — ATENOLOL 25 MG: 25 TABLET ORAL at 10:34

## 2025-06-12 RX ADMIN — PROPOFOL 30 MG: 10 INJECTION, EMULSION INTRAVENOUS at 08:18

## 2025-06-12 RX ADMIN — AMLODIPINE BESYLATE 5 MG: 5 TABLET ORAL at 10:34

## 2025-06-12 RX ADMIN — DONEPEZIL HYDROCHLORIDE 10 MG: 10 TABLET, FILM COATED ORAL at 10:34

## 2025-06-12 RX ADMIN — APIXABAN 5 MG: 5 TABLET, FILM COATED ORAL at 16:09

## 2025-06-12 RX ADMIN — BUDESONIDE AND FORMOTEROL FUMARATE DIHYDRATE 2 PUFF: 160; 4.5 AEROSOL RESPIRATORY (INHALATION) at 21:17

## 2025-06-12 RX ADMIN — LISINOPRIL 20 MG: 20 TABLET ORAL at 10:34

## 2025-06-12 RX ADMIN — HYDRALAZINE HYDROCHLORIDE 25 MG: 25 TABLET ORAL at 22:40

## 2025-06-12 RX ADMIN — ESCITALOPRAM 20 MG: 10 TABLET, FILM COATED ORAL at 10:34

## 2025-06-12 RX ADMIN — CALCIUM CARBONATE-VITAMIN D TAB 500 MG-200 UNIT 1 TABLET: 500-200 TAB at 10:34

## 2025-06-12 RX ADMIN — APIXABAN 5 MG: 5 TABLET, FILM COATED ORAL at 22:43

## 2025-06-12 RX ADMIN — LATANOPROST 1 DROP: 50 SOLUTION/ DROPS OPHTHALMIC at 22:41

## 2025-06-12 RX ADMIN — LISINOPRIL 20 MG: 20 TABLET ORAL at 22:40

## 2025-06-12 RX ADMIN — PROPOFOL 10 MG: 10 INJECTION, EMULSION INTRAVENOUS at 08:35

## 2025-06-12 RX ADMIN — ASPIRIN 325 MG ORAL TABLET 325 MG: 325 PILL ORAL at 10:34

## 2025-06-12 RX ADMIN — SODIUM CHLORIDE 3.38 G: 9 INJECTION, SOLUTION INTRAVENOUS at 17:05

## 2025-06-12 RX ADMIN — SODIUM CHLORIDE 3.38 G: 9 INJECTION, SOLUTION INTRAVENOUS at 01:18

## 2025-06-12 RX ADMIN — ATENOLOL 25 MG: 25 TABLET ORAL at 22:39

## 2025-06-12 RX ADMIN — HYDRALAZINE HYDROCHLORIDE 25 MG: 25 TABLET ORAL at 16:09

## 2025-06-12 NOTE — ANESTHESIA POSTPROCEDURE EVALUATION
Patient: Avelina Carr    Procedure Summary       Date: 06/12/25 Room / Location: [facility] PACU    Anesthesia Start: 0816 Anesthesia Stop: 0842    Procedure: ADULT TRANSESOPHAGEAL ECHO (JEFE) W/ CONT IF NECESSARY PER PROTOCOL Diagnosis: (Cardiac Source of Emboli)    Scheduled Providers: Sudarshan Boyd MD Provider: Antony Murrell MD    Anesthesia Type: MAC ASA Status: 4            Anesthesia Type: MAC    Vitals  Vitals Value Taken Time   /59 06/12/25 09:15   Temp 36.8 °C (98.3 °F) 06/12/25 09:15   Pulse 59 06/12/25 09:19   Resp 22 06/12/25 09:15   SpO2 92 % 06/12/25 09:19   Vitals shown include unfiled device data.        Post Anesthesia Care and Evaluation    Level of consciousness: awake and alert  Pain management: adequate    Airway patency: patent  Anesthetic complications: No anesthetic complications  PONV Status: controlled  Cardiovascular status: blood pressure returned to baseline and acceptable  Respiratory status: acceptable  Hydration status: acceptable

## 2025-06-12 NOTE — CONSULTS
Nutrition Services    Patient Name:  Avelina Carr  YOB: 1940  MRN: [id number]  Admit Date:  6/4/2025  Assessment Date:  06/12/25     CALORIE COUNT PROGRESS NOTE    Comments: Calorie Count Day 1 completed  Day 1:  Pt dislikes the pureed food, asking for real pancakes.  Day 2:  No documentation for Breakfast or Lunch but report from staff and pt spouse was minimal intake - Dinner - recorded  Day 3:  Pending, NPO this morning for JEFE      Calorie Count Data          Calories Protein   Day 1   (6/11)  713   (47%) 12    (7%)   Day 2   (6/12)  320   (21%)  12   (17%)   Day 3   (6/13)     Average Intake       Current Nutrition Orders            Food Allergies NKFA   Current PO Diet Diet: Regular/House; Feeding Assistance - Nursing, No Straw; Texture: Pureed (NDD 1); Fluid Consistency: Nectar Thick   Supplement Magic Cup TID and Boost VHC BID   Current EN/PN  Route: n/a  Order: --     Estimated Requirements         Weight for Calculation  68.9 kg     Calories  2414-2716 (22 kcal/kg, 25 kcal/kg)    Protein  70-85 (1.0 - 1.2 gm/kg)    Fluid  9516-1313 (1 mL/kcal)     RD to follow up per protocol.    Electronically signed by:  Fab Senior RD  06/12/25 10:59 EDT

## 2025-06-12 NOTE — SIGNIFICANT NOTE
06/12/25 1445   OTHER   Discipline occupational therapist   Rehab Time/Intention   Session Not Performed patient unavailable for treatment  (Patient RAMOS for JEFE this am. Pt also noted to dc soon to rehab. OT will follow up next service date.)   Recommendation   OT - Next Appointment 06/16/25

## 2025-06-12 NOTE — CASE MANAGEMENT/SOCIAL WORK
Continued Stay Note  [facility]     Patient Name: Avelina Carr  MRN: [id number]  Today's Date: 6/12/2025    Admit Date: 6/4/2025    Plan: DC to [facility] pending bed availabilty   Discharge Plan       Row Name 06/12/25 1427       Plan    Plan DC to [facility] pending bed availabilty    Plan Comments Infomred Renee S/Trilogy that pt will be ready for dc on 6-, and that pt/family would like to go to [facility] pending bed availablity.                   Discharge Codes    No documentation.                 Expected Discharge Date and Time       Expected Discharge Date Expected Discharge Time    Jun 13, 2025               Charlette Wilburn RN

## 2025-06-12 NOTE — PLAN OF CARE
Goal Outcome Evaluation:  Plan of Care Reviewed With: patient           Outcome Evaluation: VSS on RA. Sinus Bradycardia on the monitor. Pt arouses to voice and more oriented this shift but with episodes of disorientation to situation. Left Midline Catheter c/d/i & flushed with Blood Return. IV Zosyn Q8H. Meds given with pudding. Turned and repositioned Q2H and as needed. BM x2. NPO at MN for Scheduled JEFE today. Pt had runs of ectopy and trigeminy at 0545H. Pt denies any pain or discomfort. NIH 3. 12L EKG showed SR. VSS. Safety maintained. Will CTM.

## 2025-06-12 NOTE — PROGRESS NOTES
"DOS: 2025  NAME: Avelina Carr   : 1940  PCP: Artem Shepherd MD  Chief Complaint   Patient presents with    Headache    Weakness - Generalized     Neurology    Subjective:     Interval History  No acute events overnight.  Pt seen in follow up today, however the problem is new to the examiner.    Objective:  Vital signs: /57 (BP Location: Left leg, Patient Position: Lying)   Pulse 53   Temp 98.6 °F (37 °C) (Oral)   Resp 20   Ht 162.6 cm (64\")   Wt 67.2 kg (148 lb 2.4 oz)   SpO2 95%   BMI 25.43 kg/m²       Physical Exam:  GENERAL: NAD, alert and cooperative  HEENT: Normocephalic, atraumatic     Neurological:   MS: Alert, oriented to self/person, time (month, 1975), place ([facility], [place]), recent/remote memory impaired, normal attention/concentration, language intact, no neglect   CN: visual acuity grossly normal, PERRL, EOMI, no nystagmus, no facial droop, no dysarthria, tongue midline  Motor: Moves all 4 extremities symmetrically and against gravity, right upper extremity limited by pain, normal tone.  No tremor or abnormal movements noted.  Sensory: Intact to crude touch in all four ext.  Gait and station: Deferred for patient safety    Results Review: I have reviewed MRI brain and see multiple areas of acute infarct in the bilateral cerebral hemispheres   I reviewed the patient's new clinical results.  I reviewed the patient's new imaging results and agree with the interpretation.  I reviewed the patient's other test results and agree with the interpretation  I personally viewed and interpreted the patient's EKG/Telemetry data  Discussed with Dr. Richards            Current Medications:  Scheduled Medications:amLODIPine, 5 mg, Oral, Q24H  aspirin, 325 mg, Oral, Daily  atenolol, 25 mg, Oral, BID  atorvastatin, 80 mg, Oral, Daily  budesonide-formoterol, 2 puff, Inhalation, BID  calcium 500 mg vitamin D 5 mcg (200 UT), 1 tablet, Oral, BID  clopidogrel, 75 mg, Oral, Daily  donepezil, 10 " mg, Oral, Daily  [Held by provider] enoxaparin sodium, 30 mg, Subcutaneous, Q24H  escitalopram, 20 mg, Oral, Daily  [Held by provider] furosemide, 20 mg, Oral, Daily  [Held by provider] gabapentin, 400 mg, Oral, Q12H  latanoprost, 1 drop, Right Eye, Nightly  levETIRAcetam, 500 mg, Oral, Q12H  levothyroxine, 150 mcg, Oral, Q AM  lisinopril, 20 mg, Oral, Q12H  pantoprazole, 40 mg, Oral, Q AM  piperacillin-tazobactam, 3.375 g, Intravenous, Q8H  sodium chloride, 10 mL, Intravenous, Q12H  vitamin B-12, 1,000 mcg, Oral, Daily      Infusions:    PRN Medications:    acetaminophen **OR** acetaminophen **OR** acetaminophen    albuterol    alteplase    senna-docusate sodium **AND** polyethylene glycol **AND** bisacodyl **AND** bisacodyl    Calcium Replacement - Follow Nurse / BPA Driven Protocol    diphenhydrAMINE    droperidol **OR** droperidol    flumazenil    hydrALAZINE    Magnesium Standard Dose Replacement - Follow Nurse / BPA Driven Protocol    naloxone    ondansetron    Phosphorus Replacement - Follow Nurse / BPA Driven Protocol    Potassium Replacement - Follow Nurse / BPA Driven Protocol    Potassium Replacement - Follow Nurse / BPA Driven Protocol    promethazine **OR** promethazine    sodium chloride    sodium chloride    Laboratory results:  Lab Results   Component Value Date    GLUCOSE 94 06/11/2025    CALCIUM 8.8 06/11/2025     06/11/2025    K 3.7 06/11/2025    CO2 23.2 06/11/2025     (H) 06/11/2025    BUN 15.0 06/11/2025    CREATININE 1.09 (H) 06/11/2025    EGFRIFAFRI 75 09/24/2021    EGFRIFNONA 62 09/24/2021    BCR 13.8 06/11/2025    ANIONGAP 8.8 06/11/2025     Lab Results   Component Value Date    WBC 6.62 06/11/2025    HGB 10.7 (L) 06/11/2025    HCT 32.1 (L) 06/11/2025    MCV 99.7 (H) 06/11/2025     06/11/2025      Results from last 7 days   Lab Units 06/12/25  0258   CHOLESTEROL mg/dL 98     Lab Results   Component Value Date    INR 1.00 11/12/2023    INR 0.93 04/11/2022    INR 0.9  "02/13/2018    PROTIME 13.3 11/12/2023    PROTIME 12.4 04/11/2022    PROTIME 12.6 12/07/2017     Lab Results   Component Value Date    TSH 4.160 06/07/2025     No components found for: \"LDLCALC\"  Lab Results   Component Value Date    HGBA1C 5.00 06/12/2025     No components found for: \"B12\"    Brief Urine Lab Results  (Last result in the past 365 days)        Color   Clarity   Blood   Leuk Est   Nitrite   Protein   CREAT   Urine HCG        06/07/25 1812 Dark Yellow   Cloudy   Negative   Negative   Negative   30 mg/dL (1+)                   Pain Management Panel          Latest Ref Rng & Units 12/7/2018   Pain Management Panel   Amphetamine, Urine Qual Negative Negative    Barbiturates Screen, Urine Negative Negative    Benzodiazepine Screen, Urine Negative Negative    Cocaine Screen, Urine Negative Negative    Methadone Screen , Urine Negative Negative        LUMBAR PUNCTURE Results:   No results found for: \"COLORCSF\", \"SUPERCLRCSF\", \"APPEARCSF\", \"RBCCSF\", \"TNUCCELLS\", \"PROTEINCSF\", \"GLUCCSF\", \"NEUTROCSF\", \"LYMPHCSF\"  No results found for: \"CSFCX\", \"BARTQIGM\"    Review and interpretation of imaging:   MRI Brain Without Contrast  Result Date: 6/10/2025  BRAIN MRI WITHOUT CONTRAST  HISTORY: Mental status change, history of strokes and meningiomas; R51.9-Headache, unspecified; R09.02-Hypoxemia; R53.1-Weakness; E87.6-Hypokalemia  COMPARISON: November 13, 2020..  FINDINGS:  Multiplanar images of the head were obtained without gadolinium. Multiple small foci of restricted diffusion are noted within both cerebral hemispheres. There are also questionable areas of restricted diffusion within the cerebellar hemispheres bilaterally, although this may be artifactual. Images are degraded by motion artifact. There is encephalomalacia which is noted within the right occipital lobe, with additional encephalomalacia noted within the left middle cranial fossa.. There is atrophy. There is periventricular and deep white matter " microangiopathic change. There is no midline shift or mass effect. The intracranial flow voids appear intact. There are old bilateral cerebellar infarcts. There is an old left pontine infarct. No definite abnormal susceptibility artifact is seen. Patient is status post right-sided craniotomy.      1. Multiple small acute infarcts are noted within both cerebral hemispheres. This would be more suggestive of an embolic process. There is also questionable involvement in the cerebellar hemispheres bilaterally.  FINDINGS were called to the patient's nurse at 2:27 a.m.   This report was finalized on 6/10/2025 2:27 AM by Dr. Paloma Chacon M.D on Workstation: [id number]      XR Hand 3+ View Right  Result Date: 6/9/2025   XR HAND 3+ VW RIGHT-  HISTORY: Right hand pain and swelling  COMPARISON: None  FINDINGS: There is uniform joint space narrowing at the MCP joints which is an atypical place for osteoarthritis and this may be associated with CPPD arthropathy or other inflammatory arthritis. There is chondrocalcinosis overlying the triangular fibrocartilage. There are also more typical osteoarthritic changes at the interphalangeal joints where there is nonuniform joint space narrowing and spur formation. Generalized soft tissue swelling about the right wrist and hand.      1. Diffuse soft tissue swelling. 2. 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 Other inflammatory arthritides are in the differential diagnosis. 3. Osteoarthritic changes at the interphalangeal joints.    This report was finalized on 6/9/2025 4:14 PM by Pierre Kwon M.D on Workstation: [id number]      Duplex Carotid Ultrasound CAR  Result Date: 6/9/2025    Right internal carotid artery demonstrates a 50-69% stenosis.   Antegrade right vertebral flow.   Left internal carotid artery demonstrates a less than 50% stenosis.   Antegrade left vertebral flow.      EEG  Result Date: 6/7/2025  Table formatting from the original result was not included. EEG Report          # Indication:AMS History: 85-year-old female with history of stroke and distant history of seizure presenting with altered mental status.  Study includes time locked video Medical diagnoses: Hypothyroidism, anxiety and depression, COPD, peripheral neuropathy, history of breast cancer, history of stroke, hyperlipidemia, sleep apnea Current Facility-Administered Medications Medication Dose Route Frequency Provider Last Rate Last Admin  acetaminophen (TYLENOL) tablet 650 mg  650 mg Oral Q4H PRN Andria Roberts MD   650 mg at 06/05/25 1027  Or  acetaminophen (TYLENOL) 160 MG/5ML oral solution 650 mg  650 mg Oral Q4H PRN Andria Roberts MD      Or  acetaminophen (TYLENOL) suppository 650 mg  650 mg Rectal Q4H PRN Andria Roberts MD      albuterol (PROVENTIL) nebulizer solution 0.083% 2.5 mg/3mL  2.5 mg Nebulization Q6H PRN Andria Roberts MD   2.5 mg at 06/06/25 1949  aspirin tablet 325 mg  325 mg Oral Daily Andria Roberts MD   325 mg at 06/07/25 1033  atenolol (TENORMIN) tablet 25 mg  25 mg Oral BID Andria Roberts MD   25 mg at 06/07/25 2047  atorvastatin (LIPITOR) tablet 80 mg  80 mg Oral Daily Andria Roberts MD   80 mg at 06/07/25 1033  sennosides-docusate (PERICOLACE) 8.6-50 MG per tablet 2 tablet  2 tablet Oral BID PRN Andria Roberts MD      And  polyethylene glycol (MIRALAX) packet 17 g  17 g Oral Daily PRN Andria Roberts MD   17 g at 06/05/25 1231  And  bisacodyl (DULCOLAX) EC tablet 5 mg  5 mg Oral Daily PRN Andria Roberts MD      And  bisacodyl (DULCOLAX) suppository 10 mg  10 mg Rectal Daily PRN Andria Roberts MD   10 mg at 06/05/25 1659  budesonide-formoterol (SYMBICORT) 160-4.5 MCG/ACT inhaler 2 puff  2 puff Inhalation BID Andria Roberts MD   2 puff at 06/07/25 7977  calcium 500 mg vitamin D 5 mcg (200  UT) per tablet 1 tablet  1 tablet Oral BID Andria Roberts MD   1 tablet at 06/07/25 2047  Calcium Replacement - Follow Nurse / BPA Driven Protocol   Not Applicable PRN Andria Roberts MD      clopidogrel (PLAVIX) tablet 75 mg  75 mg Oral Daily Andria Roberts MD   75 mg at 06/07/25 1033  [Held by provider] donepezil (ARICEPT) tablet 10 mg  10 mg Oral Daily Andria Roberts MD   10 mg at 06/07/25 1033  enoxaparin sodium (LOVENOX) syringe 40 mg  40 mg Subcutaneous Q24H Connor Maynard APRN   40 mg at 06/07/25 1802  escitalopram (LEXAPRO) tablet 20 mg  20 mg Oral Daily Andria Roberts MD   20 mg at 06/07/25 1032  [Held by provider] furosemide (LASIX) tablet 20 mg  20 mg Oral Daily Andria Roberts MD   20 mg at 06/07/25 1033  [Held by provider] gabapentin (NEURONTIN) capsule 400 mg  400 mg Oral Q12H Andria Roberts MD   400 mg at 06/07/25 1033  hydrALAZINE (APRESOLINE) injection 10 mg  10 mg Intravenous Q6H PRN Nahid García MD   10 mg at 06/06/25 0928  latanoprost (XALATAN) 0.005 % ophthalmic solution 1 drop  1 drop Right Eye Nightly Andria Roberts MD   1 drop at 06/06/25 2054  levETIRAcetam (KEPPRA) injection 500 mg  500 mg Intravenous Q12H Nahid García MD   500 mg at 06/07/25 2046  levothyroxine (SYNTHROID, LEVOTHROID) tablet 150 mcg  150 mcg Oral Q AM Andria Roberts MD   150 mcg at 06/05/25 0824  [Held by provider] lisinopril (PRINIVIL,ZESTRIL) tablet 10 mg  10 mg Oral Q12H Sudarshan Boyd MD   10 mg at 06/07/25 1033  Magnesium Standard Dose Replacement - Follow Nurse / BPA Driven Protocol   Not Applicable PRN Andria Roberts MD      NIFEdipine XL (PROCARDIA XL) 24 hr tablet 60 mg  60 mg Oral Q24H Nahid García MD   60 mg at 06/07/25 1033  [START ON 6/8/2025] pantoprazole (PROTONIX) injection 40 mg  40 mg Intravenous Q AM Tito Bhandari MD      Phosphorus Replacement - Follow Nurse / BPA Driven Protocol   Not Applicable PRN  Andria Roberts MD      [START ON 6/8/2025] piperacillin-tazobactam (ZOSYN) 3.375 g IVPB in 100 mL NS MBP (CD)  3.375 g Intravenous Q8H Tito Bhandari MD      Potassium Replacement - Follow Nurse / BPA Driven Protocol   Not Applicable PRN Norman Agrawal MD      Potassium Replacement - Follow Nurse / BPA Driven Protocol   Not Applicable PRN Andria Roberts MD      sodium chloride 0.9 % infusion  100 mL/hr Intravenous Continuous Tito Bhandari  mL/hr at 06/07/25 1451 100 mL/hr at 06/07/25 1451  vitamin B-12 (CYANOCOBALAMIN) tablet 1,000 mcg  1,000 mcg Oral Daily Andria Roberts MD   1,000 mcg at 06/07/25 1032 Time of study: 26 minutes 4 seconds Technical summary: The 10-20 system was used for electrode placement  Background: The background rhythm was composed of primarily 6 Hz diffuse slow theta activity.  Sleep: The patient became more drowsy as noted by diffusely slower activities.  No vertex sharp transients or sleep spindles were seen  Hyperventilation: Not obtained  Photic stimulation: Photic stimulation was performed at various flash frequencies showing no significant change in the background activity  EKG: Sinus bradycardia in the 50s  Video: No unusual involuntary movements were seen on the video clips reviewed Impression: Abnormal EEG being moderately diffusely slow consistent with a moderate diffuse encephalopathy versus bihemispheric structural lesions.  No epileptiform activities were seen. Dictated utilizing Dragon dictation.      CT Head Without Contrast  Result Date: 6/7/2025  CT OF THE HEAD WITHOUT CONTRAST  HISTORY: Somnolence  COMPARISON: June 4, 2025  TECHNIQUE: Axial CT imaging was obtained through the brain. No IV contrast was administered.  FINDINGS: No acute intracranial hemorrhage is seen. There is atrophy. There is periventricular and deep white matter microangiopathic change. There are changes of prior right-sided craniotomy. Areas of  encephalomalacia are noted within the right parieto-occipital region. There is further encephalomalacia which is noted within the left middle cranial fossa. There is no midline shift or mass effect. Paranasal sinuses and mastoid air cells are clear.      No acute intracranial findings.  Radiation dose reduction techniques were utilized, including automated exposure control and exposure modulation based on body size.   This report was finalized on 6/7/2025 7:50 PM by Dr. Paloma Chacon M.D on Workstation: [id number]      XR Chest 1 View  Result Date: 6/7/2025  XR CHEST 1 VW-   INDICATION: Pneumonia  COMPARISON: Chest radiograph June 4, 2025  TECHNIQUE: 1 view chest  FINDINGS:  Vascular congestion. Ill-defined basilar opacities. Low volumes. Stable mediastinum. Bilateral shoulder arthroplasties. Surgical clips over the right chest. Left chest wall implantable loop recorder.       1. Increased ill-defined basilar lung opacities. 2. Suspect airways disease  This report was finalized on 6/7/2025 3:11 PM by Dr. Everardo Garcia M.D on Workstation: [id number]      Stress Test With Myocardial Perfusion One Day  Result Date: 6/6/2025    Myocardial perfusion imaging indicates a normal myocardial perfusion study with no evidence of ischemia. Impressions are consistent with a low risk study.   Left ventricular ejection fraction is hyperdynamic (Calculated EF > 70%).   There is no prior study available for comparison.     CT Angiogram Chest  Result Date: 6/4/2025  CTA CHEST WITH IV CONTRAST  HISTORY: Shortness of breath, hypoxia, evaluate for PE; R51.9-Headache, unspecified; R09.02-Hypoxemia; R53.1-Weakness; E87.6-Hypokalemia  COMPARISON: None  TECHNIQUE: CT angiography was performed of the chest with axial images as well as coronal and sagittal reformatted MIP images provided following administration of IV contrast. 3-D surface rendered reformats were obtained of the pulmonary arteries and aorta. Radiation dose reduction  techniques were utilized, including automated exposure control, and exposure modulation based on body size.  FINDINGS:  There is a tiny right pleural effusion, and there is mild bilateral posterior basilar dependent atelectasis. There are patchy areas of alveolar infiltrate with somewhat coarse interstitial markings in the left upper lobe. Similar though more subtle changes are seen in the right middle lobe and right lower lobe.  Thoracic aorta is normal in caliber.  There are coronary atherosclerotic vascular calcifications.  There is no suspicious mediastinal adenopathy or other mass.  Images of the upper abdomen show no acute abnormality.  There are chronic healed posttraumatic as well as degenerative bony changes, but there is no acute bony abnormality.  Bolus timing is excellent, and there is no evidence of pulmonary embolism.       Pulmonary arteries are well-opacified, and there is no evidence of pulmonary embolism.  There are mild somewhat patchy alveolar pulmonary infiltrates with somewhat coarse interstitial markings, most prominent in the left upper lobe though seen to a lesser degree in the right middle lobe and lower lobe. FINDINGS are nonspecific.  Imaging features can be seen with COVID-19 pneumonia, though are nonspecific and can occur with a variety of infectious and noninfectious processes.    This report was finalized on 6/4/2025 8:05 PM by Dr. Dale Headley M.D on Workstation: [id number]      CT Abdomen Pelvis Without Contrast  Result Date: 6/4/2025  CT ABDOMEN PELVIS WO CONTRAST-  Radiation dose reduction techniques were utilized, including automated exposure control and exposure modulation based on body size.  Clinical: Abdominal pain with constipation  FINDINGS: 1. Prior right mastectomy, trace right pleural effusion. There is cardiac enlargement.  2. The liver, pancreas, spleen and left adrenal gland are satisfactory in appearance. Clips within the right upper quadrant, the right adrenal  gland not identified, suspect previous right adrenalectomy. Bilateral renal cortical nodules, having attenuation consistent with renal cysts. The largest on the left measures 14 mm. No renal calculus or obstructive uropathy. There is atherosclerotic calcification of a normal diameter aorta. The bladder is normal.  Bladder is satisfactory in appearance, no gallstones or wall thickening or pericholecystic fluid. No biliary duct dilatation. The stomach is collapsed and cannot be optimally evaluated. Suspect duodenal diverticula. Uterus and ovaries are normal for age. There is diverticulosis of the colon, no diverticulitis. The small bowel is satisfactory in appearance. No free intraperitoneal gas or fluid. No adenopathy seen.  CONCLUSION: No acute intra-abdominal abnormality. There is diverticulosis of the colon without diverticulitis. Small bilateral renal cysts. Postoperative change consistent with right adrenalectomy.     This report was finalized on 6/4/2025 6:22 PM by Dr. Bryan Roy M.D on Workstation: [id number]      CT Head Without Contrast  Result Date: 6/4/2025  HEAD CT WITHOUT CONTRAST  REASON:  Frequent headaches  COMPARISON STUDIES: Head CT 11/12/2023.  TECHNIQUE:  Axial images were acquired from the skull base to vertex without contrast, including multiplanar reformats, per standard departmental protocol.    Radiation dose reduction techniques were utilized, including automated exposure control, and exposure modulation based on body size.  FINDINGS:  There is no CT evidence of acute intracranial hemorrhage, mass, or infarct. There is volume loss, but there is no evidence of hydrocephalus or extra-axial fluid collection.  Redemonstrated advanced chronic white matter changes as well as right parieto-occipital cortical encephalomalacia, no evidence of acute intracranial abnormality.  Skull base, calvarium, and extracranial soft tissues show chronic changes, without evidence of acute abnormality.        Chronic changes as noted above, no acute intracranial abnormality.        This report was finalized on 6/4/2025 6:21 PM by Dr. Dale Headley M.D on Workstation: [id number]      XR Chest AP  Result Date: 6/4/2025  XR CHEST AP-  Clinical: COVID evaluation, cough, fever  COMPARISON examination 4/11/2022  FINDINGS: The cardiomediastinal silhouette is stable. There is atherosclerotic calcification of the aorta. The right lung is clear. There is a suggestion of perhaps minimal infiltrate along the left costophrenic sulcus. No consolidation is seen within either lung. No edema or effusion. The remainder is unremarkable.  This report was finalized on 6/4/2025 5:11 PM by Dr. Bryan Roy M.D on Workstation: [id number]        Results for orders placed during the hospital encounter of 06/04/25    Adult Transthoracic Echo Complete W/ Cont if Necessary Per Protocol    Interpretation Summary    Left ventricular systolic function is normal. Calculated left ventricular EF = 62.6%    Left ventricular diastolic function is consistent with (grade II w/high LAP) pseudonormalization.    The left atrial cavity is moderately dilated.    Lab Review: A1c 5%, LDL 55, BMP and CBC reviewed, magnesium 1.8, urinalysis with no bacteria, B12 > 2000, folate 12.6, TSH 4.160    Diagnoses:  Altered mental status felt secondary to bilateral acute strokes  Acute bilateral cortical strokes, cardioembolic  Left atrial appendage thrombus  Hypertension  Hyperlipidemia  History of breast cancer s/p XRT  GERD  COPD  Sleep apnea  Seizure disorder    Impression: Mrs. Carr is an 85-year-old female with a past medical history of hyperlipidemia, hypothyroidism, obstructive sleep apnea, seizure disorder on Keppra and gabapentin, history of right MCA stroke secondary to right ICA stenosis on DAPT, loop recorder presence.  She follows in our neurology outpatient clinic for history of stroke and cognitive impairment.  She also follows with cardiology   "Janes and has a loop recorder with no evidence of A-fib per last note dated 12/24.  She was admitted to [facility]/4/20[facility] with headaches, fatigue, generalized weakness after a fall 3 weeks prior.  She was also found to have acute kidney injury and pneumonia.  Our service was consulted for somnolence.  A routine EEG showed no epileptogenic activity or interictal discharges.  MRI brain demonstrated embolic appearing strokes.  This morning she had JEFE with cardiology which revealed a \"chicken wing\" shaped appendage with prominent hook shaped superior segment with low flow and a visualized thrombus.  She was initiated on Eliquis 5 mg twice daily and DAPT was discontinued.    Plan:  - Eliquis 5 mg BID per Cardiology - Neuro in agreement. Okay to discontinue aspirin and Plavix  - Continue statin  - Neuro checks  - BP <140/90  - Therapies as written  - SCDs/TEDs  - Follow-up in outpatient stroke clinic - message sent to arrange scheduling   - We will sign off, no further work-up planned. Call RRT for acute neurologic change/concern. Please call us back with any questions.     I have discussed the above with the patient and Dr. Richards who are in agreement with the plan.     Kristie Rueda, CELY  06/12/25  09:26 EDT    I spent 50 minutes caring for the patient on this date of service. This time includes time spent by me in the following activities:reviewing tests, obtaining and/or reviewing a separately obtained history, performing a medically appropriate examination and/or evaluation , counseling and educating the patient/family/caregiver, ordering medications, tests, or procedures, referring and communicating with other health care professionals , documenting information in the medical record, independently interpreting results and communicating that information with the patient/family/caregiver, and care coordination    \"Dictated utilizing Dragon dictation\".       As of April 2021, as required by the Federal 21st " Century Cures Act, medical records (including provider notes and laboratory/imaging results) are to be made available to patient’s and/or their designees as soon as the documents are signed/resulted. While the intention is to ensure transparency and to engage the patient in their healthcare, this immediate access may create unintended consequences as this document uses language intended for communication between medical experts and diagnostic results are interpreted with the entirety of the patient’s clinical picture in mind. It is recommended that patients and/or their designees review all available information with their primary or specialist providers for explanation and guidance to avoid misinterpretation based on layperson understanding, non-medical expert opinions, or Internet searches.

## 2025-06-12 NOTE — PROGRESS NOTES
LOS: 7 days   Patient Care Team:  Artem Shepherd MD as PCP - General (Internal Medicine)    Chief Complaint: Follow-up chest pain, hypertensive urgency, troponin elevation.    Interval History: No new complaints today.  She seemed reasonably alert and interactive.  No chest pain or shortness of breath.  Performed this morning showed a chicken wing shaped appendage with a prominent  superior segment containing a thrombus.    Vital Signs:  Temp:  [98.1 °F (36.7 °C)-98.6 °F (37 °C)] 98.6 °F (37 °C)  Heart Rate:  [50-86] 59  Resp:  [16-28] 20  BP: (140-226)/(47-96) 184/59    Intake/Output Summary (Last 24 hours) at 6/12/2025 1056  Last data filed at 6/12/2025 0838  Gross per 24 hour   Intake 150 ml   Output 100 ml   Net 50 ml       Physical Exam:   General Appearance:    No acute distress, alert.   Lungs:     Decreased breath sounds bilaterally.    Heart:    Regular rhythm and normal rate. II/VI SM LLSB.    Abdomen:     Soft, nontender, nondistended.    Extremities:   Trace edema of the lower extremities with chronic venous stasis changes.     Results Review:    Results from last 7 days   Lab Units 06/11/25  1353   SODIUM mmol/L 143   POTASSIUM mmol/L 3.7   CHLORIDE mmol/L 111*   CO2 mmol/L 23.2   BUN mg/dL 15.0   CREATININE mg/dL 1.09*   GLUCOSE mg/dL 94   CALCIUM mg/dL 8.8           Results from last 7 days   Lab Units 06/11/25  1353   WBC 10*3/mm3 6.62   HEMOGLOBIN g/dL 10.7*   HEMATOCRIT % 32.1*   PLATELETS 10*3/mm3 259         Results from last 7 days   Lab Units 06/12/25  0258   CHOLESTEROL mg/dL 98     Results from last 7 days   Lab Units 06/09/25  0646   MAGNESIUM mg/dL 1.8     Results from last 7 days   Lab Units 06/12/25  0258   CHOLESTEROL mg/dL 98   TRIGLYCERIDES mg/dL 90   HDL CHOL mg/dL 25*   LDL CHOL mg/dL 55       I reviewed the patient's new clinical results.        Assessment:  1.  Acute hypoxic respiratory failure  2.  Patchy bilateral infiltrates by CTA of the chest, presumed pneumonia  3.  COPD  without acute exacerbation  4.  Former tobacco use  5.  Hypertensive urgency  6.  Recent progressive weakness, likely multifactorial  7.  Headache, likely secondary to #5  8.  Intermittent chest pressure, negative Myoview stress test on 6/6/2025  9.  Nonspecific high-sensitivity troponin elevation  10.  History of multiple recurrent strokes (last with a right MCA CVA in November 2023).  On DAPT.  11.  History of SVT with loop recorder placement in August 2022  12.  Multifactorial chronic anemia  13.  History of remote meningioma, status post craniotomy and resection  14.  Bilateral carotid artery disease  15.  Peripheral neuropathy  16.  Chronic lower extremity edema secondary to venous stasis  17.  Altered mental status - MRI of the brain multiple bilateral small acute strokes  18.  Left atrial appendage thrombus by JEFE on 6/12/2025    Plan:  -Again, her JEFE this morning showed a chicken wing shaped appendage with a prominent hook shaped superior segment with low flow and a visualized thrombus.  Spoke with Dr. Richards from neurology.  Likely start Eliquis 5 mg twice daily later today if there are no contraindications from a stroke standpoint.  If so, she likely can be taken off of aspirin and Plavix.  This would also likely explain why she has not had recurrent strokes in the past without documented atrial fibrillation.    -Blood pressure significantly elevated again.  Continue amlodipine, lisinopril, and atenolol.  Add hydralazine 25 mg every 8 hours.    -Continue Lipitor 80 mg/day for recurrent stroke.    - Discussed with Dr. Richards and with the patient's  as well.     Sudarshan Boyd MD  06/12/25  10:56 EDT

## 2025-06-12 NOTE — PROGRESS NOTES
Name: Avelina Carr ADMIT: 2025   : 1940  PCP: Artem Shepherd MD    MRN: [id number] LOS: 7 days   AGE/SEX: 85 y.o. female  ROOM: [facility]     Subjective   Subjective    at bedside. Patient without new complaints.     Objective   Objective   Vital Signs  Temp:  [98.1 °F (36.7 °C)-98.6 °F (37 °C)] 98.6 °F (37 °C)  Heart Rate:  [50-86] 53  Resp:  [16-28] 20  BP: (139-226)/(47-96) 139/57  SpO2:  [92 %-100 %] 95 %  on  Flow (L/min) (Oxygen Therapy):  [15] 15;   Device (Oxygen Therapy): room air  Body mass index is 25.43 kg/m².    Physical Exam  Constitutional:       General: She is not in acute distress.     Appearance: She is not toxic-appearing.   HENT:      Head: Normocephalic and atraumatic.   Cardiovascular:      Rate and Rhythm: Normal rate and regular rhythm.   Pulmonary:      Effort: Pulmonary effort is normal. No respiratory distress.      Breath sounds: Normal breath sounds.   Abdominal:      General: Bowel sounds are normal.      Palpations: Abdomen is soft.      Tenderness: There is no abdominal tenderness.   Musculoskeletal:      Right hand: No swelling.   Skin:     General: Skin is warm and dry.   Neurological:      Mental Status: She is alert.   Psychiatric:         Mood and Affect: Mood normal.         Behavior: Behavior normal.     Results Review  I reviewed the patient's new clinical results.  Results from last 7 days   Lab Units 25  1353 06/10/25  0742 25  1013 25  0917   WBC 10*3/mm3 6.62 7.83 8.33 7.31   HEMOGLOBIN g/dL 10.7* 10.6* 10.6* 11.9*   PLATELETS 10*3/mm3 259 246 233 243     Results from last 7 days   Lab Units 25  1353 25  2332 25  0646 25  1901   SODIUM mmol/L 143 137 145 143   POTASSIUM mmol/L 3.7 4.1  4.1 2.9*  2.9* 3.4*   CHLORIDE mmol/L 111* 103 118* 109*   CO2 mmol/L 23.2 22.9 17.9* 23.9   BUN mg/dL 15.0 8.0 9.0 16.0   CREATININE mg/dL 1.09* 0.84 0.58 0.98   GLUCOSE mg/dL 94 93 81 110*     Lab Results   Component Value Date     ANIONGAP 8.8 06/11/2025     Estimated Creatinine Clearance: 34.9 mL/min (A) (by C-G formula based on SCr of 1.09 mg/dL (H)).   Lab Results   Component Value Date    EGFR 49.6 (L) 06/11/2025           Results from last 7 days   Lab Units 06/11/25  1353 06/09/25  2332 06/09/25  0646 06/08/25  1901   CALCIUM mg/dL 8.8 10.5 6.2* 8.4*   MAGNESIUM mg/dL  --   --  1.8  --        Hemoglobin A1C   Date/Time Value Ref Range Status   06/12/2025 0258 5.00 4.80 - 5.60 % Final     Glucose   Date/Time Value Ref Range Status   06/12/2025 0257 93 70 - 130 mg/dL Final   06/11/2025 2014 110 70 - 130 mg/dL Final       No radiology results for the last day      Scheduled Meds  amLODIPine, 5 mg, Oral, Q24H  apixaban, 5 mg, Oral, Q12H  atenolol, 25 mg, Oral, BID  atorvastatin, 80 mg, Oral, Daily  budesonide-formoterol, 2 puff, Inhalation, BID  calcium 500 mg vitamin D 5 mcg (200 UT), 1 tablet, Oral, BID  donepezil, 10 mg, Oral, Daily  escitalopram, 20 mg, Oral, Daily  [Held by provider] furosemide, 20 mg, Oral, Daily  [Held by provider] gabapentin, 400 mg, Oral, Q12H  hydrALAZINE, 25 mg, Oral, Q8H  latanoprost, 1 drop, Right Eye, Nightly  levETIRAcetam, 500 mg, Oral, Q12H  levothyroxine, 150 mcg, Oral, Q AM  lisinopril, 20 mg, Oral, Q12H  pantoprazole, 40 mg, Oral, Q AM  piperacillin-tazobactam, 3.375 g, Intravenous, Q8H  sodium chloride, 10 mL, Intravenous, Q12H  vitamin B-12, 1,000 mcg, Oral, Daily    Continuous Infusions   PRN Meds    acetaminophen **OR** acetaminophen **OR** acetaminophen    albuterol    alteplase    senna-docusate sodium **AND** polyethylene glycol **AND** bisacodyl **AND** bisacodyl    Calcium Replacement - Follow Nurse / BPA Driven Protocol    hydrALAZINE    Magnesium Standard Dose Replacement - Follow Nurse / BPA Driven Protocol    Phosphorus Replacement - Follow Nurse / BPA Driven Protocol    Potassium Replacement - Follow Nurse / BPA Driven Protocol    Potassium Replacement - Follow Nurse / BPA Driven  Protocol    sodium chloride    sodium chloride     Diet  Diet: Regular/House; Feeding Assistance - Nursing, No Straw; Texture: Pureed (NDD 1); Fluid Consistency: Nectar Thick       Assessment/Plan     Active Hospital Problems    Diagnosis  POA    **Hypoxia [R09.02]  Yes    Severe protein-calorie malnutrition [E43]  Yes    Altered mental state [R41.82]  Unknown    Chest discomfort [R07.89]  Yes    Elevated troponin [R79.89]  Yes    Acute CVA (cerebrovascular accident) [I63.9]  Yes    Former smoker [Z87.891]  Not Applicable    Hypothyroid [E03.9]  Yes    HLD (hyperlipidemia) [E78.5]  Yes    Primary hypertension [I10]  Yes    History of CVA (cerebrovascular accident) [Z86.73]  Not Applicable      Resolved Hospital Problems   No resolved problems to display.     Patient is a 85 y.o. female     AMS  Multiple acute strokes/cryptogenic recurrent strokes  Seizure disorder on [facility]  - Neurology following.  EEG and head CT unremarkable  -Brain MRI revealed multiple acute infarcts.  Carotid Doppler noted show 50-69% stenosis on the right and less than 50% on the left  -Gabapentin held  - Metabolic workup unremarkable  -No A-fib on loop  - JEFE today with left atrial appendage. Stop aspirin and Plavix, start apixaban     Hypoxia  Former smoker  Suspected aspiration pneumonia  - Continue Zosyn to complete a 7-day course  -On room air with no respiratory distress today  - Chronic dysphagia. Speech therapy has modified her diet Diet: Regular/House; Feeding Assistance - Nursing, No Straw; Texture: Pureed (NDD 1); Fluid Consistency: Nectar Thick        Chest discomfort     Elevated troponin  -CTA negative PE   - Stress test and echocardiogram looked okay  - On statin, aspirin, Plavix  -s/p loop recorder; followed by Dr. Marrero  -Cardiology following.    -Patient has no documented history of atrial fibrillation. No A-fib on loop       Hypokalemia  - Improved.  Replace as per protocol       Primary hypertension  - Blood pressure has  risen and will see how this does with stopping IV fluids  -Continue Norvasc, lisinopril      Elevated creatinine  - Resolved  - Lasix on hold     Very poor IV access  - Now with midline for antibiotic administration as well as labs however no blood return. Right arm has restriction so will probably have to try some cathflo       HLD (hyperlipidemia)    History of cardioembolic cerebrovascular accident (CVA)  - Switching from aspirin and Plavix to apixaban  - Statin        Hypothyroid  - TSH within normal limits     Right hand pain and swelling  - Uric acid unremarkable  -X-ray suggested as above CPPD arthropathy.  -Will treat conservatively for now as she reports improvement of pain and swelling looks better today. May consider small dose of steroid see how she does     Nutrition  - Dietitian following for calorie count.  Giving Magic cups and boost    DVT prophylaxis  Apixaban would cover    Discharge  [facility] maybe tomorrow  Expected Discharge Date: 6/13/2025; Expected Discharge Time:     Discussed with patient, family, and nursing staff     Jose Roberto Degroot MD  [facility]  06/12/25 13:55 EDT

## 2025-06-12 NOTE — ANESTHESIA PREPROCEDURE EVALUATION
Anesthesia Evaluation     Patient summary reviewed and Nursing notes reviewed   no history of anesthetic complications:   NPO Solid Status: > 8 hours  NPO Liquid Status: > 2 hours           Airway   Mallampati: II  TM distance: <3 FB  Dental - normal exam     Comment: Upper bridge    Pulmonary    (+) a smoker Former, COPD,sleep apnea  Cardiovascular     PT is on anticoagulation therapy  Rhythm: regular  Rate: normal    (+) hypertension, hyperlipidemia      Neuro/Psych  (+) CVA, psychiatric history Anxiety    ROS Comment: Active stroke  GI/Hepatic/Renal/Endo    (+) GERD    Musculoskeletal     Abdominal    Substance History      OB/GYN          Other   arthritis,   history of cancer                  Anesthesia Plan    ASA 4     MAC     intravenous induction     Anesthetic plan, risks, benefits, and alternatives have been provided, discussed and informed consent has been obtained with: patient and spouse/significant other.

## 2025-06-13 LAB
GLUCOSE BLDC GLUCOMTR-MCNC: 113 MG/DL (ref 70–130)
GLUCOSE BLDC GLUCOMTR-MCNC: 130 MG/DL (ref 70–130)
GLUCOSE BLDC GLUCOMTR-MCNC: 89 MG/DL (ref 70–130)
GLUCOSE BLDC GLUCOMTR-MCNC: 91 MG/DL (ref 70–130)

## 2025-06-13 PROCEDURE — 82948 REAGENT STRIP/BLOOD GLUCOSE: CPT

## 2025-06-13 PROCEDURE — 94799 UNLISTED PULMONARY SVC/PX: CPT

## 2025-06-13 PROCEDURE — 94761 N-INVAS EAR/PLS OXIMETRY MLT: CPT

## 2025-06-13 PROCEDURE — 25010000002 PIPERACILLIN SOD-TAZOBACTAM PER 1 G: Performed by: INTERNAL MEDICINE

## 2025-06-13 PROCEDURE — 97530 THERAPEUTIC ACTIVITIES: CPT

## 2025-06-13 PROCEDURE — 94664 DEMO&/EVAL PT USE INHALER: CPT

## 2025-06-13 PROCEDURE — 99232 SBSQ HOSP IP/OBS MODERATE 35: CPT | Performed by: INTERNAL MEDICINE

## 2025-06-13 PROCEDURE — 92526 ORAL FUNCTION THERAPY: CPT | Performed by: SPEECH-LANGUAGE PATHOLOGIST

## 2025-06-13 RX ORDER — SPIRONOLACTONE 25 MG/1
25 TABLET ORAL DAILY
Status: DISCONTINUED | OUTPATIENT
Start: 2025-06-13 | End: 2025-06-14 | Stop reason: HOSPADM

## 2025-06-13 RX ORDER — HYDRALAZINE HYDROCHLORIDE 50 MG/1
50 TABLET, FILM COATED ORAL EVERY 8 HOURS SCHEDULED
Status: DISCONTINUED | OUTPATIENT
Start: 2025-06-13 | End: 2025-06-14

## 2025-06-13 RX ADMIN — SODIUM CHLORIDE 3.38 G: 9 INJECTION, SOLUTION INTRAVENOUS at 09:17

## 2025-06-13 RX ADMIN — LEVETIRACETAM 500 MG: 500 TABLET, FILM COATED ORAL at 09:19

## 2025-06-13 RX ADMIN — LATANOPROST 1 DROP: 50 SOLUTION/ DROPS OPHTHALMIC at 20:27

## 2025-06-13 RX ADMIN — DONEPEZIL HYDROCHLORIDE 10 MG: 10 TABLET, FILM COATED ORAL at 09:15

## 2025-06-13 RX ADMIN — HYDRALAZINE HYDROCHLORIDE 50 MG: 50 TABLET ORAL at 13:09

## 2025-06-13 RX ADMIN — HYDRALAZINE HYDROCHLORIDE 50 MG: 50 TABLET ORAL at 22:32

## 2025-06-13 RX ADMIN — BUDESONIDE AND FORMOTEROL FUMARATE DIHYDRATE 2 PUFF: 160; 4.5 AEROSOL RESPIRATORY (INHALATION) at 23:08

## 2025-06-13 RX ADMIN — ATENOLOL 25 MG: 25 TABLET ORAL at 20:26

## 2025-06-13 RX ADMIN — SPIRONOLACTONE 25 MG: 25 TABLET ORAL at 09:17

## 2025-06-13 RX ADMIN — SODIUM CHLORIDE 3.38 G: 9 INJECTION, SOLUTION INTRAVENOUS at 16:41

## 2025-06-13 RX ADMIN — HYDRALAZINE HYDROCHLORIDE 25 MG: 25 TABLET ORAL at 06:55

## 2025-06-13 RX ADMIN — SODIUM CHLORIDE 3.38 G: 9 INJECTION, SOLUTION INTRAVENOUS at 03:17

## 2025-06-13 RX ADMIN — APIXABAN 5 MG: 5 TABLET, FILM COATED ORAL at 20:26

## 2025-06-13 RX ADMIN — LEVOTHYROXINE SODIUM 150 MCG: 0.07 TABLET ORAL at 06:55

## 2025-06-13 RX ADMIN — LISINOPRIL 20 MG: 20 TABLET ORAL at 20:28

## 2025-06-13 RX ADMIN — ESCITALOPRAM 20 MG: 10 TABLET, FILM COATED ORAL at 09:15

## 2025-06-13 RX ADMIN — APIXABAN 5 MG: 5 TABLET, FILM COATED ORAL at 09:14

## 2025-06-13 RX ADMIN — LEVETIRACETAM 500 MG: 500 TABLET, FILM COATED ORAL at 20:26

## 2025-06-13 RX ADMIN — Medication 10 ML: at 09:17

## 2025-06-13 RX ADMIN — PANTOPRAZOLE SODIUM 40 MG: 40 TABLET, DELAYED RELEASE ORAL at 06:55

## 2025-06-13 RX ADMIN — Medication 10 ML: at 20:28

## 2025-06-13 RX ADMIN — LISINOPRIL 20 MG: 20 TABLET ORAL at 09:16

## 2025-06-13 RX ADMIN — BUDESONIDE AND FORMOTEROL FUMARATE DIHYDRATE 2 PUFF: 160; 4.5 AEROSOL RESPIRATORY (INHALATION) at 09:03

## 2025-06-13 RX ADMIN — AMLODIPINE BESYLATE 5 MG: 5 TABLET ORAL at 09:13

## 2025-06-13 RX ADMIN — CALCIUM CARBONATE-VITAMIN D TAB 500 MG-200 UNIT 1 TABLET: 500-200 TAB at 20:26

## 2025-06-13 RX ADMIN — Medication 1000 MCG: at 09:17

## 2025-06-13 RX ADMIN — CALCIUM CARBONATE-VITAMIN D TAB 500 MG-200 UNIT 1 TABLET: 500-200 TAB at 09:15

## 2025-06-13 RX ADMIN — ATENOLOL 25 MG: 25 TABLET ORAL at 09:14

## 2025-06-13 RX ADMIN — ATORVASTATIN CALCIUM 80 MG: 80 TABLET, FILM COATED ORAL at 09:14

## 2025-06-13 NOTE — PROGRESS NOTES
LOS: 8 days   Patient Care Team:  Artem Shepherd MD as PCP - General (Internal Medicine)    Chief Complaint: Follow-up chest pain, hypertensive urgency, troponin elevation.    Interval History: No new complaints today.  She was reasonably alert and interactive.  No chest pain or shortness of breath.      Vital Signs:  Temp:  [97.9 °F (36.6 °C)-98.4 °F (36.9 °C)] 97.9 °F (36.6 °C)  Heart Rate:  [53-71] 55  Resp:  [18-20] 18  BP: (139-181)/(57-84) 181/84    Intake/Output Summary (Last 24 hours) at 6/13/2025 1200  Last data filed at 6/13/2025 0917  Gross per 24 hour   Intake 100 ml   Output 500 ml   Net -400 ml       Physical Exam:   General Appearance:    No acute distress, alert.   Lungs:     Decreased breath sounds bilaterally.    Heart:    Regular rhythm and normal rate. II/VI SM LLSB.    Abdomen:     Soft, nontender, nondistended.    Extremities:   Trace edema of the lower extremities with chronic venous stasis changes.     Results Review:    Results from last 7 days   Lab Units 06/11/25  1353   SODIUM mmol/L 143   POTASSIUM mmol/L 3.7   CHLORIDE mmol/L 111*   CO2 mmol/L 23.2   BUN mg/dL 15.0   CREATININE mg/dL 1.09*   GLUCOSE mg/dL 94   CALCIUM mg/dL 8.8           Results from last 7 days   Lab Units 06/11/25  1353   WBC 10*3/mm3 6.62   HEMOGLOBIN g/dL 10.7*   HEMATOCRIT % 32.1*   PLATELETS 10*3/mm3 259         Results from last 7 days   Lab Units 06/12/25  0258   CHOLESTEROL mg/dL 98     Results from last 7 days   Lab Units 06/09/25  0646   MAGNESIUM mg/dL 1.8     Results from last 7 days   Lab Units 06/12/25  0258   CHOLESTEROL mg/dL 98   TRIGLYCERIDES mg/dL 90   HDL CHOL mg/dL 25*   LDL CHOL mg/dL 55       I reviewed the patient's new clinical results.        Assessment:  1.  Acute hypoxic respiratory failure  2.  Patchy bilateral infiltrates by CTA of the chest, presumed pneumonia  3.  COPD without acute exacerbation  4.  Former tobacco use  5.  Hypertensive urgency  6.  Recent progressive weakness, likely  multifactorial  7.  Headache, likely secondary to #5  8.  Intermittent chest pressure, negative Myoview stress test on 6/6/2025  9.  Nonspecific high-sensitivity troponin elevation  10.  History of multiple recurrent strokes (last with a right MCA CVA in November 2023).  On DAPT.  11.  History of SVT with loop recorder placement in August 2022  12.  Multifactorial chronic anemia  13.  History of remote meningioma, status post craniotomy and resection  14.  Bilateral carotid artery disease  15.  Peripheral neuropathy  16.  Chronic lower extremity edema secondary to venous stasis  17.  Altered mental status - MRI of the brain multiple bilateral small acute strokes  18.  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  Eliquis 5 mg twice daily started.  Aspirin and Plavix were discontinued.    -Blood pressure remains a significant issue.  Continue amlodipine 5 mg/day, atenolol 25 mg twice daily, and lisinopril 20 mg 3 times daily.  Increase hydralazine to 50 mg every 8 hours, and add spironolactone 25 mg/day.  If her blood pressure does not come down after these adjustments, would consider a renal artery Doppler ultrasound.    -Continue Lipitor 80 mg/day for recurrent stroke.    - Discussed with the patient's  at bedside this morning.     Sudarshan Boyd MD  06/13/25  12:00 EDT

## 2025-06-13 NOTE — CONSULTS
Nutrition Services    Patient Name:  Avelina Carr  YOB: 1940  MRN: [id number]  Admit Date:  6/4/2025  Assessment Date:  06/13/25     CALORIE COUNT PROGRESS NOTE    Comments: Calorie Count Day 1 completed  Day 1:  Pt dislikes the pureed food, asking for real pancakes.  Day 2:  No documentation for Breakfast or Lunch but report from staff and pt spouse was minimal intake - Dinner - recorded  Day 3:  NPO in AM for JEFE, most of intake from Magic Cups at lunch and dinner,  Bites of other items only.  Note pt may d/c today.  Would continue Magic Cups TID at discharge.      Calorie Count Data          Calories Protein   Day 1   (6/11)  713   (47%) 12    (7%)   Day 2   (6/12)  320   (21%)  12   (17%)   Day 3   (6/13)  730   (49%)  22   (31%)   Average Intake   587  (39%)   15  (21%)     Current Nutrition Orders            Food Allergies NKFA   Current PO Diet Diet: Regular/House; Feeding Assistance - Nursing, No Straw; Texture: Pureed (NDD 1); Fluid Consistency: Nectar Thick   Supplement Magic Cup TID and Boost VHC BID   Current EN/PN  Route: n/a  Order: --     Estimated Requirements         Weight for Calculation  68.9 kg     Calories  7550-0025 (22 kcal/kg, 25 kcal/kg)    Protein  70-85 (1.0 - 1.2 gm/kg)    Fluid  5437-4390 (1 mL/kcal)     RD to follow up per protocol.    Electronically signed by:  Fab Senior RD  06/13/25 11:01 EDT

## 2025-06-13 NOTE — CASE MANAGEMENT/SOCIAL WORK
Continued Stay Note  [facility]     Patient Name: Avelina Carr  MRN: [id number]  Today's Date: 6/13/2025    Admit Date: 6/4/2025    Plan: DC to [facility] on 6- via [facility]   Discharge Plan       Row Name 06/13/25 1437       Plan    Plan DC to [facility] on 6- via [facility]    Plan Comments MD would like to monitor pt's BP 2h hours. Informed Renee S[facility]. Pt will have a bed on Saturday 6-14. Md will dc on 6-. Informed pt and spouse, Vitaly, of disposition. [facility] to transport 6-14 at 1400, confirmation # [id number]. Please call report to [phone]. Please fax dc summary to [phone]. Prepared packet at nurses station.                   Discharge Codes    No documentation.                 Expected Discharge Date and Time       Expected Discharge Date Expected Discharge Time    Jun 14, 2025               Charlette Wilburn RN

## 2025-06-13 NOTE — PROGRESS NOTES
Name: Avelina Carr ADMIT: 2025   : 1940  PCP: Artem Shepherd MD    MRN: [id number] LOS: 8 days   AGE/SEX: 85 y.o. female  ROOM: [facility]     Subjective   Subjective   No acute events overnight.  Patient states she is feeling pretty well today.  No chest pain or shortness of breath.  No family at bedside.    Objective   Objective     Vital Signs  Temp:  [97.9 °F (36.6 °C)-98.4 °F (36.9 °C)] 97.9 °F (36.6 °C)  Heart Rate:  [53-71] 55  Resp:  [18-20] 18  BP: (139-181)/(57-84) 181/84  SpO2:  [94 %-98 %] 95 %  on   ;   Device (Oxygen Therapy): room air  Body mass index is 28.53 kg/m².    Physical Exam  General: Alert, no acute distress.  Lying in bed.  Elderly and frail appearing.  ENT: No conjunctival injection or scleral icterus. Moist mucous membranes.   Neuro: Eyes open and moving in all directions, generalized weakness but moving all extremities, face symmetric, no focal deficits.   Lungs: Clear to auscultation bilaterally. No wheeze or crackles. No distress.   Heart: RRR, no murmurs. No edema.  Abdomen: Soft, non-tender, non-distended. Normal bowel sounds.   Ext: Warm and well-perfused. No edema.   Skin: No rashes or lesions. IV site without swelling or erythema.     Results Review     I reviewed the patient's new clinical results:  Results from last 7 days   Lab Units 25  1353 06/10/25  0742 25  1013 25  0917   WBC 10*3/mm3 6.62 7.83 8.33 7.31   HEMOGLOBIN g/dL 10.7* 10.6* 10.6* 11.9*   PLATELETS 10*3/mm3 259 246 233 243     Results from last 7 days   Lab Units 25  1353 25  2332 25  0646 25  1901   SODIUM mmol/L 143 137 145 143   POTASSIUM mmol/L 3.7 4.1  4.1 2.9*  2.9* 3.4*   CHLORIDE mmol/L 111* 103 118* 109*   CO2 mmol/L 23.2 22.9 17.9* 23.9   BUN mg/dL 15.0 8.0 9.0 16.0   CREATININE mg/dL 1.09* 0.84 0.58 0.98   GLUCOSE mg/dL 94 93 81 110*   EGFR mL/min/1.73 49.6* 67.8 88.3 56.3*       Results from last 7 days   Lab Units 25  1353 25  2021  06/09/25  0646 06/08/25  1901   CALCIUM mg/dL 8.8 10.5 6.2* 8.4*   MAGNESIUM mg/dL  --   --  1.8  --        Hemoglobin A1C   Date/Time Value Ref Range Status   06/12/2025 0258 5.00 4.80 - 5.60 % Final     Glucose   Date/Time Value Ref Range Status   06/13/2025 1220 130 70 - 130 mg/dL Final   06/13/2025 0758 91 70 - 130 mg/dL Final   06/13/2025 0539 89 70 - 130 mg/dL Final   06/12/2025 2147 86 70 - 130 mg/dL Final   06/12/2025 0257 93 70 - 130 mg/dL Final   06/11/2025 2014 110 70 - 130 mg/dL Final       No radiology results for the last day    I have personally reviewed all medications:  Scheduled Medications  amLODIPine, 5 mg, Oral, Q24H  apixaban, 5 mg, Oral, Q12H  atenolol, 25 mg, Oral, BID  atorvastatin, 80 mg, Oral, Daily  budesonide-formoterol, 2 puff, Inhalation, BID  calcium 500 mg vitamin D 5 mcg (200 UT), 1 tablet, Oral, BID  donepezil, 10 mg, Oral, Daily  escitalopram, 20 mg, Oral, Daily  [Held by provider] furosemide, 20 mg, Oral, Daily  [Held by provider] gabapentin, 400 mg, Oral, Q12H  hydrALAZINE, 50 mg, Oral, Q8H  latanoprost, 1 drop, Right Eye, Nightly  levETIRAcetam, 500 mg, Oral, Q12H  levothyroxine, 150 mcg, Oral, Q AM  lisinopril, 20 mg, Oral, Q12H  pantoprazole, 40 mg, Oral, Q AM  piperacillin-tazobactam, 3.375 g, Intravenous, Q8H  sodium chloride, 10 mL, Intravenous, Q12H  spironolactone, 25 mg, Oral, Daily  vitamin B-12, 1,000 mcg, Oral, Daily    Infusions   Diet  Diet: Regular/House; Feeding Assistance - Nursing, No Straw; Texture: Pureed (NDD 1); Fluid Consistency: Nectar Thick      Intake/Output Summary (Last 24 hours) at 6/13/2025 1314  Last data filed at 6/13/2025 0917  Gross per 24 hour   Intake 100 ml   Output 500 ml   Net -400 ml       Assessment/Plan     Active Hospital Problems    Diagnosis  POA    **Hypoxia [R09.02]  Yes    Severe protein-calorie malnutrition [E43]  Yes    Altered mental state [R41.82]  Unknown    Chest discomfort [R07.89]  Yes    Elevated troponin [R79.89]  Yes     Acute CVA (cerebrovascular accident) [I63.9]  Yes    Former smoker [Z87.891]  Not Applicable    Hypothyroid [E03.9]  Yes    HLD (hyperlipidemia) [E78.5]  Yes    Primary hypertension [I10]  Yes    History of CVA (cerebrovascular accident) [Z86.73]  Not Applicable      Resolved Hospital Problems   No resolved problems to display.       85 y.o. female with Hypoxia.    AMS  Multiple acute strokes/cryptogenic recurrent strokes  Seizure disorder on Keppra  - Neurology following.  EEG and head CT unremarkable  -Brain MRI revealed multiple acute infarcts.  Carotid Doppler noted show 50-69% stenosis on the right and less than 50% on the left  -Gabapentin held  - Metabolic workup unremarkable  -No A-fib on loop  - JEFE 6/12 with left atrial appendage with visualized thrombus  - ASA and Plavix discontinued, continue Eliquis  -SNF at discharge     Hypoxia  Former smoker  Suspected aspiration pneumonia  - Continue Zosyn to complete a 7-day course  -On room air with no respiratory distress today  - Chronic dysphagia. Speech therapy has modified her diet Diet: Regular/House; Feeding Assistance - Nursing, No Straw; Texture: Pureed (NDD 1); Fluid Consistency: [place] Thick      Hypertension  Chest discomfort   Elevated troponin  HFpEF  -CTA negative PE   - Stress test and echocardiogram looked okay  -Echo with EF 56-60%, grade 2 diastolic dysfunction  - On statin, aspirin, Plavix  -s/p loop recorder; followed by Dr. Marrero  -Cardiology consulted, cleared for discharge from their standpoint  -Patient has no documented history of atrial fibrillation. No A-fib on loop  -BP continues to be elevated.  Continue amlodipine, atenolol, lisinopril.  Hydralazineincreased today and spironolactone ordered.  Appreciate cardiology assistance with management.    Anemia  - Hgb decreased but had been stable on last check  -No signs of active bleeding  -Repeat CBC with morning labs, transfuse for Hb less than 7  -Eliquis through this admission so consult  bleeding precautions       HLD (hyperlipidemia)    History of cardioembolic cerebrovascular accident (CVA)  - Switching from aspirin and Plavix to apixaban  - Statin        Hypothyroid  - TSH within normal limits     Right hand pain and swelling  - Uric acid unremarkable  -X-ray suggested as above CPPD arthropathy.  - Continue conservative management, improving    Nutrition  - Dietitian following for calorie count.  Giving Magic cups and boost       Eliquis (home med) for DVT prophylaxis.  Limited code (no CPR, no intubation).  Discussed with patient and nursing staff.  Anticipate discharge to SNU facility in 1-2 days.      Yadira Davis MD  [facility]  06/13/25  13:14 EDT

## 2025-06-13 NOTE — THERAPY TREATMENT NOTE
Acute Care - Speech Language Pathology   Swallow Treatment Note [facility]     Patient Name: Avelina Carr  : 1940  MRN: [id number]  Today's Date: 2025               Admit Date: 2025    Visit Dx:     ICD-10-CM ICD-9-CM   1. Acute nonintractable headache, unspecified headache type  R51.9 784.0   2. Hypoxia  R09.02 799.02   3. Generalized weakness  R53.1 780.79   4. Hypokalemia  E87.6 276.8     Patient Active Problem List   Diagnosis    Postmastectomy lymphedema syndrome    Malignant neoplasm of upper-outer quadrant of right female breast    Cerebrovascular accident (CVA)    Stroke    Primary hypertension    HLD (hyperlipidemia)    Adverse effect of antiplatelet agent    Left middle cerebral artery stroke    Carcinoma of central portion of right breast in female, estrogen receptor positive    Chronic bilateral low back pain with right-sided sciatica    History of lumbar surgery    Seizure disorder, simple partial, without intractable epilepsy    Spinal stenosis of lumbar region with neurogenic claudication    Status post craniectomy    History of total right knee replacement    Other forms of scoliosis, lumbar region    DDD (degenerative disc disease), lumbar    Benign meningioma of brain    Squamous cell carcinoma of leg, left    History of CVA (cerebrovascular accident)    Hypothyroid    Depression    COPD (chronic obstructive pulmonary disease)    Obstructive sleep apnea    Squamous cell carcinoma of leg, right    Pain in joint, multiple sites    Excessive daytime sleepiness    Former smoker    Gastroesophageal reflux disease    Migraines    Generalized weakness    Acute CVA (cerebrovascular accident)    Dependence on supplemental oxygen    Paroxysmal supraventricular tachycardia    Arthritis    Asthma    Focal neurological deficit present    Acute right MCA stroke    History of supraventricular tachycardia    Rotator cuff arthropathy of right shoulder    Hallux valgus (acquired), right foot     Hallux valgus (acquired), left foot    Hypoxia    Chest discomfort    Elevated troponin    Severe protein-calorie malnutrition    Altered mental state     Past Medical History:   Diagnosis Date    Anxiety     Arthritis     Breast cancer     COPD (chronic obstructive pulmonary disease)     oxygen at 2L aths    COVID     CVD (cardiovascular disease)     Depression     GERD (gastroesophageal reflux disease)     Hx of radiation therapy     Hyperlipidemia     Hypothyroid     Low back pain     Macular degeneration     Osteoarthritis     Peripheral neuropathy     Sleep apnea     Stroke     Vertigo      Past Surgical History:   Procedure Laterality Date    ADRENAL GLAND SURGERY      BRAIN MENINGIOMA EXCISION      occipital    BREAST BIOPSY       SECTION      EMBOLECTOMY N/A 11/15/2017    Procedure: Embolectomy Mechanical;  Surgeon: Rachid Figueroa MD;  Location: [facility] OR ;  Service:     MASTECTOMY      RECTAL SURGERY      REPLACEMENT TOTAL KNEE Right     SKIN BIOPSY  2025    TOTAL SHOULDER REPLACEMENT      x2       SLP Recommendation and Plan  SLP Swallowing Diagnosis: oral dysphagia, suspected pharyngeal dysphagia (25)  SLP Diet Recommendation: soft to chew textures, nectar thick liquids, ground, water between meals after oral care, with supervision, ice chips between meals after oral care, with supervision (25)  Recommended Precautions and Strategies: no straw, upright posture during/after eating, general aspiration precautions, 1:1 supervision, small bites of food and sips of liquid (25)  SLP Rec. for Method of Medication Administration: meds whole, meds crushed, with puree (25)     Monitor for Signs of Aspiration: yes, notify SLP if any concerns (25)     Swallow Criteria for Skilled Therapeutic Interventions Met: demonstrates skilled criteria (25)     Rehab Potential/Prognosis, Swallowing: adequate, monitor  progress closely (06/13/25 1500)  Therapy Frequency (Swallow): PRN (06/13/25 1500)  Predicted Duration Therapy Intervention (Days): until discharge (06/13/25 1500)  Oral Care Recommendations: Oral Care BID/PRN (06/13/25 1500)                                        Outcome Evaluation: Swallow f/u completed recommend mechanical soft ground and continue NTL, meds crushed or whole with puree. Upright 90 degrees and small bites and sips.      SWALLOW EVALUATION (Last 72 Hours)       SLP Adult Swallow Evaluation       Row Name 06/13/25 1500                   Rehab Evaluation    Document Type therapy note (daily note)  -KA        Subjective Information no complaints  -KA        Patient Observations alert;cooperative  -KA        Patient Effort good  -KA           General Information    Patient Profile Reviewed yes  -KA           SLP Evaluation Clinical Impression    SLP Swallowing Diagnosis oral dysphagia;suspected pharyngeal dysphagia  -KA        Functional Impact risk of aspiration/pneumonia  -KA        Rehab Potential/Prognosis, Swallowing adequate, monitor progress closely  -KA        Swallow Criteria for Skilled Therapeutic Interventions Met demonstrates skilled criteria  -KA           Recommendations    Therapy Frequency (Swallow) PRN  -KA        Predicted Duration Therapy Intervention (Days) until discharge  -KA        SLP Diet Recommendation soft to chew textures;nectar thick liquids;ground;water between meals after oral care, with supervision;ice chips between meals after oral care, with supervision  -KA        Recommended Precautions and Strategies no straw;upright posture during/after eating;general aspiration precautions;1:1 supervision;small bites of food and sips of liquid  -KA        Oral Care Recommendations Oral Care BID/PRN  -KA        SLP Rec. for Method of Medication Administration meds whole;meds crushed;with puree  -KA        Monitor for Signs of Aspiration yes;notify SLP if any concerns  -KA            (STG) Patient will tolerate trials of    Consistencies Trialed (Tolerate trials) soft to chew (chopped) textures;thin liquids  -KA        Desired Outcome (Tolerate trials) without signs/symptoms of aspiration  -KA        [place] (Tolerate trials) independently (over 90% accuracy)  -KA        Time Frame (Tolerate trials) by discharge  -KA        Progress/Outcomes (Tolerate trials) good progress toward goal  -KA        Comment (Tolerate trials) SLP completed swallow f/u and re-eval. RN reports pt tolerating current diet and  reports the same. Patient presents more alert today, eyes open and demonstrates improved interaction with SLP. Patient responding to questions timely and conversing with SLP. Patient stated she did  not like the current pureed diet. Patient accepted NTL via cup without overt s/s of pen/asp. Delayed cough with thins via cup. No overt s/s of pen/asp with puree and mechanical soft chopped mixed consistencies. Patient demonstrated slightly prolonged with mechanical soft however functional. Recommend upgrade to mechanical soft ground and continue with NTL. Okay for water protocol  -KA                  User Key  (r) = Recorded By, (t) = Taken By, (c) = Cosigned By      Initials Name Effective Dates    KA James Herrera, OMAR 01/05/24 -                     EDUCATION  The patient has been educated in the following areas:   Dysphagia (Swallowing Impairment).        SLP GOALS       Row Name 06/13/25 1500             (STG) Patient will tolerate trials of    Consistencies Trialed (Tolerate trials) soft to chew (chopped) textures;thin liquids  -KA      Desired Outcome (Tolerate trials) without signs/symptoms of aspiration  -KA      [place] (Tolerate trials) independently (over 90% accuracy)  -KA      Time Frame (Tolerate trials) by discharge  -KA      Progress/Outcomes (Tolerate trials) good progress toward goal  -KA      Comment (Tolerate trials) SLP completed swallow f/u and re-eval. RN  reports pt tolerating current diet and  reports the same. Patient presents more alert today, eyes open and demonstrates improved interaction with SLP. Patient responding to questions timely and conversing with SLP. Patient stated she did  not like the current pureed diet. Patient accepted NTL via cup without overt s/s of pen/asp. Delayed cough with thins via cup. No overt s/s of pen/asp with puree and mechanical soft chopped mixed consistencies. Patient demonstrated slightly prolonged with mechanical soft however functional. Recommend upgrade to mechanical soft ground and continue with NTL. Okay for water protocol  -KA                User Key  (r) = Recorded By, (t) = Taken By, (c) = Cosigned By      Initials Name Provider Type    James Membreno SLP Speech and Language Pathologist                         Time Calculation:    Time Calculation- SLP       Row Name 06/13/25 1548             Time Calculation- SLP    SLP Start Time 1400  -KA      SLP Received On 06/13/25  -KA         Untimed Charges    [id number] Treatment Swallow Minutes 55  -KA         Total Minutes    Untimed Charges Total Minutes 55  -KA       Total Minutes 55  -KA                User Key  (r) = Recorded By, (t) = Taken By, (c) = Cosigned By      Initials Name Provider Type    James Membreno SLP Speech and Language Pathologist                    Therapy Charges for Today       Code Description Service Date Service Provider Modifiers Qty    [id number]  ST TREATMENT SWALLOW 4 6/13/2025 James Herrera SLP GN 1                 OMAR Finnegan  6/13/2025

## 2025-06-13 NOTE — PLAN OF CARE
Goal Outcome Evaluation:  Plan of Care Reviewed With: patient           Outcome Evaluation: Upon entering room, pt. supine in bed, awake/alert, and agreeable to work with P.T. this date despite c/o fatigue.  This PM, pt. able to ambulate 6 feet and take 4 sidesteps, Min-Mod. assist x2, with HHA x 2.  Pt. requires Mod. assist x 2 for bed mobility and Min-Mod. assist x2 for sit <-> stand transfers.  Verbal/tactile cues given throughout upright mobility for posture correction.  Overall improved tolerance to functional activity this date with an increase in gait distance.  Will continue to progress functional mobility as tolerated.

## 2025-06-13 NOTE — THERAPY TREATMENT NOTE
Patient Name: Avelina Carr  : 1940    MRN: [id number]                              Today's Date: 2025       Admit Date: 2025    Visit Dx:     ICD-10-CM ICD-9-CM   1. Acute nonintractable headache, unspecified headache type  R51.9 784.0   2. Hypoxia  R09.02 799.02   3. Generalized weakness  R53.1 780.79   4. Hypokalemia  E87.6 276.8     Patient Active Problem List   Diagnosis    Postmastectomy lymphedema syndrome    Malignant neoplasm of upper-outer quadrant of right female breast    Cerebrovascular accident (CVA)    Stroke    Primary hypertension    HLD (hyperlipidemia)    Adverse effect of antiplatelet agent    Left middle cerebral artery stroke    Carcinoma of central portion of right breast in female, estrogen receptor positive    Chronic bilateral low back pain with right-sided sciatica    History of lumbar surgery    Seizure disorder, simple partial, without intractable epilepsy    Spinal stenosis of lumbar region with neurogenic claudication    Status post craniectomy    History of total right knee replacement    Other forms of scoliosis, lumbar region    DDD (degenerative disc disease), lumbar    Benign meningioma of brain    Squamous cell carcinoma of leg, left    History of CVA (cerebrovascular accident)    Hypothyroid    Depression    COPD (chronic obstructive pulmonary disease)    Obstructive sleep apnea    Squamous cell carcinoma of leg, right    Pain in joint, multiple sites    Excessive daytime sleepiness    Former smoker    Gastroesophageal reflux disease    Migraines    Generalized weakness    Acute CVA (cerebrovascular accident)    Dependence on supplemental oxygen    Paroxysmal supraventricular tachycardia    Arthritis    Asthma    Focal neurological deficit present    Acute right MCA stroke    History of supraventricular tachycardia    Rotator cuff arthropathy of right shoulder    Hallux valgus (acquired), right foot    Hallux valgus (acquired), left foot    Hypoxia    Chest  discomfort    Elevated troponin    Severe protein-calorie malnutrition    Altered mental state     Past Medical History:   Diagnosis Date    Anxiety     Arthritis     Breast cancer     COPD (chronic obstructive pulmonary disease)     oxygen at 2L aths    COVID     CVD (cardiovascular disease)     Depression     GERD (gastroesophageal reflux disease)     Hx of radiation therapy     Hyperlipidemia     Hypothyroid     Low back pain     Macular degeneration     Osteoarthritis     Peripheral neuropathy     Sleep apnea     Stroke     Vertigo      Past Surgical History:   Procedure Laterality Date    ADRENAL GLAND SURGERY      BRAIN MENINGIOMA EXCISION      occipital    BREAST BIOPSY       SECTION      EMBOLECTOMY N/A 11/15/2017    Procedure: Embolectomy Mechanical;  Surgeon: Rachid Figueroa MD;  Location: [facility] ;  Service:     MASTECTOMY  2014    RECTAL SURGERY  2003    REPLACEMENT TOTAL KNEE Right     SKIN BIOPSY  2025    TOTAL SHOULDER REPLACEMENT      x2      General Information       Row Name 25 1536          Physical Therapy Time and Intention    Document Type therapy note (daily note)  -MS     Mode of Treatment physical therapy;individual therapy  -MS       Row Name 25 1536          General Information    Patient Profile Reviewed yes  -MS     Existing Precautions/Restrictions fall   Exit alarm  -MS     Barriers to Rehab none identified  -MS       Row Name 25 1536          Living Environment    Name(s) of People in Home Gait belt used for safety.  -MS       Row Name 25 1536          Cognition    Orientation Status (Cognition) oriented x 3  -MS               User Key  (r) = Recorded By, (t) = Taken By, (c) = Cosigned By      Initials Name Provider Type    Roque Cheek, PT Physical Therapist                   Mobility       Row Name 25 1537          Bed Mobility    Supine-Sit [place] (Bed Mobility) moderate assist (50% patient effort);2  person assist  -MS     Sit-Supine Door (Bed Mobility) moderate assist (50% patient effort);2 person assist  -MS     Assistive Device (Bed Mobility) repositioning sheet  -MS       Row Name 06/13/25 1537          Sit-Stand Transfer    Sit-Stand Door (Transfers) minimum assist (75% patient effort);moderate assist (50% patient effort);2 person assist  -MS     Assistive Device (Sit-Stand Transfers) --  HHA x 2  -MS       Row Name 06/13/25 1537          Gait/Stairs (Locomotion)    Door Level (Gait) minimum assist (75% patient effort);moderate assist (50% patient effort);2 person assist  -MS     Assistive Device (Gait) --  HHA x 2  -MS     Distance in Feet (Gait) 6  FW/BW;  Pt. also able to take 4 sidesteps at bedside  -MS     Deviations/Abnormal Patterns (Gait) zabrina decreased;stride length decreased  -MS     Bilateral Gait Deviations forward flexed posture  -MS     Comment, (Gait/Stairs) Unsteadiness throughout;  Verbal/tactile cues given for posture correction and to increase her Bilateral step length.  Improved tolerance to functional activity with an increase in gait distance this date compared to last P.T. session.  -MS               User Key  (r) = Recorded By, (t) = Taken By, (c) = Cosigned By      Initials Name Provider Type    Roque Cheek, PT Physical Therapist                   Obj/Interventions    No documentation.                  Goals/Plan    No documentation.                  Clinical Impression       Row Name 06/13/25 1538          Pain    Pretreatment Pain Rating 3/10  -MS     Posttreatment Pain Rating 3/10  -MS     Pain Location wrist  -MS     Pain Side/Orientation right  -MS       Row Name 06/13/25 1538          Positioning and Restraints    Pre-Treatment Position in bed  -MS     Post Treatment Position bed  -MS     In Bed notified nsg;supine;call light within reach;encouraged to call for assist;exit alarm on;RLE elevated;LLE elevated;R heel elevated;L heel elevated   All lines intact.  -MS               User Key  (r) = Recorded By, (t) = Taken By, (c) = Cosigned By      Initials Name Provider Type    MS Naylor Roque AISHA, PT Physical Therapist                   Outcome Measures       Row Name 06/13/25 1539          How much help from another person do you currently need...    Turning from your back to your side while in flat bed without using bedrails? 2  -MS     Moving from lying on back to sitting on the side of a flat bed without bedrails? 2  -MS     Moving to and from a bed to a chair (including a wheelchair)? 2  -MS     Standing up from a chair using your arms (e.g., wheelchair, bedside chair)? 2  -MS     Climbing 3-5 steps with a railing? 2  -MS     To walk in hospital room? 2  -MS     AM-PAC 6 Clicks Score (PT) 12  -MS     Highest Level of Mobility Goal Move to Chair/Commode-4  -MS       Row Name 06/13/25 1539          Functional Assessment    Outcome Measure Options AM-PAC 6 Clicks Basic Mobility (PT)  -MS               User Key  (r) = Recorded By, (t) = Taken By, (c) = Cosigned By      Initials Name Provider Type    MS Naylor Roque LEONARD, PT Physical Therapist                                 Physical Therapy Education       Title: PT OT SLP Therapies (Done)       Topic: Physical Therapy (Done)       Point: Mobility training (Done)       Learning Progress Summary            Patient Acceptance, E,D, VU,NR by MS at 6/13/2025 1539    Acceptance, E,D, VU,NR by MS at 6/10/2025 1537    Acceptance, E,D, NR by MS at 6/9/2025 1145    Acceptance, E, VU,NR by  at 6/5/2025 1156                      Point: Home exercise program (Done)       Learning Progress Summary            Patient Acceptance, E,D, VU,NR by MS at 6/10/2025 1537    Acceptance, E,D, NR by MS at 6/9/2025 1145    Acceptance, E, VU,NR by  at 6/5/2025 1156                      Point: Body mechanics (Done)       Learning Progress Summary            Patient Acceptance, E,D, VU,NR by MS at 6/13/2025 1539    Acceptance,  E,D, VU,NR by MS at 6/10/2025 1537    Acceptance, E,D, NR by MS at 6/9/2025 1145    Acceptance, E, VU,NR by  at 6/5/2025 1156                      Point: Precautions (Done)       Learning Progress Summary            Patient Acceptance, E,D, VU,NR by MS at 6/13/2025 1539    Acceptance, E,D, VU,NR by MS at 6/10/2025 1537    Acceptance, E,D, NR by MS at 6/9/2025 1145    Acceptance, E, VU,NR by  at 6/5/2025 1156                                      User Key       Initials Effective Dates Name Provider Type Discipline    MS 06/16/21 -  Roque Naylor PT Physical Therapist PT     05/02/22 -  Nedra Sigala PT Physical Therapist PT                  PT Recommendation and Plan     Outcome Evaluation: Upon entering room, pt. supine in bed, awake/alert, and agreeable to work with P.T. this date despite c/o fatigue.  This PM, pt. able to ambulate 6 feet and take 4 sidesteps, Min-Mod. assist x2, with HHA x 2.  Pt. requires Mod. assist x 2 for bed mobility and Min-Mod. assist x2 for sit <-> stand transfers.  Verbal/tactile cues given throughout upright mobility for posture correction.  Overall improved tolerance to functional activity this date with an increase in gait distance.  Will continue to progress functional mobility as tolerated.     Time Calculation:         PT Charges       Row Name 06/13/25 1544             Time Calculation    Start Time 1520  -MS      Stop Time 1535  -MS      Time Calculation (min) 15 min  -MS      PT Received On 06/13/25  -MS      PT - Next Appointment 06/16/25  -MS         Time Calculation- PT    Total Timed Code Minutes- PT 14 minute(s)  -MS                User Key  (r) = Recorded By, (t) = Taken By, (c) = Cosigned By      Initials Name Provider Type    MS Roque Naylor PT Physical Therapist                  Therapy Charges for Today       Code Description Service Date Service Provider Modifiers Qty    80870846526  PT THERAPEUTIC ACT EA 15 MIN 6/13/2025 Roque Naylor, PT  GP 1    [id number]  PT THER SUPP EA 15 MIN 6/13/2025 Roque Naylor, PT GP 1            PT G-Codes  Outcome Measure Options: AM-PAC 6 Clicks Basic Mobility (PT)  AM-PAC 6 Clicks Score (PT): 12  AM-PAC 6 Clicks Score (OT): 19       Roque Naylor, PT  6/13/2025

## 2025-06-13 NOTE — PLAN OF CARE
Goal Outcome Evaluation:              Outcome Evaluation: Swallow f/u completed recommend mechanical soft ground and continue NTL, meds crushed or whole with puree. Upright 90 degrees and small bites and sips.               SLP Swallowing Diagnosis: oral dysphagia, suspected pharyngeal dysphagia (06/13/25 1500)

## 2025-06-14 VITALS
WEIGHT: 142.3 LBS | SYSTOLIC BLOOD PRESSURE: 159 MMHG | BODY MASS INDEX: 24.3 KG/M2 | HEIGHT: 64 IN | RESPIRATION RATE: 16 BRPM | DIASTOLIC BLOOD PRESSURE: 63 MMHG | TEMPERATURE: 98.8 F | HEART RATE: 54 BPM | OXYGEN SATURATION: 97 %

## 2025-06-14 LAB
ANION GAP SERPL CALCULATED.3IONS-SCNC: 9 MMOL/L (ref 5–15)
BUN SERPL-MCNC: 11 MG/DL (ref 8–23)
BUN/CREAT SERPL: 13.9 (ref 7–25)
CALCIUM SPEC-SCNC: 8.5 MG/DL (ref 8.6–10.5)
CHLORIDE SERPL-SCNC: 114 MMOL/L (ref 98–107)
CO2 SERPL-SCNC: 23 MMOL/L (ref 22–29)
CREAT SERPL-MCNC: 0.79 MG/DL (ref 0.57–1)
DEPRECATED RDW RBC AUTO: 47.2 FL (ref 37–54)
EGFRCR SERPLBLD CKD-EPI 2021: 73 ML/MIN/1.73
ERYTHROCYTE [DISTWIDTH] IN BLOOD BY AUTOMATED COUNT: 12.6 % (ref 12.3–15.4)
GLUCOSE BLDC GLUCOMTR-MCNC: 114 MG/DL (ref 70–130)
GLUCOSE BLDC GLUCOMTR-MCNC: 79 MG/DL (ref 70–130)
GLUCOSE SERPL-MCNC: 91 MG/DL (ref 65–99)
HCT VFR BLD AUTO: 32 % (ref 34–46.6)
HGB BLD-MCNC: 9.9 G/DL (ref 12–15.9)
MCH RBC QN AUTO: 31.9 PG (ref 26.6–33)
MCHC RBC AUTO-ENTMCNC: 30.9 G/DL (ref 31.5–35.7)
MCV RBC AUTO: 103.2 FL (ref 79–97)
PLATELET # BLD AUTO: 269 10*3/MM3 (ref 140–450)
PMV BLD AUTO: 10.5 FL (ref 6–12)
POTASSIUM SERPL-SCNC: 3.4 MMOL/L (ref 3.5–5.2)
RBC # BLD AUTO: 3.1 10*6/MM3 (ref 3.77–5.28)
SODIUM SERPL-SCNC: 146 MMOL/L (ref 136–145)
WBC NRBC COR # BLD AUTO: 5.53 10*3/MM3 (ref 3.4–10.8)

## 2025-06-14 PROCEDURE — 94761 N-INVAS EAR/PLS OXIMETRY MLT: CPT

## 2025-06-14 PROCEDURE — 94664 DEMO&/EVAL PT USE INHALER: CPT

## 2025-06-14 PROCEDURE — 94799 UNLISTED PULMONARY SVC/PX: CPT

## 2025-06-14 PROCEDURE — 80048 BASIC METABOLIC PNL TOTAL CA: CPT | Performed by: STUDENT IN AN ORGANIZED HEALTH CARE EDUCATION/TRAINING PROGRAM

## 2025-06-14 PROCEDURE — 82948 REAGENT STRIP/BLOOD GLUCOSE: CPT

## 2025-06-14 PROCEDURE — 25010000002 PIPERACILLIN SOD-TAZOBACTAM PER 1 G: Performed by: INTERNAL MEDICINE

## 2025-06-14 PROCEDURE — 99232 SBSQ HOSP IP/OBS MODERATE 35: CPT | Performed by: INTERNAL MEDICINE

## 2025-06-14 PROCEDURE — 85027 COMPLETE CBC AUTOMATED: CPT | Performed by: STUDENT IN AN ORGANIZED HEALTH CARE EDUCATION/TRAINING PROGRAM

## 2025-06-14 RX ORDER — HYDRALAZINE HYDROCHLORIDE 25 MG/1
75 TABLET, FILM COATED ORAL EVERY 8 HOURS SCHEDULED
Start: 2025-06-14

## 2025-06-14 RX ORDER — LISINOPRIL 20 MG/1
20 TABLET ORAL EVERY 12 HOURS SCHEDULED
Start: 2025-06-14

## 2025-06-14 RX ORDER — AMLODIPINE BESYLATE 10 MG/1
10 TABLET ORAL
Status: DISCONTINUED | OUTPATIENT
Start: 2025-06-14 | End: 2025-06-14 | Stop reason: HOSPADM

## 2025-06-14 RX ORDER — SPIRONOLACTONE 25 MG/1
25 TABLET ORAL DAILY
Start: 2025-06-15

## 2025-06-14 RX ORDER — AMLODIPINE BESYLATE 10 MG/1
10 TABLET ORAL
Start: 2025-06-15

## 2025-06-14 RX ORDER — POTASSIUM CHLORIDE 1.5 G/1.58G
40 POWDER, FOR SOLUTION ORAL EVERY 4 HOURS
Status: DISCONTINUED | OUTPATIENT
Start: 2025-06-14 | End: 2025-06-14 | Stop reason: HOSPADM

## 2025-06-14 RX ADMIN — SPIRONOLACTONE 25 MG: 25 TABLET ORAL at 08:50

## 2025-06-14 RX ADMIN — DONEPEZIL HYDROCHLORIDE 10 MG: 10 TABLET, FILM COATED ORAL at 08:50

## 2025-06-14 RX ADMIN — AMLODIPINE BESYLATE 10 MG: 10 TABLET ORAL at 08:50

## 2025-06-14 RX ADMIN — PANTOPRAZOLE SODIUM 40 MG: 40 TABLET, DELAYED RELEASE ORAL at 06:18

## 2025-06-14 RX ADMIN — POTASSIUM CHLORIDE 40 MEQ: 1.5 POWDER, FOR SOLUTION ORAL at 08:50

## 2025-06-14 RX ADMIN — LEVOTHYROXINE SODIUM 150 MCG: 0.07 TABLET ORAL at 06:18

## 2025-06-14 RX ADMIN — Medication 10 ML: at 08:51

## 2025-06-14 RX ADMIN — BUDESONIDE AND FORMOTEROL FUMARATE DIHYDRATE 2 PUFF: 160; 4.5 AEROSOL RESPIRATORY (INHALATION) at 08:34

## 2025-06-14 RX ADMIN — LISINOPRIL 20 MG: 20 TABLET ORAL at 08:50

## 2025-06-14 RX ADMIN — ATENOLOL 25 MG: 25 TABLET ORAL at 08:50

## 2025-06-14 RX ADMIN — HYDRALAZINE HYDROCHLORIDE 50 MG: 50 TABLET ORAL at 06:18

## 2025-06-14 RX ADMIN — LEVETIRACETAM 500 MG: 500 TABLET, FILM COATED ORAL at 08:50

## 2025-06-14 RX ADMIN — ESCITALOPRAM 20 MG: 10 TABLET, FILM COATED ORAL at 08:50

## 2025-06-14 RX ADMIN — SODIUM CHLORIDE 3.38 G: 9 INJECTION, SOLUTION INTRAVENOUS at 08:50

## 2025-06-14 RX ADMIN — APIXABAN 5 MG: 5 TABLET, FILM COATED ORAL at 08:50

## 2025-06-14 RX ADMIN — ATORVASTATIN CALCIUM 80 MG: 80 TABLET, FILM COATED ORAL at 08:50

## 2025-06-14 RX ADMIN — CALCIUM CARBONATE-VITAMIN D TAB 500 MG-200 UNIT 1 TABLET: 500-200 TAB at 08:50

## 2025-06-14 RX ADMIN — SODIUM CHLORIDE 3.38 G: 9 INJECTION, SOLUTION INTRAVENOUS at 01:15

## 2025-06-14 RX ADMIN — Medication 1000 MCG: at 08:49

## 2025-06-14 NOTE — PROGRESS NOTES
LOS: 9 days   Patient Care Team:  Artem Shepherd MD as PCP - General (Internal Medicine)    Chief Complaint: Follow-up chest pain, hypertensive urgency, troponin elevation.    Interval History: No new complaints today.  She looked good this morning.  She was interactive.  No complaints.  No chest pain or shortness of breath.  Blood pressure has still been elevated.    Vital Signs:  Temp:  [98.1 °F (36.7 °C)-98.8 °F (37.1 °C)] 98.8 °F (37.1 °C)  Heart Rate:  [54-56] 54  Resp:  [16-18] 16  BP: (159-178)/(54-66) 159/63    Intake/Output Summary (Last 24 hours) at 6/14/2025 1135  Last data filed at 6/14/2025 0538  Gross per 24 hour   Intake 100 ml   Output 1000 ml   Net -900 ml       Physical Exam:   General Appearance:    No acute distress, alert.   Lungs:     Decreased breath sounds bilaterally.    Heart:    Regular rhythm and normal rate. II/VI SM LLSB.    Abdomen:     Soft, nontender, nondistended.    Extremities:   Trace edema of the lower extremities with chronic venous stasis changes.     Results Review:    Results from last 7 days   Lab Units 06/14/25  0349   SODIUM mmol/L 146*   POTASSIUM mmol/L 3.4*   CHLORIDE mmol/L 114*   CO2 mmol/L 23.0   BUN mg/dL 11.0   CREATININE mg/dL 0.79   GLUCOSE mg/dL 91   CALCIUM mg/dL 8.5*           Results from last 7 days   Lab Units 06/14/25  0349   WBC 10*3/mm3 5.53   HEMOGLOBIN g/dL 9.9*   HEMATOCRIT % 32.0*   PLATELETS 10*3/mm3 269         Results from last 7 days   Lab Units 06/12/25  0258   CHOLESTEROL mg/dL 98     Results from last 7 days   Lab Units 06/09/25  0646   MAGNESIUM mg/dL 1.8     Results from last 7 days   Lab Units 06/12/25  0258   CHOLESTEROL mg/dL 98   TRIGLYCERIDES mg/dL 90   HDL CHOL mg/dL 25*   LDL CHOL mg/dL 55       I reviewed the patient's new clinical results.        Assessment:  1.  Acute hypoxic respiratory failure  2.  Patchy bilateral infiltrates by CTA of the chest, presumed pneumonia  3.  COPD without acute exacerbation  4.  Former tobacco  use  5.  Hypertensive urgency  6.  Recent progressive weakness, likely multifactorial  7.  Headache, likely secondary to #5  8.  Intermittent chest pressure, negative Myoview stress test on 6/6/2025  9.  Nonspecific high-sensitivity troponin elevation  10.  History of multiple recurrent strokes (last with a right MCA CVA in November 2023).  On DAPT.  11.  History of SVT with loop recorder placement in August 2022  12.  Multifactorial chronic anemia  13.  History of remote meningioma, status post craniotomy and resection  14.  Bilateral carotid artery disease  15.  Peripheral neuropathy  16.  Chronic lower extremity edema secondary to venous stasis  17.  Altered mental status - MRI of the brain multiple bilateral small acute strokes  18.  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  Eliquis 5 mg twice daily started.  Aspirin and Plavix were discontinued.    -Blood pressure remains a significant issue.  Continue amlodipine 5 mg/day, atenolol 25 mg twice daily, and lisinopril 20 mg 3 times daily.  Increase hydralazine to 75 mg every 8 hours, and added spironolactone 25 mg/day.     -Continue Lipitor 80 mg/day for recurrent stroke.    -It appears she may have a bed ready at the SNF.  She is okay to be discharged from a cardiology standpoint.  She will need close follow-up with her normal cardiologist, Dr. Marrero, at [facility] [[email or web address]] ALLYSON Boyd MD  06/14/25  11:35 EDT

## 2025-06-14 NOTE — DISCHARGE SUMMARY
Patient Name: Avelina Carr  : 1940  MRN: [id number]    Date of Admission: 2025  Date of Discharge:  2025  Primary Care Physician: Artem Shepherd MD      Chief Complaint:   Headache and Weakness - Generalized      Discharge Diagnoses     Active Hospital Problems    Diagnosis  POA    **Hypoxia [R09.02]  Yes    Severe protein-calorie malnutrition [E43]  Yes    Altered mental state [R41.82]  Unknown    Chest discomfort [R07.89]  Yes    Elevated troponin [R79.89]  Yes    Acute CVA (cerebrovascular accident) [I63.9]  Yes    Former smoker [Z87.891]  Not Applicable    Hypothyroid [E03.9]  Yes    HLD (hyperlipidemia) [E78.5]  Yes    Primary hypertension [I10]  Yes    History of CVA (cerebrovascular accident) [Z86.73]  Not Applicable      Resolved Hospital Problems   No resolved problems to display.        Hospital Course     Ms. Carr is a 85 y.o. female with a history of hypertension, HFpEF, seizure disorder, and history of stroke who presented to [facility] initially complaining of shortness of breath.  Please see the admitting history and physical for further details.  She was found to have hypoxia and was admitted to the hospital for further evaluation and treatment.      Initial presentation was consistent with acute hypoxic respiratory failure secondary to aspiration pneumonia.  The patient was started on empiric antibiotics and improved clinically.  She will complete antibiotic course prior to discharge home.  Patient tolerating room air at this time.  She did have some dysphagia while admitted, likely leading to an aspiration event causing pneumonia.  SLP was consulted.  The patient is being discharged on a modified diet as below.  Hospital course was complicated by an episode of altered mental status.  Neurology was consulted.  EEG and CT head were obtained initially and unremarkable.  A brain MRI was ultimately obtained and revealed multiple acute infarcts.  As part of stroke  workup, carotid Doppler was done and showed 50-69% stenosis on the right and less than 50% stenosis on the left.  Echocardiogram was completed initially for further information was recommended and cardiology was consulted for JEFE.  Patient underwent a JEFE on 6/12 multiple results as below but he was found to have a left atrial appendage with visualized thrombus.  This was likely the etiology of his CVAs.  Decision was made to discontinue aspirin and Plavix and start the patient on Eliquis.  The Eliquis should be continued at discharge.  She has also been started on Lipitor 80 mg daily.  She will require neurology follow-up in the outpatient setting.  The patient also complained of chest pain during hospitalization.  CT angiogram chest was negative for PE.  Stress test did not reveal any acute ischemia.  The patient has a loop recorder in place and this was evaluated.  She does not have any history of documented atrial fibrillation.  Blood pressure did proved to be fairly difficult to control during hospitalization and this may have been contributing to the patient's chest pain.  Medications were titrated with cardiology's assistance.  She is being discharged on the below regimen.  I recommend close monitoring of blood pressure at rehab as additional adjustments may be needed.  The patient did have a bit of a downtrending hemoglobin on day of discharge in the setting of being on Eliquis.  She did not have any signs of active bleeding.  Would recommend a repeat CBC within 2 days of discharge while at rehab to ensure hemoglobin is stable.  Would also recommend a repeat BMP to ensure electrolytes are stable as well.  Overall, the patient Reca clinical improvement.  Consulting services were agreeable with discharge home.  PT/OT were consulted and SNF was recommended.  CCP was consulted to assist with placement.  The patient is being discharged to SNF in satisfactory condition.  She should follow-up with [facility] cardiology  after discharge.  Other abnormalities with echocardiogram can be followed by primary cardiologist.    Day of Discharge     Subjective:  No acute events overnight.  Blood pressure trend is improved.  Patient denies chest pain or shortness of breath this morning.  She is extremely eager for discharge to rehab.  No family at bedside.    Physical Exam:  Temp:  [98.1 °F (36.7 °C)-98.8 °F (37.1 °C)] 98.8 °F (37.1 °C)  Heart Rate:  [54-56] 54  Resp:  [16-18] 16  BP: (159-178)/(54-66) 159/63  Body mass index is 24.43 kg/m².  Physical Exam  General: Alert, no acute distress.  Lying in bed.  Elderly and frail appearing.  ENT: No conjunctival injection or scleral icterus. Moist mucous membranes.   Neuro: Eyes open and moving in all directions, generalized weakness but moving all extremities, face symmetric, no focal deficits.   Lungs: Clear to auscultation bilaterally. No wheeze or crackles. No distress.   Heart: RRR, no murmurs. No edema.  Abdomen: Soft, non-tender, non-distended. Normal bowel sounds.   Ext: Warm and well-perfused. No edema.   Skin: No rashes or lesions. IV site without swelling or erythema.     Consultants     Consult Orders (all) (From admission, onward)       Start     Ordered    06/11/25 1301  Notify Stroke Coordinator  Once        Provider:  (Not yet assigned)    06/11/25 1302    06/11/25 1301  Inpatient Case Management  Consult  Once        Provider:  (Not yet assigned)    06/11/25 1302    06/10/25 0950  Inpatient Palliative Care Team Consult  Once        Comments: MD santos   Provider:  (Not yet assigned)    06/10/25 0950    06/09/25 1111  Inpatient Nutrition Consult  Once        Provider:  (Not yet assigned)    06/09/25 1110    06/08/25 1314  IV TEAM - Consult for Midline Placement (IV Team to Determine Number of Lumens)  Once        Provider:  (Not yet assigned)    06/08/25 1313    06/07/25 1327  Inpatient Neurology Consult General  Once        Specialty:  Neurology  Provider:   Morales Richards MD    06/07/25 1327    06/05/25 1248  Inpatient Cardiology Consult  Once        Specialty:  Cardiology  Provider:  Deepika Herrera MD    06/05/25 1247    06/04/25 2216  Inpatient Case Management  Consult  Once        Provider:  (Not yet assigned)    06/04/25 2216    06/04/25 1831  [facility] (on-call MD unless specified) Details  Once        Specialty:  Hospitalist  Provider:  (Not yet assigned)    06/04/25 1830                  Procedures     * Surgery not found *    Imaging Results (All)       Procedure Component Value Units Date/Time    MRI Brain Without Contrast [[id number]] Collected: 06/10/25 0216     Updated: 06/10/25 0230    Narrative:      BRAIN MRI WITHOUT CONTRAST     HISTORY: Mental status change, history of strokes and meningiomas;  R51.9-Headache, unspecified; R09.02-Hypoxemia; R53.1-Weakness;  E87.6-Hypokalemia     COMPARISON: November 13, 2020..     FINDINGS:  Multiplanar images of the head were obtained without  gadolinium. Multiple small foci of restricted diffusion are noted within  both cerebral hemispheres. There are also questionable areas of  restricted diffusion within the cerebellar hemispheres bilaterally,  although this may be artifactual. Images are degraded by motion  artifact. There is encephalomalacia which is noted within the right  occipital lobe, with additional encephalomalacia noted within the left  middle cranial fossa.. There is atrophy. There is periventricular and  deep white matter microangiopathic change. There is no midline shift or  mass effect. The intracranial flow voids appear intact. There are old  bilateral cerebellar infarcts. There is an old left pontine infarct. No  definite abnormal susceptibility artifact is seen. Patient is status  post right-sided craniotomy.       Impression:      1. Multiple small acute infarcts are noted within both cerebral  hemispheres. This would be more suggestive of an embolic process. There  is also  questionable involvement in the cerebellar hemispheres  bilaterally.     FINDINGS were called to the patient's nurse at 2:27 a.m.        This report was finalized on 6/10/2025 2:27 AM by Dr. Paloma Chacon M.D on Workstation: [id number]       XR Hand 3+ View Right [[id number]] Collected: 06/09/25 1609     Updated: 06/09/25 1617    Narrative:         XR HAND 3+ VW RIGHT-     HISTORY: Right hand pain and swelling     COMPARISON: None     FINDINGS: There is uniform joint space narrowing at the MCP joints which  is an atypical place for osteoarthritis and this may be associated with  CPPD arthropathy or other inflammatory arthritis. There is  chondrocalcinosis overlying the triangular fibrocartilage. There are  also more typical osteoarthritic changes at the interphalangeal joints  where there is nonuniform joint space narrowing and spur formation.  Generalized soft tissue swelling about the right wrist and hand.       Impression:      1. Diffuse soft tissue swelling.  2. 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 Other  inflammatory arthritides are in the differential diagnosis.  3. Osteoarthritic changes at the interphalangeal joints.           This report was finalized on 6/9/2025 4:14 PM by Pierre Kwon M.D on  Workstation: [id number]       CT Head Without Contrast [[id number]] Collected: 06/07/25 1943     Updated: 06/07/25 1953    Narrative:      CT OF THE HEAD WITHOUT CONTRAST     HISTORY: Somnolence     COMPARISON: June 4, 2025     TECHNIQUE: Axial CT imaging was obtained through the brain. No IV  contrast was administered.     FINDINGS:  No acute intracranial hemorrhage is seen. There is atrophy. There is  periventricular and deep white matter microangiopathic change. There are  changes of prior right-sided craniotomy. Areas of encephalomalacia are  noted within the right parieto-occipital region. There is  further  encephalomalacia which is noted within the left middle cranial fossa.  There is no midline shift or mass effect. Paranasal sinuses and mastoid  air cells are clear.       Impression:      No acute intracranial findings.     Radiation dose reduction techniques were utilized, including automated  exposure control and exposure modulation based on body size.        This report was finalized on 6/7/2025 7:50 PM by Dr. Paloma Chacon M.D on Workstation: [id number]       XR Chest 1 View [[id number]] Collected: 06/07/25 1509     Updated: 06/07/25 1514    Narrative:      XR CHEST 1 VW-        INDICATION: Pneumonia     COMPARISON: Chest radiograph June 4, 2025     TECHNIQUE: 1 view chest     FINDINGS:      Vascular congestion. Ill-defined basilar opacities. Low volumes. Stable  mediastinum. Bilateral shoulder arthroplasties. Surgical clips over the  right chest. Left chest wall implantable loop recorder.       Impression:         1. Increased ill-defined basilar lung opacities.  2. Suspect airways disease     This report was finalized on 6/7/2025 3:11 PM by Dr. Everardo Garcia M.D  on Workstation: [id number]       CT Angiogram Chest [[id number]] Collected: 06/04/25 2000     Updated: 06/04/25 2008    Narrative:      CTA CHEST WITH IV CONTRAST     HISTORY: Shortness of breath, hypoxia, evaluate for PE; R51.9-Headache,  unspecified; R09.02-Hypoxemia; R53.1-Weakness; E87.6-Hypokalemia     COMPARISON: None     TECHNIQUE: CT angiography was performed of the chest with axial images  as well as coronal and sagittal reformatted MIP images provided  following administration of IV contrast. 3-D surface rendered reformats  were obtained of the pulmonary arteries and aorta. Radiation dose  reduction techniques were utilized, including automated exposure  control, and exposure modulation based on body size.     FINDINGS:     There is a tiny right pleural effusion, and there is mild bilateral  posterior basilar dependent  atelectasis. There are patchy areas of  alveolar infiltrate with somewhat coarse interstitial markings in the  left upper lobe. Similar though more subtle changes are seen in the  right middle lobe and right lower lobe.     Thoracic aorta is normal in caliber.  There are coronary atherosclerotic  vascular calcifications.  There is no suspicious mediastinal adenopathy  or other mass.     Images of the upper abdomen show no acute abnormality.  There are  chronic healed posttraumatic as well as degenerative bony changes, but  there is no acute bony abnormality.     Bolus timing is excellent, and there is no evidence of pulmonary  embolism.       Impression:         Pulmonary arteries are well-opacified, and there is no evidence of  pulmonary embolism.     There are mild somewhat patchy alveolar pulmonary infiltrates with  somewhat coarse interstitial markings, most prominent in the left upper  lobe though seen to a lesser degree in the right middle lobe and lower  lobe. FINDINGS are nonspecific.     Imaging features can be seen with COVID-19 pneumonia, though are  nonspecific and can occur with a variety of infectious and noninfectious  processes.           This report was finalized on 6/4/2025 8:05 PM by Dr. Dale Headley M.D  on Workstation: [id number]       CT Abdomen Pelvis Without Contrast [[id number]] Collected: 06/04/25 1816     Updated: 06/04/25 1825    Narrative:      CT ABDOMEN PELVIS WO CONTRAST-     Radiation dose reduction techniques were utilized, including automated  exposure control and exposure modulation based on body size.     Clinical: Abdominal pain with constipation     FINDINGS:  1. Prior right mastectomy, trace right pleural effusion. There is  cardiac enlargement.     2. The liver, pancreas, spleen and left adrenal gland are satisfactory  in appearance. Clips within the right upper quadrant, the right adrenal  gland not identified, suspect previous right adrenalectomy. Bilateral  renal  cortical nodules, having attenuation consistent with renal cysts.  The largest on the left measures 14 mm. No renal calculus or obstructive  uropathy. There is atherosclerotic calcification of a normal diameter  aorta. The bladder is normal.     Bladder is satisfactory in appearance, no gallstones or wall thickening  or pericholecystic fluid. No biliary duct dilatation. The stomach is  collapsed and cannot be optimally evaluated. Suspect duodenal  diverticula. Uterus and ovaries are normal for age. There is  diverticulosis of the colon, no diverticulitis. The small bowel is  satisfactory in appearance. No free intraperitoneal gas or fluid. No  adenopathy seen.     CONCLUSION: No acute intra-abdominal abnormality. There is  diverticulosis of the colon without diverticulitis. Small bilateral  renal cysts. Postoperative change consistent with right adrenalectomy.              This report was finalized on 6/4/2025 6:22 PM by Dr. Bryan Roy M.D  on Workstation: [id number]       CT Head Without Contrast [[id number]] Collected: 06/04/25 1817     Updated: 06/04/25 1824    Narrative:      HEAD CT WITHOUT CONTRAST     REASON:  Frequent headaches      COMPARISON STUDIES: Head CT 11/12/2023.     TECHNIQUE:  Axial images were acquired from the skull base to vertex  without contrast, including multiplanar reformats, per standard  departmental protocol.    Radiation dose reduction techniques were  utilized, including automated exposure control, and exposure modulation  based on body size.     FINDINGS:     There is no CT evidence of acute intracranial hemorrhage, mass, or  infarct. There is volume loss, but there is no evidence of hydrocephalus  or extra-axial fluid collection.     Redemonstrated advanced chronic white matter changes as well as right  parieto-occipital cortical encephalomalacia, no evidence of acute  intracranial abnormality.     Skull base, calvarium, and extracranial soft tissues show chronic  changes,  without evidence of acute abnormality.       Impression:         Chronic changes as noted above, no acute intracranial abnormality.                        This report was finalized on 6/4/2025 6:21 PM by Dr. Dale Headley M.D  on Workstation: [id number]       XR Chest AP [[id number]] Collected: 06/04/25 1710     Updated: 06/04/25 1714    Narrative:      XR CHEST AP-     Clinical: COVID evaluation, cough, fever     COMPARISON examination 4/11/2022     FINDINGS: The cardiomediastinal silhouette is stable. There is  atherosclerotic calcification of the aorta. The right lung is clear.  There is a suggestion of perhaps minimal infiltrate along the left  costophrenic sulcus. No consolidation is seen within either lung. No  edema or effusion. The remainder is unremarkable.     This report was finalized on 6/4/2025 5:11 PM by Dr. Bryan Roy M.D  on Workstation: [id number]             Results for orders placed during the hospital encounter of 06/04/25    Duplex Carotid Ultrasound CAR    Interpretation Summary    Right internal carotid artery demonstrates a 50-69% stenosis.    Antegrade right vertebral flow.    Left internal carotid artery demonstrates a less than 50% stenosis.    Antegrade left vertebral flow.    Results for orders placed during the hospital encounter of 06/04/25    Adult Transesophageal Echo (JEFE) W/ Cont if Necessary Per Protocol    Interpretation Summary    There is a large chicken wing shaped left atrial appendage. There is a prominent segment which hooks superiorly and has low-flow associated with it. There is a small to moderate-sized mobile thrombus within the superior portion of the left atrial appendage (measures 0.32 cm x 0.4 cm)    Left ventricular systolic function is normal. Left ventricular ejection fraction appears to be 56 - 60%.    Left ventricular wall thickness is consistent with borderline concentric hypertrophy.    Left ventricular diastolic function is consistent with (grade  II w/high LAP) pseudonormalization.    Normal right ventricular cavity size and systolic function noted.    The left atrial cavity is severely dilated    Mild aortic valve regurgitation is present.    Moderate mitral valve regurgitation is present    Calculated right ventricular systolic pressure from tricuspid regurgitation is 16 mmHg.    There are scattered moderate plaques within the descending thoracic aorta. There are mild plaques in the aortic arch    There is a trivial pericardial effusion.    Pertinent Labs     Results from last 7 days   Lab Units 06/14/25  0349 06/11/25  1353 06/10/25  0742 06/09/25  1013   WBC 10*3/mm3 5.53 6.62 7.83 8.33   HEMOGLOBIN g/dL 9.9* 10.7* 10.6* 10.6*   PLATELETS 10*3/mm3 269 259 246 233     Results from last 7 days   Lab Units 06/14/25  0349 06/11/25  1353 06/09/25  2332 06/09/25  0646   SODIUM mmol/L 146* 143 137 145   POTASSIUM mmol/L 3.4* 3.7 4.1  4.1 2.9*  2.9*   CHLORIDE mmol/L 114* 111* 103 118*   CO2 mmol/L 23.0 23.2 22.9 17.9*   BUN mg/dL 11.0 15.0 8.0 9.0   CREATININE mg/dL 0.79 1.09* 0.84 0.58   GLUCOSE mg/dL 91 94 93 81   EGFR mL/min/1.73 73.0 49.6* 67.8 88.3       Results from last 7 days   Lab Units 06/14/25  0349 06/11/25  1353 06/09/25  2332 06/09/25  0646   CALCIUM mg/dL 8.5* 8.8 10.5 6.2*   MAGNESIUM mg/dL  --   --   --  1.8         Results from last 7 days   Lab Units 06/09/25  1013 06/07/25  1812   SODIUM UR mmol/L  --  <20   URIC ACID mg/dL 2.4  --      Results from last 7 days   Lab Units 06/12/25  0258   CHOLESTEROL mg/dL 98   TRIGLYCERIDES mg/dL 90   HDL CHOL mg/dL 25*   LDL CHOL mg/dL 55     Results from last 7 days   Lab Units 06/07/25  1721   MRSAPCR  No MRSA Detected         Test Results Pending at Discharge     Pending Results       Procedure [Order ID] Specimen - Date/Time    Potassium [[id number]]     Specimen: Blood               Discharge Details        Discharge Medications        New Medications        Instructions Start Date   amLODIPine 10  MG tablet  Commonly known as: NORVASC   10 mg, Oral, Every 24 Hours Scheduled   Start Date: Tiffanie 15, 2025     apixaban 5 MG tablet tablet  Commonly known as: ELIQUIS   5 mg, Oral, Every 12 Hours Scheduled      hydrALAZINE 25 MG tablet  Commonly known as: APRESOLINE   75 mg, Oral, Every 8 Hours Scheduled      lisinopril 20 MG tablet  Commonly known as: PRINIVIL,ZESTRIL   20 mg, Oral, Every 12 Hours Scheduled      spironolactone 25 MG tablet  Commonly known as: ALDACTONE   25 mg, Oral, Daily   Start Date: Tiffanie 15, 2025            Changes to Medications        Instructions Start Date   donepezil 10 MG tablet  Commonly known as: Aricept  What changed: Another medication with the same name was removed. Continue taking this medication, and follow the directions you see here.   10 mg, Oral, Daily             Continue These Medications        Instructions Start Date   acetaminophen 325 MG tablet  Commonly known as: TYLENOL   2 tablets, Every 6 Hours PRN      albuterol sulfate  (90 Base) MCG/ACT inhaler  Commonly known as: PROVENTIL HFA;VENTOLIN HFA;PROAIR HFA   2 puffs, Every 4 Hours PRN      atenolol 25 MG tablet  Commonly known as: TENORMIN   1 tablet, 2 Times Daily      atorvastatin 80 MG tablet  Commonly known as: LIPITOR   I Tablet Nightly      escitalopram 20 MG tablet  Commonly known as: LEXAPRO   20 mg, Oral, Daily      furosemide 20 MG tablet  Commonly known as: LASIX   20 mg, Oral, Daily      Glycerin-Hypromellose- 0.2-0.2-1 % solution ophthalmic solution  Commonly known as: ARTIFICIAL TEARS   1 drop, Both Eyes, Every 1 Hour PRN      latanoprost 0.005 % ophthalmic solution  Commonly known as: XALATAN   1 drop, Nightly      levETIRAcetam 500 MG tablet  Commonly known as: KEPPRA   500 mg, Oral, 2 Times Daily      levothyroxine 150 MCG tablet  Commonly known as: SYNTHROID, LEVOTHROID   150 mcg, Daily      Melatonin ER 10 MG tablet controlled-release   1 tablet, Every Night at Bedtime      NON  FORMULARY   Calcium 500mg  Vit D 200mg      Oyster Shell Calcium/D 500-5 MG-MCG tablet   1 tablet, 2 Times Daily      pantoprazole 40 MG EC tablet  Commonly known as: PROTONIX   TAKE ONE TABLET BY MOUTH DAILY      [facility] HEALTH PO   1 capsule, Daily      PRESERVISION AREDS 2 PO   1 capsule, 2 Times Daily      Symbicort 160-4.5 MCG/ACT inhaler  Generic drug: budesonide-formoterol   2 puffs, 2 Times Daily      Vitamin B 12 500 MCG tablet   2 tablets, Daily             Stop These Medications      aspirin 325 MG tablet     clopidogrel 75 MG tablet  Commonly known as: PLAVIX     gabapentin 400 MG capsule  Commonly known as: NEURONTIN     traMADol 50 MG tablet  Commonly known as: ULTRAM              Allergies   Allergen Reactions    Penicillin G Hives    Penicillins Rash       Discharge Disposition:  Skilled Nursing Facility (DC - External)      Discharge Diet:  Diet Order   Procedures    Diet: Regular/House; Feeding Assistance - Nursing; Texture: Soft to Chew (NDD 3); Soft to Chew: Ground Meat; Fluid Consistency: Nectar Thick       Discharge Activity: Activity as tolerated      CODE STATUS:    Code Status and Medical Interventions: No CPR (Do Not Attempt to Resuscitate); Limited Support; No intubation (DNI)   Ordered at: 06/04/25 4904     Code Status (Patient has no pulse and is not breathing):    No CPR (Do Not Attempt to Resuscitate)     Medical Interventions (Patient has pulse or is breathing):    Limited Support     Medical Intervention Limits:    No intubation (DNI)       Future Appointments   Date Time Provider Department Center   8/29/2025  2:40 PM Mike Buenrostro II, MD [facility]   10/16/2025 10:30 AM [facility] RM 1 [facility]   10/16/2025 10:45 AM Roque Forrest MD [facility]      Contact information for follow-up providers       Artem Shepherd MD .    Specialty: Internal Medicine  Contact information:  [address] [facility]  [address] [place], [place] [address] [phone]                        Contact information for after-discharge care       Destination       [place] .    Service: Skilled Nursing  Contact information:  [address] [facility] [address] [phone]                                   Time Spent on Discharge:  Greater than 30 minutes      Yadira Davis MD  [facility]  06/14/25  11:36 EDT

## 2025-06-14 NOTE — PLAN OF CARE
Goal Outcome Evaluation:  Plan of Care Reviewed With: patient           Outcome Evaluation: A/Ox4. NIH 3. VSS on RA. CPAP use when sleeping. BP remains elevated. Last BP at 178/66. Meds given with apple sauce. IV Zosyn Q8H. Pt slept well in between care through shift and had no c/o pain, SOA or discomfort. Turned and repositioned Q2H and as needed. Safety maintained. Will CTM.

## 2025-06-15 NOTE — CASE MANAGEMENT/SOCIAL WORK
Case Management Discharge Note      Final Note: DC to [facility] via Lori tena         Selected Continued Care - Discharged on 6/14/2025 Admission date: 6/4/2025 - Discharge disposition: Skilled Nursing Facility (DC - External)      Destination Coordination complete.      Service Provider Services Address Phone Fax Patient Preferred    [facility] [address] [id number] [phone] --              Durable Medical Equipment    No services have been selected for the patient.                Dialysis/Infusion    No services have been selected for the patient.                Home Medical Care    No services have been selected for the patient.                Therapy    No services have been selected for the patient.                Community Resources    No services have been selected for the patient.                Community & DME    No services have been selected for the patient.                    Transportation Services  [facility]    Final Discharge Disposition Code: 03 - skilled nursing facility (SNF)

## 2025-06-20 ENCOUNTER — PATIENT OUTREACH (OUTPATIENT)
Dept: CASE MANAGEMENT | Facility: OTHER | Age: 85
End: 2025-06-20
Payer: MEDICARE

## 2025-06-20 NOTE — OUTREACH NOTE
AMBULATORY CASE MANAGEMENT NOTE    Names and Relationships of Patient/Support Persons: Contact: Mai; Relationship: Other -     SNF Follow-up    Per Mai at [facility] there is no anticipated DC date at this time.         Flakita MOTT  Ambulatory Case Management    6/20/2025, 08:12 EDT

## 2025-06-27 ENCOUNTER — PATIENT OUTREACH (OUTPATIENT)
Dept: CASE MANAGEMENT | Facility: OTHER | Age: 85
End: 2025-06-27
Payer: MEDICARE

## 2025-06-27 NOTE — OUTREACH NOTE
AMBULATORY CASE MANAGEMENT NOTE    Names and Relationships of Patient/Support Persons: Contact: Mai; Relationship: Other -     SNF Follow-up  Incoming call from Mai with [facility]. There is no anticipated DC date at this time.           Flakita MOTT  Ambulatory Case Management    6/27/2025, 09:56 EDT

## 2025-07-18 ENCOUNTER — READMISSION MANAGEMENT (OUTPATIENT)
Dept: CALL CENTER | Facility: HOSPITAL | Age: 85
End: 2025-07-18
Payer: MEDICARE

## 2025-07-18 ENCOUNTER — PATIENT OUTREACH (OUTPATIENT)
Dept: CASE MANAGEMENT | Facility: OTHER | Age: 85
End: 2025-07-18
Payer: MEDICARE

## 2025-07-18 NOTE — OUTREACH NOTE
Prep Survey      Flowsheet Row Responses   Hindu facility patient discharged from? Non-BH   Is LACE score < 7 ? Non-BH Discharge   Eligibility [facility]   Date of Discharge 07/17/25   Discharge Disposition Home or Self Care   Discharge diagnosis Hypoxia   Does the patient have one of the following disease processes/diagnoses(primary or secondary)? Other   Prep survey completed? Yes            Cordelia NUNEZ - Registered Nurse

## 2025-07-18 NOTE — OUTREACH NOTE
SNF Follow-up    AMBULATORY CASE MANAGEMENT NOTE    Names and Relationships of Patient/Support Persons: Contact: Mai; Relationship: Other -     SNF Follow-up    Questions/Answers      Flowsheet Row Responses   Acute Facility Discharged From [facility]   Acute Discharge Date 06/25/25   Acute Facility Diagnoses Active Hospital Problems     Diagnosis     POA    **Hypoxia (R09.02)     Yes    Severe protein-calorie malnutrition (E43)     Yes    Altered mental state (R41.82)     Unknown    Chest discomfort (R07.89)     Yes    Elevated troponin (R79.89)     Yes    Acute CVA (cerebrovascular accident) (I63.9)     Yes    Former smoker (Z87.891)     Not Applicable    Hypothyroid (E03.9)     Yes    HLD (hyperlipidemia) (E78.5)     Yes    Primary hypertension (I10)     Yes   Name of the Skilled Nursing Facility? [facility]   Skilled Nursing Discharge Date? 07/17/25          Per Mai patient was discharged from [facility] 7/17/25.      Flakita MOTT  Ambulatory Case Management    7/18/2025, 09:45 EDT

## 2025-07-30 ENCOUNTER — TELEPHONE (OUTPATIENT)
Dept: NEUROLOGY | Facility: CLINIC | Age: 85
End: 2025-07-30

## 2025-08-31 PROBLEM — R62.7 FAILURE TO THRIVE IN ADULT: Status: ACTIVE | Noted: 2025-08-31

## (undated) DEVICE — KT NEURO CUST

## (undated) DEVICE — COPILOT BLEEDBACK CONTROL VALVE: Brand: COPILOT

## (undated) DEVICE — ANGIO-SEAL VIP VASCULAR CLOSURE DEVICE: Brand: ANGIO-SEAL

## (undated) DEVICE — STANDARD GUIDEWIRE WITH HYDROPHILIC COATING: Brand: SYNCHRO 2

## (undated) DEVICE — SPNG GZ WOVN 4X4IN 12PLY 10/BX STRL

## (undated) DEVICE — SKIN PREP TRAY W/CHG: Brand: MEDLINE INDUSTRIES, INC.

## (undated) DEVICE — TOWEL,OR,DSP,ST,BLUE,STD,4/PK,20PK/CS: Brand: MEDLINE

## (undated) DEVICE — PINNACLE R/O II INTRODUCER SHEATH WITH RADIOPAQUE MARKER: Brand: PINNACLE

## (undated) DEVICE — RADIFOCUS GLIDEWIRE: Brand: GLIDEWIRE

## (undated) DEVICE — STPCK 3WY HP ROT

## (undated) DEVICE — PK ANGIO CERBRL RAD 40

## (undated) DEVICE — SOL NACL 0.9PCT 1000ML

## (undated) DEVICE — CATH DEL MARKSMAN MICRO 2.8/3.2F 10X150

## (undated) DEVICE — PRESSURE MONITORING SET: Brand: TRUWAVE

## (undated) DEVICE — 1 ML TUBERCULIN SYRINGE REGULAR TIP: Brand: MONOJECT

## (undated) DEVICE — TBG ART PRESS 60 IN

## (undated) DEVICE — BALLOON GUIDE CATHETER: Brand: FLOWGATE2

## (undated) DEVICE — SHLD ANGIO 2LAYR CIR FEN

## (undated) DEVICE — NDL PERC 1PRT THNWALL W/BASEPLT 18G 7CM

## (undated) DEVICE — SYR CORNRY CONTRL 10ML

## (undated) DEVICE — RADIFOCUS TORQUE DEVICE MULTI-TORQUE VISE: Brand: RADIFOCUS TORQUE DEVICE

## (undated) DEVICE — GLV SURG BIOGEL M LTX PF 8 1/2

## (undated) DEVICE — Device

## (undated) DEVICE — EXTENSION SET, MALE LUER LOCK ADAPTER WITH RETRACTABLE COLLAR

## (undated) DEVICE — CONTAINER,SPECIMEN,OR STERILE,4OZ: Brand: MEDLINE

## (undated) DEVICE — MANIFLD 3PRT RT OFFHNDL 500PSI 34BAR

## (undated) DEVICE — CVR HNDL LT SURG ACCSSRY BLU STRL

## (undated) DEVICE — ST IV PRI VENOSET NV W/CLAMP 72IN

## (undated) DEVICE — BASN GW RINGMASTER